# Patient Record
Sex: MALE | Race: WHITE | NOT HISPANIC OR LATINO | Employment: OTHER | URBAN - NONMETROPOLITAN AREA
[De-identification: names, ages, dates, MRNs, and addresses within clinical notes are randomized per-mention and may not be internally consistent; named-entity substitution may affect disease eponyms.]

---

## 2022-03-15 ENCOUNTER — CONSULT (OUTPATIENT)
Dept: ONCOLOGY | Facility: CLINIC | Age: 67
End: 2022-03-15

## 2022-03-15 VITALS
TEMPERATURE: 99.1 F | RESPIRATION RATE: 18 BRPM | SYSTOLIC BLOOD PRESSURE: 165 MMHG | WEIGHT: 251 LBS | DIASTOLIC BLOOD PRESSURE: 78 MMHG | BODY MASS INDEX: 35.14 KG/M2 | HEIGHT: 71 IN | HEART RATE: 70 BPM | OXYGEN SATURATION: 93 %

## 2022-03-15 DIAGNOSIS — C61 PROSTATE CANCER METASTATIC TO MULTIPLE SITES: Primary | ICD-10-CM

## 2022-03-15 PROCEDURE — 99205 OFFICE O/P NEW HI 60 MIN: CPT | Performed by: INTERNAL MEDICINE

## 2022-03-15 RX ORDER — ONDANSETRON HYDROCHLORIDE 8 MG/1
8 TABLET, FILM COATED ORAL 3 TIMES DAILY PRN
Qty: 30 TABLET | Refills: 5 | Status: SHIPPED | OUTPATIENT
Start: 2022-03-15 | End: 2022-07-26 | Stop reason: SDUPTHER

## 2022-03-15 RX ORDER — BICALUTAMIDE 50 MG/1
TABLET, FILM COATED ORAL
COMMUNITY
Start: 2022-03-04 | End: 2022-04-28 | Stop reason: ALTCHOICE

## 2022-03-15 RX ORDER — DIPHENHYDRAMINE HYDROCHLORIDE 50 MG/ML
50 INJECTION INTRAMUSCULAR; INTRAVENOUS AS NEEDED
Status: CANCELLED | OUTPATIENT
Start: 2022-04-07

## 2022-03-15 RX ORDER — FAMOTIDINE 10 MG/ML
20 INJECTION, SOLUTION INTRAVENOUS AS NEEDED
Status: CANCELLED | OUTPATIENT
Start: 2022-04-07

## 2022-03-15 RX ORDER — DEXAMETHASONE 4 MG/1
TABLET ORAL
Qty: 12 TABLET | Refills: 5 | Status: SHIPPED | OUTPATIENT
Start: 2022-03-15 | End: 2022-07-26

## 2022-03-15 RX ORDER — TAMSULOSIN HYDROCHLORIDE 0.4 MG/1
CAPSULE ORAL
COMMUNITY
Start: 2022-03-02 | End: 2022-07-29 | Stop reason: ALTCHOICE

## 2022-03-15 RX ORDER — FINASTERIDE 5 MG/1
TABLET, FILM COATED ORAL
COMMUNITY
Start: 2022-02-24 | End: 2022-07-29 | Stop reason: ALTCHOICE

## 2022-03-15 RX ORDER — SODIUM CHLORIDE 9 MG/ML
250 INJECTION, SOLUTION INTRAVENOUS ONCE
Status: CANCELLED | OUTPATIENT
Start: 2022-04-07

## 2022-03-15 NOTE — PROGRESS NOTES
CHIEF COMPLAINT: Metastatic prostate cancer    REASON FOR REFERRAL: Metastatic prostate cancer      RECORDS OBTAINED  Records of the patients history including those obtained from urologist were reviewed and summarized in detail.    Oncology/Hematology History Overview Note   1.  Prostate cancer clinical T1c and 2M1B stage IVb  2.  Urinary retention with Goodwin in place 1/24/2022.  On finasteride 1/24/2022.  3.  Covid 19 December 2021    Oncology history timeline:  -11/29/2021  with symptoms of urinary retention.  -2/15/2022 prostate biopsy adenocarcinoma grade group 5 and 3 out of 12 cores, grade group 4 in 1 out of 12 cores, grade group 3 in 8 out of 12 cores.  -2/24/2022 CT chest abdomen pelvis and total body bone scan shows left third rib, right acetabulum, periaortic and retroperitoneal kizzy metastases complaining of pain in the left chest and right hip.  Also possible sclerotic left 10th rib on CT.  Spleen mildly enlarged 13.8 cm.  Hepatic steatosis.  Small liver cyst.  Fat stranding in the periaortic and mesenteric region consistent with reactive/inflammatory stranding.  Multiple enlarged periaortic and retroperitoneal nodes and lymphadenopathy largest 4.9 cm.  CT chest describes tiny sclerotic foci in left eighth rib and right lateral seventh rib and T1.  Multiple small mediastinal and bilateral axillary nodes seen with small hypodense left thyroid nodule.  Creatinine 1.7.  -3/4/2022 office note Dr. Rolando Matute: Patient was seen after his elevated PSA by Dr. Vera and prostate biopsy scheduled.  However, he developed COVID-19 and this was canceled and the patient did not reschedule due to lack of insurance.  Saw Dr. Matute back on 1/24/2022 with plans to get staging CTs and bone scan with results as outlined above.  Started Casodex and to return on 3/11/2022 for Lupron.  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  TURP planned for urinary  retention symptoms.    -3/15/2022 [facility] medical oncology initial consultation: I reviewed the above data with the patient.  With his fairly bulky retroperitoneal adenopathy and bone metastases including extra axial metastases, he would fit the data from the CHAARTED trial for which Taxotere x6 followed by Zytiga prednisone along with the planned Lupron already being planned by Dr. Matute would be reasonable.  Equally reasonable in terms of comparisons with bicalutamide and Lupron would be Zytiga prednisone plus Lupron or Xtandi plus Lupron or apalutamide plus Lupron.  None of these have been compared head-to-head but all have beaten combined androgen deprivation therapy with bicalutamide and Lupron.  At the age of 67 and in order to have as many bullets as possible down the road and hence saving some of the hormone blockade options for later and using chemotherapy when he is younger and healthier for a more time-limited.,  He has opted for the Taxotere x6 followed by Zytiga and prednisone.  We will also give him Xgeva to improve bone health and given metastatic disease, as with all metastatic prostate cancer patients, he needs genetic counseling both for his sake as well as his family's.  If he were to have BRCA1 or other such  mutations, then that also opens up the options for PARP inhibitors etc. down the road.  He has his TURP scheduled for 2/24/2022 and we will start treatment a couple of weeks after that and he will get chemo preparation visit in the meantime and I will get capital surgeons to place a port.     Prostate cancer metastatic to multiple sites (HCC)   3/15/2022 Initial Diagnosis    Prostate cancer metastatic to multiple sites (HCC)     3/15/2022 Cancer Staged    Staging form: Prostate, AJCC 8th Edition  - Clinical: Stage IVB (cT1c, cN1, cM1b, Grade Group: 5) - Signed by Fritz Goodson MD on 3/15/2022         HISTORY OF PRESENT ILLNESS:  The patient is a 67 y.o.  male, referred for  "discussions of management of metastatic prostate cancer    REVIEW OF SYSTEMS:  No somatic complaints    History reviewed. No pertinent past medical history.  Past Surgical History:   Procedure Laterality Date   • PROSTATE BIOPSY  02/2022    dr. sue       Current Outpatient Medications on File Prior to Visit   Medication Sig Dispense Refill   • bicalutamide (CASODEX) 50 MG chemo tablet      • finasteride (PROSCAR) 5 MG tablet      • tamsulosin (FLOMAX) 0.4 MG capsule 24 hr capsule        No current facility-administered medications on file prior to visit.       No Known Allergies    Social History     Socioeconomic History   • Marital status: Single   Tobacco Use   • Smoking status: Never Smoker   Substance and Sexual Activity   • Alcohol use: Not Currently     Comment: no drinking x's 20 years   • Drug use: Yes     Types: Marijuana     Comment: x 45 years       Family History   Problem Relation Age of Onset   • Cancer Sister    • Cancer Brother        PHYSICAL EXAM:  No respiratory distress.  No palpable adenopathy    /78   Pulse 70   Temp 99.1 °F (37.3 °C)   Resp 18   Ht 180.3 cm (71\")   Wt 114 kg (251 lb)   SpO2 93%   BMI 35.01 kg/m²                 ECOG: (0) Fully Active - Able to Carry On All Pre-disease Performance Without Restriction    No results found for: HGB, HCT, MCV, PLT, WBC, NEUTROABS, LYMPHSABS, MONOSABS, EOSABS, BASOSABS  No results found for: GLUCOSE, BUN, CREATININE, NA, K, CL, CO2, CALCIUM, PROTEINTOT, ALBUMIN, BILITOT, ALKPHOS, AST, ALT      Assessment/Plan   1.  Prostate cancer clinical T1c and 2M1B stage IVb  2.  Urinary retention with Goodwin in place 1/24/2022.  On finasteride 1/24/2022.  3.  Covid 19 December 2021    Oncology history timeline:  -11/29/2021  with symptoms of urinary retention.  -2/15/2022 prostate biopsy adenocarcinoma grade group 5 and 3 out of 12 cores, grade group 4 in 1 out of 12 cores, grade group 3 in 8 out of 12 cores.  -2/24/2022 CT chest " abdomen pelvis and total body bone scan shows left third rib, right acetabulum, periaortic and retroperitoneal kizzy metastases complaining of pain in the left chest and right hip.  Also possible sclerotic left 10th rib on CT.  Spleen mildly enlarged 13.8 cm.  Hepatic steatosis.  Small liver cyst.  Fat stranding in the periaortic and mesenteric region consistent with reactive/inflammatory stranding.  Multiple enlarged periaortic and retroperitoneal nodes and lymphadenopathy largest 4.9 cm.  CT chest describes tiny sclerotic foci in left eighth rib and right lateral seventh rib and T1.  Multiple small mediastinal and bilateral axillary nodes seen with small hypodense left thyroid nodule.  Creatinine 1.7.  -3/4/2022 office note Dr. Rolando Matute: Patient was seen after his elevated PSA by Dr. Vera and prostate biopsy scheduled.  However, he developed COVID-19 and this was canceled and the patient did not reschedule due to lack of insurance.  Saw Dr. Matute back on 1/24/2022 with plans to get staging CTs and bone scan with results as outlined above.  Started Casodex and to return on 3/11/2022 for Lupron.  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  TURP planned for urinary retention symptoms.    -3/15/2022 [facility] initial consultation: I reviewed the above data with the patient.  With his fairly bulky retroperitoneal adenopathy and bone metastases including extra axial metastases, he would fit the data from the CHAARTED trial for which Taxotere x6 followed by Zytiga prednisone along with the planned Lupron already being planned by Dr. Matute would be reasonable.  Equally reasonable in terms of comparisons with bicalutamide and Lupron would be Zytiga prednisone plus Lupron or Xtandi plus Lupron or apalutamide plus Lupron.  None of these have been compared head-to-head but all have beaten combined androgen deprivation therapy with bicalutamide  and Lupron.  At the age of 67 and in order to have as many bullets as possible down the road and hence saving some of the hormone blockade options for later and using chemotherapy when he is younger and healthier for a more time-limited.,  He has opted for the Taxotere x6 followed by Zytiga and prednisone.  We will also give him Xgeva to improve bone health and given metastatic disease, as with all metastatic prostate cancer patients, he needs genetic counseling both for his sake as well as his family's.  If he were to have BRCA1 or other such  mutations, then that also opens up the options for PARP inhibitors etc. down the road.He has his TURP scheduled for 2/24/2022 and we will start treatment a couple of weeks after that and he will get chemo preparation visit in the meantime and I will get capital surgeons to place a port.  We will check his PSA after his TURP when he sees my nurse practitioner back early April for the start of chemotherapy and repeat the PSA and CT chest abdomen pelvis and bone scan after the Taxotere is complete.      Total time of care today inclusive of time spent today reviewing this data in detail with the patient and after visit instituting this plan and the various options for therapy and the pros and cons for each including toxicities and the fact that none of them are curative took 1 hour of patient care time throughout the day today.      Fritz Goodson MD    3/15/2022

## 2022-03-31 ENCOUNTER — OFFICE VISIT (OUTPATIENT)
Dept: ONCOLOGY | Facility: CLINIC | Age: 67
End: 2022-03-31

## 2022-03-31 VITALS
RESPIRATION RATE: 20 BRPM | BODY MASS INDEX: 35.28 KG/M2 | HEART RATE: 68 BPM | WEIGHT: 252 LBS | DIASTOLIC BLOOD PRESSURE: 76 MMHG | HEIGHT: 71 IN | OXYGEN SATURATION: 93 % | TEMPERATURE: 97.6 F | SYSTOLIC BLOOD PRESSURE: 149 MMHG

## 2022-03-31 DIAGNOSIS — Z95.828 PORT-A-CATH IN PLACE: ICD-10-CM

## 2022-03-31 DIAGNOSIS — C61 PROSTATE CANCER METASTATIC TO MULTIPLE SITES: Primary | ICD-10-CM

## 2022-03-31 PROCEDURE — 99215 OFFICE O/P EST HI 40 MIN: CPT | Performed by: NURSE PRACTITIONER

## 2022-03-31 RX ORDER — LIDOCAINE AND PRILOCAINE 25; 25 MG/G; MG/G
1 CREAM TOPICAL AS NEEDED
Qty: 30 G | Refills: 3 | Status: SHIPPED | OUTPATIENT
Start: 2022-03-31

## 2022-03-31 NOTE — PROGRESS NOTES
CHEMOTHERAPY PREPARATION    Chris Morfin  [id number]  1955    Subjective   Chief Complaint: Treatment Preparation and Needs Assessment    History of present illness:  Chris Morfin is a 67 y.o. year old male who is here today for chemotherapy preparation and needs assessment. The patient has been diagnosed with metastatic prostate cancer and is scheduled to begin treatment with Taxotere.  He will also be receiving Xgeva.    Oncology History:    Oncology/Hematology History Overview Note   1.  Prostate cancer clinical T1c and 2M1B stage IVb  2.  Urinary retention with Goodwin in place 1/24/2022.  On finasteride 1/24/2022.  3.  Covid 19 December 2021    Oncology history timeline:  -11/29/2021  with symptoms of urinary retention.  -2/15/2022 prostate biopsy adenocarcinoma grade group 5 and 3 out of 12 cores, grade group 4 in 1 out of 12 cores, grade group 3 in 8 out of 12 cores.  -2/24/2022 CT chest abdomen pelvis and total body bone scan shows left third rib, right acetabulum, periaortic and retroperitoneal kizzy metastases complaining of pain in the left chest and right hip.  Also possible sclerotic left 10th rib on CT.  Spleen mildly enlarged 13.8 cm.  Hepatic steatosis.  Small liver cyst.  Fat stranding in the periaortic and mesenteric region consistent with reactive/inflammatory stranding.  Multiple enlarged periaortic and retroperitoneal nodes and lymphadenopathy largest 4.9 cm.  CT chest describes tiny sclerotic foci in left eighth rib and right lateral seventh rib and T1.  Multiple small mediastinal and bilateral axillary nodes seen with small hypodense left thyroid nodule.  Creatinine 1.7.  -3/4/2022 office note Dr. Rolando Matute: Patient was seen after his elevated PSA by Dr. Vera and prostate biopsy scheduled.  However, he developed COVID-19 and this was canceled and the patient did not reschedule due to lack of insurance.  Saw Dr. Matute back on 1/24/2022 with plans to get staging CTs and  bone scan with results as outlined above.  Started Casodex and to return on 3/11/2022 for Lupron.  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  TURP planned for urinary retention symptoms.    -3/15/2022 [facility] medical oncology initial consultation: I reviewed the above data with the patient.  With his fairly bulky retroperitoneal adenopathy and bone metastases including extra axial metastases, he would fit the data from the CHAARTED trial for which Taxotere x6 followed by Zytiga prednisone along with the planned Lupron already being planned by Dr. Matute would be reasonable.  Equally reasonable in terms of comparisons with bicalutamide and Lupron would be Zytiga prednisone plus Lupron or Xtandi plus Lupron or apalutamide plus Lupron.  None of these have been compared head-to-head but all have beaten combined androgen deprivation therapy with bicalutamide and Lupron.  At the age of 67 and in order to have as many bullets as possible down the road and hence saving some of the hormone blockade options for later and using chemotherapy when he is younger and healthier for a more time-limited.,  He has opted for the Taxotere x6 followed by Zytiga and prednisone.  We will also give him Xgeva to improve bone health and given metastatic disease, as with all metastatic prostate cancer patients, he needs genetic counseling both for his sake as well as his family's.  If he were to have BRCA1 or other such  mutations, then that also opens up the options for PARP inhibitors etc. down the road.  He has his TURP scheduled for 2/24/2022 and we will start treatment a couple of weeks after that and he will get chemo preparation visit in the meantime and I will get capital surgeons to place a port.  We will check his PSA after his TURP when he sees my nurse practitioner back early April for the start of chemotherapy and repeat the PSA and CT chest abdomen pelvis and bone  "scan after the Taxotere is complete.     Prostate cancer metastatic to multiple sites (HCC)   3/15/2022 Initial Diagnosis    Prostate cancer metastatic to multiple sites (HCC)     3/15/2022 Cancer Staged    Staging form: Prostate, AJCC 8th Edition  - Clinical: Stage IVB (cT1c, cN1, cM1b, Grade Group: 5) - Signed by Fritz Goodson MD on 3/15/2022     4/7/2022 -  Chemotherapy    OP PROSTATE DOCEtaxel     4/7/2022 -  Chemotherapy    OP SUPPORTIVE Denosumab (Xgeva) Q28D     8/11/2022 -  Chemotherapy    OP PROSTATE Abiraterone / PredniSONE         The current medication list and allergy list were reviewed and reconciled.     Past Medical History, Past Surgical History, Social History, Family History have been reviewed and are without significant changes except as mentioned.    Review of Systems    Negative for somatic concerns    Objective   Physical Exam  Vital Signs: /76   Pulse 68   Temp 97.6 °F (36.4 °C)   Resp 20   Ht 180.3 cm (71\")   Wt 114 kg (252 lb)   SpO2 93%   BMI 35.15 kg/m²   Vitals:    03/31/22 0930   PainSc: 0-No pain           General Appearance:  alert, cooperative, no apparent distress and appears stated age   Neurologic/Psychiatric: A&O x 3, gait steady, appropriate affect   HEENT:  Normocephalic, without obvious abnormality, mucous membranes moist   Lungs:   Respirations regular, even, and unlabored bilaterally       Extremities: Normal, atraumatic; no clubbing, cyanosis, or edema    Skin: No rashes, lesions, or abnormal coloration noted     ECOG Performance Status: 0 - Asymptomatic            NEEDS ASSESSMENTS    Genetics  The patient's new diagnosis and family history have been reviewed for genetic counseling needs. A genetic referral is recommended.  Referral has been made, appointment pending.      Psychosocial  The patient has completed a PHQ-9 Depression Screening and the Distress Thermometer (DT) today.   PHQ-9 Total Score:  . PHQ-9 results show 1-4 (Minimal Depression). The patient " scored their distress today as 1 on a scale of 0-10 with 0 being no distress and 10 being extreme distress.   Problems marked by the patient as being an issue for them within the last week include no problems.   Results were reviewed along with psychosocial resources offered by our cancer center. Our oncology social worker will be flagged for a DT score of 4 or above, and a same day call will be made for a score of 9 or 10. A mental health referral is not recommended at this time. The patient is not accepting of a referral to [facility].   Copies of patient's questionnaires will be scanned into EMR for details and further reference.    Barriers to care  A barriers form was also completed by the patient today. We discussed services offered by our facility to help him have adequate access to care. The patient was given the name and card for our Oncology Social Worker, Ivana Clark. Based upon barriers assessment today, the patient will not require a follow-up call from the  to further discuss needs.   A copy of the barriers form will also be scanned into EMR for details and further reference.     VAD Assessment  The patient and I discussed planned intervenous chemotherapy as well as other IV treatments that are often needed throughout the course of treatment. These may include, but are not limited to blood transfusions, antibiotics, and IV hydration. The vasculature does not appear to be adequate for multiple peripheral IVs throughout their treatment course. Discussed risks and benefits of VADs. The patient would like to pursue Port-A-Cath insertion prior to initiation of treatment.   He now has port in place in his right chest wall.  Dressing on port site is clean, dry.  I discussed with him that since his port was placed 1 week ago he can remove the Tegaderm dressing, I instructed him to leave any Steri-Strips in place.  I also have sent a prescription for EMLA cream and instructed him on how  "to apply it.      Advance Care Planning   ACP discussion was declined by the patient. Patient does not have an advance directive, information provided.  The patient and I discussed advanced care planning, \"Conversations that Matter\".   This service was offered, free of charge, for development of advance directives with a certified ACP facilitator.  The patient does not have an up-to-date advanced directive. This document is not on file with our office. The patient is not interested in an appointment with one of our facilitators to create or update their advanced directives.         Palliative Care  The patient and I discussed palliative care services. Palliative care is not the same as Hospice care. This is specialized medical care for people living with serious illness with the goal of improving quality of life for the patient and their family. [facility] has partnered with [facility] to offer our patients outpatient palliative care early along with their treatment to assist in coordination of care, symptom management, pain management, and medical decision making.  Oncology criteria for palliative care referral is not met at this time. The patient is not interested in a palliative care consultation.     Additional Referral needs  none      CHEMOTHERAPY EDUCATION    Booklets Given: Chemotherapy and You []  Eating Hints []    Sexuality/Fertility Books []      Chemotherapy/Biotherapy Education Sheets: (list all that apply)  nausea management, diarrhea management, Cancer resourse contacts information and skin and mouth care                                                                                                                                                                 Chemotherapy Regimen:   Treatment Plans     Name Type Plan Dates Plan Provider         Active    OP SUPPORTIVE Denosumab (Xgeva) Q28D ONCOLOGY SUPPORTIVE CARE 1  4/7/2022 - Present Fritz Goodson MD     OP PROSTATE DOCEtaxel " ONCOLOGY TREATMENT  3/15/2022 - Present Fritz Goodson MD                    DETAILED CHEMOTHERAPY TEACHING COMPLETED BY PHARMACY. CHEMOTHERAPY CONSENT COMPLETED BY PHARMACY. SEE PHARMACY EDUCATION FOR DOCUMENTATION.     Chemotherapy education comprehension reviewed. Questions answered and additional information discussed on topics including:  Anemia, Thrombocytopenia, Neutropenia, Diarrhea, Nausea & vomiting, Mouth sores, Alopecia, Nervous system changes, Skin & nail changes, Home care, Vaccinations and we discussed the COVID vaccine of which I recommend he get.  He states he will consider but likely will not get        Assessment and Plan:    Diagnoses and all orders for this visit:    1. Prostate cancer metastatic to multiple sites (HCC) (Primary)  -     CBC and Differential; Future  -     Comprehensive metabolic panel; Future  -     Lab Appointment Request; Future  -     Clinic Appointment Request; Future  -     Infusion Appointment Request 3; Future  -     lidocaine-prilocaine (EMLA) 2.5-2.5 % cream; Apply 1 application topically to the appropriate area as directed As Needed (prior to port access).  Dispense: 30 g; Refill: 3    2. Port-A-Cath in place  -     lidocaine-prilocaine (EMLA) 2.5-2.5 % cream; Apply 1 application topically to the appropriate area as directed As Needed (prior to port access).  Dispense: 30 g; Refill: 3        I reviewed the medications he will take at home including dexamethasone that he will take with each treatment, the day before, day of treatment and day after treatment.  I also sent a prescription for EMLA cream today.  He also has Zofran at home to use if needed for nausea.  He is scheduled for lab draw including PSA and port flush on Tuesday, I will see him back Thursday for his first treatment to review his labs with him and address any further questions.    The patient and I have reviewed their new cancer diagnosis and scheduled treatment plan. Needs assessment was completed  including genetics, psychosocial needs, barriers to care, VAD evaluation, advanced care planning, and palliative care services. Referrals have been ordered as appropriate based upon our evaluation and patient desires.     Adequate time was given to answer all questions to his satisfaction. Patient and family are aware of their care team members and contact information if they have questions or problems throughout the treatment course. Needs assessments and education has been completed. The patient is adequately prepared to begin treatment as scheduled.     I spent 45 minutes caring for Chris on this date of service. This time includes time spent by me in the following activities: preparing for the visit, reviewing tests, obtaining and/or reviewing a separately obtained history, performing a medically appropriate examination and/or evaluation, counseling and educating the patient/family/caregiver and documenting information in the medical record.     Electronically signed by KARYN Weber on 03/31/22 at 11:41 EDT.

## 2022-04-05 ENCOUNTER — INFUSION (OUTPATIENT)
Dept: ONCOLOGY | Facility: HOSPITAL | Age: 67
End: 2022-04-05

## 2022-04-05 VITALS
HEART RATE: 79 BPM | TEMPERATURE: 99.1 F | BODY MASS INDEX: 35.73 KG/M2 | DIASTOLIC BLOOD PRESSURE: 84 MMHG | WEIGHT: 256.2 LBS | SYSTOLIC BLOOD PRESSURE: 138 MMHG | RESPIRATION RATE: 18 BRPM

## 2022-04-05 DIAGNOSIS — Z45.2 ENCOUNTER FOR CARE RELATED TO VASCULAR ACCESS PORT: ICD-10-CM

## 2022-04-05 DIAGNOSIS — C61 PROSTATE CANCER METASTATIC TO MULTIPLE SITES: Primary | ICD-10-CM

## 2022-04-05 PROCEDURE — 25010000002 HEPARIN LOCK FLUSH PER 10 UNITS: Performed by: INTERNAL MEDICINE

## 2022-04-05 PROCEDURE — 36591 DRAW BLOOD OFF VENOUS DEVICE: CPT

## 2022-04-05 RX ORDER — HEPARIN SODIUM (PORCINE) LOCK FLUSH IV SOLN 100 UNIT/ML 100 UNIT/ML
500 SOLUTION INTRAVENOUS AS NEEDED
Status: CANCELLED | OUTPATIENT
Start: 2022-04-05

## 2022-04-05 RX ORDER — SODIUM CHLORIDE 0.9 % (FLUSH) 0.9 %
20 SYRINGE (ML) INJECTION AS NEEDED
Status: CANCELLED | OUTPATIENT
Start: 2022-04-05

## 2022-04-05 RX ORDER — HEPARIN SODIUM (PORCINE) LOCK FLUSH IV SOLN 100 UNIT/ML 100 UNIT/ML
500 SOLUTION INTRAVENOUS AS NEEDED
Status: DISCONTINUED | OUTPATIENT
Start: 2022-04-05 | End: 2022-04-05 | Stop reason: HOSPADM

## 2022-04-05 RX ADMIN — HEPARIN 500 UNITS: 100 SYRINGE at 08:40

## 2022-04-06 LAB
ALBUMIN SERPL-MCNC: 4.1 G/DL (ref 3.8–4.8)
ALBUMIN/GLOB SERPL: 1.5 {RATIO} (ref 1.2–2.2)
ALP SERPL-CCNC: 119 IU/L (ref 44–121)
ALT SERPL-CCNC: 11 IU/L (ref 0–44)
AST SERPL-CCNC: 15 IU/L (ref 0–40)
BASOPHILS # BLD AUTO: 0.1 X10E3/UL (ref 0–0.2)
BASOPHILS NFR BLD AUTO: 2 %
BILIRUB SERPL-MCNC: 0.3 MG/DL (ref 0–1.2)
BUN SERPL-MCNC: 21 MG/DL (ref 8–27)
BUN/CREAT SERPL: 16 (ref 10–24)
CALCIUM SERPL-MCNC: 8.7 MG/DL (ref 8.6–10.2)
CHLORIDE SERPL-SCNC: 105 MMOL/L (ref 96–106)
CO2 SERPL-SCNC: 19 MMOL/L (ref 20–29)
CREAT SERPL-MCNC: 1.34 MG/DL (ref 0.76–1.27)
EGFRCR SERPLBLD CKD-EPI 2021: 58 ML/MIN/1.73
EOSINOPHIL # BLD AUTO: 0.4 X10E3/UL (ref 0–0.4)
EOSINOPHIL NFR BLD AUTO: 6 %
ERYTHROCYTE [DISTWIDTH] IN BLOOD BY AUTOMATED COUNT: 13.5 % (ref 11.6–15.4)
GLOBULIN SER CALC-MCNC: 2.7 G/DL (ref 1.5–4.5)
GLUCOSE SERPL-MCNC: 115 MG/DL (ref 65–99)
HCT VFR BLD AUTO: 39.1 % (ref 37.5–51)
HGB BLD-MCNC: 13.1 G/DL (ref 13–17.7)
IMM GRANULOCYTES # BLD AUTO: 0 X10E3/UL (ref 0–0.1)
IMM GRANULOCYTES NFR BLD AUTO: 0 %
LYMPHOCYTES # BLD AUTO: 2.1 X10E3/UL (ref 0.7–3.1)
LYMPHOCYTES NFR BLD AUTO: 30 %
MAGNESIUM SERPL-MCNC: 2 MG/DL (ref 1.6–2.3)
MCH RBC QN AUTO: 29 PG (ref 26.6–33)
MCHC RBC AUTO-ENTMCNC: 33.5 G/DL (ref 31.5–35.7)
MCV RBC AUTO: 87 FL (ref 79–97)
MONOCYTES # BLD AUTO: 0.6 X10E3/UL (ref 0.1–0.9)
MONOCYTES NFR BLD AUTO: 8 %
NEUTROPHILS # BLD AUTO: 3.7 X10E3/UL (ref 1.4–7)
NEUTROPHILS NFR BLD AUTO: 54 %
PHOSPHATE SERPL-MCNC: 3.2 MG/DL (ref 2.8–4.1)
PLATELET # BLD AUTO: 281 X10E3/UL (ref 150–450)
POTASSIUM SERPL-SCNC: 4.2 MMOL/L (ref 3.5–5.2)
PROT SERPL-MCNC: 6.8 G/DL (ref 6–8.5)
PSA SERPL-MCNC: 259 NG/ML (ref 0–4)
RBC # BLD AUTO: 4.52 X10E6/UL (ref 4.14–5.8)
SODIUM SERPL-SCNC: 140 MMOL/L (ref 134–144)
WBC # BLD AUTO: 6.8 X10E3/UL (ref 3.4–10.8)

## 2022-04-07 ENCOUNTER — OFFICE VISIT (OUTPATIENT)
Dept: ONCOLOGY | Facility: CLINIC | Age: 67
End: 2022-04-07

## 2022-04-07 ENCOUNTER — INFUSION (OUTPATIENT)
Dept: ONCOLOGY | Facility: HOSPITAL | Age: 67
End: 2022-04-07

## 2022-04-07 VITALS
TEMPERATURE: 97.2 F | HEIGHT: 71 IN | RESPIRATION RATE: 18 BRPM | WEIGHT: 257 LBS | DIASTOLIC BLOOD PRESSURE: 76 MMHG | HEART RATE: 82 BPM | OXYGEN SATURATION: 97 % | BODY MASS INDEX: 35.98 KG/M2 | SYSTOLIC BLOOD PRESSURE: 148 MMHG

## 2022-04-07 DIAGNOSIS — C61 PROSTATE CANCER METASTATIC TO MULTIPLE SITES: Primary | ICD-10-CM

## 2022-04-07 DIAGNOSIS — Z45.2 ENCOUNTER FOR CARE RELATED TO VASCULAR ACCESS PORT: ICD-10-CM

## 2022-04-07 PROCEDURE — 99213 OFFICE O/P EST LOW 20 MIN: CPT | Performed by: NURSE PRACTITIONER

## 2022-04-07 PROCEDURE — 25010000002 DENOSUMAB 120 MG/1.7ML SOLUTION: Performed by: INTERNAL MEDICINE

## 2022-04-07 PROCEDURE — 96372 THER/PROPH/DIAG INJ SC/IM: CPT

## 2022-04-07 PROCEDURE — 25010000002 DOCETAXEL 20 MG/ML SOLUTION 8 ML VIAL: Performed by: INTERNAL MEDICINE

## 2022-04-07 PROCEDURE — 96413 CHEMO IV INFUSION 1 HR: CPT

## 2022-04-07 PROCEDURE — 25010000002 HEPARIN LOCK FLUSH PER 10 UNITS: Performed by: INTERNAL MEDICINE

## 2022-04-07 RX ORDER — HEPARIN SODIUM (PORCINE) LOCK FLUSH IV SOLN 100 UNIT/ML 100 UNIT/ML
500 SOLUTION INTRAVENOUS AS NEEDED
Status: DISCONTINUED | OUTPATIENT
Start: 2022-04-07 | End: 2022-04-07 | Stop reason: HOSPADM

## 2022-04-07 RX ORDER — SODIUM CHLORIDE 9 MG/ML
250 INJECTION, SOLUTION INTRAVENOUS ONCE
Status: COMPLETED | OUTPATIENT
Start: 2022-04-07 | End: 2022-04-07

## 2022-04-07 RX ORDER — HEPARIN SODIUM (PORCINE) LOCK FLUSH IV SOLN 100 UNIT/ML 100 UNIT/ML
500 SOLUTION INTRAVENOUS AS NEEDED
Status: CANCELLED | OUTPATIENT
Start: 2022-04-07

## 2022-04-07 RX ORDER — SODIUM CHLORIDE 0.9 % (FLUSH) 0.9 %
20 SYRINGE (ML) INJECTION AS NEEDED
Status: CANCELLED | OUTPATIENT
Start: 2022-04-07

## 2022-04-07 RX ADMIN — DOCETAXEL 175 MG: 20 INJECTION, SOLUTION, CONCENTRATE INTRAVENOUS at 09:28

## 2022-04-07 RX ADMIN — Medication 500 UNITS: at 10:38

## 2022-04-07 RX ADMIN — SODIUM CHLORIDE 250 ML: 9 INJECTION, SOLUTION INTRAVENOUS at 09:28

## 2022-04-07 RX ADMIN — DENOSUMAB 120 MG: 120 INJECTION SUBCUTANEOUS at 10:39

## 2022-04-07 NOTE — PROGRESS NOTES
CHIEF COMPLAINT: Metastatic prostate cancer    Problem List:  Oncology/Hematology History Overview Note   1.  Prostate cancer clinical T1c and 2M1B stage IVb  2.  Urinary retention with Goodwin in place 1/24/2022.  On finasteride 1/24/2022.  3.  Covid 19 December 2021    Oncology history timeline:  -11/29/2021  with symptoms of urinary retention.  -2/15/2022 prostate biopsy adenocarcinoma grade group 5 and 3 out of 12 cores, grade group 4 in 1 out of 12 cores, grade group 3 in 8 out of 12 cores.  -2/24/2022 CT chest abdomen pelvis and total body bone scan shows left third rib, right acetabulum, periaortic and retroperitoneal kizzy metastases complaining of pain in the left chest and right hip.  Also possible sclerotic left 10th rib on CT.  Spleen mildly enlarged 13.8 cm.  Hepatic steatosis.  Small liver cyst.  Fat stranding in the periaortic and mesenteric region consistent with reactive/inflammatory stranding.  Multiple enlarged periaortic and retroperitoneal nodes and lymphadenopathy largest 4.9 cm.  CT chest describes tiny sclerotic foci in left eighth rib and right lateral seventh rib and T1.  Multiple small mediastinal and bilateral axillary nodes seen with small hypodense left thyroid nodule.  Creatinine 1.7.  -3/4/2022 office note Dr. Rolando Matute: Patient was seen after his elevated PSA by Dr. Vera and prostate biopsy scheduled.  However, he developed COVID-19 and this was canceled and the patient did not reschedule due to lack of insurance.  Saw Dr. Matute back on 1/24/2022 with plans to get staging CTs and bone scan with results as outlined above.  Started Casodex and to return on 3/11/2022 for Lupron.  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  TURP planned for urinary retention symptoms.    -3/15/2022 [facility] initial consultation: I reviewed the above data with the patient.  With his fairly bulky retroperitoneal  adenopathy and bone metastases including extra axial metastases, he would fit the data from the CHAARTED trial for which Taxotere x6 followed by Zytiga prednisone along with the planned Lupron already being planned by Dr. Matute would be reasonable.  Equally reasonable in terms of comparisons with bicalutamide and Lupron would be Zytiga prednisone plus Lupron or Xtandi plus Lupron or apalutamide plus Lupron.  None of these have been compared head-to-head but all have beaten combined androgen deprivation therapy with bicalutamide and Lupron.  At the age of 67 and in order to have as many bullets as possible down the road and hence saving some of the hormone blockade options for later and using chemotherapy when he is younger and healthier for a more time-limited.,  He has opted for the Taxotere x6 followed by Zytiga and prednisone.  We will also give him Xgeva to improve bone health and given metastatic disease, as with all metastatic prostate cancer patients, he needs genetic counseling both for his sake as well as his family's.  If he were to have BRCA1 or other such  mutations, then that also opens up the options for PARP inhibitors etc. down the road.  He has his TURP scheduled for 2/24/2022 and we will start treatment a couple of weeks after that and he will get chemo preparation visit in the meantime and I will get capital surgeons to place a port.  We will check his PSA after his TURP when he sees my nurse practitioner back early April for the start of chemotherapy and repeat the PSA and CT chest abdomen pelvis and bone scan after the Taxotere is complete.     Prostate cancer metastatic to multiple sites (HCC)   3/15/2022 Initial Diagnosis    Prostate cancer metastatic to multiple sites (HCC)     3/15/2022 Cancer Staged    Staging form: Prostate, AJCC 8th Edition  - Clinical: Stage IVB (cT1c, cN1, cM1b, Grade Group: 5) - Signed by Fritz Goodson MD on 3/15/2022     4/7/2022 -  Chemotherapy    OP  "PROSTATE DOCEtaxel     4/7/2022 -  Chemotherapy    OP SUPPORTIVE Denosumab (Xgeva) Q28D     8/11/2022 -  Chemotherapy    OP PROSTATE Abiraterone / PredniSONE         HISTORY OF PRESENT ILLNESS:  The patient is a 67 y.o. male, here for follow up on management of metastatic prostate cancer beginning systemic therapy today with docetaxel and Xgeva.  Mr. Morfin reports that he is a little anxious about starting treatment but otherwise feeling well with no new concerns.    History reviewed. No pertinent past medical history.  Past Surgical History:   Procedure Laterality Date   • PROSTATE BIOPSY  02/2022    dr. sue       No Known Allergies    Family History and Social History reviewed and changed as necessary    REVIEW OF SYSTEM:   No new concerns    PHYSICAL EXAM:  General: Well-developed, well-nourished male in no distress, here with his niece today    Vitals:    04/07/22 0816   BP: 148/76   Pulse: 82   Resp: 18   Temp: 97.2 °F (36.2 °C)   SpO2: 97%   Weight: 117 kg (257 lb)   Height: 180.3 cm (71\")     Vitals:    04/07/22 0816   PainSc: 0-No pain          ECOG score: 0           Vitals reviewed.    ECOG: (0) Fully Active - Able to Carry On All Pre-disease Performance Without Restriction    Lab Results   Component Value Date    HGB 13.1 04/05/2022    HCT 39.1 04/05/2022    MCV 87 04/05/2022     04/05/2022    WBC 6.8 04/05/2022    NEUTROABS 3.7 04/05/2022    LYMPHSABS 2.1 04/05/2022    MONOSABS 0.6 04/05/2022    EOSABS 0.4 04/05/2022    BASOSABS 0.1 04/05/2022       Lab Results   Component Value Date    GLUCOSE 115 (H) 04/05/2022    BUN 21 04/05/2022    CREATININE 1.34 (H) 04/05/2022     04/05/2022    K 4.2 04/05/2022     04/05/2022    CO2 19 (L) 04/05/2022    CALCIUM 8.7 04/05/2022    ALBUMIN 4.1 04/05/2022    BILITOT 0.3 04/05/2022    ALKPHOS 119 04/05/2022    AST 15 04/05/2022    ALT 11 04/05/2022     .0, magnesium 2.0, phosphorus 3.2.        ASSESSMENT & PLAN:    1.  Prostate cancer " clinical T1c and 2M1B stage IVb  2.  Urinary retention with Goodwin in place 1/24/2022.  On finasteride 1/24/2022.  3.  Covid 19 December 2021    Oncology history timeline:  -11/29/2021  with symptoms of urinary retention.  -2/15/2022 prostate biopsy adenocarcinoma grade group 5 and 3 out of 12 cores, grade group 4 in 1 out of 12 cores, grade group 3 in 8 out of 12 cores.  -2/24/2022 CT chest abdomen pelvis and total body bone scan shows left third rib, right acetabulum, periaortic and retroperitoneal kizzy metastases complaining of pain in the left chest and right hip.  Also possible sclerotic left 10th rib on CT.  Spleen mildly enlarged 13.8 cm.  Hepatic steatosis.  Small liver cyst.  Fat stranding in the periaortic and mesenteric region consistent with reactive/inflammatory stranding.  Multiple enlarged periaortic and retroperitoneal nodes and lymphadenopathy largest 4.9 cm.  CT chest describes tiny sclerotic foci in left eighth rib and right lateral seventh rib and T1.  Multiple small mediastinal and bilateral axillary nodes seen with small hypodense left thyroid nodule.  Creatinine 1.7.  -3/4/2022 office note Dr. Rolando Matute: Patient was seen after his elevated PSA by Dr. Vera and prostate biopsy scheduled.  However, he developed COVID-19 and this was canceled and the patient did not reschedule due to lack of insurance.  Saw Dr. Matute back on 1/24/2022 with plans to get staging CTs and bone scan with results as outlined above.  Started Casodex and to return on 3/11/2022 for Lupron.  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  TURP planned for urinary retention symptoms.    -3/15/2022 [facility] initial consultation: I reviewed the above data with the patient.  With his fairly bulky retroperitoneal adenopathy and bone metastases including extra axial metastases, he would fit the data from the CHAARTED trial for which Taxotere x6  followed by Zytiga prednisone along with the planned Lupron already being planned by Dr. Matute would be reasonable.  Equally reasonable in terms of comparisons with bicalutamide and Lupron would be Zytiga prednisone plus Lupron or Xtandi plus Lupron or apalutamide plus Lupron.  None of these have been compared head-to-head but all have beaten combined androgen deprivation therapy with bicalutamide and Lupron.  At the age of 67 and in order to have as many bullets as possible down the road and hence saving some of the hormone blockade options for later and using chemotherapy when he is younger and healthier for a more time-limited.,  He has opted for the Taxotere x6 followed by Zytiga and prednisone.  We will also give him Xgeva to improve bone health and given metastatic disease, as with all metastatic prostate cancer patients, he needs genetic counseling both for his sake as well as his family's.  If he were to have BRCA1 or other such  mutations, then that also opens up the options for PARP inhibitors etc. down the road.  He has his TURP scheduled for 2/24/2022 and we will start treatment a couple of weeks after that and he will get chemo preparation visit in the meantime and I will get capital surgeons to place a port.  We will check his PSA after his TURP when he sees my nurse practitioner back early April for the start of chemotherapy and repeat the PSA and CT chest abdomen pelvis and bone scan after the Taxotere is complete.    -3/31/2022 chemotherapy education and needs assessment completed    -4/7/2022 [facility] follow-up:  Mr. Morfin will start systemic therapy today for his metastatic prostate cancer with docetaxel and Xgeva.  Labs reviewed from 4/5/2022 with normal CBC, CMP unremarkable with mild renal insufficiency with creatinine 1.34, his creatinine clearance is 87.5, phosphorus and magnesium normal,  which is down from his baseline PSA of 911 on 11/29/2021.  He receives  Kaya with Dr. Matute and is on bicalutamide.  We will treat today, I will see him back in 3 weeks for follow-up.  We plan on 6 courses of docetaxel.  Genetic counseling appointment is pending.  See note dated 3/15/2022 from Dr. Fritz Goodson for plan of care going forward.  Mr. Morfin does not have a primary care provider, we are giving him information on primary care providers available in the area as he needs to establish care.  We will do his Xgeva every 6 weeks to coordinate with his docetaxel infusions.    I spent 20 minutes caring for Chris on this date of service. This time includes time spent by me in the following activities: preparing for the visit, reviewing tests and documenting information in the medical record.     Susana Torres, APRN    04/07/2022

## 2022-04-19 ENCOUNTER — TELEPHONE (OUTPATIENT)
Dept: ONCOLOGY | Facility: CLINIC | Age: 67
End: 2022-04-19

## 2022-04-19 NOTE — TELEPHONE ENCOUNTER
Evangelina tt Dr. Goodson about continuing casodex. Dr. Goodson said to stop the casodex but continue Lupron permanently. Called Giovani's office and spoke with Stefany about dcing the casodex and continuing the lupron. Voiced understanding.

## 2022-04-19 NOTE — TELEPHONE ENCOUNTER
Caller: [facility] - DR. GORE    Relationship: Provider    Best call back number: [phone]    What medications are you currently taking:   Current Outpatient Medications on File Prior to Visit   Medication Sig Dispense Refill   • bicalutamide (CASODEX) 50 MG chemo tablet      • dexamethasone (DECADRON) 4 MG tablet Take 2 tablets oral twice a day for 3 consecutive days beginning the day before chemotherapy and continue for 6 doses. 12 tablet 5   • finasteride (PROSCAR) 5 MG tablet      • lidocaine-prilocaine (EMLA) 2.5-2.5 % cream Apply 1 application topically to the appropriate area as directed As Needed (prior to port access). 30 g 3   • ondansetron (ZOFRAN) 8 MG tablet Take 1 tablet by mouth 3 (Three) Times a Day As Needed for Nausea or Vomiting. 30 tablet 5   • tamsulosin (FLOMAX) 0.4 MG capsule 24 hr capsule        No current facility-administered medications on file prior to visit.      Which medication are you concerned about: CASODEX    Who prescribed you this medication: DR. WATERS    What are your concerns: DR. MITCHELL IS ASKING IF PATIENT SHOULD CONTINUE WITH CASODEX & LUPRON.

## 2022-04-27 ENCOUNTER — INFUSION (OUTPATIENT)
Dept: ONCOLOGY | Facility: HOSPITAL | Age: 67
End: 2022-04-27

## 2022-04-27 VITALS
BODY MASS INDEX: 36.12 KG/M2 | HEART RATE: 71 BPM | RESPIRATION RATE: 16 BRPM | SYSTOLIC BLOOD PRESSURE: 147 MMHG | DIASTOLIC BLOOD PRESSURE: 83 MMHG | TEMPERATURE: 98.9 F | WEIGHT: 259 LBS

## 2022-04-27 DIAGNOSIS — C61 PROSTATE CANCER METASTATIC TO MULTIPLE SITES: ICD-10-CM

## 2022-04-27 DIAGNOSIS — Z45.2 ENCOUNTER FOR CARE RELATED TO VASCULAR ACCESS PORT: Primary | ICD-10-CM

## 2022-04-27 PROCEDURE — 36415 COLL VENOUS BLD VENIPUNCTURE: CPT

## 2022-04-27 RX ORDER — HEPARIN SODIUM (PORCINE) LOCK FLUSH IV SOLN 100 UNIT/ML 100 UNIT/ML
500 SOLUTION INTRAVENOUS AS NEEDED
Status: DISCONTINUED | OUTPATIENT
Start: 2022-04-27 | End: 2022-04-27 | Stop reason: HOSPADM

## 2022-04-27 RX ORDER — HEPARIN SODIUM (PORCINE) LOCK FLUSH IV SOLN 100 UNIT/ML 100 UNIT/ML
500 SOLUTION INTRAVENOUS AS NEEDED
Status: CANCELLED | OUTPATIENT
Start: 2022-04-27

## 2022-04-27 RX ORDER — SODIUM CHLORIDE 0.9 % (FLUSH) 0.9 %
20 SYRINGE (ML) INJECTION AS NEEDED
Status: CANCELLED | OUTPATIENT
Start: 2022-04-27

## 2022-04-28 ENCOUNTER — INFUSION (OUTPATIENT)
Dept: ONCOLOGY | Facility: HOSPITAL | Age: 67
End: 2022-04-28

## 2022-04-28 ENCOUNTER — OFFICE VISIT (OUTPATIENT)
Dept: ONCOLOGY | Facility: CLINIC | Age: 67
End: 2022-04-28

## 2022-04-28 VITALS
TEMPERATURE: 97.9 F | OXYGEN SATURATION: 98 % | BODY MASS INDEX: 35.93 KG/M2 | SYSTOLIC BLOOD PRESSURE: 156 MMHG | DIASTOLIC BLOOD PRESSURE: 77 MMHG | WEIGHT: 257.6 LBS | HEART RATE: 86 BPM

## 2022-04-28 DIAGNOSIS — Z45.2 ENCOUNTER FOR CARE RELATED TO VASCULAR ACCESS PORT: ICD-10-CM

## 2022-04-28 DIAGNOSIS — C61 PROSTATE CANCER METASTATIC TO MULTIPLE SITES: Primary | ICD-10-CM

## 2022-04-28 LAB
ALBUMIN SERPL-MCNC: 4.3 G/DL (ref 3.8–4.8)
ALBUMIN/GLOB SERPL: 1.7 {RATIO} (ref 1.2–2.2)
ALP SERPL-CCNC: 116 IU/L (ref 44–121)
ALT SERPL-CCNC: 15 IU/L (ref 0–44)
AST SERPL-CCNC: 17 IU/L (ref 0–40)
BASOPHILS # BLD AUTO: 0.2 X10E3/UL (ref 0–0.2)
BASOPHILS NFR BLD AUTO: 2 %
BILIRUB SERPL-MCNC: 0.2 MG/DL (ref 0–1.2)
BUN SERPL-MCNC: 17 MG/DL (ref 8–27)
BUN/CREAT SERPL: 13 (ref 10–24)
CALCIUM SERPL-MCNC: 8.9 MG/DL (ref 8.6–10.2)
CHLORIDE SERPL-SCNC: 105 MMOL/L (ref 96–106)
CO2 SERPL-SCNC: 18 MMOL/L (ref 20–29)
CREAT SERPL-MCNC: 1.33 MG/DL (ref 0.76–1.27)
EGFRCR SERPLBLD CKD-EPI 2021: 59 ML/MIN/1.73
EOSINOPHIL # BLD AUTO: 0 X10E3/UL (ref 0–0.4)
EOSINOPHIL NFR BLD AUTO: 1 %
ERYTHROCYTE [DISTWIDTH] IN BLOOD BY AUTOMATED COUNT: 13.5 % (ref 11.6–15.4)
GLOBULIN SER CALC-MCNC: 2.5 G/DL (ref 1.5–4.5)
GLUCOSE SERPL-MCNC: 107 MG/DL (ref 65–99)
HCT VFR BLD AUTO: 39.7 % (ref 37.5–51)
HGB BLD-MCNC: 13.2 G/DL (ref 13–17.7)
IMM GRANULOCYTES # BLD AUTO: 0.1 X10E3/UL (ref 0–0.1)
IMM GRANULOCYTES NFR BLD AUTO: 2 %
LYMPHOCYTES # BLD AUTO: 2.1 X10E3/UL (ref 0.7–3.1)
LYMPHOCYTES NFR BLD AUTO: 26 %
MCH RBC QN AUTO: 28.8 PG (ref 26.6–33)
MCHC RBC AUTO-ENTMCNC: 33.2 G/DL (ref 31.5–35.7)
MCV RBC AUTO: 87 FL (ref 79–97)
MONOCYTES # BLD AUTO: 0.7 X10E3/UL (ref 0.1–0.9)
MONOCYTES NFR BLD AUTO: 8 %
NEUTROPHILS # BLD AUTO: 5 X10E3/UL (ref 1.4–7)
NEUTROPHILS NFR BLD AUTO: 61 %
PLATELET # BLD AUTO: 225 X10E3/UL (ref 150–450)
POTASSIUM SERPL-SCNC: 4.5 MMOL/L (ref 3.5–5.2)
PROT SERPL-MCNC: 6.8 G/DL (ref 6–8.5)
RBC # BLD AUTO: 4.59 X10E6/UL (ref 4.14–5.8)
SODIUM SERPL-SCNC: 142 MMOL/L (ref 134–144)
WBC # BLD AUTO: 8.1 X10E3/UL (ref 3.4–10.8)

## 2022-04-28 PROCEDURE — 99214 OFFICE O/P EST MOD 30 MIN: CPT | Performed by: NURSE PRACTITIONER

## 2022-04-28 PROCEDURE — 96413 CHEMO IV INFUSION 1 HR: CPT

## 2022-04-28 PROCEDURE — 25010000002 HEPARIN LOCK FLUSH PER 10 UNITS: Performed by: INTERNAL MEDICINE

## 2022-04-28 PROCEDURE — 25010000002 DOCETAXEL 20 MG/ML SOLUTION 8 ML VIAL: Performed by: NURSE PRACTITIONER

## 2022-04-28 RX ORDER — SODIUM CHLORIDE 9 MG/ML
250 INJECTION, SOLUTION INTRAVENOUS ONCE
Status: COMPLETED | OUTPATIENT
Start: 2022-04-28 | End: 2022-04-28

## 2022-04-28 RX ORDER — SODIUM CHLORIDE 9 MG/ML
250 INJECTION, SOLUTION INTRAVENOUS ONCE
Status: CANCELLED | OUTPATIENT
Start: 2022-04-28

## 2022-04-28 RX ORDER — DIPHENHYDRAMINE HYDROCHLORIDE 50 MG/ML
50 INJECTION INTRAMUSCULAR; INTRAVENOUS AS NEEDED
Status: CANCELLED | OUTPATIENT
Start: 2022-04-28

## 2022-04-28 RX ORDER — HEPARIN SODIUM (PORCINE) LOCK FLUSH IV SOLN 100 UNIT/ML 100 UNIT/ML
500 SOLUTION INTRAVENOUS AS NEEDED
Status: DISCONTINUED | OUTPATIENT
Start: 2022-04-28 | End: 2022-04-28 | Stop reason: HOSPADM

## 2022-04-28 RX ORDER — HEPARIN SODIUM (PORCINE) LOCK FLUSH IV SOLN 100 UNIT/ML 100 UNIT/ML
500 SOLUTION INTRAVENOUS AS NEEDED
Status: CANCELLED | OUTPATIENT
Start: 2022-04-28

## 2022-04-28 RX ORDER — SODIUM CHLORIDE 0.9 % (FLUSH) 0.9 %
20 SYRINGE (ML) INJECTION AS NEEDED
Status: CANCELLED | OUTPATIENT
Start: 2022-04-28

## 2022-04-28 RX ORDER — FAMOTIDINE 10 MG/ML
20 INJECTION, SOLUTION INTRAVENOUS AS NEEDED
Status: CANCELLED | OUTPATIENT
Start: 2022-04-28

## 2022-04-28 RX ADMIN — HEPARIN 500 UNITS: 100 SYRINGE at 10:42

## 2022-04-28 RX ADMIN — SODIUM CHLORIDE 250 ML: 9 INJECTION, SOLUTION INTRAVENOUS at 09:36

## 2022-04-28 RX ADMIN — DOCETAXEL 175 MG: 20 INJECTION, SOLUTION, CONCENTRATE INTRAVENOUS at 09:36

## 2022-04-28 NOTE — PROGRESS NOTES
CHIEF COMPLAINT: Metastatic prostate cancer    Problem List:  Oncology/Hematology History Overview Note   1.  Prostate cancer clinical T1c and 2M1B stage IVb  2.  Urinary retention with Goodwin in place 1/24/2022.  On finasteride 1/24/2022.  3.  Covid 19 December 2021    Oncology history timeline:  -11/29/2021  with symptoms of urinary retention.  -2/15/2022 prostate biopsy adenocarcinoma grade group 5 and 3 out of 12 cores, grade group 4 in 1 out of 12 cores, grade group 3 in 8 out of 12 cores.  -2/24/2022 CT chest abdomen pelvis and total body bone scan shows left third rib, right acetabulum, periaortic and retroperitoneal kizzy metastases complaining of pain in the left chest and right hip.  Also possible sclerotic left 10th rib on CT.  Spleen mildly enlarged 13.8 cm.  Hepatic steatosis.  Small liver cyst.  Fat stranding in the periaortic and mesenteric region consistent with reactive/inflammatory stranding.  Multiple enlarged periaortic and retroperitoneal nodes and lymphadenopathy largest 4.9 cm.  CT chest describes tiny sclerotic foci in left eighth rib and right lateral seventh rib and T1.  Multiple small mediastinal and bilateral axillary nodes seen with small hypodense left thyroid nodule.  Creatinine 1.7.  -3/4/2022 office note Dr. Rolando Matute: Patient was seen after his elevated PSA by Dr. Vera and prostate biopsy scheduled.  However, he developed COVID-19 and this was canceled and the patient did not reschedule due to lack of insurance.  Saw Dr. Matute back on 1/24/2022 with plans to get staging CTs and bone scan with results as outlined above.  Started Casodex and to return on 3/11/2022 for Lupron.  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  TURP planned for urinary retention symptoms.    -3/15/2022 [facility] initial consultation: I reviewed the above data with the patient.  With his fairly bulky retroperitoneal  adenopathy and bone metastases including extra axial metastases, he would fit the data from the CHAARTED trial for which Taxotere x6 followed by Zytiga prednisone along with the planned Lupron already being planned by Dr. Matute would be reasonable.  Equally reasonable in terms of comparisons with bicalutamide and Lupron would be Zytiga prednisone plus Lupron or Xtandi plus Lupron or apalutamide plus Lupron.  None of these have been compared head-to-head but all have beaten combined androgen deprivation therapy with bicalutamide and Lupron.  At the age of 67 and in order to have as many bullets as possible down the road and hence saving some of the hormone blockade options for later and using chemotherapy when he is younger and healthier for a more time-limited.,  He has opted for the Taxotere x6 followed by Zytiga and prednisone.  We will also give him Xgeva to improve bone health and given metastatic disease, as with all metastatic prostate cancer patients, he needs genetic counseling both for his sake as well as his family's.  If he were to have BRCA1 or other such  mutations, then that also opens up the options for PARP inhibitors etc. down the road.  He has his TURP scheduled for 2/24/2022 and we will start treatment a couple of weeks after that and he will get chemo preparation visit in the meantime and I will get capital surgeons to place a port.  We will check his PSA after his TURP when he sees my nurse practitioner back early April for the start of chemotherapy and repeat the PSA and CT chest abdomen pelvis and bone scan after the Taxotere is complete.    -3/31/2022 chemotherapy education and needs assessment completed    -4/7/2022 began docetaxel and Xgeva.  .0.  We will do his Xgeva every 6 weeks to coordinate with his docetaxel infusions.     Prostate cancer metastatic to multiple sites (HCC)   3/15/2022 Initial Diagnosis    Prostate cancer metastatic to multiple sites (HCC)     3/15/2022  Cancer Staged    Staging form: Prostate, AJCC 8th Edition  - Clinical: Stage IVB (cT1c, cN1, cM1b, Grade Group: 5) - Signed by Fritz Goodson MD on 3/15/2022     4/7/2022 -  Chemotherapy    OP PROSTATE DOCEtaxel     4/7/2022 -  Chemotherapy    OP SUPPORTIVE Denosumab (Xgeva) Q28D     8/11/2022 -  Chemotherapy    OP PROSTATE Abiraterone / PredniSONE         HISTORY OF PRESENT ILLNESS:  The patient is a 67 y.o. male, here for follow up on management of metastatic prostate cancer currently on therapy twith docetaxel and Xgeva.  Mr. Morfin reports that he tolerated his first cycle with no side effects.  He has felt good, he has had lots of energy and remains busy.  No neuropathy thus far.  No change in his appetite, no change in his bowel or bladder habits.  He stopped Casodex on 4/19/2022.    History reviewed. No pertinent past medical history.  Past Surgical History:   Procedure Laterality Date   • PROSTATE BIOPSY  02/2022    dr. sue       No Known Allergies    Family History and Social History reviewed and changed as necessary    REVIEW OF SYSTEM:   No new concerns    PHYSICAL EXAM:  General: Well-developed, well-nourished male in no distress, here with his niece today    Vitals:    04/28/22 0859   BP: 156/77   Pulse: 86   Temp: 97.9 °F (36.6 °C)   SpO2: 98%   Weight: 117 kg (257 lb 9.6 oz)     Vitals:    04/28/22 0859   PainSc: 0-No pain          ECOG score: 0           Vitals reviewed.  Labs reviewed    ECOG: (0) Fully Active - Able to Carry On All Pre-disease Performance Without Restriction    Lab Results   Component Value Date    HGB 13.2 04/27/2022    HCT 39.7 04/27/2022    MCV 87 04/27/2022     04/27/2022    WBC 8.1 04/27/2022    NEUTROABS 5.0 04/27/2022    LYMPHSABS 2.1 04/27/2022    MONOSABS 0.7 04/27/2022    EOSABS 0.0 04/27/2022    BASOSABS 0.2 04/27/2022       Lab Results   Component Value Date    GLUCOSE 107 (H) 04/27/2022    BUN 17 04/27/2022    CREATININE 1.33 (H) 04/27/2022      04/27/2022    K 4.5 04/27/2022     04/27/2022    CO2 18 (L) 04/27/2022    CALCIUM 8.9 04/27/2022    ALBUMIN 4.3 04/27/2022    BILITOT 0.2 04/27/2022    ALKPHOS 116 04/27/2022    AST 17 04/27/2022    ALT 15 04/27/2022             ASSESSMENT & PLAN:    1.  Prostate cancer clinical T1c and 2M1B stage IVb  2.  Urinary retention with Goodwin in place 1/24/2022.  On finasteride 1/24/2022.  3.  Covid 19 December 2021  4.  Mild renal insufficiency    Oncology history timeline:  -11/29/2021  with symptoms of urinary retention.  -2/15/2022 prostate biopsy adenocarcinoma grade group 5 and 3 out of 12 cores, grade group 4 in 1 out of 12 cores, grade group 3 in 8 out of 12 cores.  -2/24/2022 CT chest abdomen pelvis and total body bone scan shows left third rib, right acetabulum, periaortic and retroperitoneal kizzy metastases complaining of pain in the left chest and right hip.  Also possible sclerotic left 10th rib on CT.  Spleen mildly enlarged 13.8 cm.  Hepatic steatosis.  Small liver cyst.  Fat stranding in the periaortic and mesenteric region consistent with reactive/inflammatory stranding.  Multiple enlarged periaortic and retroperitoneal nodes and lymphadenopathy largest 4.9 cm.  CT chest describes tiny sclerotic foci in left eighth rib and right lateral seventh rib and T1.  Multiple small mediastinal and bilateral axillary nodes seen with small hypodense left thyroid nodule.  Creatinine 1.7.  -3/4/2022 office note Dr. Rolando Matute: Patient was seen after his elevated PSA by Dr. Vera and prostate biopsy scheduled.  However, he developed COVID-19 and this was canceled and the patient did not reschedule due to lack of insurance.  Saw Dr. Matute back on 1/24/2022 with plans to get staging CTs and bone scan with results as outlined above.  Started Casodex and to return on 3/11/2022 for Lupron.  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  TURP planned for  urinary retention symptoms.    -3/15/2022 [facility] initial consultation: I reviewed the above data with the patient.  With his fairly bulky retroperitoneal adenopathy and bone metastases including extra axial metastases, he would fit the data from the CHAARTED trial for which Taxotere x6 followed by Zytiga prednisone along with the planned Lupron already being planned by Dr. Matute would be reasonable.  Equally reasonable in terms of comparisons with bicalutamide and Lupron would be Zytiga prednisone plus Lupron or Xtandi plus Lupron or apalutamide plus Lupron.  None of these have been compared head-to-head but all have beaten combined androgen deprivation therapy with bicalutamide and Lupron.  At the age of 67 and in order to have as many bullets as possible down the road and hence saving some of the hormone blockade options for later and using chemotherapy when he is younger and healthier for a more time-limited.,  He has opted for the Taxotere x6 followed by Zytiga and prednisone.  We will also give him Xgeva to improve bone health and given metastatic disease, as with all metastatic prostate cancer patients, he needs genetic counseling both for his sake as well as his family's.  If he were to have BRCA1 or other such  mutations, then that also opens up the options for PARP inhibitors etc. down the road.  He has his TURP scheduled for 2/24/2022 and we will start treatment a couple of weeks after that and he will get chemo preparation visit in the meantime and I will get capital surgeons to place a port.  We will check his PSA after his TURP when he sees my nurse practitioner back early April for the start of chemotherapy and repeat the PSA and CT chest abdomen pelvis and bone scan after the Taxotere is complete.    -3/31/2022 chemotherapy education and needs assessment completed    -4/7/2022 [facility] follow-up:  Mr. Morfin will start systemic therapy today for his metastatic  prostate cancer with docetaxel and Xgeva.  Labs reviewed from 4/5/2022 with normal CBC, CMP unremarkable with mild renal insufficiency with creatinine 1.34, his creatinine clearance is 87.5, phosphorus and magnesium normal,  which is down from his baseline PSA of 911 on 11/29/2021.  He receives Lupron with Dr. Matute and is on bicalutamide.  We will treat today, I will see him back in 3 weeks for follow-up.  We plan on 6 courses of docetaxel.  Genetic counseling appointment is pending.  See note dated 3/15/2022 from Dr. Fritz Goodson for plan of care going forward.  Mr. Morfin does not have a primary care provider, we are giving him information on primary care providers available in the area as he needs to establish care.  We will do his Xgeva every 6 weeks to coordinate with his docetaxel infusions.    -4/28/2022 [facility] follow-up: Mr. Morfin continues to do well, he tolerated his first cycle of docetaxel with no unusual side effects, he also was on Xgeva which we are doing every 6 weeks to coordinate with his chemotherapy infusions.  Labs from yesterday reviewed and were unremarkable, CBC is normal, creatinine stable at 1.33.  He will continue treatment today with cycle #2.  I will see him back in 3 weeks for follow-up, he will be due his next Xgeva at that time.  We will also repeat PSA then.  Plan to repeat restaging scans after 6 courses.    Return to clinic in 3 weeks for follow-up    This was a level 4, moderate MDM visit with 2 stable chronic illnesses, management of drug therapy requiring intensive monitoring for toxicity and review of labs.    Susana Torres, APRN    04/28/2022

## 2022-05-05 ENCOUNTER — TELEPHONE (OUTPATIENT)
Dept: NUTRITION | Facility: HOSPITAL | Age: 67
End: 2022-05-05

## 2022-05-05 NOTE — PROGRESS NOTES
"Outpatient Oncology Nutrition     Reason for Visit:    Oncology Nutrition Screening    Patient Name:  Chris Morfin    :  1955    MRN:  [id number]    Date of Encounter: 2022    Nutrition Assessment     Diagnosis: Metastatic prostate cancer    Chemotherapy: Taxotere - every 21 days x 6 cycles (cycle 3 planned for 22) + Xgeva - every 6 weeks     Patient Active Problem List:    Patient Active Problem List   Diagnosis   • Prostate cancer metastatic to multiple sites (HCC)   • Encounter for care related to vascular access port       Food / Nutrition Related History       Hydration Status     Goal: ~96 ounces     Enteral Feeding       Anthropometric Measurements     Height:    Ht Readings from Last 1 Encounters:   22 180.3 cm (71\")       Weight:    Wt Readings from Last 1 Encounters:   22 117 kg (257 lb 9.6 oz)       BMI:  35.9 - Obese     Weight Change: no recent weight change noted per chart review    Review of Lab Data (Time Frame - 1 month / 2 month)   Labs reviewed - 22 - elevated creatinine noted     Medication Review   MAR reviewed - dexamethasone noted     Nutrition Focused Physical Findings       Nutrition Impact Symptoms   No problems with eating noted per chart review    Physical Activity   Normal with no limitations    Current Nutritional Intake     Oral diet:  Regular     Malnutrition Risk Assessment     Recent weight loss over the past 6 months:  0 = No    Eating poorly because of a decreased appetite:  0 = No    Malnutrition Screening Score:     MST = 0 or 1 Patient not at risk for malnutrition    Nutrition Diagnosis     Problem    Etiology    Signs / Symptoms      Nutrition Intervention   Initial nutritional screening completed as above.  Attempted to contact patient for nutrition education, however no answer.  VM message provided stating the nature of the phone call, RD's contact information, and request to call back at his convenience / as needed.    Goal "       Monitoring / Evaluation   Will follow up as indicated.  RD available to assist prn.    Collette Laird MS, RD, LD

## 2022-05-17 ENCOUNTER — OFFICE VISIT (OUTPATIENT)
Dept: FAMILY MEDICINE CLINIC | Facility: CLINIC | Age: 67
End: 2022-05-17

## 2022-05-17 ENCOUNTER — TELEPHONE (OUTPATIENT)
Dept: ONCOLOGY | Facility: CLINIC | Age: 67
End: 2022-05-17

## 2022-05-17 VITALS
SYSTOLIC BLOOD PRESSURE: 120 MMHG | HEIGHT: 71 IN | WEIGHT: 261 LBS | DIASTOLIC BLOOD PRESSURE: 82 MMHG | BODY MASS INDEX: 36.54 KG/M2

## 2022-05-17 DIAGNOSIS — C61 PROSTATE CANCER METASTATIC TO MULTIPLE SITES: Primary | ICD-10-CM

## 2022-05-17 DIAGNOSIS — C61 PROSTATE CANCER METASTATIC TO MULTIPLE SITES: Primary | Chronic | ICD-10-CM

## 2022-05-17 DIAGNOSIS — L02.91 ABSCESS: ICD-10-CM

## 2022-05-17 DIAGNOSIS — R73.01 IMPAIRED FASTING GLUCOSE: ICD-10-CM

## 2022-05-17 DIAGNOSIS — N18.31 STAGE 3A CHRONIC KIDNEY DISEASE (CKD): ICD-10-CM

## 2022-05-17 DIAGNOSIS — Z13.220 LIPID SCREENING: ICD-10-CM

## 2022-05-17 PROCEDURE — 99203 OFFICE O/P NEW LOW 30 MIN: CPT | Performed by: STUDENT IN AN ORGANIZED HEALTH CARE EDUCATION/TRAINING PROGRAM

## 2022-05-17 RX ORDER — CLINDAMYCIN HYDROCHLORIDE 300 MG/1
300 CAPSULE ORAL 3 TIMES DAILY
Qty: 30 CAPSULE | Refills: 0 | Status: SHIPPED | OUTPATIENT
Start: 2022-05-17 | End: 2022-05-27 | Stop reason: SDUPTHER

## 2022-05-17 NOTE — PROGRESS NOTES
"Chief Complaint  Establish Care (Does not have a PCP)    Subjective          Chris Morfin presents to [facility]  History of Present Illness    Patient is here to establish care. He has not had a PCP in the past.     PMH includes following:    He has know Prostate cancer Stage 4 followed by Dr. Goodson, diagnosed by Dr. Matute. He states that he was diagnosed after he was unable to urinate.  He states that he was told that his PSA was in the 900s.  Most recent PSA was in the 250s.    He has no other known medical problems.  He denies any known hypertension, hyperlipidemia, or diabetes.    PSH: TURP, Port placement in the R chest.     Family history: Lung cancer, prostate cancer, diabetes in brother    Patient states that he does have a concern today: He states that he has noticed a spot on the lower abdomen above the pubic bone that has been growing over the past day.  He states that it is red, painful, and is not getting any better.  He has not tried anything for this.        Objective   Vital Signs:   /82 (BP Location: Left arm, Patient Position: Sitting, Cuff Size: Large Adult)   Ht 180.3 cm (71\")   Wt 118 kg (261 lb)   BMI 36.40 kg/m²     Body mass index is 36.4 kg/m².    Review of Systems    Past History:  Medical History: has a past medical history of Cancer (HCC) and Prostate cancer (HCC).   Surgical History: has a past surgical history that includes Prostate biopsy (02/2022).   Family History: family history includes Cancer in his brother and sister.   Social History: reports that he has never smoked. He has never used smokeless tobacco. He reports previous alcohol use. He reports current drug use. Drug: Marijuana.      Current Outpatient Medications:   •  clindamycin (Cleocin) 300 MG capsule, Take 1 capsule by mouth 3 (Three) Times a Day., Disp: 30 capsule, Rfl: 0  •  dexamethasone (DECADRON) 4 MG tablet, Take 2 tablets oral twice a day for 3 consecutive days " beginning the day before chemotherapy and continue for 6 doses., Disp: 12 tablet, Rfl: 5  •  finasteride (PROSCAR) 5 MG tablet, , Disp: , Rfl:   •  lidocaine-prilocaine (EMLA) 2.5-2.5 % cream, Apply 1 application topically to the appropriate area as directed As Needed (prior to port access)., Disp: 30 g, Rfl: 3  •  ondansetron (ZOFRAN) 8 MG tablet, Take 1 tablet by mouth 3 (Three) Times a Day As Needed for Nausea or Vomiting., Disp: 30 tablet, Rfl: 5  •  tamsulosin (FLOMAX) 0.4 MG capsule 24 hr capsule, , Disp: , Rfl:     Allergies: Patient has no known allergies.    Physical Exam  Constitutional:       General: He is not in acute distress.     Appearance: Normal appearance. He is not ill-appearing or toxic-appearing.   HENT:      Head: Normocephalic and atraumatic.   Cardiovascular:      Rate and Rhythm: Normal rate and regular rhythm.      Heart sounds: No murmur heard.    No gallop.   Pulmonary:      Effort: Pulmonary effort is normal.      Breath sounds: Normal breath sounds.   Abdominal:      General: Abdomen is flat.      Palpations: Abdomen is soft.      Tenderness: There is no abdominal tenderness. There is no guarding.   Musculoskeletal:      Right lower leg: No edema.      Left lower leg: No edema.   Skin:     Comments: Port upper right chest.  Approximately 1-1/2 inch abscess on the mons.  No drainage.  Fluctuance noted   Neurological:      General: No focal deficit present.      Mental Status: He is alert and oriented to person, place, and time.   Psychiatric:         Mood and Affect: Mood normal.         Thought Content: Thought content normal.          Result Review :                   Assessment and Plan    Diagnoses and all orders for this visit:    1. Prostate cancer metastatic to multiple sites (HCC) (Primary)  Assessment & Plan:  Managed by Dr. Goodson with hematology oncology and Dr. Matute with urology.  Patient is overall doing well and has upcoming appointment for hemotherapy.  Advised that  because he has an active infection with an abscess he should contact them prior to starting the dexamethasone for fear that this could worsen the abscess significantly.  He is agreeable and will contact them.      2. Stage 3a chronic kidney disease (CKD) (HCC)  Assessment & Plan:  Patient is due for blood work tomorrow.  We will add on labs and follow kidney function.       3. Lipid screening  Assessment & Plan:  Blood work.      4. Abscess  Assessment & Plan:  Clindamycin 3 mg 3 times daily for 10 days.  Contact heme-onc as above      Other orders  -     clindamycin (Cleocin) 300 MG capsule; Take 1 capsule by mouth 3 (Three) Times a Day.  Dispense: 30 capsule; Refill: 0      Follow Up   Return 6-8, for AWV-Provider.  Patient was given instructions and counseling regarding his condition or for health maintenance advice. Please see specific information pulled into the AVS if appropriate.     Teri Ram, DO

## 2022-05-17 NOTE — TELEPHONE ENCOUNTER
Called and spoke with patient.  He states his pcp prescribed clindamycin today for an abscess on his lower abdomen and asked that he call to make sure it did not interact with his dexamethasone.  Verified with pharmacist there's no interaction.  Reviewed pcp note from today.  Per office note, the concern is that dexamethasone could worse abscess.  Discussed with Dr. Goodson.  He would like to delay taxotere one week to allow antibiotics time to work before another dose of chemotherapy.  Patient notified and verbalized understanding.  Call transferred to Natty to reschedule.

## 2022-05-17 NOTE — TELEPHONE ENCOUNTER
Caller: Chris Morfin    Relationship: Self    Best call back number: [phone]    What is the best time to reach you: ASAP    Who are you requesting to speak with (clinical staff, provider,  specific staff member): NURSE    Do you know the name of the person who called:     What was the call regarding: PT WANTS TO ASK ABOUT MED INTERACTION    Do you require a callback: YES

## 2022-05-17 NOTE — ASSESSMENT & PLAN NOTE
Managed by Dr. Goodson with hematology oncology and Dr. Matute with urology.  Patient is overall doing well and has upcoming appointment for hemotherapy.  Advised that because he has an active infection with an abscess he should contact them prior to starting the dexamethasone for fear that this could worsen the abscess significantly.  He is agreeable and will contact them.

## 2022-05-19 ENCOUNTER — APPOINTMENT (OUTPATIENT)
Dept: ONCOLOGY | Facility: HOSPITAL | Age: 67
End: 2022-05-19

## 2022-05-25 ENCOUNTER — INFUSION (OUTPATIENT)
Dept: ONCOLOGY | Facility: HOSPITAL | Age: 67
End: 2022-05-25

## 2022-05-25 VITALS
HEART RATE: 78 BPM | TEMPERATURE: 98.7 F | RESPIRATION RATE: 18 BRPM | WEIGHT: 258.8 LBS | BODY MASS INDEX: 36.1 KG/M2 | DIASTOLIC BLOOD PRESSURE: 75 MMHG | SYSTOLIC BLOOD PRESSURE: 141 MMHG

## 2022-05-25 DIAGNOSIS — Z45.2 ENCOUNTER FOR CARE RELATED TO VASCULAR ACCESS PORT: ICD-10-CM

## 2022-05-25 DIAGNOSIS — C61 PROSTATE CANCER METASTATIC TO MULTIPLE SITES: Primary | ICD-10-CM

## 2022-05-25 PROCEDURE — 36415 COLL VENOUS BLD VENIPUNCTURE: CPT

## 2022-05-25 PROCEDURE — 25010000002 HEPARIN LOCK FLUSH PER 10 UNITS: Performed by: INTERNAL MEDICINE

## 2022-05-25 RX ORDER — HEPARIN SODIUM (PORCINE) LOCK FLUSH IV SOLN 100 UNIT/ML 100 UNIT/ML
500 SOLUTION INTRAVENOUS AS NEEDED
Status: DISCONTINUED | OUTPATIENT
Start: 2022-05-25 | End: 2022-05-25 | Stop reason: HOSPADM

## 2022-05-25 RX ORDER — SODIUM CHLORIDE 0.9 % (FLUSH) 0.9 %
20 SYRINGE (ML) INJECTION AS NEEDED
Status: CANCELLED | OUTPATIENT
Start: 2022-05-25

## 2022-05-25 RX ORDER — HEPARIN SODIUM (PORCINE) LOCK FLUSH IV SOLN 100 UNIT/ML 100 UNIT/ML
500 SOLUTION INTRAVENOUS AS NEEDED
Status: CANCELLED | OUTPATIENT
Start: 2022-05-25

## 2022-05-26 ENCOUNTER — OFFICE VISIT (OUTPATIENT)
Dept: ONCOLOGY | Facility: CLINIC | Age: 67
End: 2022-05-26

## 2022-05-26 ENCOUNTER — INFUSION (OUTPATIENT)
Dept: ONCOLOGY | Facility: HOSPITAL | Age: 67
End: 2022-05-26

## 2022-05-26 VITALS
DIASTOLIC BLOOD PRESSURE: 77 MMHG | WEIGHT: 258 LBS | HEART RATE: 79 BPM | OXYGEN SATURATION: 98 % | HEIGHT: 71 IN | RESPIRATION RATE: 20 BRPM | TEMPERATURE: 98.7 F | BODY MASS INDEX: 36.12 KG/M2 | SYSTOLIC BLOOD PRESSURE: 151 MMHG

## 2022-05-26 DIAGNOSIS — C61 PROSTATE CANCER METASTATIC TO MULTIPLE SITES: Primary | ICD-10-CM

## 2022-05-26 DIAGNOSIS — L02.818 CUTANEOUS ABSCESS OF OTHER SITE: ICD-10-CM

## 2022-05-26 LAB
ALBUMIN SERPL-MCNC: 4.4 G/DL (ref 3.8–4.8)
ALBUMIN/GLOB SERPL: 1.6 {RATIO} (ref 1.2–2.2)
ALP SERPL-CCNC: 114 IU/L (ref 44–121)
ALT SERPL-CCNC: 19 IU/L (ref 0–44)
AST SERPL-CCNC: 17 IU/L (ref 0–40)
BASOPHILS # BLD AUTO: 0.1 X10E3/UL (ref 0–0.2)
BASOPHILS NFR BLD AUTO: 1 %
BILIRUB SERPL-MCNC: 0.3 MG/DL (ref 0–1.2)
BUN SERPL-MCNC: 22 MG/DL (ref 8–27)
BUN/CREAT SERPL: 18 (ref 10–24)
CALCIUM SERPL-MCNC: 10 MG/DL (ref 8.6–10.2)
CHLORIDE SERPL-SCNC: 106 MMOL/L (ref 96–106)
CO2 SERPL-SCNC: 20 MMOL/L (ref 20–29)
CREAT SERPL-MCNC: 1.25 MG/DL (ref 0.76–1.27)
EGFRCR SERPLBLD CKD-EPI 2021: 63 ML/MIN/1.73
EOSINOPHIL # BLD AUTO: 0.3 X10E3/UL (ref 0–0.4)
EOSINOPHIL NFR BLD AUTO: 2 %
ERYTHROCYTE [DISTWIDTH] IN BLOOD BY AUTOMATED COUNT: 13.3 % (ref 11.6–15.4)
GLOBULIN SER CALC-MCNC: 2.8 G/DL (ref 1.5–4.5)
GLUCOSE SERPL-MCNC: 120 MG/DL (ref 65–99)
HCT VFR BLD AUTO: 40 % (ref 37.5–51)
HGB BLD-MCNC: 13.5 G/DL (ref 13–17.7)
IMM GRANULOCYTES # BLD AUTO: 0.1 X10E3/UL (ref 0–0.1)
IMM GRANULOCYTES NFR BLD AUTO: 1 %
LYMPHOCYTES # BLD AUTO: 1.2 X10E3/UL (ref 0.7–3.1)
LYMPHOCYTES NFR BLD AUTO: 11 %
MAGNESIUM SERPL-MCNC: 2.4 MG/DL (ref 1.6–2.3)
MCH RBC QN AUTO: 28.8 PG (ref 26.6–33)
MCHC RBC AUTO-ENTMCNC: 33.8 G/DL (ref 31.5–35.7)
MCV RBC AUTO: 86 FL (ref 79–97)
MONOCYTES # BLD AUTO: 0.3 X10E3/UL (ref 0.1–0.9)
MONOCYTES NFR BLD AUTO: 3 %
NEUTROPHILS # BLD AUTO: 8.4 X10E3/UL (ref 1.4–7)
NEUTROPHILS NFR BLD AUTO: 82 %
PHOSPHATE SERPL-MCNC: 2.5 MG/DL (ref 2.8–4.1)
PLATELET # BLD AUTO: 272 X10E3/UL (ref 150–450)
POTASSIUM SERPL-SCNC: 5.1 MMOL/L (ref 3.5–5.2)
PROT SERPL-MCNC: 7.2 G/DL (ref 6–8.5)
PSA SERPL-MCNC: 34.3 NG/ML (ref 0–4)
RBC # BLD AUTO: 4.68 X10E6/UL (ref 4.14–5.8)
SODIUM SERPL-SCNC: 141 MMOL/L (ref 134–144)
WBC # BLD AUTO: 10.3 X10E3/UL (ref 3.4–10.8)

## 2022-05-26 PROCEDURE — 99214 OFFICE O/P EST MOD 30 MIN: CPT | Performed by: NURSE PRACTITIONER

## 2022-05-26 PROCEDURE — 96372 THER/PROPH/DIAG INJ SC/IM: CPT

## 2022-05-26 PROCEDURE — 25010000002 DENOSUMAB 120 MG/1.7ML SOLUTION: Performed by: NURSE PRACTITIONER

## 2022-05-26 RX ADMIN — DENOSUMAB 120 MG: 120 INJECTION SUBCUTANEOUS at 10:11

## 2022-05-26 NOTE — PROGRESS NOTES
CHIEF COMPLAINT: 1.   Metastatic prostate cancer   2.  Suprapubic abscess    Problem List:  Oncology/Hematology History Overview Note   1.  Prostate cancer clinical T1c and 2M1B stage IVb  2.  Urinary retention with Goodwin in place 1/24/2022.  On finasteride 1/24/2022.  3.  Covid 19 December 2021    Oncology history timeline:  -11/29/2021  with symptoms of urinary retention.  -2/15/2022 prostate biopsy adenocarcinoma grade group 5 and 3 out of 12 cores, grade group 4 in 1 out of 12 cores, grade group 3 in 8 out of 12 cores.  -2/24/2022 CT chest abdomen pelvis and total body bone scan shows left third rib, right acetabulum, periaortic and retroperitoneal kizzy metastases complaining of pain in the left chest and right hip.  Also possible sclerotic left 10th rib on CT.  Spleen mildly enlarged 13.8 cm.  Hepatic steatosis.  Small liver cyst.  Fat stranding in the periaortic and mesenteric region consistent with reactive/inflammatory stranding.  Multiple enlarged periaortic and retroperitoneal nodes and lymphadenopathy largest 4.9 cm.  CT chest describes tiny sclerotic foci in left eighth rib and right lateral seventh rib and T1.  Multiple small mediastinal and bilateral axillary nodes seen with small hypodense left thyroid nodule.  Creatinine 1.7.  -3/4/2022 office note Dr. Rolando Matute: Patient was seen after his elevated PSA by Dr. Vera and prostate biopsy scheduled.  However, he developed COVID-19 and this was canceled and the patient did not reschedule due to lack of insurance.  Saw Dr. Matute back on 1/24/2022 with plans to get staging CTs and bone scan with results as outlined above.  Started Casodex and to return on 3/11/2022 for Lupron.  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  TURP planned for urinary retention symptoms.    -3/15/2022 [facility]ology initial consultation: I reviewed the above data with the patient.  With his fairly  bulky retroperitoneal adenopathy and bone metastases including extra axial metastases, he would fit the data from the CHAARTED trial for which Taxotere x6 followed by Zytiga prednisone along with the planned Lupron already being planned by Dr. Matute would be reasonable.  Equally reasonable in terms of comparisons with bicalutamide and Lupron would be Zytiga prednisone plus Lupron or Xtandi plus Lupron or apalutamide plus Lupron.  None of these have been compared head-to-head but all have beaten combined androgen deprivation therapy with bicalutamide and Lupron.  At the age of 67 and in order to have as many bullets as possible down the road and hence saving some of the hormone blockade options for later and using chemotherapy when he is younger and healthier for a more time-limited.,  He has opted for the Taxotere x6 followed by Zytiga and prednisone.  We will also give him Xgeva to improve bone health and given metastatic disease, as with all metastatic prostate cancer patients, he needs genetic counseling both for his sake as well as his family's.  If he were to have BRCA1 or other such  mutations, then that also opens up the options for PARP inhibitors etc. down the road.  He has his TURP scheduled for 2/24/2022 and we will start treatment a couple of weeks after that and he will get chemo preparation visit in the meantime and I will get capital surgeons to place a port.  We will check his PSA after his TURP when he sees my nurse practitioner back early April for the start of chemotherapy and repeat the PSA and CT chest abdomen pelvis and bone scan after the Taxotere is complete.    -3/31/2022 chemotherapy education and needs assessment completed    -4/7/2022 began docetaxel and Xgeva.  .0.  We will do his Xgeva every 6 weeks to coordinate with his docetaxel infusions.    -4/19/2022 patient stopped Casodex     Prostate cancer metastatic to multiple sites (HCC)   3/15/2022 Initial Diagnosis     "Prostate cancer metastatic to multiple sites (HCC)     3/15/2022 Cancer Staged    Staging form: Prostate, AJCC 8th Edition  - Clinical: Stage IVB (cT1c, cN1, cM1b, Grade Group: 5) - Signed by Fritz Goodson MD on 3/15/2022     4/7/2022 -  Chemotherapy    OP PROSTATE DOCEtaxel     4/7/2022 -  Chemotherapy    OP SUPPORTIVE Denosumab (Xgeva) Q28D     8/11/2022 -  Chemotherapy    OP PROSTATE Abiraterone / PredniSONE         HISTORY OF PRESENT ILLNESS:  The patient is a 67 y.o. male, here for follow up on management of metastatic prostate cancer currently on therapy twith docetaxel and Xgeva.  Mr. Morfin reports that he saw his PCP about 1 week ago over a knot that appeared in his suprapubic area.  He states it was about the size of an egg, it was tender and red.  No fevers or chills.  He was placed on clindamycin and reports that the area now has shrunk significantly.  It has never opened up or drained.  He reports that after his last treatment on about day 4 he felt poorly and had substernal chest pain.  He states that he rested and the pain went away after a few hours without intervention.  He did not seek any emergency evaluation.  Currently he is feeling well.     Past Medical History:   Diagnosis Date   • Cancer (HCC)    • Prostate cancer (HCC)      Past Surgical History:   Procedure Laterality Date   • PROSTATE BIOPSY  02/2022    dr. sue       No Known Allergies    Family History and Social History reviewed and changed as necessary    REVIEW OF SYSTEM:   Abscess in the suprapubic area    PHYSICAL EXAM:  Well-developed, well-nourished male in no distress  Firm nodular cystic abscess in the suprapubic area approximately 4-5 cm, mildly erythemic, nontender.    Vitals:    05/26/22 0927   BP: 151/77   Pulse: 79   Resp: 20   Temp: 98.7 °F (37.1 °C)   SpO2: 98%   Weight: 117 kg (258 lb)   Height: 180.3 cm (71\")     Vitals:    05/26/22 0927   PainSc: 0-No pain          ECOG score: 0           Vitals reviewed.  Labs " reviewed    ECOG: (0) Fully Active - Able to Carry On All Pre-disease Performance Without Restriction    Lab Results   Component Value Date    HGB 13.5 05/25/2022    HCT 40.0 05/25/2022    MCV 86 05/25/2022     05/25/2022    WBC 10.3 05/25/2022    NEUTROABS 8.4 (H) 05/25/2022    LYMPHSABS 1.2 05/25/2022    MONOSABS 0.3 05/25/2022    EOSABS 0.3 05/25/2022    BASOSABS 0.1 05/25/2022       Lab Results   Component Value Date    GLUCOSE 120 (H) 05/25/2022    BUN 22 05/25/2022    CREATININE 1.25 05/25/2022     05/25/2022    K 5.1 05/25/2022     05/25/2022    CO2 20 05/25/2022    CALCIUM 10.0 05/25/2022    ALBUMIN 4.4 05/25/2022    BILITOT 0.3 05/25/2022    ALKPHOS 114 05/25/2022    AST 17 05/25/2022    ALT 19 05/25/2022     PSA 34.3 5/25/2022        ASSESSMENT & PLAN:    1.  Prostate cancer clinical T1c and 2M1B stage IVb  2.  Urinary retention with Goodwin in place 1/24/2022.  On finasteride 1/24/2022.  3.  Covid 19 December 2021  4.  Mild renal insufficiency  5.  Abscess in the suprapubic area    Oncology history timeline:  -11/29/2021  with symptoms of urinary retention.  -2/15/2022 prostate biopsy adenocarcinoma grade group 5 and 3 out of 12 cores, grade group 4 in 1 out of 12 cores, grade group 3 in 8 out of 12 cores.  -2/24/2022 CT chest abdomen pelvis and total body bone scan shows left third rib, right acetabulum, periaortic and retroperitoneal kizzy metastases complaining of pain in the left chest and right hip.  Also possible sclerotic left 10th rib on CT.  Spleen mildly enlarged 13.8 cm.  Hepatic steatosis.  Small liver cyst.  Fat stranding in the periaortic and mesenteric region consistent with reactive/inflammatory stranding.  Multiple enlarged periaortic and retroperitoneal nodes and lymphadenopathy largest 4.9 cm.  CT chest describes tiny sclerotic foci in left eighth rib and right lateral seventh rib and T1.  Multiple small mediastinal and bilateral axillary nodes seen with small  hypodense left thyroid nodule.  Creatinine 1.7.  -3/4/2022 office note Dr. Rolando Matute: Patient was seen after his elevated PSA by Dr. Vera and prostate biopsy scheduled.  However, he developed COVID-19 and this was canceled and the patient did not reschedule due to lack of insurance.  Saw Dr. Matute back on 1/24/2022 with plans to get staging CTs and bone scan with results as outlined above.  Started Casodex and to return on 3/11/2022 for Lupron.  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  TURP planned for urinary retention symptoms.    -3/15/2022 [facility] medical oncology initial consultation: I reviewed the above data with the patient.  With his fairly bulky retroperitoneal adenopathy and bone metastases including extra axial metastases, he would fit the data from the CHAARTED trial for which Taxotere x6 followed by Zytiga prednisone along with the planned Lupron already being planned by Dr. Matute would be reasonable.  Equally reasonable in terms of comparisons with bicalutamide and Lupron would be Zytiga prednisone plus Lupron or Xtandi plus Lupron or apalutamide plus Lupron.  None of these have been compared head-to-head but all have beaten combined androgen deprivation therapy with bicalutamide and Lupron.  At the age of 67 and in order to have as many bullets as possible down the road and hence saving some of the hormone blockade options for later and using chemotherapy when he is younger and healthier for a more time-limited.,  He has opted for the Taxotere x6 followed by Zytiga and prednisone.  We will also give him Xgeva to improve bone health and given metastatic disease, as with all metastatic prostate cancer patients, he needs genetic counseling both for his sake as well as his family's.  If he were to have BRCA1 or other such  mutations, then that also opens up the options for PARP inhibitors etc. down the road.  He has his TURP  scheduled for 2/24/2022 and we will start treatment a couple of weeks after that and he will get chemo preparation visit in the meantime and I will get capital surgeons to place a port.  We will check his PSA after his TURP when he sees my nurse practitioner back early April for the start of chemotherapy and repeat the PSA and CT chest abdomen pelvis and bone scan after the Taxotere is complete.    -3/31/2022 chemotherapy education and needs assessment completed    -4/7/2022 [facility] [place] [facility] follow-up:  Mr. Morfni will start systemic therapy today for his metastatic prostate cancer with docetaxel and Xgeva.  Labs reviewed from 4/5/2022 with normal CBC, CMP unremarkable with mild renal insufficiency with creatinine 1.34, his creatinine clearance is 87.5, phosphorus and magnesium normal,  which is down from his baseline PSA of 911 on 11/29/2021.  He receives Lupron with Dr. Matute and is on bicalutamide.  We will treat today, I will see him back in 3 weeks for follow-up.  We plan on 6 courses of docetaxel.  Genetic counseling appointment is pending.  See note dated 3/15/2022 from Dr. Fritz Goodson for plan of care going forward.  Mr. Morfin does not have a primary care provider, we are giving him information on primary care providers available in the area as he needs to establish care.  We will do his Xgeva every 6 weeks to coordinate with his docetaxel infusions.    -4/28/2022 [facility] [place] [facility] follow-up: Mr. Morfin continues to do well, he tolerated his first cycle of docetaxel with no unusual side effects, he also was on Xgeva which we are doing every 6 weeks to coordinate with his chemotherapy infusions.  Labs from yesterday reviewed and were unremarkable, CBC is normal, creatinine stable at 1.33.  He will continue treatment today with cycle #2.  I will see him back in 3 weeks for follow-up, he will be due his next Xgeva at that time.  We will also repeat PSA then.  Plan to repeat restaging scans  after 6 courses.    -5/26/2022 [facility] Oncology clinic follow-up: Chris has an abscess in the suprapubic area, it has improved on antibiotic therapy but I am concerned if we treat him it will just flare back up unless it is drained.  I will get him to Dr. Miller who placed his port for I&D and culture.  He is on clindamycin and states he completes course of treatment tomorrow.  I will delay his treatment with Taxotere 1 more week.  We will give his Xgeva today.  I will see him back in 1 week for follow-up to assess whether or not we can resume his Taxotere.  His PSA has dropped nicely with treatment thus far, PSA yesterday was 34.3 which is down from a PSA of 259.0 when we started treatment, it has been as high as 911 in November.    Return to clinic in 1 week for follow-up    This was a level 4, moderate MDM visit with management of side effects of therapy, drug therapy requiring intensive monitoring for toxicity, review of labs and referral to surgeon.    Susana Torres, APRN    05/26/2022

## 2022-05-27 ENCOUNTER — TELEPHONE (OUTPATIENT)
Dept: FAMILY MEDICINE CLINIC | Facility: CLINIC | Age: 67
End: 2022-05-27

## 2022-05-27 RX ORDER — CLINDAMYCIN HYDROCHLORIDE 300 MG/1
300 CAPSULE ORAL 3 TIMES DAILY
Qty: 15 CAPSULE | Refills: 0 | Status: SHIPPED | OUTPATIENT
Start: 2022-05-27 | End: 2022-07-14

## 2022-05-27 NOTE — TELEPHONE ENCOUNTER
Pt is doing good on the abx you gave him but he asked if you could send him one more round to help him feel better.

## 2022-06-01 ENCOUNTER — INFUSION (OUTPATIENT)
Dept: ONCOLOGY | Facility: HOSPITAL | Age: 67
End: 2022-06-01

## 2022-06-01 VITALS
RESPIRATION RATE: 18 BRPM | WEIGHT: 257.2 LBS | HEART RATE: 78 BPM | SYSTOLIC BLOOD PRESSURE: 135 MMHG | TEMPERATURE: 99.3 F | BODY MASS INDEX: 35.87 KG/M2 | DIASTOLIC BLOOD PRESSURE: 67 MMHG

## 2022-06-01 DIAGNOSIS — C61 PROSTATE CANCER METASTATIC TO MULTIPLE SITES: ICD-10-CM

## 2022-06-01 PROCEDURE — 36415 COLL VENOUS BLD VENIPUNCTURE: CPT

## 2022-06-02 ENCOUNTER — INFUSION (OUTPATIENT)
Dept: ONCOLOGY | Facility: HOSPITAL | Age: 67
End: 2022-06-02

## 2022-06-02 ENCOUNTER — OFFICE VISIT (OUTPATIENT)
Dept: ONCOLOGY | Facility: CLINIC | Age: 67
End: 2022-06-02

## 2022-06-02 VITALS
HEIGHT: 71 IN | HEART RATE: 94 BPM | DIASTOLIC BLOOD PRESSURE: 83 MMHG | TEMPERATURE: 97.7 F | OXYGEN SATURATION: 98 % | BODY MASS INDEX: 36.12 KG/M2 | WEIGHT: 258 LBS | RESPIRATION RATE: 20 BRPM | SYSTOLIC BLOOD PRESSURE: 156 MMHG

## 2022-06-02 DIAGNOSIS — L30.4 INTERTRIGO: ICD-10-CM

## 2022-06-02 DIAGNOSIS — C61 PROSTATE CANCER METASTATIC TO MULTIPLE SITES: Primary | ICD-10-CM

## 2022-06-02 DIAGNOSIS — Z45.2 ENCOUNTER FOR CARE RELATED TO VASCULAR ACCESS PORT: ICD-10-CM

## 2022-06-02 PROBLEM — L02.91 ABSCESS: Status: RESOLVED | Noted: 2022-05-17 | Resolved: 2022-06-02

## 2022-06-02 LAB
ALBUMIN SERPL-MCNC: 4.1 G/DL (ref 3.8–4.8)
ALBUMIN/GLOB SERPL: 1.6 {RATIO} (ref 1.2–2.2)
ALP SERPL-CCNC: 99 IU/L (ref 44–121)
ALT SERPL-CCNC: 10 IU/L (ref 0–44)
AST SERPL-CCNC: 12 IU/L (ref 0–40)
BASOPHILS # BLD AUTO: 0.1 X10E3/UL (ref 0–0.2)
BASOPHILS NFR BLD AUTO: 1 %
BILIRUB SERPL-MCNC: 0.5 MG/DL (ref 0–1.2)
BUN SERPL-MCNC: 17 MG/DL (ref 8–27)
BUN/CREAT SERPL: 14 (ref 10–24)
CALCIUM SERPL-MCNC: 8.3 MG/DL (ref 8.6–10.2)
CHLORIDE SERPL-SCNC: 108 MMOL/L (ref 96–106)
CO2 SERPL-SCNC: 20 MMOL/L (ref 20–29)
CREAT SERPL-MCNC: 1.18 MG/DL (ref 0.76–1.27)
EGFRCR SERPLBLD CKD-EPI 2021: 68 ML/MIN/1.73
EOSINOPHIL # BLD AUTO: 0.4 X10E3/UL (ref 0–0.4)
EOSINOPHIL NFR BLD AUTO: 6 %
ERYTHROCYTE [DISTWIDTH] IN BLOOD BY AUTOMATED COUNT: 13.5 % (ref 11.6–15.4)
GLOBULIN SER CALC-MCNC: 2.6 G/DL (ref 1.5–4.5)
GLUCOSE SERPL-MCNC: 135 MG/DL (ref 65–99)
HCT VFR BLD AUTO: 38.8 % (ref 37.5–51)
HGB BLD-MCNC: 12.9 G/DL (ref 13–17.7)
IMM GRANULOCYTES # BLD AUTO: 0 X10E3/UL (ref 0–0.1)
IMM GRANULOCYTES NFR BLD AUTO: 0 %
LYMPHOCYTES # BLD AUTO: 1.8 X10E3/UL (ref 0.7–3.1)
LYMPHOCYTES NFR BLD AUTO: 26 %
MCH RBC QN AUTO: 28.5 PG (ref 26.6–33)
MCHC RBC AUTO-ENTMCNC: 33.2 G/DL (ref 31.5–35.7)
MCV RBC AUTO: 86 FL (ref 79–97)
MONOCYTES # BLD AUTO: 0.7 X10E3/UL (ref 0.1–0.9)
MONOCYTES NFR BLD AUTO: 10 %
NEUTROPHILS # BLD AUTO: 3.9 X10E3/UL (ref 1.4–7)
NEUTROPHILS NFR BLD AUTO: 57 %
PLATELET # BLD AUTO: 237 X10E3/UL (ref 150–450)
POTASSIUM SERPL-SCNC: 4 MMOL/L (ref 3.5–5.2)
PROT SERPL-MCNC: 6.7 G/DL (ref 6–8.5)
PSA SERPL-MCNC: 39.1 NG/ML (ref 0–4)
RBC # BLD AUTO: 4.53 X10E6/UL (ref 4.14–5.8)
SODIUM SERPL-SCNC: 141 MMOL/L (ref 134–144)
WBC # BLD AUTO: 7 X10E3/UL (ref 3.4–10.8)

## 2022-06-02 PROCEDURE — 25010000002 HEPARIN LOCK FLUSH PER 10 UNITS: Performed by: INTERNAL MEDICINE

## 2022-06-02 PROCEDURE — 96413 CHEMO IV INFUSION 1 HR: CPT

## 2022-06-02 PROCEDURE — 99214 OFFICE O/P EST MOD 30 MIN: CPT | Performed by: NURSE PRACTITIONER

## 2022-06-02 PROCEDURE — 25010000002 DOCETAXEL 20 MG/ML SOLUTION 8 ML VIAL: Performed by: NURSE PRACTITIONER

## 2022-06-02 RX ORDER — SODIUM CHLORIDE 0.9 % (FLUSH) 0.9 %
20 SYRINGE (ML) INJECTION AS NEEDED
Status: CANCELLED | OUTPATIENT
Start: 2022-06-02

## 2022-06-02 RX ORDER — FAMOTIDINE 10 MG/ML
20 INJECTION, SOLUTION INTRAVENOUS AS NEEDED
Status: CANCELLED | OUTPATIENT
Start: 2022-06-02

## 2022-06-02 RX ORDER — HEPARIN SODIUM (PORCINE) LOCK FLUSH IV SOLN 100 UNIT/ML 100 UNIT/ML
500 SOLUTION INTRAVENOUS AS NEEDED
Status: DISCONTINUED | OUTPATIENT
Start: 2022-06-02 | End: 2022-06-02 | Stop reason: HOSPADM

## 2022-06-02 RX ORDER — SODIUM CHLORIDE 9 MG/ML
250 INJECTION, SOLUTION INTRAVENOUS ONCE
Status: COMPLETED | OUTPATIENT
Start: 2022-06-02 | End: 2022-06-02

## 2022-06-02 RX ORDER — SODIUM CHLORIDE 9 MG/ML
250 INJECTION, SOLUTION INTRAVENOUS ONCE
Status: CANCELLED | OUTPATIENT
Start: 2022-06-02

## 2022-06-02 RX ORDER — HEPARIN SODIUM (PORCINE) LOCK FLUSH IV SOLN 100 UNIT/ML 100 UNIT/ML
500 SOLUTION INTRAVENOUS AS NEEDED
Status: CANCELLED | OUTPATIENT
Start: 2022-06-02

## 2022-06-02 RX ORDER — DIPHENHYDRAMINE HYDROCHLORIDE 50 MG/ML
50 INJECTION INTRAMUSCULAR; INTRAVENOUS AS NEEDED
Status: CANCELLED | OUTPATIENT
Start: 2022-06-02

## 2022-06-02 RX ADMIN — DOCETAXEL 175 MG: 20 INJECTION, SOLUTION, CONCENTRATE INTRAVENOUS at 09:15

## 2022-06-02 RX ADMIN — SODIUM CHLORIDE 250 ML: 9 INJECTION, SOLUTION INTRAVENOUS at 09:15

## 2022-06-02 RX ADMIN — HEPARIN 500 UNITS: 100 SYRINGE at 10:15

## 2022-06-02 NOTE — PROGRESS NOTES
CHIEF COMPLAINT: 1.   Metastatic prostate cancer   2.  Suprapubic abscess  3.  Intertrigo    Problem List:  Oncology/Hematology History Overview Note   1.  Prostate cancer clinical T1c and 2M1B stage IVb  2.  Urinary retention with Goodwin in place 1/24/2022.  On finasteride 1/24/2022.  3.  Covid 19 December 2021    Oncology history timeline:  -11/29/2021  with symptoms of urinary retention.  -2/15/2022 prostate biopsy adenocarcinoma grade group 5 and 3 out of 12 cores, grade group 4 in 1 out of 12 cores, grade group 3 in 8 out of 12 cores.  -2/24/2022 CT chest abdomen pelvis and total body bone scan shows left third rib, right acetabulum, periaortic and retroperitoneal kizzy metastases complaining of pain in the left chest and right hip.  Also possible sclerotic left 10th rib on CT.  Spleen mildly enlarged 13.8 cm.  Hepatic steatosis.  Small liver cyst.  Fat stranding in the periaortic and mesenteric region consistent with reactive/inflammatory stranding.  Multiple enlarged periaortic and retroperitoneal nodes and lymphadenopathy largest 4.9 cm.  CT chest describes tiny sclerotic foci in left eighth rib and right lateral seventh rib and T1.  Multiple small mediastinal and bilateral axillary nodes seen with small hypodense left thyroid nodule.  Creatinine 1.7.  -3/4/2022 office note Dr. Rolando Matute: Patient was seen after his elevated PSA by Dr. Vera and prostate biopsy scheduled.  However, he developed COVID-19 and this was canceled and the patient did not reschedule due to lack of insurance.  Saw Dr. Matute back on 1/24/2022 with plans to get staging CTs and bone scan with results as outlined above.  Started Casodex and to return on 3/11/2022 for Lupron.  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  TURP planned for urinary retention symptoms.    -3/15/2022 [facility] initial consultation: I reviewed the above data with the patient.   With his fairly bulky retroperitoneal adenopathy and bone metastases including extra axial metastases, he would fit the data from the CHAARTED trial for which Taxotere x6 followed by Zytiga prednisone along with the planned Lupron already being planned by Dr. Matute would be reasonable.  Equally reasonable in terms of comparisons with bicalutamide and Lupron would be Zytiga prednisone plus Lupron or Xtandi plus Lupron or apalutamide plus Lupron.  None of these have been compared head-to-head but all have beaten combined androgen deprivation therapy with bicalutamide and Lupron.  At the age of 67 and in order to have as many bullets as possible down the road and hence saving some of the hormone blockade options for later and using chemotherapy when he is younger and healthier for a more time-limited.,  He has opted for the Taxotere x6 followed by Zytiga and prednisone.  We will also give him Xgeva to improve bone health and given metastatic disease, as with all metastatic prostate cancer patients, he needs genetic counseling both for his sake as well as his family's.  If he were to have BRCA1 or other such  mutations, then that also opens up the options for PARP inhibitors etc. down the road.  He has his TURP scheduled for 2/24/2022 and we will start treatment a couple of weeks after that and he will get chemo preparation visit in the meantime and I will get capital surgeons to place a port.  We will check his PSA after his TURP when he sees my nurse practitioner back early April for the start of chemotherapy and repeat the PSA and CT chest abdomen pelvis and bone scan after the Taxotere is complete.    -3/31/2022 chemotherapy education and needs assessment completed    -4/7/2022 began docetaxel and Xgeva.  .0.  We will do his Xgeva every 6 weeks to coordinate with his docetaxel infusions.    -4/19/2022 patient stopped Casodex    -5/17/2022 patient reported seeing his PCP for an abscess in his  suprapubic area.  Placed on clindamycin, will delay treatment 1 week.    -5/25/2022 PSA 34.3  -5/26/2022 [facility] Oncology clinic follow-up: Chris has an abscess in the suprapubic area, it has improved on antibiotic therapy but I am concerned if we treat him it will just flare back up unless it is drained.  I will get him to Dr. Miller who placed his port for I&D and culture.  He is on clindamycin and states he completes course of treatment tomorrow.  I will delay his treatment with Taxotere 1 more week.  We will give his Xgeva today.  I will see him back in 1 week for follow-up to assess whether or not we can resume his Taxotere.  His PSA has dropped nicely with treatment thus far, PSA yesterday was 34.3 which is down from a PSA of 259.0 when we started treatment, it has been as high as 911 in November.     Prostate cancer metastatic to multiple sites (HCC)   3/15/2022 Initial Diagnosis    Prostate cancer metastatic to multiple sites (HCC)     3/15/2022 Cancer Staged    Staging form: Prostate, AJCC 8th Edition  - Clinical: Stage IVB (cT1c, cN1, cM1b, Grade Group: 5) - Signed by Fritz Goodson MD on 3/15/2022     4/7/2022 -  Chemotherapy    OP PROSTATE DOCEtaxel     4/7/2022 -  Chemotherapy    OP SUPPORTIVE Denosumab (Xgeva) Q28D     8/11/2022 -  Chemotherapy    OP PROSTATE Abiraterone / PredniSONE         HISTORY OF PRESENT ILLNESS:  The patient is a 67 y.o. male, here for follow up on management of metastatic prostate cancer currently on therapy twith docetaxel and Xgeva.  Mr. Morfin completed antibiotics for abscess in his suprapubic area.  I had sent him to surgeon last week to have this lanced however there apparently was a long waiting time in the office and Mr. Morfin left without being seen.  He did call his PCP and was given 5 more days of antibiotic.  The abscess has basically resolved.  He does have a mild rash on his lower abdomen that is not painful or bothersome.  He has had no fevers or chills.  He  "reports that overall he feels good.      Past Medical History:   Diagnosis Date   • Cancer (HCC)    • Prostate cancer (HCC)      Past Surgical History:   Procedure Laterality Date   • PROSTATE BIOPSY  02/2022    dr. sue       No Known Allergies    Family History and Social History reviewed and changed as necessary    REVIEW OF SYSTEM:   Abscess in the suprapubic area, basically resolved  Rash on lower abdomen    PHYSICAL EXAM:  Well-developed, well-nourished male in no distress  No longer able to palpate abscess in the suprapubic area  Erythemic confluent patch on lower abdomen under the panniculus, sour odor    Vitals:    06/02/22 0830   BP: 156/83   Pulse: 94   Resp: 20   Temp: 97.7 °F (36.5 °C)   SpO2: 98%   Weight: 117 kg (258 lb)   Height: 180.3 cm (71\")     Vitals:    06/02/22 0830   PainSc: 0-No pain          ECOG score: 0           Vitals reviewed.  Labs reviewed    ECOG: (0) Fully Active - Able to Carry On All Pre-disease Performance Without Restriction    Lab Results   Component Value Date    HGB 12.9 (L) 06/01/2022    HCT 38.8 06/01/2022    MCV 86 06/01/2022     06/01/2022    WBC 7.0 06/01/2022    NEUTROABS 3.9 06/01/2022    LYMPHSABS 1.8 06/01/2022    MONOSABS 0.7 06/01/2022    EOSABS 0.4 06/01/2022    BASOSABS 0.1 06/01/2022       Lab Results   Component Value Date    GLUCOSE 135 (H) 06/01/2022    BUN 17 06/01/2022    CREATININE 1.18 06/01/2022     06/01/2022    K 4.0 06/01/2022     (H) 06/01/2022    CO2 20 06/01/2022    CALCIUM 8.3 (L) 06/01/2022    ALBUMIN 4.1 06/01/2022    BILITOT 0.5 06/01/2022    ALKPHOS 99 06/01/2022    AST 12 06/01/2022    ALT 10 06/01/2022 6/1/2022 PSA 39.1        ASSESSMENT & PLAN:    1.  Prostate cancer clinical T1c and 2M1B stage IVb  2.  Urinary retention with Goodwin in place 1/24/2022.  On finasteride 1/24/2022.  3.  Covid 19 December 2021  4.  Mild renal insufficiency  5.  Abscess in the suprapubic area, resolved on antibiotics  6.  " Intertrigo  7.  Mild hypocalcemia    Oncology history timeline:  -11/29/2021  with symptoms of urinary retention.  -2/15/2022 prostate biopsy adenocarcinoma grade group 5 and 3 out of 12 cores, grade group 4 in 1 out of 12 cores, grade group 3 in 8 out of 12 cores.  -2/24/2022 CT chest abdomen pelvis and total body bone scan shows left third rib, right acetabulum, periaortic and retroperitoneal kizzy metastases complaining of pain in the left chest and right hip.  Also possible sclerotic left 10th rib on CT.  Spleen mildly enlarged 13.8 cm.  Hepatic steatosis.  Small liver cyst.  Fat stranding in the periaortic and mesenteric region consistent with reactive/inflammatory stranding.  Multiple enlarged periaortic and retroperitoneal nodes and lymphadenopathy largest 4.9 cm.  CT chest describes tiny sclerotic foci in left eighth rib and right lateral seventh rib and T1.  Multiple small mediastinal and bilateral axillary nodes seen with small hypodense left thyroid nodule.  Creatinine 1.7.  -3/4/2022 office note Dr. Rolando Matute: Patient was seen after his elevated PSA by Dr. Vera and prostate biopsy scheduled.  However, he developed COVID-19 and this was canceled and the patient did not reschedule due to lack of insurance.  Saw Dr. Matute back on 1/24/2022 with plans to get staging CTs and bone scan with results as outlined above.  Started Casodex and to return on 3/11/2022 for Lupron.  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  TURP planned for urinary retention symptoms.    -3/15/2022 [facility] initial consultation: I reviewed the above data with the patient.  With his fairly bulky retroperitoneal adenopathy and bone metastases including extra axial metastases, he would fit the data from the CHAARTED trial for which Taxotere x6 followed by Zytiga prednisone along with the planned Lupron already being planned by Dr. Matute would be  reasonable.  Equally reasonable in terms of comparisons with bicalutamide and Lupron would be Zytiga prednisone plus Lupron or Xtandi plus Lupron or apalutamide plus Lupron.  None of these have been compared head-to-head but all have beaten combined androgen deprivation therapy with bicalutamide and Lupron.  At the age of 67 and in order to have as many bullets as possible down the road and hence saving some of the hormone blockade options for later and using chemotherapy when he is younger and healthier for a more time-limited.,  He has opted for the Taxotere x6 followed by Zytiga and prednisone.  We will also give him Xgeva to improve bone health and given metastatic disease, as with all metastatic prostate cancer patients, he needs genetic counseling both for his sake as well as his family's.  If he were to have BRCA1 or other such  mutations, then that also opens up the options for PARP inhibitors etc. down the road.  He has his TURP scheduled for 2/24/2022 and we will start treatment a couple of weeks after that and he will get chemo preparation visit in the meantime and I will get capital surgeons to place a port.  We will check his PSA after his TURP when he sees my nurse practitioner back early April for the start of chemotherapy and repeat the PSA and CT chest abdomen pelvis and bone scan after the Taxotere is complete.    -3/31/2022 chemotherapy education and needs assessment completed    -4/7/2022 [facility] follow-up:  Mr. Morfin will start systemic therapy today for his metastatic prostate cancer with docetaxel and Xgeva.  Labs reviewed from 4/5/2022 with normal CBC, CMP unremarkable with mild renal insufficiency with creatinine 1.34, his creatinine clearance is 87.5, phosphorus and magnesium normal,  which is down from his baseline PSA of 911 on 11/29/2021.  He receives Lupron with Dr. Matute and is on bicalutamide.  We will treat today, I will see him back in 3 weeks for  follow-up.  We plan on 6 courses of docetaxel.  Genetic counseling appointment is pending.  See note dated 3/15/2022 from Dr. Fritz Goodson for plan of care going forward.  Mr. Morfin does not have a primary care provider, we are giving him information on primary care providers available in the area as he needs to establish care.  We will do his Xgeva every 6 weeks to coordinate with his docetaxel infusions.    -4/28/2022 [facility] clinic follow-up: Mr. Morfin continues to do well, he tolerated his first cycle of docetaxel with no unusual side effects, he also was on Xgeva which we are doing every 6 weeks to coordinate with his chemotherapy infusions.  Labs from yesterday reviewed and were unremarkable, CBC is normal, creatinine stable at 1.33.  He will continue treatment today with cycle #2.  I will see him back in 3 weeks for follow-up, he will be due his next Xgeva at that time.  We will also repeat PSA then.  Plan to repeat restaging scans after 6 courses.    -5/26/2022 [facility] Oncology clinic follow-up: Chris has an abscess in the suprapubic area, it has improved on antibiotic therapy but I am concerned if we treat him it will just flare back up unless it is drained.  I will get him to Dr. Miller who placed his port for I&D and culture.  He is on clindamycin and states he completes course of treatment tomorrow.  I will delay his treatment with Taxotere 1 more week.  We will give his Xgeva today.  I will see him back in 1 week for follow-up to assess whether or not we can resume his Taxotere.  His PSA has dropped nicely with treatment thus far, PSA yesterday was 34.3 which is down from a PSA of 259.0 when we started treatment, it has been as high as 911 in November.    -6/2/2022 [facility] Oncology clinic follow-up: Chris went to see Dr. Miller to have his abscess lanced however apparently there was a long wait and he left without being seen.  He did call his PCP and was given 5 more days of clindamycin and the  abscess now seems to have resolved.  We discussed today that he is at risk for recurrence of infection due to immunocompromise state with chemotherapy.  He will notify us if he has any return of his abscess.  He does have intertrigo under his panniculus, I have advised him to keep this area dry, to apply topical drying/antifungal powders.  Also recommended looser clothing.  His counts are adequate to continue therapy, we will resume Taxotere today with cycle #3.  We will repeat restaging scans after 6 courses.  We are doing Xgeva every 6 weeks to coordinate with his Taxotere.  Once he finishes Taxotere we can then go back to every 4 weeks.  His next Xgeva is not due until 7/14/2022.  His calcium is running a little low, he is going to  calcium supplement.    Return to clinic in 3 weeks for follow-up    This was a level 4, moderate MDM visit with management of side effects of therapy, drug therapy requiring intensive monitoring for toxicity and review of labs.    Susana Torres, APRN    06/02/2022

## 2022-06-22 ENCOUNTER — INFUSION (OUTPATIENT)
Dept: ONCOLOGY | Facility: HOSPITAL | Age: 67
End: 2022-06-22

## 2022-06-22 VITALS
BODY MASS INDEX: 36.12 KG/M2 | DIASTOLIC BLOOD PRESSURE: 82 MMHG | HEART RATE: 76 BPM | TEMPERATURE: 97.3 F | SYSTOLIC BLOOD PRESSURE: 149 MMHG | WEIGHT: 259 LBS | RESPIRATION RATE: 18 BRPM

## 2022-06-22 DIAGNOSIS — C61 PROSTATE CANCER METASTATIC TO MULTIPLE SITES: ICD-10-CM

## 2022-06-22 DIAGNOSIS — Z45.2 ENCOUNTER FOR CARE RELATED TO VASCULAR ACCESS PORT: Primary | ICD-10-CM

## 2022-06-22 PROCEDURE — G0463 HOSPITAL OUTPT CLINIC VISIT: HCPCS

## 2022-06-22 PROCEDURE — 36415 COLL VENOUS BLD VENIPUNCTURE: CPT

## 2022-06-22 RX ORDER — DIPHENHYDRAMINE HYDROCHLORIDE 50 MG/ML
50 INJECTION INTRAMUSCULAR; INTRAVENOUS AS NEEDED
Status: CANCELLED | OUTPATIENT
Start: 2022-06-23

## 2022-06-22 RX ORDER — HEPARIN SODIUM (PORCINE) LOCK FLUSH IV SOLN 100 UNIT/ML 100 UNIT/ML
500 SOLUTION INTRAVENOUS AS NEEDED
Status: DISCONTINUED | OUTPATIENT
Start: 2022-06-22 | End: 2022-06-22 | Stop reason: HOSPADM

## 2022-06-22 RX ORDER — SODIUM CHLORIDE 0.9 % (FLUSH) 0.9 %
20 SYRINGE (ML) INJECTION AS NEEDED
Status: CANCELLED | OUTPATIENT
Start: 2022-06-22

## 2022-06-22 RX ORDER — HEPARIN SODIUM (PORCINE) LOCK FLUSH IV SOLN 100 UNIT/ML 100 UNIT/ML
500 SOLUTION INTRAVENOUS AS NEEDED
Status: CANCELLED | OUTPATIENT
Start: 2022-06-22

## 2022-06-22 RX ORDER — SODIUM CHLORIDE 9 MG/ML
250 INJECTION, SOLUTION INTRAVENOUS ONCE
Status: CANCELLED | OUTPATIENT
Start: 2022-06-23

## 2022-06-22 RX ORDER — FAMOTIDINE 10 MG/ML
20 INJECTION, SOLUTION INTRAVENOUS AS NEEDED
Status: CANCELLED | OUTPATIENT
Start: 2022-06-23

## 2022-06-23 ENCOUNTER — OFFICE VISIT (OUTPATIENT)
Dept: ONCOLOGY | Facility: CLINIC | Age: 67
End: 2022-06-23

## 2022-06-23 ENCOUNTER — INFUSION (OUTPATIENT)
Dept: ONCOLOGY | Facility: HOSPITAL | Age: 67
End: 2022-06-23

## 2022-06-23 VITALS
TEMPERATURE: 98.2 F | HEIGHT: 71 IN | OXYGEN SATURATION: 96 % | DIASTOLIC BLOOD PRESSURE: 89 MMHG | HEART RATE: 82 BPM | SYSTOLIC BLOOD PRESSURE: 147 MMHG | BODY MASS INDEX: 36.12 KG/M2 | RESPIRATION RATE: 20 BRPM | WEIGHT: 258 LBS

## 2022-06-23 DIAGNOSIS — Z45.2 ENCOUNTER FOR CARE RELATED TO VASCULAR ACCESS PORT: ICD-10-CM

## 2022-06-23 DIAGNOSIS — C61 PROSTATE CANCER METASTATIC TO MULTIPLE SITES: Primary | ICD-10-CM

## 2022-06-23 LAB
ALBUMIN SERPL-MCNC: 4.4 G/DL (ref 3.8–4.8)
ALBUMIN/GLOB SERPL: 1.8 {RATIO} (ref 1.2–2.2)
ALP SERPL-CCNC: 92 IU/L (ref 44–121)
ALT SERPL-CCNC: 17 IU/L (ref 0–44)
AST SERPL-CCNC: 18 IU/L (ref 0–40)
BASOPHILS # BLD AUTO: 0.1 X10E3/UL (ref 0–0.2)
BASOPHILS NFR BLD AUTO: 2 %
BILIRUB SERPL-MCNC: 0.3 MG/DL (ref 0–1.2)
BUN SERPL-MCNC: 23 MG/DL (ref 8–27)
BUN/CREAT SERPL: 20 (ref 10–24)
CALCIUM SERPL-MCNC: 9.2 MG/DL (ref 8.6–10.2)
CHLORIDE SERPL-SCNC: 106 MMOL/L (ref 96–106)
CO2 SERPL-SCNC: 20 MMOL/L (ref 20–29)
CREAT SERPL-MCNC: 1.17 MG/DL (ref 0.76–1.27)
EGFRCR SERPLBLD CKD-EPI 2021: 68 ML/MIN/1.73
EOSINOPHIL # BLD AUTO: 0.1 X10E3/UL (ref 0–0.4)
EOSINOPHIL NFR BLD AUTO: 1 %
ERYTHROCYTE [DISTWIDTH] IN BLOOD BY AUTOMATED COUNT: 13.7 % (ref 11.6–15.4)
GLOBULIN SER CALC-MCNC: 2.4 G/DL (ref 1.5–4.5)
GLUCOSE SERPL-MCNC: 130 MG/DL (ref 65–99)
HCT VFR BLD AUTO: 38.8 % (ref 37.5–51)
HGB BLD-MCNC: 12.9 G/DL (ref 13–17.7)
IMM GRANULOCYTES # BLD AUTO: 0.1 X10E3/UL (ref 0–0.1)
IMM GRANULOCYTES NFR BLD AUTO: 1 %
LYMPHOCYTES # BLD AUTO: 2.4 X10E3/UL (ref 0.7–3.1)
LYMPHOCYTES NFR BLD AUTO: 33 %
MCH RBC QN AUTO: 28.3 PG (ref 26.6–33)
MCHC RBC AUTO-ENTMCNC: 33.2 G/DL (ref 31.5–35.7)
MCV RBC AUTO: 85 FL (ref 79–97)
MONOCYTES # BLD AUTO: 0.7 X10E3/UL (ref 0.1–0.9)
MONOCYTES NFR BLD AUTO: 10 %
NEUTROPHILS # BLD AUTO: 3.8 X10E3/UL (ref 1.4–7)
NEUTROPHILS NFR BLD AUTO: 53 %
PLATELET # BLD AUTO: 277 X10E3/UL (ref 150–450)
POTASSIUM SERPL-SCNC: 4.3 MMOL/L (ref 3.5–5.2)
PROT SERPL-MCNC: 6.8 G/DL (ref 6–8.5)
RBC # BLD AUTO: 4.56 X10E6/UL (ref 4.14–5.8)
SODIUM SERPL-SCNC: 141 MMOL/L (ref 134–144)
WBC # BLD AUTO: 7.2 X10E3/UL (ref 3.4–10.8)

## 2022-06-23 PROCEDURE — 25010000002 DOCETAXEL 20 MG/ML SOLUTION 8 ML VIAL: Performed by: NURSE PRACTITIONER

## 2022-06-23 PROCEDURE — 25010000002 HEPARIN LOCK FLUSH PER 10 UNITS: Performed by: INTERNAL MEDICINE

## 2022-06-23 PROCEDURE — 99214 OFFICE O/P EST MOD 30 MIN: CPT | Performed by: NURSE PRACTITIONER

## 2022-06-23 PROCEDURE — 96413 CHEMO IV INFUSION 1 HR: CPT

## 2022-06-23 RX ORDER — SODIUM CHLORIDE 0.9 % (FLUSH) 0.9 %
20 SYRINGE (ML) INJECTION AS NEEDED
Status: CANCELLED | OUTPATIENT
Start: 2022-06-23

## 2022-06-23 RX ORDER — SODIUM CHLORIDE 0.9 % (FLUSH) 0.9 %
20 SYRINGE (ML) INJECTION AS NEEDED
Status: DISCONTINUED | OUTPATIENT
Start: 2022-06-23 | End: 2022-06-23 | Stop reason: HOSPADM

## 2022-06-23 RX ORDER — HEPARIN SODIUM (PORCINE) LOCK FLUSH IV SOLN 100 UNIT/ML 100 UNIT/ML
500 SOLUTION INTRAVENOUS AS NEEDED
Status: DISCONTINUED | OUTPATIENT
Start: 2022-06-23 | End: 2022-06-23 | Stop reason: HOSPADM

## 2022-06-23 RX ORDER — SODIUM CHLORIDE 9 MG/ML
250 INJECTION, SOLUTION INTRAVENOUS ONCE
Status: COMPLETED | OUTPATIENT
Start: 2022-06-23 | End: 2022-06-23

## 2022-06-23 RX ORDER — HEPARIN SODIUM (PORCINE) LOCK FLUSH IV SOLN 100 UNIT/ML 100 UNIT/ML
500 SOLUTION INTRAVENOUS AS NEEDED
Status: CANCELLED | OUTPATIENT
Start: 2022-06-23

## 2022-06-23 RX ADMIN — HEPARIN 500 UNITS: 100 SYRINGE at 10:05

## 2022-06-23 RX ADMIN — DOCETAXEL 175 MG: 20 INJECTION, SOLUTION, CONCENTRATE INTRAVENOUS at 09:05

## 2022-06-23 RX ADMIN — SODIUM CHLORIDE 250 ML: 9 INJECTION, SOLUTION INTRAVENOUS at 09:04

## 2022-06-23 NOTE — PROGRESS NOTES
CHIEF COMPLAINT: 1.   Metastatic prostate cancer       Problem List:  Oncology/Hematology History Overview Note   1.  Prostate cancer clinical T1c and 2M1B stage IVb  2.  Urinary retention with Goodwin in place 1/24/2022.  On finasteride 1/24/2022.  3.  Covid 19 December 2021    Oncology history timeline:  -11/29/2021  with symptoms of urinary retention.  -2/15/2022 prostate biopsy adenocarcinoma grade group 5 and 3 out of 12 cores, grade group 4 in 1 out of 12 cores, grade group 3 in 8 out of 12 cores.  -2/24/2022 CT chest abdomen pelvis and total body bone scan shows left third rib, right acetabulum, periaortic and retroperitoneal kizzy metastases complaining of pain in the left chest and right hip.  Also possible sclerotic left 10th rib on CT.  Spleen mildly enlarged 13.8 cm.  Hepatic steatosis.  Small liver cyst.  Fat stranding in the periaortic and mesenteric region consistent with reactive/inflammatory stranding.  Multiple enlarged periaortic and retroperitoneal nodes and lymphadenopathy largest 4.9 cm.  CT chest describes tiny sclerotic foci in left eighth rib and right lateral seventh rib and T1.  Multiple small mediastinal and bilateral axillary nodes seen with small hypodense left thyroid nodule.  Creatinine 1.7.  -3/4/2022 office note Dr. Rolando Matute: Patient was seen after his elevated PSA by Dr. Vera and prostate biopsy scheduled.  However, he developed COVID-19 and this was canceled and the patient did not reschedule due to lack of insurance.  Saw Dr. Matute back on 1/24/2022 with plans to get staging CTs and bone scan with results as outlined above.  Started Casodex and to return on 3/11/2022 for Lupron.  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  TURP planned for urinary retention symptoms.    -3/15/2022 [facility] medical oncology initial consultation: I reviewed the above data with the patient.  With his fairly bulky retroperitoneal  adenopathy and bone metastases including extra axial metastases, he would fit the data from the CHAARTED trial for which Taxotere x6 followed by Zytiga prednisone along with the planned Lupron already being planned by Dr. Matute would be reasonable.  Equally reasonable in terms of comparisons with bicalutamide and Lupron would be Zytiga prednisone plus Lupron or Xtandi plus Lupron or apalutamide plus Lupron.  None of these have been compared head-to-head but all have beaten combined androgen deprivation therapy with bicalutamide and Lupron.  At the age of 67 and in order to have as many bullets as possible down the road and hence saving some of the hormone blockade options for later and using chemotherapy when he is younger and healthier for a more time-limited.,  He has opted for the Taxotere x6 followed by Zytiga and prednisone.  We will also give him Xgeva to improve bone health and given metastatic disease, as with all metastatic prostate cancer patients, he needs genetic counseling both for his sake as well as his family's.  If he were to have BRCA1 or other such  mutations, then that also opens up the options for PARP inhibitors etc. down the road.  He has his TURP scheduled for 2/24/2022 and we will start treatment a couple of weeks after that and he will get chemo preparation visit in the meantime and I will get capital surgeons to place a port.  We will check his PSA after his TURP when he sees my nurse practitioner back early April for the start of chemotherapy and repeat the PSA and CT chest abdomen pelvis and bone scan after the Taxotere is complete.    -3/31/2022 chemotherapy education and needs assessment completed    -4/7/2022 began docetaxel and Xgeva.  .0.  We will do his Xgeva every 6 weeks to coordinate with his docetaxel infusions.    -4/19/2022 patient stopped Casodex    -5/17/2022 patient reported seeing his PCP for an abscess in his suprapubic area.  Placed on clindamycin, will  delay treatment 1 week.    -5/25/2022 PSA 34.3  -5/26/2022 [facility] Oncology clinic follow-up: Chris has an abscess in the suprapubic area, it has improved on antibiotic therapy but I am concerned if we treat him it will just flare back up unless it is drained.  I will get him to Dr. Miller who placed his port for I&D and culture.  He is on clindamycin and states he completes course of treatment tomorrow.  I will delay his treatment with Taxotere 1 more week.  We will give his Xgeva today.  I will see him back in 1 week for follow-up to assess whether or not we can resume his Taxotere.  His PSA has dropped nicely with treatment thus far, PSA yesterday was 34.3 which is down from a PSA of 259.0 when we started treatment, it has been as high as 911 in November.    -6/2/2022 [facility] Oncology clinic follow-up: Chris went to see Dr. Miller to have his abscess lanced however apparently there was a long wait and he left without being seen.  He did call his PCP and was given 5 more days of clindamycin and the abscess now seems to have resolved.  We discussed today that he is at risk for recurrence of infection due to immunocompromise state with chemotherapy.  He will notify us if he has any return of his abscess.  He does have intertrigo under his panniculus, I have advised him to keep this area dry, to apply topical drying/antifungal powders.  Also recommended looser clothing.  His counts are adequate to continue therapy, we will resume Taxotere today with cycle #3.  We will repeat restaging scans after 6 courses.  We are doing Xgeva every 6 weeks to coordinate with his Taxotere.  Once he finishes Taxotere we can then go back to every 4 weeks.  His next Xgeva is not due until 7/14/2022.  His calcium is running a little low, he is going to  calcium supplement.     Prostate cancer metastatic to multiple sites (HCC)   3/15/2022 Initial Diagnosis    Prostate cancer metastatic to multiple sites (HCC)     3/15/2022 Cancer  "Staged    Staging form: Prostate, AJCC 8th Edition  - Clinical: Stage IVB (cT1c, cN1, cM1b, Grade Group: 5) - Signed by Fritz Goodson MD on 3/15/2022     4/7/2022 -  Chemotherapy    OP PROSTATE DOCEtaxel     4/7/2022 -  Chemotherapy    OP SUPPORTIVE Denosumab (Xgeva) Q28D     8/11/2022 -  Chemotherapy    OP PROSTATE Abiraterone / PredniSONE         HISTORY OF PRESENT ILLNESS:  The patient is a 67 y.o. male, here for follow up on management of metastatic prostate cancer currently on therapy twith docetaxel and Xgeva.  Mr. Morfin is feeling well.  He reports he has had no recurrence of his suprapubic abscess everything has resolved.  He is trying to keep his skin dry on his lower abdomen and uses antifungal powder \"when I think about it\".  No fevers or chills.  He is eating well and has a good appetite, weight is stable.  No change in his bowel or bladder habits.  Has some fatigue for 2-3 days after his treatment but then feels back to himself.      Past Medical History:   Diagnosis Date   • Cancer (HCC)    • Prostate cancer (HCC)      Past Surgical History:   Procedure Laterality Date   • PROSTATE BIOPSY  02/2022    dr. sue       No Known Allergies    Family History and Social History reviewed and changed as necessary    REVIEW OF SYSTEM:   Negative for new somatic concerns    PHYSICAL EXAM:  Well-developed, well-nourished appearing male in no distress  Lungs clear to auscultation bilaterally, respirations even and unlabored  Heart regular rate and rhythm    Vitals:    06/23/22 0827   BP: 147/89   Pulse: 82   Resp: 20   Temp: 98.2 °F (36.8 °C)   SpO2: 96%   Weight: 117 kg (258 lb)   Height: 180.3 cm (71\")     Vitals:    06/23/22 0827   PainSc: 0-No pain          ECOG score: 0           Vitals reviewed.  Labs reviewed    ECOG: (0) Fully Active - Able to Carry On All Pre-disease Performance Without Restriction    Lab Results   Component Value Date    HGB 12.9 (L) 06/22/2022    HCT 38.8 06/22/2022    MCV 85 " 06/22/2022     06/22/2022    WBC 7.2 06/22/2022    NEUTROABS 3.8 06/22/2022    LYMPHSABS 2.4 06/22/2022    MONOSABS 0.7 06/22/2022    EOSABS 0.1 06/22/2022    BASOSABS 0.1 06/22/2022       Lab Results   Component Value Date    GLUCOSE 130 (H) 06/22/2022    BUN 23 06/22/2022    CREATININE 1.17 06/22/2022     06/22/2022    K 4.3 06/22/2022     06/22/2022    CO2 20 06/22/2022    CALCIUM 9.2 06/22/2022    ALBUMIN 4.4 06/22/2022    BILITOT 0.3 06/22/2022    ALKPHOS 92 06/22/2022    AST 18 06/22/2022    ALT 17 06/22/2022     6/1/2022 PSA 39.1        ASSESSMENT & PLAN:    1.  Prostate cancer clinical T1c and 2M1B stage IVb  2.  Urinary retention with Goodwin in place 1/24/2022.  On finasteride 1/24/2022.  3.  Covid 19 December 2021  4.  Mild renal insufficiency  5.  Abscess in the suprapubic area, resolved on antibiotics  6.  Intertrigo  7.  Mild hypocalcemia    Oncology history timeline:  -11/29/2021  with symptoms of urinary retention.  -2/15/2022 prostate biopsy adenocarcinoma grade group 5 and 3 out of 12 cores, grade group 4 in 1 out of 12 cores, grade group 3 in 8 out of 12 cores.  -2/24/2022 CT chest abdomen pelvis and total body bone scan shows left third rib, right acetabulum, periaortic and retroperitoneal kizzy metastases complaining of pain in the left chest and right hip.  Also possible sclerotic left 10th rib on CT.  Spleen mildly enlarged 13.8 cm.  Hepatic steatosis.  Small liver cyst.  Fat stranding in the periaortic and mesenteric region consistent with reactive/inflammatory stranding.  Multiple enlarged periaortic and retroperitoneal nodes and lymphadenopathy largest 4.9 cm.  CT chest describes tiny sclerotic foci in left eighth rib and right lateral seventh rib and T1.  Multiple small mediastinal and bilateral axillary nodes seen with small hypodense left thyroid nodule.  Creatinine 1.7.  -3/4/2022 office note Dr. Rolando Matute: Patient was seen after his elevated PSA by   Patterson and prostate biopsy scheduled.  However, he developed COVID-19 and this was canceled and the patient did not reschedule due to lack of insurance.  Saw Dr. Matute back on 1/24/2022 with plans to get staging CTs and bone scan with results as outlined above.  Started Casodex and to return on 3/11/2022 for Lupron.  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  TURP planned for urinary retention symptoms.    -3/15/2022 [facility] medical oncology initial consultation: I reviewed the above data with the patient.  With his fairly bulky retroperitoneal adenopathy and bone metastases including extra axial metastases, he would fit the data from the CHAARTED trial for which Taxotere x6 followed by Zytiga prednisone along with the planned Lupron already being planned by Dr. Matute would be reasonable.  Equally reasonable in terms of comparisons with bicalutamide and Lupron would be Zytiga prednisone plus Lupron or Xtandi plus Lupron or apalutamide plus Lupron.  None of these have been compared head-to-head but all have beaten combined androgen deprivation therapy with bicalutamide and Lupron.  At the age of 67 and in order to have as many bullets as possible down the road and hence saving some of the hormone blockade options for later and using chemotherapy when he is younger and healthier for a more time-limited.,  He has opted for the Taxotere x6 followed by Zytiga and prednisone.  We will also give him Xgeva to improve bone health and given metastatic disease, as with all metastatic prostate cancer patients, he needs genetic counseling both for his sake as well as his family's.  If he were to have BRCA1 or other such  mutations, then that also opens up the options for PARP inhibitors etc. down the road.  He has his TURP scheduled for 2/24/2022 and we will start treatment a couple of weeks after that and he will get chemo preparation visit in the meantime  and I will get capital surgeons to place a port.  We will check his PSA after his TURP when he sees my nurse practitioner back early April for the start of chemotherapy and repeat the PSA and CT chest abdomen pelvis and bone scan after the Taxotere is complete.    -3/31/2022 chemotherapy education and needs assessment completed    -4/7/2022 [facility] Oncology clinic follow-up:  Mr. Morfin will start systemic therapy today for his metastatic prostate cancer with docetaxel and Xgeva.  Labs reviewed from 4/5/2022 with normal CBC, CMP unremarkable with mild renal insufficiency with creatinine 1.34, his creatinine clearance is 87.5, phosphorus and magnesium normal,  which is down from his baseline PSA of 911 on 11/29/2021.  He receives Lupron with Dr. Matute and is on bicalutamide.  We will treat today, I will see him back in 3 weeks for follow-up.  We plan on 6 courses of docetaxel.  Genetic counseling appointment is pending.  See note dated 3/15/2022 from Dr. Fritz Goodson for plan of care going forward.  Mr. Morfin does not have a primary care provider, we are giving him information on primary care providers available in the area as he needs to establish care.  We will do his Xgeva every 6 weeks to coordinate with his docetaxel infusions.    -4/28/2022 [facility] Oncology clinic follow-up: Mr. Morfin continues to do well, he tolerated his first cycle of docetaxel with no unusual side effects, he also was on Xgeva which we are doing every 6 weeks to coordinate with his chemotherapy infusions.  Labs from yesterday reviewed and were unremarkable, CBC is normal, creatinine stable at 1.33.  He will continue treatment today with cycle #2.  I will see him back in 3 weeks for follow-up, he will be due his next Xgeva at that time.  We will also repeat PSA then.  Plan to repeat restaging scans after 6 courses.    -5/26/2022 [facility] Oncology clinic follow-up: Chris has an abscess in the suprapubic area, it has improved on  antibiotic therapy but I am concerned if we treat him it will just flare back up unless it is drained.  I will get him to Dr. Miller who placed his port for I&D and culture.  He is on clindamycin and states he completes course of treatment tomorrow.  I will delay his treatment with Taxotere 1 more week.  We will give his Xgeva today.  I will see him back in 1 week for follow-up to assess whether or not we can resume his Taxotere.  His PSA has dropped nicely with treatment thus far, PSA yesterday was 34.3 which is down from a PSA of 259.0 when we started treatment, it has been as high as 911 in November.    -6/2/2022 [facility] Oncology clinic follow-up: Chris went to see Dr. Miller to have his abscess lanced however apparently there was a long wait and he left without being seen.  He did call his PCP and was given 5 more days of clindamycin and the abscess now seems to have resolved.  We discussed today that he is at risk for recurrence of infection due to immunocompromise state with chemotherapy.  He will notify us if he has any return of his abscess.  He does have intertrigo under his panniculus, I have advised him to keep this area dry, to apply topical drying/antifungal powders.  Also recommended looser clothing.  His counts are adequate to continue therapy, we will resume Taxotere today with cycle #3.  We will repeat restaging scans after 6 courses.  We are doing Xgeva every 6 weeks to coordinate with his Taxotere.  Once he finishes Taxotere we can then go back to every 4 weeks.  His next Xgeva is not due until 7/14/2022.  His calcium is running a little low, he is going to  calcium supplement.    -6/23/2022 [facility] Oncology clinic follow-up: Chris overall is doing well on treatment with Taxotere.  Labs reviewed from yesterday and are unremarkable.  We will continue treatment today unchanged.  His suprapubic abscess resolved on clindamycin.  He will continue to use drying powder/antifungal powder under his  panniculus for intertrigo.  Today will be cycle #4 of a planned 6 courses of Taxotere.  He is also on Xgeva, next dose due 7/14/2022, we are doing this every 6 weeks for now to line up with his chemotherapy, can go back to every 4 weeks after he finishes Taxotere.  Calcium from CMP yesterday was normal.     Return to clinic in 3 weeks for follow-up    This was a level 4, moderate MDM visit with 2 stable illnesses, review of labs, management of prescription drug therapy requiring intensive monitoring for toxicity.    Susana Torres, APRN    06/23/2022

## 2022-06-28 ENCOUNTER — TELEPHONE (OUTPATIENT)
Dept: NUTRITION | Facility: HOSPITAL | Age: 67
End: 2022-06-28

## 2022-06-28 NOTE — PROGRESS NOTES
Onc Nutrition    Patient: Chris Morfin  YOB: 1955    Diagnosis: Metastatic prostate cancer     Chemotherapy: Taxotere - every 21 days x 6 cycles (cycle 5 planned for 7/14/22) + Xgeva - every 6 weeks     Weight - 258# / stable throughout treatment thus far    Attempted to contact patient via phone call to discuss nutrition with his diagnosis and treatment plan, however he was unavailable.  Provided message regarding intention of phone call, request to call back at his convenience, and RD's contact information.  Will follow up as indicated.  RD available to assist prn.    Collette Laird RD  06/28/22

## 2022-07-13 ENCOUNTER — OFFICE VISIT (OUTPATIENT)
Dept: ONCOLOGY | Facility: CLINIC | Age: 67
End: 2022-07-13

## 2022-07-13 ENCOUNTER — INFUSION (OUTPATIENT)
Dept: ONCOLOGY | Facility: HOSPITAL | Age: 67
End: 2022-07-13

## 2022-07-13 VITALS
BODY MASS INDEX: 36.68 KG/M2 | SYSTOLIC BLOOD PRESSURE: 141 MMHG | WEIGHT: 262 LBS | HEART RATE: 87 BPM | OXYGEN SATURATION: 96 % | DIASTOLIC BLOOD PRESSURE: 82 MMHG | HEIGHT: 71 IN | RESPIRATION RATE: 18 BRPM | TEMPERATURE: 99.1 F

## 2022-07-13 DIAGNOSIS — C61 PROSTATE CANCER METASTATIC TO MULTIPLE SITES: ICD-10-CM

## 2022-07-13 DIAGNOSIS — C61 PROSTATE CANCER METASTATIC TO MULTIPLE SITES: Primary | Chronic | ICD-10-CM

## 2022-07-13 DIAGNOSIS — R55 SYNCOPE, UNSPECIFIED SYNCOPE TYPE: ICD-10-CM

## 2022-07-13 PROCEDURE — 36415 COLL VENOUS BLD VENIPUNCTURE: CPT

## 2022-07-13 PROCEDURE — 99215 OFFICE O/P EST HI 40 MIN: CPT | Performed by: NURSE PRACTITIONER

## 2022-07-13 NOTE — PROGRESS NOTES
CHIEF COMPLAINT: 1.   Syncopal episode 3 days after last treatment   2.  Metastatic prostate cancer       Problem List:  Oncology/Hematology History Overview Note   1.  Prostate cancer clinical T1c and 2M1B stage IVb  2.  Urinary retention with Goodwin in place 1/24/2022.  On finasteride 1/24/2022.  3.  Covid 19 December 2021    Oncology history timeline:  -11/29/2021  with symptoms of urinary retention.  -2/15/2022 prostate biopsy adenocarcinoma grade group 5 and 3 out of 12 cores, grade group 4 in 1 out of 12 cores, grade group 3 in 8 out of 12 cores.  -2/24/2022 CT chest abdomen pelvis and total body bone scan shows left third rib, right acetabulum, periaortic and retroperitoneal kizzy metastases complaining of pain in the left chest and right hip.  Also possible sclerotic left 10th rib on CT.  Spleen mildly enlarged 13.8 cm.  Hepatic steatosis.  Small liver cyst.  Fat stranding in the periaortic and mesenteric region consistent with reactive/inflammatory stranding.  Multiple enlarged periaortic and retroperitoneal nodes and lymphadenopathy largest 4.9 cm.  CT chest describes tiny sclerotic foci in left eighth rib and right lateral seventh rib and T1.  Multiple small mediastinal and bilateral axillary nodes seen with small hypodense left thyroid nodule.  Creatinine 1.7.  -3/4/2022 office note Dr. Rolando Matute: Patient was seen after his elevated PSA by Dr. Vera and prostate biopsy scheduled.  However, he developed COVID-19 and this was canceled and the patient did not reschedule due to lack of insurance.  Saw Dr. Matute back on 1/24/2022 with plans to get staging CTs and bone scan with results as outlined above.  Started Casodex and to return on 3/11/2022 for Lupron.  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  TURP planned for urinary retention symptoms.    -3/15/2022 [facility] oncology initial consultation: I reviewed the above data with  the patient.  With his fairly bulky retroperitoneal adenopathy and bone metastases including extra axial metastases, he would fit the data from the CHAARTED trial for which Taxotere x6 followed by Zytiga prednisone along with the planned Lupron already being planned by Dr. Matute would be reasonable.  Equally reasonable in terms of comparisons with bicalutamide and Lupron would be Zytiga prednisone plus Lupron or Xtandi plus Lupron or apalutamide plus Lupron.  None of these have been compared head-to-head but all have beaten combined androgen deprivation therapy with bicalutamide and Lupron.  At the age of 67 and in order to have as many bullets as possible down the road and hence saving some of the hormone blockade options for later and using chemotherapy when he is younger and healthier for a more time-limited.,  He has opted for the Taxotere x6 followed by Zytiga and prednisone.  We will also give him Xgeva to improve bone health and given metastatic disease, as with all metastatic prostate cancer patients, he needs genetic counseling both for his sake as well as his family's.  If he were to have BRCA1 or other such  mutations, then that also opens up the options for PARP inhibitors etc. down the road.  He has his TURP scheduled for 2/24/2022 and we will start treatment a couple of weeks after that and he will get chemo preparation visit in the meantime and I will get capital surgeons to place a port.  We will check his PSA after his TURP when he sees my nurse practitioner back early April for the start of chemotherapy and repeat the PSA and CT chest abdomen pelvis and bone scan after the Taxotere is complete.    -3/31/2022 chemotherapy education and needs assessment completed    -4/7/2022 began docetaxel and Xgeva.  .0.  We will do his Xgeva every 6 weeks to coordinate with his docetaxel infusions.    -4/19/2022 patient stopped Casodex    -5/17/2022 patient reported seeing his PCP for an abscess  in his suprapubic area.  Placed on clindamycin, will delay treatment 1 week.    -5/25/2022 PSA 34.3  -5/26/2022 [facility] Oncology clinic follow-up: Chris has an abscess in the suprapubic area, it has improved on antibiotic therapy but I am concerned if we treat him it will just flare back up unless it is drained.  I will get him to Dr. Miller who placed his port for I&D and culture.  He is on clindamycin and states he completes course of treatment tomorrow.  I will delay his treatment with Taxotere 1 more week.  We will give his Xgeva today.  I will see him back in 1 week for follow-up to assess whether or not we can resume his Taxotere.  His PSA has dropped nicely with treatment thus far, PSA yesterday was 34.3 which is down from a PSA of 259.0 when we started treatment, it has been as high as 911 in November.    -6/2/2022 [facility] Oncology clinic follow-up: Chris went to see Dr. Miller to have his abscess lanced however apparently there was a long wait and he left without being seen.  He did call his PCP and was given 5 more days of clindamycin and the abscess now seems to have resolved.  We discussed today that he is at risk for recurrence of infection due to immunocompromise state with chemotherapy.  He will notify us if he has any return of his abscess.  He does have intertrigo under his panniculus, I have advised him to keep this area dry, to apply topical drying/antifungal powders.  Also recommended looser clothing.  His counts are adequate to continue therapy, we will resume Taxotere today with cycle #3.  We will repeat restaging scans after 6 courses.  We are doing Xgeva every 6 weeks to coordinate with his Taxotere.  Once he finishes Taxotere we can then go back to every 4 weeks.  His next Xgeva is not due until 7/14/2022.  His calcium is running a little low, he is going to  calcium supplement.    -6/23/2022 [facility] Oncology clinic follow-up: Chris overall is doing well on treatment with Taxotere.   Labs reviewed from yesterday and are unremarkable.  We will continue treatment today unchanged.  His suprapubic abscess resolved on clindamycin.  He will continue to use drying powder/antifungal powder under his panniculus for intertrigo.  Today will be cycle #4 of a planned 6 courses of Taxotere.  He is also on Xgeva, next dose due 7/14/2022, we are doing this every 6 weeks for now to line up with his chemotherapy, can go back to every 4 weeks after he finishes Taxotere.  Calcium from CMP yesterday was normal.     Prostate cancer metastatic to multiple sites (HCC)   3/15/2022 Initial Diagnosis    Prostate cancer metastatic to multiple sites (HCC)     3/15/2022 Cancer Staged    Staging form: Prostate, AJCC 8th Edition  - Clinical: Stage IVB (cT1c, cN1, cM1b, Grade Group: 5) - Signed by Fritz Goodson MD on 3/15/2022     4/7/2022 -  Chemotherapy    OP PROSTATE DOCEtaxel     4/7/2022 -  Chemotherapy    OP SUPPORTIVE Denosumab (Xgeva) Q28D     8/11/2022 -  Chemotherapy    OP PROSTATE Abiraterone / PredniSONE         HISTORY OF PRESENT ILLNESS:  The patient is a 67 y.o. male, here for follow up on management of metastatic prostate cancer currently on therapy twith docetaxel and Xgeva.  Mr. Morfin was scheduled for labs today and to see me in clinic tomorrow however when he stopped at the desk to sign in today he requested to be seen today due to issues that happened after his last treatment on day 3.  He reports that he is feeling well today however he reports he had a syncopal episode on day 3 after his last treatment.  He reports that after each treatment he has been more fatigued on about day 3 but this is the first time he actually passed out.  He reports that he was sitting with his family in the garage just having a conversation where he felt a warm sensation and then when he got up the next thing he knew he was waking up on the ground.  No one called 911, he did not seek medical attention, he states that he then  "went to his chair and sat for a while and then when he got back up he felt the warm sensation again but he was able to make it to the house and sit down without losing consciousness again.  Since that day he has been back to his usual self with no further episodes.  On previous treatments on day 3 he would have fatigue, after his first treatment he had chest pain on day 3 but did not seek medical attention and has had no recurrence of chest pain.  No history of cardiac issues.  He denies any headaches, no dizziness otherwise or vision changes.  He reports that he stays well-hydrated.    He has had no recurrence of his suprapubic abscess, stating that has resolved.  He is eating well and has a good appetite, weight is stable.  No change in his bowel or bladder habits.        Past Medical History:   Diagnosis Date   • Cancer (HCC)    • Prostate cancer (HCC)      Past Surgical History:   Procedure Laterality Date   • PROSTATE BIOPSY  02/2022    dr. sue       No Known Allergies    Family History and Social History reviewed and changed as necessary    REVIEW OF SYSTEM:   Negative for new somatic concerns    PHYSICAL EXAM:  Well-developed, well-nourished appearing male in no distress  Lungs clear to auscultation bilaterally, respirations even and unlabored  Heart regular rate and rhythm    Vitals:    07/13/22 1059   BP: 141/82   Pulse: 87   Resp: 18   Temp: 99.1 °F (37.3 °C)   SpO2: 96%   Weight: 119 kg (262 lb)   Height: 180.3 cm (71\")     Vitals:    07/13/22 1059   PainSc: 0-No pain          ECOG score: 0           Vitals reviewed.  Labs from today pending    ECOG: (0) Fully Active - Able to Carry On All Pre-disease Performance Without Restriction    Lab Results   Component Value Date    HGB 12.9 (L) 06/22/2022    HCT 38.8 06/22/2022    MCV 85 06/22/2022     06/22/2022    WBC 7.2 06/22/2022    NEUTROABS 3.8 06/22/2022    LYMPHSABS 2.4 06/22/2022    MONOSABS 0.7 06/22/2022    EOSABS 0.1 06/22/2022    BASOSABS " 0.1 06/22/2022       Lab Results   Component Value Date    GLUCOSE 130 (H) 06/22/2022    BUN 23 06/22/2022    CREATININE 1.17 06/22/2022     06/22/2022    K 4.3 06/22/2022     06/22/2022    CO2 20 06/22/2022    CALCIUM 9.2 06/22/2022    ALBUMIN 4.4 06/22/2022    BILITOT 0.3 06/22/2022    ALKPHOS 92 06/22/2022    AST 18 06/22/2022    ALT 17 06/22/2022     6/1/2022 PSA 39.1        ASSESSMENT & PLAN:    1.  Prostate cancer clinical T1c and 2M1B stage IVb  2.  Urinary retention with Goodwin in place 1/24/2022.  On finasteride 1/24/2022.  3.  Covid 19 December 2021  4.  Mild renal insufficiency  5.  Abscess in the suprapubic area, resolved on antibiotics  6.  Intertrigo  7.  Mild hypocalcemia  8.  Syncopal episode after fourth cycle of Taxotere    Oncology history timeline:  -11/29/2021  with symptoms of urinary retention.  -2/15/2022 prostate biopsy adenocarcinoma grade group 5 and 3 out of 12 cores, grade group 4 in 1 out of 12 cores, grade group 3 in 8 out of 12 cores.  -2/24/2022 CT chest abdomen pelvis and total body bone scan shows left third rib, right acetabulum, periaortic and retroperitoneal kizzy metastases complaining of pain in the left chest and right hip.  Also possible sclerotic left 10th rib on CT.  Spleen mildly enlarged 13.8 cm.  Hepatic steatosis.  Small liver cyst.  Fat stranding in the periaortic and mesenteric region consistent with reactive/inflammatory stranding.  Multiple enlarged periaortic and retroperitoneal nodes and lymphadenopathy largest 4.9 cm.  CT chest describes tiny sclerotic foci in left eighth rib and right lateral seventh rib and T1.  Multiple small mediastinal and bilateral axillary nodes seen with small hypodense left thyroid nodule.  Creatinine 1.7.  -3/4/2022 office note Dr. Rolando Matute: Patient was seen after his elevated PSA by Dr. Vera and prostate biopsy scheduled.  However, he developed COVID-19 and this was canceled and the patient did not  reschedule due to lack of insurance.  Saw Dr. Matute back on 1/24/2022 with plans to get staging CTs and bone scan with results as outlined above.  Started Casodex and to return on 3/11/2022 for Lupron.  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  TURP planned for urinary retention symptoms.    -3/15/2022 [facility] medical oncology initial consultation: I reviewed the above data with the patient.  With his fairly bulky retroperitoneal adenopathy and bone metastases including extra axial metastases, he would fit the data from the CHAARTED trial for which Taxotere x6 followed by Zytiga prednisone along with the planned Lupron already being planned by Dr. Matute would be reasonable.  Equally reasonable in terms of comparisons with bicalutamide and Lupron would be Zytiga prednisone plus Lupron or Xtandi plus Lupron or apalutamide plus Lupron.  None of these have been compared head-to-head but all have beaten combined androgen deprivation therapy with bicalutamide and Lupron.  At the age of 67 and in order to have as many bullets as possible down the road and hence saving some of the hormone blockade options for later and using chemotherapy when he is younger and healthier for a more time-limited.,  He has opted for the Taxotere x6 followed by Zytiga and prednisone.  We will also give him Xgeva to improve bone health and given metastatic disease, as with all metastatic prostate cancer patients, he needs genetic counseling both for his sake as well as his family's.  If he were to have BRCA1 or other such  mutations, then that also opens up the options for PARP inhibitors etc. down the road.  He has his TURP scheduled for 2/24/2022 and we will start treatment a couple of weeks after that and he will get chemo preparation visit in the meantime and I will get capital surgeons to place a port.  We will check his PSA after his TURP when he sees my nurse practitioner  back early April for the start of chemotherapy and repeat the PSA and CT chest abdomen pelvis and bone scan after the Taxotere is complete.    -3/31/2022 chemotherapy education and needs assessment completed    -4/7/2022 [facility] Oncology clinic follow-up:  Mr. Morfin will start systemic therapy today for his metastatic prostate cancer with docetaxel and Xgeva.  Labs reviewed from 4/5/2022 with normal CBC, CMP unremarkable with mild renal insufficiency with creatinine 1.34, his creatinine clearance is 87.5, phosphorus and magnesium normal,  which is down from his baseline PSA of 911 on 11/29/2021.  He receives Lupron with Dr. Matute and is on bicalutamide.  We will treat today, I will see him back in 3 weeks for follow-up.  We plan on 6 courses of docetaxel.  Genetic counseling appointment is pending.  See note dated 3/15/2022 from Dr. Fritz Goodson for plan of care going forward.  Mr. Morfin does not have a primary care provider, we are giving him information on primary care providers available in the area as he needs to establish care.  We will do his Xgeva every 6 weeks to coordinate with his docetaxel infusions.    -4/28/2022 [facility] Oncology clinic follow-up: Mr. Morfin continues to do well, he tolerated his first cycle of docetaxel with no unusual side effects, he also was on Xgeva which we are doing every 6 weeks to coordinate with his chemotherapy infusions.  Labs from yesterday reviewed and were unremarkable, CBC is normal, creatinine stable at 1.33.  He will continue treatment today with cycle #2.  I will see him back in 3 weeks for follow-up, he will be due his next Xgeva at that time.  We will also repeat PSA then.  Plan to repeat restaging scans after 6 courses.    -5/26/2022 [facility] Oncology clinic follow-up: Chris has an abscess in the suprapubic area, it has improved on antibiotic therapy but I am concerned if we treat him it will just flare back up unless it is drained.  I will get him to   Angela who placed his port for I&D and culture.  He is on clindamycin and states he completes course of treatment tomorrow.  I will delay his treatment with Taxotere 1 more week.  We will give his Xgeva today.  I will see him back in 1 week for follow-up to assess whether or not we can resume his Taxotere.  His PSA has dropped nicely with treatment thus far, PSA yesterday was 34.3 which is down from a PSA of 259.0 when we started treatment, it has been as high as 911 in November.    -6/2/2022 [facility] Oncology clinic follow-up: Chris went to see Dr. Miller to have his abscess lanced however apparently there was a long wait and he left without being seen.  He did call his PCP and was given 5 more days of clindamycin and the abscess now seems to have resolved.  We discussed today that he is at risk for recurrence of infection due to immunocompromise state with chemotherapy.  He will notify us if he has any return of his abscess.  He does have intertrigo under his panniculus, I have advised him to keep this area dry, to apply topical drying/antifungal powders.  Also recommended looser clothing.  His counts are adequate to continue therapy, we will resume Taxotere today with cycle #3.  We will repeat restaging scans after 6 courses.  We are doing Xgeva every 6 weeks to coordinate with his Taxotere.  Once he finishes Taxotere we can then go back to every 4 weeks.  His next Xgeva is not due until 7/14/2022.  His calcium is running a little low, he is going to  calcium supplement.    -6/23/2022 [facility] Oncology clinic follow-up: Chris overall is doing well on treatment with Taxotere.  Labs reviewed from yesterday and are unremarkable.  We will continue treatment today unchanged.  His suprapubic abscess resolved on clindamycin.  He will continue to use drying powder/antifungal powder under his panniculus for intertrigo.  Today will be cycle #4 of a planned 6 courses of Taxotere.  He is also on Xgeva, next dose due  7/14/2022, we are doing this every 6 weeks for now to line up with his chemotherapy, can go back to every 4 weeks after he finishes Taxotere.  Calcium from CMP yesterday was normal.     -7/13/2022 [facility] Oncology clinic follow-up: Mr. Morfin has had issues around day 3 after each treatment ranging from chest pain after his first treatment for which he did not seek medical attention, fatigue after the next few treatments, but 3 days after his most recent treatment on 6/23/2022 he had a syncopal episode at home and once again did not seek medical attention.  I discussed with him that this was not acceptable, if he has occurrences like this someone needs to call 911, it is difficult to figure out what is going on after the fact, the best time to work this up would be during the occurrence.  For now we will get labs today with CBC, CMP, PSA.  I will refer him to cardiology for further evaluation.  We will hold Taxotere most likely, I will see him tomorrow to go over his lab results.  I will also repeat his restaging scans with CT chest, abdomen and pelvis and will include CT of the head in light of his syncopal episode.  Plan of care discussed with Dr. Goodson also today via telephone.    Return to clinic tomorrow for follow-up    I spent 40 minutes caring for Chris on this date of service. This time includes time spent by me in the following activities: preparing for the visit, performing a medically appropriate examination and/or evaluation, counseling and educating the patient/family/caregiver, ordering medications, tests, or procedures, referring and communicating with other health care professionals and documenting information in the medical record.     Susana Torres, APRN    07/13/2022

## 2022-07-14 ENCOUNTER — OFFICE VISIT (OUTPATIENT)
Dept: ONCOLOGY | Facility: CLINIC | Age: 67
End: 2022-07-14

## 2022-07-14 VITALS
WEIGHT: 261 LBS | TEMPERATURE: 98.6 F | HEART RATE: 82 BPM | OXYGEN SATURATION: 96 % | BODY MASS INDEX: 36.54 KG/M2 | DIASTOLIC BLOOD PRESSURE: 75 MMHG | RESPIRATION RATE: 18 BRPM | SYSTOLIC BLOOD PRESSURE: 153 MMHG | HEIGHT: 71 IN

## 2022-07-14 DIAGNOSIS — C61 PROSTATE CANCER METASTATIC TO MULTIPLE SITES: Primary | ICD-10-CM

## 2022-07-14 LAB
ALBUMIN SERPL-MCNC: 4.3 G/DL (ref 3.8–4.8)
ALBUMIN/GLOB SERPL: 1.7 {RATIO} (ref 1.2–2.2)
ALP SERPL-CCNC: 82 IU/L (ref 44–121)
ALT SERPL-CCNC: 26 IU/L (ref 0–44)
AST SERPL-CCNC: 22 IU/L (ref 0–40)
BASOPHILS # BLD AUTO: 0.1 X10E3/UL (ref 0–0.2)
BASOPHILS NFR BLD AUTO: 2 %
BILIRUB SERPL-MCNC: <0.2 MG/DL (ref 0–1.2)
BUN SERPL-MCNC: 18 MG/DL (ref 8–27)
BUN/CREAT SERPL: 15 (ref 10–24)
CALCIUM SERPL-MCNC: 9.2 MG/DL (ref 8.6–10.2)
CHLORIDE SERPL-SCNC: 108 MMOL/L (ref 96–106)
CO2 SERPL-SCNC: 19 MMOL/L (ref 20–29)
CREAT SERPL-MCNC: 1.23 MG/DL (ref 0.76–1.27)
EGFRCR SERPLBLD CKD-EPI 2021: 64 ML/MIN/1.73
EOSINOPHIL # BLD AUTO: 0 X10E3/UL (ref 0–0.4)
EOSINOPHIL NFR BLD AUTO: 1 %
ERYTHROCYTE [DISTWIDTH] IN BLOOD BY AUTOMATED COUNT: 13.9 % (ref 11.6–15.4)
GLOBULIN SER CALC-MCNC: 2.5 G/DL (ref 1.5–4.5)
GLUCOSE SERPL-MCNC: 111 MG/DL (ref 65–99)
HCT VFR BLD AUTO: 37.9 % (ref 37.5–51)
HGB BLD-MCNC: 12.4 G/DL (ref 13–17.7)
IMM GRANULOCYTES # BLD AUTO: 0.1 X10E3/UL (ref 0–0.1)
IMM GRANULOCYTES NFR BLD AUTO: 1 %
LYMPHOCYTES # BLD AUTO: 1.3 X10E3/UL (ref 0.7–3.1)
LYMPHOCYTES NFR BLD AUTO: 19 %
MAGNESIUM SERPL-MCNC: 2.3 MG/DL (ref 1.6–2.3)
MCH RBC QN AUTO: 28.4 PG (ref 26.6–33)
MCHC RBC AUTO-ENTMCNC: 32.7 G/DL (ref 31.5–35.7)
MCV RBC AUTO: 87 FL (ref 79–97)
MONOCYTES # BLD AUTO: 0.4 X10E3/UL (ref 0.1–0.9)
MONOCYTES NFR BLD AUTO: 5 %
NEUTROPHILS # BLD AUTO: 4.9 X10E3/UL (ref 1.4–7)
NEUTROPHILS NFR BLD AUTO: 72 %
PHOSPHATE SERPL-MCNC: 2.4 MG/DL (ref 2.8–4.1)
PLATELET # BLD AUTO: 297 X10E3/UL (ref 150–450)
POTASSIUM SERPL-SCNC: 4.8 MMOL/L (ref 3.5–5.2)
PROT SERPL-MCNC: 6.8 G/DL (ref 6–8.5)
PSA SERPL-MCNC: 18.6 NG/ML (ref 0–4)
RBC # BLD AUTO: 4.36 X10E6/UL (ref 4.14–5.8)
SODIUM SERPL-SCNC: 143 MMOL/L (ref 134–144)
WBC # BLD AUTO: 6.8 X10E3/UL (ref 3.4–10.8)

## 2022-07-14 PROCEDURE — 99213 OFFICE O/P EST LOW 20 MIN: CPT | Performed by: NURSE PRACTITIONER

## 2022-07-14 NOTE — PROGRESS NOTES
CHIEF COMPLAINT: 1.   Syncopal episode 3 days after last treatment   2.  Metastatic prostate cancer       Problem List:  Oncology/Hematology History Overview Note   1.  Prostate cancer clinical T1c and 2M1B stage IVb  2.  Urinary retention with Goodwin in place 1/24/2022.  On finasteride 1/24/2022.  3.  Covid 19 December 2021    Oncology history timeline:  -11/29/2021  with symptoms of urinary retention.  -2/15/2022 prostate biopsy adenocarcinoma grade group 5 and 3 out of 12 cores, grade group 4 in 1 out of 12 cores, grade group 3 in 8 out of 12 cores.  -2/24/2022 CT chest abdomen pelvis and total body bone scan shows left third rib, right acetabulum, periaortic and retroperitoneal kizzy metastases complaining of pain in the left chest and right hip.  Also possible sclerotic left 10th rib on CT.  Spleen mildly enlarged 13.8 cm.  Hepatic steatosis.  Small liver cyst.  Fat stranding in the periaortic and mesenteric region consistent with reactive/inflammatory stranding.  Multiple enlarged periaortic and retroperitoneal nodes and lymphadenopathy largest 4.9 cm.  CT chest describes tiny sclerotic foci in left eighth rib and right lateral seventh rib and T1.  Multiple small mediastinal and bilateral axillary nodes seen with small hypodense left thyroid nodule.  Creatinine 1.7.  -3/4/2022 office note Dr. Rolando Matute: Patient was seen after his elevated PSA by Dr. Vera and prostate biopsy scheduled.  However, he developed COVID-19 and this was canceled and the patient did not reschedule due to lack of insurance.  Saw Dr. Matute back on 1/24/2022 with plans to get staging CTs and bone scan with results as outlined above.  Started Casodex and to return on 3/11/2022 for Lupron.  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  TURP planned for urinary retention symptoms.    -3/15/2022 [facility] initial consultation: I reviewed the above data with  the patient.  With his fairly bulky retroperitoneal adenopathy and bone metastases including extra axial metastases, he would fit the data from the CHAARTED trial for which Taxotere x6 followed by Zytiga prednisone along with the planned Lupron already being planned by Dr. Matute would be reasonable.  Equally reasonable in terms of comparisons with bicalutamide and Lupron would be Zytiga prednisone plus Lupron or Xtandi plus Lupron or apalutamide plus Lupron.  None of these have been compared head-to-head but all have beaten combined androgen deprivation therapy with bicalutamide and Lupron.  At the age of 67 and in order to have as many bullets as possible down the road and hence saving some of the hormone blockade options for later and using chemotherapy when he is younger and healthier for a more time-limited.,  He has opted for the Taxotere x6 followed by Zytiga and prednisone.  We will also give him Xgeva to improve bone health and given metastatic disease, as with all metastatic prostate cancer patients, he needs genetic counseling both for his sake as well as his family's.  If he were to have BRCA1 or other such  mutations, then that also opens up the options for PARP inhibitors etc. down the road.  He has his TURP scheduled for 2/24/2022 and we will start treatment a couple of weeks after that and he will get chemo preparation visit in the meantime and I will get capital surgeons to place a port.  We will check his PSA after his TURP when he sees my nurse practitioner back early April for the start of chemotherapy and repeat the PSA and CT chest abdomen pelvis and bone scan after the Taxotere is complete.    -3/31/2022 chemotherapy education and needs assessment completed    -4/7/2022 began docetaxel and Xgeva.  .0.  We will do his Xgeva every 6 weeks to coordinate with his docetaxel infusions.    -4/19/2022 patient stopped Casodex    -5/17/2022 patient reported seeing his PCP for an abscess  in his suprapubic area.  Placed on clindamycin, will delay treatment 1 week.    -5/25/2022 PSA 34.3  -5/26/2022 [facility] Oncology clinic follow-up: Chris has an abscess in the suprapubic area, it has improved on antibiotic therapy but I am concerned if we treat him it will just flare back up unless it is drained.  I will get him to Dr. Miller who placed his port for I&D and culture.  He is on clindamycin and states he completes course of treatment tomorrow.  I will delay his treatment with Taxotere 1 more week.  We will give his Xgeva today.  I will see him back in 1 week for follow-up to assess whether or not we can resume his Taxotere.  His PSA has dropped nicely with treatment thus far, PSA yesterday was 34.3 which is down from a PSA of 259.0 when we started treatment, it has been as high as 911 in November.    -6/2/2022 [facility] Oncology clinic follow-up: Chris went to see Dr. Miller to have his abscess lanced however apparently there was a long wait and he left without being seen.  He did call his PCP and was given 5 more days of clindamycin and the abscess now seems to have resolved.  We discussed today that he is at risk for recurrence of infection due to immunocompromise state with chemotherapy.  He will notify us if he has any return of his abscess.  He does have intertrigo under his panniculus, I have advised him to keep this area dry, to apply topical drying/antifungal powders.  Also recommended looser clothing.  His counts are adequate to continue therapy, we will resume Taxotere today with cycle #3.  We will repeat restaging scans after 6 courses.  We are doing Xgeva every 6 weeks to coordinate with his Taxotere.  Once he finishes Taxotere we can then go back to every 4 weeks.  His next Xgeva is not due until 7/14/2022.  His calcium is running a little low, he is going to  calcium supplement.    -6/23/2022 [facility] Oncology clinic follow-up: Chris overall is doing well on treatment with Taxotere.   Labs reviewed from yesterday and are unremarkable.  We will continue treatment today unchanged.  His suprapubic abscess resolved on clindamycin.  He will continue to use drying powder/antifungal powder under his panniculus for intertrigo.  Today will be cycle #4 of a planned 6 courses of Taxotere.  He is also on Xgeva, next dose due 7/14/2022, we are doing this every 6 weeks for now to line up with his chemotherapy, can go back to every 4 weeks after he finishes Taxotere.  Calcium from CMP yesterday was normal.    -7/13/2022 [facility] Oncology clinic follow-up: Mr. Morfin has had issues around day 3 after each treatment ranging from chest pain after his first treatment for which he did not seek medical attention, fatigue after the next few treatments, but 3 days after his most recent treatment on 6/23/2022 he had a syncopal episode at home and once again did not seek medical attention.  I discussed with him that this was not acceptable, if he has occurrences like this someone needs to call 911, it is difficult to figure out what is going on after the fact, the best time to work this up would be during the occurrence.  For now we will get labs today with CBC, CMP, PSA.  I will refer him to cardiology for further evaluation.  We will hold Taxotere most likely, I will see him tomorrow to go over his lab results.  I will also repeat his restaging scans with CT chest, abdomen and pelvis and will include CT of the head in light of his syncopal episode.       Prostate cancer metastatic to multiple sites (HCC)   3/15/2022 Initial Diagnosis    Prostate cancer metastatic to multiple sites (HCC)     3/15/2022 Cancer Staged    Staging form: Prostate, AJCC 8th Edition  - Clinical: Stage IVB (cT1c, cN1, cM1b, Grade Group: 5) - Signed by Fritz Goodson MD on 3/15/2022     4/7/2022 -  Chemotherapy    OP PROSTATE DOCEtaxel     4/7/2022 -  Chemotherapy    OP SUPPORTIVE Denosumab (Xgeva) Q28D     8/11/2022 -  Chemotherapy    OP PROSTATE  "Abiraterone / PredniSONE         HISTORY OF PRESENT ILLNESS:  The patient is a 67 y.o. male, here for follow up on management of metastatic prostate cancer currently on therapy twith docetaxel and Xgeva.  Mr. Morfin had labs yesterday, we are currently holding treatment today after he reported syncopal episode on day 3 after his last treatment.  He did not seek medical attention.  Currently feeling in his usual health with no new concerns.  Here today to go over labs and plan of care.      Past Medical History:   Diagnosis Date   • Cancer (HCC)    • Prostate cancer (HCC)      Past Surgical History:   Procedure Laterality Date   • PROSTATE BIOPSY  02/2022    dr. sue       No Known Allergies    Family History and Social History reviewed and changed as necessary    REVIEW OF SYSTEM:   Negative for new somatic concerns    PHYSICAL EXAM:  Well-developed, well-nourished appearing male in no distress  Lungs clear to auscultation bilaterally, respirations even and unlabored  Heart regular rate and rhythm    Vitals:    07/14/22 0928   BP: 153/75   Pulse: 82   Resp: 18   Temp: 98.6 °F (37 °C)   SpO2: 96%   Weight: 118 kg (261 lb)   Height: 180.3 cm (71\")     Vitals:    07/14/22 0928   PainSc: 0-No pain          ECOG score: 0           Vitals reviewed.  Labs from today pending    ECOG: (0) Fully Active - Able to Carry On All Pre-disease Performance Without Restriction    Lab Results   Component Value Date    HGB 12.4 (L) 07/13/2022    HCT 37.9 07/13/2022    MCV 87 07/13/2022     07/13/2022    WBC 6.8 07/13/2022    NEUTROABS 4.9 07/13/2022    LYMPHSABS 1.3 07/13/2022    MONOSABS 0.4 07/13/2022    EOSABS 0.0 07/13/2022    BASOSABS 0.1 07/13/2022       Lab Results   Component Value Date    GLUCOSE 111 (H) 07/13/2022    BUN 18 07/13/2022    CREATININE 1.23 07/13/2022     07/13/2022    K 4.8 07/13/2022     (H) 07/13/2022    CO2 19 (L) 07/13/2022    CALCIUM 9.2 07/13/2022    ALBUMIN 4.3 07/13/2022    BILITOT " <0.2 07/13/2022    ALKPHOS 82 07/13/2022    AST 22 07/13/2022    ALT 26 07/13/2022 7/13/2022 PSA 18.6   Magnesium 2.3, Phosphorous 2.4        ASSESSMENT & PLAN:    1.  Prostate cancer clinical T1c and 2M1B stage IVb  2.  Urinary retention with Goodwin in place 1/24/2022.  On finasteride 1/24/2022.  3.  Covid 19 December 2021  4.  Mild renal insufficiency  5.  Abscess in the suprapubic area, resolved on antibiotics  6.  Intertrigo  7.  Mild hypocalcemia  8.  Syncopal episode after fourth cycle of Taxotere    Oncology history timeline:  -11/29/2021  with symptoms of urinary retention.  -2/15/2022 prostate biopsy adenocarcinoma grade group 5 and 3 out of 12 cores, grade group 4 in 1 out of 12 cores, grade group 3 in 8 out of 12 cores.  -2/24/2022 CT chest abdomen pelvis and total body bone scan shows left third rib, right acetabulum, periaortic and retroperitoneal kizzy metastases complaining of pain in the left chest and right hip.  Also possible sclerotic left 10th rib on CT.  Spleen mildly enlarged 13.8 cm.  Hepatic steatosis.  Small liver cyst.  Fat stranding in the periaortic and mesenteric region consistent with reactive/inflammatory stranding.  Multiple enlarged periaortic and retroperitoneal nodes and lymphadenopathy largest 4.9 cm.  CT chest describes tiny sclerotic foci in left eighth rib and right lateral seventh rib and T1.  Multiple small mediastinal and bilateral axillary nodes seen with small hypodense left thyroid nodule.  Creatinine 1.7.  -3/4/2022 office note Dr. Rolando Matute: Patient was seen after his elevated PSA by Dr. Vera and prostate biopsy scheduled.  However, he developed COVID-19 and this was canceled and the patient did not reschedule due to lack of insurance.  Saw Dr. Matute back on 1/24/2022 with plans to get staging CTs and bone scan with results as outlined above.  Started Casodex and to return on 3/11/2022 for Lupron.  Referral made to medical oncology for other  additional castrate naïve prostate cancer therapies.  TURP planned for urinary retention symptoms.    -3/15/2022 [facility] oncology initial consultation: I reviewed the above data with the patient.  With his fairly bulky retroperitoneal adenopathy and bone metastases including extra axial metastases, he would fit the data from the CHAARTED trial for which Taxotere x6 followed by Zytiga prednisone along with the planned Lupron already being planned by Dr. Matute would be reasonable.  Equally reasonable in terms of comparisons with bicalutamide and Lupron would be Zytiga prednisone plus Lupron or Xtandi plus Lupron or apalutamide plus Lupron.  None of these have been compared head-to-head but all have beaten combined androgen deprivation therapy with bicalutamide and Lupron.  At the age of 67 and in order to have as many bullets as possible down the road and hence saving some of the hormone blockade options for later and using chemotherapy when he is younger and healthier for a more time-limited.,  He has opted for the Taxotere x6 followed by Zytiga and prednisone.  We will also give him Xgeva to improve bone health and given metastatic disease, as with all metastatic prostate cancer patients, he needs genetic counseling both for his sake as well as his family's.  If he were to have BRCA1 or other such  mutations, then that also opens up the options for PARP inhibitors etc. down the road.  He has his TURP scheduled for 2/24/2022 and we will start treatment a couple of weeks after that and he will get chemo preparation visit in the meantime and I will get capital surgeons to place a port.  We will check his PSA after his TURP when he sees my nurse practitioner back early April for the start of chemotherapy and repeat the PSA and CT chest abdomen pelvis and bone scan after the Taxotere is complete.    -3/31/2022 chemotherapy education and needs assessment completed    -4/7/2022 [facility] clinic  follow-up:  Mr. Morfin will start systemic therapy today for his metastatic prostate cancer with docetaxel and Xgeva.  Labs reviewed from 4/5/2022 with normal CBC, CMP unremarkable with mild renal insufficiency with creatinine 1.34, his creatinine clearance is 87.5, phosphorus and magnesium normal,  which is down from his baseline PSA of 911 on 11/29/2021.  He receives Lupron with Dr. Matute and is on bicalutamide.  We will treat today, I will see him back in 3 weeks for follow-up.  We plan on 6 courses of docetaxel.  Genetic counseling appointment is pending.  See note dated 3/15/2022 from Dr. Fritz Goodson for plan of care going forward.  Mr. Morfin does not have a primary care provider, we are giving him information on primary care providers available in the area as he needs to establish care.  We will do his Xgeva every 6 weeks to coordinate with his docetaxel infusions.    -4/28/2022 [facility] Oncology clinic follow-up: Mr. Morfin continues to do well, he tolerated his first cycle of docetaxel with no unusual side effects, he also was on Xgeva which we are doing every 6 weeks to coordinate with his chemotherapy infusions.  Labs from yesterday reviewed and were unremarkable, CBC is normal, creatinine stable at 1.33.  He will continue treatment today with cycle #2.  I will see him back in 3 weeks for follow-up, he will be due his next Xgeva at that time.  We will also repeat PSA then.  Plan to repeat restaging scans after 6 courses.    -5/26/2022 [facility] Oncology clinic follow-up: Chris has an abscess in the suprapubic area, it has improved on antibiotic therapy but I am concerned if we treat him it will just flare back up unless it is drained.  I will get him to Dr. Miller who placed his port for I&D and culture.  He is on clindamycin and states he completes course of treatment tomorrow.  I will delay his treatment with Taxotere 1 more week.  We will give his Xgeva today.  I will see him back in 1 week for  follow-up to assess whether or not we can resume his Taxotere.  His PSA has dropped nicely with treatment thus far, PSA yesterday was 34.3 which is down from a PSA of 259.0 when we started treatment, it has been as high as 911 in November.    -6/2/2022 [facility] Oncology clinic follow-up: Chris went to see Dr. Miller to have his abscess lanced however apparently there was a long wait and he left without being seen.  He did call his PCP and was given 5 more days of clindamycin and the abscess now seems to have resolved.  We discussed today that he is at risk for recurrence of infection due to immunocompromise state with chemotherapy.  He will notify us if he has any return of his abscess.  He does have intertrigo under his panniculus, I have advised him to keep this area dry, to apply topical drying/antifungal powders.  Also recommended looser clothing.  His counts are adequate to continue therapy, we will resume Taxotere today with cycle #3.  We will repeat restaging scans after 6 courses.  We are doing Xgeva every 6 weeks to coordinate with his Taxotere.  Once he finishes Taxotere we can then go back to every 4 weeks.  His next Xgeva is not due until 7/14/2022.  His calcium is running a little low, he is going to  calcium supplement.    -6/23/2022 [facility] Oncology clinic follow-up: Chris overall is doing well on treatment with Taxotere.  Labs reviewed from yesterday and are unremarkable.  We will continue treatment today unchanged.  His suprapubic abscess resolved on clindamycin.  He will continue to use drying powder/antifungal powder under his panniculus for intertrigo.  Today will be cycle #4 of a planned 6 courses of Taxotere.  He is also on Xgeva, next dose due 7/14/2022, we are doing this every 6 weeks for now to line up with his chemotherapy, can go back to every 4 weeks after he finishes Taxotere.  Calcium from [facility] yesterday was normal.     -7/13/2022 [facility] Oncology clinic follow-up: Mr. Morfin has  had issues around day 3 after each treatment ranging from chest pain after his first treatment for which he did not seek medical attention, fatigue after the next few treatments, but 3 days after his most recent treatment on 6/23/2022 he had a syncopal episode at home and once again did not seek medical attention.  I discussed with him that this was not acceptable, if he has occurrences like this someone needs to call 911, it is difficult to figure out what is going on after the fact, the best time to work this up would be during the occurrence.  For now we will get labs today with CBC, CMP, PSA.  I will refer him to cardiology for further evaluation.  We will hold Taxotere most likely, I will see him tomorrow to go over his lab results.  I will also repeat his restaging scans with CT chest, abdomen and pelvis and will include CT of the head in light of his syncopal episode.  Plan of care discussed with Dr. Goodson also today via telephone.    -7/14/2022 [facility] Oncology clinic follow-up: Mr. Morfin returns today to review his labs from yesterday.  Labs are unremarkable.  PSA down to 18.6 from a high of 259.0 in April prior to starting treatment.  CBC and CMP unremarkable, magnesium is normal at 2.3, phosphorus slightly low at 2.4.  We will hold therapy for now in light of his syncopal episode on day 3 after his last treatment and prior episodes with chest pain and severe fatigue that all occurred on day 3 after his Taxotere.  We will restage him with CT head, chest, abdomen and pelvis (I am including the head in light of his syncopal episode).  I have also referred him to cardiology, he states that he received a call from them about making an appointment however he wanted to talk to us today first.  I emphasized the importance to him of making and keeping that appointment and he states understanding.  I also once again emphasized the requirement to seek emergency medical attention if he has any episodes in the future  such as chest pain or syncopal events.  Whether or not we try and complete Taxotere with 2 more courses or move to the next line of therapy will be decided when he returns to discuss with Dr. Goodson and review his restaging scans.    Return to clinic in about 2 weeks for follow-up    I spent 20 minutes caring for Chris on this date of service. This time includes time spent by me in the following activities: preparing for the visit, reviewing tests, counseling and educating the patient/family/caregiver and documenting information in the medical record.     Susana Torres, APRN    07/14/2022

## 2022-07-15 ENCOUNTER — OFFICE VISIT (OUTPATIENT)
Dept: CARDIOLOGY | Facility: CLINIC | Age: 67
End: 2022-07-15

## 2022-07-15 VITALS
TEMPERATURE: 97.1 F | DIASTOLIC BLOOD PRESSURE: 88 MMHG | HEART RATE: 64 BPM | SYSTOLIC BLOOD PRESSURE: 128 MMHG | RESPIRATION RATE: 18 BRPM | WEIGHT: 260.8 LBS | OXYGEN SATURATION: 99 % | BODY MASS INDEX: 36.51 KG/M2 | HEIGHT: 71 IN

## 2022-07-15 DIAGNOSIS — R55 SYNCOPE AND COLLAPSE: ICD-10-CM

## 2022-07-15 DIAGNOSIS — R07.9 CHEST PAIN AT REST: Primary | ICD-10-CM

## 2022-07-15 PROCEDURE — 93000 ELECTROCARDIOGRAM COMPLETE: CPT | Performed by: INTERNAL MEDICINE

## 2022-07-15 PROCEDURE — 99204 OFFICE O/P NEW MOD 45 MIN: CPT | Performed by: INTERNAL MEDICINE

## 2022-07-15 NOTE — PROGRESS NOTES
[facility]  [facility]  [address] [place] [address]  Dept: [phone]  Dept Fax: [phone]    Chris Morfin  1955    New Patient Office Note    History of Present Illness:  Chris Morfin is a 67 y.o. male who presents to the clinic for Establish Care. For syncope and chest pain- He is 67 years old without major medical history undergoing chemo for prostate cancer. He states at 3 day of the chemo, has episode of feeling weak,like a wave comes to him, feels hot  And has some chest pain  And one time has passed out,, he states  has the feeling and he try to walk to him home and then went down, was apparently out for 1-2 minutes, he did not go to ER, he does not have any other complaints outside the chemo, he is not smoker, no diabeteic, no hypertension, his cardiac exam is normal as well as his EKG, , his BP is 130.80 and this drops somewhat after standing to 114.70, without complaints, he states he eats well and states well hydrate., he has had lab few days ago, without significant abnormalities, will get an echo and also a Holter, , possible vasovagal,     The following portions of the patient's history were reviewed and updated as appropriate: allergies, current medications, past family history, past medical history, past social history, past surgical history and problem list.    Medications:  dexamethasone  finasteride  lidocaine-prilocaine  ondansetron  tamsulosin capsule    Subjective  No Known Allergies     Past Medical History:   Diagnosis Date   • Cancer (HCC)    • Prostate cancer (HCC)        Past Surgical History:   Procedure Laterality Date   • PROSTATE BIOPSY  02/2022    dr. sue       Family History   Problem Relation Age of Onset   • Cancer Sister    • Cancer Brother         Social History     Socioeconomic History   • Marital status: Single   Tobacco Use   • Smoking status: Never Smoker   • Smokeless tobacco: Never Used   Vaping Use   •  "Vaping Use: Never used   Substance and Sexual Activity   • Alcohol use: Not Currently     Comment: no drinking x's 20 years   • Drug use: Yes     Types: Marijuana     Comment: x 45 years   • Sexual activity: Defer       Review of Systems   Constitutional: Negative.    HENT: Negative.    Respiratory: Negative.    Cardiovascular: Negative.    Endocrine: Negative.    Genitourinary: Negative.    Musculoskeletal: Negative.    Skin: Negative.    Allergic/Immunologic: Negative.    Neurological: Positive for dizziness, syncope and light-headedness.   Hematological: Negative.    Psychiatric/Behavioral: Negative.        Cardiovascular Procedures    ECHO/MUGA:   STRESS TESTS:   CARDIAC CATH:   DEVICES:   HOLTER:   CT/MRI:   VASCULAR:   CARDIOTHORACIC:     Objective  Vitals:    07/15/22 0804   BP: 128/88   BP Location: Left arm   Patient Position: Lying   Cuff Size: Large Adult   Pulse: 64   Resp: 18   Temp: 97.1 °F (36.2 °C)   TempSrc: Temporal   SpO2: 99%   Weight: 118 kg (260 lb 12.8 oz)   Height: 180.3 cm (71\")   PainSc: 0-No pain       Physical Exam  Constitutional:       Appearance: Healthy appearance. Not in distress.   Neck:      Vascular: No JVR. JVD normal.   Pulmonary:      Effort: Pulmonary effort is normal.      Breath sounds: Normal breath sounds. No wheezing. No rhonchi. No rales.   Chest:      Chest wall: Not tender to palpatation.   Cardiovascular:      PMI at left midclavicular line. Normal rate. Regular rhythm. Normal S1. Normal S2.      Murmurs: There is no murmur.      No gallop. No click. No rub.   Pulses:     Intact distal pulses.   Edema:     Peripheral edema absent.   Abdominal:      General: Bowel sounds are normal.      Palpations: Abdomen is soft.      Tenderness: There is no abdominal tenderness.   Musculoskeletal: Normal range of motion.         General: No tenderness. Skin:     General: Skin is warm and dry.   Neurological:      General: No focal deficit present.      Mental Status: Alert and " oriented to person, place and time.          Diagnostic Data    ECG 12 Lead    Date/Time: 7/15/2022 8:59 AM  Performed by: Ang Diaz MD  Authorized by: Ang Diaz MD   Comparison: not compared with previous ECG   Previous ECG: no previous ECG available  Rhythm: sinus rhythm  Rate: normal  BPM: 64  Conduction: left anterior fascicular block and non-specific intraventricular conduction delay  QRS axis: left    Clinical impression: abnormal EKG            Assessment and Plan  Diagnoses and all orders for this visit:    Chest pain at rest-Atypical, will get a echo   -     Adult Transthoracic Echo Complete W/ Cont if Necessary Per Protocol; Future  -     Holter Monitor - 48 Hour; Future    Syncope and collapse- has some complaints consistent with vasovagal, hot or warm feeling dizziness and also a wave coming, will get a Holter 48 hours and also a echo, advised to drink plenty fluids,   -     Adult Transthoracic Echo Complete W/ Cont if Necessary Per Protocol; Future  -     Holter Monitor - 48 Hour; Future        No follow-ups on file.    Ang Diaz MD  07/15/2022

## 2022-07-26 ENCOUNTER — INFUSION (OUTPATIENT)
Dept: ONCOLOGY | Facility: HOSPITAL | Age: 67
End: 2022-07-26

## 2022-07-26 ENCOUNTER — SPECIALTY PHARMACY (OUTPATIENT)
Dept: ONCOLOGY | Facility: HOSPITAL | Age: 67
End: 2022-07-26

## 2022-07-26 ENCOUNTER — OFFICE VISIT (OUTPATIENT)
Dept: ONCOLOGY | Facility: CLINIC | Age: 67
End: 2022-07-26

## 2022-07-26 VITALS
HEART RATE: 72 BPM | HEIGHT: 71 IN | RESPIRATION RATE: 18 BRPM | TEMPERATURE: 98.4 F | OXYGEN SATURATION: 97 % | DIASTOLIC BLOOD PRESSURE: 89 MMHG | WEIGHT: 256 LBS | SYSTOLIC BLOOD PRESSURE: 169 MMHG | BODY MASS INDEX: 35.84 KG/M2

## 2022-07-26 DIAGNOSIS — C61 PROSTATE CANCER METASTATIC TO MULTIPLE SITES: Chronic | ICD-10-CM

## 2022-07-26 DIAGNOSIS — C61 PROSTATE CANCER METASTATIC TO MULTIPLE SITES: Primary | Chronic | ICD-10-CM

## 2022-07-26 DIAGNOSIS — K63.9 CECAL LESION: ICD-10-CM

## 2022-07-26 DIAGNOSIS — C61 PROSTATE CANCER METASTATIC TO MULTIPLE SITES: Primary | ICD-10-CM

## 2022-07-26 PROCEDURE — 36415 COLL VENOUS BLD VENIPUNCTURE: CPT

## 2022-07-26 PROCEDURE — 99215 OFFICE O/P EST HI 40 MIN: CPT | Performed by: INTERNAL MEDICINE

## 2022-07-26 RX ORDER — PREDNISONE 1 MG/1
5 TABLET ORAL 2 TIMES DAILY
Qty: 60 TABLET | Refills: 11 | Status: CANCELLED | OUTPATIENT
Start: 2022-07-26

## 2022-07-26 RX ORDER — ABIRATERONE 500 MG/1
1000 TABLET ORAL DAILY
Qty: 60 TABLET | Refills: 5 | Status: SHIPPED | OUTPATIENT
Start: 2022-07-26 | End: 2023-01-09 | Stop reason: SDUPTHER

## 2022-07-26 RX ORDER — ONDANSETRON HYDROCHLORIDE 8 MG/1
8 TABLET, FILM COATED ORAL 3 TIMES DAILY PRN
Qty: 30 TABLET | Refills: 5 | Status: SHIPPED | OUTPATIENT
Start: 2022-07-26

## 2022-07-26 RX ORDER — PREDNISONE 1 MG/1
5 TABLET ORAL 2 TIMES DAILY
Qty: 60 TABLET | Refills: 11 | Status: SHIPPED | OUTPATIENT
Start: 2022-07-26

## 2022-07-26 NOTE — PROGRESS NOTES
Oral Chemotherapy - New Referral    Received a referral from Dr. Goodson    Treatment Plan: Zytiga (abiraterone acetate)  + prednisone + Lupron + Xgeva  Start date of treatment planned for: As soon as oral specialty medication is available.  Indication: stage IVB prostate cancer, castration naive  Relevant past treatments: docetaxel, casodex  Is the therapy appropriate based on treatment guidelines and FDA labeling?: yes  Therapeutic Goals: Continue treatment until progression or intolerable toxicity  Patient can self-administer oral medications: Yes    • Drug-Drug Interactions: The current medication list was reviewed and there are some relevant drug-drug interactions with the oral specialty medication, including moderate interaction with abiraterone and Flomax (may increase concentrations of Flomax)..  This will be discussed during education.  • Medication Allergies: The patient has NKDA  • Review of Labs/Dose Adjustments: The patient's most recent labs were reviewed and all are WNL to start treatment at this dose.     A prescription was released to [facility] specialty pharmacy for   Drug: Zytiga (abiraterone acetate)  Strength: 500 mg  Directions: Take 2 tablets by mouth daily  Quantity: 60  Refills: 5    Pharmacy education is not yet scheduled. and CCA and consent will be signed at that time.    Karen Pardo, Zenia, [facility]  Oncology Clinical Pharmacist  7/26/2022  10:27 EDT

## 2022-07-26 NOTE — PROGRESS NOTES
CHIEF COMPLAINT: No new somatic complaints    Problem List:  Oncology/Hematology History Overview Note   1.  Prostate cancer clinical T1c and 2M1B stage IVb treated with 4 cycles of Taxotere, stopped due to syncope with negative cardiac work-up, with marked drop in PSA of 18.6 from over 900 at baseline, continued on Zytiga and prednisone.  2.  Urinary retention with Goodwin in place 1/24/2022.  On finasteride 1/24/2022.  3.  Covid 19 December 2021    Oncology history timeline:  -11/29/2021  with symptoms of urinary retention.  -2/15/2022 prostate biopsy adenocarcinoma grade group 5 and 3 out of 12 cores, grade group 4 in 1 out of 12 cores, grade group 3 in 8 out of 12 cores.  -2/24/2022 CT chest abdomen pelvis and total body bone scan shows left third rib, right acetabulum, periaortic and retroperitoneal kizzy metastases complaining of pain in the left chest and right hip.  Also possible sclerotic left 10th rib on CT.  Spleen mildly enlarged 13.8 cm.  Hepatic steatosis.  Small liver cyst.  Fat stranding in the periaortic and mesenteric region consistent with reactive/inflammatory stranding.  Multiple enlarged periaortic and retroperitoneal nodes and lymphadenopathy largest 4.9 cm.  CT chest describes tiny sclerotic foci in left eighth rib and right lateral seventh rib and T1.  Multiple small mediastinal and bilateral axillary nodes seen with small hypodense left thyroid nodule.  Creatinine 1.7.  -3/4/2022 office note Dr. Rolando Matute: Patient was seen after his elevated PSA by Dr. Vera and prostate biopsy scheduled.  However, he developed COVID-19 and this was canceled and the patient did not reschedule due to lack of insurance.  Saw Dr. Matute back on 1/24/2022 with plans to get staging CTs and bone scan with results as outlined above.  Started Casodex and to return on 3/11/2022 for Lupron.  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  TURP planned  for urinary retention symptoms.    -3/15/2022 [facility] medical oncology initial consultation: I reviewed the above data with the patient.  With his fairly bulky retroperitoneal adenopathy and bone metastases including extra axial metastases, he would fit the data from the CHAARTED trial for which Taxotere x6 followed by Zytiga prednisone along with the planned Lupron already being planned by Dr. Matute would be reasonable.  Equally reasonable in terms of comparisons with bicalutamide and Lupron would be Zytiga prednisone plus Lupron or Xtandi plus Lupron or apalutamide plus Lupron.  None of these have been compared head-to-head but all have beaten combined androgen deprivation therapy with bicalutamide and Lupron.  At the age of 67 and in order to have as many bullets as possible down the road and hence saving some of the hormone blockade options for later and using chemotherapy when he is younger and healthier for a more time-limited.,  He has opted for the Taxotere x6 followed by Zytiga and prednisone.  We will also give him Xgeva to improve bone health and given metastatic disease, as with all metastatic prostate cancer patients, he needs genetic counseling both for his sake as well as his family's.  If he were to have BRCA1 or other such  mutations, then that also opens up the options for PARP inhibitors etc. down the road.  He has his TURP scheduled for 2/24/2022 and we will start treatment a couple of weeks after that and he will get chemo preparation visit in the meantime and I will get capital surgeons to place a port.  We will check his PSA after his TURP when he sees my nurse practitioner back early April for the start of chemotherapy and repeat the PSA and CT chest abdomen pelvis and bone scan after the Taxotere is complete.    -3/31/2022 chemotherapy education and needs assessment completed    -4/7/2022 began docetaxel and Xgeva.  .0.  We will do his Xgeva every 6 weeks to coordinate with  his docetaxel infusions.    -4/19/2022 patient stopped Casodex    -5/17/2022 patient reported seeing his PCP for an abscess in his suprapubic area.  Placed on clindamycin, will delay treatment 1 week.    -5/25/2022 PSA 34.3  -5/26/2022 [facility] Oncology clinic follow-up: Chris has an abscess in the suprapubic area, it has improved on antibiotic therapy but I am concerned if we treat him it will just flare back up unless it is drained.  I will get him to Dr. Miller who placed his port for I&D and culture.  He is on clindamycin and states he completes course of treatment tomorrow.  I will delay his treatment with Taxotere 1 more week.  We will give his Xgeva today.  I will see him back in 1 week for follow-up to assess whether or not we can resume his Taxotere.  His PSA has dropped nicely with treatment thus far, PSA yesterday was 34.3 which is down from a PSA of 259.0 when we started treatment, it has been as high as 911 in November.    -6/2/2022 [facility] Oncology clinic follow-up: Chris went to see Dr. Milelr to have his abscess lanced however apparently there was a long wait and he left without being seen.  He did call his PCP and was given 5 more days of clindamycin and the abscess now seems to have resolved.  We discussed today that he is at risk for recurrence of infection due to immunocompromise state with chemotherapy.  He will notify us if he has any return of his abscess.  He does have intertrigo under his panniculus, I have advised him to keep this area dry, to apply topical drying/antifungal powders.  Also recommended looser clothing.  His counts are adequate to continue therapy, we will resume Taxotere today with cycle #3.  We will repeat restaging scans after 6 courses.  We are doing Xgeva every 6 weeks to coordinate with his Taxotere.  Once he finishes Taxotere we can then go back to every 4 weeks.  His next Xgeva is not due until 7/14/2022.  His calcium is running a little low, he is going to   calcium supplement.    -6/23/2022 [facility] Oncology clinic follow-up: Chris overall is doing well on treatment with Taxotere.  Labs reviewed from yesterday and are unremarkable.  We will continue treatment today unchanged.  His suprapubic abscess resolved on clindamycin.  He will continue to use drying powder/antifungal powder under his panniculus for intertrigo.  Today will be cycle #4 of a planned 6 courses of Taxotere.  He is also on Xgeva, next dose due 7/14/2022, we are doing this every 6 weeks for now to line up with his chemotherapy, can go back to every 4 weeks after he finishes Taxotere.  Calcium from CMP yesterday was normal.    -7/13/2022 [facility] Oncology clinic follow-up: Mr. Morfin has had issues around day 3 after each treatment ranging from chest pain after his first treatment for which he did not seek medical attention, fatigue after the next few treatments, but 3 days after his most recent treatment on 6/23/2022 he had a syncopal episode at home and once again did not seek medical attention.  I discussed with him that this was not acceptable, if he has occurrences like this someone needs to call 911, it is difficult to figure out what is going on after the fact, the best time to work this up would be during the occurrence.  For now we will get labs today with CBC, CMP, PSA.  I will refer him to cardiology for further evaluation.  We will hold Taxotere most likely, I will see him tomorrow to go over his lab results.  I will also repeat his restaging scans with CT chest, abdomen and pelvis and will include CT of the head in light of his syncopal episode.      -7/13/2022 PSA 18.6    -7/14/2022 [facility] Oncology clinic follow-up: Mr. Morfin returns today to review his labs from yesterday.  Labs are unremarkable.  PSA down to 18.6 from a high of 259.0 in April prior to starting treatment.  CBC and CMP unremarkable, magnesium is normal at 2.3, phosphorus slightly low at 2.4.  We will hold therapy for now in  light of his syncopal episode on day 3 after his last treatment and prior episodes with chest pain and severe fatigue that all occurred on day 3 after his Taxotere.  We will restage him with CT head, chest, abdomen and pelvis (I am including the head in light of his syncopal episode).  I have also referred him to cardiology, he states that he received a call from them about making an appointment however he wanted to talk to us today first.  I emphasized the importance to him of making and keeping that appointment and he states understanding.  I also once again emphasized the requirement to seek emergency medical attention if he has any episodes in the future such as chest pain or syncopal events.  Whether or not we try and complete Taxotere with 2 more courses or move to the next line of therapy will be decided when he returns to discuss with Dr. Goodson and review his restaging scans.    -7/15/2022 saw Dr. Diaz cardiologist.  For syncope he ordered echocardiogram and 48-hour Holter monitor.    -7/15/2022 Holter monitor relatively benign.    -7/22/2022 CT brain with and without contrast negative.  CT chest abdomen and pelvis shows some nonspecific cecal wall thickening.  No progression in the chest.  T1 and ribs stable.  Decrease in multiple retroperitoneal and pelvic nodes.  Slight increase in right acetabular sclerotic lesion.  Persistent but improved circumferential bladder wall thickening.  Total body bone scan shows stable left third rib and no evidence of new bone metastases.    -7/19/2022 ejection fraction 65% with grade 1 diastolic dysfunction.    -7/26/2022 [facility] follow-up: Given presyncopal symptoms occurred 3 days after Taxotere and has not otherwise occurred any other time and his cardiology work-up is fairly unremarkable and his disease is under good control as witnessed by the report above of the CTs of head chest abdomen pelvis and bone scan, I will hold cycle 5 and 6 of Taxotere  "and continue 1 now with Zytiga and prednisone.  With reference to the coincidental cecal wall thickening found on CT, we will get him to [facility] surgeons for colonoscopy.  He will see my nurse practitioner back in August for next cycle of Abiraterone prednisone.  He will continue his Lupron with Dr. Sue.  We will continue his Xgeva.  We will repeat his CT chest abdomen pelvis and bone scan again in 3 months.  He will see my nurse practitioner in the interim.     Prostate cancer metastatic to multiple sites (HCC)   3/15/2022 Initial Diagnosis    Prostate cancer metastatic to multiple sites (HCC)     3/15/2022 Cancer Staged    Staging form: Prostate, AJCC 8th Edition  - Clinical: Stage IVB (cT1c, cN1, cM1b, Grade Group: 5) - Signed by Fritz Goodson MD on 3/15/2022     4/7/2022 -  Chemotherapy    OP PROSTATE DOCEtaxel     4/7/2022 -  Chemotherapy    OP SUPPORTIVE Denosumab (Xgeva) Q28D     8/11/2022 -  Chemotherapy    OP PROSTATE Abiraterone / PredniSONE         HISTORY OF PRESENT ILLNESS:  The patient is a 67 y.o. male, here for follow up on management of presyncopal symptoms resolved with no explanation now off Taxotere for several weeks.    Past Medical History:   Diagnosis Date   • Cancer (HCC)    • Prostate cancer (HCC)      Past Surgical History:   Procedure Laterality Date   • PROSTATE BIOPSY  02/2022    dr. sue       No Known Allergies    Family History and Social History reviewed and changed as necessary    REVIEW OF SYSTEM:   No new somatic complaints    PHYSICAL EXAM:  Lungs clear heart regular rate and rhythm.    Vitals:    07/26/22 0858   BP: 169/89   Pulse: 72   Resp: 18   Temp: 98.4 °F (36.9 °C)   SpO2: 97%   Weight: 116 kg (256 lb)   Height: 180.3 cm (71\")     Vitals:    07/26/22 0858   PainSc: 0-No pain          ECOG score: 0           Vitals reviewed.      Lab Results   Component Value Date    HGB 12.4 (L) 07/13/2022    HCT 37.9 07/13/2022    MCV 87 07/13/2022     07/13/2022    WBC " 6.8 07/13/2022    NEUTROABS 4.9 07/13/2022    LYMPHSABS 1.3 07/13/2022    MONOSABS 0.4 07/13/2022    EOSABS 0.0 07/13/2022    BASOSABS 0.1 07/13/2022       Lab Results   Component Value Date    GLUCOSE 111 (H) 07/13/2022    BUN 18 07/13/2022    CREATININE 1.23 07/13/2022     07/13/2022    K 4.8 07/13/2022     (H) 07/13/2022    CO2 19 (L) 07/13/2022    CALCIUM 9.2 07/13/2022    ALBUMIN 4.3 07/13/2022    BILITOT <0.2 07/13/2022    ALKPHOS 82 07/13/2022    AST 22 07/13/2022    ALT 26 07/13/2022             ASSESSMENT & PLAN:  1.  Prostate cancer clinical T1c and 2M1B stage IVb treated with 4 cycles of Taxotere, stopped due to syncope with negative cardiac work-up, with marked drop in PSA of 18.6 from over 900 at baseline, continued on Zytiga and prednisone.  2.  Urinary retention with Goodwin in place 1/24/2022.  On finasteride 1/24/2022.  3.  Covid 19 December 2021    Oncology history timeline:  -11/29/2021  with symptoms of urinary retention.  -2/15/2022 prostate biopsy adenocarcinoma grade group 5 and 3 out of 12 cores, grade group 4 in 1 out of 12 cores, grade group 3 in 8 out of 12 cores.  -2/24/2022 CT chest abdomen pelvis and total body bone scan shows left third rib, right acetabulum, periaortic and retroperitoneal kizzy metastases complaining of pain in the left chest and right hip.  Also possible sclerotic left 10th rib on CT.  Spleen mildly enlarged 13.8 cm.  Hepatic steatosis.  Small liver cyst.  Fat stranding in the periaortic and mesenteric region consistent with reactive/inflammatory stranding.  Multiple enlarged periaortic and retroperitoneal nodes and lymphadenopathy largest 4.9 cm.  CT chest describes tiny sclerotic foci in left eighth rib and right lateral seventh rib and T1.  Multiple small mediastinal and bilateral axillary nodes seen with small hypodense left thyroid nodule.  Creatinine 1.7.  -3/4/2022 office note Dr. Rolando Matute: Patient was seen after his elevated PSA by   [facility] and prostate biopsy scheduled.  However, he developed COVID-19 and this was canceled and the patient did not reschedule due to lack of insurance.  Saw Dr. Matute back on 1/24/2022 with plans to get staging CTs and bone scan with results as outlined above.  Started Casodex and to return on 3/11/2022 for Lupron.  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  TURP planned for urinary retention symptoms.    -3/15/2022 [facility] medical oncology initial consultation: I reviewed the above data with the patient.  With his fairly bulky retroperitoneal adenopathy and bone metastases including extra axial metastases, he would fit the data from the CHAARTED trial for which Taxotere x6 followed by Zytiga prednisone along with the planned Lupron already being planned by Dr. Matute would be reasonable.  Equally reasonable in terms of comparisons with bicalutamide and Lupron would be Zytiga prednisone plus Lupron or Xtandi plus Lupron or apalutamide plus Lupron.  None of these have been compared head-to-head but all have beaten combined androgen deprivation therapy with bicalutamide and Lupron.  At the age of 67 and in order to have as many bullets as possible down the road and hence saving some of the hormone blockade options for later and using chemotherapy when he is younger and healthier for a more time-limited.,  He has opted for the Taxotere x6 followed by Zytiga and prednisone.  We will also give him Xgeva to improve bone health and given metastatic disease, as with all metastatic prostate cancer patients, he needs genetic counseling both for his sake as well as his family's.  If he were to have BRCA1 or other such  mutations, then that also opens up the options for PARP inhibitors etc. down the road.  He has his TURP scheduled for 2/24/2022 and we will start treatment a couple of weeks after that and he will get chemo preparation visit in the meantime  and I will get capital surgeons to place a port.  We will check his PSA after his TURP when he sees my nurse practitioner back early April for the start of chemotherapy and repeat the PSA and CT chest abdomen pelvis and bone scan after the Taxotere is complete.    -3/31/2022 chemotherapy education and needs assessment completed    -4/7/2022 began docetaxel and Xgeva.  .0.  We will do his Xgeva every 6 weeks to coordinate with his docetaxel infusions.    -4/19/2022 patient stopped Casodex    -5/17/2022 patient reported seeing his PCP for an abscess in his suprapubic area.  Placed on clindamycin, will delay treatment 1 week.    -5/25/2022 PSA 34.3  -5/26/2022 [facility] clinic follow-up: Chris has an abscess in the suprapubic area, it has improved on antibiotic therapy but I am concerned if we treat him it will just flare back up unless it is drained.  I will get him to Dr. Miller who placed his port for I&D and culture.  He is on clindamycin and states he completes course of treatment tomorrow.  I will delay his treatment with Taxotere 1 more week.  We will give his Xgeva today.  I will see him back in 1 week for follow-up to assess whether or not we can resume his Taxotere.  His PSA has dropped nicely with treatment thus far, PSA yesterday was 34.3 which is down from a PSA of 259.0 when we started treatment, it has been as high as 911 in November.    -6/2/2022 [facility] clinic follow-up: Chris went to see Dr. Miller to have his abscess lanced however apparently there was a long wait and he left without being seen.  He did call his PCP and was given 5 more days of clindamycin and the abscess now seems to have resolved.  We discussed today that he is at risk for recurrence of infection due to immunocompromise state with chemotherapy.  He will notify us if he has any return of his abscess.  He does have intertrigo under his panniculus, I have advised him to keep this area dry, to apply topical  drying/antifungal powders.  Also recommended looser clothing.  His counts are adequate to continue therapy, we will resume Taxotere today with cycle #3.  We will repeat restaging scans after 6 courses.  We are doing Xgeva every 6 weeks to coordinate with his Taxotere.  Once he finishes Taxotere we can then go back to every 4 weeks.  His next Xgeva is not due until 7/14/2022.  His calcium is running a little low, he is going to  calcium supplement.    -6/23/2022 [facility] Oncology clinic follow-up: Chris huerta is doing well on treatment with Taxotere.  Labs reviewed from yesterday and are unremarkable.  We will continue treatment today unchanged.  His suprapubic abscess resolved on clindamycin.  He will continue to use drying powder/antifungal powder under his panniculus for intertrigo.  Today will be cycle #4 of a planned 6 courses of Taxotere.  He is also on Xgeva, next dose due 7/14/2022, we are doing this every 6 weeks for now to line up with his chemotherapy, can go back to every 4 weeks after he finishes Taxotere.  Calcium from CMP yesterday was normal.    -7/13/2022 [facility] Oncology clinic follow-up: Mr. Morfin has had issues around day 3 after each treatment ranging from chest pain after his first treatment for which he did not seek medical attention, fatigue after the next few treatments, but 3 days after his most recent treatment on 6/23/2022 he had a syncopal episode at home and once again did not seek medical attention.  I discussed with him that this was not acceptable, if he has occurrences like this someone needs to call 911, it is difficult to figure out what is going on after the fact, the best time to work this up would be during the occurrence.  For now we will get labs today with CBC, CMP, PSA.  I will refer him to cardiology for further evaluation.  We will hold Taxotere most likely, I will see him tomorrow to go over his lab results.  I will also repeat his restaging scans with CT chest,  abdomen and pelvis and will include CT of the head in light of his syncopal episode.      -7/13/2022 PSA 18.6    -7/14/2022 [facility] [place] by [facility] Oncology clinic follow-up: Mr. Morfin returns today to review his labs from yesterday.  Labs are unremarkable.  PSA down to 18.6 from a high of 259.0 in April prior to starting treatment.  CBC and CMP unremarkable, magnesium is normal at 2.3, phosphorus slightly low at 2.4.  We will hold therapy for now in light of his syncopal episode on day 3 after his last treatment and prior episodes with chest pain and severe fatigue that all occurred on day 3 after his Taxotere.  We will restage him with CT head, chest, abdomen and pelvis (I am including the head in light of his syncopal episode).  I have also referred him to cardiology, he states that he received a call from them about making an appointment however he wanted to talk to us today first.  I emphasized the importance to him of making and keeping that appointment and he states understanding.  I also once again emphasized the requirement to seek emergency medical attention if he has any episodes in the future such as chest pain or syncopal events.  Whether or not we try and complete Taxotere with 2 more courses or move to the next line of therapy will be decided when he returns to discuss with Dr. Goodson and review his restaging scans.    -7/15/2022 saw Dr. Diaz cardiologist.  For syncope he ordered echocardiogram and 48-hour Holter monitor.    -7/15/2022 Holter monitor relatively benign.    -7/22/2022 CT brain with and without contrast negative.  CT chest abdomen and pelvis shows some nonspecific cecal wall thickening.  No progression in the chest.  T1 and ribs stable.  Decrease in multiple retroperitoneal and pelvic nodes.  Slight increase in right acetabular sclerotic lesion.  Persistent but improved circumferential bladder wall thickening.  Total body bone scan shows stable left third rib and no evidence of new bone  metastases.    -7/19/2022 ejection fraction 65% with grade 1 diastolic dysfunction.    -7/26/2022 [facility] oncology clinic follow-up: Given presyncopal symptoms occurred 3 days after Taxotere and has not otherwise occurred any other time and his cardiology work-up is fairly unremarkable and his disease is under good control as witnessed by the report above of the CTs of head chest abdomen pelvis and bone scan, I will hold cycle 5 and 6 of Taxotere and continue 1 now with Zytiga and prednisone.  With reference to the coincidental cecal wall thickening found on CT, we will get him to [facility] for colonoscopy.  He will see my nurse practitioner back in August for next cycle of Abiraterone prednisone.  He will continue his Lupron with Dr. Matute.  We will continue his Xgeva.  We will repeat his CT chest abdomen pelvis and bone scan again in 3 months.  He will see my nurse practitioner in the interim.    Total time of care today inclusive of time spent today prior to his arrival reviewing interval notes from cardiology, Holter, echo, CT reports and bone scan reports and during visit translating all that information to him and going over the plan as outlined above and interviewing him as to signs or symptoms from the treatment and/or disease and after visit instituting the plan as outlined above and signing orders and changing treatment plans as outlined took 45 minutes of patient care time throughout the day today.  Fritz Goodson MD    07/26/2022

## 2022-07-27 LAB
ALBUMIN SERPL-MCNC: 4.7 G/DL (ref 3.8–4.8)
ALBUMIN/GLOB SERPL: 1.9 {RATIO} (ref 1.2–2.2)
ALP SERPL-CCNC: 83 IU/L (ref 44–121)
ALT SERPL-CCNC: 17 IU/L (ref 0–44)
AST SERPL-CCNC: 18 IU/L (ref 0–40)
BASOPHILS # BLD AUTO: 0.1 X10E3/UL (ref 0–0.2)
BASOPHILS NFR BLD AUTO: 1 %
BILIRUB SERPL-MCNC: 0.4 MG/DL (ref 0–1.2)
BUN SERPL-MCNC: 17 MG/DL (ref 8–27)
BUN/CREAT SERPL: 15 (ref 10–24)
CALCIUM SERPL-MCNC: 9.6 MG/DL (ref 8.6–10.2)
CHLORIDE SERPL-SCNC: 106 MMOL/L (ref 96–106)
CO2 SERPL-SCNC: 22 MMOL/L (ref 20–29)
CREAT SERPL-MCNC: 1.12 MG/DL (ref 0.76–1.27)
EGFRCR SERPLBLD CKD-EPI 2021: 72 ML/MIN/1.73
EOSINOPHIL # BLD AUTO: 0.4 X10E3/UL (ref 0–0.4)
EOSINOPHIL NFR BLD AUTO: 6 %
ERYTHROCYTE [DISTWIDTH] IN BLOOD BY AUTOMATED COUNT: 14.4 % (ref 11.6–15.4)
GLOBULIN SER CALC-MCNC: 2.5 G/DL (ref 1.5–4.5)
GLUCOSE SERPL-MCNC: 95 MG/DL (ref 65–99)
HCT VFR BLD AUTO: 40.4 % (ref 37.5–51)
HGB BLD-MCNC: 13.2 G/DL (ref 13–17.7)
IMM GRANULOCYTES # BLD AUTO: 0 X10E3/UL (ref 0–0.1)
IMM GRANULOCYTES NFR BLD AUTO: 0 %
LYMPHOCYTES # BLD AUTO: 1.5 X10E3/UL (ref 0.7–3.1)
LYMPHOCYTES NFR BLD AUTO: 21 %
MAGNESIUM SERPL-MCNC: 2.3 MG/DL (ref 1.6–2.3)
MCH RBC QN AUTO: 28.3 PG (ref 26.6–33)
MCHC RBC AUTO-ENTMCNC: 32.7 G/DL (ref 31.5–35.7)
MCV RBC AUTO: 87 FL (ref 79–97)
MONOCYTES # BLD AUTO: 0.7 X10E3/UL (ref 0.1–0.9)
MONOCYTES NFR BLD AUTO: 10 %
NEUTROPHILS # BLD AUTO: 4.3 X10E3/UL (ref 1.4–7)
NEUTROPHILS NFR BLD AUTO: 62 %
PHOSPHATE SERPL-MCNC: 2.7 MG/DL (ref 2.8–4.1)
PLATELET # BLD AUTO: 222 X10E3/UL (ref 150–450)
POTASSIUM SERPL-SCNC: 4.3 MMOL/L (ref 3.5–5.2)
PROT SERPL-MCNC: 7.2 G/DL (ref 6–8.5)
RBC # BLD AUTO: 4.66 X10E6/UL (ref 4.14–5.8)
SODIUM SERPL-SCNC: 142 MMOL/L (ref 134–144)
WBC # BLD AUTO: 7.1 X10E3/UL (ref 3.4–10.8)

## 2022-07-27 NOTE — PROGRESS NOTES
I completed an independent review of the medication order and prescription. I agree with Dr. Leonardo' assessment and what is in her note.  I confirm she sent the prescription to [facility] specialty pharmacy as described.      Aspen Taylor, PharmD, [facility] - Oncology Clinical Pharmacist [phone]    7/27/2022  12:28 EDT

## 2022-07-28 ENCOUNTER — INFUSION (OUTPATIENT)
Dept: ONCOLOGY | Facility: HOSPITAL | Age: 67
End: 2022-07-28

## 2022-07-28 VITALS — DIASTOLIC BLOOD PRESSURE: 86 MMHG | SYSTOLIC BLOOD PRESSURE: 149 MMHG | TEMPERATURE: 98.4 F | HEART RATE: 76 BPM

## 2022-07-28 DIAGNOSIS — C61 PROSTATE CANCER METASTATIC TO MULTIPLE SITES: Primary | ICD-10-CM

## 2022-07-28 PROCEDURE — 25010000002 DENOSUMAB 120 MG/1.7ML SOLUTION: Performed by: INTERNAL MEDICINE

## 2022-07-28 PROCEDURE — 96372 THER/PROPH/DIAG INJ SC/IM: CPT

## 2022-07-28 RX ADMIN — DENOSUMAB 120 MG: 120 INJECTION SUBCUTANEOUS at 10:45

## 2022-07-29 ENCOUNTER — HOSPITAL ENCOUNTER (OUTPATIENT)
Dept: ONCOLOGY | Facility: HOSPITAL | Age: 67
Discharge: HOME | End: 2022-07-29
Admitting: INTERNAL MEDICINE

## 2022-07-29 ENCOUNTER — PATIENT ROUNDING (BHMG ONLY) (OUTPATIENT)
Dept: CARDIOLOGY | Facility: CLINIC | Age: 67
End: 2022-07-29

## 2022-07-29 ENCOUNTER — OFFICE VISIT (OUTPATIENT)
Dept: CARDIOLOGY | Facility: CLINIC | Age: 67
End: 2022-07-29

## 2022-07-29 ENCOUNTER — SPECIALTY PHARMACY (OUTPATIENT)
Dept: ONCOLOGY | Facility: HOSPITAL | Age: 67
End: 2022-07-29

## 2022-07-29 VITALS
TEMPERATURE: 98.2 F | HEIGHT: 71 IN | HEART RATE: 81 BPM | OXYGEN SATURATION: 96 % | RESPIRATION RATE: 16 BRPM | BODY MASS INDEX: 21.76 KG/M2 | DIASTOLIC BLOOD PRESSURE: 84 MMHG | WEIGHT: 155.4 LBS | SYSTOLIC BLOOD PRESSURE: 128 MMHG

## 2022-07-29 DIAGNOSIS — R55 SYNCOPE AND COLLAPSE: Primary | ICD-10-CM

## 2022-07-29 DIAGNOSIS — I25.10 CORONARY ARTERY DISEASE INVOLVING NATIVE CORONARY ARTERY OF NATIVE HEART WITHOUT ANGINA PECTORIS: ICD-10-CM

## 2022-07-29 PROBLEM — I25.810 CORONARY ARTERY DISEASE INVOLVING CORONARY BYPASS GRAFT OF NATIVE HEART WITHOUT ANGINA PECTORIS: Status: RESOLVED | Noted: 2022-07-29 | Resolved: 2022-07-29

## 2022-07-29 PROBLEM — I25.810 CORONARY ARTERY DISEASE INVOLVING CORONARY BYPASS GRAFT OF NATIVE HEART WITHOUT ANGINA PECTORIS: Status: ACTIVE | Noted: 2022-07-29

## 2022-07-29 PROCEDURE — 99214 OFFICE O/P EST MOD 30 MIN: CPT | Performed by: INTERNAL MEDICINE

## 2022-07-29 PROCEDURE — G0463 HOSPITAL OUTPT CLINIC VISIT: HCPCS

## 2022-07-29 NOTE — PROGRESS NOTES
July 29, 2022    Hello, may I speak with Chris Morfin?    My name is marco antonio     I am  with [facility]  [facility]  [address] [place] [address].    Before we get started may I verify your date of birth? 1955    I am calling to officially welcome you to our practice and ask about your recent visit. Is this a good time to talk? yes    Tell me about your visit with us. What things went well?  everything went well  the staff is really friendly        We're always looking for ways to make our patients' experiences even better. Do you have recommendations on ways we may improve?  no    Overall were you satisfied with your first visit to our practice? yes       I appreciate you taking the time to speak with me today. Is there anything else I can do for you? no      Thank you, and have a great day.

## 2022-07-29 NOTE — PROGRESS NOTES
Specialty Pharmacy Patient Management Program  Oncology Initial Assessment       Chris Morfin is a 67 y.o. male with prostate cancer seen by an Oncology provider and enrolled in the Oncology Patient Management program offered by [facility].  An initial outreach was conducted, including assessment of therapy appropriateness and specialty medication education for abiraterone and prednisone. The patient was introduced to services offered by [facility], including: regular assessments, refill coordination, curbside pick-up or mail order delivery options, prior authorization maintenance, and financial assistance programs as applicable. The patient was also provided with contact information for the pharmacy team.     Regimen: Abiraterone 1000mg PO daily + prednisone 5mg PO BID    Start date of oral specialty medication: 8/1/2022    Relevant Past Medical History, Comorbidities, and Vaccines  Relevant medical history and concomitant health conditions were discussed with the patient. The patient's chart has been reviewed for relevant past medical history and comorbid health conditions and updated as necessary.  Vaccines are coordinated by the patient's oncologist and primary care provider.  Past Medical History:   Diagnosis Date   • Cancer (HCC)    • Prostate cancer (HCC)      Social History     Socioeconomic History   • Marital status: Single   Tobacco Use   • Smoking status: Never Smoker   • Smokeless tobacco: Never Used   Vaping Use   • Vaping Use: Never used   Substance and Sexual Activity   • Alcohol use: Not Currently     Comment: no drinking x's 20 years   • Drug use: Yes     Types: Marijuana     Comment: x 45 years   • Sexual activity: Defer       Allergies  Known allergies and reactions were discussed with the patient. The patient's chart has been reviewed for allergy information and updated as necessary.   Patient has no known allergies.    Current Medication  List  This medication list has been reviewed with the patient and evaluated for any interactions or necessary modifications/recommendations, and updated to include all prescription medications, OTC medications, and supplements the patient is currently taking.  This list reflects what is contained in the patient's profile, which has also been marked as reviewed to communicate to other providers it is the most up to date version of the patient's current medication therapy.   Prior to Admission medications    Medication Sig Start Date End Date Taking? Authorizing Provider   abiraterone acetate (ZYTIGA) 500 MG chemo tablet Take 2 tablets by mouth Daily. 7/26/22   Fritz Goodson MD   lidocaine-prilocaine (EMLA) 2.5-2.5 % cream Apply 1 application topically to the appropriate area as directed As Needed (prior to port access). 3/31/22   Susana Torres APRN   ondansetron (ZOFRAN) 8 MG tablet Take 1 tablet by mouth 3 (Three) Times a Day As Needed for Nausea or Vomiting. 7/26/22   Fritz Goodson MD   predniSONE (DELTASONE) 5 MG tablet Take 1 tablet by mouth 2 (Two) Times a Day. 7/26/22   Fritz Goodson MD   clindamycin (Cleocin) 300 MG capsule Take 1 capsule by mouth 3 (Three) Times a Day. 5/27/22 7/14/22  Teri Ram DO   dexamethasone (DECADRON) 4 MG tablet Take 2 tablets oral twice a day for 3 consecutive days beginning the day before chemotherapy and continue for 6 doses. 3/15/22 7/26/22  Fritz Goodson MD   finasteride (PROSCAR) 5 MG tablet  2/24/22 7/29/22  Camden Enciso MD   ondansetron (ZOFRAN) 8 MG tablet Take 1 tablet by mouth 3 (Three) Times a Day As Needed for Nausea or Vomiting. 3/15/22 7/26/22  Fritz Goodson MD   tamsulosin (FLOMAX) 0.4 MG capsule 24 hr capsule  3/2/22 7/29/22  Camden Enciso MD       Drug Interactions  • Reviewed concomitant medications, allergies, labs, comorbidities/medical history, quality of life, and immunization history.   • Drug-drug interactions noted and discussed  during education: no significant drug interactions noted. . Reminded the patient to let us know before making any changes or starting any new prescription or OTC medications so we can first assess drug interactions.  • Drug-food interactions noted and discussed during education: Patient was instructed to avoid eating grapefruit and drinking grapefruit juice    Recommended Medications Assessment  • Bone Health (such as calcium/vitamin D, bisphosphonate, RANKL inhibitor) - Currently Taking The patient was started/is already on Calcium and Vitamin D and Xgeva (denosumab)    Relevant Laboratory Values  Lab Results   Component Value Date    GLUCOSE 95 07/26/2022    CALCIUM 9.6 07/26/2022     07/26/2022    K 4.3 07/26/2022    CO2 22 07/26/2022     07/26/2022    BUN 17 07/26/2022    CREATININE 1.12 07/26/2022    BCR 15 07/26/2022     Lab Results   Component Value Date    WBC 7.1 07/26/2022    RBC 4.66 07/26/2022    HGB 13.2 07/26/2022    HCT 40.4 07/26/2022    MCV 87 07/26/2022    MCH 28.3 07/26/2022    MCHC 32.7 07/26/2022    RDW 14.4 07/26/2022     07/26/2022    NEUTRORELPCT 62 07/26/2022    LYMPHORELPCT 21 07/26/2022    MONORELPCT 10 07/26/2022    EOSRELPCT 6 07/26/2022    BASORELPCT 1 07/26/2022    NEUTROABS 4.3 07/26/2022    LYMPHSABS 1.5 07/26/2022    MONOSABS 0.7 07/26/2022    EOSABS 0.4 07/26/2022    BASOSABS 0.1 07/26/2022       Initial Education Provided for Specialty Medication  The patient has been provided with the following education. All questions and concerns have been addressed prior to the patient receiving the medication, and the patient has verbalized understanding of the education and any materials provided.  Additional patient education shall be provided and documented upon request by the patient, provider or payer.      Provided patient with:   Chemo calendar to help improve adherence., Education sheets about the medication, 24-hour clinic phone number and my contact information  and instructions to call should additional questions arise.     Medication Education Sheets Provided: (select all that apply)  • Oral Specialty Medication: Zytiga (abiraterone acetate)  • IV: None  • Steroid: Prednisone    Other Education Sheets Provided: (select all that apply)  Adherence, Blood Pressure Log and Symptom Tracker Sheet and [facility] Information    TOPICS COMMENTS   Storage and Handling of Oral Specialty Medication Store in the original container, in a dry location out of direct sunlight, and out of reach of children or pets. and Store at room temperature.  Discussed safe handling and what to do with any unused medication.   Administration of Oral Specialty Medication Take on an empty stomach, 1 hour(s) before and 2 hour(s) after eating.   Adherence to Oral Specialty Regimen and Handling Missed Doses Patient is likely to have good treatment adherence; reinforced the importance of adherence. Reviewed how to address missed doses and to let us know of any missed doses.   Anemia: role of RBC, cause, s/s, ways to manage, role of transfusion Reviewed the role of RBC and the use of transfusions if hemoglobin decreases too much.  Patient to notify us if they experience shortness of breath, dizziness, or palpitations.  Also let patient know they could feel more tired than usual and to try to stay active, but rest if they need to.    Thrombocytopenia: role of platelet, cause, s/s, ways to prevent bleeding, things to avoid, when to seek help Reviewed the role of platelets in blood clotting and when to call clinic (bloody nose that bleeds for 5 mins despite pressure, a cut that won't stop bleeding despite pressure, gums that bleed excessively with brushing or flossing). Recommended using an electric razor, soft bristle toothbrush, and blowing your nose gently.    Neutropenia: role of WBC, cause, infection precautions, s/s of infection, when to call MD Reviewed the role of WBC, good infection prevention practices, and  when to call the clinic (temperature 100.4F, sore throat, burning urination, etc)  • COVID Vaccines: Unknown  • Flu Vaccine: Unknown   Nutrition and Appetite Changes:  importance of maintaining healthy diet & weight, ways to manage to improve intake, dietary consult, exercise regimen Discussed risk of decreased appetite, reviewed plan for small more frequent meals.    Diarrhea: causes, s/s of dehydration, ways to manage, dietary changes, when to call MD Chemotherapy - Discussed risk of diarrhea. Instructed patient that they can use OTC loperamide at first presentation of diarrhea, but call MD if 4-6 episodes in 24 hours not relieved by OTC loperamide.   Constipation: causes, ways to manage, dietary changes, when to call MD Provided supplementary handout with instructions for use of docusate and other OTC therapies to manage constipation.  Instructed to call us if medications aren't working.   Nausea/Vomiting: cause, use of antiemetics, dietary changes, when to call MD • Emetic risk: Low-Minimal  • Premeds: none  • Scheduled meds: none  • PRN home meds: Ondansetron  • Pharmacy home meds sent to: Patient has ondansetron at home.    Instructed the patient to take a dose of the PRN medication at the first onset of nausea and if it's not working to call us for additional medications.  Also provided non-drug measures to mitigate nausea.   Mouth Sores: causes, oral care, ways to manage Mouth sores can be prevented by making a mouth wash mixture of salt, baking soda, and water. The patient was instructed to swish and spit four times daily after meals and before bedtime.  Use of a soft bristle toothbrush was recommended.  The patient was instructed to avoid alcohol-containing OTC mouthwashes.    Alopecia: cause, ways to manage, resources Discussed the possibility of hair loss with the patient. Informed patient that they could request a prescription for a wig if desired and most of the cost is usually covered by insurance.    Infertility and Sexuality:  causes, fertility preservation options, sexuality changes, ways to manage, importance of birth control Discussed safe sex practices.   Nervous System Changes: causes, s/s, neuropathies, cognitive changes, ways to manage    Pain: causes, ways to manage Chemo - Discussed muscle and joint aches/pains with chemotherapy, and recommended the use of OTC pain relief with ibuprofen or acetaminophen if needed.   Skin/Nail Changes: cause, s/s, ways to manage Informed patient that they may see dark or white lines on finger and toenails during treatment, and that their nails may become brittle and even fall off in extreme cases. But that this would likely reverse after treatment concludes.        Organ Toxicities: cause, s/s, need for diagnostic tests, labs, when to notify MD Discussed potential effects on organ systems, monitoring, diagnostic tests, labs, and when to notify their MD. Discussed the signs/symptoms of the following: hepatotoxicity and nephrotoxicity   Survivorship: distress, distress assessment, secondary malignancies, early/late effects, follow-up, social issues, social support Discussed risk of hypertension. Discussed risk of electrolyte changes and hyperglycemia. Discussed risk of swelling and edema. Discussed how and when to take prednisone twice daily.   Home Care: how to manage bodily fluids Counseled on management of soiled linens and proper flush technique.  Discussed how to manage all the side effects at home and advised when to contact the MD office   Miscellaneous • Financial Issues: Patient will  abiraterone today from [employer] after education for $1.35.  • Lab Draws: Patient will need labs Days 1 and 15 for 3 cycles then monthly thereafter.     Adherence and Self-Administration  • Barriers to Patient Adherence and/or Self-Administration: no barriers  • Methods for Supporting Patient Adherence and/or Self-Administration: dosing calendar  • Expected duration of  therapy: Until disease progression or intolerable toxicity    Goals of Therapy  • Patient Goals of Therapy:   o Consistently take medications as prescribed  o Patient will adhere to medication regimen  o Patient will report any medication side effects to healthcare provider  • Clinical Goals:   o Support patient understanding of medication regimen  o Ensure patient knows the pharmacy contact information  o Schedule regular follow-up to monitor the treatment serious adverse events  o Schedule regular follow-up to confirm medication adherence  o Schedule regular follow-up to monitor disease progression or stabilty    Quality of Life Assessment   The patient's current (pre-therapy) quality of life was discussed with the patient. The QOL segment of this outreach has been reviewed and updated.   • Quality of Life Score: 8/10    Reassessment Plan & Follow-Up  1. Pharmacist to perform regular reassessments no more than (6) months from the previous assessment.  2. Welcome information and patient satisfaction survey to be sent by retail team with patient's initial fill.  3. Care Coordinator to set up future refill outreaches, coordinate prescription delivery, and escalate clinical questions to pharmacist.     Additional Plans, Therapy Recommendations or Therapy Problems to Be Addressed: no further concerns     Attestation  I attest that the initiated specialty medication(s) are appropriate for the patient based on my assessment.  If the prescribed therapy is at any point deemed not appropriate based on the current or future assessments, a consultation will be initiated with the patient's specialty care provider to determine the best course of action. The revised plan of therapy will be documented along with any additional patient education provided.     Karen Pardo PharmD, [facility]  Oncology Clinical Pharmacist    Date and Time: 7/29/2022 1242

## 2022-07-29 NOTE — PROGRESS NOTES
[facility]  [facility]  [address] [place] [address]  Dept: [phone]  Dept Fax: [phone]    Chris Morfin  1955    Follow Up Office Visit Note    History of Present Illness:  Chris Morfin is a 67 y.o. male who presents to the clinic for Follow-up. Near syncope- He has not had more complaints, he thinks was related to the chemo, Echo, normal  Holter short run atrial tachycardia and extra beats, asymptomatic, will observe , he also has had a CT chest with coalification of the coronary arteries , and as he was having some chest discomfort, will do risk stratification , with a stress nuclear, he seems very active    The following portions of the patient's history were reviewed and updated as appropriate: allergies, current medications, past family history, past medical history, past social history, past surgical history and problem list.    Medications:  abiraterone acetate  lidocaine-prilocaine  ondansetron  predniSONE    Subjective  No Known Allergies     Past Medical History:   Diagnosis Date   • Cancer (HCC)    • Prostate cancer (HCC)        Past Surgical History:   Procedure Laterality Date   • PROSTATE BIOPSY  02/2022    dr. sue       Family History   Problem Relation Age of Onset   • Cancer Sister    • Cancer Brother         Social History     Socioeconomic History   • Marital status: Single   Tobacco Use   • Smoking status: Never Smoker   • Smokeless tobacco: Never Used   Vaping Use   • Vaping Use: Never used   Substance and Sexual Activity   • Alcohol use: Not Currently     Comment: no drinking x's 20 years   • Drug use: Yes     Types: Marijuana     Comment: x 45 years   • Sexual activity: Defer       Review of Systems   Constitutional: Negative.    HENT: Negative.    Respiratory: Negative.    Cardiovascular: Negative.    Endocrine: Negative.    Genitourinary: Negative.    Musculoskeletal: Negative.    Skin: Negative.    Allergic/Immunologic:  "Negative.    Neurological: Negative.    Hematological: Negative.    Psychiatric/Behavioral: Negative.        Cardiovascular Procedures    ECHO/MUGA:   STRESS TESTS:   CARDIAC CATH:   DEVICES:   HOLTER:   CT/MRI:   VASCULAR:   CARDIOTHORACIC:     Objective  Vitals:    07/29/22 0928   BP: 128/84   BP Location: Left arm   Patient Position: Lying   Cuff Size: Adult   Pulse: 81   Resp: 16   Temp: 98.2 °F (36.8 °C)   TempSrc: Temporal   SpO2: 96%   Weight: 70.5 kg (155 lb 6.4 oz)   Height: 180.3 cm (71\")   PainSc: 0-No pain     Body mass index is 21.67 kg/m².     Physical Exam  Vitals reviewed.   Constitutional:       Appearance: Healthy appearance. Not in distress.   Eyes:      Pupils: Pupils are equal, round, and reactive to light.   HENT:    Mouth/Throat:      Pharynx: Oropharynx is clear.   Neck:      Thyroid: Thyroid normal.      Vascular: No JVR. JVD normal.   Pulmonary:      Effort: Pulmonary effort is normal.      Breath sounds: Normal breath sounds. No wheezing. No rhonchi. No rales.   Chest:      Chest wall: Not tender to palpatation.   Cardiovascular:      PMI at left midclavicular line. Normal rate. Regular rhythm. Normal S1. Normal S2.      Murmurs: There is no murmur.      No gallop. No click. No rub.   Pulses:     Intact distal pulses.   Edema:     Peripheral edema absent.   Abdominal:      General: Bowel sounds are normal.      Palpations: Abdomen is soft.      Tenderness: There is no abdominal tenderness.   Musculoskeletal: Normal range of motion.         General: No tenderness.      Cervical back: Normal range of motion and neck supple. Skin:     General: Skin is warm and dry.   Neurological:      General: No focal deficit present.      Mental Status: Alert and oriented to person, place and time.          Diagnostic Data  Procedures    Assessment and Plan  Diagnoses and all orders for this visit:    Syncope and collapse- No more complaints, echo normal, Holter short run atrial tachycardia   -     Stress " Test With Myocardial Perfusion One Day; Future  -     High Sensitivity CRP  -     Lipid Panel  -     CK    Coronary artery disease involving native coronary artery of native heart without angina pectoris- he has calcifications of the coronary arteries by ct scan no exertional, chest pain, will do risk stratification with a treadmill Nuclear, will get a Lipid profile   -     Stress Test With Myocardial Perfusion One Day; Future  -     High Sensitivity CRP  -     Lipid Panel  -     CK         Return in about 1 month (around 8/29/2022) for Recheck.    Ang Diaz MD  07/29/2022

## 2022-07-30 LAB
CHOLEST SERPL-MCNC: 214 MG/DL (ref 100–199)
CK SERPL-CCNC: 40 U/L (ref 41–331)
CRP SERPL HS-MCNC: 0.66 MG/L (ref 0–3)
HDLC SERPL-MCNC: 46 MG/DL
LDLC SERPL CALC-MCNC: 140 MG/DL (ref 0–99)
TRIGL SERPL-MCNC: 156 MG/DL (ref 0–149)
VLDLC SERPL CALC-MCNC: 28 MG/DL (ref 5–40)

## 2022-08-02 ENCOUNTER — TELEPHONE (OUTPATIENT)
Dept: CARDIOLOGY | Facility: CLINIC | Age: 67
End: 2022-08-02

## 2022-08-02 RX ORDER — ROSUVASTATIN CALCIUM 20 MG/1
20 TABLET, COATED ORAL DAILY
Qty: 90 TABLET | Refills: 1 | Status: SHIPPED | OUTPATIENT
Start: 2022-08-02 | End: 2023-01-09

## 2022-08-02 RX ORDER — ROSUVASTATIN CALCIUM 20 MG/1
20 TABLET, COATED ORAL DAILY
COMMUNITY
End: 2022-08-02 | Stop reason: SDUPTHER

## 2022-08-02 NOTE — TELEPHONE ENCOUNTER
----- Message from Ang Diaz MD sent at 7/30/2022  8:34 AM EDT -----  Lipids very high LDL is 140, take Crestor 20 mg

## 2022-08-02 NOTE — TELEPHONE ENCOUNTER
HUB TO SHARE    Left a message that mr. Morfin's, lab results for his LDL, bad cholesterol, are 140 and need to be <70. He would like you to start Crestor to help lower these numbers.  I have called prescription into your pharmacy. Please call if any questions or concerns.

## 2022-08-10 ENCOUNTER — INFUSION (OUTPATIENT)
Dept: ONCOLOGY | Facility: HOSPITAL | Age: 67
End: 2022-08-10

## 2022-08-10 VITALS
HEART RATE: 65 BPM | WEIGHT: 261.6 LBS | BODY MASS INDEX: 36.49 KG/M2 | RESPIRATION RATE: 18 BRPM | DIASTOLIC BLOOD PRESSURE: 86 MMHG | TEMPERATURE: 97.6 F | SYSTOLIC BLOOD PRESSURE: 141 MMHG

## 2022-08-10 DIAGNOSIS — C61 PROSTATE CANCER METASTATIC TO MULTIPLE SITES: Chronic | ICD-10-CM

## 2022-08-10 PROCEDURE — 36415 COLL VENOUS BLD VENIPUNCTURE: CPT

## 2022-08-11 LAB
ALBUMIN SERPL-MCNC: 4.6 G/DL (ref 3.8–4.8)
ALBUMIN/GLOB SERPL: 2.1 {RATIO} (ref 1.2–2.2)
ALP SERPL-CCNC: 66 IU/L (ref 44–121)
ALT SERPL-CCNC: 12 IU/L (ref 0–44)
AST SERPL-CCNC: 15 IU/L (ref 0–40)
BASOPHILS # BLD AUTO: 0.1 X10E3/UL (ref 0–0.2)
BASOPHILS NFR BLD AUTO: 1 %
BILIRUB SERPL-MCNC: 0.3 MG/DL (ref 0–1.2)
BUN SERPL-MCNC: 21 MG/DL (ref 8–27)
BUN/CREAT SERPL: 17 (ref 10–24)
CALCIUM SERPL-MCNC: 9.3 MG/DL (ref 8.6–10.2)
CHLORIDE SERPL-SCNC: 107 MMOL/L (ref 96–106)
CO2 SERPL-SCNC: 21 MMOL/L (ref 20–29)
CREAT SERPL-MCNC: 1.27 MG/DL (ref 0.76–1.27)
EGFRCR SERPLBLD CKD-EPI 2021: 62 ML/MIN/1.73
EOSINOPHIL # BLD AUTO: 0.4 X10E3/UL (ref 0–0.4)
EOSINOPHIL NFR BLD AUTO: 7 %
ERYTHROCYTE [DISTWIDTH] IN BLOOD BY AUTOMATED COUNT: 13.9 % (ref 11.6–15.4)
GLOBULIN SER CALC-MCNC: 2.2 G/DL (ref 1.5–4.5)
GLUCOSE SERPL-MCNC: 108 MG/DL (ref 65–99)
HCT VFR BLD AUTO: 37.2 % (ref 37.5–51)
HGB BLD-MCNC: 12.3 G/DL (ref 13–17.7)
IMM GRANULOCYTES # BLD AUTO: 0 X10E3/UL (ref 0–0.1)
IMM GRANULOCYTES NFR BLD AUTO: 0 %
LYMPHOCYTES # BLD AUTO: 1.7 X10E3/UL (ref 0.7–3.1)
LYMPHOCYTES NFR BLD AUTO: 26 %
MCH RBC QN AUTO: 28.7 PG (ref 26.6–33)
MCHC RBC AUTO-ENTMCNC: 33.1 G/DL (ref 31.5–35.7)
MCV RBC AUTO: 87 FL (ref 79–97)
MONOCYTES # BLD AUTO: 0.7 X10E3/UL (ref 0.1–0.9)
MONOCYTES NFR BLD AUTO: 11 %
NEUTROPHILS # BLD AUTO: 3.6 X10E3/UL (ref 1.4–7)
NEUTROPHILS NFR BLD AUTO: 55 %
PLATELET # BLD AUTO: 267 X10E3/UL (ref 150–450)
POTASSIUM SERPL-SCNC: 4.1 MMOL/L (ref 3.5–5.2)
PROT SERPL-MCNC: 6.8 G/DL (ref 6–8.5)
RBC # BLD AUTO: 4.29 X10E6/UL (ref 4.14–5.8)
SODIUM SERPL-SCNC: 143 MMOL/L (ref 134–144)
WBC # BLD AUTO: 6.5 X10E3/UL (ref 3.4–10.8)

## 2022-08-22 ENCOUNTER — SPECIALTY PHARMACY (OUTPATIENT)
Dept: ONCOLOGY | Facility: HOSPITAL | Age: 67
End: 2022-08-22

## 2022-08-22 NOTE — PROGRESS NOTES
Specialty Pharmacy Refill Coordination Note     Chris is a 67 y.o. male contacted today regarding refills of  abiraterone 1000mg PO QD specialty medication(s).      Specialty medication(s) and dose(s) confirmed: yes    Refill Questions    Flowsheet Row Most Recent Value   Changes to allergies? No   Changes to medications? No   New conditions since last clinic visit No   Unplanned office visit, urgent care, ED, or hospital admission in the last 4 weeks  No   How does patient/caregiver feel medication is working? Good   Financial problems or insurance changes  No   Since the previous refill, were any specialty medication doses or scheduled injections missed or delayed?  No   Does this patient require a clinical escalation to a pharmacist? No          Delivery Questions    Flowsheet Row Most Recent Value   Delivery method [employer]   Delivery address correct? Yes   Delivery phone number [phone]   Preferred delivery time? Anytime   Number of medications in delivery 1   Medication being filled and delivered abiraterone   Doses left of specialty medications 3 days left   Is there any medication that is due not being filled? No   Supplies needed? No supplies needed   Cooler needed? No   Do any medications need mixed or dated? No   Copay form of payment Credit card on file   Additional comments $1.35   Questions or concerns for the pharmacist? No   Explain any questions or concerns for the pharmacist n/a   Are any medications first time fills? No                 Follow-up: 28 day(s)     Catalina Montes De Oca, Pharmacy Technician  Specialty Pharmacy Technician

## 2022-08-23 ENCOUNTER — INFUSION (OUTPATIENT)
Dept: ONCOLOGY | Facility: HOSPITAL | Age: 67
End: 2022-08-23

## 2022-08-23 ENCOUNTER — OFFICE VISIT (OUTPATIENT)
Dept: ONCOLOGY | Facility: CLINIC | Age: 67
End: 2022-08-23

## 2022-08-23 VITALS
SYSTOLIC BLOOD PRESSURE: 156 MMHG | HEIGHT: 71 IN | OXYGEN SATURATION: 97 % | DIASTOLIC BLOOD PRESSURE: 90 MMHG | WEIGHT: 264 LBS | HEART RATE: 66 BPM | TEMPERATURE: 98.6 F | BODY MASS INDEX: 36.96 KG/M2 | RESPIRATION RATE: 18 BRPM

## 2022-08-23 DIAGNOSIS — C61 PROSTATE CANCER METASTATIC TO MULTIPLE SITES: Primary | Chronic | ICD-10-CM

## 2022-08-23 DIAGNOSIS — Z45.2 ENCOUNTER FOR CARE RELATED TO VASCULAR ACCESS PORT: ICD-10-CM

## 2022-08-23 PROCEDURE — 96372 THER/PROPH/DIAG INJ SC/IM: CPT

## 2022-08-23 PROCEDURE — 36591 DRAW BLOOD OFF VENOUS DEVICE: CPT

## 2022-08-23 PROCEDURE — 25010000002 HEPARIN LOCK FLUSH PER 10 UNITS: Performed by: INTERNAL MEDICINE

## 2022-08-23 PROCEDURE — 25010000002 DENOSUMAB 120 MG/1.7ML SOLUTION: Performed by: INTERNAL MEDICINE

## 2022-08-23 PROCEDURE — 99214 OFFICE O/P EST MOD 30 MIN: CPT | Performed by: INTERNAL MEDICINE

## 2022-08-23 RX ORDER — HEPARIN SODIUM (PORCINE) LOCK FLUSH IV SOLN 100 UNIT/ML 100 UNIT/ML
500 SOLUTION INTRAVENOUS AS NEEDED
Status: DISCONTINUED | OUTPATIENT
Start: 2022-08-23 | End: 2022-08-23 | Stop reason: HOSPADM

## 2022-08-23 RX ORDER — SODIUM CHLORIDE 0.9 % (FLUSH) 0.9 %
20 SYRINGE (ML) INJECTION AS NEEDED
Status: CANCELLED | OUTPATIENT
Start: 2022-08-23

## 2022-08-23 RX ORDER — HEPARIN SODIUM (PORCINE) LOCK FLUSH IV SOLN 100 UNIT/ML 100 UNIT/ML
500 SOLUTION INTRAVENOUS AS NEEDED
Status: CANCELLED | OUTPATIENT
Start: 2022-08-23

## 2022-08-23 RX ADMIN — DENOSUMAB 120 MG: 120 INJECTION SUBCUTANEOUS at 11:38

## 2022-08-23 RX ADMIN — HEPARIN 500 UNITS: 100 SYRINGE at 10:50

## 2022-08-23 NOTE — PROGRESS NOTES
CHIEF COMPLAINT: Hot flashes    Problem List:  Oncology/Hematology History Overview Note   1.  Prostate cancer clinical T1c and 2M1B stage IVb treated with 4 cycles of Taxotere, stopped due to syncope with negative cardiac work-up, with marked drop in PSA of 18.6 from over 900 at baseline, continued on Zytiga and prednisone.  2.  Urinary retention with Goodwin in place 1/24/2022.  On finasteride 1/24/2022.  3.  Covid 19 December 2021    Oncology history timeline:  -11/29/2021  with symptoms of urinary retention.  -2/15/2022 prostate biopsy adenocarcinoma grade group 5 and 3 out of 12 cores, grade group 4 in 1 out of 12 cores, grade group 3 in 8 out of 12 cores.  -2/24/2022 CT chest abdomen pelvis and total body bone scan shows left third rib, right acetabulum, periaortic and retroperitoneal kizzy metastases complaining of pain in the left chest and right hip.  Also possible sclerotic left 10th rib on CT.  Spleen mildly enlarged 13.8 cm.  Hepatic steatosis.  Small liver cyst.  Fat stranding in the periaortic and mesenteric region consistent with reactive/inflammatory stranding.  Multiple enlarged periaortic and retroperitoneal nodes and lymphadenopathy largest 4.9 cm.  CT chest describes tiny sclerotic foci in left eighth rib and right lateral seventh rib and T1.  Multiple small mediastinal and bilateral axillary nodes seen with small hypodense left thyroid nodule.  Creatinine 1.7.  -3/4/2022 office note Dr. Rolando Matute: Patient was seen after his elevated PSA by Dr. Vera and prostate biopsy scheduled.  However, he developed COVID-19 and this was canceled and the patient did not reschedule due to lack of insurance.  Saw Dr. Matute back on 1/24/2022 with plans to get staging CTs and bone scan with results as outlined above.  Started Casodex and to return on 3/11/2022 for Lupron.  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  TURP planned for urinary  retention symptoms.    -3/15/2022 [facility] medical oncology initial consultation: I reviewed the above data with the patient.  With his fairly bulky retroperitoneal adenopathy and bone metastases including extra axial metastases, he would fit the data from the CHAARTED trial for which Taxotere x6 followed by Zytiga prednisone along with the planned Lupron already being planned by Dr. Matute would be reasonable.  Equally reasonable in terms of comparisons with bicalutamide and Lupron would be Zytiga prednisone plus Lupron or Xtandi plus Lupron or apalutamide plus Lupron.  None of these have been compared head-to-head but all have beaten combined androgen deprivation therapy with bicalutamide and Lupron.  At the age of 67 and in order to have as many bullets as possible down the road and hence saving some of the hormone blockade options for later and using chemotherapy when he is younger and healthier for a more time-limited.,  He has opted for the Taxotere x6 followed by Zytiga and prednisone.  We will also give him Xgeva to improve bone health and given metastatic disease, as with all metastatic prostate cancer patients, he needs genetic counseling both for his sake as well as his family's.  If he were to have BRCA1 or other such  mutations, then that also opens up the options for PARP inhibitors etc. down the road.  He has his TURP scheduled for 2/24/2022 and we will start treatment a couple of weeks after that and he will get chemo preparation visit in the meantime and I will get capital surgeons to place a port.  We will check his PSA after his TURP when he sees my nurse practitioner back early April for the start of chemotherapy and repeat the PSA and CT chest abdomen pelvis and bone scan after the Taxotere is complete.    -3/31/2022 chemotherapy education and needs assessment completed    -4/7/2022 began docetaxel and Xgeva.  .0.  We will do his Xgeva every 6 weeks to coordinate with his docetaxel  infusions.    -4/19/2022 patient stopped Casodex    -5/17/2022 patient reported seeing his PCP for an abscess in his suprapubic area.  Placed on clindamycin, will delay treatment 1 week.    -5/25/2022 PSA 34.3  -5/26/2022 [facility] Oncology clinic follow-up: Chris has an abscess in the suprapubic area, it has improved on antibiotic therapy but I am concerned if we treat him it will just flare back up unless it is drained.  I will get him to Dr. Miller who placed his port for I&D and culture.  He is on clindamycin and states he completes course of treatment tomorrow.  I will delay his treatment with Taxotere 1 more week.  We will give his Xgeva today.  I will see him back in 1 week for follow-up to assess whether or not we can resume his Taxotere.  His PSA has dropped nicely with treatment thus far, PSA yesterday was 34.3 which is down from a PSA of 259.0 when we started treatment, it has been as high as 911 in November.    -6/2/2022 [facility] Oncology clinic follow-up: Chris went to see Dr. Miller to have his abscess lanced however apparently there was a long wait and he left without being seen.  He did call his PCP and was given 5 more days of clindamycin and the abscess now seems to have resolved.  We discussed today that he is at risk for recurrence of infection due to immunocompromise state with chemotherapy.  He will notify us if he has any return of his abscess.  He does have intertrigo under his panniculus, I have advised him to keep this area dry, to apply topical drying/antifungal powders.  Also recommended looser clothing.  His counts are adequate to continue therapy, we will resume Taxotere today with cycle #3.  We will repeat restaging scans after 6 courses.  We are doing Xgeva every 6 weeks to coordinate with his Taxotere.  Once he finishes Taxotere we can then go back to every 4 weeks.  His next Xgeva is not due until 7/14/2022.  His calcium is running a little low, he is going to  calcium  supplement.    -6/23/2022 [facility] Oncology clinic follow-up: Chris overall is doing well on treatment with Taxotere.  Labs reviewed from yesterday and are unremarkable.  We will continue treatment today unchanged.  His suprapubic abscess resolved on clindamycin.  He will continue to use drying powder/antifungal powder under his panniculus for intertrigo.  Today will be cycle #4 of a planned 6 courses of Taxotere.  He is also on Xgeva, next dose due 7/14/2022, we are doing this every 6 weeks for now to line up with his chemotherapy, can go back to every 4 weeks after he finishes Taxotere.  Calcium from CMP yesterday was normal.    -7/13/2022 [facility] Oncology clinic follow-up: Mr. Morfin has had issues around day 3 after each treatment ranging from chest pain after his first treatment for which he did not seek medical attention, fatigue after the next few treatments, but 3 days after his most recent treatment on 6/23/2022 he had a syncopal episode at home and once again did not seek medical attention.  I discussed with him that this was not acceptable, if he has occurrences like this someone needs to call 911, it is difficult to figure out what is going on after the fact, the best time to work this up would be during the occurrence.  For now we will get labs today with CBC, CMP, PSA.  I will refer him to cardiology for further evaluation.  We will hold Taxotere most likely, I will see him tomorrow to go over his lab results.  I will also repeat his restaging scans with CT chest, abdomen and pelvis and will include CT of the head in light of his syncopal episode.      -7/13/2022 PSA 18.6    -7/14/2022 [facility] Oncology clinic follow-up: Mr. Morfin returns today to review his labs from yesterday.  Labs are unremarkable.  PSA down to 18.6 from a high of 259.0 in April prior to starting treatment.  CBC and CMP unremarkable, magnesium is normal at 2.3, phosphorus slightly low at 2.4.  We will hold therapy for now in light  of his syncopal episode on day 3 after his last treatment and prior episodes with chest pain and severe fatigue that all occurred on day 3 after his Taxotere.  We will restage him with CT head, chest, abdomen and pelvis (I am including the head in light of his syncopal episode).  I have also referred him to cardiology, he states that he received a call from them about making an appointment however he wanted to talk to us today first.  I emphasized the importance to him of making and keeping that appointment and he states understanding.  I also once again emphasized the requirement to seek emergency medical attention if he has any episodes in the future such as chest pain or syncopal events.  Whether or not we try and complete Taxotere with 2 more courses or move to the next line of therapy will be decided when he returns to discuss with Dr. Goodson and review his restaging scans.    -7/15/2022 saw Dr. Diaz cardiologist.  For syncope he ordered echocardiogram and 48-hour Holter monitor.    -7/15/2022 Holter monitor relatively benign.    -7/22/2022 CT brain with and without contrast negative.  CT chest abdomen and pelvis shows some nonspecific cecal wall thickening.  No progression in the chest.  T1 and ribs stable.  Decrease in multiple retroperitoneal and pelvic nodes.  Slight increase in right acetabular sclerotic lesion.  Persistent but improved circumferential bladder wall thickening.  Total body bone scan shows stable left third rib and no evidence of new bone metastases.    -7/19/2022 ejection fraction 65% with grade 1 diastolic dysfunction.    -7/26/2022 [facility] oncology clinic follow-up: Given presyncopal symptoms occurred 3 days after Taxotere and has not otherwise occurred any other time and his cardiology work-up is fairly unremarkable and his disease is under good control as witnessed by the report above of the CTs of head chest abdomen pelvis and bone scan, I will hold cycle 5 and 6 of Taxotere and  continue 1 now with Zytiga and prednisone.  With reference to the coincidental cecal wall thickening found on CT, we will get him to [facility] surgeons for colonoscopy.  He will see my nurse practitioner back in August for next cycle of Abiraterone prednisone.  He will continue his Lupron with Dr. Matute.  We will continue his Xgeva.  We will repeat his CT chest abdomen pelvis and bone scan again in 3 months.  He will see my nurse practitioner in the interim.    -8/23/2022 [facility] oncology clinic follow-up: No further presyncopal symptoms.  Feeling great other than for hot flashes.  Did commend for now we will continue on with his Zytiga prednisone and he will get his Xgeva today based on labs from 8/10/2022.  He opted out of getting the colonoscopy that I recommended and he will not change his mind.  He will continue getting the Lupron with Dr. Matute and I asked him to give the date of this appointment to my nurse when he sees her back in a month.  We will give his Xgeva today.  We will get CT chest abdomen pelvis and total body bone scan towards the middle to end of October.  Presently other than for the hot flashes feels great.     Prostate cancer metastatic to multiple sites (HCC)   3/15/2022 Initial Diagnosis    Prostate cancer metastatic to multiple sites (HCC)     3/15/2022 Cancer Staged    Staging form: Prostate, AJCC 8th Edition  - Clinical: Stage IVB (cT1c, cN1, cM1b, Grade Group: 5) - Signed by Fritz Goodson MD on 3/15/2022     4/7/2022 - 6/23/2022 Chemotherapy    Stopped after cycle 4 6/23/2022 due to syncope 3 days post infusions with negative cardiac work-up  OP PROSTATE DOCEtaxel     4/7/2022 -  Chemotherapy    OP SUPPORTIVE Denosumab (Xgeva) Q28D     7/26/2022 -  Chemotherapy    OP PROSTATE Abiraterone / PredniSONE           HISTORY OF PRESENT ILLNESS:  The patient is a 67 y.o. male, here for follow up on management of prostate cancer.  With hot flashes.  No other complaints    Past Medical  "History:   Diagnosis Date   • Cancer (HCC)    • Prostate cancer (HCC)      Past Surgical History:   Procedure Laterality Date   • PROSTATE BIOPSY  02/2022    dr. sue       No Known Allergies    Family History and Social History reviewed and changed as necessary    REVIEW OF SYSTEM:   No further syncopal episodes    PHYSICAL EXAM:  No respiratory distress.  Lungs clear.  Heart regular rhythm.    Vitals:    08/23/22 0946   BP: 156/90   Pulse: 66   Resp: 18   Temp: 98.6 °F (37 °C)   SpO2: 97%   Weight: 120 kg (264 lb)   Height: 180.3 cm (71\")     Vitals:    08/23/22 0946   PainSc: 0-No pain          ECOG score: 0           Vitals reviewed.    ECOG: (0) Fully Active - Able to Carry On All Pre-disease Performance Without Restriction    Lab Results   Component Value Date    HGB 12.3 (L) 08/10/2022    HCT 37.2 (L) 08/10/2022    MCV 87 08/10/2022     08/10/2022    WBC 6.5 08/10/2022    NEUTROABS 3.6 08/10/2022    LYMPHSABS 1.7 08/10/2022    MONOSABS 0.7 08/10/2022    EOSABS 0.4 08/10/2022    BASOSABS 0.1 08/10/2022       Lab Results   Component Value Date    GLUCOSE 108 (H) 08/10/2022    BUN 21 08/10/2022    CREATININE 1.27 08/10/2022     08/10/2022    K 4.1 08/10/2022     (H) 08/10/2022    CO2 21 08/10/2022    CALCIUM 9.3 08/10/2022    ALBUMIN 4.6 08/10/2022    BILITOT 0.3 08/10/2022    ALKPHOS 66 08/10/2022    AST 15 08/10/2022    ALT 12 08/10/2022             ASSESSMENT & PLAN:  1.  Prostate cancer clinical T1c and 2M1B stage IVb treated with 4 cycles of Taxotere, stopped due to syncope with negative cardiac work-up, with marked drop in PSA of 18.6 from over 900 at baseline, continued on Zytiga and prednisone.  2.  Urinary retention with Goodwin in place 1/24/2022.  On finasteride 1/24/2022.  3.  Covid 19 December 2021    Oncology history timeline:  -11/29/2021  with symptoms of urinary retention.  -2/15/2022 prostate biopsy adenocarcinoma grade group 5 and 3 out of 12 cores, grade group 4 in " 1 out of 12 cores, grade group 3 in 8 out of 12 cores.  -2/24/2022 CT chest abdomen pelvis and total body bone scan shows left third rib, right acetabulum, periaortic and retroperitoneal kizzy metastases complaining of pain in the left chest and right hip.  Also possible sclerotic left 10th rib on CT.  Spleen mildly enlarged 13.8 cm.  Hepatic steatosis.  Small liver cyst.  Fat stranding in the periaortic and mesenteric region consistent with reactive/inflammatory stranding.  Multiple enlarged periaortic and retroperitoneal nodes and lymphadenopathy largest 4.9 cm.  CT chest describes tiny sclerotic foci in left eighth rib and right lateral seventh rib and T1.  Multiple small mediastinal and bilateral axillary nodes seen with small hypodense left thyroid nodule.  Creatinine 1.7.  -3/4/2022 office note Dr. Rolando Matute: Patient was seen after his elevated PSA by Dr. Vera and prostate biopsy scheduled.  However, he developed COVID-19 and this was canceled and the patient did not reschedule due to lack of insurance.  Saw Dr. Matute back on 1/24/2022 with plans to get staging CTs and bone scan with results as outlined above.  Started Casodex and to return on 3/11/2022 for Lupron.  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  TURP planned for urinary retention symptoms.    -3/15/2022 [facility] medical oncology initial consultation: I reviewed the above data with the patient.  With his fairly bulky retroperitoneal adenopathy and bone metastases including extra axial metastases, he would fit the data from the CHAARTED trial for which Taxotere x6 followed by Zytiga prednisone along with the planned Lupron already being planned by Dr. Matute would be reasonable.  Equally reasonable in terms of comparisons with bicalutamide and Lupron would be Zytiga prednisone plus Lupron or Xtandi plus Lupron or apalutamide plus Lupron.  None of these have been compared head-to-head  but all have beaten combined androgen deprivation therapy with bicalutamide and Lupron.  At the age of 67 and in order to have as many bullets as possible down the road and hence saving some of the hormone blockade options for later and using chemotherapy when he is younger and healthier for a more time-limited.,  He has opted for the Taxotere x6 followed by Zytiga and prednisone.  We will also give him Xgeva to improve bone health and given metastatic disease, as with all metastatic prostate cancer patients, he needs genetic counseling both for his sake as well as his family's.  If he were to have BRCA1 or other such  mutations, then that also opens up the options for PARP inhibitors etc. down the road.  He has his TURP scheduled for 2/24/2022 and we will start treatment a couple of weeks after that and he will get chemo preparation visit in the meantime and I will get capital surgeons to place a port.  We will check his PSA after his TURP when he sees my nurse practitioner back early April for the start of chemotherapy and repeat the PSA and CT chest abdomen pelvis and bone scan after the Taxotere is complete.    -3/31/2022 chemotherapy education and needs assessment completed    -4/7/2022 began docetaxel and Xgeva.  .0.  We will do his Xgeva every 6 weeks to coordinate with his docetaxel infusions.    -4/19/2022 patient stopped Casodex    -5/17/2022 patient reported seeing his PCP for an abscess in his suprapubic area.  Placed on clindamycin, will delay treatment 1 week.    -5/25/2022 PSA 34.3  -5/26/2022 [facility] Oncology clinic follow-up: Chris has an abscess in the suprapubic area, it has improved on antibiotic therapy but I am concerned if we treat him it will just flare back up unless it is drained.  I will get him to Dr. Miller who placed his port for I&D and culture.  He is on clindamycin and states he completes course of treatment tomorrow.  I will delay his treatment with Taxotere 1 more  week.  We will give his Xgeva today.  I will see him back in 1 week for follow-up to assess whether or not we can resume his Taxotere.  His PSA has dropped nicely with treatment thus far, PSA yesterday was 34.3 which is down from a PSA of 259.0 when we started treatment, it has been as high as 911 in November.    -6/2/2022 [facility] Oncology clinic follow-up: Chris went to see Dr. Miller to have his abscess lanced however apparently there was a long wait and he left without being seen.  He did call his PCP and was given 5 more days of clindamycin and the abscess now seems to have resolved.  We discussed today that he is at risk for recurrence of infection due to immunocompromise state with chemotherapy.  He will notify us if he has any return of his abscess.  He does have intertrigo under his panniculus, I have advised him to keep this area dry, to apply topical drying/antifungal powders.  Also recommended looser clothing.  His counts are adequate to continue therapy, we will resume Taxotere today with cycle #3.  We will repeat restaging scans after 6 courses.  We are doing Xgeva every 6 weeks to coordinate with his Taxotere.  Once he finishes Taxotere we can then go back to every 4 weeks.  His next Xgeva is not due until 7/14/2022.  His calcium is running a little low, he is going to  calcium supplement.    -6/23/2022 [facility] Oncology clinic follow-up: Chris overall is doing well on treatment with Taxotere.  Labs reviewed from yesterday and are unremarkable.  We will continue treatment today unchanged.  His suprapubic abscess resolved on clindamycin.  He will continue to use drying powder/antifungal powder under his panniculus for intertrigo.  Today will be cycle #4 of a planned 6 courses of Taxotere.  He is also on Xgeva, next dose due 7/14/2022, we are doing this every 6 weeks for now to line up with his chemotherapy, can go back to every 4 weeks after he finishes Taxotere.  Calcium from [facility] yesterday was  normal.    -7/13/2022 [facility] Oncology clinic follow-up: Mr. Morfin has had issues around day 3 after each treatment ranging from chest pain after his first treatment for which he did not seek medical attention, fatigue after the next few treatments, but 3 days after his most recent treatment on 6/23/2022 he had a syncopal episode at home and once again did not seek medical attention.  I discussed with him that this was not acceptable, if he has occurrences like this someone needs to call 911, it is difficult to figure out what is going on after the fact, the best time to work this up would be during the occurrence.  For now we will get labs today with CBC, CMP, PSA.  I will refer him to cardiology for further evaluation.  We will hold Taxotere most likely, I will see him tomorrow to go over his lab results.  I will also repeat his restaging scans with CT chest, abdomen and pelvis and will include CT of the head in light of his syncopal episode.      -7/13/2022 PSA 18.6    -7/14/2022 [facility] Oncology clinic follow-up: Mr. Morfin returns today to review his labs from yesterday.  Labs are unremarkable.  PSA down to 18.6 from a high of 259.0 in April prior to starting treatment.  CBC and CMP unremarkable, magnesium is normal at 2.3, phosphorus slightly low at 2.4.  We will hold therapy for now in light of his syncopal episode on day 3 after his last treatment and prior episodes with chest pain and severe fatigue that all occurred on day 3 after his Taxotere.  We will restage him with CT head, chest, abdomen and pelvis (I am including the head in light of his syncopal episode).  I have also referred him to cardiology, he states that he received a call from them about making an appointment however he wanted to talk to us today first.  I emphasized the importance to him of making and keeping that appointment and he states understanding.  I also once again emphasized the requirement to seek emergency medical attention if  he has any episodes in the future such as chest pain or syncopal events.  Whether or not we try and complete Taxotere with 2 more courses or move to the next line of therapy will be decided when he returns to discuss with Dr. Goodson and review his restaging scans.    -7/15/2022 saw Dr. Diaz cardiologist.  For syncope he ordered echocardiogram and 48-hour Holter monitor.    -7/15/2022 Holter monitor relatively benign.    -7/22/2022 CT brain with and without contrast negative.  CT chest abdomen and pelvis shows some nonspecific cecal wall thickening.  No progression in the chest.  T1 and ribs stable.  Decrease in multiple retroperitoneal and pelvic nodes.  Slight increase in right acetabular sclerotic lesion.  Persistent but improved circumferential bladder wall thickening.  Total body bone scan shows stable left third rib and no evidence of new bone metastases.    -7/19/2022 ejection fraction 65% with grade 1 diastolic dysfunction.    -7/26/2022 [facility] oncology clinic follow-up: Given presyncopal symptoms occurred 3 days after Taxotere and has not otherwise occurred any other time and his cardiology work-up is fairly unremarkable and his disease is under good control as witnessed by the report above of the CTs of head chest abdomen pelvis and bone scan, I will hold cycle 5 and 6 of Taxotere and continue 1 now with Zytiga and prednisone.  With reference to the coincidental cecal wall thickening found on CT, we will get him to [facility] surgeons for colonoscopy.  He will see my nurse practitioner back in August for next cycle of Abiraterone prednisone.  He will continue his Lupron with Dr. Matute.  We will continue his Xgeva.  We will repeat his CT chest abdomen pelvis and bone scan again in 3 months.  He will see my nurse practitioner in the interim.    -8/23/2022 [facility] oncology clinic follow-up: No further presyncopal symptoms.  Feeling great other than for hot flashes.  Did commend for now we will continue on  with his Zytiga prednisone and he will get his Xgeva today based on labs from 8/10/2022.  He opted out of getting the colonoscopy that I recommended and he will not change his mind.  He will continue getting the Lupron with Dr. Matute and I asked him to give the date of this appointment to my nurse when he sees her back in a month.  We will give his Xgeva today.  We will get CT chest abdomen pelvis and total body bone scan towards the middle to end of October.  Presently other than for the hot flashes feels great.    Total time of care today including time spent prior to his arrival searching for the colonoscopy which of course was not subsequently done per the patient's own recognizance and during visit translating the above information to the patient and the plan as outlined above and after visit instituting the plan as outlined took 30 minutes of patient care time throughout the day today.  Fritz Goodson MD    08/23/2022

## 2022-08-24 LAB
ALBUMIN SERPL-MCNC: 4.2 G/DL (ref 3.8–4.8)
ALBUMIN/GLOB SERPL: 1.7 {RATIO} (ref 1.2–2.2)
ALP SERPL-CCNC: 73 IU/L (ref 44–121)
ALT SERPL-CCNC: 11 IU/L (ref 0–44)
AST SERPL-CCNC: 13 IU/L (ref 0–40)
BASOPHILS # BLD AUTO: 0.1 X10E3/UL (ref 0–0.2)
BASOPHILS NFR BLD AUTO: 1 %
BILIRUB SERPL-MCNC: 0.2 MG/DL (ref 0–1.2)
BUN SERPL-MCNC: 23 MG/DL (ref 8–27)
BUN/CREAT SERPL: 20 (ref 10–24)
CALCIUM SERPL-MCNC: 9.4 MG/DL (ref 8.6–10.2)
CHLORIDE SERPL-SCNC: 107 MMOL/L (ref 96–106)
CO2 SERPL-SCNC: 22 MMOL/L (ref 20–29)
CREAT SERPL-MCNC: 1.16 MG/DL (ref 0.76–1.27)
EGFRCR-CYS SERPLBLD CKD-EPI 2021: 69 ML/MIN/1.73
EOSINOPHIL # BLD AUTO: 0.3 X10E3/UL (ref 0–0.4)
EOSINOPHIL NFR BLD AUTO: 3 %
ERYTHROCYTE [DISTWIDTH] IN BLOOD BY AUTOMATED COUNT: 13.9 % (ref 11.6–15.4)
GLOBULIN SER CALC-MCNC: 2.5 G/DL (ref 1.5–4.5)
GLUCOSE SERPL-MCNC: 82 MG/DL (ref 65–99)
HCT VFR BLD AUTO: 40.1 % (ref 37.5–51)
HGB BLD-MCNC: 13.2 G/DL (ref 13–17.7)
IMM GRANULOCYTES # BLD AUTO: 0 X10E3/UL (ref 0–0.1)
IMM GRANULOCYTES NFR BLD AUTO: 0 %
LYMPHOCYTES # BLD AUTO: 1.8 X10E3/UL (ref 0.7–3.1)
LYMPHOCYTES NFR BLD AUTO: 24 %
MAGNESIUM SERPL-MCNC: 2.3 MG/DL (ref 1.6–2.3)
MCH RBC QN AUTO: 29 PG (ref 26.6–33)
MCHC RBC AUTO-ENTMCNC: 32.9 G/DL (ref 31.5–35.7)
MCV RBC AUTO: 88 FL (ref 79–97)
MONOCYTES # BLD AUTO: 0.8 X10E3/UL (ref 0.1–0.9)
MONOCYTES NFR BLD AUTO: 10 %
NEUTROPHILS # BLD AUTO: 4.7 X10E3/UL (ref 1.4–7)
NEUTROPHILS NFR BLD AUTO: 62 %
PHOSPHATE SERPL-MCNC: 3 MG/DL (ref 2.8–4.1)
PLATELET # BLD AUTO: 247 X10E3/UL (ref 150–450)
POTASSIUM SERPL-SCNC: 4.2 MMOL/L (ref 3.5–5.2)
PROT SERPL-MCNC: 6.7 G/DL (ref 6–8.5)
PSA SERPL-MCNC: 8.3 NG/ML (ref 0–4)
RBC # BLD AUTO: 4.55 X10E6/UL (ref 4.14–5.8)
SODIUM SERPL-SCNC: 143 MMOL/L (ref 134–144)
WBC # BLD AUTO: 7.7 X10E3/UL (ref 3.4–10.8)

## 2022-08-25 ENCOUNTER — APPOINTMENT (OUTPATIENT)
Dept: ONCOLOGY | Facility: HOSPITAL | Age: 67
End: 2022-08-25

## 2022-09-06 ENCOUNTER — INFUSION (OUTPATIENT)
Dept: ONCOLOGY | Facility: HOSPITAL | Age: 67
End: 2022-09-06

## 2022-09-06 VITALS
WEIGHT: 264.4 LBS | HEART RATE: 56 BPM | RESPIRATION RATE: 18 BRPM | TEMPERATURE: 99 F | DIASTOLIC BLOOD PRESSURE: 83 MMHG | SYSTOLIC BLOOD PRESSURE: 140 MMHG | BODY MASS INDEX: 36.88 KG/M2

## 2022-09-06 DIAGNOSIS — C61 PROSTATE CANCER METASTATIC TO MULTIPLE SITES: Chronic | ICD-10-CM

## 2022-09-06 PROCEDURE — 36415 COLL VENOUS BLD VENIPUNCTURE: CPT

## 2022-09-07 LAB
ALBUMIN SERPL-MCNC: 4.7 G/DL (ref 3.8–4.8)
ALBUMIN/GLOB SERPL: 2 {RATIO} (ref 1.2–2.2)
ALP SERPL-CCNC: 73 IU/L (ref 44–121)
ALT SERPL-CCNC: 13 IU/L (ref 0–44)
AST SERPL-CCNC: 14 IU/L (ref 0–40)
BASOPHILS # BLD AUTO: 0.1 X10E3/UL (ref 0–0.2)
BASOPHILS NFR BLD AUTO: 1 %
BILIRUB SERPL-MCNC: <0.2 MG/DL (ref 0–1.2)
BUN SERPL-MCNC: 25 MG/DL (ref 8–27)
BUN/CREAT SERPL: 20 (ref 10–24)
CALCIUM SERPL-MCNC: 9.7 MG/DL (ref 8.6–10.2)
CHLORIDE SERPL-SCNC: 101 MMOL/L (ref 96–106)
CO2 SERPL-SCNC: 22 MMOL/L (ref 20–29)
CREAT SERPL-MCNC: 1.26 MG/DL (ref 0.76–1.27)
EGFRCR-CYS SERPLBLD CKD-EPI 2021: 63 ML/MIN/1.73
EOSINOPHIL # BLD AUTO: 0.4 X10E3/UL (ref 0–0.4)
EOSINOPHIL NFR BLD AUTO: 4 %
ERYTHROCYTE [DISTWIDTH] IN BLOOD BY AUTOMATED COUNT: 13.9 % (ref 11.6–15.4)
GLOBULIN SER CALC-MCNC: 2.3 G/DL (ref 1.5–4.5)
GLUCOSE SERPL-MCNC: 80 MG/DL (ref 65–99)
HCT VFR BLD AUTO: 41 % (ref 37.5–51)
HGB BLD-MCNC: 13.8 G/DL (ref 13–17.7)
IMM GRANULOCYTES # BLD AUTO: 0 X10E3/UL (ref 0–0.1)
IMM GRANULOCYTES NFR BLD AUTO: 0 %
LYMPHOCYTES # BLD AUTO: 3.1 X10E3/UL (ref 0.7–3.1)
LYMPHOCYTES NFR BLD AUTO: 35 %
MCH RBC QN AUTO: 29.6 PG (ref 26.6–33)
MCHC RBC AUTO-ENTMCNC: 33.7 G/DL (ref 31.5–35.7)
MCV RBC AUTO: 88 FL (ref 79–97)
MONOCYTES # BLD AUTO: 0.8 X10E3/UL (ref 0.1–0.9)
MONOCYTES NFR BLD AUTO: 9 %
NEUTROPHILS # BLD AUTO: 4.5 X10E3/UL (ref 1.4–7)
NEUTROPHILS NFR BLD AUTO: 51 %
PLATELET # BLD AUTO: 245 X10E3/UL (ref 150–450)
POTASSIUM SERPL-SCNC: 4 MMOL/L (ref 3.5–5.2)
PROT SERPL-MCNC: 7 G/DL (ref 6–8.5)
RBC # BLD AUTO: 4.67 X10E6/UL (ref 4.14–5.8)
SODIUM SERPL-SCNC: 140 MMOL/L (ref 134–144)
WBC # BLD AUTO: 8.8 X10E3/UL (ref 3.4–10.8)

## 2022-09-16 ENCOUNTER — SPECIALTY PHARMACY (OUTPATIENT)
Dept: ONCOLOGY | Facility: HOSPITAL | Age: 67
End: 2022-09-16

## 2022-09-16 NOTE — PROGRESS NOTES
Specialty Pharmacy Refill Coordination Note     Chris is a 67 y.o. male contacted today regarding refills of  abiraterone 1000mg PO QD specialty medication(s).    This prescription will not be mailed out until 9/19/22 because of the weekend. So it will cause of gap in refill history.     Specialty medication(s) and dose(s) confirmed: yes    Refill Questions    Flowsheet Row Most Recent Value   Changes to allergies? No   Changes to medications? No   New conditions since last clinic visit No   Unplanned office visit, urgent care, ED, or hospital admission in the last 4 weeks  No   How does patient/caregiver feel medication is working? Good   Financial problems or insurance changes  No   Since the previous refill, were any specialty medication doses or scheduled injections missed or delayed?  No   Does this patient require a clinical escalation to a pharmacist? No          Delivery Questions    Flowsheet Row Most Recent Value   Delivery method [employer]   Delivery address correct? Yes   Delivery phone number [phone]   Preferred delivery time? Anytime   Number of medications in delivery 1   Medication being filled and delivered abiraterone   Doses left of specialty medications 14 days left   Is there any medication that is due not being filled? No   Supplies needed? No supplies needed   Cooler needed? No   Do any medications need mixed or dated? No   Additional comments no copay   Questions or concerns for the pharmacist? No   Explain any questions or concerns for the pharmacist n/a   Are any medications first time fills? No                 Follow-up: 28 day(s)     Catalina Montes De Oca, Pharmacy Technician  Specialty Pharmacy Technician

## 2022-09-20 ENCOUNTER — INFUSION (OUTPATIENT)
Dept: ONCOLOGY | Facility: HOSPITAL | Age: 67
End: 2022-09-20

## 2022-09-20 VITALS
BODY MASS INDEX: 36.74 KG/M2 | HEART RATE: 66 BPM | DIASTOLIC BLOOD PRESSURE: 95 MMHG | WEIGHT: 263.4 LBS | RESPIRATION RATE: 18 BRPM | SYSTOLIC BLOOD PRESSURE: 159 MMHG | TEMPERATURE: 98 F

## 2022-09-20 DIAGNOSIS — C61 PROSTATE CANCER METASTATIC TO MULTIPLE SITES: Chronic | ICD-10-CM

## 2022-09-20 PROCEDURE — 36415 COLL VENOUS BLD VENIPUNCTURE: CPT

## 2022-09-21 LAB
ALBUMIN SERPL-MCNC: 4.7 G/DL (ref 3.5–5.2)
ALBUMIN/GLOB SERPL: 2.2 G/DL
ALP SERPL-CCNC: 72 U/L (ref 39–117)
ALT SERPL-CCNC: 15 U/L (ref 1–41)
AST SERPL-CCNC: 17 U/L (ref 1–40)
BASOPHILS # BLD AUTO: 0.07 10*3/MM3 (ref 0–0.2)
BASOPHILS NFR BLD AUTO: 0.9 % (ref 0–1.5)
BILIRUB SERPL-MCNC: 0.3 MG/DL (ref 0–1.2)
BUN SERPL-MCNC: 34 MG/DL (ref 8–23)
BUN/CREAT SERPL: 25.4 (ref 7–25)
CALCIUM SERPL-MCNC: 9.5 MG/DL (ref 8.6–10.5)
CHLORIDE SERPL-SCNC: 106 MMOL/L (ref 98–107)
CO2 SERPL-SCNC: 24 MMOL/L (ref 22–29)
CREAT SERPL-MCNC: 1.34 MG/DL (ref 0.76–1.27)
EGFRCR SERPLBLD CKD-EPI 2021: 58.1 ML/MIN/1.73
EOSINOPHIL # BLD AUTO: 0.31 10*3/MM3 (ref 0–0.4)
EOSINOPHIL NFR BLD AUTO: 3.8 % (ref 0.3–6.2)
ERYTHROCYTE [DISTWIDTH] IN BLOOD BY AUTOMATED COUNT: 13.4 % (ref 12.3–15.4)
GLOBULIN SER CALC-MCNC: 2.1 GM/DL
GLUCOSE SERPL-MCNC: 94 MG/DL (ref 65–99)
HCT VFR BLD AUTO: 40.5 % (ref 37.5–51)
HGB BLD-MCNC: 13.6 G/DL (ref 13–17.7)
IMM GRANULOCYTES # BLD AUTO: 0.04 10*3/MM3 (ref 0–0.05)
IMM GRANULOCYTES NFR BLD AUTO: 0.5 % (ref 0–0.5)
LYMPHOCYTES # BLD AUTO: 2.59 10*3/MM3 (ref 0.7–3.1)
LYMPHOCYTES NFR BLD AUTO: 31.7 % (ref 19.6–45.3)
MAGNESIUM SERPL-MCNC: 2.1 MG/DL (ref 1.6–2.4)
MCH RBC QN AUTO: 28.6 PG (ref 26.6–33)
MCHC RBC AUTO-ENTMCNC: 33.6 G/DL (ref 31.5–35.7)
MCV RBC AUTO: 85.1 FL (ref 79–97)
MONOCYTES # BLD AUTO: 0.62 10*3/MM3 (ref 0.1–0.9)
MONOCYTES NFR BLD AUTO: 7.6 % (ref 5–12)
NEUTROPHILS # BLD AUTO: 4.55 10*3/MM3 (ref 1.7–7)
NEUTROPHILS NFR BLD AUTO: 55.5 % (ref 42.7–76)
NRBC BLD AUTO-RTO: 0 /100 WBC (ref 0–0.2)
PHOSPHATE SERPL-MCNC: 3.1 MG/DL (ref 2.5–4.5)
PLATELET # BLD AUTO: 237 10*3/MM3 (ref 140–450)
POTASSIUM SERPL-SCNC: 4.2 MMOL/L (ref 3.5–5.2)
PROT SERPL-MCNC: 6.8 G/DL (ref 6–8.5)
PSA SERPL-MCNC: 8.32 NG/ML (ref 0–4)
RBC # BLD AUTO: 4.76 10*6/MM3 (ref 4.14–5.8)
SODIUM SERPL-SCNC: 141 MMOL/L (ref 136–145)
WBC # BLD AUTO: 8.18 10*3/MM3 (ref 3.4–10.8)

## 2022-09-22 ENCOUNTER — OFFICE VISIT (OUTPATIENT)
Dept: ONCOLOGY | Facility: CLINIC | Age: 67
End: 2022-09-22

## 2022-09-22 ENCOUNTER — INFUSION (OUTPATIENT)
Dept: ONCOLOGY | Facility: HOSPITAL | Age: 67
End: 2022-09-22

## 2022-09-22 VITALS
WEIGHT: 262 LBS | RESPIRATION RATE: 20 BRPM | TEMPERATURE: 97.8 F | HEIGHT: 71 IN | OXYGEN SATURATION: 95 % | SYSTOLIC BLOOD PRESSURE: 167 MMHG | DIASTOLIC BLOOD PRESSURE: 94 MMHG | HEART RATE: 74 BPM | BODY MASS INDEX: 36.68 KG/M2

## 2022-09-22 DIAGNOSIS — C61 PROSTATE CANCER METASTATIC TO MULTIPLE SITES: Primary | ICD-10-CM

## 2022-09-22 PROCEDURE — 25010000002 DENOSUMAB 120 MG/1.7ML SOLUTION: Performed by: INTERNAL MEDICINE

## 2022-09-22 PROCEDURE — 96372 THER/PROPH/DIAG INJ SC/IM: CPT

## 2022-09-22 PROCEDURE — 99214 OFFICE O/P EST MOD 30 MIN: CPT | Performed by: NURSE PRACTITIONER

## 2022-09-22 RX ADMIN — DENOSUMAB 120 MG: 120 INJECTION SUBCUTANEOUS at 09:55

## 2022-09-22 NOTE — PROGRESS NOTES
CHIEF COMPLAINT:  1. Hot flashes  2.  Metastatic prostate cancer    Problem List:  Oncology/Hematology History Overview Note   1.  Prostate cancer clinical T1c and 2M1B stage IVb treated with 4 cycles of Taxotere, stopped due to syncope with negative cardiac work-up, with marked drop in PSA of 18.6 from over 900 at baseline, continued on Zytiga and prednisone.  2.  Urinary retention with Goodwin in place 1/24/2022.  On finasteride 1/24/2022.  3.  Covid 19 December 2021    Oncology history timeline:  -11/29/2021  with symptoms of urinary retention.  -2/15/2022 prostate biopsy adenocarcinoma grade group 5 and 3 out of 12 cores, grade group 4 in 1 out of 12 cores, grade group 3 in 8 out of 12 cores.  -2/24/2022 CT chest abdomen pelvis and total body bone scan shows left third rib, right acetabulum, periaortic and retroperitoneal kizzy metastases complaining of pain in the left chest and right hip.  Also possible sclerotic left 10th rib on CT.  Spleen mildly enlarged 13.8 cm.  Hepatic steatosis.  Small liver cyst.  Fat stranding in the periaortic and mesenteric region consistent with reactive/inflammatory stranding.  Multiple enlarged periaortic and retroperitoneal nodes and lymphadenopathy largest 4.9 cm.  CT chest describes tiny sclerotic foci in left eighth rib and right lateral seventh rib and T1.  Multiple small mediastinal and bilateral axillary nodes seen with small hypodense left thyroid nodule.  Creatinine 1.7.  -3/4/2022 office note Dr. Rolando Matute: Patient was seen after his elevated PSA by Dr. Vera and prostate biopsy scheduled.  However, he developed COVID-19 and this was canceled and the patient did not reschedule due to lack of insurance.  Saw Dr. Matute back on 1/24/2022 with plans to get staging CTs and bone scan with results as outlined above.  Started Casodex and to return on 3/11/2022 for Lupron.  Referral made to medical oncology for other additional castrate naïve prostate cancer  therapies.  TURP planned for urinary retention symptoms.    -3/15/2022 [facility] medical oncology initial consultation: I reviewed the above data with the patient.  With his fairly bulky retroperitoneal adenopathy and bone metastases including extra axial metastases, he would fit the data from the CHAARTED trial for which Taxotere x6 followed by Zytiga prednisone along with the planned Lupron already being planned by Dr. Matute would be reasonable.  Equally reasonable in terms of comparisons with bicalutamide and Lupron would be Zytiga prednisone plus Lupron or Xtandi plus Lupron or apalutamide plus Lupron.  None of these have been compared head-to-head but all have beaten combined androgen deprivation therapy with bicalutamide and Lupron.  At the age of 67 and in order to have as many bullets as possible down the road and hence saving some of the hormone blockade options for later and using chemotherapy when he is younger and healthier for a more time-limited.,  He has opted for the Taxotere x6 followed by Zytiga and prednisone.  We will also give him Xgeva to improve bone health and given metastatic disease, as with all metastatic prostate cancer patients, he needs genetic counseling both for his sake as well as his family's.  If he were to have BRCA1 or other such  mutations, then that also opens up the options for PARP inhibitors etc. down the road.  He has his TURP scheduled for 2/24/2022 and we will start treatment a couple of weeks after that and he will get chemo preparation visit in the meantime and I will get capital surgeons to place a port.  We will check his PSA after his TURP when he sees my nurse practitioner back early April for the start of chemotherapy and repeat the PSA and CT chest abdomen pelvis and bone scan after the Taxotere is complete.    -3/31/2022 chemotherapy education and needs assessment completed    -4/7/2022 began docetaxel and Xgeva.  .0.  We will do his Xgeva every 6  weeks to coordinate with his docetaxel infusions.    -4/19/2022 patient stopped Casodex    -5/17/2022 patient reported seeing his PCP for an abscess in his suprapubic area.  Placed on clindamycin, will delay treatment 1 week.    -5/25/2022 PSA 34.3  -5/26/2022 [facility] follow-up: Chris has an abscess in the suprapubic area, it has improved on antibiotic therapy but I am concerned if we treat him it will just flare back up unless it is drained.  I will get him to Dr. Miller who placed his port for I&D and culture.  He is on clindamycin and states he completes course of treatment tomorrow.  I will delay his treatment with Taxotere 1 more week.  We will give his Xgeva today.  I will see him back in 1 week for follow-up to assess whether or not we can resume his Taxotere.  His PSA has dropped nicely with treatment thus far, PSA yesterday was 34.3 which is down from a PSA of 259.0 when we started treatment, it has been as high as 911 in November.    -6/2/2022 [facility] [place] Oncology clinic follow-up: Chris went to see Dr. Miller to have his abscess lanced however apparently there was a long wait and he left without being seen.  He did call his PCP and was given 5 more days of clindamycin and the abscess now seems to have resolved.  We discussed today that he is at risk for recurrence of infection due to immunocompromise state with chemotherapy.  He will notify us if he has any return of his abscess.  He does have intertrigo under his panniculus, I have advised him to keep this area dry, to apply topical drying/antifungal powders.  Also recommended looser clothing.  His counts are adequate to continue therapy, we will resume Taxotere today with cycle #3.  We will repeat restaging scans after 6 courses.  We are doing Xgeva every 6 weeks to coordinate with his Taxotere.  Once he finishes Taxotere we can then go back to every 4 weeks.  His next Xgeva is not due until 7/14/2022.  His calcium is running a little low,  he is going to  calcium supplement.    -6/23/2022 [facility] clinic follow-up: Chris overall is doing well on treatment with Taxotere.  Labs reviewed from yesterday and are unremarkable.  We will continue treatment today unchanged.  His suprapubic abscess resolved on clindamycin.  He will continue to use drying powder/antifungal powder under his panniculus for intertrigo.  Today will be cycle #4 of a planned 6 courses of Taxotere.  He is also on Xgeva, next dose due 7/14/2022, we are doing this every 6 weeks for now to line up with his chemotherapy, can go back to every 4 weeks after he finishes Taxotere.  Calcium from CMP yesterday was normal.    -7/13/2022 [facility] clinic follow-up: Mr. Morfin has had issues around day 3 after each treatment ranging from chest pain after his first treatment for which he did not seek medical attention, fatigue after the next few treatments, but 3 days after his most recent treatment on 6/23/2022 he had a syncopal episode at home and once again did not seek medical attention.  I discussed with him that this was not acceptable, if he has occurrences like this someone needs to call 911, it is difficult to figure out what is going on after the fact, the best time to work this up would be during the occurrence.  For now we will get labs today with CBC, CMP, PSA.  I will refer him to cardiology for further evaluation.  We will hold Taxotere most likely, I will see him tomorrow to go over his lab results.  I will also repeat his restaging scans with CT chest, abdomen and pelvis and will include CT of the head in light of his syncopal episode.      -7/13/2022 PSA 18.6    -7/14/2022 [facility] clinic follow-up: Mr. Morfin returns today to review his labs from yesterday.  Labs are unremarkable.  PSA down to 18.6 from a high of 259.0 in April prior to starting treatment.  CBC and CMP unremarkable, magnesium is normal at 2.3, phosphorus slightly low at 2.4.  We will  hold therapy for now in light of his syncopal episode on day 3 after his last treatment and prior episodes with chest pain and severe fatigue that all occurred on day 3 after his Taxotere.  We will restage him with CT head, chest, abdomen and pelvis (I am including the head in light of his syncopal episode).  I have also referred him to cardiology, he states that he received a call from them about making an appointment however he wanted to talk to us today first.  I emphasized the importance to him of making and keeping that appointment and he states understanding.  I also once again emphasized the requirement to seek emergency medical attention if he has any episodes in the future such as chest pain or syncopal events.  Whether or not we try and complete Taxotere with 2 more courses or move to the next line of therapy will be decided when he returns to discuss with Dr. Goodson and review his restaging scans.    -7/15/2022 saw Dr. Diaz cardiologist.  For syncope he ordered echocardiogram and 48-hour Holter monitor.    -7/15/2022 Holter monitor relatively benign.    -7/22/2022 CT brain with and without contrast negative.  CT chest abdomen and pelvis shows some nonspecific cecal wall thickening.  No progression in the chest.  T1 and ribs stable.  Decrease in multiple retroperitoneal and pelvic nodes.  Slight increase in right acetabular sclerotic lesion.  Persistent but improved circumferential bladder wall thickening.  Total body bone scan shows stable left third rib and no evidence of new bone metastases.    -7/19/2022 ejection fraction 65% with grade 1 diastolic dysfunction.    -7/26/2022 [facility] oncology clinic follow-up: Given presyncopal symptoms occurred 3 days after Taxotere and has not otherwise occurred any other time and his cardiology work-up is fairly unremarkable and his disease is under good control as witnessed by the report above of the CTs of head chest abdomen pelvis and bone scan, I will hold  cycle 5 and 6 of Taxotere and continue 1 now with Zytiga and prednisone.  With reference to the coincidental cecal wall thickening found on CT, we will get him to [facility] surgeons for colonoscopy.  He will see my nurse practitioner back in August for next cycle of Abiraterone prednisone.  He will continue his Lupron with Dr. Matute.  We will continue his Xgeva.  We will repeat his CT chest abdomen pelvis and bone scan again in 3 months.  He will see my nurse practitioner in the interim.    -8/23/2022 [facility] oncology clinic follow-up: No further presyncopal symptoms.  Feeling great other than for hot flashes.  Did commend for now we will continue on with his Zytiga prednisone and he will get his Xgeva today based on labs from 8/10/2022.  He opted out of getting the colonoscopy that I recommended and he will not change his mind.  He will continue getting the Lupron with Dr. Matute and I asked him to give the date of this appointment to my nurse when he sees her back in a month.  We will give his Xgeva today.  We will get CT chest abdomen pelvis and total body bone scan towards the middle to end of October.  Presently other than for the hot flashes feels great.     Prostate cancer metastatic to multiple sites (HCC)   3/15/2022 Initial Diagnosis    Prostate cancer metastatic to multiple sites (HCC)     3/15/2022 Cancer Staged    Staging form: Prostate, AJCC 8th Edition  - Clinical: Stage IVB (cT1c, cN1, cM1b, Grade Group: 5) - Signed by Fritz Goodson MD on 3/15/2022     4/7/2022 - 6/23/2022 Chemotherapy    Stopped after cycle 4 6/23/2022 due to syncope 3 days post infusions with negative cardiac work-up  OP PROSTATE DOCEtaxel     4/7/2022 -  Chemotherapy    OP SUPPORTIVE Denosumab (Xgeva) Q28D     7/26/2022 -  Chemotherapy    OP PROSTATE Abiraterone / PredniSONE           HISTORY OF PRESENT ILLNESS:  The patient is a 67 y.o. male, here for follow up on management of prostate cancer currently on treatment with  "Zytiga, prednisone and Xgeva.  Mr. Morfin reports overall he is feeling well, continues to have hot flashes but these are tolerable.  Reports that he had his Lupron shot last week with Dr. Bennett and his next visit is in February.  He reports that he feels much better off of chemotherapy, no new concerns.    Past Medical History:   Diagnosis Date   • Cancer (HCC)    • Prostate cancer (HCC)      Past Surgical History:   Procedure Laterality Date   • PROSTATE BIOPSY  02/2022    dr. sue       No Known Allergies    Family History and Social History reviewed and changed as necessary    REVIEW OF SYSTEM:   No new somatic concerns    PHYSICAL EXAM:  Well-developed, well-nourished healthy-appearing male in no distress  Lungs clear to auscultation bilaterally, respirations even and unlabored  Heart regular rate and rhythm  Skin without rash    Vitals:    09/22/22 0927   BP: 167/94   Pulse: 74   Resp: 20   Temp: 97.8 °F (36.6 °C)   SpO2: 95%   Weight: 119 kg (262 lb)   Height: 180.3 cm (71\")     Vitals:    09/22/22 0927   PainSc: 0-No pain          ECOG score: 0           Vitals reviewed.  Labs reviewed.    ECOG: (0) Fully Active - Able to Carry On All Pre-disease Performance Without Restriction    Lab Results   Component Value Date    HGB 13.6 09/20/2022    HCT 40.5 09/20/2022    MCV 85.1 09/20/2022     09/20/2022    WBC 8.18 09/20/2022    NEUTROABS 4.55 09/20/2022    LYMPHSABS 2.59 09/20/2022    MONOSABS 0.62 09/20/2022    EOSABS 0.31 09/20/2022    BASOSABS 0.07 09/20/2022       Lab Results   Component Value Date    GLUCOSE 94 09/20/2022    BUN 34 (H) 09/20/2022    CREATININE 1.34 (H) 09/20/2022     09/20/2022    K 4.2 09/20/2022     09/20/2022    CO2 24.0 09/20/2022    CALCIUM 9.5 09/20/2022    ALBUMIN 4.70 09/20/2022    BILITOT 0.3 09/20/2022    ALKPHOS 72 09/20/2022    AST 17 09/20/2022    ALT 15 09/20/2022     Lab Results   Component Value Date    PSA 8.320 (H) 09/20/2022             ASSESSMENT " & PLAN:  1.  Prostate cancer clinical T1c and 2M1B stage IVb treated with 4 cycles of Taxotere, stopped due to syncope with negative cardiac work-up, with marked drop in PSA of 18.6 from over 900 at baseline, continued on Zytiga and prednisone.  2.  Urinary retention with Goodwin in place 1/24/2022.  On finasteride 1/24/2022.  3.  Covid 19 December 2021    Oncology history timeline:  -11/29/2021  with symptoms of urinary retention.  -2/15/2022 prostate biopsy adenocarcinoma grade group 5 and 3 out of 12 cores, grade group 4 in 1 out of 12 cores, grade group 3 in 8 out of 12 cores.  -2/24/2022 CT chest abdomen pelvis and total body bone scan shows left third rib, right acetabulum, periaortic and retroperitoneal kizzy metastases complaining of pain in the left chest and right hip.  Also possible sclerotic left 10th rib on CT.  Spleen mildly enlarged 13.8 cm.  Hepatic steatosis.  Small liver cyst.  Fat stranding in the periaortic and mesenteric region consistent with reactive/inflammatory stranding.  Multiple enlarged periaortic and retroperitoneal nodes and lymphadenopathy largest 4.9 cm.  CT chest describes tiny sclerotic foci in left eighth rib and right lateral seventh rib and T1.  Multiple small mediastinal and bilateral axillary nodes seen with small hypodense left thyroid nodule.  Creatinine 1.7.  -3/4/2022 office note Dr. Rolando Matute: Patient was seen after his elevated PSA by Dr. Vera and prostate biopsy scheduled.  However, he developed COVID-19 and this was canceled and the patient did not reschedule due to lack of insurance.  Saw Dr. Matute back on 1/24/2022 with plans to get staging CTs and bone scan with results as outlined above.  Started Casodex and to return on 3/11/2022 for Lupron.  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  TURP planned for urinary retention symptoms.    -3/15/2022 [facility] oncology initial consultation: I  reviewed the above data with the patient.  With his fairly bulky retroperitoneal adenopathy and bone metastases including extra axial metastases, he would fit the data from the CHAARTED trial for which Taxotere x6 followed by Zytiga prednisone along with the planned Lupron already being planned by Dr. Matute would be reasonable.  Equally reasonable in terms of comparisons with bicalutamide and Lupron would be Zytiga prednisone plus Lupron or Xtandi plus Lupron or apalutamide plus Lupron.  None of these have been compared head-to-head but all have beaten combined androgen deprivation therapy with bicalutamide and Lupron.  At the age of 67 and in order to have as many bullets as possible down the road and hence saving some of the hormone blockade options for later and using chemotherapy when he is younger and healthier for a more time-limited.,  He has opted for the Taxotere x6 followed by Zytiga and prednisone.  We will also give him Xgeva to improve bone health and given metastatic disease, as with all metastatic prostate cancer patients, he needs genetic counseling both for his sake as well as his family's.  If he were to have BRCA1 or other such  mutations, then that also opens up the options for PARP inhibitors etc. down the road.  He has his TURP scheduled for 2/24/2022 and we will start treatment a couple of weeks after that and he will get chemo preparation visit in the meantime and I will get capital surgeons to place a port.  We will check his PSA after his TURP when he sees my nurse practitioner back early April for the start of chemotherapy and repeat the PSA and CT chest abdomen pelvis and bone scan after the Taxotere is complete.    -3/31/2022 chemotherapy education and needs assessment completed    -4/7/2022 began docetaxel and Xgeva.  .0.  We will do his Xgeva every 6 weeks to coordinate with his docetaxel infusions.    -4/19/2022 patient stopped Casodex    -5/17/2022 patient reported  seeing his PCP for an abscess in his suprapubic area.  Placed on clindamycin, will delay treatment 1 week.    -5/25/2022 PSA 34.3  -5/26/2022 [facility] Oncology clinic follow-up: Chris has an abscess in the suprapubic area, it has improved on antibiotic therapy but I am concerned if we treat him it will just flare back up unless it is drained.  I will get him to Dr. Miller who placed his port for I&D and culture.  He is on clindamycin and states he completes course of treatment tomorrow.  I will delay his treatment with Taxotere 1 more week.  We will give his Xgeva today.  I will see him back in 1 week for follow-up to assess whether or not we can resume his Taxotere.  His PSA has dropped nicely with treatment thus far, PSA yesterday was 34.3 which is down from a PSA of 259.0 when we started treatment, it has been as high as 911 in November.    -6/2/2022 [facility] Oncology clinic follow-up: Chris went to see Dr. Miller to have his abscess lanced however apparently there was a long wait and he left without being seen.  He did call his PCP and was given 5 more days of clindamycin and the abscess now seems to have resolved.  We discussed today that he is at risk for recurrence of infection due to immunocompromise state with chemotherapy.  He will notify us if he has any return of his abscess.  He does have intertrigo under his panniculus, I have advised him to keep this area dry, to apply topical drying/antifungal powders.  Also recommended looser clothing.  His counts are adequate to continue therapy, we will resume Taxotere today with cycle #3.  We will repeat restaging scans after 6 courses.  We are doing Xgeva every 6 weeks to coordinate with his Taxotere.  Once he finishes Taxotere we can then go back to every 4 weeks.  His next Xgeva is not due until 7/14/2022.  His calcium is running a little low, he is going to  calcium supplement.    -6/23/2022 [facility] Oncology clinic follow-up: Chris overall is doing well  on treatment with Taxotere.  Labs reviewed from yesterday and are unremarkable.  We will continue treatment today unchanged.  His suprapubic abscess resolved on clindamycin.  He will continue to use drying powder/antifungal powder under his panniculus for intertrigo.  Today will be cycle #4 of a planned 6 courses of Taxotere.  He is also on Xgeva, next dose due 7/14/2022, we are doing this every 6 weeks for now to line up with his chemotherapy, can go back to every 4 weeks after he finishes Taxotere.  Calcium from CMP yesterday was normal.    -7/13/2022 [facility] Oncology clinic follow-up: Mr. Morfin has had issues around day 3 after each treatment ranging from chest pain after his first treatment for which he did not seek medical attention, fatigue after the next few treatments, but 3 days after his most recent treatment on 6/23/2022 he had a syncopal episode at home and once again did not seek medical attention.  I discussed with him that this was not acceptable, if he has occurrences like this someone needs to call 911, it is difficult to figure out what is going on after the fact, the best time to work this up would be during the occurrence.  For now we will get labs today with CBC, CMP, PSA.  I will refer him to cardiology for further evaluation.  We will hold Taxotere most likely, I will see him tomorrow to go over his lab results.  I will also repeat his restaging scans with CT chest, abdomen and pelvis and will include CT of the head in light of his syncopal episode.      -7/13/2022 PSA 18.6    -7/14/2022 [facility] Oncology clinic follow-up: Mr. Morfin returns today to review his labs from yesterday.  Labs are unremarkable.  PSA down to 18.6 from a high of 259.0 in April prior to starting treatment.  CBC and CMP unremarkable, magnesium is normal at 2.3, phosphorus slightly low at 2.4.  We will hold therapy for now in light of his syncopal episode on day 3 after his last treatment and prior episodes with chest  pain and severe fatigue that all occurred on day 3 after his Taxotere.  We will restage him with CT head, chest, abdomen and pelvis (I am including the head in light of his syncopal episode).  I have also referred him to cardiology, he states that he received a call from them about making an appointment however he wanted to talk to us today first.  I emphasized the importance to him of making and keeping that appointment and he states understanding.  I also once again emphasized the requirement to seek emergency medical attention if he has any episodes in the future such as chest pain or syncopal events.  Whether or not we try and complete Taxotere with 2 more courses or move to the next line of therapy will be decided when he returns to discuss with Dr. Goodson and review his restaging scans.    -7/15/2022 saw Dr. Diaz cardiologist.  For syncope he ordered echocardiogram and 48-hour Holter monitor.    -7/15/2022 Holter monitor relatively benign.    -7/22/2022 CT brain with and without contrast negative.  CT chest abdomen and pelvis shows some nonspecific cecal wall thickening.  No progression in the chest.  T1 and ribs stable.  Decrease in multiple retroperitoneal and pelvic nodes.  Slight increase in right acetabular sclerotic lesion.  Persistent but improved circumferential bladder wall thickening.  Total body bone scan shows stable left third rib and no evidence of new bone metastases.    -7/19/2022 ejection fraction 65% with grade 1 diastolic dysfunction.    -7/26/2022 [facility] oncology clinic follow-up: Given presyncopal symptoms occurred 3 days after Taxotere and has not otherwise occurred any other time and his cardiology work-up is fairly unremarkable and his disease is under good control as witnessed by the report above of the CTs of head chest abdomen pelvis and bone scan, I will hold cycle 5 and 6 of Taxotere and continue 1 now with Zytiga and prednisone.  With reference to the coincidental cecal wall  thickening found on CT, we will get him to [facility] for colonoscopy.  He will see my nurse practitioner back in August for next cycle of Abiraterone prednisone.  He will continue his Lupron with Dr. Matute.  We will continue his Xgeva.  We will repeat his CT chest abdomen pelvis and bone scan again in 3 months.  He will see my nurse practitioner in the interim.    -8/23/2022 [facility] oncology clinic follow-up: No further presyncopal symptoms.  Feeling great other than for hot flashes.  Did commend for now we will continue on with his Zytiga prednisone and he will get his Xgeva today based on labs from 8/10/2022.  He opted out of getting the colonoscopy that I recommended and he will not change his mind.  He will continue getting the Lupron with Dr. Matute and I asked him to give the date of this appointment to my nurse when he sees her back in a month.  We will give his Xgeva today.  We will get CT chest abdomen pelvis and total body bone scan towards the middle to end of October.  Presently other than for the hot flashes feels great.    -9/22/2022 [facility] Oncology clinic follow-up: Chris is doing well on Zytiga, prednisone along with Xgeva.  Labs reviewed from 9/20/2022 are unremarkable, CBC was normal, CMP with creatinine of 1.34 otherwise normal, this is stable from his baseline.  PSA stable at 8.320.  He received Lupron recently with Dr. Matute, he thinks last week or so, states his next appointment is in February.  He will continue treatment unchanged.  We will repeat restaging CT chest, abdomen and pelvis and total body bone scan in October prior to return and I have ordered those today.  We will also repeat his PSA.    Return to clinic in 1 month for follow-up    I spent 30 minutes caring for Chris on this date of service. This time includes time spent by me in the following activities: preparing for the visit, reviewing tests, performing a medically appropriate examination and/or evaluation,  ordering medications, tests, or procedures and documenting information in the medical record.     Susana Torres, APRN    09/22/2022

## 2022-10-04 ENCOUNTER — INFUSION (OUTPATIENT)
Dept: ONCOLOGY | Facility: HOSPITAL | Age: 67
End: 2022-10-04

## 2022-10-04 VITALS
RESPIRATION RATE: 18 BRPM | HEART RATE: 70 BPM | SYSTOLIC BLOOD PRESSURE: 164 MMHG | BODY MASS INDEX: 37.1 KG/M2 | WEIGHT: 266 LBS | DIASTOLIC BLOOD PRESSURE: 91 MMHG | TEMPERATURE: 98.1 F

## 2022-10-04 DIAGNOSIS — C61 PROSTATE CANCER METASTATIC TO MULTIPLE SITES: Primary | Chronic | ICD-10-CM

## 2022-10-04 DIAGNOSIS — Z45.2 ENCOUNTER FOR CARE RELATED TO VASCULAR ACCESS PORT: ICD-10-CM

## 2022-10-04 PROCEDURE — 25010000002 HEPARIN LOCK FLUSH PER 10 UNITS: Performed by: INTERNAL MEDICINE

## 2022-10-04 PROCEDURE — 36591 DRAW BLOOD OFF VENOUS DEVICE: CPT

## 2022-10-04 RX ORDER — SODIUM CHLORIDE 0.9 % (FLUSH) 0.9 %
20 SYRINGE (ML) INJECTION AS NEEDED
OUTPATIENT
Start: 2022-10-04

## 2022-10-04 RX ORDER — HEPARIN SODIUM (PORCINE) LOCK FLUSH IV SOLN 100 UNIT/ML 100 UNIT/ML
500 SOLUTION INTRAVENOUS AS NEEDED
OUTPATIENT
Start: 2022-10-04

## 2022-10-04 RX ORDER — HEPARIN SODIUM (PORCINE) LOCK FLUSH IV SOLN 100 UNIT/ML 100 UNIT/ML
500 SOLUTION INTRAVENOUS AS NEEDED
Status: DISCONTINUED | OUTPATIENT
Start: 2022-10-04 | End: 2022-10-04 | Stop reason: HOSPADM

## 2022-10-04 RX ADMIN — HEPARIN 500 UNITS: 100 SYRINGE at 11:35

## 2022-10-05 ENCOUNTER — SPECIALTY PHARMACY (OUTPATIENT)
Dept: ONCOLOGY | Facility: HOSPITAL | Age: 67
End: 2022-10-05

## 2022-10-05 LAB
ALBUMIN SERPL-MCNC: 4.5 G/DL (ref 3.5–5.2)
ALBUMIN/GLOB SERPL: 2 G/DL
ALP SERPL-CCNC: 78 U/L (ref 39–117)
ALT SERPL-CCNC: 22 U/L (ref 1–41)
AST SERPL-CCNC: 21 U/L (ref 1–40)
BASOPHILS # BLD AUTO: 0.07 10*3/MM3 (ref 0–0.2)
BASOPHILS NFR BLD AUTO: 1 % (ref 0–1.5)
BILIRUB SERPL-MCNC: 0.2 MG/DL (ref 0–1.2)
BUN SERPL-MCNC: 22 MG/DL (ref 8–23)
BUN/CREAT SERPL: 18.3 (ref 7–25)
CALCIUM SERPL-MCNC: 9.3 MG/DL (ref 8.6–10.5)
CHLORIDE SERPL-SCNC: 106 MMOL/L (ref 98–107)
CO2 SERPL-SCNC: 25.3 MMOL/L (ref 22–29)
CREAT SERPL-MCNC: 1.2 MG/DL (ref 0.76–1.27)
EGFRCR SERPLBLD CKD-EPI 2021: 66.3 ML/MIN/1.73
EOSINOPHIL # BLD AUTO: 0.21 10*3/MM3 (ref 0–0.4)
EOSINOPHIL NFR BLD AUTO: 2.9 % (ref 0.3–6.2)
ERYTHROCYTE [DISTWIDTH] IN BLOOD BY AUTOMATED COUNT: 13.4 % (ref 12.3–15.4)
GLOBULIN SER CALC-MCNC: 2.3 GM/DL
GLUCOSE SERPL-MCNC: 125 MG/DL (ref 65–99)
HCT VFR BLD AUTO: 39 % (ref 37.5–51)
HGB BLD-MCNC: 13.1 G/DL (ref 13–17.7)
IMM GRANULOCYTES # BLD AUTO: 0.03 10*3/MM3 (ref 0–0.05)
IMM GRANULOCYTES NFR BLD AUTO: 0.4 % (ref 0–0.5)
LYMPHOCYTES # BLD AUTO: 1.53 10*3/MM3 (ref 0.7–3.1)
LYMPHOCYTES NFR BLD AUTO: 21.2 % (ref 19.6–45.3)
MCH RBC QN AUTO: 29 PG (ref 26.6–33)
MCHC RBC AUTO-ENTMCNC: 33.6 G/DL (ref 31.5–35.7)
MCV RBC AUTO: 86.3 FL (ref 79–97)
MONOCYTES # BLD AUTO: 0.64 10*3/MM3 (ref 0.1–0.9)
MONOCYTES NFR BLD AUTO: 8.9 % (ref 5–12)
NEUTROPHILS # BLD AUTO: 4.75 10*3/MM3 (ref 1.7–7)
NEUTROPHILS NFR BLD AUTO: 65.6 % (ref 42.7–76)
NRBC BLD AUTO-RTO: 0 /100 WBC (ref 0–0.2)
PLATELET # BLD AUTO: 229 10*3/MM3 (ref 140–450)
POTASSIUM SERPL-SCNC: 4.2 MMOL/L (ref 3.5–5.2)
PROT SERPL-MCNC: 6.8 G/DL (ref 6–8.5)
RBC # BLD AUTO: 4.52 10*6/MM3 (ref 4.14–5.8)
SODIUM SERPL-SCNC: 142 MMOL/L (ref 136–145)
WBC # BLD AUTO: 7.23 10*3/MM3 (ref 3.4–10.8)

## 2022-10-05 NOTE — PROGRESS NOTES
Specialty Pharmacy Refill Coordination Note     Chris is a 67 y.o. male contacted today regarding refills of  abiraterone 1000mg PO QD specialty medication(s).    Scheduled refill for abiraterone at [facility] retail for 10/14/22. Patient request medication be mailed to home address.     Specialty medication(s) and dose(s) confirmed: yes    Refill Questions    Flowsheet Row Most Recent Value   Changes to allergies? No   Changes to medications? No   New conditions since last clinic visit No   Unplanned office visit, urgent care, ED, or hospital admission in the last 4 weeks  No   How does patient/caregiver feel medication is working? Good   Financial problems or insurance changes  No   Since the previous refill, were any specialty medication doses or scheduled injections missed or delayed?  No   Does this patient require a clinical escalation to a pharmacist? No          Delivery Questions    Flowsheet Row Most Recent Value   Delivery method [employer]   Delivery address correct? Yes   Delivery phone number [phone]   Preferred delivery time? Anytime   Number of medications in delivery 1   Medication being filled and delivered abiraterone   Doses left of specialty medications 10 days left   Is there any medication that is due not being filled? No   Supplies needed? No supplies needed   Cooler needed? No   Do any medications need mixed or dated? No   Additional comments no copay   Questions or concerns for the pharmacist? No   Explain any questions or concerns for the pharmacist n/a   Are any medications first time fills? No                 Follow-up: 28 day(s)     Catalina Montes De Oca, Pharmacy Technician  Specialty Pharmacy Technician

## 2022-10-18 ENCOUNTER — OFFICE VISIT (OUTPATIENT)
Dept: ONCOLOGY | Facility: CLINIC | Age: 67
End: 2022-10-18

## 2022-10-18 ENCOUNTER — INFUSION (OUTPATIENT)
Dept: ONCOLOGY | Facility: HOSPITAL | Age: 67
End: 2022-10-18

## 2022-10-18 VITALS
DIASTOLIC BLOOD PRESSURE: 101 MMHG | OXYGEN SATURATION: 96 % | SYSTOLIC BLOOD PRESSURE: 182 MMHG | RESPIRATION RATE: 18 BRPM | HEART RATE: 81 BPM | TEMPERATURE: 98 F | HEIGHT: 71 IN | WEIGHT: 268 LBS | BODY MASS INDEX: 37.52 KG/M2

## 2022-10-18 DIAGNOSIS — C61 PROSTATE CANCER METASTATIC TO MULTIPLE SITES: Primary | Chronic | ICD-10-CM

## 2022-10-18 DIAGNOSIS — C61 PROSTATE CANCER METASTATIC TO MULTIPLE SITES: ICD-10-CM

## 2022-10-18 PROCEDURE — 36415 COLL VENOUS BLD VENIPUNCTURE: CPT

## 2022-10-18 PROCEDURE — 99214 OFFICE O/P EST MOD 30 MIN: CPT | Performed by: INTERNAL MEDICINE

## 2022-10-18 NOTE — PROGRESS NOTES
Discussed with Dr. Goodson patients desire to have port removed, Dr. Goodson agreeable order placed. Referral coordinator notified.

## 2022-10-18 NOTE — PROGRESS NOTES
CHIEF COMPLAINT: No new somatic complaints    Problem List:  Oncology/Hematology History Overview Note   1.  Prostate cancer clinical T1c and 2M1B stage IVb treated with 4 cycles of Taxotere, stopped due to syncope with negative cardiac work-up, with marked drop in PSA of 18.6 from over 900 at baseline, continued on Zytiga and prednisone.  2.  Urinary retention with Goodwin in place 1/24/2022.  On finasteride 1/24/2022.  3.  Covid 19 December 2021    Oncology history timeline:  -11/29/2021  with symptoms of urinary retention.  -2/15/2022 prostate biopsy adenocarcinoma grade group 5 and 3 out of 12 cores, grade group 4 in 1 out of 12 cores, grade group 3 in 8 out of 12 cores.  -2/24/2022 CT chest abdomen pelvis and total body bone scan shows left third rib, right acetabulum, periaortic and retroperitoneal kizzy metastases complaining of pain in the left chest and right hip.  Also possible sclerotic left 10th rib on CT.  Spleen mildly enlarged 13.8 cm.  Hepatic steatosis.  Small liver cyst.  Fat stranding in the periaortic and mesenteric region consistent with reactive/inflammatory stranding.  Multiple enlarged periaortic and retroperitoneal nodes and lymphadenopathy largest 4.9 cm.  CT chest describes tiny sclerotic foci in left eighth rib and right lateral seventh rib and T1.  Multiple small mediastinal and bilateral axillary nodes seen with small hypodense left thyroid nodule.  Creatinine 1.7.  -3/4/2022 office note Dr. Rolando Matute: Patient was seen after his elevated PSA by Dr. Vera and prostate biopsy scheduled.  However, he developed COVID-19 and this was canceled and the patient did not reschedule due to lack of insurance.  Saw Dr. Matute back on 1/24/2022 with plans to get staging CTs and bone scan with results as outlined above.  Started Casodex and to return on 3/11/2022 for Lupron.  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  TURP planned  for urinary retention symptoms.    -3/15/2022 [facility] medical oncology initial consultation: I reviewed the above data with the patient.  With his fairly bulky retroperitoneal adenopathy and bone metastases including extra axial metastases, he would fit the data from the CHAARTED trial for which Taxotere x6 followed by Zytiga prednisone along with the planned Lupron already being planned by Dr. Matute would be reasonable.  Equally reasonable in terms of comparisons with bicalutamide and Lupron would be Zytiga prednisone plus Lupron or Xtandi plus Lupron or apalutamide plus Lupron.  None of these have been compared head-to-head but all have beaten combined androgen deprivation therapy with bicalutamide and Lupron.  At the age of 67 and in order to have as many bullets as possible down the road and hence saving some of the hormone blockade options for later and using chemotherapy when he is younger and healthier for a more time-limited.,  He has opted for the Taxotere x6 followed by Zytiga and prednisone.  We will also give him Xgeva to improve bone health and given metastatic disease, as with all metastatic prostate cancer patients, he needs genetic counseling both for his sake as well as his family's.  If he were to have BRCA1 or other such  mutations, then that also opens up the options for PARP inhibitors etc. down the road.  He has his TURP scheduled for 2/24/2022 and we will start treatment a couple of weeks after that and he will get chemo preparation visit in the meantime and I will get capital surgeons to place a port.  We will check his PSA after his TURP when he sees my nurse practitioner back early April for the start of chemotherapy and repeat the PSA and CT chest abdomen pelvis and bone scan after the Taxotere is complete.    -3/31/2022 chemotherapy education and needs assessment completed    -4/7/2022 began docetaxel and Xgeva.  .0.  We will do his Xgeva every 6 weeks to coordinate with  his docetaxel infusions.    -4/19/2022 patient stopped Casodex    -5/17/2022 patient reported seeing his PCP for an abscess in his suprapubic area.  Placed on clindamycin, will delay treatment 1 week.    -5/25/2022 PSA 34.3  -5/26/2022 [facility] Oncology clinic follow-up: Chris has an abscess in the suprapubic area, it has improved on antibiotic therapy but I am concerned if we treat him it will just flare back up unless it is drained.  I will get him to Dr. Miller who placed his port for I&D and culture.  He is on clindamycin and states he completes course of treatment tomorrow.  I will delay his treatment with Taxotere 1 more week.  We will give his Xgeva today.  I will see him back in 1 week for follow-up to assess whether or not we can resume his Taxotere.  His PSA has dropped nicely with treatment thus far, PSA yesterday was 34.3 which is down from a PSA of 259.0 when we started treatment, it has been as high as 911 in November.    -6/2/2022 [facility] Oncology clinic follow-up: Chris went to see Dr. Miller to have his abscess lanced however apparently there was a long wait and he left without being seen.  He did call his PCP and was given 5 more days of clindamycin and the abscess now seems to have resolved.  We discussed today that he is at risk for recurrence of infection due to immunocompromise state with chemotherapy.  He will notify us if he has any return of his abscess.  He does have intertrigo under his panniculus, I have advised him to keep this area dry, to apply topical drying/antifungal powders.  Also recommended looser clothing.  His counts are adequate to continue therapy, we will resume Taxotere today with cycle #3.  We will repeat restaging scans after 6 courses.  We are doing Xgeva every 6 weeks to coordinate with his Taxotere.  Once he finishes Taxotere we can then go back to every 4 weeks.  His next Xgeva is not due until 7/14/2022.  His calcium is running a little low, he is going to   calcium supplement.    -6/23/2022 [facility] Oncology clinic follow-up: Chris overall is doing well on treatment with Taxotere.  Labs reviewed from yesterday and are unremarkable.  We will continue treatment today unchanged.  His suprapubic abscess resolved on clindamycin.  He will continue to use drying powder/antifungal powder under his panniculus for intertrigo.  Today will be cycle #4 of a planned 6 courses of Taxotere.  He is also on Xgeva, next dose due 7/14/2022, we are doing this every 6 weeks for now to line up with his chemotherapy, can go back to every 4 weeks after he finishes Taxotere.  Calcium from CMP yesterday was normal.    -7/13/2022 [facility] Oncology clinic follow-up: Mr. Morfin has had issues around day 3 after each treatment ranging from chest pain after his first treatment for which he did not seek medical attention, fatigue after the next few treatments, but 3 days after his most recent treatment on 6/23/2022 he had a syncopal episode at home and once again did not seek medical attention.  I discussed with him that this was not acceptable, if he has occurrences like this someone needs to call 911, it is difficult to figure out what is going on after the fact, the best time to work this up would be during the occurrence.  For now we will get labs today with CBC, CMP, PSA.  I will refer him to cardiology for further evaluation.  We will hold Taxotere most likely, I will see him tomorrow to go over his lab results.  I will also repeat his restaging scans with CT chest, abdomen and pelvis and will include CT of the head in light of his syncopal episode.      -7/13/2022 PSA 18.6    -7/14/2022 [facility] Oncology clinic follow-up: Mr. Morfin returns today to review his labs from yesterday.  Labs are unremarkable.  PSA down to 18.6 from a high of 259.0 in April prior to starting treatment.  CBC and CMP unremarkable, magnesium is normal at 2.3, phosphorus slightly low at 2.4.  We will hold therapy for now in  light of his syncopal episode on day 3 after his last treatment and prior episodes with chest pain and severe fatigue that all occurred on day 3 after his Taxotere.  We will restage him with CT head, chest, abdomen and pelvis (I am including the head in light of his syncopal episode).  I have also referred him to cardiology, he states that he received a call from them about making an appointment however he wanted to talk to us today first.  I emphasized the importance to him of making and keeping that appointment and he states understanding.  I also once again emphasized the requirement to seek emergency medical attention if he has any episodes in the future such as chest pain or syncopal events.  Whether or not we try and complete Taxotere with 2 more courses or move to the next line of therapy will be decided when he returns to discuss with Dr. Goodson and review his restaging scans.    -7/15/2022 saw Dr. Diaz cardiologist.  For syncope he ordered echocardiogram and 48-hour Holter monitor.    -7/15/2022 Holter monitor relatively benign.    -7/22/2022 CT brain with and without contrast negative.  CT chest abdomen and pelvis shows some nonspecific cecal wall thickening.  No progression in the chest.  T1 and ribs stable.  Decrease in multiple retroperitoneal and pelvic nodes.  Slight increase in right acetabular sclerotic lesion.  Persistent but improved circumferential bladder wall thickening.  Total body bone scan shows stable left third rib and no evidence of new bone metastases.    -7/19/2022 ejection fraction 65% with grade 1 diastolic dysfunction.    -7/26/2022 [facility] oncology clinic follow-up: Given presyncopal symptoms occurred 3 days after Taxotere and has not otherwise occurred any other time and his cardiology work-up is fairly unremarkable and his disease is under good control as witnessed by the report above of the CTs of head chest abdomen pelvis and bone scan, I will hold cycle 5 and 6 of Taxotere  and continue 1 now with Zytiga and prednisone.  With reference to the coincidental cecal wall thickening found on CT, we will get him to [facility] surgeons for colonoscopy.  He will see my nurse practitioner back in August for next cycle of Abiraterone prednisone.  He will continue his Lupron with Dr. Matute.  We will continue his Xgeva.  We will repeat his CT chest abdomen pelvis and bone scan again in 3 months.  He will see my nurse practitioner in the interim.    -8/23/2022 [facility] oncology clinic follow-up: No further presyncopal symptoms.  Feeling great other than for hot flashes.  Did commend for now we will continue on with his Zytiga prednisone and he will get his Xgeva today based on labs from 8/10/2022.  He opted out of getting the colonoscopy that I recommended and he will not change his mind.  He will continue getting the Lupron with Dr. Matute and I asked him to give the date of this appointment to my nurse when he sees her back in a month.  We will give his Xgeva today.  We will get CT chest abdomen pelvis and total body bone scan towards the middle to end of October.  Presently other than for the hot flashes feels great.    -9/20/2022 PSA 8.320    -9/22/2022 [facility] Oncology clinic follow-up: Chris is doing well on Zytiga, prednisone along with Xgeva.  Labs reviewed from 9/20/2022 are unremarkable, CBC was normal, CMP with creatinine of 1.34 otherwise normal, this is stable from his baseline.  PSA stable at 8.320.  He received Lupron recently with Dr. Matute, he thinks last week or so, states his next appointment is in February.  He will continue treatment unchanged.  We will repeat restaging CT chest, abdomen and pelvis and total body bone scan in October prior to return and I have ordered those today.  We will also repeat his PSA.    -9/22/2022 CT chest abdomen pelvisCompared to July 2022 [facility] showed no evidence of disease progression in the chest with no substantial sclerotic bony  "lesions and decrease in size of retroperitoneal and pelvic nodes with persistent cecal wall thickening and suboptimal bladder distention.  Total body bone scan showed decrease in previously seen uptake in the left third rib with diffuse sclerosis representing treatment response with no new foci.     Prostate cancer metastatic to multiple sites (HCC)   3/15/2022 Initial Diagnosis    Prostate cancer metastatic to multiple sites (HCC)     3/15/2022 Cancer Staged    Staging form: Prostate, AJCC 8th Edition  - Clinical: Stage IVB (cT1c, cN1, cM1b, Grade Group: 5) - Signed by Fritz Godoson MD on 3/15/2022     4/7/2022 - 6/23/2022 Chemotherapy    Stopped after cycle 4 6/23/2022 due to syncope 3 days post infusions with negative cardiac work-up  OP PROSTATE DOCEtaxel     4/7/2022 -  Chemotherapy    OP SUPPORTIVE Denosumab (Xgeva) Q28D     7/26/2022 -  Chemotherapy    OP PROSTATE Abiraterone / PredniSONE           HISTORY OF PRESENT ILLNESS:  The patient is a 67 y.o. male, here for follow up on management of metastatic prostate cancer.  No new somatic complaints.    Past Medical History:   Diagnosis Date   • Cancer (HCC)    • Prostate cancer (HCC)      Past Surgical History:   Procedure Laterality Date   • PROSTATE BIOPSY  02/2022    dr. sue       No Known Allergies    Family History and Social History reviewed and changed as necessary    REVIEW OF SYSTEM:   No new somatic complaints.    PHYSICAL EXAM:  Lungs clear with no respiratory distress.    Vitals:    10/18/22 0907   BP: (!) 182/101   Pulse: 81   Resp: 18   Temp: 98 °F (36.7 °C)   SpO2: 96%   Weight: 122 kg (268 lb)   Height: 180.3 cm (71\")     Vitals:    10/18/22 0907   PainSc: 0-No pain          ECOG score: 0           Vitals reviewed.    ECOG: (0) Fully Active - Able to Carry On All Pre-disease Performance Without Restriction    Lab Results   Component Value Date    HGB 13.1 10/04/2022    HCT 39.0 10/04/2022    MCV 86.3 10/04/2022     10/04/2022    WBC " 7.23 10/04/2022    NEUTROABS 4.75 10/04/2022    LYMPHSABS 1.53 10/04/2022    MONOSABS 0.64 10/04/2022    EOSABS 0.21 10/04/2022    BASOSABS 0.07 10/04/2022       Lab Results   Component Value Date    GLUCOSE 125 (H) 10/04/2022    BUN 22 10/04/2022    CREATININE 1.20 10/04/2022     10/04/2022    K 4.2 10/04/2022     10/04/2022    CO2 25.3 10/04/2022    CALCIUM 9.3 10/04/2022    ALBUMIN 4.50 10/04/2022    BILITOT 0.2 10/04/2022    ALKPHOS 78 10/04/2022    AST 21 10/04/2022    ALT 22 10/04/2022             ASSESSMENT & PLAN:  1.  Prostate cancer clinical T1c and 2M1B stage IVb treated with 4 cycles of Taxotere, stopped due to syncope with negative cardiac work-up, with marked drop in PSA of 18.6 from over 900 at baseline, continued on Zytiga and prednisone.  2.  Urinary retention with Goodwin in place 1/24/2022.  On finasteride 1/24/2022.  3.  Covid 19 December 2021    Oncology history timeline:  -11/29/2021  with symptoms of urinary retention.  -2/15/2022 prostate biopsy adenocarcinoma grade group 5 and 3 out of 12 cores, grade group 4 in 1 out of 12 cores, grade group 3 in 8 out of 12 cores.  -2/24/2022 CT chest abdomen pelvis and total body bone scan shows left third rib, right acetabulum, periaortic and retroperitoneal kizzy metastases complaining of pain in the left chest and right hip.  Also possible sclerotic left 10th rib on CT.  Spleen mildly enlarged 13.8 cm.  Hepatic steatosis.  Small liver cyst.  Fat stranding in the periaortic and mesenteric region consistent with reactive/inflammatory stranding.  Multiple enlarged periaortic and retroperitoneal nodes and lymphadenopathy largest 4.9 cm.  CT chest describes tiny sclerotic foci in left eighth rib and right lateral seventh rib and T1.  Multiple small mediastinal and bilateral axillary nodes seen with small hypodense left thyroid nodule.  Creatinine 1.7.  -3/4/2022 office note Dr. Rolando Matute: Patient was seen after his elevated PSA by   [facility] and prostate biopsy scheduled.  However, he developed COVID-19 and this was canceled and the patient did not reschedule due to lack of insurance.  Saw Dr. Matute back on 1/24/2022 with plans to get staging CTs and bone scan with results as outlined above.  Started Casodex and to return on 3/11/2022 for Lupron.  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  TURP planned for urinary retention symptoms.    -3/15/2022 [facility] medical oncology initial consultation: I reviewed the above data with the patient.  With his fairly bulky retroperitoneal adenopathy and bone metastases including extra axial metastases, he would fit the data from the CHAARTED trial for which Taxotere x6 followed by Zytiga prednisone along with the planned Lupron already being planned by Dr. Matute would be reasonable.  Equally reasonable in terms of comparisons with bicalutamide and Lupron would be Zytiga prednisone plus Lupron or Xtandi plus Lupron or apalutamide plus Lupron.  None of these have been compared head-to-head but all have beaten combined androgen deprivation therapy with bicalutamide and Lupron.  At the age of 67 and in order to have as many bullets as possible down the road and hence saving some of the hormone blockade options for later and using chemotherapy when he is younger and healthier for a more time-limited.,  He has opted for the Taxotere x6 followed by Zytiga and prednisone.  We will also give him Xgeva to improve bone health and given metastatic disease, as with all metastatic prostate cancer patients, he needs genetic counseling both for his sake as well as his family's.  If he were to have BRCA1 or other such  mutations, then that also opens up the options for PARP inhibitors etc. down the road.  He has his TURP scheduled for 2/24/2022 and we will start treatment a couple of weeks after that and he will get chemo preparation visit in the meantime  and I will get capital surgeons to place a port.  We will check his PSA after his TURP when he sees my nurse practitioner back early April for the start of chemotherapy and repeat the PSA and CT chest abdomen pelvis and bone scan after the Taxotere is complete.    -3/31/2022 chemotherapy education and needs assessment completed    -4/7/2022 began docetaxel and Xgeva.  .0.  We will do his Xgeva every 6 weeks to coordinate with his docetaxel infusions.    -4/19/2022 patient stopped Casodex    -5/17/2022 patient reported seeing his PCP for an abscess in his suprapubic area.  Placed on clindamycin, will delay treatment 1 week.    -5/25/2022 PSA 34.3  -5/26/2022 [facility] clinic follow-up: Chris has an abscess in the suprapubic area, it has improved on antibiotic therapy but I am concerned if we treat him it will just flare back up unless it is drained.  I will get him to Dr. Miller who placed his port for I&D and culture.  He is on clindamycin and states he completes course of treatment tomorrow.  I will delay his treatment with Taxotere 1 more week.  We will give his Xgeva today.  I will see him back in 1 week for follow-up to assess whether or not we can resume his Taxotere.  His PSA has dropped nicely with treatment thus far, PSA yesterday was 34.3 which is down from a PSA of 259.0 when we started treatment, it has been as high as 911 in November.    -6/2/2022 [facility] clinic follow-up: Chris went to see Dr. Miller to have his abscess lanced however apparently there was a long wait and he left without being seen.  He did call his PCP and was given 5 more days of clindamycin and the abscess now seems to have resolved.  We discussed today that he is at risk for recurrence of infection due to immunocompromise state with chemotherapy.  He will notify us if he has any return of his abscess.  He does have intertrigo under his panniculus, I have advised him to keep this area dry, to apply topical  drying/antifungal powders.  Also recommended looser clothing.  His counts are adequate to continue therapy, we will resume Taxotere today with cycle #3.  We will repeat restaging scans after 6 courses.  We are doing Xgeva every 6 weeks to coordinate with his Taxotere.  Once he finishes Taxotere we can then go back to every 4 weeks.  His next Xgeva is not due until 7/14/2022.  His calcium is running a little low, he is going to  calcium supplement.    -6/23/2022 [facility] Oncology clinic follow-up: Chris huerta is doing well on treatment with Taxotere.  Labs reviewed from yesterday and are unremarkable.  We will continue treatment today unchanged.  His suprapubic abscess resolved on clindamycin.  He will continue to use drying powder/antifungal powder under his panniculus for intertrigo.  Today will be cycle #4 of a planned 6 courses of Taxotere.  He is also on Xgeva, next dose due 7/14/2022, we are doing this every 6 weeks for now to line up with his chemotherapy, can go back to every 4 weeks after he finishes Taxotere.  Calcium from CMP yesterday was normal.    -7/13/2022 [facility] [place] Oncology clinic follow-up: Mr. Morfin has had issues around day 3 after each treatment ranging from chest pain after his first treatment for which he did not seek medical attention, fatigue after the next few treatments, but 3 days after his most recent treatment on 6/23/2022 he had a syncopal episode at home and once again did not seek medical attention.  I discussed with him that this was not acceptable, if he has occurrences like this someone needs to call 911, it is difficult to figure out what is going on after the fact, the best time to work this up would be during the occurrence.  For now we will get labs today with CBC, CMP, PSA.  I will refer him to cardiology for further evaluation.  We will hold Taxotere most likely, I will see him tomorrow to go over his lab results.  I will also repeat his restaging scans with CT chest,  abdomen and pelvis and will include CT of the head in light of his syncopal episode.      -7/13/2022 PSA 18.6    -7/14/2022 [facility] Oncology clinic follow-up: Mr. Morfin returns today to review his labs from yesterday.  Labs are unremarkable.  PSA down to 18.6 from a high of 259.0 in April prior to starting treatment.  CBC and CMP unremarkable, magnesium is normal at 2.3, phosphorus slightly low at 2.4.  We will hold therapy for now in light of his syncopal episode on day 3 after his last treatment and prior episodes with chest pain and severe fatigue that all occurred on day 3 after his Taxotere.  We will restage him with CT head, chest, abdomen and pelvis (I am including the head in light of his syncopal episode).  I have also referred him to cardiology, he states that he received a call from them about making an appointment however he wanted to talk to us today first.  I emphasized the importance to him of making and keeping that appointment and he states understanding.  I also once again emphasized the requirement to seek emergency medical attention if he has any episodes in the future such as chest pain or syncopal events.  Whether or not we try and complete Taxotere with 2 more courses or move to the next line of therapy will be decided when he returns to discuss with Dr. Goodson and review his restaging scans.    -7/15/2022 saw Dr. Diaz cardiologist.  For syncope he ordered echocardiogram and 48-hour Holter monitor.    -7/15/2022 Holter monitor relatively benign.    -7/22/2022 CT brain with and without contrast negative.  CT chest abdomen and pelvis shows some nonspecific cecal wall thickening.  No progression in the chest.  T1 and ribs stable.  Decrease in multiple retroperitoneal and pelvic nodes.  Slight increase in right acetabular sclerotic lesion.  Persistent but improved circumferential bladder wall thickening.  Total body bone scan shows stable left third rib and no evidence of new bone  metastases.    -7/19/2022 ejection fraction 65% with grade 1 diastolic dysfunction.    -7/26/2022 [facility] oncology clinic follow-up: Given presyncopal symptoms occurred 3 days after Taxotere and has not otherwise occurred any other time and his cardiology work-up is fairly unremarkable and his disease is under good control as witnessed by the report above of the CTs of head chest abdomen pelvis and bone scan, I will hold cycle 5 and 6 of Taxotere and continue 1 now with Zytiga and prednisone.  With reference to the coincidental cecal wall thickening found on CT, we will get him to [facility] for colonoscopy.  He will see my nurse practitioner back in August for next cycle of Abiraterone prednisone.  He will continue his Lupron with Dr. Matute.  We will continue his Xgeva.  We will repeat his CT chest abdomen pelvis and bone scan again in 3 months.  He will see my nurse practitioner in the interim.    -8/23/2022 [facility] follow-up: No further presyncopal symptoms.  Feeling great other than for hot flashes.  Did commend for now we will continue on with his Zytiga prednisone and he will get his Xgeva today based on labs from 8/10/2022.  He opted out of getting the colonoscopy that I recommended and he will not change his mind.  He will continue getting the Lupron with Dr. Matute and I asked him to give the date of this appointment to my nurse when he sees her back in a month.  We will give his Xgeva today.  We will get CT chest abdomen pelvis and total body bone scan towards the middle to end of October.  Presently other than for the hot flashes feels great.    -9/20/2022 PSA 8.320    -9/22/2022 [facility] follow-up: Chirs is doing well on Zytiga, prednisone along with Xgeva.  Labs reviewed from 9/20/2022 are unremarkable, CBC was normal, CMP with creatinine of 1.34 otherwise normal, this is stable from his baseline.  PSA stable at 8.320.  He received Lupron recently with   Giovani, he thinks last week or so, states his next appointment is in February.  He will continue treatment unchanged.  We will repeat restaging CT chest, abdomen and pelvis and total body bone scan in October prior to return and I have ordered those today.  We will also repeat his PSA.    -9/22/2022 CT chest abdomen pelvisCompared to July 2022 [facility] showed no evidence of disease progression in the chest with no substantial sclerotic bony lesions and decrease in size of retroperitoneal and pelvic nodes with persistent cecal wall thickening and suboptimal bladder distention.  Total body bone scan showed decrease in previously seen uptake in the left third rib with diffuse sclerosis representing treatment response with no new foci.    -10/18/2022 [facility] oncology follow-up: I reviewed bone scan and CT reports as above with no evidence of progression.  Tolerating Zytiga prednisone and Xgeva.  Getting Lupron regularly with Dr. Matute next appointment coming in February.  We will repeat his CT chest abdomen pelvis and total body bone scan in April.  We will follow with my nurse practitioner in the interim.    Total time of care today inclusive of time spent today prior to patient's arrival reviewing interval images and reports thereof as outlined above and during visit translating that information to him and putting forth a plan as outlined above and after visit signing orders for all of the above took 30 minutes of patient care time throughout the day today.  Fritz Goodson MD    10/18/2022

## 2022-10-19 LAB
ALBUMIN SERPL-MCNC: 4.1 G/DL (ref 3.5–5.2)
ALBUMIN/GLOB SERPL: 1.9 G/DL
ALP SERPL-CCNC: 86 U/L (ref 39–117)
ALT SERPL-CCNC: 29 U/L (ref 1–41)
AST SERPL-CCNC: 19 U/L (ref 1–40)
BASOPHILS # BLD AUTO: 0.08 10*3/MM3 (ref 0–0.2)
BASOPHILS NFR BLD AUTO: 1 % (ref 0–1.5)
BILIRUB SERPL-MCNC: 0.3 MG/DL (ref 0–1.2)
BUN SERPL-MCNC: 21 MG/DL (ref 8–23)
BUN/CREAT SERPL: 16.8 (ref 7–25)
CALCIUM SERPL-MCNC: 9.4 MG/DL (ref 8.6–10.5)
CHLORIDE SERPL-SCNC: 106 MMOL/L (ref 98–107)
CO2 SERPL-SCNC: 25.6 MMOL/L (ref 22–29)
CREAT SERPL-MCNC: 1.25 MG/DL (ref 0.76–1.27)
EGFRCR SERPLBLD CKD-EPI 2021: 63.1 ML/MIN/1.73
EOSINOPHIL # BLD AUTO: 0.33 10*3/MM3 (ref 0–0.4)
EOSINOPHIL NFR BLD AUTO: 4 % (ref 0.3–6.2)
ERYTHROCYTE [DISTWIDTH] IN BLOOD BY AUTOMATED COUNT: 13.7 % (ref 12.3–15.4)
GLOBULIN SER CALC-MCNC: 2.2 GM/DL
GLUCOSE SERPL-MCNC: 93 MG/DL (ref 65–99)
HCT VFR BLD AUTO: 41.3 % (ref 37.5–51)
HGB BLD-MCNC: 13 G/DL (ref 13–17.7)
IMM GRANULOCYTES # BLD AUTO: 0.05 10*3/MM3 (ref 0–0.05)
IMM GRANULOCYTES NFR BLD AUTO: 0.6 % (ref 0–0.5)
LYMPHOCYTES # BLD AUTO: 2.21 10*3/MM3 (ref 0.7–3.1)
LYMPHOCYTES NFR BLD AUTO: 26.9 % (ref 19.6–45.3)
MAGNESIUM SERPL-MCNC: 2.3 MG/DL (ref 1.6–2.4)
MCH RBC QN AUTO: 28.1 PG (ref 26.6–33)
MCHC RBC AUTO-ENTMCNC: 31.5 G/DL (ref 31.5–35.7)
MCV RBC AUTO: 89.2 FL (ref 79–97)
MONOCYTES # BLD AUTO: 0.77 10*3/MM3 (ref 0.1–0.9)
MONOCYTES NFR BLD AUTO: 9.4 % (ref 5–12)
NEUTROPHILS # BLD AUTO: 4.79 10*3/MM3 (ref 1.7–7)
NEUTROPHILS NFR BLD AUTO: 58.1 % (ref 42.7–76)
NRBC BLD AUTO-RTO: 0 /100 WBC (ref 0–0.2)
PHOSPHATE SERPL-MCNC: 2.8 MG/DL (ref 2.5–4.5)
PLATELET # BLD AUTO: 257 10*3/MM3 (ref 140–450)
POTASSIUM SERPL-SCNC: 4.2 MMOL/L (ref 3.5–5.2)
PROT SERPL-MCNC: 6.3 G/DL (ref 6–8.5)
PSA SERPL-MCNC: 5.5 NG/ML (ref 0–4)
RBC # BLD AUTO: 4.63 10*6/MM3 (ref 4.14–5.8)
SODIUM SERPL-SCNC: 143 MMOL/L (ref 136–145)
WBC # BLD AUTO: 8.23 10*3/MM3 (ref 3.4–10.8)

## 2022-10-20 ENCOUNTER — INFUSION (OUTPATIENT)
Dept: ONCOLOGY | Facility: HOSPITAL | Age: 67
End: 2022-10-20

## 2022-10-20 VITALS
WEIGHT: 267 LBS | DIASTOLIC BLOOD PRESSURE: 83 MMHG | SYSTOLIC BLOOD PRESSURE: 150 MMHG | BODY MASS INDEX: 37.24 KG/M2 | HEART RATE: 87 BPM | TEMPERATURE: 97.7 F | RESPIRATION RATE: 17 BRPM

## 2022-10-20 DIAGNOSIS — C61 PROSTATE CANCER METASTATIC TO MULTIPLE SITES: Primary | ICD-10-CM

## 2022-10-20 PROCEDURE — 96372 THER/PROPH/DIAG INJ SC/IM: CPT

## 2022-10-20 PROCEDURE — 25010000002 DENOSUMAB 120 MG/1.7ML SOLUTION: Performed by: INTERNAL MEDICINE

## 2022-10-20 RX ADMIN — DENOSUMAB 120 MG: 120 INJECTION SUBCUTANEOUS at 09:52

## 2022-11-11 ENCOUNTER — SPECIALTY PHARMACY (OUTPATIENT)
Dept: ONCOLOGY | Facility: HOSPITAL | Age: 67
End: 2022-11-11

## 2022-11-11 NOTE — PROGRESS NOTES
Specialty Pharmacy Refill Coordination Note     Chris is a 67 y.o. male contacted today regarding refills of  Abiraterone 1000mg PO QD specialty medication(s).    Scheduled refill for 11/16/22 to be mailed and delivered on 11/17/22 per patient request    Specialty medication(s) and dose(s) confirmed: yes    Refill Questions    Flowsheet Row Most Recent Value   Changes to allergies? No   Changes to medications? No   New conditions since last clinic visit No   Unplanned office visit, urgent care, ED, or hospital admission in the last 4 weeks  No   How does patient/caregiver feel medication is working? Good   Financial problems or insurance changes  No   Since the previous refill, were any specialty medication doses or scheduled injections missed or delayed?  No   Does this patient require a clinical escalation to a pharmacist? No          Delivery Questions    Flowsheet Row Most Recent Value   Delivery method [employer]   Delivery address correct? Yes   Delivery phone number [phone]   Preferred delivery time? Anytime   Number of medications in delivery 1   Medication being filled and delivered abiraterone   Doses left of specialty medications 5 days left   Is there any medication that is due not being filled? No   Supplies needed? No supplies needed   Cooler needed? No   Do any medications need mixed or dated? No   Additional comments no copay   Questions or concerns for the pharmacist? No   Explain any questions or concerns for the pharmacist n/a   Are any medications first time fills? No                 Follow-up: 28 day(s)     Catalina Montes De Oca, Pharmacy Technician  Specialty Pharmacy Technician

## 2022-11-15 ENCOUNTER — INFUSION (OUTPATIENT)
Dept: ONCOLOGY | Facility: HOSPITAL | Age: 67
End: 2022-11-15

## 2022-11-15 VITALS
RESPIRATION RATE: 18 BRPM | BODY MASS INDEX: 38.05 KG/M2 | DIASTOLIC BLOOD PRESSURE: 90 MMHG | SYSTOLIC BLOOD PRESSURE: 141 MMHG | HEART RATE: 78 BPM | WEIGHT: 272.8 LBS | TEMPERATURE: 98.8 F

## 2022-11-15 DIAGNOSIS — C61 PROSTATE CANCER METASTATIC TO MULTIPLE SITES: Chronic | ICD-10-CM

## 2022-11-15 PROCEDURE — 36415 COLL VENOUS BLD VENIPUNCTURE: CPT

## 2022-11-16 ENCOUNTER — INFUSION (OUTPATIENT)
Dept: ONCOLOGY | Facility: HOSPITAL | Age: 67
End: 2022-11-16

## 2022-11-16 ENCOUNTER — OFFICE VISIT (OUTPATIENT)
Dept: ONCOLOGY | Facility: CLINIC | Age: 67
End: 2022-11-16

## 2022-11-16 VITALS
TEMPERATURE: 98.4 F | RESPIRATION RATE: 20 BRPM | HEIGHT: 71 IN | OXYGEN SATURATION: 96 % | SYSTOLIC BLOOD PRESSURE: 175 MMHG | DIASTOLIC BLOOD PRESSURE: 91 MMHG | WEIGHT: 276 LBS | HEART RATE: 87 BPM | BODY MASS INDEX: 38.64 KG/M2

## 2022-11-16 DIAGNOSIS — C61 PROSTATE CANCER METASTATIC TO MULTIPLE SITES: Primary | ICD-10-CM

## 2022-11-16 LAB
ALBUMIN SERPL-MCNC: 4.6 G/DL (ref 3.5–5.2)
ALBUMIN/GLOB SERPL: 2.4 G/DL
ALP SERPL-CCNC: 79 U/L (ref 39–117)
ALT SERPL-CCNC: 27 U/L (ref 1–41)
AST SERPL-CCNC: 18 U/L (ref 1–40)
BASOPHILS # BLD AUTO: 0.09 10*3/MM3 (ref 0–0.2)
BASOPHILS NFR BLD AUTO: 1 % (ref 0–1.5)
BILIRUB SERPL-MCNC: 0.3 MG/DL (ref 0–1.2)
BUN SERPL-MCNC: 19 MG/DL (ref 8–23)
BUN/CREAT SERPL: 17.1 (ref 7–25)
CALCIUM SERPL-MCNC: 9.4 MG/DL (ref 8.6–10.5)
CHLORIDE SERPL-SCNC: 104 MMOL/L (ref 98–107)
CO2 SERPL-SCNC: 26 MMOL/L (ref 22–29)
CREAT SERPL-MCNC: 1.11 MG/DL (ref 0.76–1.27)
EGFRCR SERPLBLD CKD-EPI 2021: 72.8 ML/MIN/1.73
EOSINOPHIL # BLD AUTO: 0.38 10*3/MM3 (ref 0–0.4)
EOSINOPHIL NFR BLD AUTO: 4.2 % (ref 0.3–6.2)
ERYTHROCYTE [DISTWIDTH] IN BLOOD BY AUTOMATED COUNT: 13.3 % (ref 12.3–15.4)
GLOBULIN SER CALC-MCNC: 1.9 GM/DL
GLUCOSE SERPL-MCNC: 155 MG/DL (ref 65–99)
HCT VFR BLD AUTO: 42.3 % (ref 37.5–51)
HGB BLD-MCNC: 13.5 G/DL (ref 13–17.7)
IMM GRANULOCYTES # BLD AUTO: 0.03 10*3/MM3 (ref 0–0.05)
IMM GRANULOCYTES NFR BLD AUTO: 0.3 % (ref 0–0.5)
LYMPHOCYTES # BLD AUTO: 2.53 10*3/MM3 (ref 0.7–3.1)
LYMPHOCYTES NFR BLD AUTO: 28.1 % (ref 19.6–45.3)
MAGNESIUM SERPL-MCNC: 2.1 MG/DL (ref 1.6–2.4)
MCH RBC QN AUTO: 28.4 PG (ref 26.6–33)
MCHC RBC AUTO-ENTMCNC: 31.9 G/DL (ref 31.5–35.7)
MCV RBC AUTO: 89.1 FL (ref 79–97)
MONOCYTES # BLD AUTO: 0.63 10*3/MM3 (ref 0.1–0.9)
MONOCYTES NFR BLD AUTO: 7 % (ref 5–12)
NEUTROPHILS # BLD AUTO: 5.34 10*3/MM3 (ref 1.7–7)
NEUTROPHILS NFR BLD AUTO: 59.4 % (ref 42.7–76)
NRBC BLD AUTO-RTO: 0 /100 WBC (ref 0–0.2)
PHOSPHATE SERPL-MCNC: 3.3 MG/DL (ref 2.5–4.5)
PLATELET # BLD AUTO: 240 10*3/MM3 (ref 140–450)
POTASSIUM SERPL-SCNC: 3.9 MMOL/L (ref 3.5–5.2)
PROT SERPL-MCNC: 6.5 G/DL (ref 6–8.5)
RBC # BLD AUTO: 4.75 10*6/MM3 (ref 4.14–5.8)
SODIUM SERPL-SCNC: 140 MMOL/L (ref 136–145)
WBC # BLD AUTO: 9 10*3/MM3 (ref 3.4–10.8)

## 2022-11-16 PROCEDURE — 99214 OFFICE O/P EST MOD 30 MIN: CPT | Performed by: NURSE PRACTITIONER

## 2022-11-16 PROCEDURE — 96372 THER/PROPH/DIAG INJ SC/IM: CPT

## 2022-11-16 PROCEDURE — 25010000002 DENOSUMAB 120 MG/1.7ML SOLUTION: Performed by: NURSE PRACTITIONER

## 2022-11-16 RX ADMIN — DENOSUMAB 120 MG: 120 INJECTION SUBCUTANEOUS at 09:57

## 2022-11-16 NOTE — PROGRESS NOTES
CHIEF COMPLAINT: Occasional hot flashes    Problem List:  Oncology/Hematology History Overview Note   1.  Prostate cancer clinical T1c and 2M1B stage IVb treated with 4 cycles of Taxotere, stopped due to syncope with negative cardiac work-up, with marked drop in PSA of 18.6 from over 900 at baseline, continued on Zytiga and prednisone.  2.  Urinary retention with Goodwin in place 1/24/2022.  On finasteride 1/24/2022.  3.  Covid 19 December 2021    Oncology history timeline:  -11/29/2021  with symptoms of urinary retention.  -2/15/2022 prostate biopsy adenocarcinoma grade group 5 and 3 out of 12 cores, grade group 4 in 1 out of 12 cores, grade group 3 in 8 out of 12 cores.  -2/24/2022 CT chest abdomen pelvis and total body bone scan shows left third rib, right acetabulum, periaortic and retroperitoneal kizzy metastases complaining of pain in the left chest and right hip.  Also possible sclerotic left 10th rib on CT.  Spleen mildly enlarged 13.8 cm.  Hepatic steatosis.  Small liver cyst.  Fat stranding in the periaortic and mesenteric region consistent with reactive/inflammatory stranding.  Multiple enlarged periaortic and retroperitoneal nodes and lymphadenopathy largest 4.9 cm.  CT chest describes tiny sclerotic foci in left eighth rib and right lateral seventh rib and T1.  Multiple small mediastinal and bilateral axillary nodes seen with small hypodense left thyroid nodule.  Creatinine 1.7.  -3/4/2022 office note Dr. Rolando Matute: Patient was seen after his elevated PSA by Dr. Vera and prostate biopsy scheduled.  However, he developed COVID-19 and this was canceled and the patient did not reschedule due to lack of insurance.  Saw Dr. Matute back on 1/24/2022 with plans to get staging CTs and bone scan with results as outlined above.  Started Casodex and to return on 3/11/2022 for Lupron.  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  TURP planned for  urinary retention symptoms.    -3/15/2022 [facility] medical oncology initial consultation: I reviewed the above data with the patient.  With his fairly bulky retroperitoneal adenopathy and bone metastases including extra axial metastases, he would fit the data from the CHAARTED trial for which Taxotere x6 followed by Zytiga prednisone along with the planned Lupron already being planned by Dr. Matute would be reasonable.  Equally reasonable in terms of comparisons with bicalutamide and Lupron would be Zytiga prednisone plus Lupron or Xtandi plus Lupron or apalutamide plus Lupron.  None of these have been compared head-to-head but all have beaten combined androgen deprivation therapy with bicalutamide and Lupron.  At the age of 67 and in order to have as many bullets as possible down the road and hence saving some of the hormone blockade options for later and using chemotherapy when he is younger and healthier for a more time-limited.,  He has opted for the Taxotere x6 followed by Zytiga and prednisone.  We will also give him Xgeva to improve bone health and given metastatic disease, as with all metastatic prostate cancer patients, he needs genetic counseling both for his sake as well as his family's.  If he were to have BRCA1 or other such  mutations, then that also opens up the options for PARP inhibitors etc. down the road.  He has his TURP scheduled for 2/24/2022 and we will start treatment a couple of weeks after that and he will get chemo preparation visit in the meantime and I will get capital surgeons to place a port.  We will check his PSA after his TURP when he sees my nurse practitioner back early April for the start of chemotherapy and repeat the PSA and CT chest abdomen pelvis and bone scan after the Taxotere is complete.    -3/31/2022 chemotherapy education and needs assessment completed    -4/7/2022 began docetaxel and Xgeva.  .0.  We will do his Xgeva every 6 weeks to coordinate with his  docetaxel infusions.    -4/19/2022 patient stopped Casodex    -5/17/2022 patient reported seeing his PCP for an abscess in his suprapubic area.  Placed on clindamycin, will delay treatment 1 week.    -5/25/2022 PSA 34.3  -5/26/2022 [facility] Oncology clinic follow-up: Chris has an abscess in the suprapubic area, it has improved on antibiotic therapy but I am concerned if we treat him it will just flare back up unless it is drained.  I will get him to Dr. Miller who placed his port for I&D and culture.  He is on clindamycin and states he completes course of treatment tomorrow.  I will delay his treatment with Taxotere 1 more week.  We will give his Xgeva today.  I will see him back in 1 week for follow-up to assess whether or not we can resume his Taxotere.  His PSA has dropped nicely with treatment thus far, PSA yesterday was 34.3 which is down from a PSA of 259.0 when we started treatment, it has been as high as 911 in November.    -6/2/2022 [facility] Oncology clinic follow-up: Chris went to see Dr. Miller to have his abscess lanced however apparently there was a long wait and he left without being seen.  He did call his PCP and was given 5 more days of clindamycin and the abscess now seems to have resolved.  We discussed today that he is at risk for recurrence of infection due to immunocompromise state with chemotherapy.  He will notify us if he has any return of his abscess.  He does have intertrigo under his panniculus, I have advised him to keep this area dry, to apply topical drying/antifungal powders.  Also recommended looser clothing.  His counts are adequate to continue therapy, we will resume Taxotere today with cycle #3.  We will repeat restaging scans after 6 courses.  We are doing Xgeva every 6 weeks to coordinate with his Taxotere.  Once he finishes Taxotere we can then go back to every 4 weeks.  His next Xgeva is not due until 7/14/2022.  His calcium is running a little low, he is going to  calcium  supplement.    -6/23/2022 [facility] Oncology clinic follow-up: Chris overall is doing well on treatment with Taxotere.  Labs reviewed from yesterday and are unremarkable.  We will continue treatment today unchanged.  His suprapubic abscess resolved on clindamycin.  He will continue to use drying powder/antifungal powder under his panniculus for intertrigo.  Today will be cycle #4 of a planned 6 courses of Taxotere.  He is also on Xgeva, next dose due 7/14/2022, we are doing this every 6 weeks for now to line up with his chemotherapy, can go back to every 4 weeks after he finishes Taxotere.  Calcium from CMP yesterday was normal.    -7/13/2022 [facility] Oncology clinic follow-up: Mr. Morfin has had issues around day 3 after each treatment ranging from chest pain after his first treatment for which he did not seek medical attention, fatigue after the next few treatments, but 3 days after his most recent treatment on 6/23/2022 he had a syncopal episode at home and once again did not seek medical attention.  I discussed with him that this was not acceptable, if he has occurrences like this someone needs to call 911, it is difficult to figure out what is going on after the fact, the best time to work this up would be during the occurrence.  For now we will get labs today with CBC, CMP, PSA.  I will refer him to cardiology for further evaluation.  We will hold Taxotere most likely, I will see him tomorrow to go over his lab results.  I will also repeat his restaging scans with CT chest, abdomen and pelvis and will include CT of the head in light of his syncopal episode.      -7/13/2022 PSA 18.6    -7/14/2022 [facility] Oncology clinic follow-up: Mr. Morfin returns today to review his labs from yesterday.  Labs are unremarkable.  PSA down to 18.6 from a high of 259.0 in April prior to starting treatment.  CBC and CMP unremarkable, magnesium is normal at 2.3, phosphorus slightly low at 2.4.  We will hold therapy for now in light  of his syncopal episode on day 3 after his last treatment and prior episodes with chest pain and severe fatigue that all occurred on day 3 after his Taxotere.  We will restage him with CT head, chest, abdomen and pelvis (I am including the head in light of his syncopal episode).  I have also referred him to cardiology, he states that he received a call from them about making an appointment however he wanted to talk to us today first.  I emphasized the importance to him of making and keeping that appointment and he states understanding.  I also once again emphasized the requirement to seek emergency medical attention if he has any episodes in the future such as chest pain or syncopal events.  Whether or not we try and complete Taxotere with 2 more courses or move to the next line of therapy will be decided when he returns to discuss with Dr. Goodson and review his restaging scans.    -7/15/2022 saw Dr. Diaz cardiologist.  For syncope he ordered echocardiogram and 48-hour Holter monitor.    -7/15/2022 Holter monitor relatively benign.    -7/22/2022 CT brain with and without contrast negative.  CT chest abdomen and pelvis shows some nonspecific cecal wall thickening.  No progression in the chest.  T1 and ribs stable.  Decrease in multiple retroperitoneal and pelvic nodes.  Slight increase in right acetabular sclerotic lesion.  Persistent but improved circumferential bladder wall thickening.  Total body bone scan shows stable left third rib and no evidence of new bone metastases.    -7/19/2022 ejection fraction 65% with grade 1 diastolic dysfunction.    -7/26/2022 [facility] oncology clinic follow-up: Given presyncopal symptoms occurred 3 days after Taxotere and has not otherwise occurred any other time and his cardiology work-up is fairly unremarkable and his disease is under good control as witnessed by the report above of the CTs of head chest abdomen pelvis and bone scan, I will hold cycle 5 and 6 of Taxotere and  continue 1 now with Zytiga and prednisone.  With reference to the coincidental cecal wall thickening found on CT, we will get him to [facility] surgeons for colonoscopy.  He will see my nurse practitioner back in August for next cycle of Abiraterone prednisone.  He will continue his Lupron with Dr. Matute.  We will continue his Xgeva.  We will repeat his CT chest abdomen pelvis and bone scan again in 3 months.  He will see my nurse practitioner in the interim.    -8/23/2022 [facility] oncology clinic follow-up: No further presyncopal symptoms.  Feeling great other than for hot flashes.  Did commend for now we will continue on with his Zytiga prednisone and he will get his Xgeva today based on labs from 8/10/2022.  He opted out of getting the colonoscopy that I recommended and he will not change his mind.  He will continue getting the Lupron with Dr. Matute and I asked him to give the date of this appointment to my nurse when he sees her back in a month.  We will give his Xgeva today.  We will get CT chest abdomen pelvis and total body bone scan towards the middle to end of October.  Presently other than for the hot flashes feels great.    -9/20/2022 PSA 8.320    -9/22/2022 [facility] Oncology clinic follow-up: Chris is doing well on Zytiga, prednisone along with Xgeva.  Labs reviewed from 9/20/2022 are unremarkable, CBC was normal, CMP with creatinine of 1.34 otherwise normal, this is stable from his baseline.  PSA stable at 8.320.  He received Lupron recently with Dr. Matute, he thinks last week or so, states his next appointment is in February.  He will continue treatment unchanged.  We will repeat restaging CT chest, abdomen and pelvis and total body bone scan in October prior to return and I have ordered those today.  We will also repeat his PSA.    -9/22/2022 CT chest abdomen pelvisCompared to July 2022 [facility] showed no evidence of disease progression in the chest with no substantial sclerotic bony  lesions and decrease in size of retroperitoneal and pelvic nodes with persistent cecal wall thickening and suboptimal bladder distention.  Total body bone scan showed decrease in previously seen uptake in the left third rib with diffuse sclerosis representing treatment response with no new foci.    -10/18/2022 [facility] oncology follow-up: I reviewed bone scan and CT reports as above with no evidence of progression.  Tolerating Zytiga prednisone and Xgeva.  Getting Lupron regularly with Dr. Matute next appointment coming in February.  We will repeat his CT chest abdomen pelvis and total body bone scan in April.  We will follow with my nurse practitioner in the interim.     Prostate cancer metastatic to multiple sites (HCC)   3/15/2022 Initial Diagnosis    Prostate cancer metastatic to multiple sites (HCC)     3/15/2022 Cancer Staged    Staging form: Prostate, AJCC 8th Edition  - Clinical: Stage IVB (cT1c, cN1, cM1b, Grade Group: 5) - Signed by Fritz oGodson MD on 3/15/2022     4/7/2022 - 6/23/2022 Chemotherapy    Stopped after cycle 4 6/23/2022 due to syncope 3 days post infusions with negative cardiac work-up  OP PROSTATE DOCEtaxel     4/7/2022 -  Chemotherapy    OP SUPPORTIVE Denosumab (Xgeva) Q28D     7/26/2022 -  Chemotherapy    OP PROSTATE Abiraterone / PredniSONE           HISTORY OF PRESENT ILLNESS:  The patient is a 67 y.o. male, here for follow up on management of metastatic prostate cancer currently on therapy with abiraterone and prednisone along with Xgeva, he also is on Lupron that he receives every 6 months with urology and states next Lupron is due in February.  Mr. Morfin is doing well with no new concerns.  He has no new pain.  Has occasional hot flashes but these are tolerable.  Appetite is good, no change in his bowel or bladder habits.    Past Medical History:   Diagnosis Date   • Cancer (HCC)    • Prostate cancer (HCC)      Past Surgical History:   Procedure Laterality Date   • PROSTATE  "BIOPSY  02/2022    dr. sue       No Known Allergies    Family History and Social History reviewed and changed as necessary    REVIEW OF SYSTEM:   Occasional hot flashes otherwise no new somatic concerns    PHYSICAL EXAM:  Well-developed, well-nourished male in no distress  Lungs clear to auscultation bilaterally  Heart regular rate and rhythm  Skin without rash    Vitals:    11/16/22 0926   BP: 175/91   Pulse: 87   Resp: 20   Temp: 98.4 °F (36.9 °C)   SpO2: 96%   Weight: 125 kg (276 lb)   Height: 180.3 cm (71\")     Vitals:    11/16/22 0926   PainSc: 0-No pain          ECOG score: 0           Vitals reviewed.  Labs reviewed    ECOG: (0) Fully Active - Able to Carry On All Pre-disease Performance Without Restriction    Lab Results   Component Value Date    HGB 13.5 11/15/2022    HCT 42.3 11/15/2022    MCV 89.1 11/15/2022     11/15/2022    WBC 9.00 11/15/2022    NEUTROABS 5.34 11/15/2022    LYMPHSABS 2.53 11/15/2022    MONOSABS 0.63 11/15/2022    EOSABS 0.38 11/15/2022    BASOSABS 0.09 11/15/2022       Lab Results   Component Value Date    GLUCOSE 155 (H) 11/15/2022    BUN 19 11/15/2022    CREATININE 1.11 11/15/2022     11/15/2022    K 3.9 11/15/2022     11/15/2022    CO2 26.0 11/15/2022    CALCIUM 9.4 11/15/2022    ALBUMIN 4.60 11/15/2022    BILITOT 0.3 11/15/2022    ALKPHOS 79 11/15/2022    AST 18 11/15/2022    ALT 27 11/15/2022             ASSESSMENT & PLAN:  1.  Prostate cancer clinical T1c and 2M1B stage IVb treated with 4 cycles of Taxotere, stopped due to syncope with negative cardiac work-up, with marked drop in PSA of 18.6 from over 900 at baseline, continued on Zytiga and prednisone.  2.  Urinary retention with Goodwin in place 1/24/2022.  On finasteride 1/24/2022.  3.  Covid 19 December 2021    Oncology history timeline:  -11/29/2021  with symptoms of urinary retention.  -2/15/2022 prostate biopsy adenocarcinoma grade group 5 and 3 out of 12 cores, grade group 4 in 1 out of 12 " cores, grade group 3 in 8 out of 12 cores.  -2/24/2022 CT chest abdomen pelvis and total body bone scan shows left third rib, right acetabulum, periaortic and retroperitoneal kizzy metastases complaining of pain in the left chest and right hip.  Also possible sclerotic left 10th rib on CT.  Spleen mildly enlarged 13.8 cm.  Hepatic steatosis.  Small liver cyst.  Fat stranding in the periaortic and mesenteric region consistent with reactive/inflammatory stranding.  Multiple enlarged periaortic and retroperitoneal nodes and lymphadenopathy largest 4.9 cm.  CT chest describes tiny sclerotic foci in left eighth rib and right lateral seventh rib and T1.  Multiple small mediastinal and bilateral axillary nodes seen with small hypodense left thyroid nodule.  Creatinine 1.7.  -3/4/2022 office note Dr. Rolando Matute: Patient was seen after his elevated PSA by Dr. Vera and prostate biopsy scheduled.  However, he developed COVID-19 and this was canceled and the patient did not reschedule due to lack of insurance.  Saw Dr. Matute back on 1/24/2022 with plans to get staging CTs and bone scan with results as outlined above.  Started Casodex and to return on 3/11/2022 for Lupron.  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  TURP planned for urinary retention symptoms.    -3/15/2022 [facility] medical oncology initial consultation: I reviewed the above data with the patient.  With his fairly bulky retroperitoneal adenopathy and bone metastases including extra axial metastases, he would fit the data from the CHAARTED trial for which Taxotere x6 followed by Zytiga prednisone along with the planned Lupron already being planned by Dr. Matute would be reasonable.  Equally reasonable in terms of comparisons with bicalutamide and Lupron would be Zytiga prednisone plus Lupron or Xtandi plus Lupron or apalutamide plus Lupron.  None of these have been compared head-to-head but all  have beaten combined androgen deprivation therapy with bicalutamide and Lupron.  At the age of 67 and in order to have as many bullets as possible down the road and hence saving some of the hormone blockade options for later and using chemotherapy when he is younger and healthier for a more time-limited.,  He has opted for the Taxotere x6 followed by Zytiga and prednisone.  We will also give him Xgeva to improve bone health and given metastatic disease, as with all metastatic prostate cancer patients, he needs genetic counseling both for his sake as well as his family's.  If he were to have BRCA1 or other such  mutations, then that also opens up the options for PARP inhibitors etc. down the road.  He has his TURP scheduled for 2/24/2022 and we will start treatment a couple of weeks after that and he will get chemo preparation visit in the meantime and I will get capital surgeons to place a port.  We will check his PSA after his TURP when he sees my nurse practitioner back early April for the start of chemotherapy and repeat the PSA and CT chest abdomen pelvis and bone scan after the Taxotere is complete.    -3/31/2022 chemotherapy education and needs assessment completed    -4/7/2022 began docetaxel and Xgeva.  .0.  We will do his Xgeva every 6 weeks to coordinate with his docetaxel infusions.    -4/19/2022 patient stopped Casodex    -5/17/2022 patient reported seeing his PCP for an abscess in his suprapubic area.  Placed on clindamycin, will delay treatment 1 week.    -5/25/2022 PSA 34.3  -5/26/2022 [facility] clinic follow-up: Chris has an abscess in the suprapubic area, it has improved on antibiotic therapy but I am concerned if we treat him it will just flare back up unless it is drained.  I will get him to Dr. Miller who placed his port for I&D and culture.  He is on clindamycin and states he completes course of treatment tomorrow.  I will delay his treatment with Taxotere 1 more week.  We  will give his Xgeva today.  I will see him back in 1 week for follow-up to assess whether or not we can resume his Taxotere.  His PSA has dropped nicely with treatment thus far, PSA yesterday was 34.3 which is down from a PSA of 259.0 when we started treatment, it has been as high as 911 in November.    -6/2/2022 [facility] Oncology clinic follow-up: Chris went to see Dr. Miller to have his abscess lanced however apparently there was a long wait and he left without being seen.  He did call his PCP and was given 5 more days of clindamycin and the abscess now seems to have resolved.  We discussed today that he is at risk for recurrence of infection due to immunocompromise state with chemotherapy.  He will notify us if he has any return of his abscess.  He does have intertrigo under his panniculus, I have advised him to keep this area dry, to apply topical drying/antifungal powders.  Also recommended looser clothing.  His counts are adequate to continue therapy, we will resume Taxotere today with cycle #3.  We will repeat restaging scans after 6 courses.  We are doing Xgeva every 6 weeks to coordinate with his Taxotere.  Once he finishes Taxotere we can then go back to every 4 weeks.  His next Xgeva is not due until 7/14/2022.  His calcium is running a little low, he is going to  calcium supplement.    -6/23/2022 [facility] Oncology clinic follow-up: Chris overall is doing well on treatment with Taxotere.  Labs reviewed from yesterday and are unremarkable.  We will continue treatment today unchanged.  His suprapubic abscess resolved on clindamycin.  He will continue to use drying powder/antifungal powder under his panniculus for intertrigo.  Today will be cycle #4 of a planned 6 courses of Taxotere.  He is also on Xgeva, next dose due 7/14/2022, we are doing this every 6 weeks for now to line up with his chemotherapy, can go back to every 4 weeks after he finishes Taxotere.  Calcium from [facility] yesterday was  normal.    -7/13/2022 [facility] Oncology clinic follow-up: Mr. Morfin has had issues around day 3 after each treatment ranging from chest pain after his first treatment for which he did not seek medical attention, fatigue after the next few treatments, but 3 days after his most recent treatment on 6/23/2022 he had a syncopal episode at home and once again did not seek medical attention.  I discussed with him that this was not acceptable, if he has occurrences like this someone needs to call 911, it is difficult to figure out what is going on after the fact, the best time to work this up would be during the occurrence.  For now we will get labs today with CBC, CMP, PSA.  I will refer him to cardiology for further evaluation.  We will hold Taxotere most likely, I will see him tomorrow to go over his lab results.  I will also repeat his restaging scans with CT chest, abdomen and pelvis and will include CT of the head in light of his syncopal episode.      -7/13/2022 PSA 18.6    -7/14/2022 [facility] Oncology clinic follow-up: Mr. Morfin returns today to review his labs from yesterday.  Labs are unremarkable.  PSA down to 18.6 from a high of 259.0 in April prior to starting treatment.  CBC and CMP unremarkable, magnesium is normal at 2.3, phosphorus slightly low at 2.4.  We will hold therapy for now in light of his syncopal episode on day 3 after his last treatment and prior episodes with chest pain and severe fatigue that all occurred on day 3 after his Taxotere.  We will restage him with CT head, chest, abdomen and pelvis (I am including the head in light of his syncopal episode).  I have also referred him to cardiology, he states that he received a call from them about making an appointment however he wanted to talk to us today first.  I emphasized the importance to him of making and keeping that appointment and he states understanding.  I also once again emphasized the requirement to seek emergency medical attention if  he has any episodes in the future such as chest pain or syncopal events.  Whether or not we try and complete Taxotere with 2 more courses or move to the next line of therapy will be decided when he returns to discuss with Dr. Goodson and review his restaging scans.    -7/15/2022 saw Dr. Diaz cardiologist.  For syncope he ordered echocardiogram and 48-hour Holter monitor.    -7/15/2022 Holter monitor relatively benign.    -7/22/2022 CT brain with and without contrast negative.  CT chest abdomen and pelvis shows some nonspecific cecal wall thickening.  No progression in the chest.  T1 and ribs stable.  Decrease in multiple retroperitoneal and pelvic nodes.  Slight increase in right acetabular sclerotic lesion.  Persistent but improved circumferential bladder wall thickening.  Total body bone scan shows stable left third rib and no evidence of new bone metastases.    -7/19/2022 ejection fraction 65% with grade 1 diastolic dysfunction.    -7/26/2022 [facility] follow-up: Given presyncopal symptoms occurred 3 days after Taxotere and has not otherwise occurred any other time and his cardiology work-up is fairly unremarkable and his disease is under good control as witnessed by the report above of the CTs of head chest abdomen pelvis and bone scan, I will hold cycle 5 and 6 of Taxotere and continue 1 now with Zytiga and prednisone.  With reference to the coincidental cecal wall thickening found on CT, we will get him to [facility] surgeons for colonoscopy.  He will see my nurse practitioner back in August for next cycle of Abiraterone prednisone.  He will continue his Lupron with Dr. Matute.  We will continue his Xgeva.  We will repeat his CT chest abdomen pelvis and bone scan again in 3 months.  He will see my nurse practitioner in the interim.    -8/23/2022 [facility] follow-up: No further presyncopal symptoms.  Feeling great other than for hot flashes.  Did commend for now we will continue on  with his Zytiga prednisone and he will get his Xgeva today based on labs from 8/10/2022.  He opted out of getting the colonoscopy that I recommended and he will not change his mind.  He will continue getting the Lupron with Dr. Matute and I asked him to give the date of this appointment to my nurse when he sees her back in a month.  We will give his Xgeva today.  We will get CT chest abdomen pelvis and total body bone scan towards the middle to end of October.  Presently other than for the hot flashes feels great.    -9/20/2022 PSA 8.320    -9/22/2022 [facility] follow-up: Chris is doing well on Zytiga, prednisone along with Xgeva.  Labs reviewed from 9/20/2022 are unremarkable, CBC was normal, CMP with creatinine of 1.34 otherwise normal, this is stable from his baseline.  PSA stable at 8.320.  He received Lupron recently with Dr. Matute, he thinks last week or so, states his next appointment is in February.  He will continue treatment unchanged.  We will repeat restaging CT chest, abdomen and pelvis and total body bone scan in October prior to return and I have ordered those today.  We will also repeat his PSA.    -9/22/2022 CT chest abdomen pelvisCompared to July 2022 [facility] showed no evidence of disease progression in the chest with no substantial sclerotic bony lesions and decrease in size of retroperitoneal and pelvic nodes with persistent cecal wall thickening and suboptimal bladder distention.  Total body bone scan showed decrease in previously seen uptake in the left third rib with diffuse sclerosis representing treatment response with no new foci.    -10/18/2022 [facility] follow-up: I reviewed bone scan and CT reports as above with no evidence of progression.  Tolerating Zytiga prednisone and Xgeva.  Getting Lupron regularly with Dr. Matute next appointment coming in February.  We will repeat his CT chest abdomen pelvis and total body bone scan in April.  We  will follow with my nurse practitioner in the interim.    -11/16/2022 [facility] clinic follow-up: Mr. Morfin continues to do well, he is tolerating therapy with Zytiga, prednisone and Xgeva with no unusual side effects.  He has occasional hot flashes but these are tolerable.  He also continues on Lupron with Dr. Bennett next Lupron injection in February.  We will continue therapy unchanged.  His labs as shown above are unremarkable.  PSA was not drawn but this months labs, we will repeat that next month.  We will repeat his restaging scans in April unless symptoms dictate the need to do those sooner or if he has any rise in his PSA of concern over time.    Return to clinic in 2 months for follow-up    This was a level 4, moderate MDM visit with management of side effects of therapy, drug therapy requiring intensive monitoring for toxicity and review of labs.    Susana Torres, APRN    11/16/2022

## 2022-12-13 ENCOUNTER — LAB (OUTPATIENT)
Dept: ONCOLOGY | Facility: HOSPITAL | Age: 67
End: 2022-12-13

## 2022-12-13 VITALS
SYSTOLIC BLOOD PRESSURE: 158 MMHG | BODY MASS INDEX: 38.55 KG/M2 | TEMPERATURE: 98.3 F | WEIGHT: 276.4 LBS | RESPIRATION RATE: 18 BRPM | DIASTOLIC BLOOD PRESSURE: 86 MMHG | HEART RATE: 94 BPM

## 2022-12-13 DIAGNOSIS — C61 PROSTATE CANCER METASTATIC TO MULTIPLE SITES: ICD-10-CM

## 2022-12-13 PROCEDURE — 36415 COLL VENOUS BLD VENIPUNCTURE: CPT

## 2022-12-14 ENCOUNTER — INFUSION (OUTPATIENT)
Dept: ONCOLOGY | Facility: HOSPITAL | Age: 67
End: 2022-12-14

## 2022-12-14 VITALS
HEART RATE: 67 BPM | SYSTOLIC BLOOD PRESSURE: 156 MMHG | TEMPERATURE: 98.8 F | DIASTOLIC BLOOD PRESSURE: 89 MMHG | RESPIRATION RATE: 18 BRPM | WEIGHT: 276 LBS | BODY MASS INDEX: 38.49 KG/M2

## 2022-12-14 DIAGNOSIS — C61 PROSTATE CANCER METASTATIC TO MULTIPLE SITES: Primary | ICD-10-CM

## 2022-12-14 LAB
ALBUMIN SERPL-MCNC: 4.2 G/DL (ref 3.5–5.2)
ALBUMIN/GLOB SERPL: 1.8 G/DL
ALP SERPL-CCNC: 83 U/L (ref 39–117)
ALT SERPL-CCNC: 16 U/L (ref 1–41)
AST SERPL-CCNC: 13 U/L (ref 1–40)
BASOPHILS # BLD AUTO: 0.06 10*3/MM3 (ref 0–0.2)
BASOPHILS NFR BLD AUTO: 0.7 % (ref 0–1.5)
BILIRUB SERPL-MCNC: 0.4 MG/DL (ref 0–1.2)
BUN SERPL-MCNC: 22 MG/DL (ref 8–23)
BUN/CREAT SERPL: 20.4 (ref 7–25)
CALCIUM SERPL-MCNC: 9.1 MG/DL (ref 8.6–10.5)
CHLORIDE SERPL-SCNC: 106 MMOL/L (ref 98–107)
CO2 SERPL-SCNC: 26 MMOL/L (ref 22–29)
CREAT SERPL-MCNC: 1.08 MG/DL (ref 0.76–1.27)
EGFRCR SERPLBLD CKD-EPI 2021: 75.2 ML/MIN/1.73
EOSINOPHIL # BLD AUTO: 0.26 10*3/MM3 (ref 0–0.4)
EOSINOPHIL NFR BLD AUTO: 3.1 % (ref 0.3–6.2)
ERYTHROCYTE [DISTWIDTH] IN BLOOD BY AUTOMATED COUNT: 13 % (ref 12.3–15.4)
GLOBULIN SER CALC-MCNC: 2.3 GM/DL
GLUCOSE SERPL-MCNC: 161 MG/DL (ref 65–99)
HCT VFR BLD AUTO: 39.7 % (ref 37.5–51)
HGB BLD-MCNC: 13.4 G/DL (ref 13–17.7)
IMM GRANULOCYTES # BLD AUTO: 0.03 10*3/MM3 (ref 0–0.05)
IMM GRANULOCYTES NFR BLD AUTO: 0.4 % (ref 0–0.5)
LYMPHOCYTES # BLD AUTO: 2.42 10*3/MM3 (ref 0.7–3.1)
LYMPHOCYTES NFR BLD AUTO: 28.6 % (ref 19.6–45.3)
MAGNESIUM SERPL-MCNC: 2.1 MG/DL (ref 1.6–2.4)
MCH RBC QN AUTO: 28.8 PG (ref 26.6–33)
MCHC RBC AUTO-ENTMCNC: 33.8 G/DL (ref 31.5–35.7)
MCV RBC AUTO: 85.4 FL (ref 79–97)
MONOCYTES # BLD AUTO: 0.55 10*3/MM3 (ref 0.1–0.9)
MONOCYTES NFR BLD AUTO: 6.5 % (ref 5–12)
NEUTROPHILS # BLD AUTO: 5.13 10*3/MM3 (ref 1.7–7)
NEUTROPHILS NFR BLD AUTO: 60.7 % (ref 42.7–76)
NRBC BLD AUTO-RTO: 0 /100 WBC (ref 0–0.2)
PHOSPHATE SERPL-MCNC: 2.9 MG/DL (ref 2.5–4.5)
PLATELET # BLD AUTO: 212 10*3/MM3 (ref 140–450)
POTASSIUM SERPL-SCNC: 4.2 MMOL/L (ref 3.5–5.2)
PROT SERPL-MCNC: 6.5 G/DL (ref 6–8.5)
PSA SERPL-MCNC: 4.04 NG/ML (ref 0–4)
RBC # BLD AUTO: 4.65 10*6/MM3 (ref 4.14–5.8)
SODIUM SERPL-SCNC: 144 MMOL/L (ref 136–145)
WBC # BLD AUTO: 8.45 10*3/MM3 (ref 3.4–10.8)

## 2022-12-14 PROCEDURE — 25010000002 DENOSUMAB 120 MG/1.7ML SOLUTION: Performed by: NURSE PRACTITIONER

## 2022-12-14 PROCEDURE — 96372 THER/PROPH/DIAG INJ SC/IM: CPT

## 2022-12-14 RX ADMIN — DENOSUMAB 120 MG: 120 INJECTION SUBCUTANEOUS at 08:49

## 2022-12-15 ENCOUNTER — SPECIALTY PHARMACY (OUTPATIENT)
Dept: ONCOLOGY | Facility: HOSPITAL | Age: 67
End: 2022-12-15

## 2022-12-15 NOTE — PROGRESS NOTES
Specialty Pharmacy Refill Coordination Note     Chris is a 67 y.o. male contacted today regarding refills of abiraterone 1000mg PO QD specialty medication(s).    Specialty medication(s) and dose(s) confirmed: yes    Refill Questions    Flowsheet Row Most Recent Value   Changes to allergies? No   Changes to medications? No   New conditions since last clinic visit No   Unplanned office visit, urgent care, ED, or hospital admission in the last 4 weeks  No   How does patient/caregiver feel medication is working? Good   Financial problems or insurance changes  No   Since the previous refill, were any specialty medication doses or scheduled injections missed or delayed?  No   Does this patient require a clinical escalation to a pharmacist? No          Delivery Questions    Flowsheet Row Most Recent Value   Delivery method [employer]   Delivery address correct? Yes   Delivery phone number [phone]   Preferred delivery time? Anytime   Number of medications in delivery 1   Medication being filled and delivered abiraterone   Doses left of specialty medications 3 days left   Is there any medication that is due not being filled? No   Supplies needed? No supplies needed   Cooler needed? No   Do any medications need mixed or dated? No   Additional comments no copay   Questions or concerns for the pharmacist? No   Explain any questions or concerns for the pharmacist n/a   Are any medications first time fills? No                 Follow-up: 28 day(s)     Catalina Montes De Oca, Pharmacy Technician  Specialty Pharmacy Technician

## 2023-01-09 ENCOUNTER — SPECIALTY PHARMACY (OUTPATIENT)
Dept: ONCOLOGY | Facility: HOSPITAL | Age: 68
End: 2023-01-09
Payer: MEDICARE

## 2023-01-09 DIAGNOSIS — C61 PROSTATE CANCER METASTATIC TO MULTIPLE SITES: ICD-10-CM

## 2023-01-09 RX ORDER — ROSUVASTATIN CALCIUM 20 MG/1
TABLET, COATED ORAL
Qty: 90 TABLET | Refills: 1 | Status: SHIPPED | OUTPATIENT
Start: 2023-01-09

## 2023-01-09 RX ORDER — ABIRATERONE 500 MG/1
1000 TABLET ORAL DAILY
Qty: 60 TABLET | Refills: 11 | Status: SHIPPED | OUTPATIENT
Start: 2023-01-09

## 2023-01-09 NOTE — PROGRESS NOTES
Re: Refills of Oral Specialty Medication - Zytiga (abiraterone acetate)    • Drug-Drug Interactions: The current medication list was reviewed and there are no relevant drug-drug interactions.  • Medication Allergies: The patient has NKDA  • Review of Labs/Dose Adjustments: NO DOSE CHANGE - I reviewed the most recent note and labs and the patient will continue without any dose changes.  I sent refills as described below.    Drug: Zytiga (abiraterone acetate)  Strength: 500 mg  Directions: Take 2 tablets by mouth daily  Quantity: 60  Refills: 11  Pharmacy prescription sent to: [facility]    Karen Pardo, CeasarD, [facility]  Oncology Clinical Pharmacist  1/9/2023  12:59 EST

## 2023-01-10 ENCOUNTER — LAB (OUTPATIENT)
Dept: ONCOLOGY | Facility: HOSPITAL | Age: 68
End: 2023-01-10
Payer: MEDICARE

## 2023-01-10 VITALS
DIASTOLIC BLOOD PRESSURE: 87 MMHG | HEART RATE: 73 BPM | BODY MASS INDEX: 38.77 KG/M2 | TEMPERATURE: 97 F | WEIGHT: 278 LBS | SYSTOLIC BLOOD PRESSURE: 159 MMHG | RESPIRATION RATE: 18 BRPM

## 2023-01-10 DIAGNOSIS — C61 PROSTATE CANCER METASTATIC TO MULTIPLE SITES: ICD-10-CM

## 2023-01-10 PROCEDURE — 36415 COLL VENOUS BLD VENIPUNCTURE: CPT

## 2023-01-11 ENCOUNTER — OFFICE VISIT (OUTPATIENT)
Dept: ONCOLOGY | Facility: CLINIC | Age: 68
End: 2023-01-11
Payer: MEDICARE

## 2023-01-11 ENCOUNTER — INFUSION (OUTPATIENT)
Dept: ONCOLOGY | Facility: HOSPITAL | Age: 68
End: 2023-01-11
Payer: MEDICARE

## 2023-01-11 VITALS
HEIGHT: 71 IN | SYSTOLIC BLOOD PRESSURE: 161 MMHG | HEART RATE: 80 BPM | DIASTOLIC BLOOD PRESSURE: 91 MMHG | OXYGEN SATURATION: 98 % | WEIGHT: 279 LBS | BODY MASS INDEX: 39.06 KG/M2 | TEMPERATURE: 98.4 F | RESPIRATION RATE: 20 BRPM

## 2023-01-11 DIAGNOSIS — C61 PROSTATE CANCER METASTATIC TO MULTIPLE SITES: Primary | ICD-10-CM

## 2023-01-11 LAB
ALBUMIN SERPL-MCNC: 4.7 G/DL (ref 3.5–5.2)
ALBUMIN/GLOB SERPL: 2.5 G/DL
ALP SERPL-CCNC: 92 U/L (ref 39–117)
ALT SERPL-CCNC: 23 U/L (ref 1–41)
AST SERPL-CCNC: 20 U/L (ref 1–40)
BASOPHILS # BLD AUTO: 0.07 10*3/MM3 (ref 0–0.2)
BASOPHILS NFR BLD AUTO: 0.8 % (ref 0–1.5)
BILIRUB SERPL-MCNC: 0.5 MG/DL (ref 0–1.2)
BUN SERPL-MCNC: 22 MG/DL (ref 8–23)
BUN/CREAT SERPL: 19.6 (ref 7–25)
CALCIUM SERPL-MCNC: 9.2 MG/DL (ref 8.6–10.5)
CHLORIDE SERPL-SCNC: 106 MMOL/L (ref 98–107)
CO2 SERPL-SCNC: 25.9 MMOL/L (ref 22–29)
CREAT SERPL-MCNC: 1.12 MG/DL (ref 0.76–1.27)
EGFRCR SERPLBLD CKD-EPI 2021: 72 ML/MIN/1.73
EOSINOPHIL # BLD AUTO: 0.22 10*3/MM3 (ref 0–0.4)
EOSINOPHIL NFR BLD AUTO: 2.6 % (ref 0.3–6.2)
ERYTHROCYTE [DISTWIDTH] IN BLOOD BY AUTOMATED COUNT: 13 % (ref 12.3–15.4)
GLOBULIN SER CALC-MCNC: 1.9 GM/DL
GLUCOSE SERPL-MCNC: 123 MG/DL (ref 65–99)
HCT VFR BLD AUTO: 40.9 % (ref 37.5–51)
HGB BLD-MCNC: 13.4 G/DL (ref 13–17.7)
IMM GRANULOCYTES # BLD AUTO: 0.04 10*3/MM3 (ref 0–0.05)
IMM GRANULOCYTES NFR BLD AUTO: 0.5 % (ref 0–0.5)
LYMPHOCYTES # BLD AUTO: 2.22 10*3/MM3 (ref 0.7–3.1)
LYMPHOCYTES NFR BLD AUTO: 26.2 % (ref 19.6–45.3)
MAGNESIUM SERPL-MCNC: 2.2 MG/DL (ref 1.6–2.4)
MCH RBC QN AUTO: 29.1 PG (ref 26.6–33)
MCHC RBC AUTO-ENTMCNC: 32.8 G/DL (ref 31.5–35.7)
MCV RBC AUTO: 88.7 FL (ref 79–97)
MONOCYTES # BLD AUTO: 0.62 10*3/MM3 (ref 0.1–0.9)
MONOCYTES NFR BLD AUTO: 7.3 % (ref 5–12)
NEUTROPHILS # BLD AUTO: 5.3 10*3/MM3 (ref 1.7–7)
NEUTROPHILS NFR BLD AUTO: 62.6 % (ref 42.7–76)
NRBC BLD AUTO-RTO: 0 /100 WBC (ref 0–0.2)
PHOSPHATE SERPL-MCNC: 3.1 MG/DL (ref 2.5–4.5)
PLATELET # BLD AUTO: 234 10*3/MM3 (ref 140–450)
POTASSIUM SERPL-SCNC: 4.2 MMOL/L (ref 3.5–5.2)
PROT SERPL-MCNC: 6.6 G/DL (ref 6–8.5)
PSA SERPL-MCNC: 3.45 NG/ML (ref 0–4)
RBC # BLD AUTO: 4.61 10*6/MM3 (ref 4.14–5.8)
SODIUM SERPL-SCNC: 143 MMOL/L (ref 136–145)
WBC # BLD AUTO: 8.47 10*3/MM3 (ref 3.4–10.8)

## 2023-01-11 PROCEDURE — 96372 THER/PROPH/DIAG INJ SC/IM: CPT

## 2023-01-11 PROCEDURE — 25010000002 DENOSUMAB 120 MG/1.7ML SOLUTION: Performed by: NURSE PRACTITIONER

## 2023-01-11 PROCEDURE — 99214 OFFICE O/P EST MOD 30 MIN: CPT | Performed by: NURSE PRACTITIONER

## 2023-01-11 RX ADMIN — DENOSUMAB 120 MG: 120 INJECTION SUBCUTANEOUS at 10:03

## 2023-01-11 NOTE — PROGRESS NOTES
CHIEF COMPLAINT: Prostate cancer    Problem List:  Oncology/Hematology History Overview Note   1.  Prostate cancer clinical T1c and 2M1B stage IVb treated with 4 cycles of Taxotere, stopped due to syncope with negative cardiac work-up, with marked drop in PSA of 18.6 from over 900 at baseline, continued on Zytiga and prednisone.  2.  Urinary retention with Goodwin in place 1/24/2022.  On finasteride 1/24/2022.  3.  Covid 19 December 2021    Oncology history timeline:  -11/29/2021  with symptoms of urinary retention.  -2/15/2022 prostate biopsy adenocarcinoma grade group 5 and 3 out of 12 cores, grade group 4 in 1 out of 12 cores, grade group 3 in 8 out of 12 cores.  -2/24/2022 CT chest abdomen pelvis and total body bone scan shows left third rib, right acetabulum, periaortic and retroperitoneal kizzy metastases complaining of pain in the left chest and right hip.  Also possible sclerotic left 10th rib on CT.  Spleen mildly enlarged 13.8 cm.  Hepatic steatosis.  Small liver cyst.  Fat stranding in the periaortic and mesenteric region consistent with reactive/inflammatory stranding.  Multiple enlarged periaortic and retroperitoneal nodes and lymphadenopathy largest 4.9 cm.  CT chest describes tiny sclerotic foci in left eighth rib and right lateral seventh rib and T1.  Multiple small mediastinal and bilateral axillary nodes seen with small hypodense left thyroid nodule.  Creatinine 1.7.  -3/4/2022 office note Dr. Rolando Matute: Patient was seen after his elevated PSA by Dr. Vera and prostate biopsy scheduled.  However, he developed COVID-19 and this was canceled and the patient did not reschedule due to lack of insurance.  Saw Dr. Matute back on 1/24/2022 with plans to get staging CTs and bone scan with results as outlined above.  Started Casodex and to return on 3/11/2022 for Lupron.  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  TURP planned for urinary  retention symptoms.    -3/15/2022 [facility] medical oncology initial consultation: I reviewed the above data with the patient.  With his fairly bulky retroperitoneal adenopathy and bone metastases including extra axial metastases, he would fit the data from the CHAARTED trial for which Taxotere x6 followed by Zytiga prednisone along with the planned Lupron already being planned by Dr. Matute would be reasonable.  Equally reasonable in terms of comparisons with bicalutamide and Lupron would be Zytiga prednisone plus Lupron or Xtandi plus Lupron or apalutamide plus Lupron.  None of these have been compared head-to-head but all have beaten combined androgen deprivation therapy with bicalutamide and Lupron.  At the age of 67 and in order to have as many bullets as possible down the road and hence saving some of the hormone blockade options for later and using chemotherapy when he is younger and healthier for a more time-limited.,  He has opted for the Taxotere x6 followed by Zytiga and prednisone.  We will also give him Xgeva to improve bone health and given metastatic disease, as with all metastatic prostate cancer patients, he needs genetic counseling both for his sake as well as his family's.  If he were to have BRCA1 or other such  mutations, then that also opens up the options for PARP inhibitors etc. down the road.  He has his TURP scheduled for 2/24/2022 and we will start treatment a couple of weeks after that and he will get chemo preparation visit in the meantime and I will get capital surgeons to place a port.  We will check his PSA after his TURP when he sees my nurse practitioner back early April for the start of chemotherapy and repeat the PSA and CT chest abdomen pelvis and bone scan after the Taxotere is complete.    -3/31/2022 chemotherapy education and needs assessment completed    -4/7/2022 began docetaxel and Xgeva.  .0.  We will do his Xgeva every 6 weeks to coordinate with his docetaxel  infusions.    -4/19/2022 patient stopped Casodex    -5/17/2022 patient reported seeing his PCP for an abscess in his suprapubic area.  Placed on clindamycin, will delay treatment 1 week.    -5/25/2022 PSA 34.3  -5/26/2022 [facility] Oncology clinic follow-up: Chris has an abscess in the suprapubic area, it has improved on antibiotic therapy but I am concerned if we treat him it will just flare back up unless it is drained.  I will get him to Dr. Miller who placed his port for I&D and culture.  He is on clindamycin and states he completes course of treatment tomorrow.  I will delay his treatment with Taxotere 1 more week.  We will give his Xgeva today.  I will see him back in 1 week for follow-up to assess whether or not we can resume his Taxotere.  His PSA has dropped nicely with treatment thus far, PSA yesterday was 34.3 which is down from a PSA of 259.0 when we started treatment, it has been as high as 911 in November.    -6/2/2022 [facility] Oncology clinic follow-up: Chris went to see Dr. Miller to have his abscess lanced however apparently there was a long wait and he left without being seen.  He did call his PCP and was given 5 more days of clindamycin and the abscess now seems to have resolved.  We discussed today that he is at risk for recurrence of infection due to immunocompromise state with chemotherapy.  He will notify us if he has any return of his abscess.  He does have intertrigo under his panniculus, I have advised him to keep this area dry, to apply topical drying/antifungal powders.  Also recommended looser clothing.  His counts are adequate to continue therapy, we will resume Taxotere today with cycle #3.  We will repeat restaging scans after 6 courses.  We are doing Xgeva every 6 weeks to coordinate with his Taxotere.  Once he finishes Taxotere we can then go back to every 4 weeks.  His next Xgeva is not due until 7/14/2022.  His calcium is running a little low, he is going to  calcium  supplement.    -6/23/2022 [facility] Oncology clinic follow-up: Chris overall is doing well on treatment with Taxotere.  Labs reviewed from yesterday and are unremarkable.  We will continue treatment today unchanged.  His suprapubic abscess resolved on clindamycin.  He will continue to use drying powder/antifungal powder under his panniculus for intertrigo.  Today will be cycle #4 of a planned 6 courses of Taxotere.  He is also on Xgeva, next dose due 7/14/2022, we are doing this every 6 weeks for now to line up with his chemotherapy, can go back to every 4 weeks after he finishes Taxotere.  Calcium from CMP yesterday was normal.    -7/13/2022 [facility] Oncology clinic follow-up: Mr. Morfin has had issues around day 3 after each treatment ranging from chest pain after his first treatment for which he did not seek medical attention, fatigue after the next few treatments, but 3 days after his most recent treatment on 6/23/2022 he had a syncopal episode at home and once again did not seek medical attention.  I discussed with him that this was not acceptable, if he has occurrences like this someone needs to call 911, it is difficult to figure out what is going on after the fact, the best time to work this up would be during the occurrence.  For now we will get labs today with CBC, CMP, PSA.  I will refer him to cardiology for further evaluation.  We will hold Taxotere most likely, I will see him tomorrow to go over his lab results.  I will also repeat his restaging scans with CT chest, abdomen and pelvis and will include CT of the head in light of his syncopal episode.      -7/13/2022 PSA 18.6    -7/14/2022 [facility] Oncology clinic follow-up: Mr. Morfin returns today to review his labs from yesterday.  Labs are unremarkable.  PSA down to 18.6 from a high of 259.0 in April prior to starting treatment.  CBC and CMP unremarkable, magnesium is normal at 2.3, phosphorus slightly low at 2.4.  We will hold therapy for now in light  of his syncopal episode on day 3 after his last treatment and prior episodes with chest pain and severe fatigue that all occurred on day 3 after his Taxotere.  We will restage him with CT head, chest, abdomen and pelvis (I am including the head in light of his syncopal episode).  I have also referred him to cardiology, he states that he received a call from them about making an appointment however he wanted to talk to us today first.  I emphasized the importance to him of making and keeping that appointment and he states understanding.  I also once again emphasized the requirement to seek emergency medical attention if he has any episodes in the future such as chest pain or syncopal events.  Whether or not we try and complete Taxotere with 2 more courses or move to the next line of therapy will be decided when he returns to discuss with Dr. Goodson and review his restaging scans.    -7/15/2022 saw Dr. Diaz cardiologist.  For syncope he ordered echocardiogram and 48-hour Holter monitor.    -7/15/2022 Holter monitor relatively benign.    -7/22/2022 CT brain with and without contrast negative.  CT chest abdomen and pelvis shows some nonspecific cecal wall thickening.  No progression in the chest.  T1 and ribs stable.  Decrease in multiple retroperitoneal and pelvic nodes.  Slight increase in right acetabular sclerotic lesion.  Persistent but improved circumferential bladder wall thickening.  Total body bone scan shows stable left third rib and no evidence of new bone metastases.    -7/19/2022 ejection fraction 65% with grade 1 diastolic dysfunction.    -7/26/2022 [facility] oncology clinic follow-up: Given presyncopal symptoms occurred 3 days after Taxotere and has not otherwise occurred any other time and his cardiology work-up is fairly unremarkable and his disease is under good control as witnessed by the report above of the CTs of head chest abdomen pelvis and bone scan, I will hold cycle 5 and 6 of Taxotere and  continue 1 now with Zytiga and prednisone.  With reference to the coincidental cecal wall thickening found on CT, we will get him to [facility] surgeons for colonoscopy.  He will see my nurse practitioner back in August for next cycle of Abiraterone prednisone.  He will continue his Lupron with Dr. Matute.  We will continue his Xgeva.  We will repeat his CT chest abdomen pelvis and bone scan again in 3 months.  He will see my nurse practitioner in the interim.    -8/23/2022 [facility] oncology clinic follow-up: No further presyncopal symptoms.  Feeling great other than for hot flashes.  Did commend for now we will continue on with his Zytiga prednisone and he will get his Xgeva today based on labs from 8/10/2022.  He opted out of getting the colonoscopy that I recommended and he will not change his mind.  He will continue getting the Lupron with Dr. Matute and I asked him to give the date of this appointment to my nurse when he sees her back in a month.  We will give his Xgeva today.  We will get CT chest abdomen pelvis and total body bone scan towards the middle to end of October.  Presently other than for the hot flashes feels great.    -9/20/2022 PSA 8.320    -9/22/2022 [facility] Oncology clinic follow-up: Chris is doing well on Zytiga, prednisone along with Xgeva.  Labs reviewed from 9/20/2022 are unremarkable, CBC was normal, CMP with creatinine of 1.34 otherwise normal, this is stable from his baseline.  PSA stable at 8.320.  He received Lupron recently with Dr. Matute, he thinks last week or so, states his next appointment is in February.  He will continue treatment unchanged.  We will repeat restaging CT chest, abdomen and pelvis and total body bone scan in October prior to return and I have ordered those today.  We will also repeat his PSA.    -9/22/2022 CT chest abdomen pelvisCompared to July 2022 [facility] showed no evidence of disease progression in the chest with no substantial sclerotic bony  lesions and decrease in size of retroperitoneal and pelvic nodes with persistent cecal wall thickening and suboptimal bladder distention.  Total body bone scan showed decrease in previously seen uptake in the left third rib with diffuse sclerosis representing treatment response with no new foci.    -10/18/2022 [facility] oncology follow-up: I reviewed bone scan and CT reports as above with no evidence of progression.  Tolerating Zytiga prednisone and Xgeva.  Getting Lupron regularly with Dr. Matute next appointment coming in February.  We will repeat his CT chest abdomen pelvis and total body bone scan in April.  We will follow with my nurse practitioner in the interim.    -1/10/2023 PSA 3.450     Prostate cancer metastatic to multiple sites (HCC)   3/15/2022 Initial Diagnosis    Prostate cancer metastatic to multiple sites (HCC)     3/15/2022 Cancer Staged    Staging form: Prostate, AJCC 8th Edition  - Clinical: Stage IVB (cT1c, cN1, cM1b, Grade Group: 5) - Signed by Fritz Goodson MD on 3/15/2022     4/7/2022 - 6/23/2022 Chemotherapy    Stopped after cycle 4 6/23/2022 due to syncope 3 days post infusions with negative cardiac work-up  OP PROSTATE DOCEtaxel     4/7/2022 -  Chemotherapy    OP SUPPORTIVE Denosumab (Xgeva) Q28D     7/26/2022 -  Chemotherapy    OP PROSTATE Abiraterone / PredniSONE           HISTORY OF PRESENT ILLNESS:  The patient is a 67 y.o. male, here for follow up on management of metastatic prostate cancer currently on therapy with abiraterone and prednisone along with Xgeva, he also is on Lupron that he receives every 6 months with urology and states next Lupron is due in February.  Mr. Morfin continues to do well on current therapy with no new concerns.  He denies any pain.  He reports that he has been gaining weight, he thinks that the prednisone is making him more hungry.  He states that he feels better than he has in some time.  No change in his bowel or bladder habits.      Past  "Medical History:   Diagnosis Date   • Cancer (HCC)    • Prostate cancer (HCC)      Past Surgical History:   Procedure Laterality Date   • PROSTATE BIOPSY  02/2022    dr. sue       No Known Allergies    Family History and Social History reviewed and changed as necessary    REVIEW OF SYSTEM:   No new somatic concerns    PHYSICAL EXAM:  Well-developed, well-nourished male in no distress  Lungs clear to auscultation bilaterally  Heart regular rate and rhythm      Vitals:    01/11/23 0926   BP: 161/91   Pulse: 80   Resp: 20   Temp: 98.4 °F (36.9 °C)   SpO2: 98%   Weight: 127 kg (279 lb)   Height: 180.3 cm (71\")     Vitals:    01/11/23 0926   PainSc: 0-No pain          ECOG score: 0           Vitals reviewed.  Labs reviewed    ECOG: (0) Fully Active - Able to Carry On All Pre-disease Performance Without Restriction    Lab Results   Component Value Date    HGB 13.4 01/10/2023    HCT 40.9 01/10/2023    MCV 88.7 01/10/2023     01/10/2023    WBC 8.47 01/10/2023    NEUTROABS 5.30 01/10/2023    LYMPHSABS 2.22 01/10/2023    MONOSABS 0.62 01/10/2023    EOSABS 0.22 01/10/2023    BASOSABS 0.07 01/10/2023       Lab Results   Component Value Date    GLUCOSE 123 (H) 01/10/2023    BUN 22 01/10/2023    CREATININE 1.12 01/10/2023     01/10/2023    K 4.2 01/10/2023     01/10/2023    CO2 25.9 01/10/2023    CALCIUM 9.2 01/10/2023    ALBUMIN 4.7 01/10/2023    BILITOT 0.5 01/10/2023    ALKPHOS 92 01/10/2023    AST 20 01/10/2023    ALT 23 01/10/2023     Lab Results   Component Value Date    PSA 3.450 01/10/2023    PSA 4.040 (H) 12/13/2022    PSA 5.500 (H) 10/18/2022    PSA 8.320 (H) 09/20/2022    PSA 8.3 (H) 08/23/2022    PSA 18.6 (H) 07/13/2022             ASSESSMENT & PLAN:  1.  Prostate cancer clinical T1c and 2M1B stage IVb treated with 4 cycles of Taxotere, stopped due to syncope with negative cardiac work-up, with marked drop in PSA of 18.6 from over 900 at baseline, continued on Zytiga and prednisone.  2.  " Urinary retention with Goodwin in place 1/24/2022.  On finasteride 1/24/2022.  3.  Covid 19 December 2021    Oncology history timeline:  -11/29/2021  with symptoms of urinary retention.  -2/15/2022 prostate biopsy adenocarcinoma grade group 5 and 3 out of 12 cores, grade group 4 in 1 out of 12 cores, grade group 3 in 8 out of 12 cores.  -2/24/2022 CT chest abdomen pelvis and total body bone scan shows left third rib, right acetabulum, periaortic and retroperitoneal kizzy metastases complaining of pain in the left chest and right hip.  Also possible sclerotic left 10th rib on CT.  Spleen mildly enlarged 13.8 cm.  Hepatic steatosis.  Small liver cyst.  Fat stranding in the periaortic and mesenteric region consistent with reactive/inflammatory stranding.  Multiple enlarged periaortic and retroperitoneal nodes and lymphadenopathy largest 4.9 cm.  CT chest describes tiny sclerotic foci in left eighth rib and right lateral seventh rib and T1.  Multiple small mediastinal and bilateral axillary nodes seen with small hypodense left thyroid nodule.  Creatinine 1.7.  -3/4/2022 office note Dr. Rolando Matute: Patient was seen after his elevated PSA by Dr. Vera and prostate biopsy scheduled.  However, he developed COVID-19 and this was canceled and the patient did not reschedule due to lack of insurance.  Saw Dr. Matute back on 1/24/2022 with plans to get staging CTs and bone scan with results as outlined above.  Started Casodex and to return on 3/11/2022 for Lupron.  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  TURP planned for urinary retention symptoms.    -3/15/2022 [facility] medical oncology initial consultation: I reviewed the above data with the patient.  With his fairly bulky retroperitoneal adenopathy and bone metastases including extra axial metastases, he would fit the data from the CHAARTED trial for which Taxotere x6 followed by Zytiga prednisone along  with the planned Lupron already being planned by Dr. Matute would be reasonable.  Equally reasonable in terms of comparisons with bicalutamide and Lupron would be Zytiga prednisone plus Lupron or Xtandi plus Lupron or apalutamide plus Lupron.  None of these have been compared head-to-head but all have beaten combined androgen deprivation therapy with bicalutamide and Lupron.  At the age of 67 and in order to have as many bullets as possible down the road and hence saving some of the hormone blockade options for later and using chemotherapy when he is younger and healthier for a more time-limited.,  He has opted for the Taxotere x6 followed by Zytiga and prednisone.  We will also give him Xgeva to improve bone health and given metastatic disease, as with all metastatic prostate cancer patients, he needs genetic counseling both for his sake as well as his family's.  If he were to have BRCA1 or other such  mutations, then that also opens up the options for PARP inhibitors etc. down the road.  He has his TURP scheduled for 2/24/2022 and we will start treatment a couple of weeks after that and he will get chemo preparation visit in the meantime and I will get capital surgeons to place a port.  We will check his PSA after his TURP when he sees my nurse practitioner back early April for the start of chemotherapy and repeat the PSA and CT chest abdomen pelvis and bone scan after the Taxotere is complete.    -3/31/2022 chemotherapy education and needs assessment completed    -4/7/2022 began docetaxel and Xgeva.  .0.  We will do his Xgeva every 6 weeks to coordinate with his docetaxel infusions.    -4/19/2022 patient stopped Casodex    -5/17/2022 patient reported seeing his PCP for an abscess in his suprapubic area.  Placed on clindamycin, will delay treatment 1 week.    -5/25/2022 PSA 34.3  -5/26/2022 [facility] clinic follow-up: Chris has an abscess in the suprapubic area, it has improved on antibiotic  therapy but I am concerned if we treat him it will just flare back up unless it is drained.  I will get him to Dr. Miller who placed his port for I&D and culture.  He is on clindamycin and states he completes course of treatment tomorrow.  I will delay his treatment with Taxotere 1 more week.  We will give his Xgeva today.  I will see him back in 1 week for follow-up to assess whether or not we can resume his Taxotere.  His PSA has dropped nicely with treatment thus far, PSA yesterday was 34.3 which is down from a PSA of 259.0 when we started treatment, it has been as high as 911 in November.    -6/2/2022 [facility] [place] Oncology clinic follow-up: Chris went to see Dr. Miller to have his abscess lanced however apparently there was a long wait and he left without being seen.  He did call his PCP and was given 5 more days of clindamycin and the abscess now seems to have resolved.  We discussed today that he is at risk for recurrence of infection due to immunocompromise state with chemotherapy.  He will notify us if he has any return of his abscess.  He does have intertrigo under his panniculus, I have advised him to keep this area dry, to apply topical drying/antifungal powders.  Also recommended looser clothing.  His counts are adequate to continue therapy, we will resume Taxotere today with cycle #3.  We will repeat restaging scans after 6 courses.  We are doing Xgeva every 6 weeks to coordinate with his Taxotere.  Once he finishes Taxotere we can then go back to every 4 weeks.  His next Xgeva is not due until 7/14/2022.  His calcium is running a little low, he is going to  calcium supplement.    -6/23/2022 [facility] [place] Oncology clinic follow-up: Chris overall is doing well on treatment with Taxotere.  Labs reviewed from yesterday and are unremarkable.  We will continue treatment today unchanged.  His suprapubic abscess resolved on clindamycin.  He will continue to use drying powder/antifungal powder under his panniculus  for intertrigo.  Today will be cycle #4 of a planned 6 courses of Taxotere.  He is also on Xgeva, next dose due 7/14/2022, we are doing this every 6 weeks for now to line up with his chemotherapy, can go back to every 4 weeks after he finishes Taxotere.  Calcium from CMP yesterday was normal.    -7/13/2022 [facility] Oncology clinic follow-up: Mr. Morfin has had issues around day 3 after each treatment ranging from chest pain after his first treatment for which he did not seek medical attention, fatigue after the next few treatments, but 3 days after his most recent treatment on 6/23/2022 he had a syncopal episode at home and once again did not seek medical attention.  I discussed with him that this was not acceptable, if he has occurrences like this someone needs to call 911, it is difficult to figure out what is going on after the fact, the best time to work this up would be during the occurrence.  For now we will get labs today with CBC, CMP, PSA.  I will refer him to cardiology for further evaluation.  We will hold Taxotere most likely, I will see him tomorrow to go over his lab results.  I will also repeat his restaging scans with CT chest, abdomen and pelvis and will include CT of the head in light of his syncopal episode.      -7/13/2022 PSA 18.6    -7/14/2022 [facility] Oncology clinic follow-up: Mr. Morfin returns today to review his labs from yesterday.  Labs are unremarkable.  PSA down to 18.6 from a high of 259.0 in April prior to starting treatment.  CBC and CMP unremarkable, magnesium is normal at 2.3, phosphorus slightly low at 2.4.  We will hold therapy for now in light of his syncopal episode on day 3 after his last treatment and prior episodes with chest pain and severe fatigue that all occurred on day 3 after his Taxotere.  We will restage him with CT head, chest, abdomen and pelvis (I am including the head in light of his syncopal episode).  I have also referred him to cardiology, he states that he  received a call from them about making an appointment however he wanted to talk to us today first.  I emphasized the importance to him of making and keeping that appointment and he states understanding.  I also once again emphasized the requirement to seek emergency medical attention if he has any episodes in the future such as chest pain or syncopal events.  Whether or not we try and complete Taxotere with 2 more courses or move to the next line of therapy will be decided when he returns to discuss with Dr. Goodson and review his restaging scans.    -7/15/2022 saw Dr. Diaz cardiologist.  For syncope he ordered echocardiogram and 48-hour Holter monitor.    -7/15/2022 Holter monitor relatively benign.    -7/22/2022 CT brain with and without contrast negative.  CT chest abdomen and pelvis shows some nonspecific cecal wall thickening.  No progression in the chest.  T1 and ribs stable.  Decrease in multiple retroperitoneal and pelvic nodes.  Slight increase in right acetabular sclerotic lesion.  Persistent but improved circumferential bladder wall thickening.  Total body bone scan shows stable left third rib and no evidence of new bone metastases.    -7/19/2022 ejection fraction 65% with grade 1 diastolic dysfunction.    -7/26/2022 [facility] follow-up: Given presyncopal symptoms occurred 3 days after Taxotere and has not otherwise occurred any other time and his cardiology work-up is fairly unremarkable and his disease is under good control as witnessed by the report above of the CTs of head chest abdomen pelvis and bone scan, I will hold cycle 5 and 6 of Taxotere and continue 1 now with Zytiga and prednisone.  With reference to the coincidental cecal wall thickening found on CT, we will get him to [facility] surgeons for colonoscopy.  He will see my nurse practitioner back in August for next cycle of Abiraterone prednisone.  He will continue his Lupron with Dr. Matute.  We will continue his Xgeva.  We  will repeat his CT chest abdomen pelvis and bone scan again in 3 months.  He will see my nurse practitioner in the interim.    -8/23/2022 [facility] clinic follow-up: No further presyncopal symptoms.  Feeling great other than for hot flashes.  Did commend for now we will continue on with his Zytiga prednisone and he will get his Xgeva today based on labs from 8/10/2022.  He opted out of getting the colonoscopy that I recommended and he will not change his mind.  He will continue getting the Lupron with Dr. Matute and I asked him to give the date of this appointment to my nurse when he sees her back in a month.  We will give his Xgeva today.  We will get CT chest abdomen pelvis and total body bone scan towards the middle to end of October.  Presently other than for the hot flashes feels great.    -9/20/2022 PSA 8.320    -9/22/2022 [facility] follow-up: Chris is doing well on Zytiga, prednisone along with Xgeva.  Labs reviewed from 9/20/2022 are unremarkable, CBC was normal, CMP with creatinine of 1.34 otherwise normal, this is stable from his baseline.  PSA stable at 8.320.  He received Lupron recently with Dr. Matute, he thinks last week or so, states his next appointment is in February.  He will continue treatment unchanged.  We will repeat restaging CT chest, abdomen and pelvis and total body bone scan in October prior to return and I have ordered those today.  We will also repeat his PSA.    -9/22/2022 CT chest abdomen pelvisCompared to July 2022 [facility] showed no evidence of disease progression in the chest with no substantial sclerotic bony lesions and decrease in size of retroperitoneal and pelvic nodes with persistent cecal wall thickening and suboptimal bladder distention.  Total body bone scan showed decrease in previously seen uptake in the left third rib with diffuse sclerosis representing treatment response with no new foci.    -10/18/2022 [facility]  follow-up: I reviewed bone scan and CT reports as above with no evidence of progression.  Tolerating Zytiga prednisone and Xgeva.  Getting Lupron regularly with Dr. Matute next appointment coming in February.  We will repeat his CT chest abdomen pelvis and total body bone scan in April.  We will follow with my nurse practitioner in the interim.    -11/16/2022 [facility] clinic follow-up: Mr. Morfin continues to do well, he is tolerating therapy with Zytiga, prednisone and Xgeva with no unusual side effects.  He has occasional hot flashes but these are tolerable.  He also continues on Lupron with Dr. Matute next Lupron injection in February.  We will continue therapy unchanged.  His labs as shown above are unremarkable.  PSA was not drawn but this months labs, we will repeat that next month.  We will repeat his restaging scans in April unless symptoms dictate the need to do those sooner or if he has any rise in his PSA of concern over time.    -1/10/2023 PSA 3.450  -1/11/2023 [facility] clinic follow-up: Mr. Morfin continues to do well on current therapy with Zytiga, prednisone and Xgeva with no unusual side effects.  He has no new worrisome symptoms.  He is due for his next Lupron injection in February with Dr. Matute.  His PSA continues to trend downward, current PSA from yesterday was 3.450.  We will continue therapy unchanged, he will receive Xgeva today.  Has had no hypocalcemia, his CMP, phosphorus and magnesium are all normal.  We will repeat restaging scans in April.    Return to clinic in 2 months for follow-up    I spent 30 minutes caring for Chris on this date of service. This time includes time spent by me in the following activities: preparing for the visit, reviewing tests, performing a medically appropriate examination and/or evaluation, ordering medications, tests, or procedures and documenting information in the medical record.     Susana Torres, APRN    01/11/2023

## 2023-01-18 ENCOUNTER — SPECIALTY PHARMACY (OUTPATIENT)
Dept: ONCOLOGY | Facility: HOSPITAL | Age: 68
End: 2023-01-18
Payer: MEDICARE

## 2023-01-18 ENCOUNTER — OFFICE VISIT (OUTPATIENT)
Dept: FAMILY MEDICINE CLINIC | Facility: CLINIC | Age: 68
End: 2023-01-18
Payer: MEDICARE

## 2023-01-18 VITALS
WEIGHT: 283 LBS | BODY MASS INDEX: 39.62 KG/M2 | SYSTOLIC BLOOD PRESSURE: 162 MMHG | HEART RATE: 75 BPM | HEIGHT: 71 IN | OXYGEN SATURATION: 96 % | DIASTOLIC BLOOD PRESSURE: 100 MMHG

## 2023-01-18 DIAGNOSIS — I10 ESSENTIAL HYPERTENSION: Primary | ICD-10-CM

## 2023-01-18 PROCEDURE — 99213 OFFICE O/P EST LOW 20 MIN: CPT | Performed by: STUDENT IN AN ORGANIZED HEALTH CARE EDUCATION/TRAINING PROGRAM

## 2023-01-18 RX ORDER — LOSARTAN POTASSIUM 25 MG/1
25 TABLET ORAL DAILY
Qty: 30 TABLET | Refills: 1 | Status: SHIPPED | OUTPATIENT
Start: 2023-01-18 | End: 2023-02-15 | Stop reason: SDUPTHER

## 2023-01-18 NOTE — PROGRESS NOTES
Specialty Pharmacy Patient Management Program  Oncology 6-Month Clinical Assessment       Chris Morfin is a 67 y.o. male with prostate cancer seen today to assess adherence and side effects.    Regimen: abiraterone 1000mg PO daily + prednisone 5mg PO BID (local pharmacy)    Reason for Outreach: Routine medication check-in .       Problem list reviewed by Karen Pardo PharmALEX on 1/18/2023 at 10:03 AM  Medicines reviewed by Karne Pardo PharmD on 1/18/2023 at 10:03 AM  Allergies reviewed by Karen Pardo PharmD on 1/18/2023 at 10:02 AM     Goals     •  Specialty Pharmacy General Goal (pt-stated)       Clinical goal/therapeutic target: stable or decreasing PSA and disease control              Medication Assessment:  • Medication Assessment  o Follow Up Clinical Assessment  o Medication(s) assessed: abiraterone  o Therapeutic appropriateness: Appropriate  o Medication tolerability: Tolerating with no to minimal ADRs  o Medication plan: Continue therapy with normal follow-up  o Quality of Life Improvement Scale: Moderately better  o Administration: Patient is taking every day at the same time .   o Patient can self administer medications: yes  o Medication Follow-Up Plan: routine follow-up  • Drug-drug interactions  o Completed medication reconciliation today to assess for drug interactions. Patient denies starting or stopping any medications.    o Assessed medication list for interactions, no significant drug interactions noted.   o Advised patient to call the clinic if any new medications are started so we can assess for drug-drug interactions.  • Discussed drug-food interactions, as relevant  • Vaccines are coordinated by the patient's oncologist and primary care provider.    Adherence Assessment:  Adherence Questions  Medication(s) assessed: abiraterone  On average, how many doses/injections does the patient miss per month?: 0  What are the identified reasons for non-adherence or missed doses? : no problems  identfied  What is the estimated medication adherence level?: % (Patient has a PDC of 100%.)  Based on the patient/caregiver response and refill history, does this patient require an MTP to track adherence improvements?: no    Quality of Life Assessment:  Quality of Life Assessment  Quality of Life Improvement Scale: Moderately better  -- Quality of Life: 8/10    Financial Assessment:  Medication availability/affordability: Patient has had no issues obtaining medication from pharmacy.      All questions addressed and patient had no additional concerns. Patient has pharmacy contact information.    Karen Pardo PharmD, [facility]  Oncology Clinical Pharmacist   [phone]

## 2023-01-18 NOTE — PROGRESS NOTES
"Chief Complaint  Blood Pressure Check    Subjective          Chris Morfin presents to [facility]  History of Present Illness    Patient is here for evaluation of his blood pressure.  He states that he has been told several times by his oncologist that he needs to have evaluation for his hypertension.  He states that prior to treatment for his prostate cancer he did not have any trouble with blood pressure.  He states that since he has been going for treatment he has had elevated blood pressures both in office and at home.  He denies any chest pain, shortness of breath, or headache.  He does not have any lower extremity edema.    Objective   Vital Signs:   /100 (BP Location: Left arm, Patient Position: Sitting, Cuff Size: Large Adult)   Pulse 75   Ht 180.3 cm (71\")   Wt 128 kg (283 lb)   SpO2 96%   BMI 39.47 kg/m²     Body mass index is 39.47 kg/m².    Review of Systems    Past History:  Medical History: has a past medical history of Cancer (HCC) and Prostate cancer (HCC).   Surgical History: has a past surgical history that includes Prostate biopsy (02/2022).   Family History: family history includes Cancer in his brother and sister.   Social History: reports that he has never smoked. He has never used smokeless tobacco. He reports that he does not currently use alcohol. He reports current drug use. Drug: Marijuana.      Current Outpatient Medications:   •  abiraterone acetate (ZYTIGA) 500 MG chemo tablet, Take 2 tablets by mouth Daily., Disp: 60 tablet, Rfl: 11  •  lidocaine-prilocaine (EMLA) 2.5-2.5 % cream, Apply 1 application topically to the appropriate area as directed As Needed (prior to port access)., Disp: 30 g, Rfl: 3  •  losartan (Cozaar) 25 MG tablet, Take 1 tablet by mouth Daily., Disp: 30 tablet, Rfl: 1  •  ondansetron (ZOFRAN) 8 MG tablet, Take 1 tablet by mouth 3 (Three) Times a Day As Needed for Nausea or Vomiting., Disp: 30 tablet, Rfl: 5  •  predniSONE " (DELTASONE) 5 MG tablet, Take 1 tablet by mouth 2 (Two) Times a Day., Disp: 60 tablet, Rfl: 11  •  rosuvastatin (CRESTOR) 20 MG tablet, TAKE ONE (1) TABLET BY MOUTH DAILY., Disp: 90 tablet, Rfl: 1    Allergies: Patient has no known allergies.    Physical Exam  Constitutional:       General: He is not in acute distress.     Appearance: He is not ill-appearing or toxic-appearing.   HENT:      Head: Normocephalic and atraumatic.   Cardiovascular:      Rate and Rhythm: Normal rate and regular rhythm.      Heart sounds: No murmur heard.  Pulmonary:      Effort: Pulmonary effort is normal. No respiratory distress.   Neurological:      General: No focal deficit present.      Mental Status: He is alert and oriented to person, place, and time.   Psychiatric:         Mood and Affect: Mood normal.         Thought Content: Thought content normal.          Result Review :                   Assessment and Plan    Diagnoses and all orders for this visit:    1. Essential hypertension (Primary)    Other orders  -     losartan (Cozaar) 25 MG tablet; Take 1 tablet by mouth Daily.  Dispense: 30 tablet; Refill: 1    Will start losartan 25 mg daily.  BMP in 2 weeks.  Follow-up with any new or worsening symptoms. Continue to monitor blood pressure at home at least 2-3 times per week.    Follow Up   No follow-ups on file.  Patient was given instructions and counseling regarding his condition or for health maintenance advice. Please see specific information pulled into the AVS if appropriate.     Teri Ram, DO

## 2023-01-19 ENCOUNTER — SPECIALTY PHARMACY (OUTPATIENT)
Dept: ONCOLOGY | Facility: HOSPITAL | Age: 68
End: 2023-01-19
Payer: MEDICARE

## 2023-01-19 NOTE — PROGRESS NOTES
Specialty Pharmacy Refill Coordination Note     Chris is a 67 y.o. male contacted today regarding refills of  abiraterone 1000mg PO QD specialty medication(s).      Specialty medication(s) and dose(s) confirmed: yes    Refill Questions    Flowsheet Row Most Recent Value   Changes to allergies? No   Changes to medications? No   New conditions since last clinic visit No   Unplanned office visit, urgent care, ED, or hospital admission in the last 4 weeks  No   How does patient/caregiver feel medication is working? Good   Financial problems or insurance changes  No   Since the previous refill, were any specialty medication doses or scheduled injections missed or delayed?  No   Does this patient require a clinical escalation to a pharmacist? No          Delivery Questions    Flowsheet Row Most Recent Value   Delivery method FedEx   Delivery address correct? Yes   Delivery phone number [phone]   Preferred delivery time? Anytime   Number of medications in delivery 1   Medication being filled and delivered abiraterone   Doses left of specialty medications 10 days left   Is there any medication that is due not being filled? No   Supplies needed? No supplies needed   Cooler needed? No   Do any medications need mixed or dated? No   Copay form of payment Credit card on file   Additional comments $1.45   Questions or concerns for the pharmacist? No   Explain any questions or concerns for the pharmacist n/a   Are any medications first time fills? No                 Follow-up: 28 day(s)     Catalina Montes De Oca, Pharmacy Technician  Specialty Pharmacy Technician

## 2023-02-07 ENCOUNTER — LAB (OUTPATIENT)
Dept: ONCOLOGY | Facility: HOSPITAL | Age: 68
End: 2023-02-07
Payer: MEDICARE

## 2023-02-07 VITALS
WEIGHT: 284 LBS | RESPIRATION RATE: 18 BRPM | DIASTOLIC BLOOD PRESSURE: 81 MMHG | BODY MASS INDEX: 39.61 KG/M2 | TEMPERATURE: 98.9 F | HEART RATE: 77 BPM | SYSTOLIC BLOOD PRESSURE: 156 MMHG

## 2023-02-07 DIAGNOSIS — C61 PROSTATE CANCER METASTATIC TO MULTIPLE SITES: ICD-10-CM

## 2023-02-07 PROCEDURE — 36415 COLL VENOUS BLD VENIPUNCTURE: CPT

## 2023-02-07 PROCEDURE — G0463 HOSPITAL OUTPT CLINIC VISIT: HCPCS

## 2023-02-08 ENCOUNTER — INFUSION (OUTPATIENT)
Dept: ONCOLOGY | Facility: HOSPITAL | Age: 68
End: 2023-02-08
Payer: MEDICARE

## 2023-02-08 VITALS
BODY MASS INDEX: 39.78 KG/M2 | RESPIRATION RATE: 17 BRPM | SYSTOLIC BLOOD PRESSURE: 153 MMHG | HEART RATE: 82 BPM | WEIGHT: 285.2 LBS | TEMPERATURE: 98.2 F | DIASTOLIC BLOOD PRESSURE: 85 MMHG

## 2023-02-08 DIAGNOSIS — C61 PROSTATE CANCER METASTATIC TO MULTIPLE SITES: Primary | ICD-10-CM

## 2023-02-08 LAB
ALBUMIN SERPL-MCNC: 4.6 G/DL (ref 3.5–5.2)
ALBUMIN/GLOB SERPL: 1.9 G/DL
ALP SERPL-CCNC: 86 U/L (ref 39–117)
ALT SERPL-CCNC: 23 U/L (ref 1–41)
AST SERPL-CCNC: 18 U/L (ref 1–40)
BASOPHILS # BLD AUTO: 0.1 10*3/MM3 (ref 0–0.2)
BASOPHILS NFR BLD AUTO: 1.1 % (ref 0–1.5)
BILIRUB SERPL-MCNC: 0.4 MG/DL (ref 0–1.2)
BUN SERPL-MCNC: 25 MG/DL (ref 8–23)
BUN/CREAT SERPL: 21.2 (ref 7–25)
CALCIUM SERPL-MCNC: 9.4 MG/DL (ref 8.6–10.5)
CHLORIDE SERPL-SCNC: 105 MMOL/L (ref 98–107)
CO2 SERPL-SCNC: 26.5 MMOL/L (ref 22–29)
CREAT SERPL-MCNC: 1.18 MG/DL (ref 0.76–1.27)
EGFRCR SERPLBLD CKD-EPI 2021: 67.6 ML/MIN/1.73
EOSINOPHIL # BLD AUTO: 0.25 10*3/MM3 (ref 0–0.4)
EOSINOPHIL NFR BLD AUTO: 2.8 % (ref 0.3–6.2)
ERYTHROCYTE [DISTWIDTH] IN BLOOD BY AUTOMATED COUNT: 12.5 % (ref 12.3–15.4)
GLOBULIN SER CALC-MCNC: 2.4 GM/DL
GLUCOSE SERPL-MCNC: 105 MG/DL (ref 65–99)
HCT VFR BLD AUTO: 41.7 % (ref 37.5–51)
HGB BLD-MCNC: 13.9 G/DL (ref 13–17.7)
IMM GRANULOCYTES # BLD AUTO: 0.04 10*3/MM3 (ref 0–0.05)
IMM GRANULOCYTES NFR BLD AUTO: 0.4 % (ref 0–0.5)
LYMPHOCYTES # BLD AUTO: 2.61 10*3/MM3 (ref 0.7–3.1)
LYMPHOCYTES NFR BLD AUTO: 28.8 % (ref 19.6–45.3)
MAGNESIUM SERPL-MCNC: 2.4 MG/DL (ref 1.6–2.4)
MCH RBC QN AUTO: 28.8 PG (ref 26.6–33)
MCHC RBC AUTO-ENTMCNC: 33.3 G/DL (ref 31.5–35.7)
MCV RBC AUTO: 86.5 FL (ref 79–97)
MONOCYTES # BLD AUTO: 0.73 10*3/MM3 (ref 0.1–0.9)
MONOCYTES NFR BLD AUTO: 8.1 % (ref 5–12)
NEUTROPHILS # BLD AUTO: 5.33 10*3/MM3 (ref 1.7–7)
NEUTROPHILS NFR BLD AUTO: 58.8 % (ref 42.7–76)
NRBC BLD AUTO-RTO: 0 /100 WBC (ref 0–0.2)
PHOSPHATE SERPL-MCNC: 3.7 MG/DL (ref 2.5–4.5)
PLATELET # BLD AUTO: 243 10*3/MM3 (ref 140–450)
POTASSIUM SERPL-SCNC: 4 MMOL/L (ref 3.5–5.2)
PROT SERPL-MCNC: 7 G/DL (ref 6–8.5)
PSA SERPL-MCNC: 3.25 NG/ML (ref 0–4)
RBC # BLD AUTO: 4.82 10*6/MM3 (ref 4.14–5.8)
SODIUM SERPL-SCNC: 143 MMOL/L (ref 136–145)
WBC # BLD AUTO: 9.06 10*3/MM3 (ref 3.4–10.8)

## 2023-02-08 PROCEDURE — 25010000002 DENOSUMAB 120 MG/1.7ML SOLUTION: Performed by: NURSE PRACTITIONER

## 2023-02-08 PROCEDURE — 96372 THER/PROPH/DIAG INJ SC/IM: CPT

## 2023-02-08 RX ADMIN — DENOSUMAB 120 MG: 120 INJECTION SUBCUTANEOUS at 10:37

## 2023-02-15 ENCOUNTER — OFFICE VISIT (OUTPATIENT)
Dept: FAMILY MEDICINE CLINIC | Facility: CLINIC | Age: 68
End: 2023-02-15
Payer: MEDICARE

## 2023-02-15 VITALS
OXYGEN SATURATION: 100 % | WEIGHT: 286 LBS | HEART RATE: 80 BPM | SYSTOLIC BLOOD PRESSURE: 160 MMHG | DIASTOLIC BLOOD PRESSURE: 88 MMHG | BODY MASS INDEX: 40.04 KG/M2 | HEIGHT: 71 IN

## 2023-02-15 DIAGNOSIS — I10 ESSENTIAL HYPERTENSION: Primary | ICD-10-CM

## 2023-02-15 PROCEDURE — 99213 OFFICE O/P EST LOW 20 MIN: CPT | Performed by: STUDENT IN AN ORGANIZED HEALTH CARE EDUCATION/TRAINING PROGRAM

## 2023-02-15 RX ORDER — LOSARTAN POTASSIUM 25 MG/1
TABLET ORAL
Qty: 30 TABLET | Refills: 1 | OUTPATIENT
Start: 2023-02-15

## 2023-02-15 RX ORDER — LOSARTAN POTASSIUM 25 MG/1
50 TABLET ORAL DAILY
Qty: 30 TABLET | Refills: 1 | Status: SHIPPED | OUTPATIENT
Start: 2023-02-15 | End: 2023-03-20

## 2023-02-15 NOTE — PROGRESS NOTES
"Chief Complaint  Hypertension    Subjective          Chris Morfin presents to [facility]  History of Present Illness    Patient states that he has been doing well with his blood pressure medication. He does not check his blood pressure at home. He denies any chest pain, SOA, HA, or LW edema. He does not check his BP at home as he does not have a BP cuff at home.     Objective   Vital Signs:   /88 (BP Location: Left arm, Patient Position: Sitting, Cuff Size: Large Adult)   Pulse 80   Ht 180.3 cm (71\")   Wt 130 kg (286 lb)   SpO2 100%   BMI 39.89 kg/m²     Body mass index is 39.89 kg/m².    Review of Systems    Past History:  Medical History: has a past medical history of Cancer (HCC) and Prostate cancer (HCC).   Surgical History: has a past surgical history that includes Prostate biopsy (02/2022).   Family History: family history includes Cancer in his brother and sister.   Social History: reports that he has never smoked. He has never used smokeless tobacco. He reports that he does not currently use alcohol. He reports current drug use. Drug: Marijuana.      Current Outpatient Medications:   •  losartan (Cozaar) 25 MG tablet, Take 2 tablets by mouth Daily., Disp: 30 tablet, Rfl: 1  •  abiraterone acetate (ZYTIGA) 500 MG chemo tablet, Take 2 tablets by mouth Daily., Disp: 60 tablet, Rfl: 11  •  lidocaine-prilocaine (EMLA) 2.5-2.5 % cream, Apply 1 application topically to the appropriate area as directed As Needed (prior to port access)., Disp: 30 g, Rfl: 3  •  ondansetron (ZOFRAN) 8 MG tablet, Take 1 tablet by mouth 3 (Three) Times a Day As Needed for Nausea or Vomiting., Disp: 30 tablet, Rfl: 5  •  predniSONE (DELTASONE) 5 MG tablet, Take 1 tablet by mouth 2 (Two) Times a Day., Disp: 60 tablet, Rfl: 11  •  rosuvastatin (CRESTOR) 20 MG tablet, TAKE ONE (1) TABLET BY MOUTH DAILY., Disp: 90 tablet, Rfl: 1    Allergies: Patient has no known allergies.    Physical " Exam  Constitutional:       General: He is not in acute distress.     Appearance: He is not ill-appearing or toxic-appearing.   HENT:      Head: Normocephalic and atraumatic.   Cardiovascular:      Rate and Rhythm: Normal rate and regular rhythm.      Heart sounds: No murmur heard.  Pulmonary:      Effort: Pulmonary effort is normal. No respiratory distress.   Neurological:      General: No focal deficit present.      Mental Status: He is alert and oriented to person, place, and time.   Psychiatric:         Mood and Affect: Mood normal.         Thought Content: Thought content normal.          Result Review :                   Assessment and Plan    Diagnoses and all orders for this visit:    1. Essential hypertension (Primary)  -     Basic Metabolic Panel; Future    Other orders  -     losartan (Cozaar) 25 MG tablet; Take 2 tablets by mouth Daily.  Dispense: 30 tablet; Refill: 1    Will increase from Losartan form 25mg to 50mg. Will have him return in 2 weeks for BMP. BP cuff order given to patient. Call with any new or worsening symptoms.     Follow Up   No follow-ups on file.  Patient was given instructions and counseling regarding his condition or for health maintenance advice. Please see specific information pulled into the AVS if appropriate.     Teri Ram DO

## 2023-02-17 ENCOUNTER — SPECIALTY PHARMACY (OUTPATIENT)
Dept: ONCOLOGY | Facility: HOSPITAL | Age: 68
End: 2023-02-17
Payer: MEDICARE

## 2023-02-17 NOTE — PROGRESS NOTES
Specialty Pharmacy Refill Coordination Note     Chris is a 67 y.o. male contacted today regarding refills of  abiraterone 1000mg PO QD specialty medication(s).    Patient request medication be mailed out on 2/22/23 for delivery on 2/23/23    Specialty medication(s) and dose(s) confirmed: yes    Refill Questions    Flowsheet Row Most Recent Value   Changes to allergies? No   Changes to medications? No   New conditions since last clinic visit No   Unplanned office visit, urgent care, ED, or hospital admission in the last 4 weeks  No   How does patient/caregiver feel medication is working? Good   Financial problems or insurance changes  No   Since the previous refill, were any specialty medication doses or scheduled injections missed or delayed?  No   Does this patient require a clinical escalation to a pharmacist? No          Delivery Questions    Flowsheet Row Most Recent Value   Delivery method FedEx   Delivery address correct? Yes   Delivery phone number [phone]   Preferred delivery time? Anytime   Number of medications in delivery 1   Medication being filled and delivered abiraterone   Doses left of specialty medications 7 days left   Is there any medication that is due not being filled? No   Supplies needed? No supplies needed   Cooler needed? No   Do any medications need mixed or dated? No   Additional comments no copay   Questions or concerns for the pharmacist? No   Explain any questions or concerns for the pharmacist n/a   Are any medications first time fills? No                 Follow-up: 28 day(s)     Catalina Montes De Oca, Pharmacy Technician  Specialty Pharmacy Technician

## 2023-03-07 ENCOUNTER — LAB (OUTPATIENT)
Dept: ONCOLOGY | Facility: HOSPITAL | Age: 68
End: 2023-03-07
Payer: MEDICARE

## 2023-03-07 VITALS
RESPIRATION RATE: 18 BRPM | TEMPERATURE: 98.6 F | WEIGHT: 286 LBS | DIASTOLIC BLOOD PRESSURE: 89 MMHG | HEART RATE: 70 BPM | BODY MASS INDEX: 39.89 KG/M2 | SYSTOLIC BLOOD PRESSURE: 171 MMHG

## 2023-03-07 DIAGNOSIS — C61 PROSTATE CANCER METASTATIC TO MULTIPLE SITES: ICD-10-CM

## 2023-03-07 PROCEDURE — G0463 HOSPITAL OUTPT CLINIC VISIT: HCPCS

## 2023-03-07 PROCEDURE — 36415 COLL VENOUS BLD VENIPUNCTURE: CPT

## 2023-03-08 ENCOUNTER — OFFICE VISIT (OUTPATIENT)
Dept: ONCOLOGY | Facility: CLINIC | Age: 68
End: 2023-03-08
Payer: MEDICARE

## 2023-03-08 ENCOUNTER — INFUSION (OUTPATIENT)
Dept: ONCOLOGY | Facility: HOSPITAL | Age: 68
End: 2023-03-08
Payer: MEDICARE

## 2023-03-08 VITALS
DIASTOLIC BLOOD PRESSURE: 88 MMHG | BODY MASS INDEX: 40.18 KG/M2 | TEMPERATURE: 98.4 F | HEART RATE: 80 BPM | OXYGEN SATURATION: 96 % | SYSTOLIC BLOOD PRESSURE: 160 MMHG | HEIGHT: 71 IN | RESPIRATION RATE: 20 BRPM | WEIGHT: 287 LBS

## 2023-03-08 DIAGNOSIS — C61 PROSTATE CANCER METASTATIC TO MULTIPLE SITES: Primary | ICD-10-CM

## 2023-03-08 LAB
ALBUMIN SERPL-MCNC: 4.3 G/DL (ref 3.5–5.2)
ALBUMIN/GLOB SERPL: 1.7 G/DL
ALP SERPL-CCNC: 86 U/L (ref 39–117)
ALT SERPL-CCNC: 25 U/L (ref 1–41)
AST SERPL-CCNC: 19 U/L (ref 1–40)
BASOPHILS # BLD AUTO: 0.08 10*3/MM3 (ref 0–0.2)
BASOPHILS NFR BLD AUTO: 1 % (ref 0–1.5)
BILIRUB SERPL-MCNC: 0.3 MG/DL (ref 0–1.2)
BUN SERPL-MCNC: 23 MG/DL (ref 8–23)
BUN/CREAT SERPL: 19.7 (ref 7–25)
CALCIUM SERPL-MCNC: 9.4 MG/DL (ref 8.6–10.5)
CHLORIDE SERPL-SCNC: 106 MMOL/L (ref 98–107)
CO2 SERPL-SCNC: 26.9 MMOL/L (ref 22–29)
CREAT SERPL-MCNC: 1.17 MG/DL (ref 0.76–1.27)
EGFRCR SERPLBLD CKD-EPI 2021: 67.9 ML/MIN/1.73
EOSINOPHIL # BLD AUTO: 0.2 10*3/MM3 (ref 0–0.4)
EOSINOPHIL NFR BLD AUTO: 2.5 % (ref 0.3–6.2)
ERYTHROCYTE [DISTWIDTH] IN BLOOD BY AUTOMATED COUNT: 12.3 % (ref 12.3–15.4)
GLOBULIN SER CALC-MCNC: 2.6 GM/DL
GLUCOSE SERPL-MCNC: 102 MG/DL (ref 65–99)
HCT VFR BLD AUTO: 39.6 % (ref 37.5–51)
HGB BLD-MCNC: 13.2 G/DL (ref 13–17.7)
IMM GRANULOCYTES # BLD AUTO: 0.03 10*3/MM3 (ref 0–0.05)
IMM GRANULOCYTES NFR BLD AUTO: 0.4 % (ref 0–0.5)
LYMPHOCYTES # BLD AUTO: 2.22 10*3/MM3 (ref 0.7–3.1)
LYMPHOCYTES NFR BLD AUTO: 28.1 % (ref 19.6–45.3)
MAGNESIUM SERPL-MCNC: 2.2 MG/DL (ref 1.6–2.4)
MCH RBC QN AUTO: 29 PG (ref 26.6–33)
MCHC RBC AUTO-ENTMCNC: 33.3 G/DL (ref 31.5–35.7)
MCV RBC AUTO: 87 FL (ref 79–97)
MONOCYTES # BLD AUTO: 0.65 10*3/MM3 (ref 0.1–0.9)
MONOCYTES NFR BLD AUTO: 8.2 % (ref 5–12)
NEUTROPHILS # BLD AUTO: 4.71 10*3/MM3 (ref 1.7–7)
NEUTROPHILS NFR BLD AUTO: 59.8 % (ref 42.7–76)
NRBC BLD AUTO-RTO: 0 /100 WBC (ref 0–0.2)
PHOSPHATE SERPL-MCNC: 3.9 MG/DL (ref 2.5–4.5)
PLATELET # BLD AUTO: 216 10*3/MM3 (ref 140–450)
POTASSIUM SERPL-SCNC: 3.9 MMOL/L (ref 3.5–5.2)
PROT SERPL-MCNC: 6.9 G/DL (ref 6–8.5)
PSA SERPL-MCNC: 2.46 NG/ML (ref 0–4)
RBC # BLD AUTO: 4.55 10*6/MM3 (ref 4.14–5.8)
SODIUM SERPL-SCNC: 143 MMOL/L (ref 136–145)
WBC # BLD AUTO: 7.89 10*3/MM3 (ref 3.4–10.8)

## 2023-03-08 PROCEDURE — 96372 THER/PROPH/DIAG INJ SC/IM: CPT

## 2023-03-08 PROCEDURE — 99214 OFFICE O/P EST MOD 30 MIN: CPT | Performed by: NURSE PRACTITIONER

## 2023-03-08 PROCEDURE — 25010000002 DENOSUMAB 120 MG/1.7ML SOLUTION: Performed by: NURSE PRACTITIONER

## 2023-03-08 RX ADMIN — DENOSUMAB 120 MG: 120 INJECTION SUBCUTANEOUS at 10:25

## 2023-03-08 NOTE — PROGRESS NOTES
CHIEF COMPLAINT:  Intermittent back pain    Problem List:  Oncology/Hematology History Overview Note   1.  Prostate cancer clinical T1c and 2M1B stage IVb treated with 4 cycles of Taxotere, stopped due to syncope with negative cardiac work-up, with marked drop in PSA of 18.6 from over 900 at baseline, continued on Zytiga and prednisone.  2.  Urinary retention with Goodwin in place 1/24/2022.  On finasteride 1/24/2022.  3.  Covid 19 December 2021    Oncology history timeline:  -11/29/2021  with symptoms of urinary retention.  -2/15/2022 prostate biopsy adenocarcinoma grade group 5 and 3 out of 12 cores, grade group 4 in 1 out of 12 cores, grade group 3 in 8 out of 12 cores.  -2/24/2022 CT chest abdomen pelvis and total body bone scan shows left third rib, right acetabulum, periaortic and retroperitoneal kizzy metastases complaining of pain in the left chest and right hip.  Also possible sclerotic left 10th rib on CT.  Spleen mildly enlarged 13.8 cm.  Hepatic steatosis.  Small liver cyst.  Fat stranding in the periaortic and mesenteric region consistent with reactive/inflammatory stranding.  Multiple enlarged periaortic and retroperitoneal nodes and lymphadenopathy largest 4.9 cm.  CT chest describes tiny sclerotic foci in left eighth rib and right lateral seventh rib and T1.  Multiple small mediastinal and bilateral axillary nodes seen with small hypodense left thyroid nodule.  Creatinine 1.7.  -3/4/2022 office note Dr. Rolando Matute: Patient was seen after his elevated PSA by Dr. Vera and prostate biopsy scheduled.  However, he developed COVID-19 and this was canceled and the patient did not reschedule due to lack of insurance.  Saw Dr. Matute back on 1/24/2022 with plans to get staging CTs and bone scan with results as outlined above.  Started Casodex and to return on 3/11/2022 for Lupron.  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  TURP planned for  urinary retention symptoms.    -3/15/2022 [facility] medical oncology initial consultation: I reviewed the above data with the patient.  With his fairly bulky retroperitoneal adenopathy and bone metastases including extra axial metastases, he would fit the data from the CHAARTED trial for which Taxotere x6 followed by Zytiga prednisone along with the planned Lupron already being planned by Dr. Matute would be reasonable.  Equally reasonable in terms of comparisons with bicalutamide and Lupron would be Zytiga prednisone plus Lupron or Xtandi plus Lupron or apalutamide plus Lupron.  None of these have been compared head-to-head but all have beaten combined androgen deprivation therapy with bicalutamide and Lupron.  At the age of 67 and in order to have as many bullets as possible down the road and hence saving some of the hormone blockade options for later and using chemotherapy when he is younger and healthier for a more time-limited.,  He has opted for the Taxotere x6 followed by Zytiga and prednisone.  We will also give him Xgeva to improve bone health and given metastatic disease, as with all metastatic prostate cancer patients, he needs genetic counseling both for his sake as well as his family's.  If he were to have BRCA1 or other such  mutations, then that also opens up the options for PARP inhibitors etc. down the road.  He has his TURP scheduled for 2/24/2022 and we will start treatment a couple of weeks after that and he will get chemo preparation visit in the meantime and I will get capital surgeons to place a port.  We will check his PSA after his TURP when he sees my nurse practitioner back early April for the start of chemotherapy and repeat the PSA and CT chest abdomen pelvis and bone scan after the Taxotere is complete.    -3/31/2022 chemotherapy education and needs assessment completed    -4/7/2022 began docetaxel and Xgeva.  .0.  We will do his Xgeva every 6 weeks to coordinate with his  docetaxel infusions.    -4/19/2022 patient stopped Casodex    -5/17/2022 patient reported seeing his PCP for an abscess in his suprapubic area.  Placed on clindamycin, will delay treatment 1 week.    -5/25/2022 PSA 34.3  -5/26/2022 [facility] Oncology clinic follow-up: Chris has an abscess in the suprapubic area, it has improved on antibiotic therapy but I am concerned if we treat him it will just flare back up unless it is drained.  I will get him to Dr. Miller who placed his port for I&D and culture.  He is on clindamycin and states he completes course of treatment tomorrow.  I will delay his treatment with Taxotere 1 more week.  We will give his Xgeva today.  I will see him back in 1 week for follow-up to assess whether or not we can resume his Taxotere.  His PSA has dropped nicely with treatment thus far, PSA yesterday was 34.3 which is down from a PSA of 259.0 when we started treatment, it has been as high as 911 in November.    -6/2/2022 [facility] Oncology clinic follow-up: Chris went to see Dr. Miller to have his abscess lanced however apparently there was a long wait and he left without being seen.  He did call his PCP and was given 5 more days of clindamycin and the abscess now seems to have resolved.  We discussed today that he is at risk for recurrence of infection due to immunocompromise state with chemotherapy.  He will notify us if he has any return of his abscess.  He does have intertrigo under his panniculus, I have advised him to keep this area dry, to apply topical drying/antifungal powders.  Also recommended looser clothing.  His counts are adequate to continue therapy, we will resume Taxotere today with cycle #3.  We will repeat restaging scans after 6 courses.  We are doing Xgeva every 6 weeks to coordinate with his Taxotere.  Once he finishes Taxotere we can then go back to every 4 weeks.  His next Xgeva is not due until 7/14/2022.  His calcium is running a little low, he is going to  calcium  supplement.    -6/23/2022 [facility] Oncology clinic follow-up: Chris overall is doing well on treatment with Taxotere.  Labs reviewed from yesterday and are unremarkable.  We will continue treatment today unchanged.  His suprapubic abscess resolved on clindamycin.  He will continue to use drying powder/antifungal powder under his panniculus for intertrigo.  Today will be cycle #4 of a planned 6 courses of Taxotere.  He is also on Xgeva, next dose due 7/14/2022, we are doing this every 6 weeks for now to line up with his chemotherapy, can go back to every 4 weeks after he finishes Taxotere.  Calcium from CMP yesterday was normal.    -7/13/2022 [facility] Oncology clinic follow-up: Mr. Morfin has had issues around day 3 after each treatment ranging from chest pain after his first treatment for which he did not seek medical attention, fatigue after the next few treatments, but 3 days after his most recent treatment on 6/23/2022 he had a syncopal episode at home and once again did not seek medical attention.  I discussed with him that this was not acceptable, if he has occurrences like this someone needs to call 911, it is difficult to figure out what is going on after the fact, the best time to work this up would be during the occurrence.  For now we will get labs today with CBC, CMP, PSA.  I will refer him to cardiology for further evaluation.  We will hold Taxotere most likely, I will see him tomorrow to go over his lab results.  I will also repeat his restaging scans with CT chest, abdomen and pelvis and will include CT of the head in light of his syncopal episode.      -7/13/2022 PSA 18.6    -7/14/2022 [facility] Oncology clinic follow-up: Mr. Morfin returns today to review his labs from yesterday.  Labs are unremarkable.  PSA down to 18.6 from a high of 259.0 in April prior to starting treatment.  CBC and CMP unremarkable, magnesium is normal at 2.3, phosphorus slightly low at 2.4.  We will hold therapy for now in light  of his syncopal episode on day 3 after his last treatment and prior episodes with chest pain and severe fatigue that all occurred on day 3 after his Taxotere.  We will restage him with CT head, chest, abdomen and pelvis (I am including the head in light of his syncopal episode).  I have also referred him to cardiology, he states that he received a call from them about making an appointment however he wanted to talk to us today first.  I emphasized the importance to him of making and keeping that appointment and he states understanding.  I also once again emphasized the requirement to seek emergency medical attention if he has any episodes in the future such as chest pain or syncopal events.  Whether or not we try and complete Taxotere with 2 more courses or move to the next line of therapy will be decided when he returns to discuss with Dr. Goodson and review his restaging scans.    -7/15/2022 saw Dr. Diaz cardiologist.  For syncope he ordered echocardiogram and 48-hour Holter monitor.    -7/15/2022 Holter monitor relatively benign.    -7/22/2022 CT brain with and without contrast negative.  CT chest abdomen and pelvis shows some nonspecific cecal wall thickening.  No progression in the chest.  T1 and ribs stable.  Decrease in multiple retroperitoneal and pelvic nodes.  Slight increase in right acetabular sclerotic lesion.  Persistent but improved circumferential bladder wall thickening.  Total body bone scan shows stable left third rib and no evidence of new bone metastases.    -7/19/2022 ejection fraction 65% with grade 1 diastolic dysfunction.    -7/26/2022 [facility] oncology clinic follow-up: Given presyncopal symptoms occurred 3 days after Taxotere and has not otherwise occurred any other time and his cardiology work-up is fairly unremarkable and his disease is under good control as witnessed by the report above of the CTs of head chest abdomen pelvis and bone scan, I will hold cycle 5 and 6 of Taxotere and  continue 1 now with Zytiga and prednisone.  With reference to the coincidental cecal wall thickening found on CT, we will get him to [facility] surgeons for colonoscopy.  He will see my nurse practitioner back in August for next cycle of Abiraterone prednisone.  He will continue his Lupron with Dr. Matute.  We will continue his Xgeva.  We will repeat his CT chest abdomen pelvis and bone scan again in 3 months.  He will see my nurse practitioner in the interim.    -8/23/2022 [facility] oncology clinic follow-up: No further presyncopal symptoms.  Feeling great other than for hot flashes.  Did commend for now we will continue on with his Zytiga prednisone and he will get his Xgeva today based on labs from 8/10/2022.  He opted out of getting the colonoscopy that I recommended and he will not change his mind.  He will continue getting the Lupron with Dr. Matute and I asked him to give the date of this appointment to my nurse when he sees her back in a month.  We will give his Xgeva today.  We will get CT chest abdomen pelvis and total body bone scan towards the middle to end of October.  Presently other than for the hot flashes feels great.    -9/20/2022 PSA 8.320    -9/22/2022 [facility] Oncology clinic follow-up: Chris is doing well on Zytiga, prednisone along with Xgeva.  Labs reviewed from 9/20/2022 are unremarkable, CBC was normal, CMP with creatinine of 1.34 otherwise normal, this is stable from his baseline.  PSA stable at 8.320.  He received Lupron recently with Dr. Matute, he thinks last week or so, states his next appointment is in February.  He will continue treatment unchanged.  We will repeat restaging CT chest, abdomen and pelvis and total body bone scan in October prior to return and I have ordered those today.  We will also repeat his PSA.    -9/22/2022 CT chest abdomen pelvisCompared to July 2022 [facility] showed no evidence of disease progression in the chest with no substantial sclerotic bony  lesions and decrease in size of retroperitoneal and pelvic nodes with persistent cecal wall thickening and suboptimal bladder distention.  Total body bone scan showed decrease in previously seen uptake in the left third rib with diffuse sclerosis representing treatment response with no new foci.    -10/18/2022 [facility] medical oncology follow-up: I reviewed bone scan and CT reports as above with no evidence of progression.  Tolerating Zytiga prednisone and Xgeva.  Getting Lupron regularly with Dr. Matute next appointment coming in February.  We will repeat his CT chest abdomen pelvis and total body bone scan in April.  We will follow with my nurse practitioner in the interim.    -1/10/2023 PSA 3.450     Prostate cancer metastatic to multiple sites (HCC)   3/15/2022 Initial Diagnosis    Prostate cancer metastatic to multiple sites (HCC)     3/15/2022 Cancer Staged    Staging form: Prostate, AJCC 8th Edition  - Clinical: Stage IVB (cT1c, cN1, cM1b, Grade Group: 5) - Signed by Fritz Goodson MD on 3/15/2022     4/7/2022 - 6/23/2022 Chemotherapy    Stopped after cycle 4 6/23/2022 due to syncope 3 days post infusions with negative cardiac work-up  OP PROSTATE DOCEtaxel     4/7/2022 -  Chemotherapy    OP SUPPORTIVE Denosumab (Xgeva) Q28D     7/26/2022 -  Chemotherapy    OP PROSTATE Abiraterone / PredniSONE           HISTORY OF PRESENT ILLNESS:  The patient is a 68 y.o. male, here for follow up on management of metastatic prostate cancer currently on therapy with abiraterone and prednisone along with Xgeva, he also is on Lupron that he receives every 6 months with urology, received his most recent Lupron injection on 2/24/2023.  Mr. Morfin continues to do well with no new concerns.  He reports that he has intermittent pain in his back, it seemed worse a few weeks ago when it flared up but has since improved and only bothers him occasionally.  It has not required any over-the-counter pain relief medications or other  "intervention.  He is eating well, weight is stable.  No change in his bowel or bladder habits    Past Medical History:   Diagnosis Date   • Cancer (HCC)    • Prostate cancer (HCC)      Past Surgical History:   Procedure Laterality Date   • PROSTATE BIOPSY  02/2022    dr. sue       No Known Allergies    Family History and Social History reviewed and changed as necessary    REVIEW OF SYSTEM:   No new somatic concerns    PHYSICAL EXAM:  Well-developed, well-nourished male in no distress  Lungs clear to auscultation bilaterally  Heart regular rate and rhythm      Vitals:    03/08/23 0939   BP: 160/88   Pulse: 80   Resp: 20   Temp: 98.4 °F (36.9 °C)   SpO2: 96%   Weight: 130 kg (287 lb)   Height: 180.3 cm (71\")     Vitals:    03/08/23 0939   PainSc: 0-No pain          ECOG score: 0           Vitals reviewed.  Labs reviewed, I also reviewed notes from urology visit 2/24/2023 with Dr. Sunny Sue    ECOG: (0) Fully Active - Able to Carry On All Pre-disease Performance Without Restriction    Lab Results   Component Value Date    HGB 13.2 03/07/2023    HCT 39.6 03/07/2023    MCV 87.0 03/07/2023     03/07/2023    WBC 7.89 03/07/2023    NEUTROABS 4.71 03/07/2023    LYMPHSABS 2.22 03/07/2023    MONOSABS 0.65 03/07/2023    EOSABS 0.20 03/07/2023    BASOSABS 0.08 03/07/2023       Lab Results   Component Value Date    GLUCOSE 102 (H) 03/07/2023    BUN 23 03/07/2023    CREATININE 1.17 03/07/2023     03/07/2023    K 3.9 03/07/2023     03/07/2023    CO2 26.9 03/07/2023    CALCIUM 9.4 03/07/2023    ALBUMIN 4.3 03/07/2023    BILITOT 0.3 03/07/2023    ALKPHOS 86 03/07/2023    AST 19 03/07/2023    ALT 25 03/07/2023     Lab Results   Component Value Date    PSA 2.460 03/07/2023    PSA 3.250 02/07/2023    PSA 3.450 01/10/2023    PSA 4.040 (H) 12/13/2022    PSA 5.500 (H) 10/18/2022    PSA 8.320 (H) 09/20/2022             ASSESSMENT & PLAN:  1.  Prostate cancer clinical T1c and 2M1B stage IVb treated with 4 " cycles of Taxotere, stopped due to syncope with negative cardiac work-up, with marked drop in PSA of 18.6 from over 900 at baseline, continued on Zytiga and prednisone.  2.  Urinary retention with Goodwin in place 1/24/2022.  On finasteride 1/24/2022.  3.  Covid 19 December 2021  4.  Hypertension    Oncology history timeline:  -11/29/2021  with symptoms of urinary retention.  -2/15/2022 prostate biopsy adenocarcinoma grade group 5 and 3 out of 12 cores, grade group 4 in 1 out of 12 cores, grade group 3 in 8 out of 12 cores.  -2/24/2022 CT chest abdomen pelvis and total body bone scan shows left third rib, right acetabulum, periaortic and retroperitoneal kizzy metastases complaining of pain in the left chest and right hip.  Also possible sclerotic left 10th rib on CT.  Spleen mildly enlarged 13.8 cm.  Hepatic steatosis.  Small liver cyst.  Fat stranding in the periaortic and mesenteric region consistent with reactive/inflammatory stranding.  Multiple enlarged periaortic and retroperitoneal nodes and lymphadenopathy largest 4.9 cm.  CT chest describes tiny sclerotic foci in left eighth rib and right lateral seventh rib and T1.  Multiple small mediastinal and bilateral axillary nodes seen with small hypodense left thyroid nodule.  Creatinine 1.7.  -3/4/2022 office note Dr. Rolando Matute: Patient was seen after his elevated PSA by Dr. Vera and prostate biopsy scheduled.  However, he developed COVID-19 and this was canceled and the patient did not reschedule due to lack of insurance.  Saw Dr. Matute back on 1/24/2022 with plans to get staging CTs and bone scan with results as outlined above.  Started Casodex and to return on 3/11/2022 for Lupron.  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  TURP planned for urinary retention symptoms.    -3/15/2022 [facility] medical oncology initial consultation: I reviewed the above data with the patient.  With his fairly  bulky retroperitoneal adenopathy and bone metastases including extra axial metastases, he would fit the data from the CHAARTED trial for which Taxotere x6 followed by Zytiga prednisone along with the planned Lupron already being planned by Dr. Matute would be reasonable.  Equally reasonable in terms of comparisons with bicalutamide and Lupron would be Zytiga prednisone plus Lupron or Xtandi plus Lupron or apalutamide plus Lupron.  None of these have been compared head-to-head but all have beaten combined androgen deprivation therapy with bicalutamide and Lupron.  At the age of 67 and in order to have as many bullets as possible down the road and hence saving some of the hormone blockade options for later and using chemotherapy when he is younger and healthier for a more time-limited.,  He has opted for the Taxotere x6 followed by Zytiga and prednisone.  We will also give him Xgeva to improve bone health and given metastatic disease, as with all metastatic prostate cancer patients, he needs genetic counseling both for his sake as well as his family's.  If he were to have BRCA1 or other such  mutations, then that also opens up the options for PARP inhibitors etc. down the road.  He has his TURP scheduled for 2/24/2022 and we will start treatment a couple of weeks after that and he will get chemo preparation visit in the meantime and I will get capital surgeons to place a port.  We will check his PSA after his TURP when he sees my nurse practitioner back early April for the start of chemotherapy and repeat the PSA and CT chest abdomen pelvis and bone scan after the Taxotere is complete.    -3/31/2022 chemotherapy education and needs assessment completed    -4/7/2022 began docetaxel and Xgeva.  .0.  We will do his Xgeva every 6 weeks to coordinate with his docetaxel infusions.    -4/19/2022 patient stopped Casodex    -5/17/2022 patient reported seeing his PCP for an abscess in his suprapubic area.  Placed  on clindamycin, will delay treatment 1 week.    -5/25/2022 PSA 34.3  -5/26/2022 [facility] Oncology clinic follow-up: Chris has an abscess in the suprapubic area, it has improved on antibiotic therapy but I am concerned if we treat him it will just flare back up unless it is drained.  I will get him to Dr. Miller who placed his port for I&D and culture.  He is on clindamycin and states he completes course of treatment tomorrow.  I will delay his treatment with Taxotere 1 more week.  We will give his Xgeva today.  I will see him back in 1 week for follow-up to assess whether or not we can resume his Taxotere.  His PSA has dropped nicely with treatment thus far, PSA yesterday was 34.3 which is down from a PSA of 259.0 when we started treatment, it has been as high as 911 in November.    -6/2/2022 [facility] Oncology clinic follow-up: Chris went to see Dr. Miller to have his abscess lanced however apparently there was a long wait and he left without being seen.  He did call his PCP and was given 5 more days of clindamycin and the abscess now seems to have resolved.  We discussed today that he is at risk for recurrence of infection due to immunocompromise state with chemotherapy.  He will notify us if he has any return of his abscess.  He does have intertrigo under his panniculus, I have advised him to keep this area dry, to apply topical drying/antifungal powders.  Also recommended looser clothing.  His counts are adequate to continue therapy, we will resume Taxotere today with cycle #3.  We will repeat restaging scans after 6 courses.  We are doing Xgeva every 6 weeks to coordinate with his Taxotere.  Once he finishes Taxotere we can then go back to every 4 weeks.  His next Xgeva is not due until 7/14/2022.  His calcium is running a little low, he is going to  calcium supplement.    -6/23/2022 [facility] Oncology clinic follow-up: Chris overall is doing well on treatment with Taxotere.  Labs reviewed from yesterday and  are unremarkable.  We will continue treatment today unchanged.  His suprapubic abscess resolved on clindamycin.  He will continue to use drying powder/antifungal powder under his panniculus for intertrigo.  Today will be cycle #4 of a planned 6 courses of Taxotere.  He is also on Xgeva, next dose due 7/14/2022, we are doing this every 6 weeks for now to line up with his chemotherapy, can go back to every 4 weeks after he finishes Taxotere.  Calcium from CMP yesterday was normal.    -7/13/2022 [facility] Oncology clinic follow-up: Mr. Morfin has had issues around day 3 after each treatment ranging from chest pain after his first treatment for which he did not seek medical attention, fatigue after the next few treatments, but 3 days after his most recent treatment on 6/23/2022 he had a syncopal episode at home and once again did not seek medical attention.  I discussed with him that this was not acceptable, if he has occurrences like this someone needs to call 911, it is difficult to figure out what is going on after the fact, the best time to work this up would be during the occurrence.  For now we will get labs today with CBC, CMP, PSA.  I will refer him to cardiology for further evaluation.  We will hold Taxotere most likely, I will see him tomorrow to go over his lab results.  I will also repeat his restaging scans with CT chest, abdomen and pelvis and will include CT of the head in light of his syncopal episode.      -7/13/2022 PSA 18.6    -7/14/2022 [facility] Oncology clinic follow-up: Mr. Morfin returns today to review his labs from yesterday.  Labs are unremarkable.  PSA down to 18.6 from a high of 259.0 in April prior to starting treatment.  CBC and CMP unremarkable, magnesium is normal at 2.3, phosphorus slightly low at 2.4.  We will hold therapy for now in light of his syncopal episode on day 3 after his last treatment and prior episodes with chest pain and severe fatigue that all occurred on day 3 after his  Taxotere.  We will restage him with CT head, chest, abdomen and pelvis (I am including the head in light of his syncopal episode).  I have also referred him to cardiology, he states that he received a call from them about making an appointment however he wanted to talk to us today first.  I emphasized the importance to him of making and keeping that appointment and he states understanding.  I also once again emphasized the requirement to seek emergency medical attention if he has any episodes in the future such as chest pain or syncopal events.  Whether or not we try and complete Taxotere with 2 more courses or move to the next line of therapy will be decided when he returns to discuss with Dr. Goodson and review his restaging scans.    -7/15/2022 saw Dr. Diaz cardiologist.  For syncope he ordered echocardiogram and 48-hour Holter monitor.    -7/15/2022 Holter monitor relatively benign.    -7/22/2022 CT brain with and without contrast negative.  CT chest abdomen and pelvis shows some nonspecific cecal wall thickening.  No progression in the chest.  T1 and ribs stable.  Decrease in multiple retroperitoneal and pelvic nodes.  Slight increase in right acetabular sclerotic lesion.  Persistent but improved circumferential bladder wall thickening.  Total body bone scan shows stable left third rib and no evidence of new bone metastases.    -7/19/2022 ejection fraction 65% with grade 1 diastolic dysfunction.    -7/26/2022 [facility] clinic follow-up: Given presyncopal symptoms occurred 3 days after Taxotere and has not otherwise occurred any other time and his cardiology work-up is fairly unremarkable and his disease is under good control as witnessed by the report above of the CTs of head chest abdomen pelvis and bone scan, I will hold cycle 5 and 6 of Taxotere and continue 1 now with Zytiga and prednisone.  With reference to the coincidental cecal wall thickening found on CT, we will get him to [facility] surgeons for  colonoscopy.  He will see my nurse practitioner back in August for next cycle of Abiraterone prednisone.  He will continue his Lupron with Dr. Matute.  We will continue his Xgeva.  We will repeat his CT chest abdomen pelvis and bone scan again in 3 months.  He will see my nurse practitioner in the interim.    -8/23/2022 [facility] oncology clinic follow-up: No further presyncopal symptoms.  Feeling great other than for hot flashes.  Did commend for now we will continue on with his Zytiga prednisone and he will get his Xgeva today based on labs from 8/10/2022.  He opted out of getting the colonoscopy that I recommended and he will not change his mind.  He will continue getting the Lupron with Dr. Matute and I asked him to give the date of this appointment to my nurse when he sees her back in a month.  We will give his Xgeva today.  We will get CT chest abdomen pelvis and total body bone scan towards the middle to end of October.  Presently other than for the hot flashes feels great.    -9/20/2022 PSA 8.320    -9/22/2022 [facility] Oncology clinic follow-up: Chris is doing well on Zytiga, prednisone along with Xgeva.  Labs reviewed from 9/20/2022 are unremarkable, CBC was normal, CMP with creatinine of 1.34 otherwise normal, this is stable from his baseline.  PSA stable at 8.320.  He received Lupron recently with Dr. Matute, he thinks last week or so, states his next appointment is in February.  He will continue treatment unchanged.  We will repeat restaging CT chest, abdomen and pelvis and total body bone scan in October prior to return and I have ordered those today.  We will also repeat his PSA.    -9/22/2022 CT chest abdomen pelvisCompared to July 2022 [facility] showed no evidence of disease progression in the chest with no substantial sclerotic bony lesions and decrease in size of retroperitoneal and pelvic nodes with persistent cecal wall thickening and suboptimal bladder distention.  Total body bone  scan showed decrease in previously seen uptake in the left third rib with diffuse sclerosis representing treatment response with no new foci.    -10/18/2022 [facility] follow-up: I reviewed bone scan and CT reports as above with no evidence of progression.  Tolerating Zytiga prednisone and Xgeva.  Getting Lupron regularly with Dr. Matute next appointment coming in February.  We will repeat his CT chest abdomen pelvis and total body bone scan in April.  We will follow with my nurse practitioner in the interim.    -11/16/2022 [facility] follow-up: Mr. Morfin continues to do well, he is tolerating therapy with Zytiga, prednisone and Xgeva with no unusual side effects.  He has occasional hot flashes but these are tolerable.  He also continues on Lupron with Dr. Matute next Lupron injection in February.  We will continue therapy unchanged.  His labs as shown above are unremarkable.  PSA was not drawn but this months labs, we will repeat that next month.  We will repeat his restaging scans in April unless symptoms dictate the need to do those sooner or if he has any rise in his PSA of concern over time.    -1/10/2023 PSA 3.450  -1/11/2023 [facility] follow-up: Mr. Morfin continues to do well on current therapy with Zytiga, prednisone and Xgeva with no unusual side effects.  He has no new worrisome symptoms.  He is due for his next Lupron injection in February with Dr. Matute.  His PSA continues to trend downward, current PSA from yesterday was 3.450.  We will continue therapy unchanged, he will receive Xgeva today.  Has had no hypocalcemia, his CMP, phosphorus and magnesium are all normal.  We will repeat restaging scans in April.    -3/7/2023 PSA 2.460  -3/8/2023 [facility] follow-up:  Mr. Morfin is doing well.  He is tolerating therapy with Zytiga, prednisone and Xgeva with no significant side effects.  He has hot flashes but these are tolerable.  He had Lupron  "injection 2/24/2023 with Dr. Matute.  His PSA continues to decline, current PSA 2.460.  He will continue therapy unchanged.  We will repeat restaging scans late April prior to return in 2 months and I have ordered those today.  He is working with his PCP Rodrigo Ram on his hypertension.  His b/p today was 160/88.  He was to have increased his losartan but I do not think he has done that yet as he said \"I still have some of my old prescription left\".    Return to clinic in 2 months for follow-up.    I spent 30 minutes caring for Chris on this date of service. This time includes time spent by me in the following activities: preparing for the visit, reviewing tests, obtaining and/or reviewing a separately obtained history, performing a medically appropriate examination and/or evaluation, ordering medications, tests, or procedures and documenting information in the medical record.     Susana Torres, APRN    03/08/2023          "

## 2023-03-20 ENCOUNTER — OFFICE VISIT (OUTPATIENT)
Dept: FAMILY MEDICINE CLINIC | Facility: CLINIC | Age: 68
End: 2023-03-20
Payer: MEDICARE

## 2023-03-20 ENCOUNTER — SPECIALTY PHARMACY (OUTPATIENT)
Dept: ONCOLOGY | Facility: HOSPITAL | Age: 68
End: 2023-03-20
Payer: MEDICARE

## 2023-03-20 VITALS
OXYGEN SATURATION: 97 % | HEIGHT: 71 IN | WEIGHT: 290 LBS | SYSTOLIC BLOOD PRESSURE: 168 MMHG | HEART RATE: 69 BPM | DIASTOLIC BLOOD PRESSURE: 96 MMHG | BODY MASS INDEX: 40.6 KG/M2

## 2023-03-20 DIAGNOSIS — I10 ESSENTIAL HYPERTENSION: Primary | ICD-10-CM

## 2023-03-20 PROCEDURE — 99213 OFFICE O/P EST LOW 20 MIN: CPT | Performed by: STUDENT IN AN ORGANIZED HEALTH CARE EDUCATION/TRAINING PROGRAM

## 2023-03-20 RX ORDER — LOSARTAN POTASSIUM 50 MG/1
50 TABLET ORAL DAILY
Qty: 30 TABLET | Refills: 1 | Status: SHIPPED | OUTPATIENT
Start: 2023-03-20

## 2023-03-20 NOTE — PROGRESS NOTES
"Chief Complaint  Hypertension    Subjective          Chris Morfin presents to [facility]  History of Present Illness    Patient states that he is here for follow up with his blood pressure.     He states that he is still taking the 25mg tablets only one once daily. He has been continuing to monitor his blood pressure and it remains elevated.     He states that he has no chest pain, SOA, HA or LE edema. He states that he has           Objective   Vital Signs:   /96 (BP Location: Left arm, Patient Position: Sitting, Cuff Size: Large Adult)   Pulse 69   Ht 180.3 cm (71\")   Wt 132 kg (290 lb)   SpO2 97%   BMI 40.45 kg/m²     Body mass index is 40.45 kg/m².    Review of Systems    Past History:  Medical History: has a past medical history of Cancer (HCC) and Prostate cancer (HCC).   Surgical History: has a past surgical history that includes Prostate biopsy (02/2022).   Family History: family history includes Cancer in his brother and sister.   Social History: reports that he has never smoked. He has never used smokeless tobacco. He reports that he does not currently use alcohol. He reports current drug use. Drug: Marijuana.      Current Outpatient Medications:   •  abiraterone acetate (ZYTIGA) 500 MG chemo tablet, Take 2 tablets by mouth Daily., Disp: 60 tablet, Rfl: 11  •  lidocaine-prilocaine (EMLA) 2.5-2.5 % cream, Apply 1 application topically to the appropriate area as directed As Needed (prior to port access)., Disp: 30 g, Rfl: 3  •  losartan (Cozaar) 50 MG tablet, Take 1 tablet by mouth Daily., Disp: 30 tablet, Rfl: 1  •  ondansetron (ZOFRAN) 8 MG tablet, Take 1 tablet by mouth 3 (Three) Times a Day As Needed for Nausea or Vomiting., Disp: 30 tablet, Rfl: 5  •  predniSONE (DELTASONE) 5 MG tablet, Take 1 tablet by mouth 2 (Two) Times a Day., Disp: 60 tablet, Rfl: 11  •  rosuvastatin (CRESTOR) 20 MG tablet, TAKE ONE (1) TABLET BY MOUTH DAILY., Disp: 90 tablet, Rfl: " 1    Allergies: Patient has no known allergies.    Physical Exam  Constitutional:       General: He is not in acute distress.     Appearance: He is not ill-appearing or toxic-appearing.   HENT:      Head: Normocephalic and atraumatic.   Cardiovascular:      Rate and Rhythm: Normal rate and regular rhythm.      Heart sounds: No murmur heard.  Pulmonary:      Effort: Pulmonary effort is normal. No respiratory distress.   Musculoskeletal:      Right lower leg: No edema.      Left lower leg: No edema.   Neurological:      General: No focal deficit present.      Mental Status: He is alert and oriented to person, place, and time.   Psychiatric:         Mood and Affect: Mood normal.         Thought Content: Thought content normal.          Result Review :                   Assessment and Plan    Diagnoses and all orders for this visit:    1. Essential hypertension (Primary)    Other orders  -     losartan (Cozaar) 50 MG tablet; Take 1 tablet by mouth Daily.  Dispense: 30 tablet; Refill: 1    Patient with continued elevated blood pressure, but he reportedly is only on 25mg. Will increase from 25mg to 50 mg and send in new script. Call with new or worsening symptoms.     Follow up in 2 months or sooner with new or worsening symptoms.     Follow Up   Return in about 2 months (around 5/20/2023) for Recheck.  Patient was given instructions and counseling regarding his condition or for health maintenance advice. Please see specific information pulled into the AVS if appropriate.     Teri Ram, DO

## 2023-03-20 NOTE — PROGRESS NOTES
Specialty Pharmacy Refill Coordination Note     Chris is a 68 y.o. male contacted today regarding refills of abiraterone acetate (ZYTIGA) 500 MG chemo tablet - Take two tablets po qd specialty medication(s).    Set up to ship out on Tuesday 03/21, due to be delivered to patient on Wednesday 03/22 due to same day shipping cut off time at 12pm.    Specialty medication(s) and dose(s) confirmed: yes    Refill Questions    Flowsheet Row Most Recent Value   Changes to allergies? No   Changes to medications? No   New conditions since last clinic visit No   Unplanned office visit, urgent care, ED, or hospital admission in the last 4 weeks  No   How does patient/caregiver feel medication is working? Very good   Financial problems or insurance changes  No   Since the previous refill, were any specialty medication doses or scheduled injections missed or delayed?  No   Does this patient require a clinical escalation to a pharmacist? No          Delivery Questions    Flowsheet Row Most Recent Value   Delivery method [employer]   Delivery address correct? Yes   Delivery phone number [phone]   Preferred delivery time? Anytime   Number of medications in delivery 1   Medication being filled and delivered Abiraterone   Doses left of specialty medications 4 days left   Is there any medication that is due not being filled? No   Supplies needed? No supplies needed   Cooler needed? No   Do any medications need mixed or dated? No   Additional comments No copay   Questions or concerns for the pharmacist? No   Explain any questions or concerns for the pharmacist n/a   Are any medications first time fills? No                 Follow-up: 28 day(s)     Juan Salamanca, Pharmacy Technician  Specialty Pharmacy Technician

## 2023-04-04 ENCOUNTER — LAB (OUTPATIENT)
Dept: ONCOLOGY | Facility: HOSPITAL | Age: 68
End: 2023-04-04
Payer: MEDICARE

## 2023-04-04 VITALS
WEIGHT: 284.5 LBS | RESPIRATION RATE: 18 BRPM | DIASTOLIC BLOOD PRESSURE: 89 MMHG | BODY MASS INDEX: 39.68 KG/M2 | HEART RATE: 74 BPM | SYSTOLIC BLOOD PRESSURE: 143 MMHG | TEMPERATURE: 99 F

## 2023-04-04 DIAGNOSIS — C61 PROSTATE CANCER METASTATIC TO MULTIPLE SITES: ICD-10-CM

## 2023-04-04 PROCEDURE — G0463 HOSPITAL OUTPT CLINIC VISIT: HCPCS

## 2023-04-04 PROCEDURE — 36415 COLL VENOUS BLD VENIPUNCTURE: CPT

## 2023-04-05 ENCOUNTER — INFUSION (OUTPATIENT)
Dept: ONCOLOGY | Facility: HOSPITAL | Age: 68
End: 2023-04-05
Payer: MEDICARE

## 2023-04-05 VITALS
WEIGHT: 284.2 LBS | BODY MASS INDEX: 39.64 KG/M2 | TEMPERATURE: 98.4 F | HEART RATE: 86 BPM | DIASTOLIC BLOOD PRESSURE: 91 MMHG | SYSTOLIC BLOOD PRESSURE: 151 MMHG | RESPIRATION RATE: 17 BRPM

## 2023-04-05 DIAGNOSIS — C61 PROSTATE CANCER METASTATIC TO MULTIPLE SITES: Primary | ICD-10-CM

## 2023-04-05 LAB
ALBUMIN SERPL-MCNC: 4.5 G/DL (ref 3.5–5.2)
ALBUMIN/GLOB SERPL: 1.8 G/DL
ALP SERPL-CCNC: 81 U/L (ref 39–117)
ALT SERPL-CCNC: 27 U/L (ref 1–41)
AST SERPL-CCNC: 22 U/L (ref 1–40)
BASOPHILS # BLD AUTO: 0.06 10*3/MM3 (ref 0–0.2)
BASOPHILS NFR BLD AUTO: 0.9 % (ref 0–1.5)
BILIRUB SERPL-MCNC: 0.4 MG/DL (ref 0–1.2)
BUN SERPL-MCNC: 27 MG/DL (ref 8–23)
BUN/CREAT SERPL: 21.6 (ref 7–25)
CALCIUM SERPL-MCNC: 9.4 MG/DL (ref 8.6–10.5)
CHLORIDE SERPL-SCNC: 108 MMOL/L (ref 98–107)
CO2 SERPL-SCNC: 24.9 MMOL/L (ref 22–29)
CREAT SERPL-MCNC: 1.25 MG/DL (ref 0.76–1.27)
EGFRCR SERPLBLD CKD-EPI 2021: 62.7 ML/MIN/1.73
EOSINOPHIL # BLD AUTO: 0.17 10*3/MM3 (ref 0–0.4)
EOSINOPHIL NFR BLD AUTO: 2.5 % (ref 0.3–6.2)
ERYTHROCYTE [DISTWIDTH] IN BLOOD BY AUTOMATED COUNT: 12.8 % (ref 12.3–15.4)
GLOBULIN SER CALC-MCNC: 2.5 GM/DL
GLUCOSE SERPL-MCNC: 119 MG/DL (ref 65–99)
HCT VFR BLD AUTO: 40.1 % (ref 37.5–51)
HGB BLD-MCNC: 13.2 G/DL (ref 13–17.7)
IMM GRANULOCYTES # BLD AUTO: 0.02 10*3/MM3 (ref 0–0.05)
IMM GRANULOCYTES NFR BLD AUTO: 0.3 % (ref 0–0.5)
LYMPHOCYTES # BLD AUTO: 1.94 10*3/MM3 (ref 0.7–3.1)
LYMPHOCYTES NFR BLD AUTO: 28.2 % (ref 19.6–45.3)
MAGNESIUM SERPL-MCNC: 2.2 MG/DL (ref 1.6–2.4)
MCH RBC QN AUTO: 28.9 PG (ref 26.6–33)
MCHC RBC AUTO-ENTMCNC: 32.9 G/DL (ref 31.5–35.7)
MCV RBC AUTO: 87.9 FL (ref 79–97)
MONOCYTES # BLD AUTO: 0.47 10*3/MM3 (ref 0.1–0.9)
MONOCYTES NFR BLD AUTO: 6.8 % (ref 5–12)
NEUTROPHILS # BLD AUTO: 4.21 10*3/MM3 (ref 1.7–7)
NEUTROPHILS NFR BLD AUTO: 61.3 % (ref 42.7–76)
NRBC BLD AUTO-RTO: 0 /100 WBC (ref 0–0.2)
PHOSPHATE SERPL-MCNC: 3.1 MG/DL (ref 2.5–4.5)
PLATELET # BLD AUTO: 211 10*3/MM3 (ref 140–450)
POTASSIUM SERPL-SCNC: 4.1 MMOL/L (ref 3.5–5.2)
PROT SERPL-MCNC: 7 G/DL (ref 6–8.5)
PSA SERPL-MCNC: 2.55 NG/ML (ref 0–4)
RBC # BLD AUTO: 4.56 10*6/MM3 (ref 4.14–5.8)
SODIUM SERPL-SCNC: 143 MMOL/L (ref 136–145)
WBC # BLD AUTO: 6.87 10*3/MM3 (ref 3.4–10.8)

## 2023-04-05 PROCEDURE — 96372 THER/PROPH/DIAG INJ SC/IM: CPT

## 2023-04-05 PROCEDURE — 25010000002 DENOSUMAB 120 MG/1.7ML SOLUTION: Performed by: NURSE PRACTITIONER

## 2023-04-05 RX ADMIN — DENOSUMAB 120 MG: 120 INJECTION SUBCUTANEOUS at 11:02

## 2023-04-18 ENCOUNTER — TELEPHONE (OUTPATIENT)
Dept: ONCOLOGY | Facility: CLINIC | Age: 68
End: 2023-04-18
Payer: MEDICARE

## 2023-04-18 NOTE — TELEPHONE ENCOUNTER
Caller: Chris Morfin    Relationship: Self    Best call back number: [phone]    What is the best time to reach you: ANYTIME    Who are you requesting to speak with (clinical staff, provider,  specific staff member):     What was the call regarding: PT RETURNING MISSED CALL NO VM LEFT    Do you require a callback: YES IF NEEDED

## 2023-04-19 ENCOUNTER — SPECIALTY PHARMACY (OUTPATIENT)
Dept: ONCOLOGY | Facility: HOSPITAL | Age: 68
End: 2023-04-19
Payer: MEDICARE

## 2023-04-19 RX ORDER — LOSARTAN POTASSIUM 50 MG/1
TABLET ORAL
Qty: 30 TABLET | Refills: 1 | Status: SHIPPED | OUTPATIENT
Start: 2023-04-19

## 2023-04-19 NOTE — TELEPHONE ENCOUNTER
Caller: Chris Morfin    Relationship: Self    Best call back number: [phone]    Who are you requesting to speak with (clinical staff, provider,  specific staff member): SPECIALTY PHARMACY    What was the call regarding: PATIENT RETURNING MISSED CALL REGARDING ZYTIGA    Do you require a callback: YES

## 2023-04-19 NOTE — PROGRESS NOTES
Specialty Pharmacy Refill Coordination Note     Chris is a 68 y.o. male contacted today regarding refills of abiraterone acetate (ZYTIGA) 500 MG tablet - Take 2 tablets po qd specialty medication(s).      Scheduled to ship on Wednesday 04/19, set to be delivered to patient on Thursday 04/20.    Specialty medication(s) and dose(s) confirmed: yes    Refill Questions    Flowsheet Row Most Recent Value   Changes to allergies? No   Changes to medications? No   New conditions since last clinic visit No   Unplanned office visit, urgent care, ED, or hospital admission in the last 4 weeks  No   How does patient/caregiver feel medication is working? Good   Financial problems or insurance changes  No   Since the previous refill, were any specialty medication doses or scheduled injections missed or delayed?  No   Does this patient require a clinical escalation to a pharmacist? No          Delivery Questions    Flowsheet Row Most Recent Value   Delivery method [employer]   Delivery address correct? Yes   Delivery phone number [phone]   Preferred delivery time? Anytime   Number of medications in delivery 1   Medication being filled and delivered Abiraterone   Doses left of specialty medications 4 days left   Is there any medication that is due not being filled? No   Supplies needed? No supplies needed   Cooler needed? No   Do any medications need mixed or dated? No   Additional comments No copay   Questions or concerns for the pharmacist? No   Explain any questions or concerns for the pharmacist N/A   Are any medications first time fills? No                 Follow-up: 28 day(s)     Juan Salamanca, Pharmacy Technician  Specialty Pharmacy Technician

## 2023-05-02 ENCOUNTER — OFFICE VISIT (OUTPATIENT)
Dept: ONCOLOGY | Facility: CLINIC | Age: 68
End: 2023-05-02
Payer: MEDICARE

## 2023-05-02 ENCOUNTER — LAB (OUTPATIENT)
Dept: ONCOLOGY | Facility: HOSPITAL | Age: 68
End: 2023-05-02
Payer: MEDICARE

## 2023-05-02 VITALS — SYSTOLIC BLOOD PRESSURE: 154 MMHG | HEART RATE: 89 BPM | DIASTOLIC BLOOD PRESSURE: 79 MMHG

## 2023-05-02 VITALS
HEIGHT: 71 IN | WEIGHT: 286 LBS | DIASTOLIC BLOOD PRESSURE: 98 MMHG | SYSTOLIC BLOOD PRESSURE: 170 MMHG | RESPIRATION RATE: 18 BRPM | HEART RATE: 82 BPM | BODY MASS INDEX: 40.04 KG/M2 | OXYGEN SATURATION: 96 % | TEMPERATURE: 98.9 F

## 2023-05-02 DIAGNOSIS — C61 PROSTATE CANCER METASTATIC TO MULTIPLE SITES: ICD-10-CM

## 2023-05-02 DIAGNOSIS — N18.31 STAGE 3A CHRONIC KIDNEY DISEASE (CKD): ICD-10-CM

## 2023-05-02 DIAGNOSIS — C79.9 SECONDARY MALIGNANT NEOPLASM OF UNSPECIFIED SITE (CODE): Primary | ICD-10-CM

## 2023-05-02 DIAGNOSIS — C61 PROSTATE CANCER METASTATIC TO MULTIPLE SITES: Primary | Chronic | ICD-10-CM

## 2023-05-02 PROCEDURE — 36415 COLL VENOUS BLD VENIPUNCTURE: CPT

## 2023-05-02 PROCEDURE — 1126F AMNT PAIN NOTED NONE PRSNT: CPT | Performed by: INTERNAL MEDICINE

## 2023-05-02 PROCEDURE — G0463 HOSPITAL OUTPT CLINIC VISIT: HCPCS

## 2023-05-02 PROCEDURE — 99214 OFFICE O/P EST MOD 30 MIN: CPT | Performed by: INTERNAL MEDICINE

## 2023-05-02 PROCEDURE — 1159F MED LIST DOCD IN RCRD: CPT | Performed by: INTERNAL MEDICINE

## 2023-05-02 PROCEDURE — 1160F RVW MEDS BY RX/DR IN RCRD: CPT | Performed by: INTERNAL MEDICINE

## 2023-05-02 NOTE — LETTER
May 2, 2023       No Recipients    Patient: Chris Morfin   YOB: 1955   Date of Visit: 5/2/2023       Dear Teri Ram, DO    Chris Morfin was in my office today. Below is a copy of my note.    If you have questions, please do not hesitate to call me. I look forward to following Chris along with you.         Sincerely,        Fritz Goodson MD        CC:   No Recipients    CHIEF COMPLAINT: No new somatic complaints    Problem List:  Oncology/Hematology History Overview Note   1.  Prostate cancer clinical T1c and 2M1B stage IVb treated with 4 cycles of Taxotere, stopped due to syncope with negative cardiac work-up, with marked drop in PSA of 18.6 from over 900 at baseline, continued on Zytiga and prednisone.  2.  Urinary retention with Goodwin in place 1/24/2022.  On finasteride 1/24/2022.  3.  Covid 19 December 2021  4.  Hypertension    Oncology history timeline:  -11/29/2021  with symptoms of urinary retention.  -2/15/2022 prostate biopsy adenocarcinoma grade group 5 and 3 out of 12 cores, grade group 4 in 1 out of 12 cores, grade group 3 in 8 out of 12 cores.  -2/24/2022 CT chest abdomen pelvis and total body bone scan shows left third rib, right acetabulum, periaortic and retroperitoneal kizzy metastases complaining of pain in the left chest and right hip.  Also possible sclerotic left 10th rib on CT.  Spleen mildly enlarged 13.8 cm.  Hepatic steatosis.  Small liver cyst.  Fat stranding in the periaortic and mesenteric region consistent with reactive/inflammatory stranding.  Multiple enlarged periaortic and retroperitoneal nodes and lymphadenopathy largest 4.9 cm.  CT chest describes tiny sclerotic foci in left eighth rib and right lateral seventh rib and T1.  Multiple small mediastinal and bilateral axillary nodes seen with small hypodense left thyroid nodule.  Creatinine 1.7.  -3/4/2022 office note Dr. Rolando Matute: Patient was seen after his elevated PSA by Dr. Vera and  prostate biopsy scheduled.  However, he developed COVID-19 and this was canceled and the patient did not reschedule due to lack of insurance.  Saw Dr. Matute back on 1/24/2022 with plans to get staging CTs and bone scan with results as outlined above.  Started Casodex and to return on 3/11/2022 for Lupron.  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  TURP planned for urinary retention symptoms.    -3/15/2022 [facility] medical oncology initial consultation: I reviewed the above data with the patient.  With his fairly bulky retroperitoneal adenopathy and bone metastases including extra axial metastases, he would fit the data from the CHAARTED trial for which Taxotere x6 followed by Zytiga prednisone along with the planned Lupron already being planned by Dr. Matute would be reasonable.  Equally reasonable in terms of comparisons with bicalutamide and Lupron would be Zytiga prednisone plus Lupron or Xtandi plus Lupron or apalutamide plus Lupron.  None of these have been compared head-to-head but all have beaten combined androgen deprivation therapy with bicalutamide and Lupron.  At the age of 67 and in order to have as many bullets as possible down the road and hence saving some of the hormone blockade options for later and using chemotherapy when he is younger and healthier for a more time-limited.,  He has opted for the Taxotere x6 followed by Zytiga and prednisone.  We will also give him Xgeva to improve bone health and given metastatic disease, as with all metastatic prostate cancer patients, he needs genetic counseling both for his sake as well as his family's.  If he were to have BRCA1 or other such  mutations, then that also opens up the options for PARP inhibitors etc. down the road.  He has his TURP scheduled for 2/24/2022 and we will start treatment a couple of weeks after that and he will get chemo preparation visit in the meantime and I will get  capital surgeons to place a port.  We will check his PSA after his TURP when he sees my nurse practitioner back early April for the start of chemotherapy and repeat the PSA and CT chest abdomen pelvis and bone scan after the Taxotere is complete.    -3/31/2022 chemotherapy education and needs assessment completed    -4/7/2022 began docetaxel and Xgeva.  .0.  We will do his Xgeva every 6 weeks to coordinate with his docetaxel infusions.    -4/19/2022 patient stopped Casodex    -5/17/2022 patient reported seeing his PCP for an abscess in his suprapubic area.  Placed on clindamycin, will delay treatment 1 week.    -5/25/2022 PSA 34.3  -5/26/2022 [facility] Oncology clinic follow-up: Chris has an abscess in the suprapubic area, it has improved on antibiotic therapy but I am concerned if we treat him it will just flare back up unless it is drained.  I will get him to Dr. Miller who placed his port for I&D and culture.  He is on clindamycin and states he completes course of treatment tomorrow.  I will delay his treatment with Taxotere 1 more week.  We will give his Xgeva today.  I will see him back in 1 week for follow-up to assess whether or not we can resume his Taxotere.  His PSA has dropped nicely with treatment thus far, PSA yesterday was 34.3 which is down from a PSA of 259.0 when we started treatment, it has been as high as 911 in November.    -6/2/2022 [facility] Oncology clinic follow-up: Chris went to see Dr. Miller to have his abscess lanced however apparently there was a long wait and he left without being seen.  He did call his PCP and was given 5 more days of clindamycin and the abscess now seems to have resolved.  We discussed today that he is at risk for recurrence of infection due to immunocompromise state with chemotherapy.  He will notify us if he has any return of his abscess.  He does have intertrigo under his panniculus, I have advised him to keep this area dry, to apply topical drying/antifungal  powders.  Also recommended looser clothing.  His counts are adequate to continue therapy, we will resume Taxotere today with cycle #3.  We will repeat restaging scans after 6 courses.  We are doing Xgeva every 6 weeks to coordinate with his Taxotere.  Once he finishes Taxotere we can then go back to every 4 weeks.  His next Xgeva is not due until 7/14/2022.  His calcium is running a little low, he is going to  calcium supplement.    -6/23/2022 [facility] follow-up: Chris huerta is doing well on treatment with Taxotere.  Labs reviewed from yesterday and are unremarkable.  We will continue treatment today unchanged.  His suprapubic abscess resolved on clindamycin.  He will continue to use drying powder/antifungal powder under his panniculus for intertrigo.  Today will be cycle #4 of a planned 6 courses of Taxotere.  He is also on Xgeva, next dose due 7/14/2022, we are doing this every 6 weeks for now to line up with his chemotherapy, can go back to every 4 weeks after he finishes Taxotere.  Calcium from CMP yesterday was normal.    -7/13/2022 [facility] clinic follow-up: Mr. Morfin has had issues around day 3 after each treatment ranging from chest pain after his first treatment for which he did not seek medical attention, fatigue after the next few treatments, but 3 days after his most recent treatment on 6/23/2022 he had a syncopal episode at home and once again did not seek medical attention.  I discussed with him that this was not acceptable, if he has occurrences like this someone needs to call 911, it is difficult to figure out what is going on after the fact, the best time to work this up would be during the occurrence.  For now we will get labs today with CBC, CMP, PSA.  I will refer him to cardiology for further evaluation.  We will hold Taxotere most likely, I will see him tomorrow to go over his lab results.  I will also repeat his restaging scans with CT chest, abdomen and pelvis  and will include CT of the head in light of his syncopal episode.      -7/13/2022 PSA 18.6    -7/14/2022 [facility] [place] operated by [facility] Oncology clinic follow-up: Mr. Morfin returns today to review his labs from yesterday.  Labs are unremarkable.  PSA down to 18.6 from a high of 259.0 in April prior to starting treatment.  CBC and CMP unremarkable, magnesium is normal at 2.3, phosphorus slightly low at 2.4.  We will hold therapy for now in light of his syncopal episode on day 3 after his last treatment and prior episodes with chest pain and severe fatigue that all occurred on day 3 after his Taxotere.  We will restage him with CT head, chest, abdomen and pelvis (I am including the head in light of his syncopal episode).  I have also referred him to cardiology, he states that he received a call from them about making an appointment however he wanted to talk to us today first.  I emphasized the importance to him of making and keeping that appointment and he states understanding.  I also once again emphasized the requirement to seek emergency medical attention if he has any episodes in the future such as chest pain or syncopal events.  Whether or not we try and complete Taxotere with 2 more courses or move to the next line of therapy will be decided when he returns to discuss with Dr. Goodson and review his restaging scans.    -7/15/2022 saw Dr. Diaz cardiologist.  For syncope he ordered echocardiogram and 48-hour Holter monitor.    -7/15/2022 Holter monitor relatively benign.    -7/22/2022 CT brain with and without contrast negative.  CT chest abdomen and pelvis shows some nonspecific cecal wall thickening.  No progression in the chest.  T1 and ribs stable.  Decrease in multiple retroperitoneal and pelvic nodes.  Slight increase in right acetabular sclerotic lesion.  Persistent but improved circumferential bladder wall thickening.  Total body bone scan shows stable left third rib and no evidence of new bone metastases.    -7/19/2022  ejection fraction 65% with grade 1 diastolic dysfunction.    -7/26/2022 [facility] oncology clinic follow-up: Given presyncopal symptoms occurred 3 days after Taxotere and has not otherwise occurred any other time and his cardiology work-up is fairly unremarkable and his disease is under good control as witnessed by the report above of the CTs of head chest abdomen pelvis and bone scan, I will hold cycle 5 and 6 of Taxotere and continue 1 now with Zytiga and prednisone.  With reference to the coincidental cecal wall thickening found on CT, we will get him to [facility] for colonoscopy.  He will see my nurse practitioner back in August for next cycle of Abiraterone prednisone.  He will continue his Lupron with Dr. Matute.  We will continue his Xgeva.  We will repeat his CT chest abdomen pelvis and bone scan again in 3 months.  He will see my nurse practitioner in the interim.    -8/23/2022 [facility] oncology clinic follow-up: No further presyncopal symptoms.  Feeling great other than for hot flashes.  Did commend for now we will continue on with his Zytiga prednisone and he will get his Xgeva today based on labs from 8/10/2022.  He opted out of getting the colonoscopy that I recommended and he will not change his mind.  He will continue getting the Lupron with Dr. Matute and I asked him to give the date of this appointment to my nurse when he sees her back in a month.  We will give his Xgeva today.  We will get CT chest abdomen pelvis and total body bone scan towards the middle to end of October.  Presently other than for the hot flashes feels great.    -9/20/2022 PSA 8.320    -9/22/2022 [facility] clinic follow-up: Chris is doing well on Zytiga, prednisone along with Xgeva.  Labs reviewed from 9/20/2022 are unremarkable, CBC was normal, CMP with creatinine of 1.34 otherwise normal, this is stable from his baseline.  PSA stable at 8.320.  He received Lupron recently with Dr. Matute, he thinks last week  or so, states his next appointment is in February.  He will continue treatment unchanged.  We will repeat restaging CT chest, abdomen and pelvis and total body bone scan in October prior to return and I have ordered those today.  We will also repeat his PSA.    -9/22/2022 CT chest abdomen pelvisCompared to July 2022 [facility] showed no evidence of disease progression in the chest with no substantial sclerotic bony lesions and decrease in size of retroperitoneal and pelvic nodes with persistent cecal wall thickening and suboptimal bladder distention.  Total body bone scan showed decrease in previously seen uptake in the left third rib with diffuse sclerosis representing treatment response with no new foci.    -10/18/2022 [facility] follow-up: I reviewed bone scan and CT reports as above with no evidence of progression.  Tolerating Zytiga prednisone and Xgeva.  Getting Lupron regularly with Dr. Matute next appointment coming in February.  We will repeat his CT chest abdomen pelvis and total body bone scan in April.  He will follow with my nurse practitioner in the interim.    -1/10/2023 PSA 3.450  -1/11/2023 [facility] clinic follow-up: Mr. Morfin continues to do well on current therapy with Zytiga, prednisone and Xgeva with no unusual side effects.  He has no new worrisome symptoms.  He is due for his next Lupron injection in February with Dr. Matute.  His PSA continues to trend downward, current PSA from yesterday was 3.450.  We will continue therapy unchanged, he will receive Xgeva today.  Has had no hypocalcemia, his CMP, phosphorus and magnesium are all normal.  We will repeat restaging scans in April.  -3/7/2023 PSA 2.460  -3/8/2023 [facility] clinic follow-up:  Mr. Morfin is doing well.  He is tolerating therapy with Zytiga, prednisone and Xgeva with no significant side effects.  He has hot flashes but these are tolerable.  He had Lupron injection 2/24/2023 with   "Giovani.  His PSA continues to decline, current PSA 2.460.  He will continue therapy unchanged.  We will repeat restaging scans late April prior to return in 2 months and I have ordered those today.  He is working with his PCP Rodrigo Ram on his hypertension.  His b/p today was 160/88.  He was to have increased his losartan but I do not think he has done that yet as he said \"I still have some of my old prescription left\".  -4/4/2023 PSA 2.55  - 4/26/2023 CT chest abdomen pelvis [place] comparison 10/10/2022 showed stable left third rib, T1, left fourth and eighth rib, right seventh rib.  Probable liver cyst.  Few diverticuli.  Mild further decrease in a few of the previously enlarged nodes.  Left external iliac 12 mm compared to 16 mm.  Right common iliac 7 mm stable.  Lower left periaortic 8 mm previously 13 mm.  No new lytic or blastic osseous lesions.  Total body bone scan comparison 10/10/2022, 7/22/2022, and CT 4/26/2023.  No new sites of increased uptake.  1 focal left third rib hotspot consistent with bone metastasis.    -5/2/2023 [facility] medical oncology follow-up: I reviewed interval notes since I last saw him from my nurse practitioner as outlined above in interval images and reports thereof from [facility] that shows no new sites of bone metastasis and no kizzy or visceral progression.  PSA stabilized around 2.5.  In the absence of symptomatic or radiographic progression, I would be unlikely to change therapy just on the basis of a PSA rise and for now the PSA is stable.  We will continue Zytiga prednisone and Xgeva and he will follow-up with my nurse practitioner 5/30/2023 with repeat CTs and bone scan in 6 months.  I asked him to check with his primary care today regarding his systolic in the 170s.     Prostate cancer metastatic to multiple sites   3/15/2022 Initial Diagnosis    Prostate cancer metastatic to multiple sites (HCC)     3/15/2022 Cancer Staged    Staging form: Prostate, AJCC 8th " "Edition  - Clinical: Stage IVB (cT1c, cN1, cM1b, Grade Group: 5) - Signed by Fritz Goodson MD on 3/15/2022     4/7/2022 - 6/23/2022 Chemotherapy    Stopped after cycle 4 6/23/2022 due to syncope 3 days post infusions with negative cardiac work-up  OP PROSTATE DOCEtaxel     4/7/2022 -  Chemotherapy    OP SUPPORTIVE Denosumab (Xgeva) Q28D     7/26/2022 -  Chemotherapy    OP PROSTATE Abiraterone / PredniSONE           HISTORY OF PRESENT ILLNESS:  The patient is a 68 y.o. male, here for follow up on management of metastatic prostate cancer stable on Zytiga and prednisone Xgeva without new somatic complaints    Past Medical History:   Diagnosis Date   • Cancer    • Prostate cancer      Past Surgical History:   Procedure Laterality Date   • PROSTATE BIOPSY  02/2022    dr. sue       No Known Allergies    Family History and Social History reviewed and changed as necessary    REVIEW OF SYSTEM:   No new somatic complaints    PHYSICAL EXAM:  No respiratory distress or tachycardia.  No rashes.    Vitals:    05/02/23 0942   BP: 170/98   Pulse: 82   Resp: 18   Temp: 98.9 °F (37.2 °C)   SpO2: 96%   Weight: 130 kg (286 lb)   Height: 180.3 cm (71\")     Vitals:    05/02/23 0942   PainSc: 0-No pain          ECOG score: 0           Vitals reviewed.    ECOG: (0) Fully Active - Able to Carry On All Pre-disease Performance Without Restriction    Lab Results   Component Value Date    HGB 13.2 04/04/2023    HCT 40.1 04/04/2023    MCV 87.9 04/04/2023     04/04/2023    WBC 6.87 04/04/2023    NEUTROABS 4.21 04/04/2023    LYMPHSABS 1.94 04/04/2023    MONOSABS 0.47 04/04/2023    EOSABS 0.17 04/04/2023    BASOSABS 0.06 04/04/2023       Lab Results   Component Value Date    GLUCOSE 119 (H) 04/04/2023    BUN 27 (H) 04/04/2023    CREATININE 1.25 04/04/2023     04/04/2023    K 4.1 04/04/2023     (H) 04/04/2023    CO2 24.9 04/04/2023    CALCIUM 9.4 04/04/2023    ALBUMIN 4.5 04/04/2023    BILITOT 0.4 04/04/2023    ALKPHOS 81 " 04/04/2023    AST 22 04/04/2023    ALT 27 04/04/2023            ASSESSMENT & PLAN:  1.  Prostate cancer clinical T1c and 2M1B stage IVb treated with 4 cycles of Taxotere, stopped due to syncope with negative cardiac work-up, with marked drop in PSA of 18.6 from over 900 at baseline, continued on Zytiga and prednisone.  2.  Urinary retention with Goodwin in place 1/24/2022.  On finasteride 1/24/2022.  3.  Covid 19 December 2021  4.  Hypertension    Oncology history timeline:  -11/29/2021  with symptoms of urinary retention.  -2/15/2022 prostate biopsy adenocarcinoma grade group 5 and 3 out of 12 cores, grade group 4 in 1 out of 12 cores, grade group 3 in 8 out of 12 cores.  -2/24/2022 CT chest abdomen pelvis and total body bone scan shows left third rib, right acetabulum, periaortic and retroperitoneal kizzy metastases complaining of pain in the left chest and right hip.  Also possible sclerotic left 10th rib on CT.  Spleen mildly enlarged 13.8 cm.  Hepatic steatosis.  Small liver cyst.  Fat stranding in the periaortic and mesenteric region consistent with reactive/inflammatory stranding.  Multiple enlarged periaortic and retroperitoneal nodes and lymphadenopathy largest 4.9 cm.  CT chest describes tiny sclerotic foci in left eighth rib and right lateral seventh rib and T1.  Multiple small mediastinal and bilateral axillary nodes seen with small hypodense left thyroid nodule.  Creatinine 1.7.  -3/4/2022 office note Dr. Rolando Matute: Patient was seen after his elevated PSA by Dr. Vera and prostate biopsy scheduled.  However, he developed COVID-19 and this was canceled and the patient did not reschedule due to lack of insurance.  Saw Dr. Matute back on 1/24/2022 with plans to get staging CTs and bone scan with results as outlined above.  Started Casodex and to return on 3/11/2022 for Lupron.  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  TURP planned for  urinary retention symptoms.    -3/15/2022 [facility] medical oncology initial consultation: I reviewed the above data with the patient.  With his fairly bulky retroperitoneal adenopathy and bone metastases including extra axial metastases, he would fit the data from the CHAARTED trial for which Taxotere x6 followed by Zytiga prednisone along with the planned Lupron already being planned by Dr. Matute would be reasonable.  Equally reasonable in terms of comparisons with bicalutamide and Lupron would be Zytiga prednisone plus Lupron or Xtandi plus Lupron or apalutamide plus Lupron.  None of these have been compared head-to-head but all have beaten combined androgen deprivation therapy with bicalutamide and Lupron.  At the age of 67 and in order to have as many bullets as possible down the road and hence saving some of the hormone blockade options for later and using chemotherapy when he is younger and healthier for a more time-limited.,  He has opted for the Taxotere x6 followed by Zytiga and prednisone.  We will also give him Xgeva to improve bone health and given metastatic disease, as with all metastatic prostate cancer patients, he needs genetic counseling both for his sake as well as his family's.  If he were to have BRCA1 or other such  mutations, then that also opens up the options for PARP inhibitors etc. down the road.  He has his TURP scheduled for 2/24/2022 and we will start treatment a couple of weeks after that and he will get chemo preparation visit in the meantime and I will get capital surgeons to place a port.  We will check his PSA after his TURP when he sees my nurse practitioner back early April for the start of chemotherapy and repeat the PSA and CT chest abdomen pelvis and bone scan after the Taxotere is complete.    -3/31/2022 chemotherapy education and needs assessment completed    -4/7/2022 began docetaxel and Xgeva.  .0.  We will do his Xgeva every 6 weeks to coordinate with his  docetaxel infusions.    -4/19/2022 patient stopped Casodex    -5/17/2022 patient reported seeing his PCP for an abscess in his suprapubic area.  Placed on clindamycin, will delay treatment 1 week.    -5/25/2022 PSA 34.3  -5/26/2022 [facility] Oncology clinic follow-up: Chris has an abscess in the suprapubic area, it has improved on antibiotic therapy but I am concerned if we treat him it will just flare back up unless it is drained.  I will get him to Dr. Miller who placed his port for I&D and culture.  He is on clindamycin and states he completes course of treatment tomorrow.  I will delay his treatment with Taxotere 1 more week.  We will give his Xgeva today.  I will see him back in 1 week for follow-up to assess whether or not we can resume his Taxotere.  His PSA has dropped nicely with treatment thus far, PSA yesterday was 34.3 which is down from a PSA of 259.0 when we started treatment, it has been as high as 911 in November.    -6/2/2022 [facility] Oncology clinic follow-up: Chris went to see Dr. Miller to have his abscess lanced however apparently there was a long wait and he left without being seen.  He did call his PCP and was given 5 more days of clindamycin and the abscess now seems to have resolved.  We discussed today that he is at risk for recurrence of infection due to immunocompromise state with chemotherapy.  He will notify us if he has any return of his abscess.  He does have intertrigo under his panniculus, I have advised him to keep this area dry, to apply topical drying/antifungal powders.  Also recommended looser clothing.  His counts are adequate to continue therapy, we will resume Taxotere today with cycle #3.  We will repeat restaging scans after 6 courses.  We are doing Xgeva every 6 weeks to coordinate with his Taxotere.  Once he finishes Taxotere we can then go back to every 4 weeks.  His next Xgeva is not due until 7/14/2022.  His calcium is running a little low, he is going to  calcium  supplement.    -6/23/2022 [facility] Oncology clinic follow-up: Chris overall is doing well on treatment with Taxotere.  Labs reviewed from yesterday and are unremarkable.  We will continue treatment today unchanged.  His suprapubic abscess resolved on clindamycin.  He will continue to use drying powder/antifungal powder under his panniculus for intertrigo.  Today will be cycle #4 of a planned 6 courses of Taxotere.  He is also on Xgeva, next dose due 7/14/2022, we are doing this every 6 weeks for now to line up with his chemotherapy, can go back to every 4 weeks after he finishes Taxotere.  Calcium from CMP yesterday was normal.    -7/13/2022 [facility] Oncology clinic follow-up: Mr. Morfin has had issues around day 3 after each treatment ranging from chest pain after his first treatment for which he did not seek medical attention, fatigue after the next few treatments, but 3 days after his most recent treatment on 6/23/2022 he had a syncopal episode at home and once again did not seek medical attention.  I discussed with him that this was not acceptable, if he has occurrences like this someone needs to call 911, it is difficult to figure out what is going on after the fact, the best time to work this up would be during the occurrence.  For now we will get labs today with CBC, CMP, PSA.  I will refer him to cardiology for further evaluation.  We will hold Taxotere most likely, I will see him tomorrow to go over his lab results.  I will also repeat his restaging scans with CT chest, abdomen and pelvis and will include CT of the head in light of his syncopal episode.      -7/13/2022 PSA 18.6    -7/14/2022 [facility] Oncology clinic follow-up: Mr. Morfin returns today to review his labs from yesterday.  Labs are unremarkable.  PSA down to 18.6 from a high of 259.0 in April prior to starting treatment.  CBC and CMP unremarkable, magnesium is normal at 2.3, phosphorus slightly low at 2.4.  We will hold therapy for now in light  of his syncopal episode on day 3 after his last treatment and prior episodes with chest pain and severe fatigue that all occurred on day 3 after his Taxotere.  We will restage him with CT head, chest, abdomen and pelvis (I am including the head in light of his syncopal episode).  I have also referred him to cardiology, he states that he received a call from them about making an appointment however he wanted to talk to us today first.  I emphasized the importance to him of making and keeping that appointment and he states understanding.  I also once again emphasized the requirement to seek emergency medical attention if he has any episodes in the future such as chest pain or syncopal events.  Whether or not we try and complete Taxotere with 2 more courses or move to the next line of therapy will be decided when he returns to discuss with Dr. Goodson and review his restaging scans.    -7/15/2022 saw Dr. Diaz cardiologist.  For syncope he ordered echocardiogram and 48-hour Holter monitor.    -7/15/2022 Holter monitor relatively benign.    -7/22/2022 CT brain with and without contrast negative.  CT chest abdomen and pelvis shows some nonspecific cecal wall thickening.  No progression in the chest.  T1 and ribs stable.  Decrease in multiple retroperitoneal and pelvic nodes.  Slight increase in right acetabular sclerotic lesion.  Persistent but improved circumferential bladder wall thickening.  Total body bone scan shows stable left third rib and no evidence of new bone metastases.    -7/19/2022 ejection fraction 65% with grade 1 diastolic dysfunction.    -7/26/2022 [facility] oncology clinic follow-up: Given presyncopal symptoms occurred 3 days after Taxotere and has not otherwise occurred any other time and his cardiology work-up is fairly unremarkable and his disease is under good control as witnessed by the report above of the CTs of head chest abdomen pelvis and bone scan, I will hold cycle 5 and 6 of Taxotere and  continue 1 now with Zytiga and prednisone.  With reference to the coincidental cecal wall thickening found on CT, we will get him to [facility] surgeons for colonoscopy.  He will see my nurse practitioner back in August for next cycle of Abiraterone prednisone.  He will continue his Lupron with Dr. Matute.  We will continue his Xgeva.  We will repeat his CT chest abdomen pelvis and bone scan again in 3 months.  He will see my nurse practitioner in the interim.    -8/23/2022 [facility], [place], operated by [facility] oncology clinic follow-up: No further presyncopal symptoms.  Feeling great other than for hot flashes.  Did commend for now we will continue on with his Zytiga prednisone and he will get his Xgeva today based on labs from 8/10/2022.  He opted out of getting the colonoscopy that I recommended and he will not change his mind.  He will continue getting the Lupron with Dr. Matute and I asked him to give the date of this appointment to my nurse when he sees her back in a month.  We will give his Xgeva today.  We will get CT chest abdomen pelvis and total body bone scan towards the middle to end of October.  Presently other than for the hot flashes feels great.    -9/20/2022 PSA 8.320    -9/22/2022 [facility], [place], operated by [facility] Oncology clinic follow-up: Chris is doing well on Zytiga, prednisone along with Xgeva.  Labs reviewed from 9/20/2022 are unremarkable, CBC was normal, CMP with creatinine of 1.34 otherwise normal, this is stable from his baseline.  PSA stable at 8.320.  He received Lupron recently with Dr. Matute, he thinks last week or so, states his next appointment is in February.  He will continue treatment unchanged.  We will repeat restaging CT chest, abdomen and pelvis and total body bone scan in October prior to return and I have ordered those today.  We will also repeat his PSA.    -9/22/2022 CT chest abdomen pelvisCompared to July 2022 [facility] showed no evidence of disease progression in the chest with no substantial sclerotic bony  "lesions and decrease in size of retroperitoneal and pelvic nodes with persistent cecal wall thickening and suboptimal bladder distention.  Total body bone scan showed decrease in previously seen uptake in the left third rib with diffuse sclerosis representing treatment response with no new foci.    -10/18/2022 [facility], [place], operated by [facility] medical oncology follow-up: I reviewed bone scan and CT reports as above with no evidence of progression.  Tolerating Zytiga prednisone and Xgeva.  Getting Lupron regularly with Dr. Matute next appointment coming in February.  We will repeat his CT chest abdomen pelvis and total body bone scan in April.  He will follow with my nurse practitioner in the interim.    -1/10/2023 PSA 3.450  -1/11/2023 [facility], [place], operated by [facility] Oncology clinic follow-up: Mr. Morfin continues to do well on current therapy with Zytiga, prednisone and Xgeva with no unusual side effects.  He has no new worrisome symptoms.  He is due for his next Lupron injection in February with Dr. Matute.  His PSA continues to trend downward, current PSA from yesterday was 3.450.  We will continue therapy unchanged, he will receive Xgeva today.  Has had no hypocalcemia, his CMP, phosphorus and magnesium are all normal.  We will repeat restaging scans in April.  -3/7/2023 PSA 2.460  -3/8/2023 [facility], [place], operated by [facility] Oncology clinic follow-up:  Mr. Morfin is doing well.  He is tolerating therapy with Zytiga, prednisone and Xgeva with no significant side effects.  He has hot flashes but these are tolerable.  He had Lupron injection 2/24/2023 with Dr. Matute.  His PSA continues to decline, current PSA 2.460.  He will continue therapy unchanged.  We will repeat restaging scans late April prior to return in 2 months and I have ordered those today.  He is working with his PCP Rodrigo Ram on his hypertension.  His b/p today was 160/88.  He was to have increased his losartan but I do not think he has done that yet as he said \"I still have some of my old prescription " "left\".  -4/4/2023 PSA 2.55  - 4/26/2023 CT chest abdomen pelvis [place] comparison 10/10/2022 showed stable left third rib, T1, left fourth and eighth rib, right seventh rib.  Probable liver cyst.  Few diverticuli.  Mild further decrease in a few of the previously enlarged nodes.  Left external iliac 12 mm compared to 16 mm.  Right common iliac 7 mm stable.  Lower left periaortic 8 mm previously 13 mm.  No new lytic or blastic osseous lesions.  Total body bone scan comparison 10/10/2022, 7/22/2022, and CT 4/26/2023.  No new sites of increased uptake.  1 focal left third rib hotspot consistent with bone metastasis.    -5/2/2023 [facility] medical oncology follow-up: I reviewed interval notes since I last saw him from my nurse practitioner as outlined above in interval images and reports thereof from [facility] that shows no new sites of bone metastasis and no kizzy or visceral progression.  PSA stabilized around 2.5.  In the absence of symptomatic or radiographic progression, I would be unlikely to change therapy just on the basis of a PSA rise and for now the PSA is stable.  We will continue Zytiga prednisone and Xgeva and he will follow-up with my nurse practitioner 5/30/2023 with repeat CTs and bone scan in 6 months.  I asked him to check with his primary care today regarding his systolic in the 170s.    Total time of care today inclusive of time spent today prior to patient's arrival reviewing interval data and information from my nurse practitioner and interval images and reports of images as outlined above and during visit interviewing him as to signs or symptoms of his disease and the need to get with primary care for tighter management of his hypertension and after visit instituting follow-up as outlined took 35 minutes of patient care time throughout the day today.  Fritz Goodson MD    05/02/2023      "

## 2023-05-02 NOTE — PROGRESS NOTES
CHIEF COMPLAINT: No new somatic complaints    Problem List:  Oncology/Hematology History Overview Note   1.  Prostate cancer clinical T1c and 2M1B stage IVb treated with 4 cycles of Taxotere, stopped due to syncope with negative cardiac work-up, with marked drop in PSA of 18.6 from over 900 at baseline, continued on Zytiga and prednisone.  2.  Urinary retention with Goodwin in place 1/24/2022.  On finasteride 1/24/2022.  3.  Covid 19 December 2021  4.  Hypertension    Oncology history timeline:  -11/29/2021  with symptoms of urinary retention.  -2/15/2022 prostate biopsy adenocarcinoma grade group 5 and 3 out of 12 cores, grade group 4 in 1 out of 12 cores, grade group 3 in 8 out of 12 cores.  -2/24/2022 CT chest abdomen pelvis and total body bone scan shows left third rib, right acetabulum, periaortic and retroperitoneal kizzy metastases complaining of pain in the left chest and right hip.  Also possible sclerotic left 10th rib on CT.  Spleen mildly enlarged 13.8 cm.  Hepatic steatosis.  Small liver cyst.  Fat stranding in the periaortic and mesenteric region consistent with reactive/inflammatory stranding.  Multiple enlarged periaortic and retroperitoneal nodes and lymphadenopathy largest 4.9 cm.  CT chest describes tiny sclerotic foci in left eighth rib and right lateral seventh rib and T1.  Multiple small mediastinal and bilateral axillary nodes seen with small hypodense left thyroid nodule.  Creatinine 1.7.  -3/4/2022 office note Dr. Rolando Matute: Patient was seen after his elevated PSA by Dr. Vera and prostate biopsy scheduled.  However, he developed COVID-19 and this was canceled and the patient did not reschedule due to lack of insurance.  Saw Dr. Matute back on 1/24/2022 with plans to get staging CTs and bone scan with results as outlined above.  Started Casodex and to return on 3/11/2022 for Lupron.  Referral made to medical oncology for other additional castrate naïve prostate cancer  therapies.  TURP planned for urinary retention symptoms.    -3/15/2022 [facility], operated by [facility] medical oncology initial consultation: I reviewed the above data with the patient.  With his fairly bulky retroperitoneal adenopathy and bone metastases including extra axial metastases, he would fit the data from the CHAARTED trial for which Taxotere x6 followed by Zytiga prednisone along with the planned Lupron already being planned by Dr. Matute would be reasonable.  Equally reasonable in terms of comparisons with bicalutamide and Lupron would be Zytiga prednisone plus Lupron or Xtandi plus Lupron or apalutamide plus Lupron.  None of these have been compared head-to-head but all have beaten combined androgen deprivation therapy with bicalutamide and Lupron.  At the age of 67 and in order to have as many bullets as possible down the road and hence saving some of the hormone blockade options for later and using chemotherapy when he is younger and healthier for a more time-limited.,  He has opted for the Taxotere x6 followed by Zytiga and prednisone.  We will also give him Xgeva to improve bone health and given metastatic disease, as with all metastatic prostate cancer patients, he needs genetic counseling both for his sake as well as his family's.  If he were to have BRCA1 or other such  mutations, then that also opens up the options for PARP inhibitors etc. down the road.  He has his TURP scheduled for 2/24/2022 and we will start treatment a couple of weeks after that and he will get chemo preparation visit in the meantime and I will get capital surgeons to place a port.  We will check his PSA after his TURP when he sees my nurse practitioner back early April for the start of chemotherapy and repeat the PSA and CT chest abdomen pelvis and bone scan after the Taxotere is complete.    -3/31/2022 chemotherapy education and needs assessment completed    -4/7/2022 began docetaxel and Xgeva.  .0.  We will do his Xgeva every 6  weeks to coordinate with his docetaxel infusions.    -4/19/2022 patient stopped Casodex    -5/17/2022 patient reported seeing his PCP for an abscess in his suprapubic area.  Placed on clindamycin, will delay treatment 1 week.    -5/25/2022 PSA 34.3  -5/26/2022 [facility] clinic follow-up: Chris has an abscess in the suprapubic area, it has improved on antibiotic therapy but I am concerned if we treat him it will just flare back up unless it is drained.  I will get him to Dr. Miller who placed his port for I&D and culture.  He is on clindamycin and states he completes course of treatment tomorrow.  I will delay his treatment with Taxotere 1 more week.  We will give his Xgeva today.  I will see him back in 1 week for follow-up to assess whether or not we can resume his Taxotere.  His PSA has dropped nicely with treatment thus far, PSA yesterday was 34.3 which is down from a PSA of 259.0 when we started treatment, it has been as high as 911 in November.    -6/2/2022 [facility] Oncology clinic follow-up: Chris went to see Dr. Miller to have his abscess lanced however apparently there was a long wait and he left without being seen.  He did call his PCP and was given 5 more days of clindamycin and the abscess now seems to have resolved.  We discussed today that he is at risk for recurrence of infection due to immunocompromise state with chemotherapy.  He will notify us if he has any return of his abscess.  He does have intertrigo under his panniculus, I have advised him to keep this area dry, to apply topical drying/antifungal powders.  Also recommended looser clothing.  His counts are adequate to continue therapy, we will resume Taxotere today with cycle #3.  We will repeat restaging scans after 6 courses.  We are doing Xgeva every 6 weeks to coordinate with his Taxotere.  Once he finishes Taxotere we can then go back to every 4 weeks.  His next Xgeva is not due until 7/14/2022.  His calcium is running a little low,  he is going to  calcium supplement.    -6/23/2022 [facility] follow-up: Chris overall is doing well on treatment with Taxotere.  Labs reviewed from yesterday and are unremarkable.  We will continue treatment today unchanged.  His suprapubic abscess resolved on clindamycin.  He will continue to use drying powder/antifungal powder under his panniculus for intertrigo.  Today will be cycle #4 of a planned 6 courses of Taxotere.  He is also on Xgeva, next dose due 7/14/2022, we are doing this every 6 weeks for now to line up with his chemotherapy, can go back to every 4 weeks after he finishes Taxotere.  Calcium from CMP yesterday was normal.    -7/13/2022 [facility] clinic follow-up: Mr. Morfin has had issues around day 3 after each treatment ranging from chest pain after his first treatment for which he did not seek medical attention, fatigue after the next few treatments, but 3 days after his most recent treatment on 6/23/2022 he had a syncopal episode at home and once again did not seek medical attention.  I discussed with him that this was not acceptable, if he has occurrences like this someone needs to call 911, it is difficult to figure out what is going on after the fact, the best time to work this up would be during the occurrence.  For now we will get labs today with CBC, CMP, PSA.  I will refer him to cardiology for further evaluation.  We will hold Taxotere most likely, I will see him tomorrow to go over his lab results.  I will also repeat his restaging scans with CT chest, abdomen and pelvis and will include CT of the head in light of his syncopal episode.      -7/13/2022 PSA 18.6    -7/14/2022 [facility] clinic follow-up: Mr. Morfin returns today to review his labs from yesterday.  Labs are unremarkable.  PSA down to 18.6 from a high of 259.0 in April prior to starting treatment.  CBC and CMP unremarkable, magnesium is normal at 2.3, phosphorus slightly low at 2.4.  We will  hold therapy for now in light of his syncopal episode on day 3 after his last treatment and prior episodes with chest pain and severe fatigue that all occurred on day 3 after his Taxotere.  We will restage him with CT head, chest, abdomen and pelvis (I am including the head in light of his syncopal episode).  I have also referred him to cardiology, he states that he received a call from them about making an appointment however he wanted to talk to us today first.  I emphasized the importance to him of making and keeping that appointment and he states understanding.  I also once again emphasized the requirement to seek emergency medical attention if he has any episodes in the future such as chest pain or syncopal events.  Whether or not we try and complete Taxotere with 2 more courses or move to the next line of therapy will be decided when he returns to discuss with Dr. Goodson and review his restaging scans.    -7/15/2022 saw Dr. Diaz cardiologist.  For syncope he ordered echocardiogram and 48-hour Holter monitor.    -7/15/2022 Holter monitor relatively benign.    -7/22/2022 CT brain with and without contrast negative.  CT chest abdomen and pelvis shows some nonspecific cecal wall thickening.  No progression in the chest.  T1 and ribs stable.  Decrease in multiple retroperitoneal and pelvic nodes.  Slight increase in right acetabular sclerotic lesion.  Persistent but improved circumferential bladder wall thickening.  Total body bone scan shows stable left third rib and no evidence of new bone metastases.    -7/19/2022 ejection fraction 65% with grade 1 diastolic dysfunction.    -7/26/2022 [facility] clinic follow-up: Given presyncopal symptoms occurred 3 days after Taxotere and has not otherwise occurred any other time and his cardiology work-up is fairly unremarkable and his disease is under good control as witnessed by the report above of the CTs of head chest abdomen pelvis and bone scan, I will hold  cycle 5 and 6 of Taxotere and continue 1 now with Zytiga and prednisone.  With reference to the coincidental cecal wall thickening found on CT, we will get him to [facility] surgeons for colonoscopy.  He will see my nurse practitioner back in August for next cycle of Abiraterone prednisone.  He will continue his Lupron with Dr. Matute.  We will continue his Xgeva.  We will repeat his CT chest abdomen pelvis and bone scan again in 3 months.  He will see my nurse practitioner in the interim.    -8/23/2022 [facility] clinic follow-up: No further presyncopal symptoms.  Feeling great other than for hot flashes.  Did commend for now we will continue on with his Zytiga prednisone and he will get his Xgeva today based on labs from 8/10/2022.  He opted out of getting the colonoscopy that I recommended and he will not change his mind.  He will continue getting the Lupron with Dr. Matute and I asked him to give the date of this appointment to my nurse when he sees her back in a month.  We will give his Xgeva today.  We will get CT chest abdomen pelvis and total body bone scan towards the middle to end of October.  Presently other than for the hot flashes feels great.    -9/20/2022 PSA 8.320    -9/22/2022 [facility] clinic follow-up: Chris is doing well on Zytiga, prednisone along with Xgeva.  Labs reviewed from 9/20/2022 are unremarkable, CBC was normal, CMP with creatinine of 1.34 otherwise normal, this is stable from his baseline.  PSA stable at 8.320.  He received Lupron recently with Dr. Matute, he thinks last week or so, states his next appointment is in February.  He will continue treatment unchanged.  We will repeat restaging CT chest, abdomen and pelvis and total body bone scan in October prior to return and I have ordered those today.  We will also repeat his PSA.    -9/22/2022 CT chest abdomen pelvisCompared to July 2022 [facility] showed no evidence of disease progression in the chest with no  "substantial sclerotic bony lesions and decrease in size of retroperitoneal and pelvic nodes with persistent cecal wall thickening and suboptimal bladder distention.  Total body bone scan showed decrease in previously seen uptake in the left third rib with diffuse sclerosis representing treatment response with no new foci.    -10/18/2022 [facility] medical oncology follow-up: I reviewed bone scan and CT reports as above with no evidence of progression.  Tolerating Zytiga prednisone and Xgeva.  Getting Lupron regularly with Dr. Matute next appointment coming in February.  We will repeat his CT chest abdomen pelvis and total body bone scan in April.  He will follow with my nurse practitioner in the interim.    -1/10/2023 PSA 3.450  -1/11/2023 [facility] Oncology clinic follow-up: Mr. Morfin continues to do well on current therapy with Zytiga, prednisone and Xgeva with no unusual side effects.  He has no new worrisome symptoms.  He is due for his next Lupron injection in February with Dr. Matute.  His PSA continues to trend downward, current PSA from yesterday was 3.450.  We will continue therapy unchanged, he will receive Xgeva today.  Has had no hypocalcemia, his CMP, phosphorus and magnesium are all normal.  We will repeat restaging scans in April.  -3/7/2023 PSA 2.460  -3/8/2023 [facility] Oncology clinic follow-up:  Mr. Morfin is doing well.  He is tolerating therapy with Zytiga, prednisone and Xgeva with no significant side effects.  He has hot flashes but these are tolerable.  He had Lupron injection 2/24/2023 with Dr. Matute.  His PSA continues to decline, current PSA 2.460.  He will continue therapy unchanged.  We will repeat restaging scans late April prior to return in 2 months and I have ordered those today.  He is working with his PCP Rodrigo Ram on his hypertension.  His b/p today was 160/88.  He was to have increased his losartan but I do not think he has done that yet as he said \"I still have some of my old " "prescription left\".  -4/4/2023 PSA 2.55  - 4/26/2023 CT chest abdomen pelvis [place] comparison 10/10/2022 showed stable left third rib, T1, left fourth and eighth rib, right seventh rib.  Probable liver cyst.  Few diverticuli.  Mild further decrease in a few of the previously enlarged nodes.  Left external iliac 12 mm compared to 16 mm.  Right common iliac 7 mm stable.  Lower left periaortic 8 mm previously 13 mm.  No new lytic or blastic osseous lesions.  Total body bone scan comparison 10/10/2022, 7/22/2022, and CT 4/26/2023.  No new sites of increased uptake.  1 focal left third rib hotspot consistent with bone metastasis.    -5/2/2023 [facility] [place] medical oncology follow-up: I reviewed interval notes since I last saw him from my nurse practitioner as outlined above in interval images and reports thereof from [facility] that shows no new sites of bone metastasis and no kizzy or visceral progression.  PSA stabilized around 2.5.  In the absence of symptomatic or radiographic progression, I would be unlikely to change therapy just on the basis of a PSA rise and for now the PSA is stable.  We will continue Zytiga prednisone and Xgeva and he will follow-up with my nurse practitioner 5/30/2023 with repeat CTs and bone scan in 6 months.  I asked him to check with his primary care today regarding his systolic in the 170s.     Prostate cancer metastatic to multiple sites   3/15/2022 Initial Diagnosis    Prostate cancer metastatic to multiple sites (HCC)     3/15/2022 Cancer Staged    Staging form: Prostate, AJCC 8th Edition  - Clinical: Stage IVB (cT1c, cN1, cM1b, Grade Group: 5) - Signed by Fritz Goodson MD on 3/15/2022     4/7/2022 - 6/23/2022 Chemotherapy    Stopped after cycle 4 6/23/2022 due to syncope 3 days post infusions with negative cardiac work-up  OP PROSTATE DOCEtaxel     4/7/2022 -  Chemotherapy    OP SUPPORTIVE Denosumab (Xgeva) Q28D     7/26/2022 -  Chemotherapy    OP PROSTATE Abiraterone / " "PredniSONE           HISTORY OF PRESENT ILLNESS:  The patient is a 68 y.o. male, here for follow up on management of metastatic prostate cancer stable on Zytiga and prednisone Xgeva without new somatic complaints    Past Medical History:   Diagnosis Date   • Cancer    • Prostate cancer      Past Surgical History:   Procedure Laterality Date   • PROSTATE BIOPSY  02/2022    dr. sue       No Known Allergies    Family History and Social History reviewed and changed as necessary    REVIEW OF SYSTEM:   No new somatic complaints    PHYSICAL EXAM:  No respiratory distress or tachycardia.  No rashes.    Vitals:    05/02/23 0942   BP: 170/98   Pulse: 82   Resp: 18   Temp: 98.9 °F (37.2 °C)   SpO2: 96%   Weight: 130 kg (286 lb)   Height: 180.3 cm (71\")     Vitals:    05/02/23 0942   PainSc: 0-No pain          ECOG score: 0           Vitals reviewed.    ECOG: (0) Fully Active - Able to Carry On All Pre-disease Performance Without Restriction    Lab Results   Component Value Date    HGB 13.2 04/04/2023    HCT 40.1 04/04/2023    MCV 87.9 04/04/2023     04/04/2023    WBC 6.87 04/04/2023    NEUTROABS 4.21 04/04/2023    LYMPHSABS 1.94 04/04/2023    MONOSABS 0.47 04/04/2023    EOSABS 0.17 04/04/2023    BASOSABS 0.06 04/04/2023       Lab Results   Component Value Date    GLUCOSE 119 (H) 04/04/2023    BUN 27 (H) 04/04/2023    CREATININE 1.25 04/04/2023     04/04/2023    K 4.1 04/04/2023     (H) 04/04/2023    CO2 24.9 04/04/2023    CALCIUM 9.4 04/04/2023    ALBUMIN 4.5 04/04/2023    BILITOT 0.4 04/04/2023    ALKPHOS 81 04/04/2023    AST 22 04/04/2023    ALT 27 04/04/2023             ASSESSMENT & PLAN:  1.  Prostate cancer clinical T1c and 2M1B stage IVb treated with 4 cycles of Taxotere, stopped due to syncope with negative cardiac work-up, with marked drop in PSA of 18.6 from over 900 at baseline, continued on Zytiga and prednisone.  2.  Urinary retention with Goodwin in place 1/24/2022.  On finasteride " 1/24/2022.  3.  Covid 19 December 2021  4.  Hypertension    Oncology history timeline:  -11/29/2021  with symptoms of urinary retention.  -2/15/2022 prostate biopsy adenocarcinoma grade group 5 and 3 out of 12 cores, grade group 4 in 1 out of 12 cores, grade group 3 in 8 out of 12 cores.  -2/24/2022 CT chest abdomen pelvis and total body bone scan shows left third rib, right acetabulum, periaortic and retroperitoneal kizzy metastases complaining of pain in the left chest and right hip.  Also possible sclerotic left 10th rib on CT.  Spleen mildly enlarged 13.8 cm.  Hepatic steatosis.  Small liver cyst.  Fat stranding in the periaortic and mesenteric region consistent with reactive/inflammatory stranding.  Multiple enlarged periaortic and retroperitoneal nodes and lymphadenopathy largest 4.9 cm.  CT chest describes tiny sclerotic foci in left eighth rib and right lateral seventh rib and T1.  Multiple small mediastinal and bilateral axillary nodes seen with small hypodense left thyroid nodule.  Creatinine 1.7.  -3/4/2022 office note Dr. Rolando Matute: Patient was seen after his elevated PSA by Dr. Vera and prostate biopsy scheduled.  However, he developed COVID-19 and this was canceled and the patient did not reschedule due to lack of insurance.  Saw Dr. Matute back on 1/24/2022 with plans to get staging CTs and bone scan with results as outlined above.  Started Casodex and to return on 3/11/2022 for Lupron.  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  TURP planned for urinary retention symptoms.    -3/15/2022 [facility] medical oncology initial consultation: I reviewed the above data with the patient.  With his fairly bulky retroperitoneal adenopathy and bone metastases including extra axial metastases, he would fit the data from the CHAARTED trial for which Taxotere x6 followed by Zytiga prednisone along with the planned Lupron already being planned by  Dr. Matute would be reasonable.  Equally reasonable in terms of comparisons with bicalutamide and Lupron would be Zytiga prednisone plus Lupron or Xtandi plus Lupron or apalutamide plus Lupron.  None of these have been compared head-to-head but all have beaten combined androgen deprivation therapy with bicalutamide and Lupron.  At the age of 67 and in order to have as many bullets as possible down the road and hence saving some of the hormone blockade options for later and using chemotherapy when he is younger and healthier for a more time-limited.,  He has opted for the Taxotere x6 followed by Zytiga and prednisone.  We will also give him Xgeva to improve bone health and given metastatic disease, as with all metastatic prostate cancer patients, he needs genetic counseling both for his sake as well as his family's.  If he were to have BRCA1 or other such  mutations, then that also opens up the options for PARP inhibitors etc. down the road.  He has his TURP scheduled for 2/24/2022 and we will start treatment a couple of weeks after that and he will get chemo preparation visit in the meantime and I will get capital surgeons to place a port.  We will check his PSA after his TURP when he sees my nurse practitioner back early April for the start of chemotherapy and repeat the PSA and CT chest abdomen pelvis and bone scan after the Taxotere is complete.    -3/31/2022 chemotherapy education and needs assessment completed    -4/7/2022 began docetaxel and Xgeva.  .0.  We will do his Xgeva every 6 weeks to coordinate with his docetaxel infusions.    -4/19/2022 patient stopped Casodex    -5/17/2022 patient reported seeing his PCP for an abscess in his suprapubic area.  Placed on clindamycin, will delay treatment 1 week.    -5/25/2022 PSA 34.3  -5/26/2022 [facility] follow-up: Chris has an abscess in the suprapubic area, it has improved on antibiotic therapy but I am concerned if we treat him it  will just flare back up unless it is drained.  I will get him to Dr. Miller who placed his port for I&D and culture.  He is on clindamycin and states he completes course of treatment tomorrow.  I will delay his treatment with Taxotere 1 more week.  We will give his Xgeva today.  I will see him back in 1 week for follow-up to assess whether or not we can resume his Taxotere.  His PSA has dropped nicely with treatment thus far, PSA yesterday was 34.3 which is down from a PSA of 259.0 when we started treatment, it has been as high as 911 in November.    -6/2/2022 [facility] Oncology clinic follow-up: Chris went to see Dr. Miller to have his abscess lanced however apparently there was a long wait and he left without being seen.  He did call his PCP and was given 5 more days of clindamycin and the abscess now seems to have resolved.  We discussed today that he is at risk for recurrence of infection due to immunocompromise state with chemotherapy.  He will notify us if he has any return of his abscess.  He does have intertrigo under his panniculus, I have advised him to keep this area dry, to apply topical drying/antifungal powders.  Also recommended looser clothing.  His counts are adequate to continue therapy, we will resume Taxotere today with cycle #3.  We will repeat restaging scans after 6 courses.  We are doing Xgeva every 6 weeks to coordinate with his Taxotere.  Once he finishes Taxotere we can then go back to every 4 weeks.  His next Xgeva is not due until 7/14/2022.  His calcium is running a little low, he is going to  calcium supplement.    -6/23/2022 [facility] Oncology clinic follow-up: Chris overall is doing well on treatment with Taxotere.  Labs reviewed from yesterday and are unremarkable.  We will continue treatment today unchanged.  His suprapubic abscess resolved on clindamycin.  He will continue to use drying powder/antifungal powder under his panniculus for intertrigo.  Today will be cycle #4 of a  planned 6 courses of Taxotere.  He is also on Xgeva, next dose due 7/14/2022, we are doing this every 6 weeks for now to line up with his chemotherapy, can go back to every 4 weeks after he finishes Taxotere.  Calcium from CMP yesterday was normal.    -7/13/2022 [facility] Oncology clinic follow-up: Mr. Morfin has had issues around day 3 after each treatment ranging from chest pain after his first treatment for which he did not seek medical attention, fatigue after the next few treatments, but 3 days after his most recent treatment on 6/23/2022 he had a syncopal episode at home and once again did not seek medical attention.  I discussed with him that this was not acceptable, if he has occurrences like this someone needs to call 911, it is difficult to figure out what is going on after the fact, the best time to work this up would be during the occurrence.  For now we will get labs today with CBC, CMP, PSA.  I will refer him to cardiology for further evaluation.  We will hold Taxotere most likely, I will see him tomorrow to go over his lab results.  I will also repeat his restaging scans with CT chest, abdomen and pelvis and will include CT of the head in light of his syncopal episode.      -7/13/2022 PSA 18.6    -7/14/2022 [facility] Oncology clinic follow-up: Mr. Morfin returns today to review his labs from yesterday.  Labs are unremarkable.  PSA down to 18.6 from a high of 259.0 in April prior to starting treatment.  CBC and CMP unremarkable, magnesium is normal at 2.3, phosphorus slightly low at 2.4.  We will hold therapy for now in light of his syncopal episode on day 3 after his last treatment and prior episodes with chest pain and severe fatigue that all occurred on day 3 after his Taxotere.  We will restage him with CT head, chest, abdomen and pelvis (I am including the head in light of his syncopal episode).  I have also referred him to cardiology, he states that he received a call from them about making an  appointment however he wanted to talk to us today first.  I emphasized the importance to him of making and keeping that appointment and he states understanding.  I also once again emphasized the requirement to seek emergency medical attention if he has any episodes in the future such as chest pain or syncopal events.  Whether or not we try and complete Taxotere with 2 more courses or move to the next line of therapy will be decided when he returns to discuss with Dr. Goodson and review his restaging scans.    -7/15/2022 saw Dr. Diaz cardiologist.  For syncope he ordered echocardiogram and 48-hour Holter monitor.    -7/15/2022 Holter monitor relatively benign.    -7/22/2022 CT brain with and without contrast negative.  CT chest abdomen and pelvis shows some nonspecific cecal wall thickening.  No progression in the chest.  T1 and ribs stable.  Decrease in multiple retroperitoneal and pelvic nodes.  Slight increase in right acetabular sclerotic lesion.  Persistent but improved circumferential bladder wall thickening.  Total body bone scan shows stable left third rib and no evidence of new bone metastases.    -7/19/2022 ejection fraction 65% with grade 1 diastolic dysfunction.    -7/26/2022 [facility] follow-up: Given presyncopal symptoms occurred 3 days after Taxotere and has not otherwise occurred any other time and his cardiology work-up is fairly unremarkable and his disease is under good control as witnessed by the report above of the CTs of head chest abdomen pelvis and bone scan, I will hold cycle 5 and 6 of Taxotere and continue 1 now with Zytiga and prednisone.  With reference to the coincidental cecal wall thickening found on CT, we will get him to [facility] surgeons for colonoscopy.  He will see my nurse practitioner back in August for next cycle of Abiraterone prednisone.  He will continue his Lupron with Dr. Matute.  We will continue his Xgeva.  We will repeat his CT chest abdomen pelvis and  bone scan again in 3 months.  He will see my nurse practitioner in the interim.    -8/23/2022 [facility] follow-up: No further presyncopal symptoms.  Feeling great other than for hot flashes.  Did commend for now we will continue on with his Zytiga prednisone and he will get his Xgeva today based on labs from 8/10/2022.  He opted out of getting the colonoscopy that I recommended and he will not change his mind.  He will continue getting the Lupron with Dr. Matute and I asked him to give the date of this appointment to my nurse when he sees her back in a month.  We will give his Xgeva today.  We will get CT chest abdomen pelvis and total body bone scan towards the middle to end of October.  Presently other than for the hot flashes feels great.    -9/20/2022 PSA 8.320    -9/22/2022 [facility] follow-up: Chris is doing well on Zytiga, prednisone along with Xgeva.  Labs reviewed from 9/20/2022 are unremarkable, CBC was normal, CMP with creatinine of 1.34 otherwise normal, this is stable from his baseline.  PSA stable at 8.320.  He received Lupron recently with Dr. Matute, he thinks last week or so, states his next appointment is in February.  He will continue treatment unchanged.  We will repeat restaging CT chest, abdomen and pelvis and total body bone scan in October prior to return and I have ordered those today.  We will also repeat his PSA.    -9/22/2022 CT chest abdomen pelvisCompared to July 2022 [facility] showed no evidence of disease progression in the chest with no substantial sclerotic bony lesions and decrease in size of retroperitoneal and pelvic nodes with persistent cecal wall thickening and suboptimal bladder distention.  Total body bone scan showed decrease in previously seen uptake in the left third rib with diffuse sclerosis representing treatment response with no new foci.    -10/18/2022 [facility] follow-up: I reviewed bone scan and CT reports as  "above with no evidence of progression.  Tolerating Zytiga prednisone and Xgeva.  Getting Lupron regularly with Dr. Matute next appointment coming in February.  We will repeat his CT chest abdomen pelvis and total body bone scan in April.  He will follow with my nurse practitioner in the interim.    -1/10/2023 PSA 3.450  -1/11/2023 [facility] Oncology clinic follow-up: Mr. Morfin continues to do well on current therapy with Zytiga, prednisone and Xgeva with no unusual side effects.  He has no new worrisome symptoms.  He is due for his next Lupron injection in February with Dr. Matute.  His PSA continues to trend downward, current PSA from yesterday was 3.450.  We will continue therapy unchanged, he will receive Xgeva today.  Has had no hypocalcemia, his CMP, phosphorus and magnesium are all normal.  We will repeat restaging scans in April.  -3/7/2023 PSA 2.460  -3/8/2023 [facility] Oncology clinic follow-up:  Mr. Morfin is doing well.  He is tolerating therapy with Zytiga, prednisone and Xgeva with no significant side effects.  He has hot flashes but these are tolerable.  He had Lupron injection 2/24/2023 with Dr. Matute.  His PSA continues to decline, current PSA 2.460.  He will continue therapy unchanged.  We will repeat restaging scans late April prior to return in 2 months and I have ordered those today.  He is working with his PCP Rodrigo Ram on his hypertension.  His b/p today was 160/88.  He was to have increased his losartan but I do not think he has done that yet as he said \"I still have some of my old prescription left\".  -4/4/2023 PSA 2.55  - 4/26/2023 CT chest abdomen pelvis [place] comparison 10/10/2022 showed stable left third rib, T1, left fourth and eighth rib, right seventh rib.  Probable liver cyst.  Few diverticuli.  Mild further decrease in a few of the previously enlarged nodes.  Left external iliac 12 mm compared to 16 mm.  Right common iliac 7 mm stable.  Lower left periaortic 8 mm previously " 13 mm.  No new lytic or blastic osseous lesions.  Total body bone scan comparison 10/10/2022, 7/22/2022, and CT 4/26/2023.  No new sites of increased uptake.  1 focal left third rib hotspot consistent with bone metastasis.    -5/2/2023 [facility] medical oncology follow-up: I reviewed interval notes since I last saw him from my nurse practitioner as outlined above in interval images and reports thereof from [facility] that shows no new sites of bone metastasis and no kizzy or visceral progression.  PSA stabilized around 2.5.  In the absence of symptomatic or radiographic progression, I would be unlikely to change therapy just on the basis of a PSA rise and for now the PSA is stable.  We will continue Zytiga prednisone and Xgeva and he will follow-up with my nurse practitioner 5/30/2023 with repeat CTs and bone scan in 6 months.  I asked him to check with his primary care today regarding his systolic in the 170s.    Total time of care today inclusive of time spent today prior to patient's arrival reviewing interval data and information from my nurse practitioner and interval images and reports of images as outlined above and during visit interviewing him as to signs or symptoms of his disease and the need to get with primary care for tighter management of his hypertension and after visit instituting follow-up as outlined took 35 minutes of patient care time throughout the day today.  Fritz Goodson MD    05/02/2023

## 2023-05-03 ENCOUNTER — INFUSION (OUTPATIENT)
Dept: ONCOLOGY | Facility: HOSPITAL | Age: 68
End: 2023-05-03
Payer: MEDICARE

## 2023-05-03 VITALS
RESPIRATION RATE: 17 BRPM | DIASTOLIC BLOOD PRESSURE: 109 MMHG | TEMPERATURE: 97.9 F | SYSTOLIC BLOOD PRESSURE: 180 MMHG | WEIGHT: 295 LBS | HEART RATE: 74 BPM | BODY MASS INDEX: 41.14 KG/M2

## 2023-05-03 DIAGNOSIS — C61 PROSTATE CANCER METASTATIC TO MULTIPLE SITES: Primary | ICD-10-CM

## 2023-05-03 LAB
ALBUMIN SERPL-MCNC: 4.7 G/DL (ref 3.5–5.2)
ALBUMIN/GLOB SERPL: 1.9 G/DL
ALP SERPL-CCNC: 85 U/L (ref 39–117)
ALT SERPL-CCNC: 29 U/L (ref 1–41)
AST SERPL-CCNC: 18 U/L (ref 1–40)
BASOPHILS # BLD AUTO: 0.07 10*3/MM3 (ref 0–0.2)
BASOPHILS NFR BLD AUTO: 0.9 % (ref 0–1.5)
BILIRUB SERPL-MCNC: 0.4 MG/DL (ref 0–1.2)
BUN SERPL-MCNC: 25 MG/DL (ref 8–23)
BUN/CREAT SERPL: 20.7 (ref 7–25)
CALCIUM SERPL-MCNC: 10.1 MG/DL (ref 8.6–10.5)
CHLORIDE SERPL-SCNC: 107 MMOL/L (ref 98–107)
CO2 SERPL-SCNC: 26.4 MMOL/L (ref 22–29)
CREAT SERPL-MCNC: 1.21 MG/DL (ref 0.76–1.27)
EGFRCR SERPLBLD CKD-EPI 2021: 65.2 ML/MIN/1.73
EOSINOPHIL # BLD AUTO: 0.21 10*3/MM3 (ref 0–0.4)
EOSINOPHIL NFR BLD AUTO: 2.7 % (ref 0.3–6.2)
ERYTHROCYTE [DISTWIDTH] IN BLOOD BY AUTOMATED COUNT: 13.1 % (ref 12.3–15.4)
GLOBULIN SER CALC-MCNC: 2.5 GM/DL
GLUCOSE SERPL-MCNC: 108 MG/DL (ref 65–99)
HCT VFR BLD AUTO: 42.4 % (ref 37.5–51)
HGB BLD-MCNC: 13.8 G/DL (ref 13–17.7)
IMM GRANULOCYTES # BLD AUTO: 0.06 10*3/MM3 (ref 0–0.05)
IMM GRANULOCYTES NFR BLD AUTO: 0.8 % (ref 0–0.5)
LYMPHOCYTES # BLD AUTO: 2.04 10*3/MM3 (ref 0.7–3.1)
LYMPHOCYTES NFR BLD AUTO: 26.5 % (ref 19.6–45.3)
MAGNESIUM SERPL-MCNC: 2.2 MG/DL (ref 1.6–2.4)
MCH RBC QN AUTO: 28.2 PG (ref 26.6–33)
MCHC RBC AUTO-ENTMCNC: 32.5 G/DL (ref 31.5–35.7)
MCV RBC AUTO: 86.5 FL (ref 79–97)
MONOCYTES # BLD AUTO: 0.58 10*3/MM3 (ref 0.1–0.9)
MONOCYTES NFR BLD AUTO: 7.5 % (ref 5–12)
NEUTROPHILS # BLD AUTO: 4.74 10*3/MM3 (ref 1.7–7)
NEUTROPHILS NFR BLD AUTO: 61.6 % (ref 42.7–76)
NRBC BLD AUTO-RTO: 0 /100 WBC (ref 0–0.2)
PHOSPHATE SERPL-MCNC: 3 MG/DL (ref 2.5–4.5)
PLATELET # BLD AUTO: 233 10*3/MM3 (ref 140–450)
POTASSIUM SERPL-SCNC: 4.3 MMOL/L (ref 3.5–5.2)
PROT SERPL-MCNC: 7.2 G/DL (ref 6–8.5)
PSA SERPL-MCNC: 2.17 NG/ML (ref 0–4)
RBC # BLD AUTO: 4.9 10*6/MM3 (ref 4.14–5.8)
SODIUM SERPL-SCNC: 144 MMOL/L (ref 136–145)
WBC # BLD AUTO: 7.7 10*3/MM3 (ref 3.4–10.8)

## 2023-05-03 PROCEDURE — 25010000002 DENOSUMAB 120 MG/1.7ML SOLUTION: Performed by: INTERNAL MEDICINE

## 2023-05-03 PROCEDURE — 96372 THER/PROPH/DIAG INJ SC/IM: CPT

## 2023-05-03 RX ADMIN — DENOSUMAB 120 MG: 120 INJECTION SUBCUTANEOUS at 09:07

## 2023-05-17 ENCOUNTER — OFFICE VISIT (OUTPATIENT)
Dept: FAMILY MEDICINE CLINIC | Facility: CLINIC | Age: 68
End: 2023-05-17
Payer: MEDICARE

## 2023-05-17 ENCOUNTER — SPECIALTY PHARMACY (OUTPATIENT)
Dept: ONCOLOGY | Facility: HOSPITAL | Age: 68
End: 2023-05-17
Payer: MEDICARE

## 2023-05-17 VITALS — BODY MASS INDEX: 40.45 KG/M2 | DIASTOLIC BLOOD PRESSURE: 98 MMHG | WEIGHT: 290 LBS | SYSTOLIC BLOOD PRESSURE: 172 MMHG

## 2023-05-17 DIAGNOSIS — I10 ESSENTIAL HYPERTENSION: Primary | ICD-10-CM

## 2023-05-17 PROCEDURE — 99213 OFFICE O/P EST LOW 20 MIN: CPT | Performed by: STUDENT IN AN ORGANIZED HEALTH CARE EDUCATION/TRAINING PROGRAM

## 2023-05-17 RX ORDER — LOSARTAN POTASSIUM 100 MG/1
100 TABLET ORAL DAILY
Qty: 30 TABLET | Refills: 1 | Status: SHIPPED | OUTPATIENT
Start: 2023-05-17

## 2023-05-17 NOTE — PROGRESS NOTES
Specialty Pharmacy Refill Coordination Note     Chris is a 68 y.o. male contacted today regarding refills of  abiraterone 1000mg PO QD specialty medication(s).    Specialty medication(s) and dose(s) confirmed: yes    Refill Questions    Flowsheet Row Most Recent Value   Changes to allergies? No   Changes to medications? No   New conditions since last clinic visit No   Unplanned office visit, urgent care, ED, or hospital admission in the last 4 weeks  No   How does patient/caregiver feel medication is working? Good   Financial problems or insurance changes  No   Since the previous refill, were any specialty medication doses or scheduled injections missed or delayed?  No   Does this patient require a clinical escalation to a pharmacist? No          Delivery Questions    Flowsheet Row Most Recent Value   Delivery method [employer]   Delivery address correct? Yes   Delivery phone number [phone]   Preferred delivery time? Anytime   Number of medications in delivery 1   Medication being filled and delivered abiraterone   Doses left of specialty medications 4 days left   Is there any medication that is due not being filled? No   Supplies needed? No supplies needed   Cooler needed? No   Do any medications need mixed or dated? No   Additional comments no copay   Questions or concerns for the pharmacist? No   Explain any questions or concerns for the pharmacist n/a   Are any medications first time fills? No                 Follow-up: 28 day(s)     Catalina Montes De Oca, Pharmacy Technician  Specialty Pharmacy Technician

## 2023-05-17 NOTE — PROGRESS NOTES
Chief Complaint  No chief complaint on file.    Subjective          Chris Morfin presents to [facility] PRIMARY CARE  History of Present Illness    Patient presents to the office for evaluation of elevated blood pressure. He states that he does not check his blood pressure at home. He has no Chest pain, but has some SOA at baseline, without worsening. He has no Le edema. He is here for further titration of his blood pressure medication.         Objective   Vital Signs:   /98 (BP Location: Left arm, Patient Position: Sitting, Cuff Size: Large Adult)   Wt 132 kg (290 lb)   BMI 40.45 kg/m²     Body mass index is 40.45 kg/m².    Review of Systems    Past History:  Medical History: has a past medical history of Cancer and Prostate cancer.   Surgical History: has a past surgical history that includes Prostate biopsy (02/2022).   Family History: family history includes Cancer in his brother and sister.   Social History: reports that he has never smoked. He has never used smokeless tobacco. He reports that he does not currently use alcohol. He reports current drug use. Drug: Marijuana.      Current Outpatient Medications:   •  abiraterone acetate (ZYTIGA) 500 MG tablet, Take 2 tablets by mouth Daily., Disp: 60 tablet, Rfl: 11  •  lidocaine-prilocaine (EMLA) 2.5-2.5 % cream, Apply 1 application topically to the appropriate area as directed As Needed (prior to port access)., Disp: 30 g, Rfl: 3  •  losartan (Cozaar) 100 MG tablet, Take 1 tablet by mouth Daily., Disp: 30 tablet, Rfl: 1  •  ondansetron (ZOFRAN) 8 MG tablet, Take 1 tablet by mouth 3 (Three) Times a Day As Needed for Nausea or Vomiting., Disp: 30 tablet, Rfl: 5  •  predniSONE (DELTASONE) 5 MG tablet, Take 1 tablet by mouth 2 (Two) Times a Day., Disp: 60 tablet, Rfl: 11  •  rosuvastatin (CRESTOR) 20 MG tablet, TAKE ONE (1) TABLET BY MOUTH DAILY., Disp: 90 tablet, Rfl: 1    Allergies: Patient has no known allergies.    Physical  Exam  Constitutional:       General: He is not in acute distress.     Appearance: He is not ill-appearing or toxic-appearing.   HENT:      Head: Normocephalic and atraumatic.   Cardiovascular:      Rate and Rhythm: Normal rate and regular rhythm.      Heart sounds: No murmur heard.  Pulmonary:      Effort: Pulmonary effort is normal. No respiratory distress.   Neurological:      General: No focal deficit present.      Mental Status: He is alert and oriented to person, place, and time.   Psychiatric:         Mood and Affect: Mood normal.         Thought Content: Thought content normal.          Result Review :                   Assessment and Plan    Diagnoses and all orders for this visit:    1. Essential hypertension (Primary)    Other orders  -     losartan (Cozaar) 100 MG tablet; Take 1 tablet by mouth Daily.  Dispense: 30 tablet; Refill: 1    Will increase losartan from 50 mg to 100 mg.  Advised patient to contact the office with any new or worsening symptoms.  Continue to monitor blood pressure at home and contact us with elevated blood pressures over 150/90 consistently.  Follow-up in 1 month or sooner with any new or worsening symptoms    Follow Up   No follow-ups on file.  Patient was given instructions and counseling regarding his condition or for health maintenance advice. Please see specific information pulled into the AVS if appropriate.     Teri Ram, DO

## 2023-05-30 ENCOUNTER — LAB (OUTPATIENT)
Dept: ONCOLOGY | Facility: HOSPITAL | Age: 68
End: 2023-05-30

## 2023-05-30 VITALS
DIASTOLIC BLOOD PRESSURE: 98 MMHG | HEART RATE: 85 BPM | WEIGHT: 285.4 LBS | RESPIRATION RATE: 16 BRPM | SYSTOLIC BLOOD PRESSURE: 152 MMHG | TEMPERATURE: 98.3 F | BODY MASS INDEX: 39.81 KG/M2

## 2023-05-30 DIAGNOSIS — C61 PROSTATE CANCER METASTATIC TO MULTIPLE SITES: ICD-10-CM

## 2023-05-30 PROCEDURE — G0463 HOSPITAL OUTPT CLINIC VISIT: HCPCS

## 2023-05-30 PROCEDURE — 36415 COLL VENOUS BLD VENIPUNCTURE: CPT

## 2023-05-31 ENCOUNTER — INFUSION (OUTPATIENT)
Dept: ONCOLOGY | Facility: HOSPITAL | Age: 68
End: 2023-05-31

## 2023-05-31 ENCOUNTER — OFFICE VISIT (OUTPATIENT)
Dept: ONCOLOGY | Facility: CLINIC | Age: 68
End: 2023-05-31

## 2023-05-31 VITALS
RESPIRATION RATE: 20 BRPM | HEART RATE: 80 BPM | HEIGHT: 71 IN | TEMPERATURE: 97.3 F | SYSTOLIC BLOOD PRESSURE: 160 MMHG | DIASTOLIC BLOOD PRESSURE: 90 MMHG | WEIGHT: 286 LBS | BODY MASS INDEX: 40.04 KG/M2 | OXYGEN SATURATION: 92 %

## 2023-05-31 DIAGNOSIS — C61 PROSTATE CANCER METASTATIC TO MULTIPLE SITES: Primary | ICD-10-CM

## 2023-05-31 LAB
ALBUMIN SERPL-MCNC: 4.5 G/DL (ref 3.5–5.2)
ALBUMIN/GLOB SERPL: 1.7 G/DL
ALP SERPL-CCNC: 88 U/L (ref 39–117)
ALT SERPL-CCNC: 27 U/L (ref 1–41)
AST SERPL-CCNC: 19 U/L (ref 1–40)
BASOPHILS # BLD AUTO: 0.03 10*3/MM3 (ref 0–0.2)
BASOPHILS NFR BLD AUTO: 0.6 % (ref 0–1.5)
BILIRUB SERPL-MCNC: 0.2 MG/DL (ref 0–1.2)
BUN SERPL-MCNC: 27 MG/DL (ref 8–23)
BUN/CREAT SERPL: 20.1 (ref 7–25)
CALCIUM SERPL-MCNC: 9.3 MG/DL (ref 8.6–10.5)
CHLORIDE SERPL-SCNC: 109 MMOL/L (ref 98–107)
CO2 SERPL-SCNC: 23.2 MMOL/L (ref 22–29)
CREAT SERPL-MCNC: 1.34 MG/DL (ref 0.76–1.27)
EGFRCR SERPLBLD CKD-EPI 2021: 57.7 ML/MIN/1.73
EOSINOPHIL # BLD AUTO: 0.13 10*3/MM3 (ref 0–0.4)
EOSINOPHIL NFR BLD AUTO: 2.5 % (ref 0.3–6.2)
ERYTHROCYTE [DISTWIDTH] IN BLOOD BY AUTOMATED COUNT: 12.8 % (ref 12.3–15.4)
GLOBULIN SER CALC-MCNC: 2.7 GM/DL
GLUCOSE SERPL-MCNC: 98 MG/DL (ref 65–99)
HCT VFR BLD AUTO: 41.6 % (ref 37.5–51)
HGB BLD-MCNC: 13.7 G/DL (ref 13–17.7)
IMM GRANULOCYTES # BLD AUTO: 0.03 10*3/MM3 (ref 0–0.05)
IMM GRANULOCYTES NFR BLD AUTO: 0.6 % (ref 0–0.5)
LYMPHOCYTES # BLD AUTO: 1.23 10*3/MM3 (ref 0.7–3.1)
LYMPHOCYTES NFR BLD AUTO: 23.3 % (ref 19.6–45.3)
MAGNESIUM SERPL-MCNC: 2.3 MG/DL (ref 1.6–2.4)
MCH RBC QN AUTO: 28.1 PG (ref 26.6–33)
MCHC RBC AUTO-ENTMCNC: 32.9 G/DL (ref 31.5–35.7)
MCV RBC AUTO: 85.4 FL (ref 79–97)
MONOCYTES # BLD AUTO: 0.61 10*3/MM3 (ref 0.1–0.9)
MONOCYTES NFR BLD AUTO: 11.6 % (ref 5–12)
NEUTROPHILS # BLD AUTO: 3.24 10*3/MM3 (ref 1.7–7)
NEUTROPHILS NFR BLD AUTO: 61.4 % (ref 42.7–76)
NRBC BLD AUTO-RTO: 0 /100 WBC (ref 0–0.2)
PHOSPHATE SERPL-MCNC: 2.2 MG/DL (ref 2.5–4.5)
PLATELET # BLD AUTO: 218 10*3/MM3 (ref 140–450)
POTASSIUM SERPL-SCNC: 4.2 MMOL/L (ref 3.5–5.2)
PROT SERPL-MCNC: 7.2 G/DL (ref 6–8.5)
PSA SERPL-MCNC: 1.94 NG/ML (ref 0–4)
RBC # BLD AUTO: 4.87 10*6/MM3 (ref 4.14–5.8)
SODIUM SERPL-SCNC: 144 MMOL/L (ref 136–145)
WBC # BLD AUTO: 5.27 10*3/MM3 (ref 3.4–10.8)

## 2023-05-31 PROCEDURE — 1126F AMNT PAIN NOTED NONE PRSNT: CPT | Performed by: NURSE PRACTITIONER

## 2023-05-31 PROCEDURE — 1160F RVW MEDS BY RX/DR IN RCRD: CPT | Performed by: NURSE PRACTITIONER

## 2023-05-31 PROCEDURE — 99214 OFFICE O/P EST MOD 30 MIN: CPT | Performed by: NURSE PRACTITIONER

## 2023-05-31 PROCEDURE — 1159F MED LIST DOCD IN RCRD: CPT | Performed by: NURSE PRACTITIONER

## 2023-05-31 PROCEDURE — 96372 THER/PROPH/DIAG INJ SC/IM: CPT

## 2023-05-31 PROCEDURE — 25010000002 DENOSUMAB 120 MG/1.7ML SOLUTION: Performed by: NURSE PRACTITIONER

## 2023-05-31 RX ADMIN — DENOSUMAB 120 MG: 120 INJECTION SUBCUTANEOUS at 10:41

## 2023-05-31 NOTE — PROGRESS NOTES
CHIEF COMPLAINT:  Hypertension    Problem List:  Oncology/Hematology History Overview Note   1.  Prostate cancer clinical T1c and 2M1B stage IVb treated with 4 cycles of Taxotere, stopped due to syncope with negative cardiac work-up, with marked drop in PSA of 18.6 from over 900 at baseline, continued on Zytiga and prednisone.  2.  Urinary retention with Goodwin in place 1/24/2022.  On finasteride 1/24/2022.  3.  Covid 19 December 2021  4.  Hypertension    Oncology history timeline:  -11/29/2021  with symptoms of urinary retention.  -2/15/2022 prostate biopsy adenocarcinoma grade group 5 and 3 out of 12 cores, grade group 4 in 1 out of 12 cores, grade group 3 in 8 out of 12 cores.  -2/24/2022 CT chest abdomen pelvis and total body bone scan shows left third rib, right acetabulum, periaortic and retroperitoneal kizzy metastases complaining of pain in the left chest and right hip.  Also possible sclerotic left 10th rib on CT.  Spleen mildly enlarged 13.8 cm.  Hepatic steatosis.  Small liver cyst.  Fat stranding in the periaortic and mesenteric region consistent with reactive/inflammatory stranding.  Multiple enlarged periaortic and retroperitoneal nodes and lymphadenopathy largest 4.9 cm.  CT chest describes tiny sclerotic foci in left eighth rib and right lateral seventh rib and T1.  Multiple small mediastinal and bilateral axillary nodes seen with small hypodense left thyroid nodule.  Creatinine 1.7.  -3/4/2022 office note Dr. Rolando Matute: Patient was seen after his elevated PSA by Dr. Vera and prostate biopsy scheduled.  However, he developed COVID-19 and this was canceled and the patient did not reschedule due to lack of insurance.  Saw Dr. Matute back on 1/24/2022 with plans to get staging CTs and bone scan with results as outlined above.  Started Casodex and to return on 3/11/2022 for Lupron.  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  TURP  planned for urinary retention symptoms.    -3/15/2022 [facility] medical oncology initial consultation: I reviewed the above data with the patient.  With his fairly bulky retroperitoneal adenopathy and bone metastases including extra axial metastases, he would fit the data from the CHAARTED trial for which Taxotere x6 followed by Zytiga prednisone along with the planned Lupron already being planned by Dr. Matute would be reasonable.  Equally reasonable in terms of comparisons with bicalutamide and Lupron would be Zytiga prednisone plus Lupron or Xtandi plus Lupron or apalutamide plus Lupron.  None of these have been compared head-to-head but all have beaten combined androgen deprivation therapy with bicalutamide and Lupron.  At the age of 67 and in order to have as many bullets as possible down the road and hence saving some of the hormone blockade options for later and using chemotherapy when he is younger and healthier for a more time-limited.,  He has opted for the Taxotere x6 followed by Zytiga and prednisone.  We will also give him Xgeva to improve bone health and given metastatic disease, as with all metastatic prostate cancer patients, he needs genetic counseling both for his sake as well as his family's.  If he were to have BRCA1 or other such  mutations, then that also opens up the options for PARP inhibitors etc. down the road.  He has his TURP scheduled for 2/24/2022 and we will start treatment a couple of weeks after that and he will get chemo preparation visit in the meantime and I will get capital surgeons to place a port.  We will check his PSA after his TURP when he sees my nurse practitioner back early April for the start of chemotherapy and repeat the PSA and CT chest abdomen pelvis and bone scan after the Taxotere is complete.    -3/31/2022 chemotherapy education and needs assessment completed    -4/7/2022 began docetaxel and Xgeva.  .0.  We will do his Xgeva every 6 weeks to  coordinate with his docetaxel infusions.    -4/19/2022 patient stopped Casodex    -5/17/2022 patient reported seeing his PCP for an abscess in his suprapubic area.  Placed on clindamycin, will delay treatment 1 week.    -5/25/2022 PSA 34.3  -5/26/2022 [facility] Oncology clinic follow-up: Chris has an abscess in the suprapubic area, it has improved on antibiotic therapy but I am concerned if we treat him it will just flare back up unless it is drained.  I will get him to Dr. Miller who placed his port for I&D and culture.  He is on clindamycin and states he completes course of treatment tomorrow.  I will delay his treatment with Taxotere 1 more week.  We will give his Xgeva today.  I will see him back in 1 week for follow-up to assess whether or not we can resume his Taxotere.  His PSA has dropped nicely with treatment thus far, PSA yesterday was 34.3 which is down from a PSA of 259.0 when we started treatment, it has been as high as 911 in November.    -6/2/2022 [facility] Oncology clinic follow-up: Chris went to see Dr. Miller to have his abscess lanced however apparently there was a long wait and he left without being seen.  He did call his PCP and was given 5 more days of clindamycin and the abscess now seems to have resolved.  We discussed today that he is at risk for recurrence of infection due to immunocompromise state with chemotherapy.  He will notify us if he has any return of his abscess.  He does have intertrigo under his panniculus, I have advised him to keep this area dry, to apply topical drying/antifungal powders.  Also recommended looser clothing.  His counts are adequate to continue therapy, we will resume Taxotere today with cycle #3.  We will repeat restaging scans after 6 courses.  We are doing Xgeva every 6 weeks to coordinate with his Taxotere.  Once he finishes Taxotere we can then go back to every 4 weeks.  His next Xgeva is not due until 7/14/2022.  His calcium is running a little low, he is  going to  calcium supplement.    -6/23/2022 [facility], [place] Oncology clinic follow-up: Chris overall is doing well on treatment with Taxotere.  Labs reviewed from yesterday and are unremarkable.  We will continue treatment today unchanged.  His suprapubic abscess resolved on clindamycin.  He will continue to use drying powder/antifungal powder under his panniculus for intertrigo.  Today will be cycle #4 of a planned 6 courses of Taxotere.  He is also on Xgeva, next dose due 7/14/2022, we are doing this every 6 weeks for now to line up with his chemotherapy, can go back to every 4 weeks after he finishes Taxotere.  Calcium from CMP yesterday was normal.    -7/13/2022 [facility], [place] Oncology clinic follow-up: Mr. Morfin has had issues around day 3 after each treatment ranging from chest pain after his first treatment for which he did not seek medical attention, fatigue after the next few treatments, but 3 days after his most recent treatment on 6/23/2022 he had a syncopal episode at home and once again did not seek medical attention.  I discussed with him that this was not acceptable, if he has occurrences like this someone needs to call 911, it is difficult to figure out what is going on after the fact, the best time to work this up would be during the occurrence.  For now we will get labs today with CBC, CMP, PSA.  I will refer him to cardiology for further evaluation.  We will hold Taxotere most likely, I will see him tomorrow to go over his lab results.  I will also repeat his restaging scans with CT chest, abdomen and pelvis and will include CT of the head in light of his syncopal episode.      -7/13/2022 PSA 18.6    -7/14/2022 [facility], [place] Oncology clinic follow-up: Mr. Morfin returns today to review his labs from yesterday.  Labs are unremarkable.  PSA down to 18.6 from a high of 259.0 in April prior to starting treatment.  CBC and CMP unremarkable, magnesium is normal at 2.3, phosphorus slightly low at 2.4.  We will hold  therapy for now in light of his syncopal episode on day 3 after his last treatment and prior episodes with chest pain and severe fatigue that all occurred on day 3 after his Taxotere.  We will restage him with CT head, chest, abdomen and pelvis (I am including the head in light of his syncopal episode).  I have also referred him to cardiology, he states that he received a call from them about making an appointment however he wanted to talk to us today first.  I emphasized the importance to him of making and keeping that appointment and he states understanding.  I also once again emphasized the requirement to seek emergency medical attention if he has any episodes in the future such as chest pain or syncopal events.  Whether or not we try and complete Taxotere with 2 more courses or move to the next line of therapy will be decided when he returns to discuss with Dr. Goodson and review his restaging scans.    -7/15/2022 saw Dr. Diaz cardiologist.  For syncope he ordered echocardiogram and 48-hour Holter monitor.    -7/15/2022 Holter monitor relatively benign.    -7/22/2022 CT brain with and without contrast negative.  CT chest abdomen and pelvis shows some nonspecific cecal wall thickening.  No progression in the chest.  T1 and ribs stable.  Decrease in multiple retroperitoneal and pelvic nodes.  Slight increase in right acetabular sclerotic lesion.  Persistent but improved circumferential bladder wall thickening.  Total body bone scan shows stable left third rib and no evidence of new bone metastases.    -7/19/2022 ejection fraction 65% with grade 1 diastolic dysfunction.    -7/26/2022 [facility] oncology clinic follow-up: Given presyncopal symptoms occurred 3 days after Taxotere and has not otherwise occurred any other time and his cardiology work-up is fairly unremarkable and his disease is under good control as witnessed by the report above of the CTs of head chest abdomen pelvis and bone scan, I will hold cycle 5  and 6 of Taxotere and continue 1 now with Zytiga and prednisone.  With reference to the coincidental cecal wall thickening found on CT, we will get him to [facility] surgeons for colonoscopy.  He will see my nurse practitioner back in August for next cycle of Abiraterone prednisone.  He will continue his Lupron with Dr. Matute.  We will continue his Xgeva.  We will repeat his CT chest abdomen pelvis and bone scan again in 3 months.  He will see my nurse practitioner in the interim.    -8/23/2022 [facility] oncology clinic follow-up: No further presyncopal symptoms.  Feeling great other than for hot flashes.  Did commend for now we will continue on with his Zytiga prednisone and he will get his Xgeva today based on labs from 8/10/2022.  He opted out of getting the colonoscopy that I recommended and he will not change his mind.  He will continue getting the Lupron with Dr. Matute and I asked him to give the date of this appointment to my nurse when he sees her back in a month.  We will give his Xgeva today.  We will get CT chest abdomen pelvis and total body bone scan towards the middle to end of October.  Presently other than for the hot flashes feels great.    -9/20/2022 PSA 8.320    -9/22/2022 [facility] Oncology clinic follow-up: Chris is doing well on Zytiga, prednisone along with Xgeva.  Labs reviewed from 9/20/2022 are unremarkable, CBC was normal, CMP with creatinine of 1.34 otherwise normal, this is stable from his baseline.  PSA stable at 8.320.  He received Lupron recently with Dr. Matute, he thinks last week or so, states his next appointment is in February.  He will continue treatment unchanged.  We will repeat restaging CT chest, abdomen and pelvis and total body bone scan in October prior to return and I have ordered those today.  We will also repeat his PSA.    -9/22/2022 CT chest abdomen pelvisCompared to July 2022 [facility] showed no evidence of disease progression in the chest with no  "substantial sclerotic bony lesions and decrease in size of retroperitoneal and pelvic nodes with persistent cecal wall thickening and suboptimal bladder distention.  Total body bone scan showed decrease in previously seen uptake in the left third rib with diffuse sclerosis representing treatment response with no new foci.    -10/18/2022 [facility] medical oncology follow-up: I reviewed bone scan and CT reports as above with no evidence of progression.  Tolerating Zytiga prednisone and Xgeva.  Getting Lupron regularly with Dr. Matute next appointment coming in February.  We will repeat his CT chest abdomen pelvis and total body bone scan in April.  He will follow with my nurse practitioner in the interim.    -1/10/2023 PSA 3.450  -1/11/2023 [facility] Oncology clinic follow-up: Mr. Morfin continues to do well on current therapy with Zytiga, prednisone and Xgeva with no unusual side effects.  He has no new worrisome symptoms.  He is due for his next Lupron injection in February with Dr. Matute.  His PSA continues to trend downward, current PSA from yesterday was 3.450.  We will continue therapy unchanged, he will receive Xgeva today.  Has had no hypocalcemia, his CMP, phosphorus and magnesium are all normal.  We will repeat restaging scans in April.  -3/7/2023 PSA 2.460  -3/8/2023 [facility] Oncology clinic follow-up:  Mr. Morfin is doing well.  He is tolerating therapy with Zytiga, prednisone and Xgeva with no significant side effects.  He has hot flashes but these are tolerable.  He had Lupron injection 2/24/2023 with Dr. Matute.  His PSA continues to decline, current PSA 2.460.  He will continue therapy unchanged.  We will repeat restaging scans late April prior to return in 2 months and I have ordered those today.  He is working with his PCP Rodrigo Ram on his hypertension.  His b/p today was 160/88.  He was to have increased his losartan but I do not think he has done that yet as he said \"I still have some of my old " "prescription left\".  -4/4/2023 PSA 2.55  - 4/26/2023 CT chest abdomen pelvis [place] comparison 10/10/2022 showed stable left third rib, T1, left fourth and eighth rib, right seventh rib.  Probable liver cyst.  Few diverticuli.  Mild further decrease in a few of the previously enlarged nodes.  Left external iliac 12 mm compared to 16 mm.  Right common iliac 7 mm stable.  Lower left periaortic 8 mm previously 13 mm.  No new lytic or blastic osseous lesions.  Total body bone scan comparison 10/10/2022, 7/22/2022, and CT 4/26/2023.  No new sites of increased uptake.  1 focal left third rib hotspot consistent with bone metastasis.    -5/2/2023 [facility] medical oncology follow-up: I reviewed interval notes since I last saw him from my nurse practitioner as outlined above in interval images and reports thereof from [facility] that shows no new sites of bone metastasis and no kizzy or visceral progression.  PSA stabilized around 2.5.  In the absence of symptomatic or radiographic progression, I would be unlikely to change therapy just on the basis of a PSA rise and for now the PSA is stable.  We will continue Zytiga prednisone and Xgeva and he will follow-up with my nurse practitioner 5/30/2023 with repeat CTs and bone scan in 6 months.  I asked him to check with his primary care today regarding his systolic in the 170s.     Prostate cancer metastatic to multiple sites   3/15/2022 Initial Diagnosis    Prostate cancer metastatic to multiple sites (HCC)     3/15/2022 Cancer Staged    Staging form: Prostate, AJCC 8th Edition  - Clinical: Stage IVB (cT1c, cN1, cM1b, Grade Group: 5) - Signed by Fritz Goodson MD on 3/15/2022     4/7/2022 - 6/23/2022 Chemotherapy    Stopped after cycle 4 6/23/2022 due to syncope 3 days post infusions with negative cardiac work-up  OP PROSTATE DOCEtaxel     4/7/2022 -  Chemotherapy    OP SUPPORTIVE Denosumab (Xgeva) Q28D     7/26/2022 -  Chemotherapy    OP PROSTATE Abiraterone / " "PredniSONE           HISTORY OF PRESENT ILLNESS:  The patient is a 68 y.o. male, here for follow up on management of metastatic prostate cancer stable on Zytiga and prednisone Xgeva.  Chris reports that he has been feeling fairly well this past month, he has noticed a little more fatigue.  He also has a cough in the morning with phlegm.  He thinks he has some seasonal allergies, he is not taking any medication for this.  He has been working with his primary care provider regarding his hypertension, recently increased his losartan to 100 mg daily.    Past Medical History:   Diagnosis Date   • Cancer    • Prostate cancer      Past Surgical History:   Procedure Laterality Date   • PROSTATE BIOPSY  02/2022    dr. sue       No Known Allergies    Family History and Social History reviewed and changed as necessary    REVIEW OF SYSTEM:   Postnasal drip and sinus drainage, some shortness of breath and cough    PHYSICAL EXAM:  Well-developed, well-nourished male in no distress  Lungs clear to auscultation bilaterally, respirations regular nonlabored      Vitals:    05/31/23 0954 05/31/23 1028   BP: (!) 210/92 160/90   Pulse: 80    Resp: 20    Temp: 97.3 °F (36.3 °C)    TempSrc: Infrared    SpO2: 92%    Weight: 130 kg (286 lb)    Height: 180.3 cm (71\")      Vitals:    05/31/23 0954   PainSc: 0-No pain          ECOG score: 0           Vitals reviewed.  Labs reviewed    ECOG: (0) Fully Active - Able to Carry On All Pre-disease Performance Without Restriction    Lab Results   Component Value Date    HGB 13.7 05/30/2023    HCT 41.6 05/30/2023    MCV 85.4 05/30/2023     05/30/2023    WBC 5.27 05/30/2023    NEUTROABS 3.24 05/30/2023    LYMPHSABS 1.23 05/30/2023    MONOSABS 0.61 05/30/2023    EOSABS 0.13 05/30/2023    BASOSABS 0.03 05/30/2023       Lab Results   Component Value Date    GLUCOSE 98 05/30/2023    BUN 27 (H) 05/30/2023    CREATININE 1.34 (H) 05/30/2023     05/30/2023    K 4.2 05/30/2023     (H) " 05/30/2023    CO2 23.2 05/30/2023    CALCIUM 9.3 05/30/2023    ALBUMIN 4.5 05/30/2023    BILITOT 0.2 05/30/2023    ALKPHOS 88 05/30/2023    AST 19 05/30/2023    ALT 27 05/30/2023     Lab Results   Component Value Date    PSA 1.940 05/30/2023    PSA 2.170 05/02/2023    PSA 2.550 04/04/2023    PSA 2.460 03/07/2023    PSA 3.250 02/07/2023    PSA 3.450 01/10/2023             ASSESSMENT & PLAN:  1.  Prostate cancer clinical T1c and 2M1B stage IVb treated with 4 cycles of Taxotere, stopped due to syncope with negative cardiac work-up, with marked drop in PSA of 18.6 from over 900 at baseline, continued on Zytiga and prednisone.  2.  Urinary retention with Goodwin in place 1/24/2022.  On finasteride 1/24/2022.  3.  Covid 19 December 2021  4.  Hypertension    Oncology history timeline:  -11/29/2021  with symptoms of urinary retention.  -2/15/2022 prostate biopsy adenocarcinoma grade group 5 and 3 out of 12 cores, grade group 4 in 1 out of 12 cores, grade group 3 in 8 out of 12 cores.  -2/24/2022 CT chest abdomen pelvis and total body bone scan shows left third rib, right acetabulum, periaortic and retroperitoneal kizzy metastases complaining of pain in the left chest and right hip.  Also possible sclerotic left 10th rib on CT.  Spleen mildly enlarged 13.8 cm.  Hepatic steatosis.  Small liver cyst.  Fat stranding in the periaortic and mesenteric region consistent with reactive/inflammatory stranding.  Multiple enlarged periaortic and retroperitoneal nodes and lymphadenopathy largest 4.9 cm.  CT chest describes tiny sclerotic foci in left eighth rib and right lateral seventh rib and T1.  Multiple small mediastinal and bilateral axillary nodes seen with small hypodense left thyroid nodule.  Creatinine 1.7.  -3/4/2022 office note Dr. Rolando Matute: Patient was seen after his elevated PSA by Dr. Vera and prostate biopsy scheduled.  However, he developed COVID-19 and this was canceled and the patient did not reschedule  due to lack of insurance.  Saw Dr. Matute back on 1/24/2022 with plans to get staging CTs and bone scan with results as outlined above.  Started Casodex and to return on 3/11/2022 for Lupron.  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  TURP planned for urinary retention symptoms.    -3/15/2022 [facility] medical oncology initial consultation: I reviewed the above data with the patient.  With his fairly bulky retroperitoneal adenopathy and bone metastases including extra axial metastases, he would fit the data from the CHAARTED trial for which Taxotere x6 followed by Zytiga prednisone along with the planned Lupron already being planned by Dr. Matute would be reasonable.  Equally reasonable in terms of comparisons with bicalutamide and Lupron would be Zytiga prednisone plus Lupron or Xtandi plus Lupron or apalutamide plus Lupron.  None of these have been compared head-to-head but all have beaten combined androgen deprivation therapy with bicalutamide and Lupron.  At the age of 67 and in order to have as many bullets as possible down the road and hence saving some of the hormone blockade options for later and using chemotherapy when he is younger and healthier for a more time-limited.,  He has opted for the Taxotere x6 followed by Zytiga and prednisone.  We will also give him Xgeva to improve bone health and given metastatic disease, as with all metastatic prostate cancer patients, he needs genetic counseling both for his sake as well as his family's.  If he were to have BRCA1 or other such  mutations, then that also opens up the options for PARP inhibitors etc. down the road.  He has his TURP scheduled for 2/24/2022 and we will start treatment a couple of weeks after that and he will get chemo preparation visit in the meantime and I will get capital surgeons to place a port.  We will check his PSA after his TURP when he sees my nurse practitioner back early  April for the start of chemotherapy and repeat the PSA and CT chest abdomen pelvis and bone scan after the Taxotere is complete.    -3/31/2022 chemotherapy education and needs assessment completed    -4/7/2022 began docetaxel and Xgeva.  .0.  We will do his Xgeva every 6 weeks to coordinate with his docetaxel infusions.    -4/19/2022 patient stopped Casodex    -5/17/2022 patient reported seeing his PCP for an abscess in his suprapubic area.  Placed on clindamycin, will delay treatment 1 week.    -5/25/2022 PSA 34.3  -5/26/2022 [facility] Oncology clinic follow-up: Chris has an abscess in the suprapubic area, it has improved on antibiotic therapy but I am concerned if we treat him it will just flare back up unless it is drained.  I will get him to Dr. Miller who placed his port for I&D and culture.  He is on clindamycin and states he completes course of treatment tomorrow.  I will delay his treatment with Taxotere 1 more week.  We will give his Xgeva today.  I will see him back in 1 week for follow-up to assess whether or not we can resume his Taxotere.  His PSA has dropped nicely with treatment thus far, PSA yesterday was 34.3 which is down from a PSA of 259.0 when we started treatment, it has been as high as 911 in November.    -6/2/2022 [facility] Oncology clinic follow-up: Chris went to see Dr. Miller to have his abscess lanced however apparently there was a long wait and he left without being seen.  He did call his PCP and was given 5 more days of clindamycin and the abscess now seems to have resolved.  We discussed today that he is at risk for recurrence of infection due to immunocompromise state with chemotherapy.  He will notify us if he has any return of his abscess.  He does have intertrigo under his panniculus, I have advised him to keep this area dry, to apply topical drying/antifungal powders.  Also recommended looser clothing.  His counts are adequate to continue therapy, we will resume Taxotere today  with cycle #3.  We will repeat restaging scans after 6 courses.  We are doing Xgeva every 6 weeks to coordinate with his Taxotere.  Once he finishes Taxotere we can then go back to every 4 weeks.  His next Xgeva is not due until 7/14/2022.  His calcium is running a little low, he is going to  calcium supplement.    -6/23/2022 [facility] Oncology clinic follow-up: Chris huerta is doing well on treatment with Taxotere.  Labs reviewed from yesterday and are unremarkable.  We will continue treatment today unchanged.  His suprapubic abscess resolved on clindamycin.  He will continue to use drying powder/antifungal powder under his panniculus for intertrigo.  Today will be cycle #4 of a planned 6 courses of Taxotere.  He is also on Xgeva, next dose due 7/14/2022, we are doing this every 6 weeks for now to line up with his chemotherapy, can go back to every 4 weeks after he finishes Taxotere.  Calcium from CMP yesterday was normal.    -7/13/2022 [facility] Oncology clinic follow-up: Mr. Morfin has had issues around day 3 after each treatment ranging from chest pain after his first treatment for which he did not seek medical attention, fatigue after the next few treatments, but 3 days after his most recent treatment on 6/23/2022 he had a syncopal episode at home and once again did not seek medical attention.  I discussed with him that this was not acceptable, if he has occurrences like this someone needs to call 911, it is difficult to figure out what is going on after the fact, the best time to work this up would be during the occurrence.  For now we will get labs today with CBC, CMP, PSA.  I will refer him to cardiology for further evaluation.  We will hold Taxotere most likely, I will see him tomorrow to go over his lab results.  I will also repeat his restaging scans with CT chest, abdomen and pelvis and will include CT of the head in light of his syncopal episode.      -7/13/2022 PSA 18.6    -7/14/2022 [facility]  Oncology clinic follow-up: Mr. Morfin returns today to review his labs from yesterday.  Labs are unremarkable.  PSA down to 18.6 from a high of 259.0 in April prior to starting treatment.  CBC and CMP unremarkable, magnesium is normal at 2.3, phosphorus slightly low at 2.4.  We will hold therapy for now in light of his syncopal episode on day 3 after his last treatment and prior episodes with chest pain and severe fatigue that all occurred on day 3 after his Taxotere.  We will restage him with CT head, chest, abdomen and pelvis (I am including the head in light of his syncopal episode).  I have also referred him to cardiology, he states that he received a call from them about making an appointment however he wanted to talk to us today first.  I emphasized the importance to him of making and keeping that appointment and he states understanding.  I also once again emphasized the requirement to seek emergency medical attention if he has any episodes in the future such as chest pain or syncopal events.  Whether or not we try and complete Taxotere with 2 more courses or move to the next line of therapy will be decided when he returns to discuss with Dr. Goodson and review his restaging scans.    -7/15/2022 saw Dr. Diaz cardiologist.  For syncope he ordered echocardiogram and 48-hour Holter monitor.    -7/15/2022 Holter monitor relatively benign.    -7/22/2022 CT brain with and without contrast negative.  CT chest abdomen and pelvis shows some nonspecific cecal wall thickening.  No progression in the chest.  T1 and ribs stable.  Decrease in multiple retroperitoneal and pelvic nodes.  Slight increase in right acetabular sclerotic lesion.  Persistent but improved circumferential bladder wall thickening.  Total body bone scan shows stable left third rib and no evidence of new bone metastases.    -7/19/2022 ejection fraction 65% with grade 1 diastolic dysfunction.    -7/26/2022 [facility] clinic follow-up: Given  presyncopal symptoms occurred 3 days after Taxotere and has not otherwise occurred any other time and his cardiology work-up is fairly unremarkable and his disease is under good control as witnessed by the report above of the CTs of head chest abdomen pelvis and bone scan, I will hold cycle 5 and 6 of Taxotere and continue 1 now with Zytiga and prednisone.  With reference to the coincidental cecal wall thickening found on CT, we will get him to [facility] surgeons for colonoscopy.  He will see my nurse practitioner back in August for next cycle of Abiraterone prednisone.  He will continue his Lupron with Dr. Matute.  We will continue his Xgeva.  We will repeat his CT chest abdomen pelvis and bone scan again in 3 months.  He will see my nurse practitioner in the interim.    -8/23/2022 [facility] clinic follow-up: No further presyncopal symptoms.  Feeling great other than for hot flashes.  Did commend for now we will continue on with his Zytiga prednisone and he will get his Xgeva today based on labs from 8/10/2022.  He opted out of getting the colonoscopy that I recommended and he will not change his mind.  He will continue getting the Lupron with Dr. Matute and I asked him to give the date of this appointment to my nurse when he sees her back in a month.  We will give his Xgeva today.  We will get CT chest abdomen pelvis and total body bone scan towards the middle to end of October.  Presently other than for the hot flashes feels great.    -9/20/2022 PSA 8.320    -9/22/2022 [facility] clinic follow-up: Chris is doing well on Zytiga, prednisone along with Xgeva.  Labs reviewed from 9/20/2022 are unremarkable, CBC was normal, CMP with creatinine of 1.34 otherwise normal, this is stable from his baseline.  PSA stable at 8.320.  He received Lupron recently with Dr. Matute, he thinks last week or so, states his next appointment is in February.  He will continue treatment unchanged.  We will repeat  restaging CT chest, abdomen and pelvis and total body bone scan in October prior to return and I have ordered those today.  We will also repeat his PSA.    -9/22/2022 CT chest abdomen pelvisCompared to July 2022 [facility] showed no evidence of disease progression in the chest with no substantial sclerotic bony lesions and decrease in size of retroperitoneal and pelvic nodes with persistent cecal wall thickening and suboptimal bladder distention.  Total body bone scan showed decrease in previously seen uptake in the left third rib with diffuse sclerosis representing treatment response with no new foci.    -10/18/2022 [facility] medical oncology follow-up: I reviewed bone scan and CT reports as above with no evidence of progression.  Tolerating Zytiga prednisone and Xgeva.  Getting Lupron regularly with Dr. Matute next appointment coming in February.  We will repeat his CT chest abdomen pelvis and total body bone scan in April.  He will follow with my nurse practitioner in the interim.    -1/10/2023 PSA 3.450  -1/11/2023 [facility] Oncology clinic follow-up: Mr. Morfin continues to do well on current therapy with Zytiga, prednisone and Xgeva with no unusual side effects.  He has no new worrisome symptoms.  He is due for his next Lupron injection in February with Dr. Matute.  His PSA continues to trend downward, current PSA from yesterday was 3.450.  We will continue therapy unchanged, he will receive Xgeva today.  Has had no hypocalcemia, his CMP, phosphorus and magnesium are all normal.  We will repeat restaging scans in April.  -3/7/2023 PSA 2.460  -3/8/2023 [facility] Oncology clinic follow-up:  Mr. Morfin is doing well.  He is tolerating therapy with Zytiga, prednisone and Xgeva with no significant side effects.  He has hot flashes but these are tolerable.  He had Lupron injection 2/24/2023 with Dr. Matute.  His PSA continues to decline, current PSA 2.460.  He will continue therapy unchanged.  We will repeat  "restaging scans late April prior to return in 2 months and I have ordered those today.  He is working with his PCP Rodrigo Ram on his hypertension.  His b/p today was 160/88.  He was to have increased his losartan but I do not think he has done that yet as he said \"I still have some of my old prescription left\".  -4/4/2023 PSA 2.55  - 4/26/2023 CT chest abdomen pelvis [place] comparison 10/10/2022 showed stable left third rib, T1, left fourth and eighth rib, right seventh rib.  Probable liver cyst.  Few diverticuli.  Mild further decrease in a few of the previously enlarged nodes.  Left external iliac 12 mm compared to 16 mm.  Right common iliac 7 mm stable.  Lower left periaortic 8 mm previously 13 mm.  No new lytic or blastic osseous lesions.  Total body bone scan comparison 10/10/2022, 7/22/2022, and CT 4/26/2023.  No new sites of increased uptake.  1 focal left third rib hotspot consistent with bone metastasis.    -5/2/2023 [facility] medical oncology follow-up: I reviewed interval notes since I last saw him from my nurse practitioner as outlined above in interval images and reports thereof from [facility] that shows no new sites of bone metastasis and no kizzy or visceral progression.  PSA stabilized around 2.5.  In the absence of symptomatic or radiographic progression, I would be unlikely to change therapy just on the basis of a PSA rise and for now the PSA is stable.  We will continue Zytiga prednisone and Xgeva and he will follow-up with my nurse practitioner 5/30/2023 with repeat CTs and bone scan in 6 months.  I asked him to check with his primary care today regarding his systolic in the 170s.    -5/31/2023 [facility] clinic follow-up: Chris continues to do well on current therapy with Zytiga, prednisone and Xgeva along with Lupron that he receives with Dr. Matute.  His PSA remains stable, it was lower this month compared to last month, current PSA 1.940.  Continues to deal with " hypertension, on arrival today his blood pressure was 210/92 however this was using a wrist cuff, when I rechecked it manually his blood pressure was 160/90.  Still has room for improvement despite recent increase in his losartan to 100 mg daily.  He will follow-up with Dr. Ram for further management.  We will continue therapy unchanged.  We will repeat restaging scans in October.  For his seasonal allergies I discussed with him that he could take over-the-counter generic Zyrtec or Claritin.    Return to clinic in 2 months for follow-up    This was a level 4, moderate MDM visit with 2 or more stable chronic illnesses, review of labs and management of drug therapy requiring intensive monitoring for toxicity.    Susana Torres, APRN    05/31/2023

## 2023-06-14 ENCOUNTER — SPECIALTY PHARMACY (OUTPATIENT)
Dept: ONCOLOGY | Facility: HOSPITAL | Age: 68
End: 2023-06-14
Payer: MEDICARE

## 2023-06-14 NOTE — PROGRESS NOTES
Specialty Pharmacy Refill Coordination Note     Chris is a 68 y.o. male contacted today regarding refills of  abiraterone 1000mg PO QD specialty medication(s).      Specialty medication(s) and dose(s) confirmed: yes    Refill Questions      Flowsheet Row Most Recent Value   Changes to allergies? No   Changes to medications? No   New conditions since last clinic visit No   Unplanned office visit, urgent care, ED, or hospital admission in the last 4 weeks  No   How does patient/caregiver feel medication is working? Good   Financial problems or insurance changes  No   Since the previous refill, were any specialty medication doses or scheduled injections missed or delayed?  No   Does this patient require a clinical escalation to a pharmacist? No            Delivery Questions      Flowsheet Row Most Recent Value   Delivery method [employer]   Delivery address correct? Yes   Delivery phone number [phone]   Preferred delivery time? Anytime   Number of medications in delivery 1   Medication being filled and delivered abiraterone   Doses left of specialty medications 4 days left   Is there any medication that is due not being filled? No   Supplies needed? No supplies needed   Cooler needed? No   Do any medications need mixed or dated? No   Additional comments no copay   Questions or concerns for the pharmacist? No   Explain any questions or concerns for the pharmacist n/a   Are any medications first time fills? No                   Follow-up: 28 day(s)     Catalina Montes De Oca, Pharmacy Technician  Specialty Pharmacy Technician

## 2023-06-15 RX ORDER — LOSARTAN POTASSIUM 100 MG/1
TABLET ORAL
Qty: 30 TABLET | Refills: 0 | Status: SHIPPED | OUTPATIENT
Start: 2023-06-15

## 2023-07-24 DIAGNOSIS — C61 PROSTATE CANCER METASTATIC TO MULTIPLE SITES: Primary | ICD-10-CM

## 2023-07-25 ENCOUNTER — LAB (OUTPATIENT)
Dept: ONCOLOGY | Facility: HOSPITAL | Age: 68
End: 2023-07-25
Payer: MEDICARE

## 2023-07-25 VITALS
DIASTOLIC BLOOD PRESSURE: 83 MMHG | HEART RATE: 70 BPM | BODY MASS INDEX: 39.14 KG/M2 | WEIGHT: 280.6 LBS | RESPIRATION RATE: 18 BRPM | TEMPERATURE: 97.9 F | SYSTOLIC BLOOD PRESSURE: 142 MMHG

## 2023-07-25 DIAGNOSIS — C61 PROSTATE CANCER METASTATIC TO MULTIPLE SITES: ICD-10-CM

## 2023-07-25 PROCEDURE — G0463 HOSPITAL OUTPT CLINIC VISIT: HCPCS

## 2023-07-25 PROCEDURE — 36415 COLL VENOUS BLD VENIPUNCTURE: CPT

## 2023-07-26 ENCOUNTER — OFFICE VISIT (OUTPATIENT)
Dept: ONCOLOGY | Facility: CLINIC | Age: 68
End: 2023-07-26
Payer: MEDICARE

## 2023-07-26 ENCOUNTER — INFUSION (OUTPATIENT)
Dept: ONCOLOGY | Facility: HOSPITAL | Age: 68
End: 2023-07-26
Payer: MEDICARE

## 2023-07-26 ENCOUNTER — SPECIALTY PHARMACY (OUTPATIENT)
Dept: ONCOLOGY | Facility: HOSPITAL | Age: 68
End: 2023-07-26
Payer: MEDICARE

## 2023-07-26 VITALS
DIASTOLIC BLOOD PRESSURE: 92 MMHG | TEMPERATURE: 98.4 F | HEIGHT: 71 IN | BODY MASS INDEX: 38.78 KG/M2 | HEART RATE: 82 BPM | WEIGHT: 277 LBS | SYSTOLIC BLOOD PRESSURE: 165 MMHG | OXYGEN SATURATION: 97 % | RESPIRATION RATE: 18 BRPM

## 2023-07-26 DIAGNOSIS — C61 PROSTATE CANCER METASTATIC TO MULTIPLE SITES: Primary | Chronic | ICD-10-CM

## 2023-07-26 DIAGNOSIS — C61 PROSTATE CANCER METASTATIC TO MULTIPLE SITES: Primary | ICD-10-CM

## 2023-07-26 LAB
ALBUMIN SERPL-MCNC: 4.6 G/DL (ref 3.5–5.2)
ALBUMIN/GLOB SERPL: 1.9 G/DL
ALP SERPL-CCNC: 97 U/L (ref 39–117)
ALT SERPL-CCNC: 35 U/L (ref 1–41)
AST SERPL-CCNC: 20 U/L (ref 1–40)
BASOPHILS # BLD AUTO: 0.07 10*3/MM3 (ref 0–0.2)
BASOPHILS NFR BLD AUTO: 0.9 % (ref 0–1.5)
BILIRUB SERPL-MCNC: 0.3 MG/DL (ref 0–1.2)
BUN SERPL-MCNC: 33 MG/DL (ref 8–23)
BUN/CREAT SERPL: 18 (ref 7–25)
CALCIUM SERPL-MCNC: 10 MG/DL (ref 8.6–10.5)
CHLORIDE SERPL-SCNC: 106 MMOL/L (ref 98–107)
CO2 SERPL-SCNC: 24.2 MMOL/L (ref 22–29)
CREAT SERPL-MCNC: 1.83 MG/DL (ref 0.76–1.27)
EGFRCR SERPLBLD CKD-EPI 2021: 39.7 ML/MIN/1.73
EOSINOPHIL # BLD AUTO: 0.18 10*3/MM3 (ref 0–0.4)
EOSINOPHIL NFR BLD AUTO: 2.2 % (ref 0.3–6.2)
ERYTHROCYTE [DISTWIDTH] IN BLOOD BY AUTOMATED COUNT: 13.5 % (ref 12.3–15.4)
GLOBULIN SER CALC-MCNC: 2.4 GM/DL
GLUCOSE SERPL-MCNC: 122 MG/DL (ref 65–99)
HCT VFR BLD AUTO: 41.3 % (ref 37.5–51)
HGB BLD-MCNC: 13.5 G/DL (ref 13–17.7)
IMM GRANULOCYTES # BLD AUTO: 0.03 10*3/MM3 (ref 0–0.05)
IMM GRANULOCYTES NFR BLD AUTO: 0.4 % (ref 0–0.5)
LYMPHOCYTES # BLD AUTO: 2.09 10*3/MM3 (ref 0.7–3.1)
LYMPHOCYTES NFR BLD AUTO: 25.4 % (ref 19.6–45.3)
MAGNESIUM SERPL-MCNC: 2.1 MG/DL (ref 1.6–2.4)
MCH RBC QN AUTO: 28.4 PG (ref 26.6–33)
MCHC RBC AUTO-ENTMCNC: 32.7 G/DL (ref 31.5–35.7)
MCV RBC AUTO: 86.9 FL (ref 79–97)
MONOCYTES # BLD AUTO: 0.7 10*3/MM3 (ref 0.1–0.9)
MONOCYTES NFR BLD AUTO: 8.5 % (ref 5–12)
NEUTROPHILS # BLD AUTO: 5.16 10*3/MM3 (ref 1.7–7)
NEUTROPHILS NFR BLD AUTO: 62.6 % (ref 42.7–76)
NRBC BLD AUTO-RTO: 0 /100 WBC (ref 0–0.2)
PHOSPHATE SERPL-MCNC: 3.4 MG/DL (ref 2.5–4.5)
PLATELET # BLD AUTO: 249 10*3/MM3 (ref 140–450)
POTASSIUM SERPL-SCNC: 4.1 MMOL/L (ref 3.5–5.2)
PROT SERPL-MCNC: 7 G/DL (ref 6–8.5)
PSA SERPL-MCNC: 2.14 NG/ML (ref 0–4)
RBC # BLD AUTO: 4.75 10*6/MM3 (ref 4.14–5.8)
SODIUM SERPL-SCNC: 141 MMOL/L (ref 136–145)
WBC # BLD AUTO: 8.23 10*3/MM3 (ref 3.4–10.8)
WRITTEN AUTHORIZATION: NORMAL

## 2023-07-26 PROCEDURE — 96372 THER/PROPH/DIAG INJ SC/IM: CPT

## 2023-07-26 PROCEDURE — 25010000002 DENOSUMAB 120 MG/1.7ML SOLUTION: Performed by: NURSE PRACTITIONER

## 2023-07-26 PROCEDURE — 1126F AMNT PAIN NOTED NONE PRSNT: CPT | Performed by: NURSE PRACTITIONER

## 2023-07-26 PROCEDURE — 99214 OFFICE O/P EST MOD 30 MIN: CPT | Performed by: NURSE PRACTITIONER

## 2023-07-26 RX ORDER — PREDNISONE 5 MG/1
5 TABLET ORAL 2 TIMES DAILY
Qty: 60 TABLET | Refills: 11 | Status: SHIPPED | OUTPATIENT
Start: 2023-07-26

## 2023-07-26 RX ADMIN — DENOSUMAB 120 MG: 120 INJECTION SUBCUTANEOUS at 10:12

## 2023-07-26 NOTE — PROGRESS NOTES
CHIEF COMPLAINT:  fatigue with new blood pressure medicine    Problem List:  Oncology/Hematology History Overview Note   1.  Prostate cancer clinical T1c and 2M1B stage IVb treated with 4 cycles of Taxotere, stopped due to syncope with negative cardiac work-up, with marked drop in PSA of 18.6 from over 900 at baseline, continued on Zytiga and prednisone.  2.  Urinary retention with Goodwin in place 1/24/2022.  On finasteride 1/24/2022.  3.  Covid 19 December 2021  4.  Hypertension    Oncology history timeline:  -11/29/2021  with symptoms of urinary retention.  -2/15/2022 prostate biopsy adenocarcinoma grade group 5 and 3 out of 12 cores, grade group 4 in 1 out of 12 cores, grade group 3 in 8 out of 12 cores.  -2/24/2022 CT chest abdomen pelvis and total body bone scan shows left third rib, right acetabulum, periaortic and retroperitoneal kizzy metastases complaining of pain in the left chest and right hip.  Also possible sclerotic left 10th rib on CT.  Spleen mildly enlarged 13.8 cm.  Hepatic steatosis.  Small liver cyst.  Fat stranding in the periaortic and mesenteric region consistent with reactive/inflammatory stranding.  Multiple enlarged periaortic and retroperitoneal nodes and lymphadenopathy largest 4.9 cm.  CT chest describes tiny sclerotic foci in left eighth rib and right lateral seventh rib and T1.  Multiple small mediastinal and bilateral axillary nodes seen with small hypodense left thyroid nodule.  Creatinine 1.7.  -3/4/2022 office note Dr. Rolando Matute: Patient was seen after his elevated PSA by Dr. Vera and prostate biopsy scheduled.  However, he developed COVID-19 and this was canceled and the patient did not reschedule due to lack of insurance.  Saw Dr. Matute back on 1/24/2022 with plans to get staging CTs and bone scan with results as outlined above.  Started Casodex and to return on 3/11/2022 for Lupron.  Referral made to medical oncology for other additional castrate naïve prostate  cancer therapies.  TURP planned for urinary retention symptoms.    -3/15/2022 [facility] medical oncology initial consultation: I reviewed the above data with the patient.  With his fairly bulky retroperitoneal adenopathy and bone metastases including extra axial metastases, he would fit the data from the CHAARTED trial for which Taxotere x6 followed by Zytiga prednisone along with the planned Lupron already being planned by Dr. Matute would be reasonable.  Equally reasonable in terms of comparisons with bicalutamide and Lupron would be Zytiga prednisone plus Lupron or Xtandi plus Lupron or apalutamide plus Lupron.  None of these have been compared head-to-head but all have beaten combined androgen deprivation therapy with bicalutamide and Lupron.  At the age of 67 and in order to have as many bullets as possible down the road and hence saving some of the hormone blockade options for later and using chemotherapy when he is younger and healthier for a more time-limited.,  He has opted for the Taxotere x6 followed by Zytiga and prednisone.  We will also give him Xgeva to improve bone health and given metastatic disease, as with all metastatic prostate cancer patients, he needs genetic counseling both for his sake as well as his family's.  If he were to have BRCA1 or other such  mutations, then that also opens up the options for PARP inhibitors etc. down the road.  He has his TURP scheduled for 2/24/2022 and we will start treatment a couple of weeks after that and he will get chemo preparation visit in the meantime and I will get capital surgeons to place a port.  We will check his PSA after his TURP when he sees my nurse practitioner back early April for the start of chemotherapy and repeat the PSA and CT chest abdomen pelvis and bone scan after the Taxotere is complete.    -3/31/2022 chemotherapy education and needs assessment completed    -4/7/2022 began docetaxel and Xgeva.  .0.  We will do his Xgeva  every 6 weeks to coordinate with his docetaxel infusions.    -4/19/2022 patient stopped Casodex    -5/17/2022 patient reported seeing his PCP for an abscess in his suprapubic area.  Placed on clindamycin, will delay treatment 1 week.    -5/25/2022 PSA 34.3  -5/26/2022 [facility] clinic follow-up: Chris has an abscess in the suprapubic area, it has improved on antibiotic therapy but I am concerned if we treat him it will just flare back up unless it is drained.  I will get him to Dr. Miller who placed his port for I&D and culture.  He is on clindamycin and states he completes course of treatment tomorrow.  I will delay his treatment with Taxotere 1 more week.  We will give his Xgeva today.  I will see him back in 1 week for follow-up to assess whether or not we can resume his Taxotere.  His PSA has dropped nicely with treatment thus far, PSA yesterday was 34.3 which is down from a PSA of 259.0 when we started treatment, it has been as high as 911 in November.    -6/2/2022 [facility] Oncology clinic follow-up: Chris went to see Dr. Miller to have his abscess lanced however apparently there was a long wait and he left without being seen.  He did call his PCP and was given 5 more days of clindamycin and the abscess now seems to have resolved.  We discussed today that he is at risk for recurrence of infection due to immunocompromise state with chemotherapy.  He will notify us if he has any return of his abscess.  He does have intertrigo under his panniculus, I have advised him to keep this area dry, to apply topical drying/antifungal powders.  Also recommended looser clothing.  His counts are adequate to continue therapy, we will resume Taxotere today with cycle #3.  We will repeat restaging scans after 6 courses.  We are doing Xgeva every 6 weeks to coordinate with his Taxotere.  Once he finishes Taxotere we can then go back to every 4 weeks.  His next Xgeva is not due until 7/14/2022.  His calcium is running a  little low, he is going to  calcium supplement.    -6/23/2022 [facility] clinic follow-up: Chris overall is doing well on treatment with Taxotere.  Labs reviewed from yesterday and are unremarkable.  We will continue treatment today unchanged.  His suprapubic abscess resolved on clindamycin.  He will continue to use drying powder/antifungal powder under his panniculus for intertrigo.  Today will be cycle #4 of a planned 6 courses of Taxotere.  He is also on Xgeva, next dose due 7/14/2022, we are doing this every 6 weeks for now to line up with his chemotherapy, can go back to every 4 weeks after he finishes Taxotere.  Calcium from CMP yesterday was normal.    -7/13/2022 [facility] clinic follow-up: Mr. Morfin has had issues around day 3 after each treatment ranging from chest pain after his first treatment for which he did not seek medical attention, fatigue after the next few treatments, but 3 days after his most recent treatment on 6/23/2022 he had a syncopal episode at home and once again did not seek medical attention.  I discussed with him that this was not acceptable, if he has occurrences like this someone needs to call 911, it is difficult to figure out what is going on after the fact, the best time to work this up would be during the occurrence.  For now we will get labs today with CBC, CMP, PSA.  I will refer him to cardiology for further evaluation.  We will hold Taxotere most likely, I will see him tomorrow to go over his lab results.  I will also repeat his restaging scans with CT chest, abdomen and pelvis and will include CT of the head in light of his syncopal episode.      -7/13/2022 PSA 18.6    -7/14/2022 [facility] clinic follow-up: Mr. Morfin returns today to review his labs from yesterday.  Labs are unremarkable.  PSA down to 18.6 from a high of 259.0 in April prior to starting treatment.  CBC and CMP unremarkable, magnesium is normal at 2.3, phosphorus slightly low at  2.4.  We will hold therapy for now in light of his syncopal episode on day 3 after his last treatment and prior episodes with chest pain and severe fatigue that all occurred on day 3 after his Taxotere.  We will restage him with CT head, chest, abdomen and pelvis (I am including the head in light of his syncopal episode).  I have also referred him to cardiology, he states that he received a call from them about making an appointment however he wanted to talk to us today first.  I emphasized the importance to him of making and keeping that appointment and he states understanding.  I also once again emphasized the requirement to seek emergency medical attention if he has any episodes in the future such as chest pain or syncopal events.  Whether or not we try and complete Taxotere with 2 more courses or move to the next line of therapy will be decided when he returns to discuss with Dr. Goodson and review his restaging scans.    -7/15/2022 saw Dr. Diaz cardiologist.  For syncope he ordered echocardiogram and 48-hour Holter monitor.    -7/15/2022 Holter monitor relatively benign.    -7/22/2022 CT brain with and without contrast negative.  CT chest abdomen and pelvis shows some nonspecific cecal wall thickening.  No progression in the chest.  T1 and ribs stable.  Decrease in multiple retroperitoneal and pelvic nodes.  Slight increase in right acetabular sclerotic lesion.  Persistent but improved circumferential bladder wall thickening.  Total body bone scan shows stable left third rib and no evidence of new bone metastases.    -7/19/2022 ejection fraction 65% with grade 1 diastolic dysfunction.    -7/26/2022 [facility] clinic follow-up: Given presyncopal symptoms occurred 3 days after Taxotere and has not otherwise occurred any other time and his cardiology work-up is fairly unremarkable and his disease is under good control as witnessed by the report above of the CTs of head chest abdomen pelvis and bone scan, I  will hold cycle 5 and 6 of Taxotere and continue 1 now with Zytiga and prednisone.  With reference to the coincidental cecal wall thickening found on CT, we will get him to [facility] surgeons for colonoscopy.  He will see my nurse practitioner back in August for next cycle of Abiraterone prednisone.  He will continue his Lupron with Dr. Matute.  We will continue his Xgeva.  We will repeat his CT chest abdomen pelvis and bone scan again in 3 months.  He will see my nurse practitioner in the interim.    -8/23/2022 [facility] oncology clinic follow-up: No further presyncopal symptoms.  Feeling great other than for hot flashes.  Did commend for now we will continue on with his Zytiga prednisone and he will get his Xgeva today based on labs from 8/10/2022.  He opted out of getting the colonoscopy that I recommended and he will not change his mind.  He will continue getting the Lupron with Dr. Matute and I asked him to give the date of this appointment to my nurse when he sees her back in a month.  We will give his Xgeva today.  We will get CT chest abdomen pelvis and total body bone scan towards the middle to end of October.  Presently other than for the hot flashes feels great.    -9/20/2022 PSA 8.320    -9/22/2022 [facility] Oncology clinic follow-up: Chris is doing well on Zytiga, prednisone along with Xgeva.  Labs reviewed from 9/20/2022 are unremarkable, CBC was normal, CMP with creatinine of 1.34 otherwise normal, this is stable from his baseline.  PSA stable at 8.320.  He received Lupron recently with Dr. Matute, he thinks last week or so, states his next appointment is in February.  He will continue treatment unchanged.  We will repeat restaging CT chest, abdomen and pelvis and total body bone scan in October prior to return and I have ordered those today.  We will also repeat his PSA.    -9/22/2022 CT chest abdomen pelvisCompared to July 2022 [facility] showed no evidence of disease progression in the  "chest with no substantial sclerotic bony lesions and decrease in size of retroperitoneal and pelvic nodes with persistent cecal wall thickening and suboptimal bladder distention.  Total body bone scan showed decrease in previously seen uptake in the left third rib with diffuse sclerosis representing treatment response with no new foci.    -10/18/2022 [facility] medical oncology follow-up: I reviewed bone scan and CT reports as above with no evidence of progression.  Tolerating Zytiga prednisone and Xgeva.  Getting Lupron regularly with Dr. Matute next appointment coming in February.  We will repeat his CT chest abdomen pelvis and total body bone scan in April.  He will follow with my nurse practitioner in the interim.    -1/10/2023 PSA 3.450  -1/11/2023 [facility] Oncology clinic follow-up: Mr. Morfin continues to do well on current therapy with Zytiga, prednisone and Xgeva with no unusual side effects.  He has no new worrisome symptoms.  He is due for his next Lupron injection in February with Dr. Matute.  His PSA continues to trend downward, current PSA from yesterday was 3.450.  We will continue therapy unchanged, he will receive Xgeva today.  Has had no hypocalcemia, his CMP, phosphorus and magnesium are all normal.  We will repeat restaging scans in April.  -3/7/2023 PSA 2.460  -3/8/2023 [facility] Oncology clinic follow-up:  Mr. Morfin is doing well.  He is tolerating therapy with Zytiga, prednisone and Xgeva with no significant side effects.  He has hot flashes but these are tolerable.  He had Lupron injection 2/24/2023 with Dr. Matute.  His PSA continues to decline, current PSA 2.460.  He will continue therapy unchanged.  We will repeat restaging scans late April prior to return in 2 months and I have ordered those today.  He is working with his PCP Rodrigo Ram on his hypertension.  His b/p today was 160/88.  He was to have increased his losartan but I do not think he has done that yet as he said \"I still have " "some of my old prescription left\".  -4/4/2023 PSA 2.55  - 4/26/2023 CT chest abdomen pelvis [facility] comparison 10/10/2022 showed stable left third rib, T1, left fourth and eighth rib, right seventh rib.  Probable liver cyst.  Few diverticuli.  Mild further decrease in a few of the previously enlarged nodes.  Left external iliac 12 mm compared to 16 mm.  Right common iliac 7 mm stable.  Lower left periaortic 8 mm previously 13 mm.  No new lytic or blastic osseous lesions.  Total body bone scan comparison 10/10/2022, 7/22/2022, and CT 4/26/2023.  No new sites of increased uptake.  1 focal left third rib hotspot consistent with bone metastasis.    -5/2/2023 [facility] medical oncology follow-up: I reviewed interval notes since I last saw him from my nurse practitioner as outlined above in interval images and reports thereof from [facility] that shows no new sites of bone metastasis and no kizzy or visceral progression.  PSA stabilized around 2.5.  In the absence of symptomatic or radiographic progression, I would be unlikely to change therapy just on the basis of a PSA rise and for now the PSA is stable.  We will continue Zytiga prednisone and Xgeva and he will follow-up with my nurse practitioner 5/30/2023 with repeat CTs and bone scan in 6 months.  I asked him to check with his primary care today regarding his systolic in the 170s.    -5/31/2023 [facility] clinic follow-up: Chris continues to do well on current therapy with Zytiga, prednisone and Xgeva along with Lupron that he receives with Dr. Matute.  His PSA remains stable, it was lower this month compared to last month, current PSA 1.940.  Continues to deal with hypertension, on arrival today his blood pressure was 210/92 however this was using a wrist cuff, when I rechecked it manually his blood pressure was 160/90.  Still has room for improvement despite recent increase in his losartan to 100 mg daily.  He will follow-up with Dr. Ram for " further management.  We will continue therapy unchanged.  We will repeat restaging scans in October.     Prostate cancer metastatic to multiple sites   3/15/2022 Initial Diagnosis    Prostate cancer metastatic to multiple sites (HCC)     3/15/2022 Cancer Staged    Staging form: Prostate, AJCC 8th Edition  - Clinical: Stage IVB (cT1c, cN1, cM1b, Grade Group: 5) - Signed by Fritz Goodson MD on 3/15/2022     4/7/2022 - 6/23/2022 Chemotherapy    Stopped after cycle 4 6/23/2022 due to syncope 3 days post infusions with negative cardiac work-up  OP PROSTATE DOCEtaxel     4/7/2022 -  Chemotherapy    OP SUPPORTIVE Denosumab (Xgeva) Q28D       7/26/2022 -  Chemotherapy    OP PROSTATE Abiraterone / PredniSONE           HISTORY OF PRESENT ILLNESS:  The patient is a 68 y.o. male, here for follow up on management of metastatic prostate cancer stable on Zytiga and prednisone Xgeva.  He requests a refill of his prednisone.  Chris reports that he has been a little more fatigued this past month, he states that he is on an additional blood pressure medication now, he is taking amlodipine along with his losartan, he states when he first started taking it he was quite fatigued but now he is getting more used to it.  Blood pressure today is 165/92.  He has no new or concerning bone pain.  He reports that his appetite has been good, his weight is stable.  No change in his bowel or bladder habits.      Past Medical History:   Diagnosis Date    Cancer     Prostate cancer      Past Surgical History:   Procedure Laterality Date    PROSTATE BIOPSY  02/2022    dr. sue       No Known Allergies    Family History and Social History reviewed and changed as necessary    REVIEW OF SYSTEM:   Mild fatigue    PHYSICAL EXAM:  Well-developed, well-nourished male in no distress  Lungs clear to auscultation bilaterally, respirations regular nonlabored  Heart regular rate and rhythm    Vitals:    07/26/23 0936   BP: 165/92   Pulse: 82   Resp: 18  "  Temp: 98.4 °F (36.9 °C)   SpO2: 97%   Weight: 126 kg (277 lb)   Height: 180.3 cm (71\")     Vitals:    07/26/23 0936   PainSc: 0-No pain          ECOG score: 0           Vitals reviewed.  Labs reviewed    ECOG: (0) Fully Active - Able to Carry On All Pre-disease Performance Without Restriction    Lab Results   Component Value Date    HGB 13.5 07/25/2023    HCT 41.3 07/25/2023    MCV 86.9 07/25/2023     07/25/2023    WBC 8.23 07/25/2023    NEUTROABS 5.16 07/25/2023    LYMPHSABS 2.09 07/25/2023    MONOSABS 0.70 07/25/2023    EOSABS 0.18 07/25/2023    BASOSABS 0.07 07/25/2023       Lab Results   Component Value Date    GLUCOSE 122 (H) 07/25/2023    BUN 33 (H) 07/25/2023    CREATININE 1.83 (H) 07/25/2023     07/25/2023    K 4.1 07/25/2023     07/25/2023    CO2 24.2 07/25/2023    CALCIUM 10.0 07/25/2023    ALBUMIN 4.6 07/25/2023    BILITOT 0.3 07/25/2023    ALKPHOS 97 07/25/2023    AST 20 07/25/2023    ALT 35 07/25/2023     Lab Results   Component Value Date    PSA 1.590 06/27/2023    PSA 1.940 05/30/2023    PSA 2.170 05/02/2023    PSA 2.550 04/04/2023    PSA 2.460 03/07/2023    PSA 3.250 02/07/2023             ASSESSMENT & PLAN:  1.  Prostate cancer clinical T1c and 2M1B stage IVb treated with 4 cycles of Taxotere, stopped due to syncope with negative cardiac work-up, with marked drop in PSA of 18.6 from over 900 at baseline, continued on Zytiga and prednisone.  2.  Urinary retention with Goodwin in place 1/24/2022.  On finasteride 1/24/2022.  3.  Covid 19 December 2021  4.  Hypertension    Oncology history timeline:  -11/29/2021  with symptoms of urinary retention.  -2/15/2022 prostate biopsy adenocarcinoma grade group 5 and 3 out of 12 cores, grade group 4 in 1 out of 12 cores, grade group 3 in 8 out of 12 cores.  -2/24/2022 CT chest abdomen pelvis and total body bone scan shows left third rib, right acetabulum, periaortic and retroperitoneal kizzy metastases complaining of pain in the left " chest and right hip.  Also possible sclerotic left 10th rib on CT.  Spleen mildly enlarged 13.8 cm.  Hepatic steatosis.  Small liver cyst.  Fat stranding in the periaortic and mesenteric region consistent with reactive/inflammatory stranding.  Multiple enlarged periaortic and retroperitoneal nodes and lymphadenopathy largest 4.9 cm.  CT chest describes tiny sclerotic foci in left eighth rib and right lateral seventh rib and T1.  Multiple small mediastinal and bilateral axillary nodes seen with small hypodense left thyroid nodule.  Creatinine 1.7.  -3/4/2022 office note Dr. Rolando Matute: Patient was seen after his elevated PSA by Dr. Vera and prostate biopsy scheduled.  However, he developed COVID-19 and this was canceled and the patient did not reschedule due to lack of insurance.  Saw Dr. Matute back on 1/24/2022 with plans to get staging CTs and bone scan with results as outlined above.  Started Casodex and to return on 3/11/2022 for Lupron.  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  TURP planned for urinary retention symptoms.    -3/15/2022 [facility] medical oncology initial consultation: I reviewed the above data with the patient.  With his fairly bulky retroperitoneal adenopathy and bone metastases including extra axial metastases, he would fit the data from the CHAARTED trial for which Taxotere x6 followed by Zytiga prednisone along with the planned Lupron already being planned by Dr. Matute would be reasonable.  Equally reasonable in terms of comparisons with bicalutamide and Lupron would be Zytiga prednisone plus Lupron or Xtandi plus Lupron or apalutamide plus Lupron.  None of these have been compared head-to-head but all have beaten combined androgen deprivation therapy with bicalutamide and Lupron.  At the age of 67 and in order to have as many bullets as possible down the road and hence saving some of the hormone blockade options for later  and using chemotherapy when he is younger and healthier for a more time-limited.,  He has opted for the Taxotere x6 followed by Zytiga and prednisone.  We will also give him Xgeva to improve bone health and given metastatic disease, as with all metastatic prostate cancer patients, he needs genetic counseling both for his sake as well as his family's.  If he were to have BRCA1 or other such  mutations, then that also opens up the options for PARP inhibitors etc. down the road.  He has his TURP scheduled for 2/24/2022 and we will start treatment a couple of weeks after that and he will get chemo preparation visit in the meantime and I will get capital surgeons to place a port.  We will check his PSA after his TURP when he sees my nurse practitioner back early April for the start of chemotherapy and repeat the PSA and CT chest abdomen pelvis and bone scan after the Taxotere is complete.    -3/31/2022 chemotherapy education and needs assessment completed    -4/7/2022 began docetaxel and Xgeva.  .0.  We will do his Xgeva every 6 weeks to coordinate with his docetaxel infusions.    -4/19/2022 patient stopped Casodex    -5/17/2022 patient reported seeing his PCP for an abscess in his suprapubic area.  Placed on clindamycin, will delay treatment 1 week.    -5/25/2022 PSA 34.3  -5/26/2022 [facility] clinic follow-up: Chris has an abscess in the suprapubic area, it has improved on antibiotic therapy but I am concerned if we treat him it will just flare back up unless it is drained.  I will get him to Dr. Miller who placed his port for I&D and culture.  He is on clindamycin and states he completes course of treatment tomorrow.  I will delay his treatment with Taxotere 1 more week.  We will give his Xgeva today.  I will see him back in 1 week for follow-up to assess whether or not we can resume his Taxotere.  His PSA has dropped nicely with treatment thus far, PSA yesterday was 34.3 which is down from a PSA  of 259.0 when we started treatment, it has been as high as 911 in November.    -6/2/2022 [facility] clinic follow-up: Chris went to see Dr. Miller to have his abscess lanced however apparently there was a long wait and he left without being seen.  He did call his PCP and was given 5 more days of clindamycin and the abscess now seems to have resolved.  We discussed today that he is at risk for recurrence of infection due to immunocompromise state with chemotherapy.  He will notify us if he has any return of his abscess.  He does have intertrigo under his panniculus, I have advised him to keep this area dry, to apply topical drying/antifungal powders.  Also recommended looser clothing.  His counts are adequate to continue therapy, we will resume Taxotere today with cycle #3.  We will repeat restaging scans after 6 courses.  We are doing Xgeva every 6 weeks to coordinate with his Taxotere.  Once he finishes Taxotere we can then go back to every 4 weeks.  His next Xgeva is not due until 7/14/2022.  His calcium is running a little low, he is going to  calcium supplement.    -6/23/2022 [facility] follow-up: Chris overall is doing well on treatment with Taxotere.  Labs reviewed from yesterday and are unremarkable.  We will continue treatment today unchanged.  His suprapubic abscess resolved on clindamycin.  He will continue to use drying powder/antifungal powder under his panniculus for intertrigo.  Today will be cycle #4 of a planned 6 courses of Taxotere.  He is also on Xgeva, next dose due 7/14/2022, we are doing this every 6 weeks for now to line up with his chemotherapy, can go back to every 4 weeks after he finishes Taxotere.  Calcium from [facility] yesterday was normal.    -7/13/2022 [facility] follow-up: Mr. Morfin has had issues around day 3 after each treatment ranging from chest pain after his first treatment for which he did not seek medical attention, fatigue after the next few  treatments, but 3 days after his most recent treatment on 6/23/2022 he had a syncopal episode at home and once again did not seek medical attention.  I discussed with him that this was not acceptable, if he has occurrences like this someone needs to call 911, it is difficult to figure out what is going on after the fact, the best time to work this up would be during the occurrence.  For now we will get labs today with CBC, CMP, PSA.  I will refer him to cardiology for further evaluation.  We will hold Taxotere most likely, I will see him tomorrow to go over his lab results.  I will also repeat his restaging scans with CT chest, abdomen and pelvis and will include CT of the head in light of his syncopal episode.      -7/13/2022 PSA 18.6    -7/14/2022 [facility] follow-up: Mr. Morfin returns today to review his labs from yesterday.  Labs are unremarkable.  PSA down to 18.6 from a high of 259.0 in April prior to starting treatment.  CBC and CMP unremarkable, magnesium is normal at 2.3, phosphorus slightly low at 2.4.  We will hold therapy for now in light of his syncopal episode on day 3 after his last treatment and prior episodes with chest pain and severe fatigue that all occurred on day 3 after his Taxotere.  We will restage him with CT head, chest, abdomen and pelvis (I am including the head in light of his syncopal episode).  I have also referred him to cardiology, he states that he received a call from them about making an appointment however he wanted to talk to us today first.  I emphasized the importance to him of making and keeping that appointment and he states understanding.  I also once again emphasized the requirement to seek emergency medical attention if he has any episodes in the future such as chest pain or syncopal events.  Whether or not we try and complete Taxotere with 2 more courses or move to the next line of therapy will be decided when he returns to discuss with Dr. Goodson and  review his restaging scans.    -7/15/2022 saw Dr. Diaz cardiologist.  For syncope he ordered echocardiogram and 48-hour Holter monitor.    -7/15/2022 Holter monitor relatively benign.    -7/22/2022 CT brain with and without contrast negative.  CT chest abdomen and pelvis shows some nonspecific cecal wall thickening.  No progression in the chest.  T1 and ribs stable.  Decrease in multiple retroperitoneal and pelvic nodes.  Slight increase in right acetabular sclerotic lesion.  Persistent but improved circumferential bladder wall thickening.  Total body bone scan shows stable left third rib and no evidence of new bone metastases.    -7/19/2022 ejection fraction 65% with grade 1 diastolic dysfunction.    -7/26/2022 [facility] follow-up: Given presyncopal symptoms occurred 3 days after Taxotere and has not otherwise occurred any other time and his cardiology work-up is fairly unremarkable and his disease is under good control as witnessed by the report above of the CTs of head chest abdomen pelvis and bone scan, I will hold cycle 5 and 6 of Taxotere and continue 1 now with Zytiga and prednisone.  With reference to the coincidental cecal wall thickening found on CT, we will get him to [facility] surgeons for colonoscopy.  He will see my nurse practitioner back in August for next cycle of Abiraterone prednisone.  He will continue his Lupron with Dr. Matute.  We will continue his Xgeva.  We will repeat his CT chest abdomen pelvis and bone scan again in 3 months.  He will see my nurse practitioner in the interim.    -8/23/2022 [facility] follow-up: No further presyncopal symptoms.  Feeling great other than for hot flashes.  Did commend for now we will continue on with his Zytiga prednisone and he will get his Xgeva today based on labs from 8/10/2022.  He opted out of getting the colonoscopy that I recommended and he will not change his mind.  He will continue getting the Lupron with Dr. Matute  and I asked him to give the date of this appointment to my nurse when he sees her back in a month.  We will give his Xgeva today.  We will get CT chest abdomen pelvis and total body bone scan towards the middle to end of October.  Presently other than for the hot flashes feels great.    -9/20/2022 PSA 8.320    -9/22/2022 [facility] follow-up: Chris is doing well on Zytiga, prednisone along with Xgeva.  Labs reviewed from 9/20/2022 are unremarkable, CBC was normal, CMP with creatinine of 1.34 otherwise normal, this is stable from his baseline.  PSA stable at 8.320.  He received Lupron recently with Dr. Matute, he thinks last week or so, states his next appointment is in February.  He will continue treatment unchanged.  We will repeat restaging CT chest, abdomen and pelvis and total body bone scan in October prior to return and I have ordered those today.  We will also repeat his PSA.    -9/22/2022 CT chest abdomen pelvisCompared to July 2022 [facility] showed no evidence of disease progression in the chest with no substantial sclerotic bony lesions and decrease in size of retroperitoneal and pelvic nodes with persistent cecal wall thickening and suboptimal bladder distention.  Total body bone scan showed decrease in previously seen uptake in the left third rib with diffuse sclerosis representing treatment response with no new foci.    -10/18/2022 [facility] follow-up: I reviewed bone scan and CT reports as above with no evidence of progression.  Tolerating Zytiga prednisone and Xgeva.  Getting Lupron regularly with Dr. Matute next appointment coming in February.  We will repeat his CT chest abdomen pelvis and total body bone scan in April.  He will follow with my nurse practitioner in the interim.    -1/10/2023 PSA 3.450  -1/11/2023 [facility] follow-up: Mr. Morfin continues to do well on current therapy with Zytiga, prednisone and Xgeva with no unusual side  "effects.  He has no new worrisome symptoms.  He is due for his next Lupron injection in February with Dr. Matute.  His PSA continues to trend downward, current PSA from yesterday was 3.450.  We will continue therapy unchanged, he will receive Xgeva today.  Has had no hypocalcemia, his CMP, phosphorus and magnesium are all normal.  We will repeat restaging scans in April.  -3/7/2023 PSA 2.460  -3/8/2023 [facility] follow-up:  Mr. Morfin is doing well.  He is tolerating therapy with Zytiga, prednisone and Xgeva with no significant side effects.  He has hot flashes but these are tolerable.  He had Lupron injection 2/24/2023 with Dr. Matute.  His PSA continues to decline, current PSA 2.460.  He will continue therapy unchanged.  We will repeat restaging scans late April prior to return in 2 months and I have ordered those today.  He is working with his PCP Rodrigo Ram on his hypertension.  His b/p today was 160/88.  He was to have increased his losartan but I do not think he has done that yet as he said \"I still have some of my old prescription left\".  -4/4/2023 PSA 2.55  - 4/26/2023 CT chest abdomen pelvis [facility] comparison 10/10/2022 showed stable left third rib, T1, left fourth and eighth rib, right seventh rib.  Probable liver cyst.  Few diverticuli.  Mild further decrease in a few of the previously enlarged nodes.  Left external iliac 12 mm compared to 16 mm.  Right common iliac 7 mm stable.  Lower left periaortic 8 mm previously 13 mm.  No new lytic or blastic osseous lesions.  Total body bone scan comparison 10/10/2022, 7/22/2022, and CT 4/26/2023.  No new sites of increased uptake.  1 focal left third rib hotspot consistent with bone metastasis.    -5/2/2023 [facility] follow-up: I reviewed interval notes since I last saw him from my nurse practitioner as outlined above in interval images and reports thereof from [facility] that shows no new sites of bone metastasis and no " kizzy or visceral progression.  PSA stabilized around 2.5.  In the absence of symptomatic or radiographic progression, I would be unlikely to change therapy just on the basis of a PSA rise and for now the PSA is stable.  We will continue Zytiga prednisone and Xgeva and he will follow-up with my nurse practitioner 5/30/2023 with repeat CTs and bone scan in 6 months.  I asked him to check with his primary care today regarding his systolic in the 170s.    -5/31/2023 [facility] follow-up: Chris continues to do well on current therapy with Zytiga, prednisone and Xgeva along with Lupron that he receives with Dr. Matute.  His PSA remains stable, it was lower this month compared to last month, current PSA 1.940.  Continues to deal with hypertension, on arrival today his blood pressure was 210/92 however this was using a wrist cuff, when I rechecked it manually his blood pressure was 160/90.  Still has room for improvement despite recent increase in his losartan to 100 mg daily.  He will follow-up with Dr. Ram for further management.  We will continue therapy unchanged.  We will repeat restaging scans in October.  For his seasonal allergies I discussed with him that he could take over-the-counter generic Zyrtec or Claritin.    -7/26/2023 [facility] follow-up: Chris overall continues to do well on current therapy with Zytiga, prednisone and Xgeva.  He receives Lupron every 6 months with Dr. Matute and states that is scheduled for August.  PSA from yesterday is pending, PSA on 6/27/2023 was 1.590 which was continuing to decline, in February it was 3.250.  His creatinine this past month has bumped up.  He feels he is staying hydrated but I encouraged him to increase his fluid intake.  I discussed with him that this could be from his hypertension, some of his medications.  His blood pressure today is 165/92.  He has an appointment with his primary care provider Dr. Teri Ram next week and  can further discuss with her.  No adjustment needed for Xgeva.  I refilled his prednisone today.  I will see him back in 1 month for follow-up with continued monitoring of his labs.  He may end up needing to see nephrology.  We will repeat restaging scans in October.    This was a level 4, moderate MDM visit with management of side effects of therapy, review of labs and management of drug therapy requiring intensive monitoring for toxicity.    Susana Torres, APRN    07/26/2023

## 2023-08-04 ENCOUNTER — OFFICE VISIT (OUTPATIENT)
Dept: FAMILY MEDICINE CLINIC | Facility: CLINIC | Age: 68
End: 2023-08-04
Payer: MEDICARE

## 2023-08-04 VITALS
SYSTOLIC BLOOD PRESSURE: 120 MMHG | OXYGEN SATURATION: 100 % | WEIGHT: 282 LBS | DIASTOLIC BLOOD PRESSURE: 80 MMHG | BODY MASS INDEX: 39.48 KG/M2 | HEART RATE: 80 BPM | HEIGHT: 71 IN

## 2023-08-04 DIAGNOSIS — I10 ESSENTIAL HYPERTENSION: Primary | ICD-10-CM

## 2023-08-04 PROCEDURE — 99213 OFFICE O/P EST LOW 20 MIN: CPT | Performed by: STUDENT IN AN ORGANIZED HEALTH CARE EDUCATION/TRAINING PROGRAM

## 2023-08-04 RX ORDER — AMLODIPINE BESYLATE 5 MG/1
7.5 TABLET ORAL DAILY
Qty: 45 TABLET | Refills: 1 | Status: SHIPPED | OUTPATIENT
Start: 2023-08-04 | End: 2023-09-03

## 2023-08-15 RX ORDER — LOSARTAN POTASSIUM 100 MG/1
100 TABLET ORAL DAILY
Qty: 30 TABLET | Refills: 1 | Status: SHIPPED | OUTPATIENT
Start: 2023-08-15

## 2023-08-15 NOTE — TELEPHONE ENCOUNTER
Caller: MorfinChris cortes    Relationship: Self    Best call back number:  [phone]     Requested Prescriptions:   Requested Prescriptions     Pending Prescriptions Disp Refills    losartan (COZAAR) 100 MG tablet 30 tablet 0     Sig: Take 1 tablet by mouth Daily.        Pharmacy where request should be sent: [address] [phone] [facility] [phone] [facility]     Last office visit with prescribing clinician: 8/4/2023   Last telemedicine visit with prescribing clinician: Visit date not found   Next office visit with prescribing clinician: 10/5/2023     Additional details provided by patient: ONE PILL LEFT.    Does the patient have less than a 3 day supply:  [x] Yes  [] No    Would you like a call back once the refill request has been completed: [] Yes [x] No    If the office needs to give you a call back, can they leave a voicemail: [] Yes [x] No    Reza Venegas   08/15/23 10:00 EDT

## 2023-08-17 ENCOUNTER — SPECIALTY PHARMACY (OUTPATIENT)
Dept: ONCOLOGY | Facility: HOSPITAL | Age: 68
End: 2023-08-17
Payer: MEDICARE

## 2023-08-17 NOTE — PROGRESS NOTES
Specialty Pharmacy Refill Coordination Note     Chris is a 68 y.o. male contacted today regarding refills of  abiraterone 1000mg po QD specialty medication(s).      Specialty medication(s) and dose(s) confirmed: yes    Refill Questions      Flowsheet Row Most Recent Value   Changes to allergies? No   Changes to medications? No   New conditions since last clinic visit No   Unplanned office visit, urgent care, ED, or hospital admission in the last 4 weeks  No   How does patient/caregiver feel medication is working? Very good   Financial problems or insurance changes  No   Since the previous refill, were any specialty medication doses or scheduled injections missed or delayed?  No   Does this patient require a clinical escalation to a pharmacist? No            Delivery Questions      Flowsheet Row Most Recent Value   Delivery method [employer]   Delivery address correct? Yes   Delivery phone number [phone]   Preferred delivery time? Anytime   Number of medications in delivery 1   Medication being filled and delivered abiraterone   Doses left of specialty medications 5 days left   Is there any medication that is due not being filled? No   Supplies needed? No supplies needed   Cooler needed? No   Do any medications need mixed or dated? No   Additional comments no copay   Questions or concerns for the pharmacist? No   Explain any questions or concerns for the pharmacist n/a   Are any medications first time fills? No                   Follow-up: 28 day(s)     Catalina Montes De Oca, Pharmacy Technician  Specialty Pharmacy Technician

## 2023-08-22 ENCOUNTER — LAB (OUTPATIENT)
Dept: ONCOLOGY | Facility: HOSPITAL | Age: 68
End: 2023-08-22
Payer: MEDICARE

## 2023-08-22 VITALS
DIASTOLIC BLOOD PRESSURE: 85 MMHG | RESPIRATION RATE: 18 BRPM | TEMPERATURE: 98.1 F | HEART RATE: 66 BPM | SYSTOLIC BLOOD PRESSURE: 141 MMHG | WEIGHT: 281.4 LBS | BODY MASS INDEX: 39.25 KG/M2

## 2023-08-22 DIAGNOSIS — C61 PROSTATE CANCER METASTATIC TO MULTIPLE SITES: Chronic | ICD-10-CM

## 2023-08-22 PROCEDURE — G0463 HOSPITAL OUTPT CLINIC VISIT: HCPCS

## 2023-08-22 PROCEDURE — 36415 COLL VENOUS BLD VENIPUNCTURE: CPT

## 2023-08-23 ENCOUNTER — INFUSION (OUTPATIENT)
Dept: ONCOLOGY | Facility: HOSPITAL | Age: 68
End: 2023-08-23
Payer: MEDICARE

## 2023-08-23 ENCOUNTER — SPECIALTY PHARMACY (OUTPATIENT)
Dept: ONCOLOGY | Facility: HOSPITAL | Age: 68
End: 2023-08-23
Payer: MEDICARE

## 2023-08-23 ENCOUNTER — OFFICE VISIT (OUTPATIENT)
Dept: ONCOLOGY | Facility: CLINIC | Age: 68
End: 2023-08-23
Payer: MEDICARE

## 2023-08-23 VITALS
WEIGHT: 284 LBS | OXYGEN SATURATION: 95 % | DIASTOLIC BLOOD PRESSURE: 86 MMHG | TEMPERATURE: 99.2 F | SYSTOLIC BLOOD PRESSURE: 161 MMHG | HEART RATE: 76 BPM | BODY MASS INDEX: 39.76 KG/M2 | HEIGHT: 71 IN | RESPIRATION RATE: 18 BRPM

## 2023-08-23 DIAGNOSIS — N28.9 ACUTE RENAL INSUFFICIENCY: ICD-10-CM

## 2023-08-23 DIAGNOSIS — C61 PROSTATE CANCER METASTATIC TO MULTIPLE SITES: Primary | ICD-10-CM

## 2023-08-23 DIAGNOSIS — C61 PROSTATE CANCER METASTATIC TO MULTIPLE SITES: Primary | Chronic | ICD-10-CM

## 2023-08-23 LAB
ALBUMIN SERPL-MCNC: 4.5 G/DL (ref 3.5–5.2)
ALBUMIN/GLOB SERPL: 2 G/DL
ALP SERPL-CCNC: 94 U/L (ref 39–117)
ALT SERPL-CCNC: 29 U/L (ref 1–41)
AST SERPL-CCNC: 18 U/L (ref 1–40)
BASOPHILS # BLD AUTO: 0.08 10*3/MM3 (ref 0–0.2)
BASOPHILS NFR BLD AUTO: 0.9 % (ref 0–1.5)
BILIRUB SERPL-MCNC: 0.4 MG/DL (ref 0–1.2)
BUN SERPL-MCNC: 40 MG/DL (ref 8–23)
BUN/CREAT SERPL: 22 (ref 7–25)
CALCIUM SERPL-MCNC: 10 MG/DL (ref 8.6–10.5)
CHLORIDE SERPL-SCNC: 107 MMOL/L (ref 98–107)
CO2 SERPL-SCNC: 24.3 MMOL/L (ref 22–29)
CREAT SERPL-MCNC: 1.82 MG/DL (ref 0.76–1.27)
EGFRCR SERPLBLD CKD-EPI 2021: 40 ML/MIN/1.73
EOSINOPHIL # BLD AUTO: 0.22 10*3/MM3 (ref 0–0.4)
EOSINOPHIL NFR BLD AUTO: 2.6 % (ref 0.3–6.2)
ERYTHROCYTE [DISTWIDTH] IN BLOOD BY AUTOMATED COUNT: 13.6 % (ref 12.3–15.4)
GLOBULIN SER CALC-MCNC: 2.3 GM/DL
GLUCOSE SERPL-MCNC: 105 MG/DL (ref 65–99)
HCT VFR BLD AUTO: 38 % (ref 37.5–51)
HGB BLD-MCNC: 12.4 G/DL (ref 13–17.7)
IMM GRANULOCYTES # BLD AUTO: 0.05 10*3/MM3 (ref 0–0.05)
IMM GRANULOCYTES NFR BLD AUTO: 0.6 % (ref 0–0.5)
LYMPHOCYTES # BLD AUTO: 1.8 10*3/MM3 (ref 0.7–3.1)
LYMPHOCYTES NFR BLD AUTO: 20.9 % (ref 19.6–45.3)
MAGNESIUM SERPL-MCNC: 2.3 MG/DL (ref 1.6–2.4)
MCH RBC QN AUTO: 27.9 PG (ref 26.6–33)
MCHC RBC AUTO-ENTMCNC: 32.6 G/DL (ref 31.5–35.7)
MCV RBC AUTO: 85.6 FL (ref 79–97)
MONOCYTES # BLD AUTO: 0.67 10*3/MM3 (ref 0.1–0.9)
MONOCYTES NFR BLD AUTO: 7.8 % (ref 5–12)
NEUTROPHILS # BLD AUTO: 5.79 10*3/MM3 (ref 1.7–7)
NEUTROPHILS NFR BLD AUTO: 67.2 % (ref 42.7–76)
NRBC BLD AUTO-RTO: 0 /100 WBC (ref 0–0.2)
PHOSPHATE SERPL-MCNC: 3.2 MG/DL (ref 2.5–4.5)
PLATELET # BLD AUTO: 251 10*3/MM3 (ref 140–450)
POTASSIUM SERPL-SCNC: 4 MMOL/L (ref 3.5–5.2)
PROT SERPL-MCNC: 6.8 G/DL (ref 6–8.5)
PSA SERPL-MCNC: 1.86 NG/ML (ref 0–4)
RBC # BLD AUTO: 4.44 10*6/MM3 (ref 4.14–5.8)
SODIUM SERPL-SCNC: 142 MMOL/L (ref 136–145)
WBC # BLD AUTO: 8.61 10*3/MM3 (ref 3.4–10.8)
WRITTEN AUTHORIZATION: NORMAL

## 2023-08-23 PROCEDURE — 1126F AMNT PAIN NOTED NONE PRSNT: CPT | Performed by: NURSE PRACTITIONER

## 2023-08-23 PROCEDURE — 99214 OFFICE O/P EST MOD 30 MIN: CPT | Performed by: NURSE PRACTITIONER

## 2023-08-23 PROCEDURE — 96372 THER/PROPH/DIAG INJ SC/IM: CPT

## 2023-08-23 PROCEDURE — 1159F MED LIST DOCD IN RCRD: CPT | Performed by: NURSE PRACTITIONER

## 2023-08-23 PROCEDURE — 1160F RVW MEDS BY RX/DR IN RCRD: CPT | Performed by: NURSE PRACTITIONER

## 2023-08-23 PROCEDURE — 25010000002 DENOSUMAB 120 MG/1.7ML SOLUTION: Performed by: NURSE PRACTITIONER

## 2023-08-23 RX ADMIN — DENOSUMAB 120 MG: 120 INJECTION SUBCUTANEOUS at 11:17

## 2023-08-23 NOTE — PROGRESS NOTES
CHIEF COMPLAINT:   Fatigue    Problem List:  Oncology/Hematology History Overview Note   1.  Prostate cancer clinical T1c and 2M1B stage IVb treated with 4 cycles of Taxotere, stopped due to syncope with negative cardiac work-up, with marked drop in PSA of 18.6 from over 900 at baseline, continued on Zytiga and prednisone.  2.  Urinary retention with Goodwin in place 1/24/2022.  On finasteride 1/24/2022.  3.  Covid 19 December 2021  4.  Hypertension    Oncology history timeline:  -11/29/2021  with symptoms of urinary retention.  -2/15/2022 prostate biopsy adenocarcinoma grade group 5 and 3 out of 12 cores, grade group 4 in 1 out of 12 cores, grade group 3 in 8 out of 12 cores.  -2/24/2022 CT chest abdomen pelvis and total body bone scan shows left third rib, right acetabulum, periaortic and retroperitoneal kizzy metastases complaining of pain in the left chest and right hip.  Also possible sclerotic left 10th rib on CT.  Spleen mildly enlarged 13.8 cm.  Hepatic steatosis.  Small liver cyst.  Fat stranding in the periaortic and mesenteric region consistent with reactive/inflammatory stranding.  Multiple enlarged periaortic and retroperitoneal nodes and lymphadenopathy largest 4.9 cm.  CT chest describes tiny sclerotic foci in left eighth rib and right lateral seventh rib and T1.  Multiple small mediastinal and bilateral axillary nodes seen with small hypodense left thyroid nodule.  Creatinine 1.7.  -3/4/2022 office note Dr. Rolando Matute: Patient was seen after his elevated PSA by Dr. Vera and prostate biopsy scheduled.  However, he developed COVID-19 and this was canceled and the patient did not reschedule due to lack of insurance.  Saw Dr. Matute back on 1/24/2022 with plans to get staging CTs and bone scan with results as outlined above.  Started Casodex and to return on 3/11/2022 for Lupron.  Referral made to medical oncology for other additional castrate na‹ve prostate cancer therapies.  TURP planned  for urinary retention symptoms.    -3/15/2022 [facility], [place], operated by [facility] medical oncology initial consultation: I reviewed the above data with the patient.  With his fairly bulky retroperitoneal adenopathy and bone metastases including extra axial metastases, he would fit the data from the CHAARTED trial for which Taxotere x6 followed by Zytiga prednisone along with the planned Lupron already being planned by Dr. Matute would be reasonable.  Equally reasonable in terms of comparisons with bicalutamide and Lupron would be Zytiga prednisone plus Lupron or Xtandi plus Lupron or apalutamide plus Lupron.  None of these have been compared head-to-head but all have beaten combined androgen deprivation therapy with bicalutamide and Lupron.  At the age of 67 and in order to have as many bullets as possible down the road and hence saving some of the hormone blockade options for later and using chemotherapy when he is younger and healthier for a more time-limited.,  He has opted for the Taxotere x6 followed by Zytiga and prednisone.  We will also give him Xgeva to improve bone health and given metastatic disease, as with all metastatic prostate cancer patients, he needs genetic counseling both for his sake as well as his family's.  If he were to have BRCA1 or other such  mutations, then that also opens up the options for PARP inhibitors etc. down the road.  He has his TURP scheduled for 2/24/2022 and we will start treatment a couple of weeks after that and he will get chemo preparation visit in the meantime and I will get capital surgeons to place a port.  We will check his PSA after his TURP when he sees my nurse practitioner back early April for the start of chemotherapy and repeat the PSA and CT chest abdomen pelvis and bone scan after the Taxotere is complete.    -3/31/2022 chemotherapy education and needs assessment completed    -4/7/2022 began docetaxel and Xgeva.  .0.  We will do his Xgeva every 6 weeks to coordinate with  his docetaxel infusions.    -4/19/2022 patient stopped Casodex    -5/17/2022 patient reported seeing his PCP for an abscess in his suprapubic area.  Placed on clindamycin, will delay treatment 1 week.    -5/25/2022 PSA 34.3  -5/26/2022 [facility] Oncology clinic follow-up: Chris has an abscess in the suprapubic area, it has improved on antibiotic therapy but I am concerned if we treat him it will just flare back up unless it is drained.  I will get him to Dr. Miller who placed his port for I&D and culture.  He is on clindamycin and states he completes course of treatment tomorrow.  I will delay his treatment with Taxotere 1 more week.  We will give his Xgeva today.  I will see him back in 1 week for follow-up to assess whether or not we can resume his Taxotere.  His PSA has dropped nicely with treatment thus far, PSA yesterday was 34.3 which is down from a PSA of 259.0 when we started treatment, it has been as high as 911 in November.    -6/2/2022 [facility] Oncology clinic follow-up: Chris went to see Dr. Miller to have his abscess lanced however apparently there was a long wait and he left without being seen.  He did call his PCP and was given 5 more days of clindamycin and the abscess now seems to have resolved.  We discussed today that he is at risk for recurrence of infection due to immunocompromise state with chemotherapy.  He will notify us if he has any return of his abscess.  He does have intertrigo under his panniculus, I have advised him to keep this area dry, to apply topical drying/antifungal powders.  Also recommended looser clothing.  His counts are adequate to continue therapy, we will resume Taxotere today with cycle #3.  We will repeat restaging scans after 6 courses.  We are doing Xgeva every 6 weeks to coordinate with his Taxotere.  Once he finishes Taxotere we can then go back to every 4 weeks.  His next Xgeva is not due until 7/14/2022.  His calcium is running a little low, he is going to   calcium supplement.    -6/23/2022 [facility] Oncology clinic follow-up: Chris overall is doing well on treatment with Taxotere.  Labs reviewed from yesterday and are unremarkable.  We will continue treatment today unchanged.  His suprapubic abscess resolved on clindamycin.  He will continue to use drying powder/antifungal powder under his panniculus for intertrigo.  Today will be cycle #4 of a planned 6 courses of Taxotere.  He is also on Xgeva, next dose due 7/14/2022, we are doing this every 6 weeks for now to line up with his chemotherapy, can go back to every 4 weeks after he finishes Taxotere.  Calcium from CMP yesterday was normal.    -7/13/2022 [facility] Oncology clinic follow-up: Mr. Morfin has had issues around day 3 after each treatment ranging from chest pain after his first treatment for which he did not seek medical attention, fatigue after the next few treatments, but 3 days after his most recent treatment on 6/23/2022 he had a syncopal episode at home and once again did not seek medical attention.  I discussed with him that this was not acceptable, if he has occurrences like this someone needs to call 911, it is difficult to figure out what is going on after the fact, the best time to work this up would be during the occurrence.  For now we will get labs today with CBC, CMP, PSA.  I will refer him to cardiology for further evaluation.  We will hold Taxotere most likely, I will see him tomorrow to go over his lab results.  I will also repeat his restaging scans with CT chest, abdomen and pelvis and will include CT of the head in light of his syncopal episode.      -7/13/2022 PSA 18.6    -7/14/2022 [facility] Oncology clinic follow-up: Mr. Morfin returns today to review his labs from yesterday.  Labs are unremarkable.  PSA down to 18.6 from a high of 259.0 in April prior to starting treatment.  CBC and CMP unremarkable, magnesium is normal at 2.3, phosphorus slightly low at 2.4.  We will hold therapy for now in  light of his syncopal episode on day 3 after his last treatment and prior episodes with chest pain and severe fatigue that all occurred on day 3 after his Taxotere.  We will restage him with CT head, chest, abdomen and pelvis (I am including the head in light of his syncopal episode).  I have also referred him to cardiology, he states that he received a call from them about making an appointment however he wanted to talk to us today first.  I emphasized the importance to him of making and keeping that appointment and he states understanding.  I also once again emphasized the requirement to seek emergency medical attention if he has any episodes in the future such as chest pain or syncopal events.  Whether or not we try and complete Taxotere with 2 more courses or move to the next line of therapy will be decided when he returns to discuss with Dr. Goodson and review his restaging scans.    -7/15/2022 saw Dr. Diaz cardiologist.  For syncope he ordered echocardiogram and 48-hour Holter monitor.    -7/15/2022 Holter monitor relatively benign.    -7/22/2022 CT brain with and without contrast negative.  CT chest abdomen and pelvis shows some nonspecific cecal wall thickening.  No progression in the chest.  T1 and ribs stable.  Decrease in multiple retroperitoneal and pelvic nodes.  Slight increase in right acetabular sclerotic lesion.  Persistent but improved circumferential bladder wall thickening.  Total body bone scan shows stable left third rib and no evidence of new bone metastases.    -7/19/2022 ejection fraction 65% with grade 1 diastolic dysfunction.    -7/26/2022 [facility] clinic follow-up: Given presyncopal symptoms occurred 3 days after Taxotere and has not otherwise occurred any other time and his cardiology work-up is fairly unremarkable and his disease is under good control as witnessed by the report above of the CTs of head chest abdomen pelvis and bone scan, I will hold cycle 5 and 6 of Taxotere  and continue 1 now with Zytiga and prednisone.  With reference to the coincidental cecal wall thickening found on CT, we will get him to [facility] surgeons for colonoscopy.  He will see my nurse practitioner back in August for next cycle of Abiraterone prednisone.  He will continue his Lupron with Dr. Matute.  We will continue his Xgeva.  We will repeat his CT chest abdomen pelvis and bone scan again in 3 months.  He will see my nurse practitioner in the interim.    -8/23/2022 [facility] clinic follow-up: No further presyncopal symptoms.  Feeling great other than for hot flashes.  Did commend for now we will continue on with his Zytiga prednisone and he will get his Xgeva today based on labs from 8/10/2022.  He opted out of getting the colonoscopy that I recommended and he will not change his mind.  He will continue getting the Lupron with Dr. Matute and I asked him to give the date of this appointment to my nurse when he sees her back in a month.  We will give his Xgeva today.  We will get CT chest abdomen pelvis and total body bone scan towards the middle to end of October.  Presently other than for the hot flashes feels great.    -9/20/2022 PSA 8.320    -9/22/2022 [facility] clinic follow-up: Chris is doing well on Zytiga, prednisone along with Xgeva.  Labs reviewed from 9/20/2022 are unremarkable, CBC was normal, CMP with creatinine of 1.34 otherwise normal, this is stable from his baseline.  PSA stable at 8.320.  He received Lupron recently with Dr. Matute, he thinks last week or so, states his next appointment is in February.  He will continue treatment unchanged.  We will repeat restaging CT chest, abdomen and pelvis and total body bone scan in October prior to return and I have ordered those today.  We will also repeat his PSA.    -9/22/2022 CT chest abdomen pelvisCompared to July 2022 [facility] showed no evidence of disease progression in the chest with no substantial sclerotic bony  "lesions and decrease in size of retroperitoneal and pelvic nodes with persistent cecal wall thickening and suboptimal bladder distention.  Total body bone scan showed decrease in previously seen uptake in the left third rib with diffuse sclerosis representing treatment response with no new foci.    -10/18/2022 [facility] medical oncology follow-up: I reviewed bone scan and CT reports as above with no evidence of progression.  Tolerating Zytiga prednisone and Xgeva.  Getting Lupron regularly with Dr. Matute next appointment coming in February.  We will repeat his CT chest abdomen pelvis and total body bone scan in April.  He will follow with my nurse practitioner in the interim.    -1/10/2023 PSA 3.450  -1/11/2023 [facility] Oncology clinic follow-up: Mr. Morfin continues to do well on current therapy with Zytiga, prednisone and Xgeva with no unusual side effects.  He has no new worrisome symptoms.  He is due for his next Lupron injection in February with Dr. Matute.  His PSA continues to trend downward, current PSA from yesterday was 3.450.  We will continue therapy unchanged, he will receive Xgeva today.  Has had no hypocalcemia, his CMP, phosphorus and magnesium are all normal.  We will repeat restaging scans in April.  -3/7/2023 PSA 2.460  -3/8/2023 [facility] Oncology clinic follow-up:  Mr. Morfin is doing well.  He is tolerating therapy with Zytiga, prednisone and Xgeva with no significant side effects.  He has hot flashes but these are tolerable.  He had Lupron injection 2/24/2023 with Dr. Matute.  His PSA continues to decline, current PSA 2.460.  He will continue therapy unchanged.  We will repeat restaging scans late April prior to return in 2 months and I have ordered those today.  He is working with his PCP Rodrigo Ram on his hypertension.  His b/p today was 160/88.  He was to have increased his losartan but I do not think he has done that yet as he said \"I still have some of my old prescription " "left\".  -4/4/2023 PSA 2.55  - 4/26/2023 CT chest abdomen pelvis [place] comparison 10/10/2022 showed stable left third rib, T1, left fourth and eighth rib, right seventh rib.  Probable liver cyst.  Few diverticuli.  Mild further decrease in a few of the previously enlarged nodes.  Left external iliac 12 mm compared to 16 mm.  Right common iliac 7 mm stable.  Lower left periaortic 8 mm previously 13 mm.  No new lytic or blastic osseous lesions.  Total body bone scan comparison 10/10/2022, 7/22/2022, and CT 4/26/2023.  No new sites of increased uptake.  1 focal left third rib hotspot consistent with bone metastasis.    -5/2/2023 [facility] oncology follow-up: I reviewed interval notes since I last saw him from my nurse practitioner as outlined above in interval images and reports thereof from [facility] that shows no new sites of bone metastasis and no kizzy or visceral progression.  PSA stabilized around 2.5.  In the absence of symptomatic or radiographic progression, I would be unlikely to change therapy just on the basis of a PSA rise and for now the PSA is stable.  We will continue Zytiga prednisone and Xgeva and he will follow-up with my nurse practitioner 5/30/2023 with repeat CTs and bone scan in 6 months.  I asked him to check with his primary care today regarding his systolic in the 170s.    -5/31/2023 [facility] follow-up: Chris continues to do well on current therapy with Zytiga, prednisone and Xgeva along with Lupron that he receives with Dr. Matute.  His PSA remains stable, it was lower this month compared to last month, current PSA 1.940.  Continues to deal with hypertension, on arrival today his blood pressure was 210/92 however this was using a wrist cuff, when I rechecked it manually his blood pressure was 160/90.  Still has room for improvement despite recent increase in his losartan to 100 mg daily.  He will follow-up with Dr. Ram for further management.  We will " continue therapy unchanged.  We will repeat restaging scans in October.    -7/26/2023 [facility] Oncology clinic follow-up: Chris overall continues to do well on current therapy with Zytiga, prednisone and Xgeva.  He receives Lupron every 6 months with Dr. Matute and states that is scheduled for August.  PSA from yesterday is pending, PSA on 6/27/2023 was 1.590 which was continuing to decline, in February it was 3.250.  His creatinine this past month has bumped up.  He feels he is staying hydrated but I encouraged him to increase his fluid intake.  I discussed with him that this could be from his hypertension, some of his medications.  His blood pressure today is 165/92.  He has an appointment with his primary care provider Dr. Teri Ram next week and can further discuss with her.  No adjustment needed for Xgeva.  I refilled his prednisone today.  I will see him back in 1 month for follow-up with continued monitoring of his labs.  He may end up needing to see nephrology.  We will repeat restaging scans in October.     Prostate cancer metastatic to multiple sites   3/15/2022 Initial Diagnosis    Prostate cancer metastatic to multiple sites (HCC)     3/15/2022 Cancer Staged    Staging form: Prostate, AJCC 8th Edition  - Clinical: Stage IVB (cT1c, cN1, cM1b, Grade Group: 5) - Signed by Fritz Goodson MD on 3/15/2022     4/7/2022 - 6/23/2022 Chemotherapy    Stopped after cycle 4 6/23/2022 due to syncope 3 days post infusions with negative cardiac work-up  OP PROSTATE DOCEtaxel     4/7/2022 -  Chemotherapy    OP SUPPORTIVE Denosumab (Xgeva) Q28D       7/26/2022 -  Chemotherapy    OP PROSTATE Abiraterone / PredniSONE           HISTORY OF PRESENT ILLNESS:  The patient is a 68 y.o. male, here for follow up on management of metastatic prostate cancer stable on Zytiga and prednisone Xgeva along with Lupron.  He received Lupron recently with Dr. Matute on 8/7/2023.  He reports that his last few urinalysis has shown  "microscopic hematuria, he is scheduled for cystoscopy in a few weeks.  He reports ongoing fatigue.  He has seen his primary care provider and they did decrease one of his antihypertensives and he states that he has felt a little better since then.  Blood pressure today 161/86.  He denies any new pain.  No change in his bowel or bladder habits.  Appetite is good, weight stable.      Past Medical History:   Diagnosis Date    Cancer     Prostate cancer      Past Surgical History:   Procedure Laterality Date    PROSTATE BIOPSY  02/2022    dr. sue       No Known Allergies    Family History and Social History reviewed and changed as necessary    REVIEW OF SYSTEM:   Mild fatigue    PHYSICAL EXAM:  Well-developed, well-nourished male in no distress  Lungs clear to auscultation bilaterally, respirations regular nonlabored  Heart regular rate and rhythm    Vitals:    08/23/23 1028   BP: 161/86   Pulse: 76   Resp: 18   Temp: 99.2 øF (37.3 øC)   SpO2: 95%   Weight: 129 kg (284 lb)   Height: 180.3 cm (71\")     Vitals:    08/23/23 1028   PainSc: 0-No pain          ECOG score: 0           Vitals reviewed.  Labs reviewed    ECOG: (0) Fully Active - Able to Carry On All Pre-disease Performance Without Restriction    Lab Results   Component Value Date    HGB 12.4 (L) 08/22/2023    HCT 38.0 08/22/2023    MCV 85.6 08/22/2023     08/22/2023    WBC 8.61 08/22/2023    NEUTROABS 5.79 08/22/2023    LYMPHSABS 1.80 08/22/2023    MONOSABS 0.67 08/22/2023    EOSABS 0.22 08/22/2023    BASOSABS 0.08 08/22/2023       Lab Results   Component Value Date    GLUCOSE 105 (H) 08/22/2023    BUN 40 (H) 08/22/2023    CREATININE 1.82 (H) 08/22/2023     08/22/2023    K 4.0 08/22/2023     08/22/2023    CO2 24.3 08/22/2023    CALCIUM 10.0 08/22/2023    ALBUMIN 4.5 08/22/2023    BILITOT 0.4 08/22/2023    ALKPHOS 94 08/22/2023    AST 18 08/22/2023    ALT 29 08/22/2023     Lab Results   Component Value Date    PSA 2.140 07/25/2023    PSA " 1.590 06/27/2023    PSA 1.940 05/30/2023    PSA 2.170 05/02/2023    PSA 2.550 04/04/2023    PSA 2.460 03/07/2023             ASSESSMENT & PLAN:  1.  Prostate cancer clinical T1c and 2M1B stage IVb treated with 4 cycles of Taxotere, stopped due to syncope with negative cardiac work-up, with marked drop in PSA of 18.6 from over 900 at baseline, continued on Zytiga and prednisone.  2.  Urinary retention with Goodwin in place 1/24/2022.  On finasteride 1/24/2022.  3.  Covid 19 December 2021  4.  Hypertension    Oncology history timeline:  -11/29/2021  with symptoms of urinary retention.  -2/15/2022 prostate biopsy adenocarcinoma grade group 5 and 3 out of 12 cores, grade group 4 in 1 out of 12 cores, grade group 3 in 8 out of 12 cores.  -2/24/2022 CT chest abdomen pelvis and total body bone scan shows left third rib, right acetabulum, periaortic and retroperitoneal kizzy metastases complaining of pain in the left chest and right hip.  Also possible sclerotic left 10th rib on CT.  Spleen mildly enlarged 13.8 cm.  Hepatic steatosis.  Small liver cyst.  Fat stranding in the periaortic and mesenteric region consistent with reactive/inflammatory stranding.  Multiple enlarged periaortic and retroperitoneal nodes and lymphadenopathy largest 4.9 cm.  CT chest describes tiny sclerotic foci in left eighth rib and right lateral seventh rib and T1.  Multiple small mediastinal and bilateral axillary nodes seen with small hypodense left thyroid nodule.  Creatinine 1.7.  -3/4/2022 office note Dr. Rolando Matute: Patient was seen after his elevated PSA by Dr. Vera and prostate biopsy scheduled.  However, he developed COVID-19 and this was canceled and the patient did not reschedule due to lack of insurance.  Saw Dr. Matute back on 1/24/2022 with plans to get staging CTs and bone scan with results as outlined above.  Started Casodex and to return on 3/11/2022 for Lupron.  Referral made to medical oncology for other additional  castrate na‹ve prostate cancer therapies.  TURP planned for urinary retention symptoms.    -3/15/2022 [facility] medical oncology initial consultation: I reviewed the above data with the patient.  With his fairly bulky retroperitoneal adenopathy and bone metastases including extra axial metastases, he would fit the data from the CHAARTED trial for which Taxotere x6 followed by Zytiga prednisone along with the planned Lupron already being planned by Dr. Matute would be reasonable.  Equally reasonable in terms of comparisons with bicalutamide and Lupron would be Zytiga prednisone plus Lupron or Xtandi plus Lupron or apalutamide plus Lupron.  None of these have been compared head-to-head but all have beaten combined androgen deprivation therapy with bicalutamide and Lupron.  At the age of 67 and in order to have as many bullets as possible down the road and hence saving some of the hormone blockade options for later and using chemotherapy when he is younger and healthier for a more time-limited.,  He has opted for the Taxotere x6 followed by Zytiga and prednisone.  We will also give him Xgeva to improve bone health and given metastatic disease, as with all metastatic prostate cancer patients, he needs genetic counseling both for his sake as well as his family's.  If he were to have BRCA1 or other such  mutations, then that also opens up the options for PARP inhibitors etc. down the road.  He has his TURP scheduled for 2/24/2022 and we will start treatment a couple of weeks after that and he will get chemo preparation visit in the meantime and I will get capital surgeons to place a port.  We will check his PSA after his TURP when he sees my nurse practitioner back early April for the start of chemotherapy and repeat the PSA and CT chest abdomen pelvis and bone scan after the Taxotere is complete.    -3/31/2022 chemotherapy education and needs assessment completed    -4/7/2022 began docetaxel and Xgeva.  .0.   We will do his Xgeva every 6 weeks to coordinate with his docetaxel infusions.    -4/19/2022 patient stopped Casodex    -5/17/2022 patient reported seeing his PCP for an abscess in his suprapubic area.  Placed on clindamycin, will delay treatment 1 week.    -5/25/2022 PSA 34.3  -5/26/2022 [facility] Oncology clinic follow-up: Chris has an abscess in the suprapubic area, it has improved on antibiotic therapy but I am concerned if we treat him it will just flare back up unless it is drained.  I will get him to Dr. Miller who placed his port for I&D and culture.  He is on clindamycin and states he completes course of treatment tomorrow.  I will delay his treatment with Taxotere 1 more week.  We will give his Xgeva today.  I will see him back in 1 week for follow-up to assess whether or not we can resume his Taxotere.  His PSA has dropped nicely with treatment thus far, PSA yesterday was 34.3 which is down from a PSA of 259.0 when we started treatment, it has been as high as 911 in November.    -6/2/2022 [facility] Oncology clinic follow-up: Chris went to see Dr. Miller to have his abscess lanced however apparently there was a long wait and he left without being seen.  He did call his PCP and was given 5 more days of clindamycin and the abscess now seems to have resolved.  We discussed today that he is at risk for recurrence of infection due to immunocompromise state with chemotherapy.  He will notify us if he has any return of his abscess.  He does have intertrigo under his panniculus, I have advised him to keep this area dry, to apply topical drying/antifungal powders.  Also recommended looser clothing.  His counts are adequate to continue therapy, we will resume Taxotere today with cycle #3.  We will repeat restaging scans after 6 courses.  We are doing Xgeva every 6 weeks to coordinate with his Taxotere.  Once he finishes Taxotere we can then go back to every 4 weeks.  His next Xgeva is not due until 7/14/2022.  His  calcium is running a little low, he is going to  calcium supplement.    -6/23/2022 [facility] clinic follow-up: Chris overall is doing well on treatment with Taxotere.  Labs reviewed from yesterday and are unremarkable.  We will continue treatment today unchanged.  His suprapubic abscess resolved on clindamycin.  He will continue to use drying powder/antifungal powder under his panniculus for intertrigo.  Today will be cycle #4 of a planned 6 courses of Taxotere.  He is also on Xgeva, next dose due 7/14/2022, we are doing this every 6 weeks for now to line up with his chemotherapy, can go back to every 4 weeks after he finishes Taxotere.  Calcium from CMP yesterday was normal.    -7/13/2022 [facility] clinic follow-up: Mr. Morfin has had issues around day 3 after each treatment ranging from chest pain after his first treatment for which he did not seek medical attention, fatigue after the next few treatments, but 3 days after his most recent treatment on 6/23/2022 he had a syncopal episode at home and once again did not seek medical attention.  I discussed with him that this was not acceptable, if he has occurrences like this someone needs to call 911, it is difficult to figure out what is going on after the fact, the best time to work this up would be during the occurrence.  For now we will get labs today with CBC, CMP, PSA.  I will refer him to cardiology for further evaluation.  We will hold Taxotere most likely, I will see him tomorrow to go over his lab results.  I will also repeat his restaging scans with CT chest, abdomen and pelvis and will include CT of the head in light of his syncopal episode.      -7/13/2022 PSA 18.6    -7/14/2022 [facility] clinic follow-up: Mr. Morfin returns today to review his labs from yesterday.  Labs are unremarkable.  PSA down to 18.6 from a high of 259.0 in April prior to starting treatment.  CBC and CMP unremarkable, magnesium is normal at 2.3,  phosphorus slightly low at 2.4.  We will hold therapy for now in light of his syncopal episode on day 3 after his last treatment and prior episodes with chest pain and severe fatigue that all occurred on day 3 after his Taxotere.  We will restage him with CT head, chest, abdomen and pelvis (I am including the head in light of his syncopal episode).  I have also referred him to cardiology, he states that he received a call from them about making an appointment however he wanted to talk to us today first.  I emphasized the importance to him of making and keeping that appointment and he states understanding.  I also once again emphasized the requirement to seek emergency medical attention if he has any episodes in the future such as chest pain or syncopal events.  Whether or not we try and complete Taxotere with 2 more courses or move to the next line of therapy will be decided when he returns to discuss with Dr. Goodson and review his restaging scans.    -7/15/2022 saw Dr. Diaz cardiologist.  For syncope he ordered echocardiogram and 48-hour Holter monitor.    -7/15/2022 Holter monitor relatively benign.    -7/22/2022 CT brain with and without contrast negative.  CT chest abdomen and pelvis shows some nonspecific cecal wall thickening.  No progression in the chest.  T1 and ribs stable.  Decrease in multiple retroperitoneal and pelvic nodes.  Slight increase in right acetabular sclerotic lesion.  Persistent but improved circumferential bladder wall thickening.  Total body bone scan shows stable left third rib and no evidence of new bone metastases.    -7/19/2022 ejection fraction 65% with grade 1 diastolic dysfunction.    -7/26/2022 [facility] clinic follow-up: Given presyncopal symptoms occurred 3 days after Taxotere and has not otherwise occurred any other time and his cardiology work-up is fairly unremarkable and his disease is under good control as witnessed by the report above of the CTs of head chest  abdomen pelvis and bone scan, I will hold cycle 5 and 6 of Taxotere and continue 1 now with Zytiga and prednisone.  With reference to the coincidental cecal wall thickening found on CT, we will get him to [facility] surgeons for colonoscopy.  He will see my nurse practitioner back in August for next cycle of Abiraterone prednisone.  He will continue his Lupron with Dr. Matute.  We will continue his Xgeva.  We will repeat his CT chest abdomen pelvis and bone scan again in 3 months.  He will see my nurse practitioner in the interim.    -8/23/2022 [facility]ology clinic follow-up: No further presyncopal symptoms.  Feeling great other than for hot flashes.  Did commend for now we will continue on with his Zytiga prednisone and he will get his Xgeva today based on labs from 8/10/2022.  He opted out of getting the colonoscopy that I recommended and he will not change his mind.  He will continue getting the Lupron with Dr. Matute and I asked him to give the date of this appointment to my nurse when he sees her back in a month.  We will give his Xgeva today.  We will get CT chest abdomen pelvis and total body bone scan towards the middle to end of October.  Presently other than for the hot flashes feels great.    -9/20/2022 PSA 8.320    -9/22/2022 [facility] clinic follow-up: Chris is doing well on Zytiga, prednisone along with Xgeva.  Labs reviewed from 9/20/2022 are unremarkable, CBC was normal, CMP with creatinine of 1.34 otherwise normal, this is stable from his baseline.  PSA stable at 8.320.  He received Lupron recently with Dr. Matute, he thinks last week or so, states his next appointment is in February.  He will continue treatment unchanged.  We will repeat restaging CT chest, abdomen and pelvis and total body bone scan in October prior to return and I have ordered those today.  We will also repeat his PSA.    -9/22/2022 CT chest abdomen pelvisCompared to July 2022 [facility] showed no evidence of  disease progression in the chest with no substantial sclerotic bony lesions and decrease in size of retroperitoneal and pelvic nodes with persistent cecal wall thickening and suboptimal bladder distention.  Total body bone scan showed decrease in previously seen uptake in the left third rib with diffuse sclerosis representing treatment response with no new foci.    -10/18/2022 [facility] medical oncology follow-up: I reviewed bone scan and CT reports as above with no evidence of progression.  Tolerating Zytiga prednisone and Xgeva.  Getting Lupron regularly with Dr. Matute next appointment coming in February.  We will repeat his CT chest abdomen pelvis and total body bone scan in April.  He will follow with my nurse practitioner in the interim.    -1/10/2023 PSA 3.450  -1/11/2023 [facility] Oncology clinic follow-up: Mr. Morfin continues to do well on current therapy with Zytiga, prednisone and Xgeva with no unusual side effects.  He has no new worrisome symptoms.  He is due for his next Lupron injection in February with Dr. Matute.  His PSA continues to trend downward, current PSA from yesterday was 3.450.  We will continue therapy unchanged, he will receive Xgeva today.  Has had no hypocalcemia, his CMP, phosphorus and magnesium are all normal.  We will repeat restaging scans in April.  -3/7/2023 PSA 2.460  -3/8/2023 [facility] Oncology clinic follow-up:  Mr. Morfin is doing well.  He is tolerating therapy with Zytiga, prednisone and Xgeva with no significant side effects.  He has hot flashes but these are tolerable.  He had Lupron injection 2/24/2023 with Dr. Matute.  His PSA continues to decline, current PSA 2.460.  He will continue therapy unchanged.  We will repeat restaging scans late April prior to return in 2 months and I have ordered those today.  He is working with his PCP Rodrigo Ram on his hypertension.  His b/p today was 160/88.  He was to have increased his losartan but I do not think he has done that yet  "as he said \"I still have some of my old prescription left\".  -4/4/2023 PSA 2.55  - 4/26/2023 CT chest abdomen pelvis [place] comparison 10/10/2022 showed stable left third rib, T1, left fourth and eighth rib, right seventh rib.  Probable liver cyst.  Few diverticuli.  Mild further decrease in a few of the previously enlarged nodes.  Left external iliac 12 mm compared to 16 mm.  Right common iliac 7 mm stable.  Lower left periaortic 8 mm previously 13 mm.  No new lytic or blastic osseous lesions.  Total body bone scan comparison 10/10/2022, 7/22/2022, and CT 4/26/2023.  No new sites of increased uptake.  1 focal left third rib hotspot consistent with bone metastasis.    -5/2/2023 [facility] oncology follow-up: I reviewed interval notes since I last saw him from my nurse practitioner as outlined above in interval images and reports thereof from [facility] that shows no new sites of bone metastasis and no kizzy or visceral progression.  PSA stabilized around 2.5.  In the absence of symptomatic or radiographic progression, I would be unlikely to change therapy just on the basis of a PSA rise and for now the PSA is stable.  We will continue Zytiga prednisone and Xgeva and he will follow-up with my nurse practitioner 5/30/2023 with repeat CTs and bone scan in 6 months.  I asked him to check with his primary care today regarding his systolic in the 170s.    -5/31/2023 [facility] clinic follow-up: Chris continues to do well on current therapy with Zytiga, prednisone and Xgeva along with Lupron that he receives with Dr. Matute.  His PSA remains stable, it was lower this month compared to last month, current PSA 1.940.  Continues to deal with hypertension, on arrival today his blood pressure was 210/92 however this was using a wrist cuff, when I rechecked it manually his blood pressure was 160/90.  Still has room for improvement despite recent increase in his losartan to 100 mg daily.  He will follow-up " with Dr. Ram for further management.  We will continue therapy unchanged.  We will repeat restaging scans in October.  For his seasonal allergies I discussed with him that he could take over-the-counter generic Zyrtec or Claritin.    -7/26/2023 [facility] [place] Oncology clinic follow-up: Chris overall continues to do well on current therapy with Zytiga, prednisone and Xgeva.  He receives Lupron every 6 months with Dr. Matute and states that is scheduled for August.  PSA from yesterday is pending, PSA on 6/27/2023 was 1.590 which was continuing to decline, in February it was 3.250.  His creatinine this past month has bumped up.  He feels he is staying hydrated but I encouraged him to increase his fluid intake.  I discussed with him that this could be from his hypertension, some of his medications.  His blood pressure today is 165/92.  He has an appointment with his primary care provider Dr. Teri Ram next week and can further discuss with her.  No adjustment needed for Xgeva.  I refilled his prednisone today.  I will see him back in 1 month for follow-up with continued monitoring of his labs.  He may end up needing to see nephrology.  We will repeat restaging scans in October.    -8/23/2023 [facility] [place] Oncology clinic follow-up: Chris continues on therapy with Zytiga, prednisone and Xgeva, he also receives Lupron every 6 months with Dr. Matute with most recent given on 8/7/2023.  He is scheduled for a cystoscopy in a few weeks as his last few UAs per the patient's report had microscopic hematuria, he has had no steve hematuria.  His creatinine continues to remain elevated at 1.82, BUN is 40, GFR is 40.  I will get him to nephrology for further evaluation.  Blood pressure today was fairly good at 161/86, he continues to follow with Dr. Ram for management.  PSA on 7/25/2023 was up slightly from prior month, PSA in July was 2.140, in June it was 1.590, PSA from yesterday is pending.  I plan on repeating restaging  CT scans and bone scans in October however if his PSA is up significantly then I will do sooner.  I will see him back for follow-up in 1 month.    Return to clinic in 1 month for follow-up    This was a level 4, moderate MDM visit with management of side effects of therapy, review of labs, referral to nephrology, management of drug therapy requiring intensive monitoring for toxicity.    Susana Torres, APRN    08/23/2023

## 2023-08-30 ENCOUNTER — TELEPHONE (OUTPATIENT)
Dept: ONCOLOGY | Facility: CLINIC | Age: 68
End: 2023-08-30

## 2023-08-30 NOTE — TELEPHONE ENCOUNTER
Caller: Chris Morfin    Relationship: Self    Best call back number: [phone]     Who are you requesting to speak with (clinical staff, provider,  specific staff member): OUTGOING REFERRALS     What was the call regarding: PATIENT HAS NOT HEARD FROM NEPHROLOGY REFERRAL AT THIS TIME

## 2023-09-11 ENCOUNTER — SPECIALTY PHARMACY (OUTPATIENT)
Dept: ONCOLOGY | Facility: HOSPITAL | Age: 68
End: 2023-09-11
Payer: MEDICARE

## 2023-09-11 NOTE — PROGRESS NOTES
Specialty Pharmacy Refill Coordination Note     Chris is a 68 y.o. male contacted today regarding refills of  abiraterone 1000mg PO QD specialty medication(s).    Patient requested medication be shipped out on 9/18/23 for delivery on 9/19/23.    Specialty medication(s) and dose(s) confirmed: yes    Refill Questions      Flowsheet Row Most Recent Value   Changes to allergies? No   Changes to medications? No   New conditions since last clinic visit No   Unplanned office visit, urgent care, ED, or hospital admission in the last 4 weeks  No   How does patient/caregiver feel medication is working? Very good   Financial problems or insurance changes  No   Since the previous refill, were any specialty medication doses or scheduled injections missed or delayed?  No   Does this patient require a clinical escalation to a pharmacist? No            Delivery Questions      Flowsheet Row Most Recent Value   Delivery method [employer]   Delivery address correct? Yes   Delivery phone number [phone]   Preferred delivery time? Anytime   Number of medications in delivery 1   Medication being filled and delivered abiraterone   Doses left of specialty medications 10 days left   Is there any medication that is due not being filled? No   Supplies needed? No supplies needed   Cooler needed? No   Do any medications need mixed or dated? No   Additional comments no copay   Questions or concerns for the pharmacist? No   Explain any questions or concerns for the pharmacist n/a   Are any medications first time fills? No                   Follow-up: 28 day(s)     Catalina Montes De Oca, Pharmacy Technician  Specialty Pharmacy Technician

## 2023-09-14 ENCOUNTER — TELEPHONE (OUTPATIENT)
Dept: ONCOLOGY | Facility: CLINIC | Age: 68
End: 2023-09-14
Payer: MEDICARE

## 2023-09-14 NOTE — TELEPHONE ENCOUNTER
Caller: Chris Morfin    Relationship: Self    Best call back number: [phone]     Who are you requesting to speak with (clinical staff, provider,  specific staff member): OUTGOING REFERRALS       What was the call regarding: PATIENT HAS NOT HEARD BACK FROM NEPHROLOGY REFERRAL

## 2023-09-14 NOTE — TELEPHONE ENCOUNTER
I CALLED PT AGAIN TO LET HIM KNOW THAT REF OFFICE IS STILL WORKING ON REF. AND AS SOON AS WE KNOW SOMETHING WE WILL CALL HIM

## 2023-09-19 ENCOUNTER — LAB (OUTPATIENT)
Dept: ONCOLOGY | Facility: HOSPITAL | Age: 68
End: 2023-09-19
Payer: MEDICARE

## 2023-09-19 VITALS
BODY MASS INDEX: 39.33 KG/M2 | DIASTOLIC BLOOD PRESSURE: 89 MMHG | HEART RATE: 69 BPM | SYSTOLIC BLOOD PRESSURE: 128 MMHG | TEMPERATURE: 98.5 F | RESPIRATION RATE: 16 BRPM | WEIGHT: 282 LBS

## 2023-09-19 DIAGNOSIS — C61 PROSTATE CANCER METASTATIC TO MULTIPLE SITES: Chronic | ICD-10-CM

## 2023-09-19 DIAGNOSIS — C61 PROSTATE CANCER METASTATIC TO MULTIPLE SITES: Primary | ICD-10-CM

## 2023-09-19 PROCEDURE — 36415 COLL VENOUS BLD VENIPUNCTURE: CPT

## 2023-09-20 ENCOUNTER — INFUSION (OUTPATIENT)
Dept: ONCOLOGY | Facility: HOSPITAL | Age: 68
End: 2023-09-20
Payer: MEDICARE

## 2023-09-20 ENCOUNTER — OFFICE VISIT (OUTPATIENT)
Dept: ONCOLOGY | Facility: CLINIC | Age: 68
End: 2023-09-20
Payer: MEDICARE

## 2023-09-20 ENCOUNTER — SPECIALTY PHARMACY (OUTPATIENT)
Dept: ONCOLOGY | Facility: HOSPITAL | Age: 68
End: 2023-09-20
Payer: MEDICARE

## 2023-09-20 VITALS
OXYGEN SATURATION: 97 % | BODY MASS INDEX: 39.48 KG/M2 | DIASTOLIC BLOOD PRESSURE: 90 MMHG | RESPIRATION RATE: 18 BRPM | TEMPERATURE: 99.3 F | HEIGHT: 71 IN | SYSTOLIC BLOOD PRESSURE: 159 MMHG | HEART RATE: 70 BPM | WEIGHT: 282 LBS

## 2023-09-20 DIAGNOSIS — C61 PROSTATE CANCER METASTATIC TO MULTIPLE SITES: Primary | ICD-10-CM

## 2023-09-20 DIAGNOSIS — C61 PROSTATE CANCER METASTATIC TO MULTIPLE SITES: Primary | Chronic | ICD-10-CM

## 2023-09-20 LAB
ALBUMIN SERPL-MCNC: 4.5 G/DL (ref 3.5–5.2)
ALBUMIN/GLOB SERPL: 1.9 G/DL
ALP SERPL-CCNC: 91 U/L (ref 39–117)
ALT SERPL-CCNC: 36 U/L (ref 1–41)
AST SERPL-CCNC: 23 U/L (ref 1–40)
BASOPHILS # BLD AUTO: 0.07 10*3/MM3 (ref 0–0.2)
BASOPHILS NFR BLD AUTO: 0.9 % (ref 0–1.5)
BILIRUB SERPL-MCNC: 0.2 MG/DL (ref 0–1.2)
BUN SERPL-MCNC: 34 MG/DL (ref 8–23)
BUN/CREAT SERPL: 22.5 (ref 7–25)
CALCIUM SERPL-MCNC: 10 MG/DL (ref 8.6–10.5)
CHLORIDE SERPL-SCNC: 107 MMOL/L (ref 98–107)
CO2 SERPL-SCNC: 25.7 MMOL/L (ref 22–29)
CREAT SERPL-MCNC: 1.51 MG/DL (ref 0.76–1.27)
EGFRCR SERPLBLD CKD-EPI 2021: 50 ML/MIN/1.73
EOSINOPHIL # BLD AUTO: 0.22 10*3/MM3 (ref 0–0.4)
EOSINOPHIL NFR BLD AUTO: 2.8 % (ref 0.3–6.2)
ERYTHROCYTE [DISTWIDTH] IN BLOOD BY AUTOMATED COUNT: 13.9 % (ref 12.3–15.4)
GLOBULIN SER CALC-MCNC: 2.4 GM/DL
GLUCOSE SERPL-MCNC: 101 MG/DL (ref 65–99)
HCT VFR BLD AUTO: 40.6 % (ref 37.5–51)
HGB BLD-MCNC: 13.3 G/DL (ref 13–17.7)
IMM GRANULOCYTES # BLD AUTO: 0.04 10*3/MM3 (ref 0–0.05)
IMM GRANULOCYTES NFR BLD AUTO: 0.5 % (ref 0–0.5)
LYMPHOCYTES # BLD AUTO: 1.7 10*3/MM3 (ref 0.7–3.1)
LYMPHOCYTES NFR BLD AUTO: 21.5 % (ref 19.6–45.3)
MAGNESIUM SERPL-MCNC: 2.2 MG/DL (ref 1.6–2.4)
MCH RBC QN AUTO: 28.7 PG (ref 26.6–33)
MCHC RBC AUTO-ENTMCNC: 32.8 G/DL (ref 31.5–35.7)
MCV RBC AUTO: 87.5 FL (ref 79–97)
MONOCYTES # BLD AUTO: 0.57 10*3/MM3 (ref 0.1–0.9)
MONOCYTES NFR BLD AUTO: 7.2 % (ref 5–12)
NEUTROPHILS # BLD AUTO: 5.29 10*3/MM3 (ref 1.7–7)
NEUTROPHILS NFR BLD AUTO: 67.1 % (ref 42.7–76)
NRBC BLD AUTO-RTO: 0 /100 WBC (ref 0–0.2)
PHOSPHATE SERPL-MCNC: 2.9 MG/DL (ref 2.5–4.5)
PLATELET # BLD AUTO: 233 10*3/MM3 (ref 140–450)
POTASSIUM SERPL-SCNC: 4.4 MMOL/L (ref 3.5–5.2)
PROT SERPL-MCNC: 6.9 G/DL (ref 6–8.5)
PSA SERPL-MCNC: 1.81 NG/ML (ref 0–4)
RBC # BLD AUTO: 4.64 10*6/MM3 (ref 4.14–5.8)
SODIUM SERPL-SCNC: 142 MMOL/L (ref 136–145)
WBC # BLD AUTO: 7.89 10*3/MM3 (ref 3.4–10.8)

## 2023-09-20 PROCEDURE — 1126F AMNT PAIN NOTED NONE PRSNT: CPT | Performed by: NURSE PRACTITIONER

## 2023-09-20 PROCEDURE — 99214 OFFICE O/P EST MOD 30 MIN: CPT | Performed by: NURSE PRACTITIONER

## 2023-09-20 PROCEDURE — 25010000002 DENOSUMAB 120 MG/1.7ML SOLUTION: Performed by: NURSE PRACTITIONER

## 2023-09-20 PROCEDURE — 96372 THER/PROPH/DIAG INJ SC/IM: CPT

## 2023-09-20 RX ORDER — AMLODIPINE BESYLATE 5 MG/1
TABLET ORAL
COMMUNITY
Start: 2023-09-18

## 2023-09-20 RX ADMIN — DENOSUMAB 120 MG: 120 INJECTION SUBCUTANEOUS at 09:14

## 2023-09-20 NOTE — PROGRESS NOTES
CHIEF COMPLAINT:  Prostate cancer    Problem List:  Oncology/Hematology History Overview Note   1.  Prostate cancer clinical T1c and 2M1B stage IVb treated with 4 cycles of Taxotere, stopped due to syncope with negative cardiac work-up, with marked drop in PSA of 18.6 from over 900 at baseline, continued on Zytiga and prednisone.  2.  Urinary retention with Goodwin in place 1/24/2022.  On finasteride 1/24/2022.  3.  Covid 19 December 2021  4.  Hypertension    Oncology history timeline:  -11/29/2021  with symptoms of urinary retention.  -2/15/2022 prostate biopsy adenocarcinoma grade group 5 and 3 out of 12 cores, grade group 4 in 1 out of 12 cores, grade group 3 in 8 out of 12 cores.  -2/24/2022 CT chest abdomen pelvis and total body bone scan shows left third rib, right acetabulum, periaortic and retroperitoneal kizzy metastases complaining of pain in the left chest and right hip.  Also possible sclerotic left 10th rib on CT.  Spleen mildly enlarged 13.8 cm.  Hepatic steatosis.  Small liver cyst.  Fat stranding in the periaortic and mesenteric region consistent with reactive/inflammatory stranding.  Multiple enlarged periaortic and retroperitoneal nodes and lymphadenopathy largest 4.9 cm.  CT chest describes tiny sclerotic foci in left eighth rib and right lateral seventh rib and T1.  Multiple small mediastinal and bilateral axillary nodes seen with small hypodense left thyroid nodule.  Creatinine 1.7.  -3/4/2022 office note Dr. Rolando Matute: Patient was seen after his elevated PSA by Dr. Vera and prostate biopsy scheduled.  However, he developed COVID-19 and this was canceled and the patient did not reschedule due to lack of insurance.  Saw Dr. Matute back on 1/24/2022 with plans to get staging CTs and bone scan with results as outlined above.  Started Casodex and to return on 3/11/2022 for Lupron.  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  TURP  planned for urinary retention symptoms.    -3/15/2022 [facility], [place], operated by [facility] medical oncology initial consultation: I reviewed the above data with the patient.  With his fairly bulky retroperitoneal adenopathy and bone metastases including extra axial metastases, he would fit the data from the CHAARTED trial for which Taxotere x6 followed by Zytiga prednisone along with the planned Lupron already being planned by Dr. Matute would be reasonable.  Equally reasonable in terms of comparisons with bicalutamide and Lupron would be Zytiga prednisone plus Lupron or Xtandi plus Lupron or apalutamide plus Lupron.  None of these have been compared head-to-head but all have beaten combined androgen deprivation therapy with bicalutamide and Lupron.  At the age of 67 and in order to have as many bullets as possible down the road and hence saving some of the hormone blockade options for later and using chemotherapy when he is younger and healthier for a more time-limited.,  He has opted for the Taxotere x6 followed by Zytiga and prednisone.  We will also give him Xgeva to improve bone health and given metastatic disease, as with all metastatic prostate cancer patients, he needs genetic counseling both for his sake as well as his family's.  If he were to have BRCA1 or other such  mutations, then that also opens up the options for PARP inhibitors etc. down the road.  He has his TURP scheduled for 2/24/2022 and we will start treatment a couple of weeks after that and he will get chemo preparation visit in the meantime and I will get capital surgeons to place a port.  We will check his PSA after his TURP when he sees my nurse practitioner back early April for the start of chemotherapy and repeat the PSA and CT chest abdomen pelvis and bone scan after the Taxotere is complete.    -3/31/2022 chemotherapy education and needs assessment completed    -4/7/2022 began docetaxel and Xgeva.  .0.  We will do his Xgeva every 6 weeks to  coordinate with his docetaxel infusions.    -4/19/2022 patient stopped Casodex    -5/17/2022 patient reported seeing his PCP for an abscess in his suprapubic area.  Placed on clindamycin, will delay treatment 1 week.    -5/25/2022 PSA 34.3  -5/26/2022 [facility] Oncology clinic follow-up: Chris has an abscess in the suprapubic area, it has improved on antibiotic therapy but I am concerned if we treat him it will just flare back up unless it is drained.  I will get him to Dr. Miller who placed his port for I&D and culture.  He is on clindamycin and states he completes course of treatment tomorrow.  I will delay his treatment with Taxotere 1 more week.  We will give his Xgeva today.  I will see him back in 1 week for follow-up to assess whether or not we can resume his Taxotere.  His PSA has dropped nicely with treatment thus far, PSA yesterday was 34.3 which is down from a PSA of 259.0 when we started treatment, it has been as high as 911 in November.    -6/2/2022 [facility] Oncology clinic follow-up: Chris went to see Dr. Miller to have his abscess lanced however apparently there was a long wait and he left without being seen.  He did call his PCP and was given 5 more days of clindamycin and the abscess now seems to have resolved.  We discussed today that he is at risk for recurrence of infection due to immunocompromise state with chemotherapy.  He will notify us if he has any return of his abscess.  He does have intertrigo under his panniculus, I have advised him to keep this area dry, to apply topical drying/antifungal powders.  Also recommended looser clothing.  His counts are adequate to continue therapy, we will resume Taxotere today with cycle #3.  We will repeat restaging scans after 6 courses.  We are doing Xgeva every 6 weeks to coordinate with his Taxotere.  Once he finishes Taxotere we can then go back to every 4 weeks.  His next Xgeva is not due until 7/14/2022.  His calcium is running a little low, he is  going to  calcium supplement.    -6/23/2022 [facility] Oncology clinic follow-up: Chris overall is doing well on treatment with Taxotere.  Labs reviewed from yesterday and are unremarkable.  We will continue treatment today unchanged.  His suprapubic abscess resolved on clindamycin.  He will continue to use drying powder/antifungal powder under his panniculus for intertrigo.  Today will be cycle #4 of a planned 6 courses of Taxotere.  He is also on Xgeva, next dose due 7/14/2022, we are doing this every 6 weeks for now to line up with his chemotherapy, can go back to every 4 weeks after he finishes Taxotere.  Calcium from CMP yesterday was normal.    -7/13/2022 [facility] Oncology clinic follow-up: Mr. Morfin has had issues around day 3 after each treatment ranging from chest pain after his first treatment for which he did not seek medical attention, fatigue after the next few treatments, but 3 days after his most recent treatment on 6/23/2022 he had a syncopal episode at home and once again did not seek medical attention.  I discussed with him that this was not acceptable, if he has occurrences like this someone needs to call 911, it is difficult to figure out what is going on after the fact, the best time to work this up would be during the occurrence.  For now we will get labs today with CBC, CMP, PSA.  I will refer him to cardiology for further evaluation.  We will hold Taxotere most likely, I will see him tomorrow to go over his lab results.  I will also repeat his restaging scans with CT chest, abdomen and pelvis and will include CT of the head in light of his syncopal episode.      -7/13/2022 PSA 18.6    -7/14/2022 [facility] Oncology clinic follow-up: Mr. Morfin returns today to review his labs from yesterday.  Labs are unremarkable.  PSA down to 18.6 from a high of 259.0 in April prior to starting treatment.  CBC and CMP unremarkable, magnesium is normal at 2.3, phosphorus slightly low at 2.4.  We will hold  therapy for now in light of his syncopal episode on day 3 after his last treatment and prior episodes with chest pain and severe fatigue that all occurred on day 3 after his Taxotere.  We will restage him with CT head, chest, abdomen and pelvis (I am including the head in light of his syncopal episode).  I have also referred him to cardiology, he states that he received a call from them about making an appointment however he wanted to talk to us today first.  I emphasized the importance to him of making and keeping that appointment and he states understanding.  I also once again emphasized the requirement to seek emergency medical attention if he has any episodes in the future such as chest pain or syncopal events.  Whether or not we try and complete Taxotere with 2 more courses or move to the next line of therapy will be decided when he returns to discuss with Dr. Goodson and review his restaging scans.    -7/15/2022 saw Dr. Diaz cardiologist.  For syncope he ordered echocardiogram and 48-hour Holter monitor.    -7/15/2022 Holter monitor relatively benign.    -7/22/2022 CT brain with and without contrast negative.  CT chest abdomen and pelvis shows some nonspecific cecal wall thickening.  No progression in the chest.  T1 and ribs stable.  Decrease in multiple retroperitoneal and pelvic nodes.  Slight increase in right acetabular sclerotic lesion.  Persistent but improved circumferential bladder wall thickening.  Total body bone scan shows stable left third rib and no evidence of new bone metastases.    -7/19/2022 ejection fraction 65% with grade 1 diastolic dysfunction.    -7/26/2022 [facility] oncology clinic follow-up: Given presyncopal symptoms occurred 3 days after Taxotere and has not otherwise occurred any other time and his cardiology work-up is fairly unremarkable and his disease is under good control as witnessed by the report above of the CTs of head chest abdomen pelvis and bone scan, I will hold cycle 5  and 6 of Taxotere and continue 1 now with Zytiga and prednisone.  With reference to the coincidental cecal wall thickening found on CT, we will get him to [facility] surgeons for colonoscopy.  He will see my nurse practitioner back in August for next cycle of Abiraterone prednisone.  He will continue his Lupron with Dr. Matute.  We will continue his Xgeva.  We will repeat his CT chest abdomen pelvis and bone scan again in 3 months.  He will see my nurse practitioner in the interim.    -8/23/2022 [facility] oncology clinic follow-up: No further presyncopal symptoms.  Feeling great other than for hot flashes.  Did commend for now we will continue on with his Zytiga prednisone and he will get his Xgeva today based on labs from 8/10/2022.  He opted out of getting the colonoscopy that I recommended and he will not change his mind.  He will continue getting the Lupron with Dr. Matute and I asked him to give the date of this appointment to my nurse when he sees her back in a month.  We will give his Xgeva today.  We will get CT chest abdomen pelvis and total body bone scan towards the middle to end of October.  Presently other than for the hot flashes feels great.    -9/20/2022 PSA 8.320    -9/22/2022 [facility] Oncology clinic follow-up: Chris is doing well on Zytiga, prednisone along with Xgeva.  Labs reviewed from 9/20/2022 are unremarkable, CBC was normal, CMP with creatinine of 1.34 otherwise normal, this is stable from his baseline.  PSA stable at 8.320.  He received Lupron recently with Dr. Matute, he thinks last week or so, states his next appointment is in February.  He will continue treatment unchanged.  We will repeat restaging CT chest, abdomen and pelvis and total body bone scan in October prior to return and I have ordered those today.  We will also repeat his PSA.    -9/22/2022 CT chest abdomen pelvisCompared to July 2022 [facility] showed no evidence of disease progression in the chest with no  "substantial sclerotic bony lesions and decrease in size of retroperitoneal and pelvic nodes with persistent cecal wall thickening and suboptimal bladder distention.  Total body bone scan showed decrease in previously seen uptake in the left third rib with diffuse sclerosis representing treatment response with no new foci.    -10/18/2022 [facility] medical oncology follow-up: I reviewed bone scan and CT reports as above with no evidence of progression.  Tolerating Zytiga prednisone and Xgeva.  Getting Lupron regularly with Dr. Matute next appointment coming in February.  We will repeat his CT chest abdomen pelvis and total body bone scan in April.  He will follow with my nurse practitioner in the interim.    -1/10/2023 PSA 3.450  -1/11/2023 [facility] Oncology clinic follow-up: Mr. Morfin continues to do well on current therapy with Zytiga, prednisone and Xgeva with no unusual side effects.  He has no new worrisome symptoms.  He is due for his next Lupron injection in February with Dr. Matute.  His PSA continues to trend downward, current PSA from yesterday was 3.450.  We will continue therapy unchanged, he will receive Xgeva today.  Has had no hypocalcemia, his CMP, phosphorus and magnesium are all normal.  We will repeat restaging scans in April.  -3/7/2023 PSA 2.460  -3/8/2023 [facility] Oncology clinic follow-up:  Mr. Morfin is doing well.  He is tolerating therapy with Zytiga, prednisone and Xgeva with no significant side effects.  He has hot flashes but these are tolerable.  He had Lupron injection 2/24/2023 with Dr. Matute.  His PSA continues to decline, current PSA 2.460.  He will continue therapy unchanged.  We will repeat restaging scans late April prior to return in 2 months and I have ordered those today.  He is working with his PCP Rodrigo Ram on his hypertension.  His b/p today was 160/88.  He was to have increased his losartan but I do not think he has done that yet as he said \"I still have some of my old " "prescription left\".  -4/4/2023 PSA 2.55  - 4/26/2023 CT chest abdomen pelvis [place] comparison 10/10/2022 showed stable left third rib, T1, left fourth and eighth rib, right seventh rib.  Probable liver cyst.  Few diverticuli.  Mild further decrease in a few of the previously enlarged nodes.  Left external iliac 12 mm compared to 16 mm.  Right common iliac 7 mm stable.  Lower left periaortic 8 mm previously 13 mm.  No new lytic or blastic osseous lesions.  Total body bone scan comparison 10/10/2022, 7/22/2022, and CT 4/26/2023.  No new sites of increased uptake.  1 focal left third rib hotspot consistent with bone metastasis.    -5/2/2023 [facility] medical oncology follow-up: I reviewed interval notes since I last saw him from my nurse practitioner as outlined above in interval images and reports thereof from [facility] that shows no new sites of bone metastasis and no kizzy or visceral progression.  PSA stabilized around 2.5.  In the absence of symptomatic or radiographic progression, I would be unlikely to change therapy just on the basis of a PSA rise and for now the PSA is stable.  We will continue Zytiga prednisone and Xgeva and he will follow-up with my nurse practitioner 5/30/2023 with repeat CTs and bone scan in 6 months.  I asked him to check with his primary care today regarding his systolic in the 170s.    -5/31/2023 [facility] Oncology clinic follow-up: Chris continues to do well on current therapy with Zytiga, prednisone and Xgeva along with Lupron that he receives with Dr. Matute.  His PSA remains stable, it was lower this month compared to last month, current PSA 1.940.  Continues to deal with hypertension, on arrival today his blood pressure was 210/92 however this was using a wrist cuff, when I rechecked it manually his blood pressure was 160/90.  Still has room for improvement despite recent increase in his losartan to 100 mg daily.  He will follow-up with Dr. Ram for further management. "  We will continue therapy unchanged.  We will repeat restaging scans in October.    -7/26/2023 [facility] Oncology clinic follow-up: Chris overall continues to do well on current therapy with Zytiga, prednisone and Xgeva.  He receives Lupron every 6 months with Dr. Matute and states that is scheduled for August.  PSA from yesterday is pending, PSA on 6/27/2023 was 1.590 which was continuing to decline, in February it was 3.250.  His creatinine this past month has bumped up.  He feels he is staying hydrated but I encouraged him to increase his fluid intake.  I discussed with him that this could be from his hypertension, some of his medications.  His blood pressure today is 165/92.  He has an appointment with his primary care provider Dr. Teri Ram next week and can further discuss with her.  No adjustment needed for Xgeva.  I refilled his prednisone today.  I will see him back in 1 month for follow-up with continued monitoring of his labs.  He may end up needing to see nephrology.  We will repeat restaging scans in October.    -8/23/2023 [facility] Oncology clinic follow-up: Chris continues on therapy with Zytiga, prednisone and Xgeva, he also receives Lupron every 6 months with Dr. Matute with most recent given on 8/7/2023.  He is scheduled for a cystoscopy in a few weeks as his last few UAs per the patient's report had microscopic hematuria, he has had no steve hematuria.  His creatinine continues to remain elevated at 1.82, BUN is 40, GFR is 40.  I will get him to nephrology for further evaluation.  Blood pressure today was fairly good at 161/86, he continues to follow with Dr. Rma for management.  PSA on 7/25/2023 was up slightly from prior month, PSA in July was 2.140, in June it was 1.590, PSA from yesterday is pending.  I plan on repeating restaging CT scans and bone scans in October however if his PSA is up significantly then I will do sooner.  I will see him back for follow-up in 1  "month.  -8/23/2024 PSA 1.860     Prostate cancer metastatic to multiple sites   3/15/2022 Initial Diagnosis    Prostate cancer metastatic to multiple sites (HCC)     3/15/2022 Cancer Staged    Staging form: Prostate, AJCC 8th Edition  - Clinical: Stage IVB (cT1c, cN1, cM1b, Grade Group: 5) - Signed by Fritz Goodson MD on 3/15/2022     4/7/2022 - 6/23/2022 Chemotherapy    Stopped after cycle 4 6/23/2022 due to syncope 3 days post infusions with negative cardiac work-up  OP PROSTATE DOCEtaxel     4/7/2022 -  Chemotherapy    OP SUPPORTIVE Denosumab (Xgeva) Q28D       7/26/2022 -  Chemotherapy    OP PROSTATE Abiraterone / PredniSONE           HISTORY OF PRESENT ILLNESS:  The patient is a 68 y.o. male, here for follow up on management of metastatic prostate cancer stable on Zytiga and prednisone Xgeva along with Lupron.  He last received Lupron with Dr. Sue on 8/7/2023.  Chris reports overall he has been feeling well this past month with no new concerns.  He denies any new pain.  Continues to work with his primary care provider regarding his hypertension.  Remains active at home.      Past Medical History:   Diagnosis Date    Cancer     Prostate cancer      Past Surgical History:   Procedure Laterality Date    PROSTATE BIOPSY  02/2022    dr. sue       No Known Allergies    Family History and Social History reviewed and changed as necessary    REVIEW OF SYSTEM:   Mild fatigue    PHYSICAL EXAM:  Well-developed, well-nourished male in no distress  Respirations regular and unlabored    Vitals:    09/20/23 0829   BP: 159/90   Pulse: 70   Resp: 18   Temp: 99.3 °F (37.4 °C)   SpO2: 97%   Weight: 128 kg (282 lb)   Height: 180.3 cm (71\")     There were no vitals filed for this visit.         ECOG score: 0           Vitals reviewed.  Labs reviewed    ECOG: (0) Fully Active - Able to Carry On All Pre-disease Performance Without Restriction    Lab Results   Component Value Date    HGB 13.3 09/19/2023    HCT 40.6 09/19/2023 "    MCV 87.5 09/19/2023     09/19/2023    WBC 7.89 09/19/2023    NEUTROABS 5.29 09/19/2023    LYMPHSABS 1.70 09/19/2023    MONOSABS 0.57 09/19/2023    EOSABS 0.22 09/19/2023    BASOSABS 0.07 09/19/2023       Lab Results   Component Value Date    GLUCOSE 101 (H) 09/19/2023    BUN 34 (H) 09/19/2023    CREATININE 1.51 (H) 09/19/2023     09/19/2023    K 4.4 09/19/2023     09/19/2023    CO2 25.7 09/19/2023    CALCIUM 10.0 09/19/2023    ALBUMIN 4.5 09/19/2023    BILITOT 0.2 09/19/2023    ALKPHOS 91 09/19/2023    AST 23 09/19/2023    ALT 36 09/19/2023     Lab Results   Component Value Date    PSA 1.810 09/19/2023    PSA 1.860 08/22/2023    PSA 2.140 07/25/2023    PSA 1.590 06/27/2023    PSA 1.940 05/30/2023    PSA 2.170 05/02/2023             ASSESSMENT & PLAN:  1.  Prostate cancer clinical T1c and 2M1B stage IVb treated with 4 cycles of Taxotere, stopped due to syncope with negative cardiac work-up, with marked drop in PSA of 18.6 from over 900 at baseline, continued on Zytiga and prednisone.  2.  Urinary retention with Goodwin in place 1/24/2022.  On finasteride 1/24/2022.  3.  Covid 19 December 2021  4.  Hypertension    Oncology history timeline:  -11/29/2021  with symptoms of urinary retention.  -2/15/2022 prostate biopsy adenocarcinoma grade group 5 and 3 out of 12 cores, grade group 4 in 1 out of 12 cores, grade group 3 in 8 out of 12 cores.  -2/24/2022 CT chest abdomen pelvis and total body bone scan shows left third rib, right acetabulum, periaortic and retroperitoneal kizzy metastases complaining of pain in the left chest and right hip.  Also possible sclerotic left 10th rib on CT.  Spleen mildly enlarged 13.8 cm.  Hepatic steatosis.  Small liver cyst.  Fat stranding in the periaortic and mesenteric region consistent with reactive/inflammatory stranding.  Multiple enlarged periaortic and retroperitoneal nodes and lymphadenopathy largest 4.9 cm.  CT chest describes tiny sclerotic foci in left  eighth rib and right lateral seventh rib and T1.  Multiple small mediastinal and bilateral axillary nodes seen with small hypodense left thyroid nodule.  Creatinine 1.7.  -3/4/2022 office note Dr. Rolando Matute: Patient was seen after his elevated PSA by Dr. Vera and prostate biopsy scheduled.  However, he developed COVID-19 and this was canceled and the patient did not reschedule due to lack of insurance.  Saw Dr. Matute back on 1/24/2022 with plans to get staging CTs and bone scan with results as outlined above.  Started Casodex and to return on 3/11/2022 for Lupron.  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  TURP planned for urinary retention symptoms.    -3/15/2022 [facility] medical oncology initial consultation: I reviewed the above data with the patient.  With his fairly bulky retroperitoneal adenopathy and bone metastases including extra axial metastases, he would fit the data from the CHAARTED trial for which Taxotere x6 followed by Zytiga prednisone along with the planned Lupron already being planned by Dr. Matute would be reasonable.  Equally reasonable in terms of comparisons with bicalutamide and Lupron would be Zytiga prednisone plus Lupron or Xtandi plus Lupron or apalutamide plus Lupron.  None of these have been compared head-to-head but all have beaten combined androgen deprivation therapy with bicalutamide and Lupron.  At the age of 67 and in order to have as many bullets as possible down the road and hence saving some of the hormone blockade options for later and using chemotherapy when he is younger and healthier for a more time-limited.,  He has opted for the Taxotere x6 followed by Zytiga and prednisone.  We will also give him Xgeva to improve bone health and given metastatic disease, as with all metastatic prostate cancer patients, he needs genetic counseling both for his sake as well as his family's.  If he were to have BRCA1 or  other such  mutations, then that also opens up the options for PARP inhibitors etc. down the road.  He has his TURP scheduled for 2/24/2022 and we will start treatment a couple of weeks after that and he will get chemo preparation visit in the meantime and I will get capital surgeons to place a port.  We will check his PSA after his TURP when he sees my nurse practitioner back early April for the start of chemotherapy and repeat the PSA and CT chest abdomen pelvis and bone scan after the Taxotere is complete.    -3/31/2022 chemotherapy education and needs assessment completed    -4/7/2022 began docetaxel and Xgeva.  .0.  We will do his Xgeva every 6 weeks to coordinate with his docetaxel infusions.    -4/19/2022 patient stopped Casodex    -5/17/2022 patient reported seeing his PCP for an abscess in his suprapubic area.  Placed on clindamycin, will delay treatment 1 week.    -5/25/2022 PSA 34.3  -5/26/2022 [facility] follow-up: Chris has an abscess in the suprapubic area, it has improved on antibiotic therapy but I am concerned if we treat him it will just flare back up unless it is drained.  I will get him to Dr. Miller who placed his port for I&D and culture.  He is on clindamycin and states he completes course of treatment tomorrow.  I will delay his treatment with Taxotere 1 more week.  We will give his Xgeva today.  I will see him back in 1 week for follow-up to assess whether or not we can resume his Taxotere.  His PSA has dropped nicely with treatment thus far, PSA yesterday was 34.3 which is down from a PSA of 259.0 when we started treatment, it has been as high as 911 in November.    -6/2/2022 [facility] follow-up: Chris went to see Dr. Miller to have his abscess lanced however apparently there was a long wait and he left without being seen.  He did call his PCP and was given 5 more days of clindamycin and the abscess now seems to have resolved.  We discussed today that  he is at risk for recurrence of infection due to immunocompromise state with chemotherapy.  He will notify us if he has any return of his abscess.  He does have intertrigo under his panniculus, I have advised him to keep this area dry, to apply topical drying/antifungal powders.  Also recommended looser clothing.  His counts are adequate to continue therapy, we will resume Taxotere today with cycle #3.  We will repeat restaging scans after 6 courses.  We are doing Xgeva every 6 weeks to coordinate with his Taxotere.  Once he finishes Taxotere we can then go back to every 4 weeks.  His next Xgeva is not due until 7/14/2022.  His calcium is running a little low, he is going to  calcium supplement.    -6/23/2022 [facility] follow-up: Chris huerta is doing well on treatment with Taxotere.  Labs reviewed from yesterday and are unremarkable.  We will continue treatment today unchanged.  His suprapubic abscess resolved on clindamycin.  He will continue to use drying powder/antifungal powder under his panniculus for intertrigo.  Today will be cycle #4 of a planned 6 courses of Taxotere.  He is also on Xgeva, next dose due 7/14/2022, we are doing this every 6 weeks for now to line up with his chemotherapy, can go back to every 4 weeks after he finishes Taxotere.  Calcium from [facility] yesterday was normal.    -7/13/2022 [facility] follow-up: Mr. Morfin has had issues around day 3 after each treatment ranging from chest pain after his first treatment for which he did not seek medical attention, fatigue after the next few treatments, but 3 days after his most recent treatment on 6/23/2022 he had a syncopal episode at home and once again did not seek medical attention.  I discussed with him that this was not acceptable, if he has occurrences like this someone needs to call 911, it is difficult to figure out what is going on after the fact, the best time to work this up would be during the  occurrence.  For now we will get labs today with CBC, CMP, PSA.  I will refer him to cardiology for further evaluation.  We will hold Taxotere most likely, I will see him tomorrow to go over his lab results.  I will also repeat his restaging scans with CT chest, abdomen and pelvis and will include CT of the head in light of his syncopal episode.      -7/13/2022 PSA 18.6    -7/14/2022 [facility] clinic follow-up: Mr. Morfin returns today to review his labs from yesterday.  Labs are unremarkable.  PSA down to 18.6 from a high of 259.0 in April prior to starting treatment.  CBC and CMP unremarkable, magnesium is normal at 2.3, phosphorus slightly low at 2.4.  We will hold therapy for now in light of his syncopal episode on day 3 after his last treatment and prior episodes with chest pain and severe fatigue that all occurred on day 3 after his Taxotere.  We will restage him with CT head, chest, abdomen and pelvis (I am including the head in light of his syncopal episode).  I have also referred him to cardiology, he states that he received a call from them about making an appointment however he wanted to talk to us today first.  I emphasized the importance to him of making and keeping that appointment and he states understanding.  I also once again emphasized the requirement to seek emergency medical attention if he has any episodes in the future such as chest pain or syncopal events.  Whether or not we try and complete Taxotere with 2 more courses or move to the next line of therapy will be decided when he returns to discuss with Dr. Goodson and review his restaging scans.    -7/15/2022 saw Dr. Diaz cardiologist.  For syncope he ordered echocardiogram and 48-hour Holter monitor.    -7/15/2022 Holter monitor relatively benign.    -7/22/2022 CT brain with and without contrast negative.  CT chest abdomen and pelvis shows some nonspecific cecal wall thickening.  No progression in the chest.  T1 and ribs stable.   Decrease in multiple retroperitoneal and pelvic nodes.  Slight increase in right acetabular sclerotic lesion.  Persistent but improved circumferential bladder wall thickening.  Total body bone scan shows stable left third rib and no evidence of new bone metastases.    -7/19/2022 ejection fraction 65% with grade 1 diastolic dysfunction.    -7/26/2022 [facility] follow[facility]: Given presyncopal symptoms occurred 3 days after Taxotere and has not otherwise occurred any other time and his cardiology work-up is fairly unremarkable and his disease is under good control as witnessed by the report above of the CTs of head chest abdomen pelvis and bone scan, I will hold cycle 5 and 6 of Taxotere and continue 1 now with Zytiga and prednisone.  With reference to the coincidental cecal wall thickening found on CT, we will get him to [facility] surgeons for colonoscopy.  He will see my nurse practitioner back in August for next cycle of Abiraterone prednisone.  He will continue his Lupron with Dr. Matute.  We will continue his Xgeva.  We will repeat his CT chest abdomen pelvis and bone scan again in 3 months.  He will see my nurse practitioner in the interim.    -8/23/2022 [facility] follow[facility]: No further presyncopal symptoms.  Feeling great other than for hot flashes.  Did commend for now we will continue on with his Zytiga prednisone and he will get his Xgeva today based on labs from 8/10/2022.  He opted out of getting the colonoscopy that I recommended and he will not change his mind.  He will continue getting the Lupron with Dr. Matute and I asked him to give the date of this appointment to my nurse when he sees her back in a month.  We will give his Xgeva today.  We will get CT chest abdomen pelvis and total body bone scan towards the middle to end of October.  Presently other than for the hot flashes feels great.    -9/20/2022 PSA 8.320    -9/22/2022 [facility] follow-up: Chris is doing  well on Zytiga, prednisone along with Xgeva.  Labs reviewed from 9/20/2022 are unremarkable, CBC was normal, CMP with creatinine of 1.34 otherwise normal, this is stable from his baseline.  PSA stable at 8.320.  He received Lupron recently with Dr. Matute, he thinks last week or so, states his next appointment is in February.  He will continue treatment unchanged.  We will repeat restaging CT chest, abdomen and pelvis and total body bone scan in October prior to return and I have ordered those today.  We will also repeat his PSA.    -9/22/2022 CT chest abdomen pelvisCompared to July 2022 [facility] showed no evidence of disease progression in the chest with no substantial sclerotic bony lesions and decrease in size of retroperitoneal and pelvic nodes with persistent cecal wall thickening and suboptimal bladder distention.  Total body bone scan showed decrease in previously seen uptake in the left third rib with diffuse sclerosis representing treatment response with no new foci.    -10/18/2022 [facility] follow-up: I reviewed bone scan and CT reports as above with no evidence of progression.  Tolerating Zytiga prednisone and Xgeva.  Getting Lupron regularly with Dr. Matute next appointment coming in February.  We will repeat his CT chest abdomen pelvis and total body bone scan in April.  He will follow with my nurse practitioner in the interim.    -1/10/2023 PSA 3.450  -1/11/2023 [facility] follow-up: Mr. Morfin continues to do well on current therapy with Zytiga, prednisone and Xgeva with no unusual side effects.  He has no new worrisome symptoms.  He is due for his next Lupron injection in February with Dr. Matute.  His PSA continues to trend downward, current PSA from yesterday was 3.450.  We will continue therapy unchanged, he will receive Xgeva today.  Has had no hypocalcemia, his CMP, phosphorus and magnesium are all normal.  We will repeat restaging scans in  "April.  -3/7/2023 PSA 2.460  -3/8/2023 [facility] follow-up:  Mr. Morfin is doing well.  He is tolerating therapy with Zytiga, prednisone and Xgeva with no significant side effects.  He has hot flashes but these are tolerable.  He had Lupron injection 2/24/2023 with Dr. Matute.  His PSA continues to decline, current PSA 2.460.  He will continue therapy unchanged.  We will repeat restaging scans late April prior to return in 2 months and I have ordered those today.  He is working with his PCP Rodrigo Ram on his hypertension.  His b/p today was 160/88.  He was to have increased his losartan but I do not think he has done that yet as he said \"I still have some of my old prescription left\".  -4/4/2023 PSA 2.55  - 4/26/2023 CT chest abdomen pelvis [facility] comparison 10/10/2022 showed stable left third rib, T1, left fourth and eighth rib, right seventh rib.  Probable liver cyst.  Few diverticuli.  Mild further decrease in a few of the previously enlarged nodes.  Left external iliac 12 mm compared to 16 mm.  Right common iliac 7 mm stable.  Lower left periaortic 8 mm previously 13 mm.  No new lytic or blastic osseous lesions.  Total body bone scan comparison 10/10/2022, 7/22/2022, and CT 4/26/2023.  No new sites of increased uptake.  1 focal left third rib hotspot consistent with bone metastasis.    -5/2/2023 [facility] follow-up: I reviewed interval notes since I last saw him from my nurse practitioner as outlined above in interval images and reports thereof from [facility] that shows no new sites of bone metastasis and no kizzy or visceral progression.  PSA stabilized around 2.5.  In the absence of symptomatic or radiographic progression, I would be unlikely to change therapy just on the basis of a PSA rise and for now the PSA is stable.  We will continue Zytiga prednisone and Xgeva and he will follow-up with my nurse practitioner 5/30/2023 with repeat CTs and bone scan in 6 months.  " I asked him to check with his primary care today regarding his systolic in the 170s.    -5/31/2023 [facility] [place] [place] [facility] follow-up: Chris continues to do well on current therapy with Zytiga, prednisone and Xgeva along with Lupron that he receives with Dr. Matute.  His PSA remains stable, it was lower this month compared to last month, current PSA 1.940.  Continues to deal with hypertension, on arrival today his blood pressure was 210/92 however this was using a wrist cuff, when I rechecked it manually his blood pressure was 160/90.  Still has room for improvement despite recent increase in his losartan to 100 mg daily.  He will follow-up with Dr. Ram for further management.  We will continue therapy unchanged.  We will repeat restaging scans in October.  For his seasonal allergies I discussed with him that he could take over-the-counter generic Zyrtec or Claritin.    -7/26/2023 [facility] [place] [place] [facility] clinic follow-up: Chris overall continues to do well on current therapy with Zytiga, prednisone and Xgeva.  He receives Lupron every 6 months with Dr. Matute and states that is scheduled for August.  PSA from yesterday is pending, PSA on 6/27/2023 was 1.590 which was continuing to decline, in February it was 3.250.  His creatinine this past month has bumped up.  He feels he is staying hydrated but I encouraged him to increase his fluid intake.  I discussed with him that this could be from his hypertension, some of his medications.  His blood pressure today is 165/92.  He has an appointment with his primary care provider Dr. Teri Ram next week and can further discuss with her.  No adjustment needed for Xgeva.  I refilled his prednisone today.  I will see him back in 1 month for follow-up with continued monitoring of his labs.  He may end up needing to see nephrology.  We will repeat restaging scans in October.    -8/23/2023 [facility] [place] [place] [facility] clinic follow-up: Chris continues on therapy with Zytiga, prednisone  and Xgeva, he also receives Lupron every 6 months with Dr. Matute with most recent given on 8/7/2023.  He is scheduled for a cystoscopy in a few weeks as his last few UAs per the patient's report had microscopic hematuria, he has had no steve hematuria.  His creatinine continues to remain elevated at 1.82, BUN is 40, GFR is 40.  I will get him to nephrology for further evaluation.  Blood pressure today was fairly good at 161/86, he continues to follow with Dr. Ram for management.  PSA on 7/25/2023 was up slightly from prior month, PSA in July was 2.140, in June it was 1.590, PSA from yesterday is pending.  I plan on repeating restaging CT scans and bone scans in October however if his PSA is up significantly then I will do sooner.  I will see him back for follow-up in 1 month.    -9/20/2023 [facility] clinic follow-up: Chris is tolerating treatment with Zytiga, prednisone and Xgeva.  He is up-to-date with his Lupron injection, last received in August with Dr. Matute.  He had cystoscopy on 9/8/2023 that was normal. I referred him to nephrology when I saw him last in August as his creatinine has been increasing, creatinine yesterday was a little better 1.51, GFR is 50, creatinine clearance 63.8.  He is still awaiting an appointment with nephrology, per our  it will be early November.  We will repeat his restaging scans prior to return and I will do his CT scans without contrast in light of his new renal insufficiency.  We will also get total body bone scan and I have ordered those today.  His PSA yesterday was 1.810.    Return to clinic in 1 month for follow-up    I spent 30 minutes caring for Chris on this date of service. This time includes time spent by me in the following activities: preparing for the visit, reviewing tests, obtaining and/or reviewing a separately obtained history, performing a medically appropriate examination and/or evaluation, ordering medications, tests, or procedures,  referring and communicating with other health care professionals, documenting information in the medical record, and independently interpreting results and communicating that information with the patient/family/caregiver.     Susana Torres, APRN    09/20/2023

## 2023-10-05 ENCOUNTER — OFFICE VISIT (OUTPATIENT)
Dept: FAMILY MEDICINE CLINIC | Facility: CLINIC | Age: 68
End: 2023-10-05
Payer: MEDICARE

## 2023-10-05 VITALS
WEIGHT: 275 LBS | OXYGEN SATURATION: 98 % | SYSTOLIC BLOOD PRESSURE: 132 MMHG | HEART RATE: 73 BPM | BODY MASS INDEX: 38.5 KG/M2 | HEIGHT: 71 IN | DIASTOLIC BLOOD PRESSURE: 90 MMHG

## 2023-10-05 DIAGNOSIS — I10 ESSENTIAL HYPERTENSION: Primary | ICD-10-CM

## 2023-10-05 PROCEDURE — 99213 OFFICE O/P EST LOW 20 MIN: CPT | Performed by: STUDENT IN AN ORGANIZED HEALTH CARE EDUCATION/TRAINING PROGRAM

## 2023-10-05 RX ORDER — LOSARTAN POTASSIUM 100 MG/1
100 TABLET ORAL DAILY
Qty: 90 TABLET | Refills: 1 | Status: SHIPPED | OUTPATIENT
Start: 2023-10-05

## 2023-10-05 RX ORDER — AMLODIPINE BESYLATE 5 MG/1
7.5 TABLET ORAL DAILY
Qty: 135 TABLET | Refills: 0 | Status: SHIPPED | OUTPATIENT
Start: 2023-10-05 | End: 2024-01-03

## 2023-10-05 NOTE — PROGRESS NOTES
"Chief Complaint  Hypertension    Subjective          Chris Morfin presents to [facility]  History of Present Illness     Patient presents to the office for follow up on HTN. He states that he is doing much better with the 7.5mg dose of Amlodipine. He has no further dizziness and he has been told at the oncologist that his blood pressure is much better. He does not check his BP at home. He has no CP, SOA, HA or Le edema.       Objective   Vital Signs:   /90   Pulse 73   Ht 180.3 cm (71\")   Wt 125 kg (275 lb)   SpO2 98%   BMI 38.35 kg/m²     Body mass index is 38.35 kg/m².    Review of Systems    Past History:  Medical History: has a past medical history of Cancer and Prostate cancer.   Surgical History: has a past surgical history that includes Prostate biopsy (02/2022).   Family History: family history includes Cancer in his brother and sister.   Social History: reports that he has never smoked. He has never used smokeless tobacco. He reports that he does not currently use alcohol. He reports current drug use. Drug: Marijuana.      Current Outpatient Medications:     amLODIPine (NORVASC) 5 MG tablet, Take 1.5 tablets by mouth Daily for 90 days., Disp: 135 tablet, Rfl: 0    losartan (COZAAR) 100 MG tablet, Take 1 tablet by mouth Daily., Disp: 90 tablet, Rfl: 1    abiraterone acetate (ZYTIGA) 500 MG tablet, Take 2 tablets by mouth Daily., Disp: 60 tablet, Rfl: 11    lidocaine-prilocaine (EMLA) 2.5-2.5 % cream, Apply 1 application topically to the appropriate area as directed As Needed (prior to port access)., Disp: 30 g, Rfl: 3    ondansetron (ZOFRAN) 8 MG tablet, Take 1 tablet by mouth 3 (Three) Times a Day As Needed for Nausea or Vomiting., Disp: 30 tablet, Rfl: 5    predniSONE (DELTASONE) 5 MG tablet, Take 1 tablet by mouth 2 (Two) Times a Day., Disp: 60 tablet, Rfl: 11    rosuvastatin (CRESTOR) 20 MG tablet, TAKE ONE (1) TABLET BY MOUTH DAILY., Disp: 90 tablet, Rfl: " 1    Allergies: Patient has no known allergies.    Physical Exam  Constitutional:       General: He is not in acute distress.     Appearance: He is not ill-appearing or toxic-appearing.   HENT:      Head: Normocephalic and atraumatic.   Cardiovascular:      Rate and Rhythm: Normal rate and regular rhythm.      Heart sounds: No murmur heard.  Pulmonary:      Effort: Pulmonary effort is normal. No respiratory distress.   Musculoskeletal:      Right lower leg: No edema.      Left lower leg: No edema.   Neurological:      General: No focal deficit present.      Mental Status: He is alert and oriented to person, place, and time.   Psychiatric:         Mood and Affect: Mood normal.         Thought Content: Thought content normal.        Result Review :                   Assessment and Plan    Diagnoses and all orders for this visit:    1. Essential hypertension (Primary)    Other orders  -     amLODIPine (NORVASC) 5 MG tablet; Take 1.5 tablets by mouth Daily for 90 days.  Dispense: 135 tablet; Refill: 0  -     losartan (COZAAR) 100 MG tablet; Take 1 tablet by mouth Daily.  Dispense: 90 tablet; Refill: 1    Improved. Will continue current dose of medications at this time. Follow up in 3 months.     Blood work from Oncology reviewed at this visit. K and CR at baseline.     Follow Up   No follow-ups on file.  Patient was given instructions and counseling regarding his condition or for health maintenance advice. Please see specific information pulled into the AVS if appropriate.     Teri Ram, DO

## 2023-10-17 ENCOUNTER — LAB (OUTPATIENT)
Dept: ONCOLOGY | Facility: HOSPITAL | Age: 68
End: 2023-10-17
Payer: MEDICARE

## 2023-10-17 ENCOUNTER — SPECIALTY PHARMACY (OUTPATIENT)
Dept: ONCOLOGY | Facility: HOSPITAL | Age: 68
End: 2023-10-17
Payer: MEDICARE

## 2023-10-17 ENCOUNTER — OFFICE VISIT (OUTPATIENT)
Dept: ONCOLOGY | Facility: CLINIC | Age: 68
End: 2023-10-17
Payer: MEDICARE

## 2023-10-17 VITALS
SYSTOLIC BLOOD PRESSURE: 156 MMHG | DIASTOLIC BLOOD PRESSURE: 92 MMHG | TEMPERATURE: 98.4 F | RESPIRATION RATE: 18 BRPM | WEIGHT: 275 LBS | OXYGEN SATURATION: 97 % | HEIGHT: 71 IN | HEART RATE: 74 BPM | BODY MASS INDEX: 38.5 KG/M2

## 2023-10-17 DIAGNOSIS — C61 PROSTATE CANCER METASTATIC TO MULTIPLE SITES: Primary | Chronic | ICD-10-CM

## 2023-10-17 DIAGNOSIS — C79.9 SECONDARY MALIGNANT NEOPLASM OF UNSPECIFIED SITE (CODE): ICD-10-CM

## 2023-10-17 DIAGNOSIS — C61 PROSTATE CANCER METASTATIC TO MULTIPLE SITES: Chronic | ICD-10-CM

## 2023-10-17 DIAGNOSIS — N18.31 STAGE 3A CHRONIC KIDNEY DISEASE (CKD): ICD-10-CM

## 2023-10-17 PROCEDURE — 36415 COLL VENOUS BLD VENIPUNCTURE: CPT

## 2023-10-17 NOTE — PROGRESS NOTES
Specialty Pharmacy Refill Coordination Note     Chris is a 68 y.o. male contacted today regarding refills of  abiraterone 1000mg PO QD specialty medication(s).    Medication scheduled for delivery on 10/24/23.    Specialty medication(s) and dose(s) confirmed: yes    Refill Questions      Flowsheet Row Most Recent Value   Changes to allergies? No   Changes to medications? No   New conditions since last clinic visit No   Unplanned office visit, urgent care, ED, or hospital admission in the last 4 weeks  No   How does patient/caregiver feel medication is working? Very good   Financial problems or insurance changes  No   Since the previous refill, were any specialty medication doses or scheduled injections missed or delayed?  No   Does this patient require a clinical escalation to a pharmacist? No            Delivery Questions      Flowsheet Row Most Recent Value   Delivery method [employer]   Delivery address correct? Yes   Delivery phone number [phone]   Preferred delivery time? Anytime   Number of medications in delivery 1   Medication(s) being filled and delivered Abiraterone Acetate   Doses left of specialty medications 10 days left   Is there any medication that is due not being filled? No   Supplies needed? No supplies needed   Cooler needed? No   Do any medications need mixed or dated? No   Additional comments no copay   Questions or concerns for the pharmacist? No   Explain any questions or concerns for the pharmacist n/a   Are any medications first time fills? No                   Follow-up: 28 day(s)     Catalina Montes De Oca, Pharmacy Technician  Specialty Pharmacy Technician

## 2023-10-17 NOTE — PROGRESS NOTES
CHIEF COMPLAINT: No new somatic complaints    Problem List:  Oncology/Hematology History Overview Note   1.  Prostate cancer clinical T1c and 2M1B stage IVb treated with 4 cycles of Taxotere, stopped due to syncope with negative cardiac work-up, with marked drop in PSA of 18.6 from over 900 at baseline, continued on Zytiga and prednisone.  2.  Urinary retention with Goodwin in place 1/24/2022.  On finasteride 1/24/2022.  3.  Covid 19 December 2021  4.  Hypertension    Oncology history timeline:  -11/29/2021  with symptoms of urinary retention.  -2/15/2022 prostate biopsy adenocarcinoma grade group 5 and 3 out of 12 cores, grade group 4 in 1 out of 12 cores, grade group 3 in 8 out of 12 cores.  -2/24/2022 CT chest abdomen pelvis and total body bone scan shows left third rib, right acetabulum, periaortic and retroperitoneal kizzy metastases complaining of pain in the left chest and right hip.  Also possible sclerotic left 10th rib on CT.  Spleen mildly enlarged 13.8 cm.  Hepatic steatosis.  Small liver cyst.  Fat stranding in the periaortic and mesenteric region consistent with reactive/inflammatory stranding.  Multiple enlarged periaortic and retroperitoneal nodes and lymphadenopathy largest 4.9 cm.  CT chest describes tiny sclerotic foci in left eighth rib and right lateral seventh rib and T1.  Multiple small mediastinal and bilateral axillary nodes seen with small hypodense left thyroid nodule.  Creatinine 1.7.  -3/4/2022 office note Dr. Rolando Matute: Patient was seen after his elevated PSA by Dr. Vera and prostate biopsy scheduled.  However, he developed COVID-19 and this was canceled and the patient did not reschedule due to lack of insurance.  Saw Dr. Matute back on 1/24/2022 with plans to get staging CTs and bone scan with results as outlined above.  Started Casodex and to return on 3/11/2022 for Lupron.  Referral made to medical oncology for other additional castrate naïve prostate cancer  therapies.  TURP planned for urinary retention symptoms.    -3/15/2022 [facility] medical oncology initial consultation: I reviewed the above data with the patient.  With his fairly bulky retroperitoneal adenopathy and bone metastases including extra axial metastases, he would fit the data from the CHAARTED trial for which Taxotere x6 followed by Zytiga prednisone along with the planned Lupron already being planned by Dr. Matute would be reasonable.  Equally reasonable in terms of comparisons with bicalutamide and Lupron would be Zytiga prednisone plus Lupron or Xtandi plus Lupron or apalutamide plus Lupron.  None of these have been compared head-to-head but all have beaten combined androgen deprivation therapy with bicalutamide and Lupron.  At the age of 67 and in order to have as many bullets as possible down the road and hence saving some of the hormone blockade options for later and using chemotherapy when he is younger and healthier for a more time-limited.,  He has opted for the Taxotere x6 followed by Zytiga and prednisone.  We will also give him Xgeva to improve bone health and given metastatic disease, as with all metastatic prostate cancer patients, he needs genetic counseling both for his sake as well as his family's.  If he were to have BRCA1 or other such  mutations, then that also opens up the options for PARP inhibitors etc. down the road.  He has his TURP scheduled for 2/24/2022 and we will start treatment a couple of weeks after that and he will get chemo preparation visit in the meantime and I will get capital surgeons to place a port.  We will check his PSA after his TURP when he sees my nurse practitioner back early April for the start of chemotherapy and repeat the PSA and CT chest abdomen pelvis and bone scan after the Taxotere is complete.    -3/31/2022 chemotherapy education and needs assessment completed    -4/7/2022 began docetaxel and Xgeva.  .0.  We will do his Xgeva every 6  weeks to coordinate with his docetaxel infusions.    -4/19/2022 patient stopped Casodex    -5/17/2022 patient reported seeing his PCP for an abscess in his suprapubic area.  Placed on clindamycin, will delay treatment 1 week.    -5/25/2022 PSA 34.3  -5/26/2022 [facility] On[facility] clinic follow-up: Chris has an abscess in the suprapubic area, it has improved on antibiotic therapy but I am concerned if we treat him it will just flare back up unless it is drained.  I will get him to Dr. Miller who placed his port for I&D and culture.  He is on clindamycin and states he completes course of treatment tomorrow.  I will delay his treatment with Taxotere 1 more week.  We will give his Xgeva today.  I will see him back in 1 week for follow-up to assess whether or not we can resume his Taxotere.  His PSA has dropped nicely with treatment thus far, PSA yesterday was 34.3 which is down from a PSA of 259.0 when we started treatment, it has been as high as 911 in November.    -6/2/2022 [facility] Oncology clinic follow-up: Chris went to see Dr. Miller to have his abscess lanced however apparently there was a long wait and he left without being seen.  He did call his PCP and was given 5 more days of clindamycin and the abscess now seems to have resolved.  We discussed today that he is at risk for recurrence of infection due to immunocompromise state with chemotherapy.  He will notify us if he has any return of his abscess.  He does have intertrigo under his panniculus, I have advised him to keep this area dry, to apply topical drying/antifungal powders.  Also recommended looser clothing.  His counts are adequate to continue therapy, we will resume Taxotere today with cycle #3.  We will repeat restaging scans after 6 courses.  We are doing Xgeva every 6 weeks to coordinate with his Taxotere.  Once he finishes Taxotere we can then go back to every 4 weeks.  His next Xgeva is not due until 7/14/2022.  His calcium is running a little low,  he is going to  calcium supplement.    -6/23/2022 [facility] clinic follow-up: Chris overall is doing well on treatment with Taxotere.  Labs reviewed from yesterday and are unremarkable.  We will continue treatment today unchanged.  His suprapubic abscess resolved on clindamycin.  He will continue to use drying powder/antifungal powder under his panniculus for intertrigo.  Today will be cycle #4 of a planned 6 courses of Taxotere.  He is also on Xgeva, next dose due 7/14/2022, we are doing this every 6 weeks for now to line up with his chemotherapy, can go back to every 4 weeks after he finishes Taxotere.  Calcium from CMP yesterday was normal.    -7/13/2022 [facility] clinic follow-up: Mr. Morfin has had issues around day 3 after each treatment ranging from chest pain after his first treatment for which he did not seek medical attention, fatigue after the next few treatments, but 3 days after his most recent treatment on 6/23/2022 he had a syncopal episode at home and once again did not seek medical attention.  I discussed with him that this was not acceptable, if he has occurrences like this someone needs to call 911, it is difficult to figure out what is going on after the fact, the best time to work this up would be during the occurrence.  For now we will get labs today with CBC, CMP, PSA.  I will refer him to cardiology for further evaluation.  We will hold Taxotere most likely, I will see him tomorrow to go over his lab results.  I will also repeat his restaging scans with CT chest, abdomen and pelvis and will include CT of the head in light of his syncopal episode.      -7/13/2022 PSA 18.6    -7/14/2022 [facility] clinic follow-up: Mr. Morfin returns today to review his labs from yesterday.  Labs are unremarkable.  PSA down to 18.6 from a high of 259.0 in April prior to starting treatment.  CBC and CMP unremarkable, magnesium is normal at 2.3, phosphorus slightly low at 2.4.  We will  hold therapy for now in light of his syncopal episode on day 3 after his last treatment and prior episodes with chest pain and severe fatigue that all occurred on day 3 after his Taxotere.  We will restage him with CT head, chest, abdomen and pelvis (I am including the head in light of his syncopal episode).  I have also referred him to cardiology, he states that he received a call from them about making an appointment however he wanted to talk to us today first.  I emphasized the importance to him of making and keeping that appointment and he states understanding.  I also once again emphasized the requirement to seek emergency medical attention if he has any episodes in the future such as chest pain or syncopal events.  Whether or not we try and complete Taxotere with 2 more courses or move to the next line of therapy will be decided when he returns to discuss with Dr. Goodson and review his restaging scans.    -7/15/2022 saw Dr. Diaz cardiologist.  For syncope he ordered echocardiogram and 48-hour Holter monitor.    -7/15/2022 Holter monitor relatively benign.    -7/22/2022 CT brain with and without contrast negative.  CT chest abdomen and pelvis shows some nonspecific cecal wall thickening.  No progression in the chest.  T1 and ribs stable.  Decrease in multiple retroperitoneal and pelvic nodes.  Slight increase in right acetabular sclerotic lesion.  Persistent but improved circumferential bladder wall thickening.  Total body bone scan shows stable left third rib and no evidence of new bone metastases.    -7/19/2022 ejection fraction 65% with grade 1 diastolic dysfunction.    -7/26/2022 [facility] clinic follow-up: Given presyncopal symptoms occurred 3 days after Taxotere and has not otherwise occurred any other time and his cardiology work-up is fairly unremarkable and his disease is under good control as witnessed by the report above of the CTs of head chest abdomen pelvis and bone scan, I will hold  cycle 5 and 6 of Taxotere and continue 1 now with Zytiga and prednisone.  With reference to the coincidental cecal wall thickening found on CT, we will get him to [facility] surgeons for colonoscopy.  He will see my nurse practitioner back in August for next cycle of Abiraterone prednisone.  He will continue his Lupron with Dr. Matute.  We will continue his Xgeva.  We will repeat his CT chest abdomen pelvis and bone scan again in 3 months.  He will see my nurse practitioner in the interim.    -8/23/2022 [facility] clinic follow-up: No further presyncopal symptoms.  Feeling great other than for hot flashes.  Did commend for now we will continue on with his Zytiga prednisone and he will get his Xgeva today based on labs from 8/10/2022.  He opted out of getting the colonoscopy that I recommended and he will not change his mind.  He will continue getting the Lupron with Dr. Matute and I asked him to give the date of this appointment to my nurse when he sees her back in a month.  We will give his Xgeva today.  We will get CT chest abdomen pelvis and total body bone scan towards the middle to end of October.  Presently other than for the hot flashes feels great.    -9/20/2022 PSA 8.320    -9/22/2022 [facility] clinic follow-up: Chris is doing well on Zytiga, prednisone along with Xgeva.  Labs reviewed from 9/20/2022 are unremarkable, CBC was normal, CMP with creatinine of 1.34 otherwise normal, this is stable from his baseline.  PSA stable at 8.320.  He received Lupron recently with Dr. Matute, he thinks last week or so, states his next appointment is in February.  He will continue treatment unchanged.  We will repeat restaging CT chest, abdomen and pelvis and total body bone scan in October prior to return and I have ordered those today.  We will also repeat his PSA.    -9/22/2022 CT chest abdomen pelvisCompared to July 2022 [facility] showed no evidence of disease progression in the chest with no  "substantial sclerotic bony lesions and decrease in size of retroperitoneal and pelvic nodes with persistent cecal wall thickening and suboptimal bladder distention.  Total body bone scan showed decrease in previously seen uptake in the left third rib with diffuse sclerosis representing treatment response with no new foci.    -10/18/2022 [facility] medical oncology follow-up: I reviewed bone scan and CT reports as above with no evidence of progression.  Tolerating Zytiga prednisone and Xgeva.  Getting Lupron regularly with Dr. Matute next appointment coming in February.  We will repeat his CT chest abdomen pelvis and total body bone scan in April.  He will follow with my nurse practitioner in the interim.    -1/10/2023 PSA 3.450  -1/11/2023 [facility] Oncology clinic follow-up: Mr. Morfin continues to do well on current therapy with Zytiga, prednisone and Xgeva with no unusual side effects.  He has no new worrisome symptoms.  He is due for his next Lupron injection in February with Dr. Matute.  His PSA continues to trend downward, current PSA from yesterday was 3.450.  We will continue therapy unchanged, he will receive Xgeva today.  Has had no hypocalcemia, his CMP, phosphorus and magnesium are all normal.  We will repeat restaging scans in April.  -3/7/2023 PSA 2.460  -3/8/2023 [facility] Oncology clinic follow-up:  Mr. Morfin is doing well.  He is tolerating therapy with Zytiga, prednisone and Xgeva with no significant side effects.  He has hot flashes but these are tolerable.  He had Lupron injection 2/24/2023 with Dr. Matute.  His PSA continues to decline, current PSA 2.460.  He will continue therapy unchanged.  We will repeat restaging scans late April prior to return in 2 months and I have ordered those today.  He is working with his PCP Rodrigo Ram on his hypertension.  His b/p today was 160/88.  He was to have increased his losartan but I do not think he has done that yet as he said \"I still have some of my old " "prescription left\".  -4/4/2023 PSA 2.55  - 4/26/2023 CT chest abdomen pelvis [facility] comparison 10/10/2022 showed stable left third rib, T1, left fourth and eighth rib, right seventh rib.  Probable liver cyst.  Few diverticuli.  Mild further decrease in a few of the previously enlarged nodes.  Left external iliac 12 mm compared to 16 mm.  Right common iliac 7 mm stable.  Lower left periaortic 8 mm previously 13 mm.  No new lytic or blastic osseous lesions.  Total body bone scan comparison 10/10/2022, 7/22/2022, and CT 4/26/2023.  No new sites of increased uptake.  1 focal left third rib hotspot consistent with bone metastasis.    -5/2/2023 [facility] medical oncology follow-up: I reviewed interval notes since I last saw him from my nurse practitioner as outlined above in interval images and reports thereof from [facility] that shows no new sites of bone metastasis and no kizzy or visceral progression.  PSA stabilized around 2.5.  In the absence of symptomatic or radiographic progression, I would be unlikely to change therapy just on the basis of a PSA rise and for now the PSA is stable.  We will continue Zytiga prednisone and Xgeva and he will follow-up with my nurse practitioner 5/30/2023 with repeat CTs and bone scan in 6 months.  I asked him to check with his primary care today regarding his systolic in the 170s.    -5/31/2023 [facility] clinic follow-up: Chris continues to do well on current therapy with Zytiga, prednisone and Xgeva along with Lupron that he receives with Dr. Matute.  His PSA remains stable, it was lower this month compared to last month, current PSA 1.940.  Continues to deal with hypertension, on arrival today his blood pressure was 210/92 however this was using a wrist cuff, when I rechecked it manually his blood pressure was 160/90.  Still has room for improvement despite recent increase in his losartan to 100 mg daily.  He will follow-up with Dr. Ram for further management. "  We will continue therapy unchanged.  We will repeat restaging scans in October.    -7/26/2023 [facility] Oncology clinic follow-up: Chris overall continues to do well on current therapy with Zytiga, prednisone and Xgeva.  He receives Lupron every 6 months with Dr. Matute and states that is scheduled for August.  PSA from yesterday is pending, PSA on 6/27/2023 was 1.590 which was continuing to decline, in February it was 3.250.  His creatinine this past month has bumped up.  He feels he is staying hydrated but I encouraged him to increase his fluid intake.  I discussed with him that this could be from his hypertension, some of his medications.  His blood pressure today is 165/92.  He has an appointment with his primary care provider Dr. Teri Ram next week and can further discuss with her.  No adjustment needed for Xgeva.  I refilled his prednisone today.  I will see him back in 1 month for follow-up with continued monitoring of his labs.  He may end up needing to see nephrology.  We will repeat restaging scans in October.    -9/29/2023 PSA 1.810  -8/23/2023 [facility] Oncology clinic follow-up: Chris continues on therapy with Zytiga, prednisone and Xgeva, he also receives Lupron every 6 months with Dr. Matute with most recent given on 8/7/2023.  He is scheduled for a cystoscopy in a few weeks as his last few UAs per the patient's report had microscopic hematuria, he has had no steve hematuria.  His creatinine continues to remain elevated at 1.82, BUN is 40, GFR is 40.  I will get him to nephrology for further evaluation.  Blood pressure today was fairly good at 161/86, he continues to follow with Dr. Ram for management.  PSA on 7/25/2023 was up slightly from prior month, PSA in July was 2.140, in June it was 1.590, PSA from yesterday is pending.  I plan on repeating restaging CT scans and bone scans in October however if his PSA is up significantly then I will do sooner.  I will see him back for follow-up  in 1 month.  -8/23/2024 PSA 1.860    -9/20/2023 [facility] clinic follow-up: Chris is tolerating treatment with Zytiga, prednisone and Xgeva.  He is up-to-date with his Lupron injection, last received in August with Dr. Matute.  He had cystoscopy on 9/8/2023 that was normal. I referred him to nephrology when I saw him last in August as his creatinine has been increasing, creatinine yesterday was a little better 1.51, GFR is 50, creatinine clearance 63.8.  He is still awaiting an appointment with nephrology, per our  it will be early November.  We will repeat his restaging scans prior to return and I will do his CT scans without contrast in light of his new renal insufficiency.  We will also get total body bone scan and I have ordered those today.  His PSA yesterday was 1.810.    -10/9/2023 CT chest abdomen Pelvis without contrast compared to April 2023 shows slightly prominent pelvic lymph nodes left external iliac 8 mm compared to prior study.  Right internal iliac heterogenous 19 mm compared to 11 mm prior.  Stable sclerotic bone lesions and no new lesions.  Stable bone scan with no new sites of disease    -10/17/2023 [facility] oncology clinic follow-up: With elevated creatinine, CTs done without contrast showed very slight increased prominence by few millimeters of some internal iliac and external iliac nodes for which PSMA PET could be done but for now I will check his PSA and have him see my nurse practitioner back in a week and unless the PSA is significantly rising I would continue the Zytiga, prednisone, Xgeva and February dose of Lupron with Dr. Matute and make sure he follows with nephrology.  If his PSA is significantly rising then my nurse practitioner will order PSMA PET.     Prostate cancer metastatic to multiple sites   3/15/2022 Initial Diagnosis    Prostate cancer metastatic to multiple sites (HCC)     3/15/2022 Cancer Staged    Staging form: Prostate, AJCC 8th Edition  - Clinical:  "Stage IVB (cT1c, cN1, cM1b, Grade Group: 5) - Signed by Fritz Goodsno MD on 3/15/2022     4/7/2022 - 6/23/2022 Chemotherapy    Stopped after cycle 4 6/23/2022 due to syncope 3 days post infusions with negative cardiac work-up  OP PROSTATE DOCEtaxel         4/7/2022 -  Chemotherapy    OP SUPPORTIVE Denosumab (Xgeva) Q28D     7/26/2022 -  Chemotherapy    OP PROSTATE Abiraterone / PredniSONE           HISTORY OF PRESENT ILLNESS:  The patient is a 68 y.o. male, here for follow up on management of metastatic prostate cancer.  Tolerating Zytiga prednisone.    Past Medical History:   Diagnosis Date    Cancer     Prostate cancer      Past Surgical History:   Procedure Laterality Date    PROSTATE BIOPSY  02/2022    dr. sue       No Known Allergies    Family History and Social History reviewed and changed as necessary    REVIEW OF SYSTEM:   No new somatic complaints    PHYSICAL EXAM:  No jaundice icterus or rash.  No focal motor or sensory deficits.  No palpable cervical axillary or inguinal nodes    Vitals:    10/17/23 0835   BP: 156/92   Pulse: 74   Resp: 18   Temp: 98.4 °F (36.9 °C)   SpO2: 97%   Weight: 125 kg (275 lb)   Height: 180.3 cm (71\")     Vitals:    10/17/23 0835   PainSc: 0-No pain          ECOG score: 0           Vitals reviewed.    ECOG: (0) Fully Active - Able to Carry On All Pre-disease Performance Without Restriction    Lab Results   Component Value Date    HGB 13.3 09/19/2023    HCT 40.6 09/19/2023    MCV 87.5 09/19/2023     09/19/2023    WBC 7.89 09/19/2023    NEUTROABS 5.29 09/19/2023    LYMPHSABS 1.70 09/19/2023    MONOSABS 0.57 09/19/2023    EOSABS 0.22 09/19/2023    BASOSABS 0.07 09/19/2023       Lab Results   Component Value Date    GLUCOSE 101 (H) 09/19/2023    BUN 34 (H) 09/19/2023    CREATININE 1.51 (H) 09/19/2023     09/19/2023    K 4.4 09/19/2023     09/19/2023    CO2 25.7 09/19/2023    CALCIUM 10.0 09/19/2023    ALBUMIN 4.5 09/19/2023    BILITOT 0.2 09/19/2023    ALKPHOS " 91 09/19/2023    AST 23 09/19/2023    ALT 36 09/19/2023             ASSESSMENT & PLAN:  1.  Prostate cancer clinical T1c and 2M1B stage IVb treated with 4 cycles of Taxotere, stopped due to syncope with negative cardiac work-up, with marked drop in PSA of 18.6 from over 900 at baseline, continued on Zytiga and prednisone.  2.  Urinary retention with Goodwin in place 1/24/2022.  On finasteride 1/24/2022.  3.  Covid 19 December 2021  4.  Hypertension    Oncology history timeline:  -11/29/2021  with symptoms of urinary retention.  -2/15/2022 prostate biopsy adenocarcinoma grade group 5 and 3 out of 12 cores, grade group 4 in 1 out of 12 cores, grade group 3 in 8 out of 12 cores.  -2/24/2022 CT chest abdomen pelvis and total body bone scan shows left third rib, right acetabulum, periaortic and retroperitoneal kizzy metastases complaining of pain in the left chest and right hip.  Also possible sclerotic left 10th rib on CT.  Spleen mildly enlarged 13.8 cm.  Hepatic steatosis.  Small liver cyst.  Fat stranding in the periaortic and mesenteric region consistent with reactive/inflammatory stranding.  Multiple enlarged periaortic and retroperitoneal nodes and lymphadenopathy largest 4.9 cm.  CT chest describes tiny sclerotic foci in left eighth rib and right lateral seventh rib and T1.  Multiple small mediastinal and bilateral axillary nodes seen with small hypodense left thyroid nodule.  Creatinine 1.7.  -3/4/2022 office note Dr. Rolando Matute: Patient was seen after his elevated PSA by Dr. Vera and prostate biopsy scheduled.  However, he developed COVID-19 and this was canceled and the patient did not reschedule due to lack of insurance.  Saw Dr. Matute back on 1/24/2022 with plans to get staging CTs and bone scan with results as outlined above.  Started Casodex and to return on 3/11/2022 for Lupron.  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  TURP planned  for urinary retention symptoms.    -3/15/2022 [facility] medical oncology initial consultation: I reviewed the above data with the patient.  With his fairly bulky retroperitoneal adenopathy and bone metastases including extra axial metastases, he would fit the data from the CHAARTED trial for which Taxotere x6 followed by Zytiga prednisone along with the planned Lupron already being planned by Dr. Matute would be reasonable.  Equally reasonable in terms of comparisons with bicalutamide and Lupron would be Zytiga prednisone plus Lupron or Xtandi plus Lupron or apalutamide plus Lupron.  None of these have been compared head-to-head but all have beaten combined androgen deprivation therapy with bicalutamide and Lupron.  At the age of 67 and in order to have as many bullets as possible down the road and hence saving some of the hormone blockade options for later and using chemotherapy when he is younger and healthier for a more time-limited.,  He has opted for the Taxotere x6 followed by Zytiga and prednisone.  We will also give him Xgeva to improve bone health and given metastatic disease, as with all metastatic prostate cancer patients, he needs genetic counseling both for his sake as well as his family's.  If he were to have BRCA1 or other such  mutations, then that also opens up the options for PARP inhibitors etc. down the road.  He has his TURP scheduled for 2/24/2022 and we will start treatment a couple of weeks after that and he will get chemo preparation visit in the meantime and I will get capital surgeons to place a port.  We will check his PSA after his TURP when he sees my nurse practitioner back early April for the start of chemotherapy and repeat the PSA and CT chest abdomen pelvis and bone scan after the Taxotere is complete.    -3/31/2022 chemotherapy education and needs assessment completed    -4/7/2022 began docetaxel and Xgeva.  .0.  We will do his Xgeva every 6 weeks to coordinate with  his docetaxel infusions.    -4/19/2022 patient stopped Casodex    -5/17/2022 patient reported seeing his PCP for an abscess in his suprapubic area.  Placed on clindamycin, will delay treatment 1 week.    -5/25/2022 PSA 34.3  -5/26/2022 [facility] Oncology clinic follow-up: Chris has an abscess in the suprapubic area, it has improved on antibiotic therapy but I am concerned if we treat him it will just flare back up unless it is drained.  I will get him to Dr. Miller who placed his port for I&D and culture.  He is on clindamycin and states he completes course of treatment tomorrow.  I will delay his treatment with Taxotere 1 more week.  We will give his Xgeva today.  I will see him back in 1 week for follow-up to assess whether or not we can resume his Taxotere.  His PSA has dropped nicely with treatment thus far, PSA yesterday was 34.3 which is down from a PSA of 259.0 when we started treatment, it has been as high as 911 in November.    -6/2/2022 [facility] Oncology clinic follow-up: Chris went to see Dr. Miller to have his abscess lanced however apparently there was a long wait and he left without being seen.  He did call his PCP and was given 5 more days of clindamycin and the abscess now seems to have resolved.  We discussed today that he is at risk for recurrence of infection due to immunocompromise state with chemotherapy.  He will notify us if he has any return of his abscess.  He does have intertrigo under his panniculus, I have advised him to keep this area dry, to apply topical drying/antifungal powders.  Also recommended looser clothing.  His counts are adequate to continue therapy, we will resume Taxotere today with cycle #3.  We will repeat restaging scans after 6 courses.  We are doing Xgeva every 6 weeks to coordinate with his Taxotere.  Once he finishes Taxotere we can then go back to every 4 weeks.  His next Xgeva is not due until 7/14/2022.  His calcium is running a little low, he is going to   calcium supplement.    -6/23/2022 [facility] Oncology clinic follow-up: Chris overall is doing well on treatment with Taxotere.  Labs reviewed from yesterday and are unremarkable.  We will continue treatment today unchanged.  His suprapubic abscess resolved on clindamycin.  He will continue to use drying powder/antifungal powder under his panniculus for intertrigo.  Today will be cycle #4 of a planned 6 courses of Taxotere.  He is also on Xgeva, next dose due 7/14/2022, we are doing this every 6 weeks for now to line up with his chemotherapy, can go back to every 4 weeks after he finishes Taxotere.  Calcium from CMP yesterday was normal.    -7/13/2022 [facility] Oncology clinic follow-up: Mr. Morfin has had issues around day 3 after each treatment ranging from chest pain after his first treatment for which he did not seek medical attention, fatigue after the next few treatments, but 3 days after his most recent treatment on 6/23/2022 he had a syncopal episode at home and once again did not seek medical attention.  I discussed with him that this was not acceptable, if he has occurrences like this someone needs to call 911, it is difficult to figure out what is going on after the fact, the best time to work this up would be during the occurrence.  For now we will get labs today with CBC, CMP, PSA.  I will refer him to cardiology for further evaluation.  We will hold Taxotere most likely, I will see him tomorrow to go over his lab results.  I will also repeat his restaging scans with CT chest, abdomen and pelvis and will include CT of the head in light of his syncopal episode.      -7/13/2022 PSA 18.6    -7/14/2022 [facility] Oncology clinic follow-up: Mr. Morfin returns today to review his labs from yesterday.  Labs are unremarkable.  PSA down to 18.6 from a high of 259.0 in April prior to starting treatment.  CBC and CMP unremarkable, magnesium is normal at 2.3, phosphorus slightly low at 2.4.  We will hold therapy for now in  light of his syncopal episode on day 3 after his last treatment and prior episodes with chest pain and severe fatigue that all occurred on day 3 after his Taxotere.  We will restage him with CT head, chest, abdomen and pelvis (I am including the head in light of his syncopal episode).  I have also referred him to cardiology, he states that he received a call from them about making an appointment however he wanted to talk to us today first.  I emphasized the importance to him of making and keeping that appointment and he states understanding.  I also once again emphasized the requirement to seek emergency medical attention if he has any episodes in the future such as chest pain or syncopal events.  Whether or not we try and complete Taxotere with 2 more courses or move to the next line of therapy will be decided when he returns to discuss with Dr. Goodson and review his restaging scans.    -7/15/2022 saw Dr. Diaz cardiologist.  For syncope he ordered echocardiogram and 48-hour Holter monitor.    -7/15/2022 Holter monitor relatively benign.    -7/22/2022 CT brain with and without contrast negative.  CT chest abdomen and pelvis shows some nonspecific cecal wall thickening.  No progression in the chest.  T1 and ribs stable.  Decrease in multiple retroperitoneal and pelvic nodes.  Slight increase in right acetabular sclerotic lesion.  Persistent but improved circumferential bladder wall thickening.  Total body bone scan shows stable left third rib and no evidence of new bone metastases.    -7/19/2022 ejection fraction 65% with grade 1 diastolic dysfunction.    -7/26/2022 [facility] clinic follow-up: Given presyncopal symptoms occurred 3 days after Taxotere and has not otherwise occurred any other time and his cardiology work-up is fairly unremarkable and his disease is under good control as witnessed by the report above of the CTs of head chest abdomen pelvis and bone scan, I will hold cycle 5 and 6 of Taxotere  and continue 1 now with Zytiga and prednisone.  With reference to the coincidental cecal wall thickening found on CT, we will get him to [facility] surgeons for colonoscopy.  He will see my nurse practitioner back in August for next cycle of Abiraterone prednisone.  He will continue his Lupron with Dr. Matute.  We will continue his Xgeva.  We will repeat his CT chest abdomen pelvis and bone scan again in 3 months.  He will see my nurse practitioner in the interim.    -8/23/2022 [facility] follow[facility]: No further presyncopal symptoms.  Feeling great other than for hot flashes.  Did commend for now we will continue on with his Zytiga prednisone and he will get his Xgeva today based on labs from 8/10/2022.  He opted out of getting the colonoscopy that I recommended and he will not change his mind.  He will continue getting the Lupron with Dr. Matute and I asked him to give the date of this appointment to my nurse when he sees her back in a month.  We will give his Xgeva today.  We will get CT chest abdomen pelvis and total body bone scan towards the middle to end of October.  Presently other than for the hot flashes feels great.    -9/20/2022 PSA 8.320    -9/22/2022 [facility] follow-up: Chris is doing well on Zytiga, prednisone along with Xgeva.  Labs reviewed from 9/20/2022 are unremarkable, CBC was normal, CMP with creatinine of 1.34 otherwise normal, this is stable from his baseline.  PSA stable at 8.320.  He received Lupron recently with Dr. Matute, he thinks last week or so, states his next appointment is in February.  He will continue treatment unchanged.  We will repeat restaging CT chest, abdomen and pelvis and total body bone scan in October prior to return and I have ordered those today.  We will also repeat his PSA.    -9/22/2022 CT chest abdomen pelvisCompared to July 2022 [facility] showed no evidence of disease progression in the chest with no substantial sclerotic bony  "lesions and decrease in size of retroperitoneal and pelvic nodes with persistent cecal wall thickening and suboptimal bladder distention.  Total body bone scan showed decrease in previously seen uptake in the left third rib with diffuse sclerosis representing treatment response with no new foci.    -10/18/2022 [facility] medical oncology follow-up: I reviewed bone scan and CT reports as above with no evidence of progression.  Tolerating Zytiga prednisone and Xgeva.  Getting Lupron regularly with Dr. Matute next appointment coming in February.  We will repeat his CT chest abdomen pelvis and total body bone scan in April.  He will follow with my nurse practitioner in the interim.    -1/10/2023 PSA 3.450  -1/11/2023 [facility] Oncology clinic follow-up: Mr. Morfin continues to do well on current therapy with Zytiga, prednisone and Xgeva with no unusual side effects.  He has no new worrisome symptoms.  He is due for his next Lupron injection in February with Dr. Matute.  His PSA continues to trend downward, current PSA from yesterday was 3.450.  We will continue therapy unchanged, he will receive Xgeva today.  Has had no hypocalcemia, his CMP, phosphorus and magnesium are all normal.  We will repeat restaging scans in April.  -3/7/2023 PSA 2.460  -3/8/2023 [facility] Oncology clinic follow-up:  Mr. Morfin is doing well.  He is tolerating therapy with Zytiga, prednisone and Xgeva with no significant side effects.  He has hot flashes but these are tolerable.  He had Lupron injection 2/24/2023 with Dr. Matute.  His PSA continues to decline, current PSA 2.460.  He will continue therapy unchanged.  We will repeat restaging scans late April prior to return in 2 months and I have ordered those today.  He is working with his PCP Rodrigo Ram on his hypertension.  His b/p today was 160/88.  He was to have increased his losartan but I do not think he has done that yet as he said \"I still have some of my old prescription " "left\".  -4/4/2023 PSA 2.55  - 4/26/2023 CT chest abdomen pelvis [facility] comparison 10/10/2022 showed stable left third rib, T1, left fourth and eighth rib, right seventh rib.  Probable liver cyst.  Few diverticuli.  Mild further decrease in a few of the previously enlarged nodes.  Left external iliac 12 mm compared to 16 mm.  Right common iliac 7 mm stable.  Lower left periaortic 8 mm previously 13 mm.  No new lytic or blastic osseous lesions.  Total body bone scan comparison 10/10/2022, 7/22/2022, and CT 4/26/2023.  No new sites of increased uptake.  1 focal left third rib hotspot consistent with bone metastasis.    -5/2/2023 [facility]: I reviewed interval notes since I last saw him from my nurse practitioner as outlined above in interval images and reports thereof from [facility] that shows no new sites of bone metastasis and no kizzy or visceral progression.  PSA stabilized around 2.5.  In the absence of symptomatic or radiographic progression, I would be unlikely to change therapy just on the basis of a PSA rise and for now the PSA is stable.  We will continue Zytiga prednisone and Xgeva and he will follow-up with my nurse practitioner 5/30/2023 with repeat CTs and bone scan in 6 months.  I asked him to check with his primary care today regarding his systolic in the 170s.    -5/31/2023 [facility] follow-up: Chris continues to do well on current therapy with Zytiga, prednisone and Xgeva along with Lupron that he receives with Dr. Matute.  His PSA remains stable, it was lower this month compared to last month, current PSA 1.940.  Continues to deal with hypertension, on arrival today his blood pressure was 210/92 however this was using a wrist cuff, when I rechecked it manually his blood pressure was 160/90.  Still has room for improvement despite recent increase in his losartan to 100 mg daily.  He will follow-up with Dr. Ram for further management.  We will " continue therapy unchanged.  We will repeat restaging scans in October.    -7/26/2023 [facility] [place] Oncology clinic follow-up: Chris overall continues to do well on current therapy with Zytiga, prednisone and Xgeva.  He receives Lupron every 6 months with Dr. Matute and states that is scheduled for August.  PSA from yesterday is pending, PSA on 6/27/2023 was 1.590 which was continuing to decline, in February it was 3.250.  His creatinine this past month has bumped up.  He feels he is staying hydrated but I encouraged him to increase his fluid intake.  I discussed with him that this could be from his hypertension, some of his medications.  His blood pressure today is 165/92.  He has an appointment with his primary care provider Dr. Teri Ram next week and can further discuss with her.  No adjustment needed for Xgeva.  I refilled his prednisone today.  I will see him back in 1 month for follow-up with continued monitoring of his labs.  He may end up needing to see nephrology.  We will repeat restaging scans in October.    -9/29/2023 PSA 1.810  -8/23/2023 [facility] [place] Oncology clinic follow-up: Chris continues on therapy with Zytiga, prednisone and Xgeva, he also receives Lupron every 6 months with Dr. Matute with most recent given on 8/7/2023.  He is scheduled for a cystoscopy in a few weeks as his last few UAs per the patient's report had microscopic hematuria, he has had no steve hematuria.  His creatinine continues to remain elevated at 1.82, BUN is 40, GFR is 40.  I will get him to nephrology for further evaluation.  Blood pressure today was fairly good at 161/86, he continues to follow with Dr. Ram for management.  PSA on 7/25/2023 was up slightly from prior month, PSA in July was 2.140, in June it was 1.590, PSA from yesterday is pending.  I plan on repeating restaging CT scans and bone scans in October however if his PSA is up significantly then I will do sooner.  I will see him back for follow-up in 1  month.  -8/23/2024 PSA 1.860    -9/20/2023 [facility] follow-up: Chris is tolerating treatment with Zytiga, prednisone and Xgeva.  He is up-to-date with his Lupron injection, last received in August with Dr. Matute.  He had cystoscopy on 9/8/2023 that was normal. I referred him to nephrology when I saw him last in August as his creatinine has been increasing, creatinine yesterday was a little better 1.51, GFR is 50, creatinine clearance 63.8.  He is still awaiting an appointment with nephrology, per our  it will be early November.  We will repeat his restaging scans prior to return and I will do his CT scans without contrast in light of his new renal insufficiency.  We will also get total body bone scan and I have ordered those today.  His PSA yesterday was 1.810.    -10/9/2023 CT chest abdomen Pelvis without contrast compared to April 2023 shows slightly prominent pelvic lymph nodes left external iliac 8 mm compared to prior study.  Right internal iliac heterogenous 19 mm compared to 11 mm prior.  Stable sclerotic bone lesions and no new lesions.  Stable bone scan with no new sites of disease    -10/17/2023 [facility] oncology clinic follow-up: With elevated creatinine, CTs done without contrast showed very slight increased prominence by few millimeters of some internal iliac and external iliac nodes for which PSMA PET could be done but for now I will check his PSA and have him see my nurse practitioner back in a week and unless the PSA is significantly rising I would continue the Zytiga, prednisone, Xgeva and February dose of Lupron with Dr. Matute and make sure he follows with nephrology.  If his PSA is significantly rising then my nurse practitioner will order PSMA PET.    Total time of care today inclusive of time spent today prior to patient's arrival reviewing interval images and reports thereof and during visit translating that information to the patient and interviewing him as to  signs or symptoms of his disease and management thereof and after visit instituting this plan took 45 minutes of patient care time throughout the day today.  Fritz Goodson MD    10/17/2023

## 2023-10-17 NOTE — LETTER
October 17, 2023       No Recipients    Patient: Chris Morfin   YOB: 1955   Date of Visit: 10/17/2023     Dear Teri Ram, DO:       Thank you for referring Chris Morfin to me for evaluation. Below are the relevant portions of my assessment and plan of care.    If you have questions, please do not hesitate to call me. I look forward to following Chris along with you.         Sincerely,        Fritz Goodson MD        CC:   No Recipients    Fritz Goodson MD  10/17/23 0847  Sign when Signing Visit  CHIEF COMPLAINT: No new somatic complaints    Problem List:  Oncology/Hematology History Overview Note   1.  Prostate cancer clinical T1c and 2M1B stage IVb treated with 4 cycles of Taxotere, stopped due to syncope with negative cardiac work-up, with marked drop in PSA of 18.6 from over 900 at baseline, continued on Zytiga and prednisone.  2.  Urinary retention with Goodwin in place 1/24/2022.  On finasteride 1/24/2022.  3.  Covid 19 December 2021  4.  Hypertension    Oncology history timeline:  -11/29/2021  with symptoms of urinary retention.  -2/15/2022 prostate biopsy adenocarcinoma grade group 5 and 3 out of 12 cores, grade group 4 in 1 out of 12 cores, grade group 3 in 8 out of 12 cores.  -2/24/2022 CT chest abdomen pelvis and total body bone scan shows left third rib, right acetabulum, periaortic and retroperitoneal kizzy metastases complaining of pain in the left chest and right hip.  Also possible sclerotic left 10th rib on CT.  Spleen mildly enlarged 13.8 cm.  Hepatic steatosis.  Small liver cyst.  Fat stranding in the periaortic and mesenteric region consistent with reactive/inflammatory stranding.  Multiple enlarged periaortic and retroperitoneal nodes and lymphadenopathy largest 4.9 cm.  CT chest describes tiny sclerotic foci in left eighth rib and right lateral seventh rib and T1.  Multiple small mediastinal and bilateral axillary nodes seen with small hypodense left thyroid  nodule.  Creatinine 1.7.  -3/4/2022 office note Dr. Rolando Matute: Patient was seen after his elevated PSA by Dr. Vera and prostate biopsy scheduled.  However, he developed COVID-19 and this was canceled and the patient did not reschedule due to lack of insurance.  Saw Dr. Matute back on 1/24/2022 with plans to get staging CTs and bone scan with results as outlined above.  Started Casodex and to return on 3/11/2022 for Lupron.  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  TURP planned for urinary retention symptoms.    -3/15/2022 [facility], [place] medical oncology initial consultation: I reviewed the above data with the patient.  With his fairly bulky retroperitoneal adenopathy and bone metastases including extra axial metastases, he would fit the data from the CHAARTED trial for which Taxotere x6 followed by Zytiga prednisone along with the planned Lupron already being planned by Dr. Matute would be reasonable.  Equally reasonable in terms of comparisons with bicalutamide and Lupron would be Zytiga prednisone plus Lupron or Xtandi plus Lupron or apalutamide plus Lupron.  None of these have been compared head-to-head but all have beaten combined androgen deprivation therapy with bicalutamide and Lupron.  At the age of 67 and in order to have as many bullets as possible down the road and hence saving some of the hormone blockade options for later and using chemotherapy when he is younger and healthier for a more time-limited.,  He has opted for the Taxotere x6 followed by Zytiga and prednisone.  We will also give him Xgeva to improve bone health and given metastatic disease, as with all metastatic prostate cancer patients, he needs genetic counseling both for his sake as well as his family's.  If he were to have BRCA1 or other such  mutations, then that also opens up the options for PARP inhibitors etc. down the road.  He has his TURP scheduled for 2/24/2022  and we will start treatment a couple of weeks after that and he will get chemo preparation visit in the meantime and I will get capital surgeons to place a port.  We will check his PSA after his TURP when he sees my nurse practitioner back early April for the start of chemotherapy and repeat the PSA and CT chest abdomen pelvis and bone scan after the Taxotere is complete.    -3/31/2022 chemotherapy education and needs assessment completed    -4/7/2022 began docetaxel and Xgeva.  .0.  We will do his Xgeva every 6 weeks to coordinate with his docetaxel infusions.    -4/19/2022 patient stopped Casodex    -5/17/2022 patient reported seeing his PCP for an abscess in his suprapubic area.  Placed on clindamycin, will delay treatment 1 week.    -5/25/2022 PSA 34.3  -5/26/2022 [facility] [place] Oncology clinic follow-up: Chris has an abscess in the suprapubic area, it has improved on antibiotic therapy but I am concerned if we treat him it will just flare back up unless it is drained.  I will get him to Dr. Miller who placed his port for I&D and culture.  He is on clindamycin and states he completes course of treatment tomorrow.  I will delay his treatment with Taxotere 1 more week.  We will give his Xgeva today.  I will see him back in 1 week for follow-up to assess whether or not we can resume his Taxotere.  His PSA has dropped nicely with treatment thus far, PSA yesterday was 34.3 which is down from a PSA of 259.0 when we started treatment, it has been as high as 911 in November.    -6/2/2022 [facility] [place] Oncology clinic follow-up: Chris went to see Dr. Miller to have his abscess lanced however apparently there was a long wait and he left without being seen.  He did call his PCP and was given 5 more days of clindamycin and the abscess now seems to have resolved.  We discussed today that he is at risk for recurrence of infection due to immunocompromise state with chemotherapy.  He will notify us if he has any return of his  abscess.  He does have intertrigo under his panniculus, I have advised him to keep this area dry, to apply topical drying/antifungal powders.  Also recommended looser clothing.  His counts are adequate to continue therapy, we will resume Taxotere today with cycle #3.  We will repeat restaging scans after 6 courses.  We are doing Xgeva every 6 weeks to coordinate with his Taxotere.  Once he finishes Taxotere we can then go back to every 4 weeks.  His next Xgeva is not due until 7/14/2022.  His calcium is running a little low, he is going to  calcium supplement.    -6/23/2022 [facility] clinic follow-up: Chris overall is doing well on treatment with Taxotere.  Labs reviewed from yesterday and are unremarkable.  We will continue treatment today unchanged.  His suprapubic abscess resolved on clindamycin.  He will continue to use drying powder/antifungal powder under his panniculus for intertrigo.  Today will be cycle #4 of a planned 6 courses of Taxotere.  He is also on Xgeva, next dose due 7/14/2022, we are doing this every 6 weeks for now to line up with his chemotherapy, can go back to every 4 weeks after he finishes Taxotere.  Calcium from CMP yesterday was normal.    -7/13/2022 [facility] clinic follow-up: Mr. Morfin has had issues around day 3 after each treatment ranging from chest pain after his first treatment for which he did not seek medical attention, fatigue after the next few treatments, but 3 days after his most recent treatment on 6/23/2022 he had a syncopal episode at home and once again did not seek medical attention.  I discussed with him that this was not acceptable, if he has occurrences like this someone needs to call 911, it is difficult to figure out what is going on after the fact, the best time to work this up would be during the occurrence.  For now we will get labs today with CBC, CMP, PSA.  I will refer him to cardiology for further evaluation.  We will hold Taxotere most  likely, I will see him tomorrow to go over his lab results.  I will also repeat his restaging scans with CT chest, abdomen and pelvis and will include CT of the head in light of his syncopal episode.      -7/13/2022 PSA 18.6    -7/14/2022 [facility] Oncology clinic follow-up: Mr. Morfin returns today to review his labs from yesterday.  Labs are unremarkable.  PSA down to 18.6 from a high of 259.0 in April prior to starting treatment.  CBC and CMP unremarkable, magnesium is normal at 2.3, phosphorus slightly low at 2.4.  We will hold therapy for now in light of his syncopal episode on day 3 after his last treatment and prior episodes with chest pain and severe fatigue that all occurred on day 3 after his Taxotere.  We will restage him with CT head, chest, abdomen and pelvis (I am including the head in light of his syncopal episode).  I have also referred him to cardiology, he states that he received a call from them about making an appointment however he wanted to talk to us today first.  I emphasized the importance to him of making and keeping that appointment and he states understanding.  I also once again emphasized the requirement to seek emergency medical attention if he has any episodes in the future such as chest pain or syncopal events.  Whether or not we try and complete Taxotere with 2 more courses or move to the next line of therapy will be decided when he returns to discuss with Dr. Goodson and review his restaging scans.    -7/15/2022 saw Dr. Diaz cardiologist.  For syncope he ordered echocardiogram and 48-hour Holter monitor.    -7/15/2022 Holter monitor relatively benign.    -7/22/2022 CT brain with and without contrast negative.  CT chest abdomen and pelvis shows some nonspecific cecal wall thickening.  No progression in the chest.  T1 and ribs stable.  Decrease in multiple retroperitoneal and pelvic nodes.  Slight increase in right acetabular sclerotic lesion.  Persistent but improved circumferential  bladder wall thickening.  Total body bone scan shows stable left third rib and no evidence of new bone metastases.    -7/19/2022 ejection fraction 65% with grade 1 diastolic dysfunction.    -7/26/2022 [facility] clinic follow-up: Given presyncopal symptoms occurred 3 days after Taxotere and has not otherwise occurred any other time and his cardiology work-up is fairly unremarkable and his disease is under good control as witnessed by the report above of the CTs of head chest abdomen pelvis and bone scan, I will hold cycle 5 and 6 of Taxotere and continue 1 now with Zytiga and prednisone.  With reference to the coincidental cecal wall thickening found on CT, we will get him to [facility] for colonoscopy.  He will see my nurse practitioner back in August for next cycle of Abiraterone prednisone.  He will continue his Lupron with Dr. Matute.  We will continue his Xgeva.  We will repeat his CT chest abdomen pelvis and bone scan again in 3 months.  He will see my nurse practitioner in the interim.    -8/23/2022 [facility] clinic follow-up: No further presyncopal symptoms.  Feeling great other than for hot flashes.  Did commend for now we will continue on with his Zytiga prednisone and he will get his Xgeva today based on labs from 8/10/2022.  He opted out of getting the colonoscopy that I recommended and he will not change his mind.  He will continue getting the Lupron with Dr. Matute and I asked him to give the date of this appointment to my nurse when he sees her back in a month.  We will give his Xgeva today.  We will get CT chest abdomen pelvis and total body bone scan towards the middle to end of October.  Presently other than for the hot flashes feels great.    -9/20/2022 PSA 8.320    -9/22/2022 [facility] follow-up: Chris is doing well on Zytiga, prednisone along with Xgeva.  Labs reviewed from 9/20/2022 are unremarkable, CBC was normal, CMP with creatinine of 1.34 otherwise  normal, this is stable from his baseline.  PSA stable at 8.320.  He received Lupron recently with Dr. Matute, he thinks last week or so, states his next appointment is in February.  He will continue treatment unchanged.  We will repeat restaging CT chest, abdomen and pelvis and total body bone scan in October prior to return and I have ordered those today.  We will also repeat his PSA.    -9/22/2022 CT chest abdomen pelvisCompared to July 2022 [facility] showed no evidence of disease progression in the chest with no substantial sclerotic bony lesions and decrease in size of retroperitoneal and pelvic nodes with persistent cecal wall thickening and suboptimal bladder distention.  Total body bone scan showed decrease in previously seen uptake in the left third rib with diffuse sclerosis representing treatment response with no new foci.    -10/18/2022 [facility] medical oncology follow-up: I reviewed bone scan and CT reports as above with no evidence of progression.  Tolerating Zytiga prednisone and Xgeva.  Getting Lupron regularly with Dr. Matute next appointment coming in February.  We will repeat his CT chest abdomen pelvis and total body bone scan in April.  He will follow with my nurse practitioner in the interim.    -1/10/2023 PSA 3.450  -1/11/2023 [facility] clinic follow-up: Mr. Morfin continues to do well on current therapy with Zytiga, prednisone and Xgeva with no unusual side effects.  He has no new worrisome symptoms.  He is due for his next Lupron injection in February with Dr. Matute.  His PSA continues to trend downward, current PSA from yesterday was 3.450.  We will continue therapy unchanged, he will receive Xgeva today.  Has had no hypocalcemia, his CMP, phosphorus and magnesium are all normal.  We will repeat restaging scans in April.  -3/7/2023 PSA 2.460  -3/8/2023 [facility] clinic follow-up:  Mr. Morfin is doing well.  He is tolerating therapy with Zytiga, prednisone and  "Xgeva with no significant side effects.  He has hot flashes but these are tolerable.  He had Lupron injection 2/24/2023 with Dr. Matute.  His PSA continues to decline, current PSA 2.460.  He will continue therapy unchanged.  We will repeat restaging scans late April prior to return in 2 months and I have ordered those today.  He is working with his PCP Rodrigo Ram on his hypertension.  His b/p today was 160/88.  He was to have increased his losartan but I do not think he has done that yet as he said \"I still have some of my old prescription left\".  -4/4/2023 PSA 2.55  - 4/26/2023 CT chest abdomen pelvis [facility] comparison 10/10/2022 showed stable left third rib, T1, left fourth and eighth rib, right seventh rib.  Probable liver cyst.  Few diverticuli.  Mild further decrease in a few of the previously enlarged nodes.  Left external iliac 12 mm compared to 16 mm.  Right common iliac 7 mm stable.  Lower left periaortic 8 mm previously 13 mm.  No new lytic or blastic osseous lesions.  Total body bone scan comparison 10/10/2022, 7/22/2022, and CT 4/26/2023.  No new sites of increased uptake.  1 focal left third rib hotspot consistent with bone metastasis.    -5/2/2023 [facility]ology follow-up: I reviewed interval notes since I last saw him from my nurse practitioner as outlined above in interval images and reports thereof from [facility] that shows no new sites of bone metastasis and no kizzy or visceral progression.  PSA stabilized around 2.5.  In the absence of symptomatic or radiographic progression, I would be unlikely to change therapy just on the basis of a PSA rise and for now the PSA is stable.  We will continue Zytiga prednisone and Xgeva and he will follow-up with my nurse practitioner 5/30/2023 with repeat CTs and bone scan in 6 months.  I asked him to check with his primary care today regarding his systolic in the 170s.    -5/31/2023 [facility] clinic follow-up: Chris continues to " do well on current therapy with Zytiga, prednisone and Xgeva along with Lupron that he receives with Dr. Matute.  His PSA remains stable, it was lower this month compared to last month, current PSA 1.940.  Continues to deal with hypertension, on arrival today his blood pressure was 210/92 however this was using a wrist cuff, when I rechecked it manually his blood pressure was 160/90.  Still has room for improvement despite recent increase in his losartan to 100 mg daily.  He will follow-up with Dr. Ram for further management.  We will continue therapy unchanged.  We will repeat restaging scans in October.    -7/26/2023 [facility] Oncology clinic follow-up: Chris overall continues to do well on current therapy with Zytiga, prednisone and Xgeva.  He receives Lupron every 6 months with Dr. Matute and states that is scheduled for August.  PSA from yesterday is pending, PSA on 6/27/2023 was 1.590 which was continuing to decline, in February it was 3.250.  His creatinine this past month has bumped up.  He feels he is staying hydrated but I encouraged him to increase his fluid intake.  I discussed with him that this could be from his hypertension, some of his medications.  His blood pressure today is 165/92.  He has an appointment with his primary care provider Dr. Teri Ram next week and can further discuss with her.  No adjustment needed for Xgeva.  I refilled his prednisone today.  I will see him back in 1 month for follow-up with continued monitoring of his labs.  He may end up needing to see nephrology.  We will repeat restaging scans in October.    -9/29/2023 PSA 1.810  -8/23/2023 [facility] Oncology clinic follow-up: Chris continues on therapy with Zytiga, prednisone and Xgeva, he also receives Lupron every 6 months with Dr. Matute with most recent given on 8/7/2023.  He is scheduled for a cystoscopy in a few weeks as his last few UAs per the patient's report had microscopic hematuria, he has had no steve  hematuria.  His creatinine continues to remain elevated at 1.82, BUN is 40, GFR is 40.  I will get him to nephrology for further evaluation.  Blood pressure today was fairly good at 161/86, he continues to follow with Dr. Ram for management.  PSA on 7/25/2023 was up slightly from prior month, PSA in July was 2.140, in June it was 1.590, PSA from yesterday is pending.  I plan on repeating restaging CT scans and bone scans in October however if his PSA is up significantly then I will do sooner.  I will see him back for follow-up in 1 month.  -8/23/2024 PSA 1.860    -9/20/2023 [facility] clinic follow-up: Chris is tolerating treatment with Zytiga, prednisone and Xgeva.  He is up-to-date with his Lupron injection, last received in August with Dr. Matute.  He had cystoscopy on 9/8/2023 that was normal. I referred him to nephrology when I saw him last in August as his creatinine has been increasing, creatinine yesterday was a little better 1.51, GFR is 50, creatinine clearance 63.8.  He is still awaiting an appointment with nephrology, per our  it will be early November.  We will repeat his restaging scans prior to return and I will do his CT scans without contrast in light of his new renal insufficiency.  We will also get total body bone scan and I have ordered those today.  His PSA yesterday was 1.810.    -10/9/2023 CT chest abdomen Pelvis without contrast compared to April 2023 shows slightly prominent pelvic lymph nodes left external iliac 8 mm compared to prior study.  Right internal iliac heterogenous 19 mm compared to 11 mm prior.  Stable sclerotic bone lesions and no new lesions.  Stable bone scan with no new sites of disease    -10/17/2023 [facility] oncology clinic follow-up: With elevated creatinine, CTs done without contrast showed very slight increased prominence by few millimeters of some internal iliac and external iliac nodes for which PSMA PET could be done but for now I will check his  "PSA and have him see my nurse practitioner back in a week and unless the PSA is significantly rising I would continue the Zytiga, prednisone, Xgeva and February dose of Lupron with Dr. Sue and make sure he follows with nephrology.  If his PSA is significantly rising then my nurse practitioner will order PSMA PET.     Prostate cancer metastatic to multiple sites   3/15/2022 Initial Diagnosis    Prostate cancer metastatic to multiple sites (HCC)     3/15/2022 Cancer Staged    Staging form: Prostate, AJCC 8th Edition  - Clinical: Stage IVB (cT1c, cN1, cM1b, Grade Group: 5) - Signed by Fritz Goodson MD on 3/15/2022     4/7/2022 - 6/23/2022 Chemotherapy    Stopped after cycle 4 6/23/2022 due to syncope 3 days post infusions with negative cardiac work-up  OP PROSTATE DOCEtaxel         4/7/2022 -  Chemotherapy    OP SUPPORTIVE Denosumab (Xgeva) Q28D     7/26/2022 -  Chemotherapy    OP PROSTATE Abiraterone / PredniSONE           HISTORY OF PRESENT ILLNESS:  The patient is a 68 y.o. male, here for follow up on management of metastatic prostate cancer.  Tolerating Zytiga prednisone.    Past Medical History:   Diagnosis Date   • Cancer    • Prostate cancer      Past Surgical History:   Procedure Laterality Date   • PROSTATE BIOPSY  02/2022    dr. sue       No Known Allergies    Family History and Social History reviewed and changed as necessary    REVIEW OF SYSTEM:   No new somatic complaints    PHYSICAL EXAM:  No jaundice icterus or rash.  No focal motor or sensory deficits.  No palpable cervical axillary or inguinal nodes    Vitals:    10/17/23 0835   BP: 156/92   Pulse: 74   Resp: 18   Temp: 98.4 °F (36.9 °C)   SpO2: 97%   Weight: 125 kg (275 lb)   Height: 180.3 cm (71\")     Vitals:    10/17/23 0835   PainSc: 0-No pain          ECOG score: 0           Vitals reviewed.    ECOG: (0) Fully Active - Able to Carry On All Pre-disease Performance Without Restriction    Lab Results   Component Value Date    HGB 13.3 " 09/19/2023    HCT 40.6 09/19/2023    MCV 87.5 09/19/2023     09/19/2023    WBC 7.89 09/19/2023    NEUTROABS 5.29 09/19/2023    LYMPHSABS 1.70 09/19/2023    MONOSABS 0.57 09/19/2023    EOSABS 0.22 09/19/2023    BASOSABS 0.07 09/19/2023       Lab Results   Component Value Date    GLUCOSE 101 (H) 09/19/2023    BUN 34 (H) 09/19/2023    CREATININE 1.51 (H) 09/19/2023     09/19/2023    K 4.4 09/19/2023     09/19/2023    CO2 25.7 09/19/2023    CALCIUM 10.0 09/19/2023    ALBUMIN 4.5 09/19/2023    BILITOT 0.2 09/19/2023    ALKPHOS 91 09/19/2023    AST 23 09/19/2023    ALT 36 09/19/2023             ASSESSMENT & PLAN:  1.  Prostate cancer clinical T1c and 2M1B stage IVb treated with 4 cycles of Taxotere, stopped due to syncope with negative cardiac work-up, with marked drop in PSA of 18.6 from over 900 at baseline, continued on Zytiga and prednisone.  2.  Urinary retention with Goodwin in place 1/24/2022.  On finasteride 1/24/2022.  3.  Covid 19 December 2021  4.  Hypertension    Oncology history timeline:  -11/29/2021  with symptoms of urinary retention.  -2/15/2022 prostate biopsy adenocarcinoma grade group 5 and 3 out of 12 cores, grade group 4 in 1 out of 12 cores, grade group 3 in 8 out of 12 cores.  -2/24/2022 CT chest abdomen pelvis and total body bone scan shows left third rib, right acetabulum, periaortic and retroperitoneal kizzy metastases complaining of pain in the left chest and right hip.  Also possible sclerotic left 10th rib on CT.  Spleen mildly enlarged 13.8 cm.  Hepatic steatosis.  Small liver cyst.  Fat stranding in the periaortic and mesenteric region consistent with reactive/inflammatory stranding.  Multiple enlarged periaortic and retroperitoneal nodes and lymphadenopathy largest 4.9 cm.  CT chest describes tiny sclerotic foci in left eighth rib and right lateral seventh rib and T1.  Multiple small mediastinal and bilateral axillary nodes seen with small hypodense left thyroid  nodule.  Creatinine 1.7.  -3/4/2022 office note Dr. Rolando Matute: Patient was seen after his elevated PSA by Dr. Vera and prostate biopsy scheduled.  However, he developed COVID-19 and this was canceled and the patient did not reschedule due to lack of insurance.  Saw Dr. Matute back on 1/24/2022 with plans to get staging CTs and bone scan with results as outlined above.  Started Casodex and to return on 3/11/2022 for Lupron.  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  TURP planned for urinary retention symptoms.    -3/15/2022 [facility] medical oncology initial consultation: I reviewed the above data with the patient.  With his fairly bulky retroperitoneal adenopathy and bone metastases including extra axial metastases, he would fit the data from the CHAARTED trial for which Taxotere x6 followed by Zytiga prednisone along with the planned Lupron already being planned by Dr. Matute would be reasonable.  Equally reasonable in terms of comparisons with bicalutamide and Lupron would be Zytiga prednisone plus Lupron or Xtandi plus Lupron or apalutamide plus Lupron.  None of these have been compared head-to-head but all have beaten combined androgen deprivation therapy with bicalutamide and Lupron.  At the age of 67 and in order to have as many bullets as possible down the road and hence saving some of the hormone blockade options for later and using chemotherapy when he is younger and healthier for a more time-limited.,  He has opted for the Taxotere x6 followed by Zytiga and prednisone.  We will also give him Xgeva to improve bone health and given metastatic disease, as with all metastatic prostate cancer patients, he needs genetic counseling both for his sake as well as his family's.  If he were to have BRCA1 or other such  mutations, then that also opens up the options for PARP inhibitors etc. down the road.  He has his TURP scheduled for 2/24/2022  and we will start treatment a couple of weeks after that and he will get chemo preparation visit in the meantime and I will get capital surgeons to place a port.  We will check his PSA after his TURP when he sees my nurse practitioner back early April for the start of chemotherapy and repeat the PSA and CT chest abdomen pelvis and bone scan after the Taxotere is complete.    -3/31/2022 chemotherapy education and needs assessment completed    -4/7/2022 began docetaxel and Xgeva.  .0.  We will do his Xgeva every 6 weeks to coordinate with his docetaxel infusions.    -4/19/2022 patient stopped Casodex    -5/17/2022 patient reported seeing his PCP for an abscess in his suprapubic area.  Placed on clindamycin, will delay treatment 1 week.    -5/25/2022 PSA 34.3  -5/26/2022 [facility] Oncology clinic follow-up: Chris has an abscess in the suprapubic area, it has improved on antibiotic therapy but I am concerned if we treat him it will just flare back up unless it is drained.  I will get him to Dr. Miller who placed his port for I&D and culture.  He is on clindamycin and states he completes course of treatment tomorrow.  I will delay his treatment with Taxotere 1 more week.  We will give his Xgeva today.  I will see him back in 1 week for follow-up to assess whether or not we can resume his Taxotere.  His PSA has dropped nicely with treatment thus far, PSA yesterday was 34.3 which is down from a PSA of 259.0 when we started treatment, it has been as high as 911 in November.    -6/2/2022 [facility] Oncology clinic follow-up: Chris went to see Dr. Miller to have his abscess lanced however apparently there was a long wait and he left without being seen.  He did call his PCP and was given 5 more days of clindamycin and the abscess now seems to have resolved.  We discussed today that he is at risk for recurrence of infection due to immunocompromise state with chemotherapy.  He will notify us if he has any return of his  abscess.  He does have intertrigo under his panniculus, I have advised him to keep this area dry, to apply topical drying/antifungal powders.  Also recommended looser clothing.  His counts are adequate to continue therapy, we will resume Taxotere today with cycle #3.  We will repeat restaging scans after 6 courses.  We are doing Xgeva every 6 weeks to coordinate with his Taxotere.  Once he finishes Taxotere we can then go back to every 4 weeks.  His next Xgeva is not due until 7/14/2022.  His calcium is running a little low, he is going to  calcium supplement.    -6/23/2022 [facility] clinic follow-up: Chris overall is doing well on treatment with Taxotere.  Labs reviewed from yesterday and are unremarkable.  We will continue treatment today unchanged.  His suprapubic abscess resolved on clindamycin.  He will continue to use drying powder/antifungal powder under his panniculus for intertrigo.  Today will be cycle #4 of a planned 6 courses of Taxotere.  He is also on Xgeva, next dose due 7/14/2022, we are doing this every 6 weeks for now to line up with his chemotherapy, can go back to every 4 weeks after he finishes Taxotere.  Calcium from CMP yesterday was normal.    -7/13/2022 [facility] clinic follow-up: Mr. Morfin has had issues around day 3 after each treatment ranging from chest pain after his first treatment for which he did not seek medical attention, fatigue after the next few treatments, but 3 days after his most recent treatment on 6/23/2022 he had a syncopal episode at home and once again did not seek medical attention.  I discussed with him that this was not acceptable, if he has occurrences like this someone needs to call 911, it is difficult to figure out what is going on after the fact, the best time to work this up would be during the occurrence.  For now we will get labs today with CBC, CMP, PSA.  I will refer him to cardiology for further evaluation.  We will hold Taxotere most  likely, I will see him tomorrow to go over his lab results.  I will also repeat his restaging scans with CT chest, abdomen and pelvis and will include CT of the head in light of his syncopal episode.      -7/13/2022 PSA 18.6    -7/14/2022 [facility] Oncology clinic follow-up: Mr. Morfin returns today to review his labs from yesterday.  Labs are unremarkable.  PSA down to 18.6 from a high of 259.0 in April prior to starting treatment.  CBC and CMP unremarkable, magnesium is normal at 2.3, phosphorus slightly low at 2.4.  We will hold therapy for now in light of his syncopal episode on day 3 after his last treatment and prior episodes with chest pain and severe fatigue that all occurred on day 3 after his Taxotere.  We will restage him with CT head, chest, abdomen and pelvis (I am including the head in light of his syncopal episode).  I have also referred him to cardiology, he states that he received a call from them about making an appointment however he wanted to talk to us today first.  I emphasized the importance to him of making and keeping that appointment and he states understanding.  I also once again emphasized the requirement to seek emergency medical attention if he has any episodes in the future such as chest pain or syncopal events.  Whether or not we try and complete Taxotere with 2 more courses or move to the next line of therapy will be decided when he returns to discuss with Dr. Goodson and review his restaging scans.    -7/15/2022 saw Dr. Diaz cardiologist.  For syncope he ordered echocardiogram and 48-hour Holter monitor.    -7/15/2022 Holter monitor relatively benign.    -7/22/2022 CT brain with and without contrast negative.  CT chest abdomen and pelvis shows some nonspecific cecal wall thickening.  No progression in the chest.  T1 and ribs stable.  Decrease in multiple retroperitoneal and pelvic nodes.  Slight increase in right acetabular sclerotic lesion.  Persistent but improved circumferential  bladder wall thickening.  Total body bone scan shows stable left third rib and no evidence of new bone metastases.    -7/19/2022 ejection fraction 65% with grade 1 diastolic dysfunction.    -7/26/2022 [facility] oncology clinic follow-up: Given presyncopal symptoms occurred 3 days after Taxotere and has not otherwise occurred any other time and his cardiology work-up is fairly unremarkable and his disease is under good control as witnessed by the report above of the CTs of head chest abdomen pelvis and bone scan, I will hold cycle 5 and 6 of Taxotere and continue 1 now with Zytiga and prednisone.  With reference to the coincidental cecal wall thickening found on CT, we will get him to [facility] for colonoscopy.  He will see my nurse practitioner back in August for next cycle of Abiraterone prednisone.  He will continue his Lupron with Dr. Matute.  We will continue his Xgeva.  We will repeat his CT chest abdomen pelvis and bone scan again in 3 months.  He will see my nurse practitioner in the interim.    -8/23/2022 [facility] oncology clinic follow-up: No further presyncopal symptoms.  Feeling great other than for hot flashes.  Did commend for now we will continue on with his Zytiga prednisone and he will get his Xgeva today based on labs from 8/10/2022.  He opted out of getting the colonoscopy that I recommended and he will not change his mind.  He will continue getting the Lupron with Dr. Matute and I asked him to give the date of this appointment to my nurse when he sees her back in a month.  We will give his Xgeva today.  We will get CT chest abdomen pelvis and total body bone scan towards the middle to end of October.  Presently other than for the hot flashes feels great.    -9/20/2022 PSA 8.320    -9/22/2022 [facility] Oncology clinic follow-up: Chris is doing well on Zytiga, prednisone along with Xgeva.  Labs reviewed from 9/20/2022 are unremarkable, CBC was normal, CMP with creatinine of 1.34 otherwise  normal, this is stable from his baseline.  PSA stable at 8.320.  He received Lupron recently with Dr. Matute, he thinks last week or so, states his next appointment is in February.  He will continue treatment unchanged.  We will repeat restaging CT chest, abdomen and pelvis and total body bone scan in October prior to return and I have ordered those today.  We will also repeat his PSA.    -9/22/2022 CT chest abdomen pelvisCompared to July 2022 [facility] showed no evidence of disease progression in the chest with no substantial sclerotic bony lesions and decrease in size of retroperitoneal and pelvic nodes with persistent cecal wall thickening and suboptimal bladder distention.  Total body bone scan showed decrease in previously seen uptake in the left third rib with diffuse sclerosis representing treatment response with no new foci.    -10/18/2022 [facility] medical oncology follow-up: I reviewed bone scan and CT reports as above with no evidence of progression.  Tolerating Zytiga prednisone and Xgeva.  Getting Lupron regularly with Dr. Matute next appointment coming in February.  We will repeat his CT chest abdomen pelvis and total body bone scan in April.  He will follow with my nurse practitioner in the interim.    -1/10/2023 PSA 3.450  -1/11/2023 [facility] Oncology clinic follow-up: Mr. Morfin continues to do well on current therapy with Zytiga, prednisone and Xgeva with no unusual side effects.  He has no new worrisome symptoms.  He is due for his next Lupron injection in February with Dr. Matute.  His PSA continues to trend downward, current PSA from yesterday was 3.450.  We will continue therapy unchanged, he will receive Xgeva today.  Has had no hypocalcemia, his CMP, phosphorus and magnesium are all normal.  We will repeat restaging scans in April.  -3/7/2023 PSA 2.460  -3/8/2023 [facility] Oncology clinic follow-up:  Mr. Morfin is doing well.  He is tolerating therapy with Zytiga, prednisone and  "Xgeva with no significant side effects.  He has hot flashes but these are tolerable.  He had Lupron injection 2/24/2023 with Dr. Matute.  His PSA continues to decline, current PSA 2.460.  He will continue therapy unchanged.  We will repeat restaging scans late April prior to return in 2 months and I have ordered those today.  He is working with his PCP Rodrigo Ram on his hypertension.  His b/p today was 160/88.  He was to have increased his losartan but I do not think he has done that yet as he said \"I still have some of my old prescription left\".  -4/4/2023 PSA 2.55  - 4/26/2023 CT chest abdomen pelvis [place] comparison 10/10/2022 showed stable left third rib, T1, left fourth and eighth rib, right seventh rib.  Probable liver cyst.  Few diverticuli.  Mild further decrease in a few of the previously enlarged nodes.  Left external iliac 12 mm compared to 16 mm.  Right common iliac 7 mm stable.  Lower left periaortic 8 mm previously 13 mm.  No new lytic or blastic osseous lesions.  Total body bone scan comparison 10/10/2022, 7/22/2022, and CT 4/26/2023.  No new sites of increased uptake.  1 focal left third rib hotspot consistent with bone metastasis.    -5/2/2023 [facility] medical oncology follow-up: I reviewed interval notes since I last saw him from my nurse practitioner as outlined above in interval images and reports thereof from [facility] that shows no new sites of bone metastasis and no kizzy or visceral progression.  PSA stabilized around 2.5.  In the absence of symptomatic or radiographic progression, I would be unlikely to change therapy just on the basis of a PSA rise and for now the PSA is stable.  We will continue Zytiga prednisone and Xgeva and he will follow-up with my nurse practitioner 5/30/2023 with repeat CTs and bone scan in 6 months.  I asked him to check with his primary care today regarding his systolic in the 170s.    -5/31/2023 [facility] Oncology clinic follow-up: Chris continues to " do well on current therapy with Zytiga, prednisone and Xgeva along with Lupron that he receives with Dr. Matute.  His PSA remains stable, it was lower this month compared to last month, current PSA 1.940.  Continues to deal with hypertension, on arrival today his blood pressure was 210/92 however this was using a wrist cuff, when I rechecked it manually his blood pressure was 160/90.  Still has room for improvement despite recent increase in his losartan to 100 mg daily.  He will follow-up with Dr. Ram for further management.  We will continue therapy unchanged.  We will repeat restaging scans in October.    -7/26/2023 [facility] [place] Oncology clinic follow-up: Chris overall continues to do well on current therapy with Zytiga, prednisone and Xgeva.  He receives Lupron every 6 months with Dr. Matute and states that is scheduled for August.  PSA from yesterday is pending, PSA on 6/27/2023 was 1.590 which was continuing to decline, in February it was 3.250.  His creatinine this past month has bumped up.  He feels he is staying hydrated but I encouraged him to increase his fluid intake.  I discussed with him that this could be from his hypertension, some of his medications.  His blood pressure today is 165/92.  He has an appointment with his primary care provider Dr. Teri Ram next week and can further discuss with her.  No adjustment needed for Xgeva.  I refilled his prednisone today.  I will see him back in 1 month for follow-up with continued monitoring of his labs.  He may end up needing to see nephrology.  We will repeat restaging scans in October.    -9/29/2023 PSA 1.810  -8/23/2023 [facility] [place] Oncology clinic follow-up: Chris continues on therapy with Zytiga, prednisone and Xgeva, he also receives Lupron every 6 months with Dr. Matute with most recent given on 8/7/2023.  He is scheduled for a cystoscopy in a few weeks as his last few UAs per the patient's report had microscopic hematuria, he has had no steve  hematuria.  His creatinine continues to remain elevated at 1.82, BUN is 40, GFR is 40.  I will get him to nephrology for further evaluation.  Blood pressure today was fairly good at 161/86, he continues to follow with Dr. Ram for management.  PSA on 7/25/2023 was up slightly from prior month, PSA in July was 2.140, in June it was 1.590, PSA from yesterday is pending.  I plan on repeating restaging CT scans and bone scans in October however if his PSA is up significantly then I will do sooner.  I will see him back for follow-up in 1 month.  -8/23/2024 PSA 1.860    -9/20/2023 [facility] clinic follow-up: Chris is tolerating treatment with Zytiga, prednisone and Xgeva.  He is up-to-date with his Lupron injection, last received in August with Dr. Matute.  He had cystoscopy on 9/8/2023 that was normal. I referred him to nephrology when I saw him last in August as his creatinine has been increasing, creatinine yesterday was a little better 1.51, GFR is 50, creatinine clearance 63.8.  He is still awaiting an appointment with nephrology, per our  it will be early November.  We will repeat his restaging scans prior to return and I will do his CT scans without contrast in light of his new renal insufficiency.  We will also get total body bone scan and I have ordered those today.  His PSA yesterday was 1.810.    -10/9/2023 CT chest abdomen Pelvis without contrast compared to April 2023 shows slightly prominent pelvic lymph nodes left external iliac 8 mm compared to prior study.  Right internal iliac heterogenous 19 mm compared to 11 mm prior.  Stable sclerotic bone lesions and no new lesions.  Stable bone scan with no new sites of disease    -10/17/2023 [facility] oncology clinic follow-up: With elevated creatinine, CTs done without contrast showed very slight increased prominence by few millimeters of some internal iliac and external iliac nodes for which PSMA PET could be done but for now I will check his  PSA and have him see my nurse practitioner back in a week and unless the PSA is significantly rising I would continue the Zytiga, prednisone, Xgeva and February dose of Lupron with Dr. Matute and make sure he follows with nephrology.  If his PSA is significantly rising then my nurse practitioner will order PSMA PET.    Total time of care today inclusive of time spent today prior to patient's arrival reviewing interval images and reports thereof and during visit translating that information to the patient and interviewing him as to signs or symptoms of his disease and management thereof and after visit instituting this plan took 45 minutes of patient care time throughout the day today.  Fritz Goodson MD    10/17/2023

## 2023-10-17 NOTE — LETTER
October 17, 2023       No Recipients    Patient: Chris Morfin   YOB: 1955   Date of Visit: 10/17/2023     Dear Teri Ram, DO:       Thank you for referring Chris Morfin to me for evaluation. Below are the relevant portions of my assessment and plan of care.    If you have questions, please do not hesitate to call me. I look forward to following Chris along with you.         Sincerely,        Fritz Goodson MD        CC:   No Recipients    Fritz Goodson MD  10/17/23 0847  Sign when Signing Visit  CHIEF COMPLAINT: No new somatic complaints    Problem List:  Oncology/Hematology History Overview Note   1.  Prostate cancer clinical T1c and 2M1B stage IVb treated with 4 cycles of Taxotere, stopped due to syncope with negative cardiac work-up, with marked drop in PSA of 18.6 from over 900 at baseline, continued on Zytiga and prednisone.  2.  Urinary retention with Goodwin in place 1/24/2022.  On finasteride 1/24/2022.  3.  Covid 19 December 2021  4.  Hypertension    Oncology history timeline:  -11/29/2021  with symptoms of urinary retention.  -2/15/2022 prostate biopsy adenocarcinoma grade group 5 and 3 out of 12 cores, grade group 4 in 1 out of 12 cores, grade group 3 in 8 out of 12 cores.  -2/24/2022 CT chest abdomen pelvis and total body bone scan shows left third rib, right acetabulum, periaortic and retroperitoneal kizzy metastases complaining of pain in the left chest and right hip.  Also possible sclerotic left 10th rib on CT.  Spleen mildly enlarged 13.8 cm.  Hepatic steatosis.  Small liver cyst.  Fat stranding in the periaortic and mesenteric region consistent with reactive/inflammatory stranding.  Multiple enlarged periaortic and retroperitoneal nodes and lymphadenopathy largest 4.9 cm.  CT chest describes tiny sclerotic foci in left eighth rib and right lateral seventh rib and T1.  Multiple small mediastinal and bilateral axillary nodes seen with small hypodense left thyroid  nodule.  Creatinine 1.7.  -3/4/2022 office note Dr. Rolando Matute: Patient was seen after his elevated PSA by Dr. Vera and prostate biopsy scheduled.  However, he developed COVID-19 and this was canceled and the patient did not reschedule due to lack of insurance.  Saw Dr. Matute back on 1/24/2022 with plans to get staging CTs and bone scan with results as outlined above.  Started Casodex and to return on 3/11/2022 for Lupron.  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  TURP planned for urinary retention symptoms.    -3/15/2022 [facility] medical oncology initial consultation: I reviewed the above data with the patient.  With his fairly bulky retroperitoneal adenopathy and bone metastases including extra axial metastases, he would fit the data from the CHAARTED trial for which Taxotere x6 followed by Zytiga prednisone along with the planned Lupron already being planned by Dr. Matute would be reasonable.  Equally reasonable in terms of comparisons with bicalutamide and Lupron would be Zytiga prednisone plus Lupron or Xtandi plus Lupron or apalutamide plus Lupron.  None of these have been compared head-to-head but all have beaten combined androgen deprivation therapy with bicalutamide and Lupron.  At the age of 67 and in order to have as many bullets as possible down the road and hence saving some of the hormone blockade options for later and using chemotherapy when he is younger and healthier for a more time-limited.,  He has opted for the Taxotere x6 followed by Zytiga and prednisone.  We will also give him Xgeva to improve bone health and given metastatic disease, as with all metastatic prostate cancer patients, he needs genetic counseling both for his sake as well as his family's.  If he were to have BRCA1 or other such  mutations, then that also opens up the options for PARP inhibitors etc. down the road.  He has his TURP scheduled for 2/24/2022  and we will start treatment a couple of weeks after that and he will get chemo preparation visit in the meantime and I will get capital surgeons to place a port.  We will check his PSA after his TURP when he sees my nurse practitioner back early April for the start of chemotherapy and repeat the PSA and CT chest abdomen pelvis and bone scan after the Taxotere is complete.    -3/31/2022 chemotherapy education and needs assessment completed    -4/7/2022 began docetaxel and Xgeva.  .0.  We will do his Xgeva every 6 weeks to coordinate with his docetaxel infusions.    -4/19/2022 patient stopped Casodex    -5/17/2022 patient reported seeing his PCP for an abscess in his suprapubic area.  Placed on clindamycin, will delay treatment 1 week.    -5/25/2022 PSA 34.3  -5/26/2022 [facility] Oncology clinic follow-up: Chris has an abscess in the suprapubic area, it has improved on antibiotic therapy but I am concerned if we treat him it will just flare back up unless it is drained.  I will get him to Dr. Miller who placed his port for I&D and culture.  He is on clindamycin and states he completes course of treatment tomorrow.  I will delay his treatment with Taxotere 1 more week.  We will give his Xgeva today.  I will see him back in 1 week for follow-up to assess whether or not we can resume his Taxotere.  His PSA has dropped nicely with treatment thus far, PSA yesterday was 34.3 which is down from a PSA of 259.0 when we started treatment, it has been as high as 911 in November.    -6/2/2022 [facility] Oncology clinic follow-up: Chris went to see Dr. Miller to have his abscess lanced however apparently there was a long wait and he left without being seen.  He did call his PCP and was given 5 more days of clindamycin and the abscess now seems to have resolved.  We discussed today that he is at risk for recurrence of infection due to immunocompromise state with chemotherapy.  He will notify us if he has any return of his  abscess.  He does have intertrigo under his panniculus, I have advised him to keep this area dry, to apply topical drying/antifungal powders.  Also recommended looser clothing.  His counts are adequate to continue therapy, we will resume Taxotere today with cycle #3.  We will repeat restaging scans after 6 courses.  We are doing Xgeva every 6 weeks to coordinate with his Taxotere.  Once he finishes Taxotere we can then go back to every 4 weeks.  His next Xgeva is not due until 7/14/2022.  His calcium is running a little low, he is going to  calcium supplement.    -6/23/2022 [facility] clinic follow-up: Chris overall is doing well on treatment with Taxotere.  Labs reviewed from yesterday and are unremarkable.  We will continue treatment today unchanged.  His suprapubic abscess resolved on clindamycin.  He will continue to use drying powder/antifungal powder under his panniculus for intertrigo.  Today will be cycle #4 of a planned 6 courses of Taxotere.  He is also on Xgeva, next dose due 7/14/2022, we are doing this every 6 weeks for now to line up with his chemotherapy, can go back to every 4 weeks after he finishes Taxotere.  Calcium from CMP yesterday was normal.    -7/13/2022 [facility] clinic follow-up: Mr. Morfin has had issues around day 3 after each treatment ranging from chest pain after his first treatment for which he did not seek medical attention, fatigue after the next few treatments, but 3 days after his most recent treatment on 6/23/2022 he had a syncopal episode at home and once again did not seek medical attention.  I discussed with him that this was not acceptable, if he has occurrences like this someone needs to call 911, it is difficult to figure out what is going on after the fact, the best time to work this up would be during the occurrence.  For now we will get labs today with CBC, CMP, PSA.  I will refer him to cardiology for further evaluation.  We will hold Taxotere most  likely, I will see him tomorrow to go over his lab results.  I will also repeat his restaging scans with CT chest, abdomen and pelvis and will include CT of the head in light of his syncopal episode.      -7/13/2022 PSA 18.6    -7/14/2022 [facility] Oncology clinic follow-up: Mr. Morfin returns today to review his labs from yesterday.  Labs are unremarkable.  PSA down to 18.6 from a high of 259.0 in April prior to starting treatment.  CBC and CMP unremarkable, magnesium is normal at 2.3, phosphorus slightly low at 2.4.  We will hold therapy for now in light of his syncopal episode on day 3 after his last treatment and prior episodes with chest pain and severe fatigue that all occurred on day 3 after his Taxotere.  We will restage him with CT head, chest, abdomen and pelvis (I am including the head in light of his syncopal episode).  I have also referred him to cardiology, he states that he received a call from them about making an appointment however he wanted to talk to us today first.  I emphasized the importance to him of making and keeping that appointment and he states understanding.  I also once again emphasized the requirement to seek emergency medical attention if he has any episodes in the future such as chest pain or syncopal events.  Whether or not we try and complete Taxotere with 2 more courses or move to the next line of therapy will be decided when he returns to discuss with Dr. Goodson and review his restaging scans.    -7/15/2022 saw Dr. Diaz cardiologist.  For syncope he ordered echocardiogram and 48-hour Holter monitor.    -7/15/2022 Holter monitor relatively benign.    -7/22/2022 CT brain with and without contrast negative.  CT chest abdomen and pelvis shows some nonspecific cecal wall thickening.  No progression in the chest.  T1 and ribs stable.  Decrease in multiple retroperitoneal and pelvic nodes.  Slight increase in right acetabular sclerotic lesion.  Persistent but improved circumferential  bladder wall thickening.  Total body bone scan shows stable left third rib and no evidence of new bone metastases.    -7/19/2022 ejection fraction 65% with grade 1 diastolic dysfunction.    -7/26/2022 [facility] clinic follow-up: Given presyncopal symptoms occurred 3 days after Taxotere and has not otherwise occurred any other time and his cardiology work-up is fairly unremarkable and his disease is under good control as witnessed by the report above of the CTs of head chest abdomen pelvis and bone scan, I will hold cycle 5 and 6 of Taxotere and continue 1 now with Zytiga and prednisone.  With reference to the coincidental cecal wall thickening found on CT, we will get him to [facility] for colonoscopy.  He will see my nurse practitioner back in August for next cycle of Abiraterone prednisone.  He will continue his Lupron with Dr. Matute.  We will continue his Xgeva.  We will repeat his CT chest abdomen pelvis and bone scan again in 3 months.  He will see my nurse practitioner in the interim.    -8/23/2022 [facility] clinic follow-up: No further presyncopal symptoms.  Feeling great other than for hot flashes.  Did commend for now we will continue on with his Zytiga prednisone and he will get his Xgeva today based on labs from 8/10/2022.  He opted out of getting the colonoscopy that I recommended and he will not change his mind.  He will continue getting the Lupron with Dr. Matute and I asked him to give the date of this appointment to my nurse when he sees her back in a month.  We will give his Xgeva today.  We will get CT chest abdomen pelvis and total body bone scan towards the middle to end of October.  Presently other than for the hot flashes feels great.    -9/20/2022 PSA 8.320    -9/22/2022 [facility] follow-up: Chris is doing well on Zytiga, prednisone along with Xgeva.  Labs reviewed from 9/20/2022 are unremarkable, CBC was normal, CMP with creatinine of 1.34 otherwise  normal, this is stable from his baseline.  PSA stable at 8.320.  He received Lupron recently with Dr. Matute, he thinks last week or so, states his next appointment is in February.  He will continue treatment unchanged.  We will repeat restaging CT chest, abdomen and pelvis and total body bone scan in October prior to return and I have ordered those today.  We will also repeat his PSA.    -9/22/2022 CT chest abdomen pelvisCompared to July 2022 [facility] showed no evidence of disease progression in the chest with no substantial sclerotic bony lesions and decrease in size of retroperitoneal and pelvic nodes with persistent cecal wall thickening and suboptimal bladder distention.  Total body bone scan showed decrease in previously seen uptake in the left third rib with diffuse sclerosis representing treatment response with no new foci.    -10/18/2022 [facility] follow-up: I reviewed bone scan and CT reports as above with no evidence of progression.  Tolerating Zytiga prednisone and Xgeva.  Getting Lupron regularly with Dr. Matute next appointment coming in February.  We will repeat his CT chest abdomen pelvis and total body bone scan in April.  He will follow with my nurse practitioner in the interim.    -1/10/2023 PSA 3.450  -1/11/2023 [facility] clinic follow-up: Mr. Morfin continues to do well on current therapy with Zytiga, prednisone and Xgeva with no unusual side effects.  He has no new worrisome symptoms.  He is due for his next Lupron injection in February with Dr. Matute.  His PSA continues to trend downward, current PSA from yesterday was 3.450.  We will continue therapy unchanged, he will receive Xgeva today.  Has had no hypocalcemia, his CMP, phosphorus and magnesium are all normal.  We will repeat restaging scans in April.  -3/7/2023 PSA 2.460  -3/8/2023 [facility] clinic follow-up:  Mr. Morfin is doing well.  He is tolerating therapy with Zytiga, prednisone and  "Xgeva with no significant side effects.  He has hot flashes but these are tolerable.  He had Lupron injection 2/24/2023 with Dr. Matute.  His PSA continues to decline, current PSA 2.460.  He will continue therapy unchanged.  We will repeat restaging scans late April prior to return in 2 months and I have ordered those today.  He is working with his PCP Rodrigo Ram on his hypertension.  His b/p today was 160/88.  He was to have increased his losartan but I do not think he has done that yet as he said \"I still have some of my old prescription left\".  -4/4/2023 PSA 2.55  - 4/26/2023 CT chest abdomen pelvis [place] comparison 10/10/2022 showed stable left third rib, T1, left fourth and eighth rib, right seventh rib.  Probable liver cyst.  Few diverticuli.  Mild further decrease in a few of the previously enlarged nodes.  Left external iliac 12 mm compared to 16 mm.  Right common iliac 7 mm stable.  Lower left periaortic 8 mm previously 13 mm.  No new lytic or blastic osseous lesions.  Total body bone scan comparison 10/10/2022, 7/22/2022, and CT 4/26/2023.  No new sites of increased uptake.  1 focal left third rib hotspot consistent with bone metastasis.    -5/2/2023 [facility] follow-up: I reviewed interval notes since I last saw him from my nurse practitioner as outlined above in interval images and reports thereof from [facility] that shows no new sites of bone metastasis and no kizzy or visceral progression.  PSA stabilized around 2.5.  In the absence of symptomatic or radiographic progression, I would be unlikely to change therapy just on the basis of a PSA rise and for now the PSA is stable.  We will continue Zytiga prednisone and Xgeva and he will follow-up with my nurse practitioner 5/30/2023 with repeat CTs and bone scan in 6 months.  I asked him to check with his primary care today regarding his systolic in the 170s.    -5/31/2023 [facility] follow-up: Chris continues to " do well on current therapy with Zytiga, prednisone and Xgeva along with Lupron that he receives with Dr. Matute.  His PSA remains stable, it was lower this month compared to last month, current PSA 1.940.  Continues to deal with hypertension, on arrival today his blood pressure was 210/92 however this was using a wrist cuff, when I rechecked it manually his blood pressure was 160/90.  Still has room for improvement despite recent increase in his losartan to 100 mg daily.  He will follow-up with Dr. Ram for further management.  We will continue therapy unchanged.  We will repeat restaging scans in October.    -7/26/2023 [facility] Oncology clinic follow-up: Chris overall continues to do well on current therapy with Zytiga, prednisone and Xgeva.  He receives Lupron every 6 months with Dr. Matute and states that is scheduled for August.  PSA from yesterday is pending, PSA on 6/27/2023 was 1.590 which was continuing to decline, in February it was 3.250.  His creatinine this past month has bumped up.  He feels he is staying hydrated but I encouraged him to increase his fluid intake.  I discussed with him that this could be from his hypertension, some of his medications.  His blood pressure today is 165/92.  He has an appointment with his primary care provider Dr. Teri Ram next week and can further discuss with her.  No adjustment needed for Xgeva.  I refilled his prednisone today.  I will see him back in 1 month for follow-up with continued monitoring of his labs.  He may end up needing to see nephrology.  We will repeat restaging scans in October.    -9/29/2023 PSA 1.810  -8/23/2023 [facility] Oncology clinic follow-up: Chris continues on therapy with Zytiga, prednisone and Xgeva, he also receives Lupron every 6 months with Dr. Matute with most recent given on 8/7/2023.  He is scheduled for a cystoscopy in a few weeks as his last few UAs per the patient's report had microscopic hematuria, he has had no steve  hematuria.  His creatinine continues to remain elevated at 1.82, BUN is 40, GFR is 40.  I will get him to nephrology for further evaluation.  Blood pressure today was fairly good at 161/86, he continues to follow with Dr. Ram for management.  PSA on 7/25/2023 was up slightly from prior month, PSA in July was 2.140, in June it was 1.590, PSA from yesterday is pending.  I plan on repeating restaging CT scans and bone scans in October however if his PSA is up significantly then I will do sooner.  I will see him back for follow-up in 1 month.  -8/23/2024 PSA 1.860    -9/20/2023 [facility] clinic follow-up: Chris is tolerating treatment with Zytiga, prednisone and Xgeva.  He is up-to-date with his Lupron injection, last received in August with Dr. Matute.  He had cystoscopy on 9/8/2023 that was normal. I referred him to nephrology when I saw him last in August as his creatinine has been increasing, creatinine yesterday was a little better 1.51, GFR is 50, creatinine clearance 63.8.  He is still awaiting an appointment with nephrology, per our  it will be early November.  We will repeat his restaging scans prior to return and I will do his CT scans without contrast in light of his new renal insufficiency.  We will also get total body bone scan and I have ordered those today.  His PSA yesterday was 1.810.    -10/9/2023 CT chest abdomen Pelvis without contrast compared to April 2023 shows slightly prominent pelvic lymph nodes left external iliac 8 mm compared to prior study.  Right internal iliac heterogenous 19 mm compared to 11 mm prior.  Stable sclerotic bone lesions and no new lesions.  Stable bone scan with no new sites of disease    -10/17/2023 [facility] oncology clinic follow-up: With elevated creatinine, CTs done without contrast showed very slight increased prominence by few millimeters of some internal iliac and external iliac nodes for which PSMA PET could be done but for now I will check his  "PSA and have him see my nurse practitioner back in a week and unless the PSA is significantly rising I would continue the Zytiga, prednisone, Xgeva and February dose of Lupron with Dr. Sue and make sure he follows with nephrology.  If his PSA is significantly rising then my nurse practitioner will order PSMA PET.     Prostate cancer metastatic to multiple sites   3/15/2022 Initial Diagnosis    Prostate cancer metastatic to multiple sites (HCC)     3/15/2022 Cancer Staged    Staging form: Prostate, AJCC 8th Edition  - Clinical: Stage IVB (cT1c, cN1, cM1b, Grade Group: 5) - Signed by Fritz Goodson MD on 3/15/2022     4/7/2022 - 6/23/2022 Chemotherapy    Stopped after cycle 4 6/23/2022 due to syncope 3 days post infusions with negative cardiac work-up  OP PROSTATE DOCEtaxel         4/7/2022 -  Chemotherapy    OP SUPPORTIVE Denosumab (Xgeva) Q28D     7/26/2022 -  Chemotherapy    OP PROSTATE Abiraterone / PredniSONE           HISTORY OF PRESENT ILLNESS:  The patient is a 68 y.o. male, here for follow up on management of metastatic prostate cancer.  Tolerating Zytiga prednisone.    Past Medical History:   Diagnosis Date   • Cancer    • Prostate cancer      Past Surgical History:   Procedure Laterality Date   • PROSTATE BIOPSY  02/2022    dr. sue       No Known Allergies    Family History and Social History reviewed and changed as necessary    REVIEW OF SYSTEM:   No new somatic complaints    PHYSICAL EXAM:  No jaundice icterus or rash.  No focal motor or sensory deficits.  No palpable cervical axillary or inguinal nodes    Vitals:    10/17/23 0835   BP: 156/92   Pulse: 74   Resp: 18   Temp: 98.4 °F (36.9 °C)   SpO2: 97%   Weight: 125 kg (275 lb)   Height: 180.3 cm (71\")     Vitals:    10/17/23 0835   PainSc: 0-No pain          ECOG score: 0           Vitals reviewed.    ECOG: (0) Fully Active - Able to Carry On All Pre-disease Performance Without Restriction    Lab Results   Component Value Date    HGB 13.3 " 09/19/2023    HCT 40.6 09/19/2023    MCV 87.5 09/19/2023     09/19/2023    WBC 7.89 09/19/2023    NEUTROABS 5.29 09/19/2023    LYMPHSABS 1.70 09/19/2023    MONOSABS 0.57 09/19/2023    EOSABS 0.22 09/19/2023    BASOSABS 0.07 09/19/2023       Lab Results   Component Value Date    GLUCOSE 101 (H) 09/19/2023    BUN 34 (H) 09/19/2023    CREATININE 1.51 (H) 09/19/2023     09/19/2023    K 4.4 09/19/2023     09/19/2023    CO2 25.7 09/19/2023    CALCIUM 10.0 09/19/2023    ALBUMIN 4.5 09/19/2023    BILITOT 0.2 09/19/2023    ALKPHOS 91 09/19/2023    AST 23 09/19/2023    ALT 36 09/19/2023             ASSESSMENT & PLAN:  1.  Prostate cancer clinical T1c and 2M1B stage IVb treated with 4 cycles of Taxotere, stopped due to syncope with negative cardiac work-up, with marked drop in PSA of 18.6 from over 900 at baseline, continued on Zytiga and prednisone.  2.  Urinary retention with Goodwin in place 1/24/2022.  On finasteride 1/24/2022.  3.  Covid 19 December 2021  4.  Hypertension    Oncology history timeline:  -11/29/2021  with symptoms of urinary retention.  -2/15/2022 prostate biopsy adenocarcinoma grade group 5 and 3 out of 12 cores, grade group 4 in 1 out of 12 cores, grade group 3 in 8 out of 12 cores.  -2/24/2022 CT chest abdomen pelvis and total body bone scan shows left third rib, right acetabulum, periaortic and retroperitoneal kizzy metastases complaining of pain in the left chest and right hip.  Also possible sclerotic left 10th rib on CT.  Spleen mildly enlarged 13.8 cm.  Hepatic steatosis.  Small liver cyst.  Fat stranding in the periaortic and mesenteric region consistent with reactive/inflammatory stranding.  Multiple enlarged periaortic and retroperitoneal nodes and lymphadenopathy largest 4.9 cm.  CT chest describes tiny sclerotic foci in left eighth rib and right lateral seventh rib and T1.  Multiple small mediastinal and bilateral axillary nodes seen with small hypodense left thyroid  nodule.  Creatinine 1.7.  -3/4/2022 office note Dr. Rolando Matute: Patient was seen after his elevated PSA by Dr. Vera and prostate biopsy scheduled.  However, he developed COVID-19 and this was canceled and the patient did not reschedule due to lack of insurance.  Saw Dr. Matute back on 1/24/2022 with plans to get staging CTs and bone scan with results as outlined above.  Started Casodex and to return on 3/11/2022 for Lupron.  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  TURP planned for urinary retention symptoms.    -3/15/2022 [facility] medical oncology initial consultation: I reviewed the above data with the patient.  With his fairly bulky retroperitoneal adenopathy and bone metastases including extra axial metastases, he would fit the data from the CHAARTED trial for which Taxotere x6 followed by Zytiga prednisone along with the planned Lupron already being planned by Dr. Matute would be reasonable.  Equally reasonable in terms of comparisons with bicalutamide and Lupron would be Zytiga prednisone plus Lupron or Xtandi plus Lupron or apalutamide plus Lupron.  None of these have been compared head-to-head but all have beaten combined androgen deprivation therapy with bicalutamide and Lupron.  At the age of 67 and in order to have as many bullets as possible down the road and hence saving some of the hormone blockade options for later and using chemotherapy when he is younger and healthier for a more time-limited.,  He has opted for the Taxotere x6 followed by Zytiga and prednisone.  We will also give him Xgeva to improve bone health and given metastatic disease, as with all metastatic prostate cancer patients, he needs genetic counseling both for his sake as well as his family's.  If he were to have BRCA1 or other such  mutations, then that also opens up the options for PARP inhibitors etc. down the road.  He has his TURP scheduled for 2/24/2022  and we will start treatment a couple of weeks after that and he will get chemo preparation visit in the meantime and I will get capital surgeons to place a port.  We will check his PSA after his TURP when he sees my nurse practitioner back early April for the start of chemotherapy and repeat the PSA and CT chest abdomen pelvis and bone scan after the Taxotere is complete.    -3/31/2022 chemotherapy education and needs assessment completed    -4/7/2022 began docetaxel and Xgeva.  .0.  We will do his Xgeva every 6 weeks to coordinate with his docetaxel infusions.    -4/19/2022 patient stopped Casodex    -5/17/2022 patient reported seeing his PCP for an abscess in his suprapubic area.  Placed on clindamycin, will delay treatment 1 week.    -5/25/2022 PSA 34.3  -5/26/2022 [facility] [place] Oncology clinic follow-up: Chris has an abscess in the suprapubic area, it has improved on antibiotic therapy but I am concerned if we treat him it will just flare back up unless it is drained.  I will get him to Dr. Miller who placed his port for I&D and culture.  He is on clindamycin and states he completes course of treatment tomorrow.  I will delay his treatment with Taxotere 1 more week.  We will give his Xgeva today.  I will see him back in 1 week for follow-up to assess whether or not we can resume his Taxotere.  His PSA has dropped nicely with treatment thus far, PSA yesterday was 34.3 which is down from a PSA of 259.0 when we started treatment, it has been as high as 911 in November.    -6/2/2022 [facility] [place] Oncology clinic follow-up: Chris went to see Dr. Miller to have his abscess lanced however apparently there was a long wait and he left without being seen.  He did call his PCP and was given 5 more days of clindamycin and the abscess now seems to have resolved.  We discussed today that he is at risk for recurrence of infection due to immunocompromise state with chemotherapy.  He will notify us if he has any return of his  abscess.  He does have intertrigo under his panniculus, I have advised him to keep this area dry, to apply topical drying/antifungal powders.  Also recommended looser clothing.  His counts are adequate to continue therapy, we will resume Taxotere today with cycle #3.  We will repeat restaging scans after 6 courses.  We are doing Xgeva every 6 weeks to coordinate with his Taxotere.  Once he finishes Taxotere we can then go back to every 4 weeks.  His next Xgeva is not due until 7/14/2022.  His calcium is running a little low, he is going to  calcium supplement.    -6/23/2022 [facility] clinic follow-up: Chris overall is doing well on treatment with Taxotere.  Labs reviewed from yesterday and are unremarkable.  We will continue treatment today unchanged.  His suprapubic abscess resolved on clindamycin.  He will continue to use drying powder/antifungal powder under his panniculus for intertrigo.  Today will be cycle #4 of a planned 6 courses of Taxotere.  He is also on Xgeva, next dose due 7/14/2022, we are doing this every 6 weeks for now to line up with his chemotherapy, can go back to every 4 weeks after he finishes Taxotere.  Calcium from CMP yesterday was normal.    -7/13/2022 [facility] On[facility] clinic follow-up: Mr. Morfin has had issues around day 3 after each treatment ranging from chest pain after his first treatment for which he did not seek medical attention, fatigue after the next few treatments, but 3 days after his most recent treatment on 6/23/2022 he had a syncopal episode at home and once again did not seek medical attention.  I discussed with him that this was not acceptable, if he has occurrences like this someone needs to call 911, it is difficult to figure out what is going on after the fact, the best time to work this up would be during the occurrence.  For now we will get labs today with CBC, CMP, PSA.  I will refer him to cardiology for further evaluation.  We will hold Taxotere most  likely, I will see him tomorrow to go over his lab results.  I will also repeat his restaging scans with CT chest, abdomen and pelvis and will include CT of the head in light of his syncopal episode.      -7/13/2022 PSA 18.6    -7/14/2022 [facility] Oncology clinic follow-up: Mr. Morfin returns today to review his labs from yesterday.  Labs are unremarkable.  PSA down to 18.6 from a high of 259.0 in April prior to starting treatment.  CBC and CMP unremarkable, magnesium is normal at 2.3, phosphorus slightly low at 2.4.  We will hold therapy for now in light of his syncopal episode on day 3 after his last treatment and prior episodes with chest pain and severe fatigue that all occurred on day 3 after his Taxotere.  We will restage him with CT head, chest, abdomen and pelvis (I am including the head in light of his syncopal episode).  I have also referred him to cardiology, he states that he received a call from them about making an appointment however he wanted to talk to us today first.  I emphasized the importance to him of making and keeping that appointment and he states understanding.  I also once again emphasized the requirement to seek emergency medical attention if he has any episodes in the future such as chest pain or syncopal events.  Whether or not we try and complete Taxotere with 2 more courses or move to the next line of therapy will be decided when he returns to discuss with Dr. Goodson and review his restaging scans.    -7/15/2022 saw Dr. Diaz cardiologist.  For syncope he ordered echocardiogram and 48-hour Holter monitor.    -7/15/2022 Holter monitor relatively benign.    -7/22/2022 CT brain with and without contrast negative.  CT chest abdomen and pelvis shows some nonspecific cecal wall thickening.  No progression in the chest.  T1 and ribs stable.  Decrease in multiple retroperitoneal and pelvic nodes.  Slight increase in right acetabular sclerotic lesion.  Persistent but improved circumferential  bladder wall thickening.  Total body bone scan shows stable left third rib and no evidence of new bone metastases.    -7/19/2022 ejection fraction 65% with grade 1 diastolic dysfunction.    -7/26/2022 [facility] oncology clinic follow-up: Given presyncopal symptoms occurred 3 days after Taxotere and has not otherwise occurred any other time and his cardiology work-up is fairly unremarkable and his disease is under good control as witnessed by the report above of the CTs of head chest abdomen pelvis and bone scan, I will hold cycle 5 and 6 of Taxotere and continue 1 now with Zytiga and prednisone.  With reference to the coincidental cecal wall thickening found on CT, we will get him to [facility] for colonoscopy.  He will see my nurse practitioner back in August for next cycle of Abiraterone prednisone.  He will continue his Lupron with Dr. Matute.  We will continue his Xgeva.  We will repeat his CT chest abdomen pelvis and bone scan again in 3 months.  He will see my nurse practitioner in the interim.    -8/23/2022 [facility] oncology clinic follow-up: No further presyncopal symptoms.  Feeling great other than for hot flashes.  Did commend for now we will continue on with his Zytiga prednisone and he will get his Xgeva today based on labs from 8/10/2022.  He opted out of getting the colonoscopy that I recommended and he will not change his mind.  He will continue getting the Lupron with Dr. Matute and I asked him to give the date of this appointment to my nurse when he sees her back in a month.  We will give his Xgeva today.  We will get CT chest abdomen pelvis and total body bone scan towards the middle to end of October.  Presently other than for the hot flashes feels great.    -9/20/2022 PSA 8.320    -9/22/2022 [facility] On[facility] clinic follow-up: Chris is doing well on Zytiga, prednisone along with Xgeva.  Labs reviewed from 9/20/2022 are unremarkable, CBC was normal, CMP with creatinine of 1.34 otherwise  normal, this is stable from his baseline.  PSA stable at 8.320.  He received Lupron recently with Dr. Matute, he thinks last week or so, states his next appointment is in February.  He will continue treatment unchanged.  We will repeat restaging CT chest, abdomen and pelvis and total body bone scan in October prior to return and I have ordered those today.  We will also repeat his PSA.    -9/22/2022 CT chest abdomen pelvisCompared to July 2022 [facility] showed no evidence of disease progression in the chest with no substantial sclerotic bony lesions and decrease in size of retroperitoneal and pelvic nodes with persistent cecal wall thickening and suboptimal bladder distention.  Total body bone scan showed decrease in previously seen uptake in the left third rib with diffuse sclerosis representing treatment response with no new foci.    -10/18/2022 [facility] medical oncology follow-up: I reviewed bone scan and CT reports as above with no evidence of progression.  Tolerating Zytiga prednisone and Xgeva.  Getting Lupron regularly with Dr. Matute next appointment coming in February.  We will repeat his CT chest abdomen pelvis and total body bone scan in April.  He will follow with my nurse practitioner in the interim.    -1/10/2023 PSA 3.450  -1/11/2023 [facility] clinic follow-up: Mr. Morfin continues to do well on current therapy with Zytiga, prednisone and Xgeva with no unusual side effects.  He has no new worrisome symptoms.  He is due for his next Lupron injection in February with Dr. Matute.  His PSA continues to trend downward, current PSA from yesterday was 3.450.  We will continue therapy unchanged, he will receive Xgeva today.  Has had no hypocalcemia, his CMP, phosphorus and magnesium are all normal.  We will repeat restaging scans in April.  -3/7/2023 PSA 2.460  -3/8/2023 [facility] clinic follow-up:  Mr. Morfin is doing well.  He is tolerating therapy with Zytiga, prednisone and  "Xgeva with no significant side effects.  He has hot flashes but these are tolerable.  He had Lupron injection 2/24/2023 with Dr. Matute.  His PSA continues to decline, current PSA 2.460.  He will continue therapy unchanged.  We will repeat restaging scans late April prior to return in 2 months and I have ordered those today.  He is working with his PCP Rodrigo Ram on his hypertension.  His b/p today was 160/88.  He was to have increased his losartan but I do not think he has done that yet as he said \"I still have some of my old prescription left\".  -4/4/2023 PSA 2.55  - 4/26/2023 CT chest abdomen pelvis [place] comparison 10/10/2022 showed stable left third rib, T1, left fourth and eighth rib, right seventh rib.  Probable liver cyst.  Few diverticuli.  Mild further decrease in a few of the previously enlarged nodes.  Left external iliac 12 mm compared to 16 mm.  Right common iliac 7 mm stable.  Lower left periaortic 8 mm previously 13 mm.  No new lytic or blastic osseous lesions.  Total body bone scan comparison 10/10/2022, 7/22/2022, and CT 4/26/2023.  No new sites of increased uptake.  1 focal left third rib hotspot consistent with bone metastasis.    -5/2/2023 [facility] medical oncology follow-up: I reviewed interval notes since I last saw him from my nurse practitioner as outlined above in interval images and reports thereof from [facility] that shows no new sites of bone metastasis and no kizyz or visceral progression.  PSA stabilized around 2.5.  In the absence of symptomatic or radiographic progression, I would be unlikely to change therapy just on the basis of a PSA rise and for now the PSA is stable.  We will continue Zytiga prednisone and Xgeva and he will follow-up with my nurse practitioner 5/30/2023 with repeat CTs and bone scan in 6 months.  I asked him to check with his primary care today regarding his systolic in the 170s.    -5/31/2023 [facility] clinic follow-up: Chris continues to " do well on current therapy with Zytiga, prednisone and Xgeva along with Lupron that he receives with Dr. Matute.  His PSA remains stable, it was lower this month compared to last month, current PSA 1.940.  Continues to deal with hypertension, on arrival today his blood pressure was 210/92 however this was using a wrist cuff, when I rechecked it manually his blood pressure was 160/90.  Still has room for improvement despite recent increase in his losartan to 100 mg daily.  He will follow-up with Dr. Ram for further management.  We will continue therapy unchanged.  We will repeat restaging scans in October.    -7/26/2023 [facility] clinic follow-up: Chris overall continues to do well on current therapy with Zytiga, prednisone and Xgeva.  He receives Lupron every 6 months with Dr. Matute and states that is scheduled for August.  PSA from yesterday is pending, PSA on 6/27/2023 was 1.590 which was continuing to decline, in February it was 3.250.  His creatinine this past month has bumped up.  He feels he is staying hydrated but I encouraged him to increase his fluid intake.  I discussed with him that this could be from his hypertension, some of his medications.  His blood pressure today is 165/92.  He has an appointment with his primary care provider Dr. Teri Ram next week and can further discuss with her.  No adjustment needed for Xgeva.  I refilled his prednisone today.  I will see him back in 1 month for follow-up with continued monitoring of his labs.  He may end up needing to see nephrology.  We will repeat restaging scans in October.    -9/29/2023 PSA 1.810  -8/23/2023 [facility] clinic follow-up: Chris continues on therapy with Zytiga, prednisone and Xgeva, he also receives Lupron every 6 months with Dr. Matute with most recent given on 8/7/2023.  He is scheduled for a cystoscopy in a few weeks as his last few UAs per the patient's report had microscopic hematuria, he has had no steve  hematuria.  His creatinine continues to remain elevated at 1.82, BUN is 40, GFR is 40.  I will get him to nephrology for further evaluation.  Blood pressure today was fairly good at 161/86, he continues to follow with Dr. Ram for management.  PSA on 7/25/2023 was up slightly from prior month, PSA in July was 2.140, in June it was 1.590, PSA from yesterday is pending.  I plan on repeating restaging CT scans and bone scans in October however if his PSA is up significantly then I will do sooner.  I will see him back for follow-up in 1 month.  -8/23/2024 PSA 1.860    -9/20/2023 [facility] clinic follow-up: Chris is tolerating treatment with Zytiga, prednisone and Xgeva.  He is up-to-date with his Lupron injection, last received in August with Dr. Matute.  He had cystoscopy on 9/8/2023 that was normal. I referred him to nephrology when I saw him last in August as his creatinine has been increasing, creatinine yesterday was a little better 1.51, GFR is 50, creatinine clearance 63.8.  He is still awaiting an appointment with nephrology, per our  it will be early November.  We will repeat his restaging scans prior to return and I will do his CT scans without contrast in light of his new renal insufficiency.  We will also get total body bone scan and I have ordered those today.  His PSA yesterday was 1.810.    -10/9/2023 CT chest abdomen Pelvis without contrast compared to April 2023 shows slightly prominent pelvic lymph nodes left external iliac 8 mm compared to prior study.  Right internal iliac heterogenous 19 mm compared to 11 mm prior.  Stable sclerotic bone lesions and no new lesions.  Stable bone scan with no new sites of disease    -10/17/2023 [facility] oncology clinic follow-up: With elevated creatinine, CTs done without contrast showed very slight increased prominence by few millimeters of some internal iliac and external iliac nodes for which PSMA PET could be done but for now I will check his  PSA and have him see my nurse practitioner back in a week and unless the PSA is significantly rising I would continue the Zytiga, prednisone, Xgeva and February dose of Lupron with Dr. Matute and make sure he follows with nephrology.  If his PSA is significantly rising then my nurse practitioner will order PSMA PET.    Total time of care today inclusive of time spent today prior to patient's arrival reviewing interval images and reports thereof and during visit translating that information to the patient and interviewing him as to signs or symptoms of his disease and management thereof and after visit instituting this plan took 45 minutes of patient care time throughout the day today.  Fritz Goodson MD    10/17/2023

## 2023-10-18 ENCOUNTER — INFUSION (OUTPATIENT)
Dept: ONCOLOGY | Facility: HOSPITAL | Age: 68
End: 2023-10-18
Payer: MEDICARE

## 2023-10-18 VITALS
RESPIRATION RATE: 18 BRPM | BODY MASS INDEX: 38.3 KG/M2 | WEIGHT: 274.6 LBS | HEART RATE: 72 BPM | DIASTOLIC BLOOD PRESSURE: 84 MMHG | TEMPERATURE: 98 F | SYSTOLIC BLOOD PRESSURE: 151 MMHG

## 2023-10-18 DIAGNOSIS — C61 PROSTATE CANCER METASTATIC TO MULTIPLE SITES: Primary | ICD-10-CM

## 2023-10-18 LAB
ALBUMIN SERPL-MCNC: 4.4 G/DL (ref 3.9–4.9)
ALBUMIN/GLOB SERPL: 1.8 {RATIO} (ref 1.2–2.2)
ALP SERPL-CCNC: 96 IU/L (ref 44–121)
ALT SERPL-CCNC: 32 IU/L (ref 0–44)
AST SERPL-CCNC: 22 IU/L (ref 0–40)
BASOPHILS # BLD AUTO: 0.1 X10E3/UL (ref 0–0.2)
BASOPHILS NFR BLD AUTO: 1 %
BILIRUB SERPL-MCNC: 0.4 MG/DL (ref 0–1.2)
BUN SERPL-MCNC: 25 MG/DL (ref 8–27)
BUN/CREAT SERPL: 16 (ref 10–24)
CALCIUM SERPL-MCNC: 9.1 MG/DL (ref 8.6–10.2)
CHLORIDE SERPL-SCNC: 106 MMOL/L (ref 96–106)
CO2 SERPL-SCNC: 23 MMOL/L (ref 20–29)
CREAT SERPL-MCNC: 1.59 MG/DL (ref 0.76–1.27)
EGFRCR SERPLBLD CKD-EPI 2021: 47 ML/MIN/1.73
EOSINOPHIL # BLD AUTO: 0.2 X10E3/UL (ref 0–0.4)
EOSINOPHIL NFR BLD AUTO: 2 %
ERYTHROCYTE [DISTWIDTH] IN BLOOD BY AUTOMATED COUNT: 13.4 % (ref 11.6–15.4)
GLOBULIN SER CALC-MCNC: 2.4 G/DL (ref 1.5–4.5)
GLUCOSE SERPL-MCNC: 89 MG/DL (ref 70–99)
HCT VFR BLD AUTO: 39.1 % (ref 37.5–51)
HGB BLD-MCNC: 12.7 G/DL (ref 13–17.7)
IMM GRANULOCYTES # BLD AUTO: 0 X10E3/UL (ref 0–0.1)
IMM GRANULOCYTES NFR BLD AUTO: 0 %
LYMPHOCYTES # BLD AUTO: 2.5 X10E3/UL (ref 0.7–3.1)
LYMPHOCYTES NFR BLD AUTO: 26 %
MAGNESIUM SERPL-MCNC: 2.2 MG/DL (ref 1.6–2.3)
MCH RBC QN AUTO: 28.6 PG (ref 26.6–33)
MCHC RBC AUTO-ENTMCNC: 32.5 G/DL (ref 31.5–35.7)
MCV RBC AUTO: 88 FL (ref 79–97)
MONOCYTES # BLD AUTO: 0.8 X10E3/UL (ref 0.1–0.9)
MONOCYTES NFR BLD AUTO: 9 %
NEUTROPHILS # BLD AUTO: 5.8 X10E3/UL (ref 1.4–7)
NEUTROPHILS NFR BLD AUTO: 62 %
PHOSPHATE SERPL-MCNC: 2.6 MG/DL (ref 2.8–4.1)
PLATELET # BLD AUTO: 253 X10E3/UL (ref 150–450)
POTASSIUM SERPL-SCNC: 4.2 MMOL/L (ref 3.5–5.2)
PROT SERPL-MCNC: 6.8 G/DL (ref 6–8.5)
RBC # BLD AUTO: 4.44 X10E6/UL (ref 4.14–5.8)
SODIUM SERPL-SCNC: 144 MMOL/L (ref 134–144)
WBC # BLD AUTO: 9.4 X10E3/UL (ref 3.4–10.8)

## 2023-10-18 PROCEDURE — 96372 THER/PROPH/DIAG INJ SC/IM: CPT

## 2023-10-18 PROCEDURE — 36415 COLL VENOUS BLD VENIPUNCTURE: CPT

## 2023-10-18 PROCEDURE — 25010000002 DENOSUMAB 120 MG/1.7ML SOLUTION: Performed by: INTERNAL MEDICINE

## 2023-10-18 RX ADMIN — DENOSUMAB 120 MG: 120 INJECTION SUBCUTANEOUS at 09:07

## 2023-10-23 ENCOUNTER — TELEPHONE (OUTPATIENT)
Dept: ONCOLOGY | Facility: CLINIC | Age: 68
End: 2023-10-23
Payer: MEDICARE

## 2023-10-23 ENCOUNTER — DOCUMENTATION (OUTPATIENT)
Dept: ONCOLOGY | Facility: CLINIC | Age: 68
End: 2023-10-23
Payer: MEDICARE

## 2023-10-23 NOTE — TELEPHONE ENCOUNTER
Caller: Chris Morfin    Relationship to patient: Self    Best call back number: [phone]    Chief complaint:     Type of visit: F/U 2    Requested date: NOT R/S WILL BE BACK DOWN IN TWO WEEKS FOR INJECTION    If rescheduling, when is the original appointment: 10/25/0023    Additional notes:

## 2023-10-23 NOTE — PROGRESS NOTES
Patient called stating that he did not want to come for appointment this week as he did not have enough money for gas to make an additional appointment.  I did review his labs from 10/17/2023, his PSA was 1.5 which is actually down from 1 month ago when it was 1.810.  Discussed with , she is going to notify the patient that he can come in as scheduled for follow-up with his next infusion in November.

## 2023-11-14 ENCOUNTER — LAB (OUTPATIENT)
Dept: ONCOLOGY | Facility: HOSPITAL | Age: 68
End: 2023-11-14
Payer: MEDICARE

## 2023-11-14 VITALS
WEIGHT: 274.9 LBS | SYSTOLIC BLOOD PRESSURE: 146 MMHG | DIASTOLIC BLOOD PRESSURE: 85 MMHG | TEMPERATURE: 98.1 F | RESPIRATION RATE: 18 BRPM | BODY MASS INDEX: 38.34 KG/M2 | HEART RATE: 78 BPM

## 2023-11-14 DIAGNOSIS — C61 PROSTATE CANCER METASTATIC TO MULTIPLE SITES: Primary | ICD-10-CM

## 2023-11-14 DIAGNOSIS — C61 PROSTATE CANCER METASTATIC TO MULTIPLE SITES: ICD-10-CM

## 2023-11-14 PROCEDURE — 36415 COLL VENOUS BLD VENIPUNCTURE: CPT

## 2023-11-15 ENCOUNTER — INFUSION (OUTPATIENT)
Dept: ONCOLOGY | Facility: HOSPITAL | Age: 68
End: 2023-11-15
Payer: MEDICARE

## 2023-11-15 ENCOUNTER — OFFICE VISIT (OUTPATIENT)
Dept: ONCOLOGY | Facility: CLINIC | Age: 68
End: 2023-11-15
Payer: MEDICARE

## 2023-11-15 VITALS
WEIGHT: 274 LBS | TEMPERATURE: 98.4 F | HEART RATE: 79 BPM | HEIGHT: 71 IN | OXYGEN SATURATION: 97 % | BODY MASS INDEX: 38.36 KG/M2 | DIASTOLIC BLOOD PRESSURE: 98 MMHG | SYSTOLIC BLOOD PRESSURE: 163 MMHG | RESPIRATION RATE: 18 BRPM

## 2023-11-15 DIAGNOSIS — C61 PROSTATE CANCER METASTATIC TO MULTIPLE SITES: Primary | ICD-10-CM

## 2023-11-15 LAB
ALBUMIN SERPL-MCNC: 4.5 G/DL (ref 3.5–5.2)
ALBUMIN/GLOB SERPL: 1.9 G/DL
ALP SERPL-CCNC: 94 U/L (ref 39–117)
ALT SERPL-CCNC: 26 U/L (ref 1–41)
AST SERPL-CCNC: 19 U/L (ref 1–40)
BASOPHILS # BLD AUTO: 0.06 10*3/MM3 (ref 0–0.2)
BASOPHILS NFR BLD AUTO: 0.8 % (ref 0–1.5)
BILIRUB SERPL-MCNC: 0.4 MG/DL (ref 0–1.2)
BUN SERPL-MCNC: 30 MG/DL (ref 8–23)
BUN/CREAT SERPL: 18 (ref 7–25)
CALCIUM SERPL-MCNC: 9.6 MG/DL (ref 8.6–10.5)
CHLORIDE SERPL-SCNC: 108 MMOL/L (ref 98–107)
CO2 SERPL-SCNC: 25.6 MMOL/L (ref 22–29)
CREAT SERPL-MCNC: 1.67 MG/DL (ref 0.76–1.27)
EGFRCR SERPLBLD CKD-EPI 2021: 44.3 ML/MIN/1.73
EOSINOPHIL # BLD AUTO: 0.17 10*3/MM3 (ref 0–0.4)
EOSINOPHIL NFR BLD AUTO: 2.3 % (ref 0.3–6.2)
ERYTHROCYTE [DISTWIDTH] IN BLOOD BY AUTOMATED COUNT: 12.9 % (ref 12.3–15.4)
GLOBULIN SER CALC-MCNC: 2.4 GM/DL
GLUCOSE SERPL-MCNC: 136 MG/DL (ref 65–99)
HCT VFR BLD AUTO: 38.5 % (ref 37.5–51)
HGB BLD-MCNC: 12.7 G/DL (ref 13–17.7)
IMM GRANULOCYTES # BLD AUTO: 0.02 10*3/MM3 (ref 0–0.05)
IMM GRANULOCYTES NFR BLD AUTO: 0.3 % (ref 0–0.5)
LYMPHOCYTES # BLD AUTO: 1.75 10*3/MM3 (ref 0.7–3.1)
LYMPHOCYTES NFR BLD AUTO: 23.3 % (ref 19.6–45.3)
MAGNESIUM SERPL-MCNC: 2.3 MG/DL (ref 1.6–2.4)
MCH RBC QN AUTO: 29 PG (ref 26.6–33)
MCHC RBC AUTO-ENTMCNC: 33 G/DL (ref 31.5–35.7)
MCV RBC AUTO: 87.9 FL (ref 79–97)
MONOCYTES # BLD AUTO: 0.5 10*3/MM3 (ref 0.1–0.9)
MONOCYTES NFR BLD AUTO: 6.7 % (ref 5–12)
NEUTROPHILS # BLD AUTO: 5 10*3/MM3 (ref 1.7–7)
NEUTROPHILS NFR BLD AUTO: 66.6 % (ref 42.7–76)
NRBC BLD AUTO-RTO: 0 /100 WBC (ref 0–0.2)
PHOSPHATE SERPL-MCNC: 2.5 MG/DL (ref 2.5–4.5)
PLATELET # BLD AUTO: 235 10*3/MM3 (ref 140–450)
POTASSIUM SERPL-SCNC: 3.9 MMOL/L (ref 3.5–5.2)
PROT SERPL-MCNC: 6.9 G/DL (ref 6–8.5)
PSA SERPL-MCNC: 1.51 NG/ML (ref 0–4)
RBC # BLD AUTO: 4.38 10*6/MM3 (ref 4.14–5.8)
SODIUM SERPL-SCNC: 143 MMOL/L (ref 136–145)
WBC # BLD AUTO: 7.5 10*3/MM3 (ref 3.4–10.8)

## 2023-11-15 PROCEDURE — 99214 OFFICE O/P EST MOD 30 MIN: CPT | Performed by: NURSE PRACTITIONER

## 2023-11-15 PROCEDURE — 96372 THER/PROPH/DIAG INJ SC/IM: CPT

## 2023-11-15 PROCEDURE — 1159F MED LIST DOCD IN RCRD: CPT | Performed by: NURSE PRACTITIONER

## 2023-11-15 PROCEDURE — 1126F AMNT PAIN NOTED NONE PRSNT: CPT | Performed by: NURSE PRACTITIONER

## 2023-11-15 PROCEDURE — 25010000002 DENOSUMAB 120 MG/1.7ML SOLUTION: Performed by: NURSE PRACTITIONER

## 2023-11-15 PROCEDURE — 1160F RVW MEDS BY RX/DR IN RCRD: CPT | Performed by: NURSE PRACTITIONER

## 2023-11-15 RX ADMIN — DENOSUMAB 120 MG: 120 INJECTION SUBCUTANEOUS at 08:55

## 2023-11-15 NOTE — PROGRESS NOTES
CHIEF COMPLAINT: No new somatic complaints    Problem List:  Oncology/Hematology History Overview Note   1.  Prostate cancer clinical T1c and 2M1B stage IVb treated with 4 cycles of Taxotere, stopped due to syncope with negative cardiac work-up, with marked drop in PSA of 18.6 from over 900 at baseline, continued on Zytiga and prednisone.  2.  Urinary retention with Goodwin in place 1/24/2022.  On finasteride 1/24/2022.  3.  Covid 19 December 2021  4.  Hypertension    Oncology history timeline:  -11/29/2021  with symptoms of urinary retention.  -2/15/2022 prostate biopsy adenocarcinoma grade group 5 and 3 out of 12 cores, grade group 4 in 1 out of 12 cores, grade group 3 in 8 out of 12 cores.  -2/24/2022 CT chest abdomen pelvis and total body bone scan shows left third rib, right acetabulum, periaortic and retroperitoneal kizzy metastases complaining of pain in the left chest and right hip.  Also possible sclerotic left 10th rib on CT.  Spleen mildly enlarged 13.8 cm.  Hepatic steatosis.  Small liver cyst.  Fat stranding in the periaortic and mesenteric region consistent with reactive/inflammatory stranding.  Multiple enlarged periaortic and retroperitoneal nodes and lymphadenopathy largest 4.9 cm.  CT chest describes tiny sclerotic foci in left eighth rib and right lateral seventh rib and T1.  Multiple small mediastinal and bilateral axillary nodes seen with small hypodense left thyroid nodule.  Creatinine 1.7.  -3/4/2022 office note Dr. Rolando Matute: Patient was seen after his elevated PSA by Dr. Vera and prostate biopsy scheduled.  However, he developed COVID-19 and this was canceled and the patient did not reschedule due to lack of insurance.  Saw Dr. Matute back on 1/24/2022 with plans to get staging CTs and bone scan with results as outlined above.  Started Casodex and to return on 3/11/2022 for Lupron.  Referral made to medical oncology for other additional castrate naïve prostate cancer  therapies.  TURP planned for urinary retention symptoms.    -3/15/2022 [facility] medical oncology initial consultation: I reviewed the above data with the patient.  With his fairly bulky retroperitoneal adenopathy and bone metastases including extra axial metastases, he would fit the data from the CHAARTED trial for which Taxotere x6 followed by Zytiga prednisone along with the planned Lupron already being planned by Dr. Matute would be reasonable.  Equally reasonable in terms of comparisons with bicalutamide and Lupron would be Zytiga prednisone plus Lupron or Xtandi plus Lupron or apalutamide plus Lupron.  None of these have been compared head-to-head but all have beaten combined androgen deprivation therapy with bicalutamide and Lupron.  At the age of 67 and in order to have as many bullets as possible down the road and hence saving some of the hormone blockade options for later and using chemotherapy when he is younger and healthier for a more time-limited.,  He has opted for the Taxotere x6 followed by Zytiga and prednisone.  We will also give him Xgeva to improve bone health and given metastatic disease, as with all metastatic prostate cancer patients, he needs genetic counseling both for his sake as well as his family's.  If he were to have BRCA1 or other such  mutations, then that also opens up the options for PARP inhibitors etc. down the road.  He has his TURP scheduled for 2/24/2022 and we will start treatment a couple of weeks after that and he will get chemo preparation visit in the meantime and I will get capital surgeons to place a port.  We will check his PSA after his TURP when he sees my nurse practitioner back early April for the start of chemotherapy and repeat the PSA and CT chest abdomen pelvis and bone scan after the Taxotere is complete.    -3/31/2022 chemotherapy education and needs assessment completed    -4/7/2022 began docetaxel and Xgeva.  .0.  We will do his Xgeva every 6  weeks to coordinate with his docetaxel infusions.    -4/19/2022 patient stopped Casodex    -5/17/2022 patient reported seeing his PCP for an abscess in his suprapubic area.  Placed on clindamycin, will delay treatment 1 week.    -5/25/2022 PSA 34.3  -5/26/2022 [facility] Oncology clinic follow-up: Chris has an abscess in the suprapubic area, it has improved on antibiotic therapy but I am concerned if we treat him it will just flare back up unless it is drained.  I will get him to Dr. Miller who placed his port for I&D and culture.  He is on clindamycin and states he completes course of treatment tomorrow.  I will delay his treatment with Taxotere 1 more week.  We will give his Xgeva today.  I will see him back in 1 week for follow-up to assess whether or not we can resume his Taxotere.  His PSA has dropped nicely with treatment thus far, PSA yesterday was 34.3 which is down from a PSA of 259.0 when we started treatment, it has been as high as 911 in November.    -6/2/2022 [facility] Oncology clinic follow-up: Chris went to see Dr. Miller to have his abscess lanced however apparently there was a long wait and he left without being seen.  He did call his PCP and was given 5 more days of clindamycin and the abscess now seems to have resolved.  We discussed today that he is at risk for recurrence of infection due to immunocompromise state with chemotherapy.  He will notify us if he has any return of his abscess.  He does have intertrigo under his panniculus, I have advised him to keep this area dry, to apply topical drying/antifungal powders.  Also recommended looser clothing.  His counts are adequate to continue therapy, we will resume Taxotere today with cycle #3.  We will repeat restaging scans after 6 courses.  We are doing Xgeva every 6 weeks to coordinate with his Taxotere.  Once he finishes Taxotere we can then go back to every 4 weeks.  His next Xgeva is not due until 7/14/2022.  His calcium is running a little low,  he is going to  calcium supplement.    -6/23/2022 [facility] clinic follow-up: Chris overall is doing well on treatment with Taxotere.  Labs reviewed from yesterday and are unremarkable.  We will continue treatment today unchanged.  His suprapubic abscess resolved on clindamycin.  He will continue to use drying powder/antifungal powder under his panniculus for intertrigo.  Today will be cycle #4 of a planned 6 courses of Taxotere.  He is also on Xgeva, next dose due 7/14/2022, we are doing this every 6 weeks for now to line up with his chemotherapy, can go back to every 4 weeks after he finishes Taxotere.  Calcium from CMP yesterday was normal.    -7/13/2022 [facility] On[facility] clinic follow-up: Mr. Morfin has had issues around day 3 after each treatment ranging from chest pain after his first treatment for which he did not seek medical attention, fatigue after the next few treatments, but 3 days after his most recent treatment on 6/23/2022 he had a syncopal episode at home and once again did not seek medical attention.  I discussed with him that this was not acceptable, if he has occurrences like this someone needs to call 911, it is difficult to figure out what is going on after the fact, the best time to work this up would be during the occurrence.  For now we will get labs today with CBC, CMP, PSA.  I will refer him to cardiology for further evaluation.  We will hold Taxotere most likely, I will see him tomorrow to go over his lab results.  I will also repeat his restaging scans with CT chest, abdomen and pelvis and will include CT of the head in light of his syncopal episode.      -7/13/2022 PSA 18.6    -7/14/2022 [facility] clinic follow-up: Mr. Morfin returns today to review his labs from yesterday.  Labs are unremarkable.  PSA down to 18.6 from a high of 259.0 in April prior to starting treatment.  CBC and CMP unremarkable, magnesium is normal at 2.3, phosphorus slightly low at 2.4.  We will  hold therapy for now in light of his syncopal episode on day 3 after his last treatment and prior episodes with chest pain and severe fatigue that all occurred on day 3 after his Taxotere.  We will restage him with CT head, chest, abdomen and pelvis (I am including the head in light of his syncopal episode).  I have also referred him to cardiology, he states that he received a call from them about making an appointment however he wanted to talk to us today first.  I emphasized the importance to him of making and keeping that appointment and he states understanding.  I also once again emphasized the requirement to seek emergency medical attention if he has any episodes in the future such as chest pain or syncopal events.  Whether or not we try and complete Taxotere with 2 more courses or move to the next line of therapy will be decided when he returns to discuss with Dr. Goodson and review his restaging scans.    -7/15/2022 saw Dr. Diaz cardiologist.  For syncope he ordered echocardiogram and 48-hour Holter monitor.    -7/15/2022 Holter monitor relatively benign.    -7/22/2022 CT brain with and without contrast negative.  CT chest abdomen and pelvis shows some nonspecific cecal wall thickening.  No progression in the chest.  T1 and ribs stable.  Decrease in multiple retroperitoneal and pelvic nodes.  Slight increase in right acetabular sclerotic lesion.  Persistent but improved circumferential bladder wall thickening.  Total body bone scan shows stable left third rib and no evidence of new bone metastases.    -7/19/2022 ejection fraction 65% with grade 1 diastolic dysfunction.    -7/26/2022 [facility] clinic follow-up: Given presyncopal symptoms occurred 3 days after Taxotere and has not otherwise occurred any other time and his cardiology work-up is fairly unremarkable and his disease is under good control as witnessed by the report above of the CTs of head chest abdomen pelvis and bone scan, I will hold  cycle 5 and 6 of Taxotere and continue 1 now with Zytiga and prednisone.  With reference to the coincidental cecal wall thickening found on CT, we will get him to [facility] surgeons for colonoscopy.  He will see my nurse practitioner back in August for next cycle of Abiraterone prednisone.  He will continue his Lupron with Dr. Matute.  We will continue his Xgeva.  We will repeat his CT chest abdomen pelvis and bone scan again in 3 months.  He will see my nurse practitioner in the interim.    -8/23/2022 [facility] oncology clinic follow-up: No further presyncopal symptoms.  Feeling great other than for hot flashes.  Did commend for now we will continue on with his Zytiga prednisone and he will get his Xgeva today based on labs from 8/10/2022.  He opted out of getting the colonoscopy that I recommended and he will not change his mind.  He will continue getting the Lupron with Dr. Matute and I asked him to give the date of this appointment to my nurse when he sees her back in a month.  We will give his Xgeva today.  We will get CT chest abdomen pelvis and total body bone scan towards the middle to end of October.  Presently other than for the hot flashes feels great.    -9/20/2022 PSA 8.320    -9/22/2022 [facility] clinic follow-up: Chris is doing well on Zytiga, prednisone along with Xgeva.  Labs reviewed from 9/20/2022 are unremarkable, CBC was normal, CMP with creatinine of 1.34 otherwise normal, this is stable from his baseline.  PSA stable at 8.320.  He received Lupron recently with Dr. Matute, he thinks last week or so, states his next appointment is in February.  He will continue treatment unchanged.  We will repeat restaging CT chest, abdomen and pelvis and total body bone scan in October prior to return and I have ordered those today.  We will also repeat his PSA.    -9/22/2022 CT chest abdomen pelvisCompared to July 2022 [facility] showed no evidence of disease progression in the chest with no  "substantial sclerotic bony lesions and decrease in size of retroperitoneal and pelvic nodes with persistent cecal wall thickening and suboptimal bladder distention.  Total body bone scan showed decrease in previously seen uptake in the left third rib with diffuse sclerosis representing treatment response with no new foci.    -10/18/2022 [facility] medical oncology follow-up: I reviewed bone scan and CT reports as above with no evidence of progression.  Tolerating Zytiga prednisone and Xgeva.  Getting Lupron regularly with Dr. Matute next appointment coming in February.  We will repeat his CT chest abdomen pelvis and total body bone scan in April.  He will follow with my nurse practitioner in the interim.    -1/10/2023 PSA 3.450  -1/11/2023 [facility] Oncology clinic follow-up: Mr. Morfin continues to do well on current therapy with Zytiga, prednisone and Xgeva with no unusual side effects.  He has no new worrisome symptoms.  He is due for his next Lupron injection in February with Dr. Matute.  His PSA continues to trend downward, current PSA from yesterday was 3.450.  We will continue therapy unchanged, he will receive Xgeva today.  Has had no hypocalcemia, his CMP, phosphorus and magnesium are all normal.  We will repeat restaging scans in April.  -3/7/2023 PSA 2.460  -3/8/2023 [facility] Oncology clinic follow-up:  Mr. Morfin is doing well.  He is tolerating therapy with Zytiga, prednisone and Xgeva with no significant side effects.  He has hot flashes but these are tolerable.  He had Lupron injection 2/24/2023 with Dr. Matute.  His PSA continues to decline, current PSA 2.460.  He will continue therapy unchanged.  We will repeat restaging scans late April prior to return in 2 months and I have ordered those today.  He is working with his PCP Rodrigo Ram on his hypertension.  His b/p today was 160/88.  He was to have increased his losartan but I do not think he has done that yet as he said \"I still have some of my old " "prescription left\".  -4/4/2023 PSA 2.55  - 4/26/2023 CT chest abdomen pelvis [facility] comparison 10/10/2022 showed stable left third rib, T1, left fourth and eighth rib, right seventh rib.  Probable liver cyst.  Few diverticuli.  Mild further decrease in a few of the previously enlarged nodes.  Left external iliac 12 mm compared to 16 mm.  Right common iliac 7 mm stable.  Lower left periaortic 8 mm previously 13 mm.  No new lytic or blastic osseous lesions.  Total body bone scan comparison 10/10/2022, 7/22/2022, and CT 4/26/2023.  No new sites of increased uptake.  1 focal left third rib hotspot consistent with bone metastasis.    -5/2/2023 [facility] medical oncology follow-up: I reviewed interval notes since I last saw him from my nurse practitioner as outlined above in interval images and reports thereof from [facility] that shows no new sites of bone metastasis and no kizzy or visceral progression.  PSA stabilized around 2.5.  In the absence of symptomatic or radiographic progression, I would be unlikely to change therapy just on the basis of a PSA rise and for now the PSA is stable.  We will continue Zytiga prednisone and Xgeva and he will follow-up with my nurse practitioner 5/30/2023 with repeat CTs and bone scan in 6 months.  I asked him to check with his primary care today regarding his systolic in the 170s.    -5/31/2023 [facility] clinic follow-up: Chris continues to do well on current therapy with Zytiga, prednisone and Xgeva along with Lupron that he receives with Dr. Matute.  His PSA remains stable, it was lower this month compared to last month, current PSA 1.940.  Continues to deal with hypertension, on arrival today his blood pressure was 210/92 however this was using a wrist cuff, when I rechecked it manually his blood pressure was 160/90.  Still has room for improvement despite recent increase in his losartan to 100 mg daily.  He will follow-up with Dr. Ram for further management. "  We will continue therapy unchanged.  We will repeat restaging scans in October.    -7/26/2023 [facility] Oncology clinic follow-up: Chris overall continues to do well on current therapy with Zytiga, prednisone and Xgeva.  He receives Lupron every 6 months with Dr. Matute and states that is scheduled for August.  PSA from yesterday is pending, PSA on 6/27/2023 was 1.590 which was continuing to decline, in February it was 3.250.  His creatinine this past month has bumped up.  He feels he is staying hydrated but I encouraged him to increase his fluid intake.  I discussed with him that this could be from his hypertension, some of his medications.  His blood pressure today is 165/92.  He has an appointment with his primary care provider Dr. Teri Ram next week and can further discuss with her.  No adjustment needed for Xgeva.  I refilled his prednisone today.  I will see him back in 1 month for follow-up with continued monitoring of his labs.  He may end up needing to see nephrology.  We will repeat restaging scans in October.    -9/29/2023 PSA 1.810  -8/23/2023 [facility] Oncology clinic follow-up: Chris continues on therapy with Zytiga, prednisone and Xgeva, he also receives Lupron every 6 months with Dr. Matute with most recent given on 8/7/2023.  He is scheduled for a cystoscopy in a few weeks as his last few UAs per the patient's report had microscopic hematuria, he has had no steve hematuria.  His creatinine continues to remain elevated at 1.82, BUN is 40, GFR is 40.  I will get him to nephrology for further evaluation.  Blood pressure today was fairly good at 161/86, he continues to follow with Dr. Ram for management.  PSA on 7/25/2023 was up slightly from prior month, PSA in July was 2.140, in June it was 1.590, PSA from yesterday is pending.  I plan on repeating restaging CT scans and bone scans in October however if his PSA is up significantly then I will do sooner.  I will see him back for follow-up  in 1 month.  -8/23/2024 PSA 1.860    -9/20/2023 [facility] clinic follow-up: Chris is tolerating treatment with Zytiga, prednisone and Xgeva.  He is up-to-date with his Lupron injection, last received in August with Dr. Matute.  He had cystoscopy on 9/8/2023 that was normal. I referred him to nephrology when I saw him last in August as his creatinine has been increasing, creatinine yesterday was a little better 1.51, GFR is 50, creatinine clearance 63.8.  He is still awaiting an appointment with nephrology, per our  it will be early November.  We will repeat his restaging scans prior to return and I will do his CT scans without contrast in light of his new renal insufficiency.  We will also get total body bone scan and I have ordered those today.  His PSA yesterday was 1.810.    -10/9/2023 CT chest abdomen Pelvis without contrast compared to April 2023 shows slightly prominent pelvic lymph nodes left external iliac 8 mm compared to prior study.  Right internal iliac heterogenous 19 mm compared to 11 mm prior.  Stable sclerotic bone lesions and no new lesions.  Stable bone scan with no new sites of disease    -10/17/2023 [facility] oncology clinic follow-up: With elevated creatinine, CTs done without contrast showed very slight increased prominence by few millimeters of some internal iliac and external iliac nodes for which PSMA PET could be done but for now I will check his PSA and have him see my nurse practitioner back in a week and unless the PSA is significantly rising I would continue the Zytiga, prednisone, Xgeva and February dose of Lupron with Dr. Matute and make sure he follows with nephrology.  If his PSA is significantly rising then my nurse practitioner will order PSMA PET.     Prostate cancer metastatic to multiple sites   3/15/2022 Initial Diagnosis    Prostate cancer metastatic to multiple sites (HCC)     3/15/2022 Cancer Staged    Staging form: Prostate, AJCC 8th Edition  - Clinical:  "Stage IVB (cT1c, cN1, cM1b, Grade Group: 5) - Signed by Fritz Goodson MD on 3/15/2022     4/7/2022 - 6/23/2022 Chemotherapy    Stopped after cycle 4 6/23/2022 due to syncope 3 days post infusions with negative cardiac work-up  OP PROSTATE DOCEtaxel     4/7/2022 -  Chemotherapy    OP SUPPORTIVE Denosumab (Xgeva) Q28D     7/26/2022 -  Chemotherapy    OP PROSTATE Abiraterone / PredniSONE           HISTORY OF PRESENT ILLNESS:  The patient is a 68 y.o. male, here for follow up on management of metastatic prostate cancer currently on treatment with Zytiga, prednisone, xgeva and lupron that he receives with Dr. Sue.  Mr. Morfin reports that he had his consultation with nephrologist and will continue to follow with them.  No change was made in his medications.  He reports that his blood pressure when he is not here has been under better control.  He denies any pain.  His appetite is normal, no change in his bowel or bladder habits.    Past Medical History:   Diagnosis Date    Cancer     Prostate cancer      Past Surgical History:   Procedure Laterality Date    PROSTATE BIOPSY  02/2022    dr. sue       No Known Allergies    Family History and Social History reviewed and changed as necessary    REVIEW OF SYSTEM:   No new somatic complaints    PHYSICAL EXAM:  Well-developed, well-nourished healthy-appearing male in no distress  Heart regular rate and rhythm  Lungs clear to auscultation bilaterally    Vitals:    11/15/23 0826   BP: 163/98   Pulse: 79   Resp: 18   Temp: 98.4 °F (36.9 °C)   SpO2: 97%   Weight: 124 kg (274 lb)   Height: 180.3 cm (71\")     Vitals:    11/15/23 0826   PainSc: 0-No pain          ECOG score: 0           Vitals reviewed.    ECOG: (0) Fully Active - Able to Carry On All Pre-disease Performance Without Restriction    Lab Results   Component Value Date    HGB 12.7 (L) 11/14/2023    HCT 38.5 11/14/2023    MCV 87.9 11/14/2023     11/14/2023    WBC 7.50 11/14/2023    NEUTROABS 5.00 11/14/2023 "    LYMPHSABS 1.75 11/14/2023    MONOSABS 0.50 11/14/2023    EOSABS 0.17 11/14/2023    BASOSABS 0.06 11/14/2023       Lab Results   Component Value Date    GLUCOSE 136 (H) 11/14/2023    BUN 30 (H) 11/14/2023    CREATININE 1.67 (H) 11/14/2023     11/14/2023    K 3.9 11/14/2023     (H) 11/14/2023    CO2 25.6 11/14/2023    CALCIUM 9.6 11/14/2023    ALBUMIN 4.5 11/14/2023    BILITOT 0.4 11/14/2023    ALKPHOS 94 11/14/2023    AST 19 11/14/2023    ALT 26 11/14/2023     Lab Results   Component Value Date    PSA 1.510 11/14/2023    PSA 1.5 10/17/2023    PSA 1.810 09/19/2023    PSA 1.860 08/22/2023             ASSESSMENT & PLAN:  1.  Prostate cancer clinical T1c and 2M1B stage IVb treated with 4 cycles of Taxotere, stopped due to syncope with negative cardiac work-up, with marked drop in PSA of 18.6 from over 900 at baseline, continued on Zytiga and prednisone.  2.  Urinary retention with Goodwin in place 1/24/2022.  On finasteride 1/24/2022.  3.  Covid 19 December 2021  4.  Hypertension    Oncology history timeline:  -11/29/2021  with symptoms of urinary retention.  -2/15/2022 prostate biopsy adenocarcinoma grade group 5 and 3 out of 12 cores, grade group 4 in 1 out of 12 cores, grade group 3 in 8 out of 12 cores.  -2/24/2022 CT chest abdomen pelvis and total body bone scan shows left third rib, right acetabulum, periaortic and retroperitoneal kizzy metastases complaining of pain in the left chest and right hip.  Also possible sclerotic left 10th rib on CT.  Spleen mildly enlarged 13.8 cm.  Hepatic steatosis.  Small liver cyst.  Fat stranding in the periaortic and mesenteric region consistent with reactive/inflammatory stranding.  Multiple enlarged periaortic and retroperitoneal nodes and lymphadenopathy largest 4.9 cm.  CT chest describes tiny sclerotic foci in left eighth rib and right lateral seventh rib and T1.  Multiple small mediastinal and bilateral axillary nodes seen with small hypodense left  thyroid nodule.  Creatinine 1.7.  -3/4/2022 office note Dr. Rolando Matute: Patient was seen after his elevated PSA by Dr. Vera and prostate biopsy scheduled.  However, he developed COVID-19 and this was canceled and the patient did not reschedule due to lack of insurance.  Saw Dr. Matute back on 1/24/2022 with plans to get staging CTs and bone scan with results as outlined above.  Started Casodex and to return on 3/11/2022 for Lupron.  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  TURP planned for urinary retention symptoms.    -3/15/2022 [facility] medical oncology initial consultation: I reviewed the above data with the patient.  With his fairly bulky retroperitoneal adenopathy and bone metastases including extra axial metastases, he would fit the data from the CHAARTED trial for which Taxotere x6 followed by Zytiga prednisone along with the planned Lupron already being planned by Dr. Matute would be reasonable.  Equally reasonable in terms of comparisons with bicalutamide and Lupron would be Zytiga prednisone plus Lupron or Xtandi plus Lupron or apalutamide plus Lupron.  None of these have been compared head-to-head but all have beaten combined androgen deprivation therapy with bicalutamide and Lupron.  At the age of 67 and in order to have as many bullets as possible down the road and hence saving some of the hormone blockade options for later and using chemotherapy when he is younger and healthier for a more time-limited.,  He has opted for the Taxotere x6 followed by Zytiga and prednisone.  We will also give him Xgeva to improve bone health and given metastatic disease, as with all metastatic prostate cancer patients, he needs genetic counseling both for his sake as well as his family's.  If he were to have BRCA1 or other such  mutations, then that also opens up the options for PARP inhibitors etc. down the road.  He has his TURP scheduled for  2/24/2022 and we will start treatment a couple of weeks after that and he will get chemo preparation visit in the meantime and I will get capital surgeons to place a port.  We will check his PSA after his TURP when he sees my nurse practitioner back early April for the start of chemotherapy and repeat the PSA and CT chest abdomen pelvis and bone scan after the Taxotere is complete.    -3/31/2022 chemotherapy education and needs assessment completed    -4/7/2022 began docetaxel and Xgeva.  .0.  We will do his Xgeva every 6 weeks to coordinate with his docetaxel infusions.    -4/19/2022 patient stopped Casodex    -5/17/2022 patient reported seeing his PCP for an abscess in his suprapubic area.  Placed on clindamycin, will delay treatment 1 week.    -5/25/2022 PSA 34.3  -5/26/2022 [facility] Oncology clinic follow-up: Chris has an abscess in the suprapubic area, it has improved on antibiotic therapy but I am concerned if we treat him it will just flare back up unless it is drained.  I will get him to Dr. Miller who placed his port for I&D and culture.  He is on clindamycin and states he completes course of treatment tomorrow.  I will delay his treatment with Taxotere 1 more week.  We will give his Xgeva today.  I will see him back in 1 week for follow-up to assess whether or not we can resume his Taxotere.  His PSA has dropped nicely with treatment thus far, PSA yesterday was 34.3 which is down from a PSA of 259.0 when we started treatment, it has been as high as 911 in November.    -6/2/2022 [facility] Oncology clinic follow-up: Chris went to see Dr. Miller to have his abscess lanced however apparently there was a long wait and he left without being seen.  He did call his PCP and was given 5 more days of clindamycin and the abscess now seems to have resolved.  We discussed today that he is at risk for recurrence of infection due to immunocompromise state with chemotherapy.  He will notify us if he has any return of  his abscess.  He does have intertrigo under his panniculus, I have advised him to keep this area dry, to apply topical drying/antifungal powders.  Also recommended looser clothing.  His counts are adequate to continue therapy, we will resume Taxotere today with cycle #3.  We will repeat restaging scans after 6 courses.  We are doing Xgeva every 6 weeks to coordinate with his Taxotere.  Once he finishes Taxotere we can then go back to every 4 weeks.  His next Xgeva is not due until 7/14/2022.  His calcium is running a little low, he is going to  calcium supplement.    -6/23/2022 [facility] clinic follow-up: Chris huerta is doing well on treatment with Taxotere.  Labs reviewed from yesterday and are unremarkable.  We will continue treatment today unchanged.  His suprapubic abscess resolved on clindamycin.  He will continue to use drying powder/antifungal powder under his panniculus for intertrigo.  Today will be cycle #4 of a planned 6 courses of Taxotere.  He is also on Xgeva, next dose due 7/14/2022, we are doing this every 6 weeks for now to line up with his chemotherapy, can go back to every 4 weeks after he finishes Taxotere.  Calcium from CMP yesterday was normal.    -7/13/2022 [facility] clinic follow-up: Mr. Morfin has had issues around day 3 after each treatment ranging from chest pain after his first treatment for which he did not seek medical attention, fatigue after the next few treatments, but 3 days after his most recent treatment on 6/23/2022 he had a syncopal episode at home and once again did not seek medical attention.  I discussed with him that this was not acceptable, if he has occurrences like this someone needs to call 911, it is difficult to figure out what is going on after the fact, the best time to work this up would be during the occurrence.  For now we will get labs today with CBC, CMP, PSA.  I will refer him to cardiology for further evaluation.  We will hold Taxotere  most likely, I will see him tomorrow to go over his lab results.  I will also repeat his restaging scans with CT chest, abdomen and pelvis and will include CT of the head in light of his syncopal episode.      -7/13/2022 PSA 18.6    -7/14/2022 [facility] Oncology clinic follow-up: Mr. Morfin returns today to review his labs from yesterday.  Labs are unremarkable.  PSA down to 18.6 from a high of 259.0 in April prior to starting treatment.  CBC and CMP unremarkable, magnesium is normal at 2.3, phosphorus slightly low at 2.4.  We will hold therapy for now in light of his syncopal episode on day 3 after his last treatment and prior episodes with chest pain and severe fatigue that all occurred on day 3 after his Taxotere.  We will restage him with CT head, chest, abdomen and pelvis (I am including the head in light of his syncopal episode).  I have also referred him to cardiology, he states that he received a call from them about making an appointment however he wanted to talk to us today first.  I emphasized the importance to him of making and keeping that appointment and he states understanding.  I also once again emphasized the requirement to seek emergency medical attention if he has any episodes in the future such as chest pain or syncopal events.  Whether or not we try and complete Taxotere with 2 more courses or move to the next line of therapy will be decided when he returns to discuss with Dr. Goodson and review his restaging scans.    -7/15/2022 saw Dr. Diaz cardiologist.  For syncope he ordered echocardiogram and 48-hour Holter monitor.    -7/15/2022 Holter monitor relatively benign.    -7/22/2022 CT brain with and without contrast negative.  CT chest abdomen and pelvis shows some nonspecific cecal wall thickening.  No progression in the chest.  T1 and ribs stable.  Decrease in multiple retroperitoneal and pelvic nodes.  Slight increase in right acetabular sclerotic lesion.  Persistent but improved  circumferential bladder wall thickening.  Total body bone scan shows stable left third rib and no evidence of new bone metastases.    -7/19/2022 ejection fraction 65% with grade 1 diastolic dysfunction.    -7/26/2022 [facility] oncology clinic follow-up: Given presyncopal symptoms occurred 3 days after Taxotere and has not otherwise occurred any other time and his cardiology work-up is fairly unremarkable and his disease is under good control as witnessed by the report above of the CTs of head chest abdomen pelvis and bone scan, I will hold cycle 5 and 6 of Taxotere and continue 1 now with Zytiga and prednisone.  With reference to the coincidental cecal wall thickening found on CT, we will get him to [facility] for colonoscopy.  He will see my nurse practitioner back in August for next cycle of Abiraterone prednisone.  He will continue his Lupron with Dr. Matute.  We will continue his Xgeva.  We will repeat his CT chest abdomen pelvis and bone scan again in 3 months.  He will see my nurse practitioner in the interim.    -8/23/2022 [facility] oncology clinic follow-up: No further presyncopal symptoms.  Feeling great other than for hot flashes.  Did commend for now we will continue on with his Zytiga prednisone and he will get his Xgeva today based on labs from 8/10/2022.  He opted out of getting the colonoscopy that I recommended and he will not change his mind.  He will continue getting the Lupron with Dr. Matute and I asked him to give the date of this appointment to my nurse when he sees her back in a month.  We will give his Xgeva today.  We will get CT chest abdomen pelvis and total body bone scan towards the middle to end of October.  Presently other than for the hot flashes feels great.    -9/20/2022 PSA 8.320    -9/22/2022 [facility] clinic follow-up: Chris is doing well on Zytiga, prednisone along with Xgeva.  Labs reviewed from 9/20/2022 are unremarkable, CBC was normal, CMP with creatinine of  1.34 otherwise normal, this is stable from his baseline.  PSA stable at 8.320.  He received Lupron recently with Dr. Matute, he thinks last week or so, states his next appointment is in February.  He will continue treatment unchanged.  We will repeat restaging CT chest, abdomen and pelvis and total body bone scan in October prior to return and I have ordered those today.  We will also repeat his PSA.    -9/22/2022 CT chest abdomen pelvisCompared to July 2022 [facility] showed no evidence of disease progression in the chest with no substantial sclerotic bony lesions and decrease in size of retroperitoneal and pelvic nodes with persistent cecal wall thickening and suboptimal bladder distention.  Total body bone scan showed decrease in previously seen uptake in the left third rib with diffuse sclerosis representing treatment response with no new foci.    -10/18/2022 [facility] medical oncology follow-up: I reviewed bone scan and CT reports as above with no evidence of progression.  Tolerating Zytiga prednisone and Xgeva.  Getting Lupron regularly with Dr. Matute next appointment coming in February.  We will repeat his CT chest abdomen pelvis and total body bone scan in April.  He will follow with my nurse practitioner in the interim.    -1/10/2023 PSA 3.450  -1/11/2023 [facility] clinic follow-up: Mr. Morfin continues to do well on current therapy with Zytiga, prednisone and Xgeva with no unusual side effects.  He has no new worrisome symptoms.  He is due for his next Lupron injection in February with Dr. Matute.  His PSA continues to trend downward, current PSA from yesterday was 3.450.  We will continue therapy unchanged, he will receive Xgeva today.  Has had no hypocalcemia, his CMP, phosphorus and magnesium are all normal.  We will repeat restaging scans in April.  -3/7/2023 PSA 2.460  -3/8/2023 [facility] clinic follow-up:  Mr. Morfin is doing well.  He is tolerating therapy with Zytiga,  "prednisone and Xgeva with no significant side effects.  He has hot flashes but these are tolerable.  He had Lupron injection 2/24/2023 with Dr. Matute.  His PSA continues to decline, current PSA 2.460.  He will continue therapy unchanged.  We will repeat restaging scans late April prior to return in 2 months and I have ordered those today.  He is working with his PCP Rodrigo Ram on his hypertension.  His b/p today was 160/88.  He was to have increased his losartan but I do not think he has done that yet as he said \"I still have some of my old prescription left\".  -4/4/2023 PSA 2.55  - 4/26/2023 CT chest abdomen pelvis [place] comparison 10/10/2022 showed stable left third rib, T1, left fourth and eighth rib, right seventh rib.  Probable liver cyst.  Few diverticuli.  Mild further decrease in a few of the previously enlarged nodes.  Left external iliac 12 mm compared to 16 mm.  Right common iliac 7 mm stable.  Lower left periaortic 8 mm previously 13 mm.  No new lytic or blastic osseous lesions.  Total body bone scan comparison 10/10/2022, 7/22/2022, and CT 4/26/2023.  No new sites of increased uptake.  1 focal left third rib hotspot consistent with bone metastasis.    -5/2/2023 [facility] medical oncology follow-up: I reviewed interval notes since I last saw him from my nurse practitioner as outlined above in interval images and reports thereof from [facility] that shows no new sites of bone metastasis and no kizzy or visceral progression.  PSA stabilized around 2.5.  In the absence of symptomatic or radiographic progression, I would be unlikely to change therapy just on the basis of a PSA rise and for now the PSA is stable.  We will continue Zytiga prednisone and Xgeva and he will follow-up with my nurse practitioner 5/30/2023 with repeat CTs and bone scan in 6 months.  I asked him to check with his primary care today regarding his systolic in the 170s.    -5/31/2023 [facility] clinic follow-up: Chris" continues to do well on current therapy with Zytiga, prednisone and Xgeva along with Lupron that he receives with Dr. Matute.  His PSA remains stable, it was lower this month compared to last month, current PSA 1.940.  Continues to deal with hypertension, on arrival today his blood pressure was 210/92 however this was using a wrist cuff, when I rechecked it manually his blood pressure was 160/90.  Still has room for improvement despite recent increase in his losartan to 100 mg daily.  He will follow-up with Dr. Ram for further management.  We will continue therapy unchanged.  We will repeat restaging scans in October.    -7/26/2023 [facility] clinic follow-up: Chris overall continues to do well on current therapy with Zytiga, prednisone and Xgeva.  He receives Lupron every 6 months with Dr. Matute and states that is scheduled for August.  PSA from yesterday is pending, PSA on 6/27/2023 was 1.590 which was continuing to decline, in February it was 3.250.  His creatinine this past month has bumped up.  He feels he is staying hydrated but I encouraged him to increase his fluid intake.  I discussed with him that this could be from his hypertension, some of his medications.  His blood pressure today is 165/92.  He has an appointment with his primary care provider Dr. Teri Ram next week and can further discuss with her.  No adjustment needed for Xgeva.  I refilled his prednisone today.  I will see him back in 1 month for follow-up with continued monitoring of his labs.  He may end up needing to see nephrology.  We will repeat restaging scans in October.    -9/29/2023 PSA 1.810  -8/23/2023 [facility] clinic follow-up: Chris continues on therapy with Zytiga, prednisone and Xgeva, he also receives Lupron every 6 months with Dr. Matute with most recent given on 8/7/2023.  He is scheduled for a cystoscopy in a few weeks as his last few UAs per the patient's report had microscopic hematuria, he has  had no steve hematuria.  His creatinine continues to remain elevated at 1.82, BUN is 40, GFR is 40.  I will get him to nephrology for further evaluation.  Blood pressure today was fairly good at 161/86, he continues to follow with Dr. Ram for management.  PSA on 7/25/2023 was up slightly from prior month, PSA in July was 2.140, in June it was 1.590, PSA from yesterday is pending.  I plan on repeating restaging CT scans and bone scans in October however if his PSA is up significantly then I will do sooner.  I will see him back for follow-up in 1 month.  -8/23/2024 PSA 1.860    -9/20/2023 [facility] clinic follow-up: Chris is tolerating treatment with Zytiga, prednisone and Xgeva.  He is up-to-date with his Lupron injection, last received in August with Dr. Matute.  He had cystoscopy on 9/8/2023 that was normal. I referred him to nephrology when I saw him last in August as his creatinine has been increasing, creatinine yesterday was a little better 1.51, GFR is 50, creatinine clearance 63.8.  He is still awaiting an appointment with nephrology, per our  it will be early November.  We will repeat his restaging scans prior to return and I will do his CT scans without contrast in light of his new renal insufficiency.  We will also get total body bone scan and I have ordered those today.  His PSA yesterday was 1.810.    -10/9/2023 CT chest abdomen Pelvis without contrast compared to April 2023 shows slightly prominent pelvic lymph nodes left external iliac 8 mm compared to prior study.  Right internal iliac heterogenous 19 mm compared to 11 mm prior.  Stable sclerotic bone lesions and no new lesions.  Stable bone scan with no new sites of disease    -10/17/2023 [facility] oncology clinic follow-up: With elevated creatinine, CTs done without contrast showed very slight increased prominence by few millimeters of some internal iliac and external iliac nodes for which PSMA PET could be done but for now I will  check his PSA and have him see my nurse practitioner back in a week and unless the PSA is significantly rising I would continue the Zytiga, prednisone, Xgeva and February dose of Lupron with Dr. Matute and make sure he follows with nephrology.  If his PSA is significantly rising then my nurse practitioner will order PSMA PET.    -11/15/2023 [facility] Oncology clinic follow-up: Mr. Ferrara continues to do well on current therapy with Zytiga, prednisone, Xgeva and Lupron that he receives with Dr. Matute.  His PSA is stable at 1.5.  Would not change therapy for now, we will plan on repeating restaging scans in February unless PSA starts to rise or he has new concerns.  Overall he feels good.  He is now established with nephrology and will continue to follow with them regarding his renal insufficiency, I reviewed note from consultation with Dr. Armstrong 11/6/2023.    Return to clinic in 1 month for follow-up    I spent 30 minutes caring for Chris on this date of service. This time includes time spent by me in the following activities: preparing for the visit, reviewing tests, obtaining and/or reviewing a separately obtained history, performing a medically appropriate examination and/or evaluation, ordering medications, tests, or procedures, documenting information in the medical record, and independently interpreting results and communicating that information with the patient/family/caregiver.     Susana Torres, APRN    11/15/2023

## 2023-11-16 ENCOUNTER — SPECIALTY PHARMACY (OUTPATIENT)
Dept: ONCOLOGY | Facility: HOSPITAL | Age: 68
End: 2023-11-16
Payer: MEDICARE

## 2023-11-22 ENCOUNTER — SPECIALTY PHARMACY (OUTPATIENT)
Dept: ONCOLOGY | Facility: HOSPITAL | Age: 68
End: 2023-11-22
Payer: MEDICARE

## 2023-11-22 NOTE — PROGRESS NOTES
Specialty Pharmacy Refill Coordination Note     Chris is a 68 y.o. male contacted today regarding refills of  abiraterone 1000mg PO QD specialty medication(s).    Medication is scheduled to be shipped out on 11/27/23 because of the holiday.     Specialty medication(s) and dose(s) confirmed: yes    Refill Questions      Flowsheet Row Most Recent Value   Changes to allergies? No   Changes to medications? No   New conditions since last clinic visit No   Unplanned office visit, urgent care, ED, or hospital admission in the last 4 weeks  No   How does patient/caregiver feel medication is working? Good   Financial problems or insurance changes  No   Since the previous refill, were any specialty medication doses or scheduled injections missed or delayed?  No   Does this patient require a clinical escalation to a pharmacist? No            Delivery Questions      Flowsheet Row Most Recent Value   Delivery method [employer]   Delivery address correct? Yes   Delivery phone number [phone]   Preferred delivery time? Anytime   Number of medications in delivery 1   Medication(s) being filled and delivered Abiraterone Acetate   Doses left of specialty medications 4 days left   Is there any medication that is due not being filled? No   Supplies needed? No supplies needed   Cooler needed? No   Do any medications need mixed or dated? No   Additional comments no copay   Questions or concerns for the pharmacist? No   Explain any questions or concerns for the pharmacist n/a   Are any medications first time fills? No                   Follow-up: 28 day(s)     Catalina Montes De Oca, Pharmacy Technician  Specialty Pharmacy Technician

## 2023-12-12 ENCOUNTER — LAB (OUTPATIENT)
Dept: ONCOLOGY | Facility: HOSPITAL | Age: 68
End: 2023-12-12
Payer: MEDICARE

## 2023-12-12 VITALS
BODY MASS INDEX: 37.94 KG/M2 | SYSTOLIC BLOOD PRESSURE: 159 MMHG | TEMPERATURE: 99.1 F | HEART RATE: 75 BPM | WEIGHT: 272 LBS | RESPIRATION RATE: 18 BRPM | DIASTOLIC BLOOD PRESSURE: 91 MMHG

## 2023-12-12 DIAGNOSIS — C61 PROSTATE CANCER METASTATIC TO MULTIPLE SITES: Primary | ICD-10-CM

## 2023-12-12 DIAGNOSIS — C61 PROSTATE CANCER METASTATIC TO MULTIPLE SITES: ICD-10-CM

## 2023-12-12 PROCEDURE — 36415 COLL VENOUS BLD VENIPUNCTURE: CPT

## 2023-12-13 ENCOUNTER — INFUSION (OUTPATIENT)
Dept: ONCOLOGY | Facility: HOSPITAL | Age: 68
End: 2023-12-13
Payer: MEDICARE

## 2023-12-13 ENCOUNTER — OFFICE VISIT (OUTPATIENT)
Dept: ONCOLOGY | Facility: CLINIC | Age: 68
End: 2023-12-13
Payer: MEDICARE

## 2023-12-13 VITALS
RESPIRATION RATE: 18 BRPM | OXYGEN SATURATION: 95 % | TEMPERATURE: 97.8 F | WEIGHT: 272 LBS | BODY MASS INDEX: 38.08 KG/M2 | HEIGHT: 71 IN | HEART RATE: 82 BPM | SYSTOLIC BLOOD PRESSURE: 175 MMHG | DIASTOLIC BLOOD PRESSURE: 94 MMHG

## 2023-12-13 DIAGNOSIS — C61 PROSTATE CANCER METASTATIC TO MULTIPLE SITES: Primary | ICD-10-CM

## 2023-12-13 LAB
ALBUMIN SERPL-MCNC: 4 G/DL (ref 3.5–5.2)
ALBUMIN/GLOB SERPL: 1.5 G/DL
ALP SERPL-CCNC: 84 U/L (ref 39–117)
ALT SERPL-CCNC: 18 U/L (ref 1–41)
AST SERPL-CCNC: 17 U/L (ref 1–40)
BASOPHILS # BLD AUTO: 0.06 10*3/MM3 (ref 0–0.2)
BASOPHILS NFR BLD AUTO: 0.9 % (ref 0–1.5)
BILIRUB SERPL-MCNC: 0.3 MG/DL (ref 0–1.2)
BUN SERPL-MCNC: 30 MG/DL (ref 8–23)
BUN/CREAT SERPL: 19.5 (ref 7–25)
CALCIUM SERPL-MCNC: 9.5 MG/DL (ref 8.6–10.5)
CHLORIDE SERPL-SCNC: 106 MMOL/L (ref 98–107)
CO2 SERPL-SCNC: 23.8 MMOL/L (ref 22–29)
CREAT SERPL-MCNC: 1.54 MG/DL (ref 0.76–1.27)
EGFRCR SERPLBLD CKD-EPI 2021: 48.8 ML/MIN/1.73
EOSINOPHIL # BLD AUTO: 0.16 10*3/MM3 (ref 0–0.4)
EOSINOPHIL NFR BLD AUTO: 2.4 % (ref 0.3–6.2)
ERYTHROCYTE [DISTWIDTH] IN BLOOD BY AUTOMATED COUNT: 12.5 % (ref 12.3–15.4)
GLOBULIN SER CALC-MCNC: 2.6 GM/DL
GLUCOSE SERPL-MCNC: 115 MG/DL (ref 65–99)
HCT VFR BLD AUTO: 39.7 % (ref 37.5–51)
HGB BLD-MCNC: 12.4 G/DL (ref 13–17.7)
IMM GRANULOCYTES # BLD AUTO: 0.03 10*3/MM3 (ref 0–0.05)
IMM GRANULOCYTES NFR BLD AUTO: 0.5 % (ref 0–0.5)
LYMPHOCYTES # BLD AUTO: 1.47 10*3/MM3 (ref 0.7–3.1)
LYMPHOCYTES NFR BLD AUTO: 22.4 % (ref 19.6–45.3)
MAGNESIUM SERPL-MCNC: 2 MG/DL (ref 1.6–2.4)
MCH RBC QN AUTO: 26.7 PG (ref 26.6–33)
MCHC RBC AUTO-ENTMCNC: 31.2 G/DL (ref 31.5–35.7)
MCV RBC AUTO: 85.4 FL (ref 79–97)
MONOCYTES # BLD AUTO: 0.54 10*3/MM3 (ref 0.1–0.9)
MONOCYTES NFR BLD AUTO: 8.2 % (ref 5–12)
NEUTROPHILS # BLD AUTO: 4.3 10*3/MM3 (ref 1.7–7)
NEUTROPHILS NFR BLD AUTO: 65.6 % (ref 42.7–76)
NRBC BLD AUTO-RTO: 0 /100 WBC (ref 0–0.2)
PHOSPHATE SERPL-MCNC: 3 MG/DL (ref 2.5–4.5)
PLATELET # BLD AUTO: 238 10*3/MM3 (ref 140–450)
POTASSIUM SERPL-SCNC: 4.1 MMOL/L (ref 3.5–5.2)
PROT SERPL-MCNC: 6.6 G/DL (ref 6–8.5)
RBC # BLD AUTO: 4.65 10*6/MM3 (ref 4.14–5.8)
SODIUM SERPL-SCNC: 141 MMOL/L (ref 136–145)
WBC # BLD AUTO: 6.56 10*3/MM3 (ref 3.4–10.8)

## 2023-12-13 PROCEDURE — 1159F MED LIST DOCD IN RCRD: CPT | Performed by: NURSE PRACTITIONER

## 2023-12-13 PROCEDURE — 1126F AMNT PAIN NOTED NONE PRSNT: CPT | Performed by: NURSE PRACTITIONER

## 2023-12-13 PROCEDURE — 99213 OFFICE O/P EST LOW 20 MIN: CPT | Performed by: NURSE PRACTITIONER

## 2023-12-13 PROCEDURE — 1160F RVW MEDS BY RX/DR IN RCRD: CPT | Performed by: NURSE PRACTITIONER

## 2023-12-13 PROCEDURE — 25010000002 DENOSUMAB 120 MG/1.7ML SOLUTION: Performed by: NURSE PRACTITIONER

## 2023-12-13 PROCEDURE — 96372 THER/PROPH/DIAG INJ SC/IM: CPT

## 2023-12-13 RX ADMIN — DENOSUMAB 120 MG: 120 INJECTION SUBCUTANEOUS at 10:26

## 2023-12-13 NOTE — PROGRESS NOTES
CHIEF COMPLAINT: No new somatic complaints    Problem List:  Oncology/Hematology History Overview Note   1.  Prostate cancer clinical T1c and 2M1B stage IVb treated with 4 cycles of Taxotere, stopped due to syncope with negative cardiac work-up, with marked drop in PSA of 18.6 from over 900 at baseline, continued on Zytiga and prednisone.  2.  Urinary retention with Goodwin in place 1/24/2022.  On finasteride 1/24/2022.  3.  Covid 19 December 2021  4.  Hypertension    Oncology history timeline:  -11/29/2021  with symptoms of urinary retention.  -2/15/2022 prostate biopsy adenocarcinoma grade group 5 and 3 out of 12 cores, grade group 4 in 1 out of 12 cores, grade group 3 in 8 out of 12 cores.  -2/24/2022 CT chest abdomen pelvis and total body bone scan shows left third rib, right acetabulum, periaortic and retroperitoneal kizzy metastases complaining of pain in the left chest and right hip.  Also possible sclerotic left 10th rib on CT.  Spleen mildly enlarged 13.8 cm.  Hepatic steatosis.  Small liver cyst.  Fat stranding in the periaortic and mesenteric region consistent with reactive/inflammatory stranding.  Multiple enlarged periaortic and retroperitoneal nodes and lymphadenopathy largest 4.9 cm.  CT chest describes tiny sclerotic foci in left eighth rib and right lateral seventh rib and T1.  Multiple small mediastinal and bilateral axillary nodes seen with small hypodense left thyroid nodule.  Creatinine 1.7.  -3/4/2022 office note Dr. Rolando Matute: Patient was seen after his elevated PSA by Dr. Vera and prostate biopsy scheduled.  However, he developed COVID-19 and this was canceled and the patient did not reschedule due to lack of insurance.  Saw Dr. Matute back on 1/24/2022 with plans to get staging CTs and bone scan with results as outlined above.  Started Casodex and to return on 3/11/2022 for Lupron.  Referral made to medical oncology for other additional castrate naïve prostate cancer  therapies.  TURP planned for urinary retention symptoms.    -3/15/2022 [facility] medical oncology initial consultation: I reviewed the above data with the patient.  With his fairly bulky retroperitoneal adenopathy and bone metastases including extra axial metastases, he would fit the data from the CHAARTED trial for which Taxotere x6 followed by Zytiga prednisone along with the planned Lupron already being planned by Dr. Matute would be reasonable.  Equally reasonable in terms of comparisons with bicalutamide and Lupron would be Zytiga prednisone plus Lupron or Xtandi plus Lupron or apalutamide plus Lupron.  None of these have been compared head-to-head but all have beaten combined androgen deprivation therapy with bicalutamide and Lupron.  At the age of 67 and in order to have as many bullets as possible down the road and hence saving some of the hormone blockade options for later and using chemotherapy when he is younger and healthier for a more time-limited.,  He has opted for the Taxotere x6 followed by Zytiga and prednisone.  We will also give him Xgeva to improve bone health and given metastatic disease, as with all metastatic prostate cancer patients, he needs genetic counseling both for his sake as well as his family's.  If he were to have BRCA1 or other such  mutations, then that also opens up the options for PARP inhibitors etc. down the road.  He has his TURP scheduled for 2/24/2022 and we will start treatment a couple of weeks after that and he will get chemo preparation visit in the meantime and I will get capital surgeons to place a port.  We will check his PSA after his TURP when he sees my nurse practitioner back early April for the start of chemotherapy and repeat the PSA and CT chest abdomen pelvis and bone scan after the Taxotere is complete.    -3/31/2022 chemotherapy education and needs assessment completed    -4/7/2022 began docetaxel and Xgeva.  .0.  We will do his Xgeva every 6  weeks to coordinate with his docetaxel infusions.    -4/19/2022 patient stopped Casodex    -5/17/2022 patient reported seeing his PCP for an abscess in his suprapubic area.  Placed on clindamycin, will delay treatment 1 week.    -5/25/2022 PSA 34.3  -5/26/2022 [facility] Oncology clinic follow-up: Chris has an abscess in the suprapubic area, it has improved on antibiotic therapy but I am concerned if we treat him it will just flare back up unless it is drained.  I will get him to Dr. Miller who placed his port for I&D and culture.  He is on clindamycin and states he completes course of treatment tomorrow.  I will delay his treatment with Taxotere 1 more week.  We will give his Xgeva today.  I will see him back in 1 week for follow-up to assess whether or not we can resume his Taxotere.  His PSA has dropped nicely with treatment thus far, PSA yesterday was 34.3 which is down from a PSA of 259.0 when we started treatment, it has been as high as 911 in November.    -6/2/2022 [facility] Oncology clinic follow-up: Chris went to see Dr. Miller to have his abscess lanced however apparently there was a long wait and he left without being seen.  He did call his PCP and was given 5 more days of clindamycin and the abscess now seems to have resolved.  We discussed today that he is at risk for recurrence of infection due to immunocompromise state with chemotherapy.  He will notify us if he has any return of his abscess.  He does have intertrigo under his panniculus, I have advised him to keep this area dry, to apply topical drying/antifungal powders.  Also recommended looser clothing.  His counts are adequate to continue therapy, we will resume Taxotere today with cycle #3.  We will repeat restaging scans after 6 courses.  We are doing Xgeva every 6 weeks to coordinate with his Taxotere.  Once he finishes Taxotere we can then go back to every 4 weeks.  His next Xgeva is not due until 7/14/2022.  His calcium is running a little low,  he is going to  calcium supplement.    -6/23/2022 [facility] clinic follow-up: Chris overall is doing well on treatment with Taxotere.  Labs reviewed from yesterday and are unremarkable.  We will continue treatment today unchanged.  His suprapubic abscess resolved on clindamycin.  He will continue to use drying powder/antifungal powder under his panniculus for intertrigo.  Today will be cycle #4 of a planned 6 courses of Taxotere.  He is also on Xgeva, next dose due 7/14/2022, we are doing this every 6 weeks for now to line up with his chemotherapy, can go back to every 4 weeks after he finishes Taxotere.  Calcium from CMP yesterday was normal.    -7/13/2022 [facility] clinic follow-up: Mr. Morfin has had issues around day 3 after each treatment ranging from chest pain after his first treatment for which he did not seek medical attention, fatigue after the next few treatments, but 3 days after his most recent treatment on 6/23/2022 he had a syncopal episode at home and once again did not seek medical attention.  I discussed with him that this was not acceptable, if he has occurrences like this someone needs to call 911, it is difficult to figure out what is going on after the fact, the best time to work this up would be during the occurrence.  For now we will get labs today with CBC, CMP, PSA.  I will refer him to cardiology for further evaluation.  We will hold Taxotere most likely, I will see him tomorrow to go over his lab results.  I will also repeat his restaging scans with CT chest, abdomen and pelvis and will include CT of the head in light of his syncopal episode.      -7/13/2022 PSA 18.6    -7/14/2022 [facility] clinic follow-up: Mr. Morfin returns today to review his labs from yesterday.  Labs are unremarkable.  PSA down to 18.6 from a high of 259.0 in April prior to starting treatment.  CBC and CMP unremarkable, magnesium is normal at 2.3, phosphorus slightly low at 2.4.  We will  hold therapy for now in light of his syncopal episode on day 3 after his last treatment and prior episodes with chest pain and severe fatigue that all occurred on day 3 after his Taxotere.  We will restage him with CT head, chest, abdomen and pelvis (I am including the head in light of his syncopal episode).  I have also referred him to cardiology, he states that he received a call from them about making an appointment however he wanted to talk to us today first.  I emphasized the importance to him of making and keeping that appointment and he states understanding.  I also once again emphasized the requirement to seek emergency medical attention if he has any episodes in the future such as chest pain or syncopal events.  Whether or not we try and complete Taxotere with 2 more courses or move to the next line of therapy will be decided when he returns to discuss with Dr. Goodson and review his restaging scans.    -7/15/2022 saw Dr. Diaz cardiologist.  For syncope he ordered echocardiogram and 48-hour Holter monitor.    -7/15/2022 Holter monitor relatively benign.    -7/22/2022 CT brain with and without contrast negative.  CT chest abdomen and pelvis shows some nonspecific cecal wall thickening.  No progression in the chest.  T1 and ribs stable.  Decrease in multiple retroperitoneal and pelvic nodes.  Slight increase in right acetabular sclerotic lesion.  Persistent but improved circumferential bladder wall thickening.  Total body bone scan shows stable left third rib and no evidence of new bone metastases.    -7/19/2022 ejection fraction 65% with grade 1 diastolic dysfunction.    -7/26/2022 [facility]ology clinic follow-up: Given presyncopal symptoms occurred 3 days after Taxotere and has not otherwise occurred any other time and his cardiology work-up is fairly unremarkable and his disease is under good control as witnessed by the report above of the CTs of head chest abdomen pelvis and bone scan, I will hold  cycle 5 and 6 of Taxotere and continue 1 now with Zytiga and prednisone.  With reference to the coincidental cecal wall thickening found on CT, we will get him to [facility] surgeons for colonoscopy.  He will see my nurse practitioner back in August for next cycle of Abiraterone prednisone.  He will continue his Lupron with Dr. Matute.  We will continue his Xgeva.  We will repeat his CT chest abdomen pelvis and bone scan again in 3 months.  He will see my nurse practitioner in the interim.    -8/23/2022 [facility] oncology clinic follow-up: No further presyncopal symptoms.  Feeling great other than for hot flashes.  Did commend for now we will continue on with his Zytiga prednisone and he will get his Xgeva today based on labs from 8/10/2022.  He opted out of getting the colonoscopy that I recommended and he will not change his mind.  He will continue getting the Lupron with Dr. Matute and I asked him to give the date of this appointment to my nurse when he sees her back in a month.  We will give his Xgeva today.  We will get CT chest abdomen pelvis and total body bone scan towards the middle to end of October.  Presently other than for the hot flashes feels great.    -9/20/2022 PSA 8.320    -9/22/2022 [facility] clinic follow-up: Chris is doing well on Zytiga, prednisone along with Xgeva.  Labs reviewed from 9/20/2022 are unremarkable, CBC was normal, CMP with creatinine of 1.34 otherwise normal, this is stable from his baseline.  PSA stable at 8.320.  He received Lupron recently with Dr. Matute, he thinks last week or so, states his next appointment is in February.  He will continue treatment unchanged.  We will repeat restaging CT chest, abdomen and pelvis and total body bone scan in October prior to return and I have ordered those today.  We will also repeat his PSA.    -9/22/2022 CT chest abdomen pelvisCompared to July 2022 [facility] showed no evidence of disease progression in the chest with no  "substantial sclerotic bony lesions and decrease in size of retroperitoneal and pelvic nodes with persistent cecal wall thickening and suboptimal bladder distention.  Total body bone scan showed decrease in previously seen uptake in the left third rib with diffuse sclerosis representing treatment response with no new foci.    -10/18/2022 [facility] [place] medical oncology follow-up: I reviewed bone scan and CT reports as above with no evidence of progression.  Tolerating Zytiga prednisone and Xgeva.  Getting Lupron regularly with Dr. Matute next appointment coming in February.  We will repeat his CT chest abdomen pelvis and total body bone scan in April.  He will follow with my nurse practitioner in the interim.    -1/10/2023 PSA 3.450  -1/11/2023 [facility] [place] Oncology clinic follow-up: Mr. Morfin continues to do well on current therapy with Zytiga, prednisone and Xgeva with no unusual side effects.  He has no new worrisome symptoms.  He is due for his next Lupron injection in February with Dr. Matute.  His PSA continues to trend downward, current PSA from yesterday was 3.450.  We will continue therapy unchanged, he will receive Xgeva today.  Has had no hypocalcemia, his CMP, phosphorus and magnesium are all normal.  We will repeat restaging scans in April.  -3/7/2023 PSA 2.460  -3/8/2023 [facility] [place] Oncology clinic follow-up:  Mr. Morfin is doing well.  He is tolerating therapy with Zytiga, prednisone and Xgeva with no significant side effects.  He has hot flashes but these are tolerable.  He had Lupron injection 2/24/2023 with Dr. Matute.  His PSA continues to decline, current PSA 2.460.  He will continue therapy unchanged.  We will repeat restaging scans late April prior to return in 2 months and I have ordered those today.  He is working with his PCP Rodrigo Ram on his hypertension.  His b/p today was 160/88.  He was to have increased his losartan but I do not think he has done that yet as he said \"I still have some of my old " "prescription left\".  -4/4/2023 PSA 2.55  - 4/26/2023 CT chest abdomen pelvis [facility] comparison 10/10/2022 showed stable left third rib, T1, left fourth and eighth rib, right seventh rib.  Probable liver cyst.  Few diverticuli.  Mild further decrease in a few of the previously enlarged nodes.  Left external iliac 12 mm compared to 16 mm.  Right common iliac 7 mm stable.  Lower left periaortic 8 mm previously 13 mm.  No new lytic or blastic osseous lesions.  Total body bone scan comparison 10/10/2022, 7/22/2022, and CT 4/26/2023.  No new sites of increased uptake.  1 focal left third rib hotspot consistent with bone metastasis.    -5/2/2023 [facility] medical oncology follow-up: I reviewed interval notes since I last saw him from my nurse practitioner as outlined above in interval images and reports thereof from [facility] that shows no new sites of bone metastasis and no kizzy or visceral progression.  PSA stabilized around 2.5.  In the absence of symptomatic or radiographic progression, I would be unlikely to change therapy just on the basis of a PSA rise and for now the PSA is stable.  We will continue Zytiga prednisone and Xgeva and he will follow-up with my nurse practitioner 5/30/2023 with repeat CTs and bone scan in 6 months.  I asked him to check with his primary care today regarding his systolic in the 170s.    -5/31/2023 [facility] On[facility] clinic follow-up: Chris continues to do well on current therapy with Zytiga, prednisone and Xgeva along with Lupron that he receives with Dr. Matute.  His PSA remains stable, it was lower this month compared to last month, current PSA 1.940.  Continues to deal with hypertension, on arrival today his blood pressure was 210/92 however this was using a wrist cuff, when I rechecked it manually his blood pressure was 160/90.  Still has room for improvement despite recent increase in his losartan to 100 mg daily.  He will follow-up with Dr. Ram for further management. "  We will continue therapy unchanged.  We will repeat restaging scans in October.    -7/26/2023 [facility] Oncology clinic follow-up: Chris overall continues to do well on current therapy with Zytiga, prednisone and Xgeva.  He receives Lupron every 6 months with Dr. Matute and states that is scheduled for August.  PSA from yesterday is pending, PSA on 6/27/2023 was 1.590 which was continuing to decline, in February it was 3.250.  His creatinine this past month has bumped up.  He feels he is staying hydrated but I encouraged him to increase his fluid intake.  I discussed with him that this could be from his hypertension, some of his medications.  His blood pressure today is 165/92.  He has an appointment with his primary care provider Dr. Teri Ram next week and can further discuss with her.  No adjustment needed for Xgeva.  I refilled his prednisone today.  I will see him back in 1 month for follow-up with continued monitoring of his labs.  He may end up needing to see nephrology.  We will repeat restaging scans in October.    -9/29/2023 PSA 1.810  -8/23/2023 [facility] Oncology clinic follow-up: Chris continues on therapy with Zytiga, prednisone and Xgeva, he also receives Lupron every 6 months with Dr. Matute with most recent given on 8/7/2023.  He is scheduled for a cystoscopy in a few weeks as his last few UAs per the patient's report had microscopic hematuria, he has had no steve hematuria.  His creatinine continues to remain elevated at 1.82, BUN is 40, GFR is 40.  I will get him to nephrology for further evaluation.  Blood pressure today was fairly good at 161/86, he continues to follow with Dr. Ram for management.  PSA on 7/25/2023 was up slightly from prior month, PSA in July was 2.140, in June it was 1.590, PSA from yesterday is pending.  I plan on repeating restaging CT scans and bone scans in October however if his PSA is up significantly then I will do sooner.  I will see him back for follow-up  in 1 month.  -8/23/2024 PSA 1.860    -9/20/2023 [facility] clinic follow-up: Chris is tolerating treatment with Zytiga, prednisone and Xgeva.  He is up-to-date with his Lupron injection, last received in August with Dr. Matute.  He had cystoscopy on 9/8/2023 that was normal. I referred him to nephrology when I saw him last in August as his creatinine has been increasing, creatinine yesterday was a little better 1.51, GFR is 50, creatinine clearance 63.8.  He is still awaiting an appointment with nephrology, per our  it will be early November.  We will repeat his restaging scans prior to return and I will do his CT scans without contrast in light of his new renal insufficiency.  We will also get total body bone scan and I have ordered those today.  His PSA yesterday was 1.810.    -10/9/2023 CT chest abdomen Pelvis without contrast compared to April 2023 shows slightly prominent pelvic lymph nodes left external iliac 8 mm compared to prior study.  Right internal iliac heterogenous 19 mm compared to 11 mm prior.  Stable sclerotic bone lesions and no new lesions.  Stable bone scan with no new sites of disease    -10/17/2023 [facility]ology clinic follow-up: With elevated creatinine, CTs done without contrast showed very slight increased prominence by few millimeters of some internal iliac and external iliac nodes for which PSMA PET could be done but for now I will check his PSA and have him see my nurse practitioner back in a week and unless the PSA is significantly rising I would continue the Zytiga, prednisone, Xgeva and February dose of Lupron with Dr. Matute and make sure he follows with nephrology.  If his PSA is significantly rising then my nurse practitioner will order PSMA PET.    -10/17/2023 PSA 1.5  -11/14/2023 PSA 1.510  -11/15/2023 [facility] clinic follow-up: Mr. Ferrara continues to do well on current therapy with Zytiga, prednisone, Xgeva and Lupron that he receives with   [facility].  His PSA is stable at 1.5.  Would not change therapy for now, we will plan on repeating restaging scans in February unless PSA starts to rise or he has new concerns.  Overall he feels good.  He is now established with nephrology and will continue to follow with them regarding his renal insufficiency, I reviewed note from consultation with Dr. Armstrong 11/6/2023.     Prostate cancer metastatic to multiple sites   3/15/2022 Initial Diagnosis    Prostate cancer metastatic to multiple sites (HCC)     3/15/2022 Cancer Staged    Staging form: Prostate, AJCC 8th Edition  - Clinical: Stage IVB (cT1c, cN1, cM1b, Grade Group: 5) - Signed by Fritz Goodson MD on 3/15/2022     4/7/2022 - 6/23/2022 Chemotherapy    Stopped after cycle 4 6/23/2022 due to syncope 3 days post infusions with negative cardiac work-up  OP PROSTATE DOCEtaxel     4/7/2022 -  Chemotherapy    OP SUPPORTIVE Denosumab (Xgeva) Q28D     7/26/2022 -  Chemotherapy    OP PROSTATE Abiraterone / PredniSONE           HISTORY OF PRESENT ILLNESS:  The patient is a 68 y.o. male, here for follow up on management of metastatic prostate cancer currently on treatment with Zytiga, prednisone, xgeva and lupron that he receives with Dr. Sue.  Chris is doing well today with no new concerns.  He denies any change in his health since we saw him last, no pain.  Appetite is good, no change in his bowel or bladder habits.    Past Medical History:   Diagnosis Date    Cancer     Prostate cancer      Past Surgical History:   Procedure Laterality Date    PROSTATE BIOPSY  02/2022    dr. sue       No Known Allergies    Family History and Social History reviewed and changed as necessary    REVIEW OF SYSTEM:   No new somatic complaints    PHYSICAL EXAM:  Well-developed, well-nourished male in no distress  Respirations regular nonlabored    Vitals:    12/13/23 0928   BP: 175/94   Pulse: 82   Resp: 18   Temp: 97.8 °F (36.6 °C)   SpO2: 95%   Weight: 123 kg (272 lb)   Height:  "180.3 cm (71\")     Vitals:    12/13/23 0928   PainSc: 0-No pain          ECOG score: 0           Vitals reviewed.  Labs reviewed    ECOG: (0) Fully Active - Able to Carry On All Pre-disease Performance Without Restriction    Lab Results   Component Value Date    HGB 12.4 (L) 12/12/2023    HCT 39.7 12/12/2023    MCV 85.4 12/12/2023     12/12/2023    WBC 6.56 12/12/2023    NEUTROABS 4.30 12/12/2023    LYMPHSABS 1.47 12/12/2023    MONOSABS 0.54 12/12/2023    EOSABS 0.16 12/12/2023    BASOSABS 0.06 12/12/2023       Lab Results   Component Value Date    GLUCOSE 115 (H) 12/12/2023    BUN 30 (H) 12/12/2023    CREATININE 1.54 (H) 12/12/2023     12/12/2023    K 4.1 12/12/2023     12/12/2023    CO2 23.8 12/12/2023    CALCIUM 9.5 12/12/2023    ALBUMIN 4.0 12/12/2023    BILITOT 0.3 12/12/2023    ALKPHOS 84 12/12/2023    AST 17 12/12/2023    ALT 18 12/12/2023     Lab Results   Component Value Date    PSA 1.510 11/14/2023    PSA 1.5 10/17/2023    PSA 1.810 09/19/2023    PSA 1.860 08/22/2023             ASSESSMENT & PLAN:  1.  Prostate cancer clinical T1c and 2M1B stage IVb treated with 4 cycles of Taxotere, stopped due to syncope with negative cardiac work-up, with marked drop in PSA of 18.6 from over 900 at baseline, continued on Zytiga and prednisone.  2.  Urinary retention with Goodwin in place 1/24/2022.  On finasteride 1/24/2022.  3.  Covid 19 December 2021  4.  Hypertension    Oncology history timeline:  -11/29/2021  with symptoms of urinary retention.  -2/15/2022 prostate biopsy adenocarcinoma grade group 5 and 3 out of 12 cores, grade group 4 in 1 out of 12 cores, grade group 3 in 8 out of 12 cores.  -2/24/2022 CT chest abdomen pelvis and total body bone scan shows left third rib, right acetabulum, periaortic and retroperitoneal kizzy metastases complaining of pain in the left chest and right hip.  Also possible sclerotic left 10th rib on CT.  Spleen mildly enlarged 13.8 cm.  Hepatic steatosis.  " Small liver cyst.  Fat stranding in the periaortic and mesenteric region consistent with reactive/inflammatory stranding.  Multiple enlarged periaortic and retroperitoneal nodes and lymphadenopathy largest 4.9 cm.  CT chest describes tiny sclerotic foci in left eighth rib and right lateral seventh rib and T1.  Multiple small mediastinal and bilateral axillary nodes seen with small hypodense left thyroid nodule.  Creatinine 1.7.  -3/4/2022 office note Dr. Rolando Matute: Patient was seen after his elevated PSA by Dr. Vera and prostate biopsy scheduled.  However, he developed COVID-19 and this was canceled and the patient did not reschedule due to lack of insurance.  Saw Dr. Matute back on 1/24/2022 with plans to get staging CTs and bone scan with results as outlined above.  Started Casodex and to return on 3/11/2022 for Lupron.  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  TURP planned for urinary retention symptoms.    -3/15/2022 [facility] medical oncology initial consultation: I reviewed the above data with the patient.  With his fairly bulky retroperitoneal adenopathy and bone metastases including extra axial metastases, he would fit the data from the CHAARTED trial for which Taxotere x6 followed by Zytiga prednisone along with the planned Lupron already being planned by Dr. Matute would be reasonable.  Equally reasonable in terms of comparisons with bicalutamide and Lupron would be Zytiga prednisone plus Lupron or Xtandi plus Lupron or apalutamide plus Lupron.  None of these have been compared head-to-head but all have beaten combined androgen deprivation therapy with bicalutamide and Lupron.  At the age of 67 and in order to have as many bullets as possible down the road and hence saving some of the hormone blockade options for later and using chemotherapy when he is younger and healthier for a more time-limited.,  He has opted for the Taxotere x6  followed by Zytiga and prednisone.  We will also give him Xgeva to improve bone health and given metastatic disease, as with all metastatic prostate cancer patients, he needs genetic counseling both for his sake as well as his family's.  If he were to have BRCA1 or other such  mutations, then that also opens up the options for PARP inhibitors etc. down the road.  He has his TURP scheduled for 2/24/2022 and we will start treatment a couple of weeks after that and he will get chemo preparation visit in the meantime and I will get capital surgeons to place a port.  We will check his PSA after his TURP when he sees my nurse practitioner back early April for the start of chemotherapy and repeat the PSA and CT chest abdomen pelvis and bone scan after the Taxotere is complete.    -3/31/2022 chemotherapy education and needs assessment completed    -4/7/2022 began docetaxel and Xgeva.  .0.  We will do his Xgeva every 6 weeks to coordinate with his docetaxel infusions.    -4/19/2022 patient stopped Casodex    -5/17/2022 patient reported seeing his PCP for an abscess in his suprapubic area.  Placed on clindamycin, will delay treatment 1 week.    -5/25/2022 PSA 34.3  -5/26/2022 [facility] Oncology clinic follow-up: Chris has an abscess in the suprapubic area, it has improved on antibiotic therapy but I am concerned if we treat him it will just flare back up unless it is drained.  I will get him to Dr. Miller who placed his port for I&D and culture.  He is on clindamycin and states he completes course of treatment tomorrow.  I will delay his treatment with Taxotere 1 more week.  We will give his Xgeva today.  I will see him back in 1 week for follow-up to assess whether or not we can resume his Taxotere.  His PSA has dropped nicely with treatment thus far, PSA yesterday was 34.3 which is down from a PSA of 259.0 when we started treatment, it has been as high as 911 in November.    -6/2/2022 [facility] Oncology clinic  follow-up: Chris went to see Dr. Miller to have his abscess lanced however apparently there was a long wait and he left without being seen.  He did call his PCP and was given 5 more days of clindamycin and the abscess now seems to have resolved.  We discussed today that he is at risk for recurrence of infection due to immunocompromise state with chemotherapy.  He will notify us if he has any return of his abscess.  He does have intertrigo under his panniculus, I have advised him to keep this area dry, to apply topical drying/antifungal powders.  Also recommended looser clothing.  His counts are adequate to continue therapy, we will resume Taxotere today with cycle #3.  We will repeat restaging scans after 6 courses.  We are doing Xgeva every 6 weeks to coordinate with his Taxotere.  Once he finishes Taxotere we can then go back to every 4 weeks.  His next Xgeva is not due until 7/14/2022.  His calcium is running a little low, he is going to  calcium supplement.    -6/23/2022 [facility] clinic follow-up: Chris overall is doing well on treatment with Taxotere.  Labs reviewed from yesterday and are unremarkable.  We will continue treatment today unchanged.  His suprapubic abscess resolved on clindamycin.  He will continue to use drying powder/antifungal powder under his panniculus for intertrigo.  Today will be cycle #4 of a planned 6 courses of Taxotere.  He is also on Xgeva, next dose due 7/14/2022, we are doing this every 6 weeks for now to line up with his chemotherapy, can go back to every 4 weeks after he finishes Taxotere.  Calcium from [facility] yesterday was normal.    -7/13/2022 [facility] clinic follow-up: Mr. Morfin has had issues around day 3 after each treatment ranging from chest pain after his first treatment for which he did not seek medical attention, fatigue after the next few treatments, but 3 days after his most recent treatment on 6/23/2022 he had a syncopal episode at home and once  again did not seek medical attention.  I discussed with him that this was not acceptable, if he has occurrences like this someone needs to call 911, it is difficult to figure out what is going on after the fact, the best time to work this up would be during the occurrence.  For now we will get labs today with CBC, CMP, PSA.  I will refer him to cardiology for further evaluation.  We will hold Taxotere most likely, I will see him tomorrow to go over his lab results.  I will also repeat his restaging scans with CT chest, abdomen and pelvis and will include CT of the head in light of his syncopal episode.      -7/13/2022 PSA 18.6    -7/14/2022 [facility] Oncology clinic follow-up: Mr. Morfin returns today to review his labs from yesterday.  Labs are unremarkable.  PSA down to 18.6 from a high of 259.0 in April prior to starting treatment.  CBC and CMP unremarkable, magnesium is normal at 2.3, phosphorus slightly low at 2.4.  We will hold therapy for now in light of his syncopal episode on day 3 after his last treatment and prior episodes with chest pain and severe fatigue that all occurred on day 3 after his Taxotere.  We will restage him with CT head, chest, abdomen and pelvis (I am including the head in light of his syncopal episode).  I have also referred him to cardiology, he states that he received a call from them about making an appointment however he wanted to talk to us today first.  I emphasized the importance to him of making and keeping that appointment and he states understanding.  I also once again emphasized the requirement to seek emergency medical attention if he has any episodes in the future such as chest pain or syncopal events.  Whether or not we try and complete Taxotere with 2 more courses or move to the next line of therapy will be decided when he returns to discuss with Dr. Goodson and review his restaging scans.    -7/15/2022 saw Dr. Diaz cardiologist.  For syncope he ordered echocardiogram and  48-hour Holter monitor.    -7/15/2022 Holter monitor relatively benign.    -7/22/2022 CT brain with and without contrast negative.  CT chest abdomen and pelvis shows some nonspecific cecal wall thickening.  No progression in the chest.  T1 and ribs stable.  Decrease in multiple retroperitoneal and pelvic nodes.  Slight increase in right acetabular sclerotic lesion.  Persistent but improved circumferential bladder wall thickening.  Total body bone scan shows stable left third rib and no evidence of new bone metastases.    -7/19/2022 ejection fraction 65% with grade 1 diastolic dysfunction.    -7/26/2022 [facility] oncology clinic follow-up: Given presyncopal symptoms occurred 3 days after Taxotere and has not otherwise occurred any other time and his cardiology work-up is fairly unremarkable and his disease is under good control as witnessed by the report above of the CTs of head chest abdomen pelvis and bone scan, I will hold cycle 5 and 6 of Taxotere and continue 1 now with Zytiga and prednisone.  With reference to the coincidental cecal wall thickening found on CT, we will get him to [facility] surgeons for colonoscopy.  He will see my nurse practitioner back in August for next cycle of Abiraterone prednisone.  He will continue his Lupron with Dr. Matute.  We will continue his Xgeva.  We will repeat his CT chest abdomen pelvis and bone scan again in 3 months.  He will see my nurse practitioner in the interim.    -8/23/2022 [facility] oncology clinic follow-up: No further presyncopal symptoms.  Feeling great other than for hot flashes.  Did commend for now we will continue on with his Zytiga prednisone and he will get his Xgeva today based on labs from 8/10/2022.  He opted out of getting the colonoscopy that I recommended and he will not change his mind.  He will continue getting the Lupron with Dr. Matute and I asked him to give the date of this appointment to my nurse when he sees her back in a month.  We will give  his Xgeva today.  We will get CT chest abdomen pelvis and total body bone scan towards the middle to end of October.  Presently other than for the hot flashes feels great.    -9/20/2022 PSA 8.320    -9/22/2022 [facility] follow-up: Chris is doing well on Zytiga, prednisone along with Xgeva.  Labs reviewed from 9/20/2022 are unremarkable, CBC was normal, CMP with creatinine of 1.34 otherwise normal, this is stable from his baseline.  PSA stable at 8.320.  He received Lupron recently with Dr. Matute, he thinks last week or so, states his next appointment is in February.  He will continue treatment unchanged.  We will repeat restaging CT chest, abdomen and pelvis and total body bone scan in October prior to return and I have ordered those today.  We will also repeat his PSA.    -9/22/2022 CT chest abdomen pelvisCompared to July 2022 [facility] showed no evidence of disease progression in the chest with no substantial sclerotic bony lesions and decrease in size of retroperitoneal and pelvic nodes with persistent cecal wall thickening and suboptimal bladder distention.  Total body bone scan showed decrease in previously seen uptake in the left third rib with diffuse sclerosis representing treatment response with no new foci.    -10/18/2022 [facility] medical oncology follow-up: I reviewed bone scan and CT reports as above with no evidence of progression.  Tolerating Zytiga prednisone and Xgeva.  Getting Lupron regularly with Dr. Matute next appointment coming in February.  We will repeat his CT chest abdomen pelvis and total body bone scan in April.  He will follow with my nurse practitioner in the interim.    -1/10/2023 PSA 3.450  -1/11/2023 [facility] clinic follow-up: Mr. Morfin continues to do well on current therapy with Zytiga, prednisone and Xgeva with no unusual side effects.  He has no new worrisome symptoms.  He is due for his next Lupron injection in February with Dr. Matute.  His  "PSA continues to trend downward, current PSA from yesterday was 3.450.  We will continue therapy unchanged, he will receive Xgeva today.  Has had no hypocalcemia, his CMP, phosphorus and magnesium are all normal.  We will repeat restaging scans in April.  -3/7/2023 PSA 2.460  -3/8/2023 [facility] clinic follow-up:  Mr. Morfin is doing well.  He is tolerating therapy with Zytiga, prednisone and Xgeva with no significant side effects.  He has hot flashes but these are tolerable.  He had Lupron injection 2/24/2023 with Dr. Matute.  His PSA continues to decline, current PSA 2.460.  He will continue therapy unchanged.  We will repeat restaging scans late April prior to return in 2 months and I have ordered those today.  He is working with his PCP Rodrigo Ram on his hypertension.  His b/p today was 160/88.  He was to have increased his losartan but I do not think he has done that yet as he said \"I still have some of my old prescription left\".  -4/4/2023 PSA 2.55  - 4/26/2023 CT chest abdomen pelvis [place] comparison 10/10/2022 showed stable left third rib, T1, left fourth and eighth rib, right seventh rib.  Probable liver cyst.  Few diverticuli.  Mild further decrease in a few of the previously enlarged nodes.  Left external iliac 12 mm compared to 16 mm.  Right common iliac 7 mm stable.  Lower left periaortic 8 mm previously 13 mm.  No new lytic or blastic osseous lesions.  Total body bone scan comparison 10/10/2022, 7/22/2022, and CT 4/26/2023.  No new sites of increased uptake.  1 focal left third rib hotspot consistent with bone metastasis.    -5/2/2023 [facility] medical oncology follow-up: I reviewed interval notes since I last saw him from my nurse practitioner as outlined above in interval images and reports thereof from [facility] that shows no new sites of bone metastasis and no kizzy or visceral progression.  PSA stabilized around 2.5.  In the absence of symptomatic or radiographic progression, I " would be unlikely to change therapy just on the basis of a PSA rise and for now the PSA is stable.  We will continue Zytiga prednisone and Xgeva and he will follow-up with my nurse practitioner 5/30/2023 with repeat CTs and bone scan in 6 months.  I asked him to check with his primary care today regarding his systolic in the 170s.    -5/31/2023 [facility] Oncology clinic follow-up: Chris continues to do well on current therapy with Zytiga, prednisone and Xgeva along with Lupron that he receives with Dr. Matute.  His PSA remains stable, it was lower this month compared to last month, current PSA 1.940.  Continues to deal with hypertension, on arrival today his blood pressure was 210/92 however this was using a wrist cuff, when I rechecked it manually his blood pressure was 160/90.  Still has room for improvement despite recent increase in his losartan to 100 mg daily.  He will follow-up with Dr. Ram for further management.  We will continue therapy unchanged.  We will repeat restaging scans in October.    -7/26/2023 [facility] Oncology clinic follow-up: Chris overall continues to do well on current therapy with Zytiga, prednisone and Xgeva.  He receives Lupron every 6 months with Dr. Matute and states that is scheduled for August.  PSA from yesterday is pending, PSA on 6/27/2023 was 1.590 which was continuing to decline, in February it was 3.250.  His creatinine this past month has bumped up.  He feels he is staying hydrated but I encouraged him to increase his fluid intake.  I discussed with him that this could be from his hypertension, some of his medications.  His blood pressure today is 165/92.  He has an appointment with his primary care provider Dr. Teri Ram next week and can further discuss with her.  No adjustment needed for Xgeva.  I refilled his prednisone today.  I will see him back in 1 month for follow-up with continued monitoring of his labs.  He may end up needing to see nephrology.  We will  repeat restaging scans in October.    -9/29/2023 PSA 1.810  -8/23/2023 [facility] Oncology clinic follow-up: Chris continues on therapy with Zytiga, prednisone and Xgeva, he also receives Lupron every 6 months with Dr. Matute with most recent given on 8/7/2023.  He is scheduled for a cystoscopy in a few weeks as his last few UAs per the patient's report had microscopic hematuria, he has had no steve hematuria.  His creatinine continues to remain elevated at 1.82, BUN is 40, GFR is 40.  I will get him to nephrology for further evaluation.  Blood pressure today was fairly good at 161/86, he continues to follow with Dr. Ram for management.  PSA on 7/25/2023 was up slightly from prior month, PSA in July was 2.140, in June it was 1.590, PSA from yesterday is pending.  I plan on repeating restaging CT scans and bone scans in October however if his PSA is up significantly then I will do sooner.  I will see him back for follow-up in 1 month.  -8/23/2024 PSA 1.860    -9/20/2023 [facility] Oncology clinic follow-up: Chris is tolerating treatment with Zytiga, prednisone and Xgeva.  He is up-to-date with his Lupron injection, last received in August with Dr. Matute.  He had cystoscopy on 9/8/2023 that was normal. I referred him to nephrology when I saw him last in August as his creatinine has been increasing, creatinine yesterday was a little better 1.51, GFR is 50, creatinine clearance 63.8.  He is still awaiting an appointment with nephrology, per our  it will be early November.  We will repeat his restaging scans prior to return and I will do his CT scans without contrast in light of his new renal insufficiency.  We will also get total body bone scan and I have ordered those today.  His PSA yesterday was 1.810.    -10/9/2023 CT chest abdomen Pelvis without contrast compared to April 2023 shows slightly prominent pelvic lymph nodes left external iliac 8 mm compared to prior study.  Right internal iliac heterogenous  19 mm compared to 11 mm prior.  Stable sclerotic bone lesions and no new lesions.  Stable bone scan with no new sites of disease    -10/17/2023 [facility] oncology clinic follow-up: With elevated creatinine, CTs done without contrast showed very slight increased prominence by few millimeters of some internal iliac and external iliac nodes for which PSMA PET could be done but for now I will check his PSA and have him see my nurse practitioner back in a week and unless the PSA is significantly rising I would continue the Zytiga, prednisone, Xgeva and February dose of Lupron with Dr. Matute and make sure he follows with nephrology.  If his PSA is significantly rising then my nurse practitioner will order PSMA PET.    -11/15/2023 [facility] clinic follow-up: Mr. Ferrara continues to do well on current therapy with Zytiga, prednisone, Xgeva and Lupron that he receives with Dr. Matute.  His PSA is stable at 1.5.  Would not change therapy for now, we will plan on repeating restaging scans in February unless PSA starts to rise or he has new concerns.  Overall he feels good.  He is now established with nephrology and will continue to follow with them regarding his renal insufficiency, I reviewed note from consultation with Dr. Armstrong 11/6/2023.    -12/13/2023 [facility] clinic follow-up: Chris continues to do well on current therapy with Zytiga, prednisone, Xgeva and Lupron.  He thinks his next Lupron with Dr. Matute is around February.  His CBC and CMP remain unremarkable.  He will continue treatment unchanged.  We will repeat his PSA next month.  Will plan on restaging scans in April in the absence of new symptoms and as long as his PSA remains stable.  Continues to deal with hypertension, blood pressure today 175/94, encouraged him to follow-up with PCP.    I spent 20 minutes caring for Chris on this date of service. This time includes time spent by me in the following activities: preparing for the visit,  reviewing tests, performing a medically appropriate examination and/or evaluation, ordering medications, tests, or procedures, and documenting information in the medical record.     Susana Torres, APRN    12/13/2023

## 2023-12-14 ENCOUNTER — SPECIALTY PHARMACY (OUTPATIENT)
Dept: ONCOLOGY | Facility: HOSPITAL | Age: 68
End: 2023-12-14
Payer: MEDICARE

## 2023-12-21 ENCOUNTER — SPECIALTY PHARMACY (OUTPATIENT)
Dept: ONCOLOGY | Facility: HOSPITAL | Age: 68
End: 2023-12-21
Payer: MEDICARE

## 2023-12-21 NOTE — PROGRESS NOTES
Specialty Pharmacy Refill Coordination Note     Chris is a 68 y.o. male contacted today regarding refills of  abiraterone 1000mg PO QD of specialty medication(s).      Specialty medication(s) and dose(s) confirmed: yes, no    Refill Questions      Flowsheet Row Most Recent Value   Changes to allergies? No   Changes to medications? No   New conditions or infections since last clinic visit No   Unplanned office visit, urgent care, ED, or hospital admission in the last 4 weeks  No   How does patient/caregiver feel medication is working? Good   Financial problems or insurance changes  No   Since the previous refill, were any specialty medication doses or scheduled injections missed or delayed?  No   Does this patient require a clinical escalation to a pharmacist? No            Delivery Questions      Flowsheet Row Most Recent Value   Delivery method [employer]   Delivery address verified with patient/caregiver? Yes   Delivery address Home   Number of medications in delivery 1   Medication(s) being filled and delivered Abiraterone Acetate   Doses left of specialty medications 5 days   Copay verified? Yes   Copay amount $0   Copay form of payment No copayment ($0)                   Follow-up: 30 day(s)     Catalina Montes De Oca, Pharmacy Technician  Specialty Pharmacy Technician

## 2023-12-28 ENCOUNTER — SPECIALTY PHARMACY (OUTPATIENT)
Dept: ONCOLOGY | Facility: HOSPITAL | Age: 68
End: 2023-12-28
Payer: MEDICARE

## 2023-12-28 DIAGNOSIS — C61 PROSTATE CANCER METASTATIC TO MULTIPLE SITES: ICD-10-CM

## 2023-12-28 RX ORDER — ABIRATERONE 500 MG/1
1000 TABLET ORAL DAILY
Qty: 60 TABLET | Refills: 11 | Status: SHIPPED | OUTPATIENT
Start: 2023-12-28

## 2023-12-28 NOTE — PROGRESS NOTES
Re: Refills of Oral Specialty Medication - Zytiga (abiraterone acetate)    Drug-Drug Interactions: The current medication list was reviewed and there are no relevant drug-drug interactions with the specialty medication.  Medication Allergies: The patient has NKDA.  Review of Labs/Dose Adjustments: NO DOSE CHANGE - I reviewed the most recent note and labs and the patient will continue without any dose changes.  I sent refills as described below.    Drug: Zytiga (abiraterone acetate)  Strength: 500 mg  Directions: Take 2 tablets by mouth daily  Quantity: 60  Refills: 11  Pharmacy prescription sent to: [facility]    Ann Cowden Mayer, PharmD, [facility]  Oncology Clinical Pharmacist  Phone [phone]    12/28/2023  13:45 EST

## 2023-12-28 NOTE — PROGRESS NOTES
Specialty Pharmacy Patient Management Program  Oncology 6-Month Clinical Assessment       Chris Morfin is a 68 y.o. male with metastatic prostate cancer seen today to assess adherence and side effects.    Reason for Outreach: Routine medication check-in .    Regimen:   Zytiga (abiraterone) 500mg tablets - take 1000mg (2 tablets) PO daily  Prednisone 5mg tablets -- take 5mg (1 tablet) PO BID  Lupron / Eligard every 3 months with Dr. Matute    Specialty Pharmacy Goal   Goals Addressed Today         Specialty Pharmacy General Goal (pt-stated)       Clinical goal/therapeutic target: stable or decreasing PSA and disease control                Problem List   Problem list reviewed by Karla Gotti, PharmD on 12/28/2023 at  1:41 PM  Patient Active Problem List   Diagnosis Code    Prostate cancer metastatic to multiple sites C61    Encounter for care related to vascular access port Z45.2    Stage 3a chronic kidney disease (CKD) N18.31    Lipid screening Z13.220    Chest pain at rest R07.9    Syncope and collapse R55    Coronary artery disease involving native coronary artery of native heart without angina pectoris I25.10     Medication Assessment for Specialty Medication(s):  Medication Assessment  Follow Up Clinical Assessment  Linked Medication(s) Assessed: Abiraterone Acetate  Therapeutic appropriateness: Appropriate  Medication tolerability: Tolerating with no to minimal ADRs (patient notes consistent fatigue, but manageable)  Medication plan: Continue therapy with normal follow-up  Quality of Life Improvement Scale: 8-Moderately better  Administration: Patient is taking every day at the same time  and on an empty stomach .   Patient can self administer medications: Yes  Medication Follow-Up Plan: Next clinical assessment and Refill coordination  Lab Review: The labs listed below have been reviewed. No dose adjustments are needed for the oral specialty medication(s) based on the labs.    Lab Results   Component Value  Date    GLUCOSE 115 (H) 12/12/2023    CALCIUM 9.5 12/12/2023     12/12/2023    K 4.1 12/12/2023    CO2 23.8 12/12/2023     12/12/2023    BUN 30 (H) 12/12/2023    CREATININE 1.54 (H) 12/12/2023    BCR 19.5 12/12/2023     Lab Results   Component Value Date    WBC 6.56 12/12/2023    RBC 4.65 12/12/2023    HGB 12.4 (L) 12/12/2023    HCT 39.7 12/12/2023    MCV 85.4 12/12/2023    MCH 26.7 12/12/2023    MCHC 31.2 (L) 12/12/2023    RDW 12.5 12/12/2023     12/12/2023    NEUTRORELPCT 65.6 12/12/2023    LYMPHORELPCT 22.4 12/12/2023    MONORELPCT 8.2 12/12/2023    EOSRELPCT 2.4 12/12/2023    BASORELPCT 0.9 12/12/2023    NEUTROABS 4.30 12/12/2023    LYMPHSABS 1.47 12/12/2023    MONOSABS 0.54 12/12/2023    EOSABS 0.16 12/12/2023    BASOSABS 0.06 12/12/2023    NRBC 0.0 12/12/2023     Drug-drug interactions  Completed medication reconciliation today to assess for drug interactions. Patient denies starting or stopping any medications.  Med list updated per our discussion.  Assessed medication list for interactions, no significant drug interactions noted.   Advised patient to call the clinic if any new medications are started so we can assess for drug-drug interactions.  Drug-food interactions discussed: eating grapefruit and drinking grapefruit juice.  Vaccines are coordinated by the patient's oncologist and primary care provider.    Medications   Medicines reviewed by Karla Gotti, PharmD on 12/28/2023 at  1:41 PM  Prior to Admission medications    Medication Sig Start Date End Date Taking? Authorizing Provider   abiraterone acetate (ZYTIGA) 500 MG tablet Take 2 tablets by mouth Daily. on an empty stomach. 12/28/23  Yes Fritz Goodson MD   amLODIPine (NORVASC) 5 MG tablet Take 1.5 tablets by mouth Daily for 90 days. 10/5/23 1/3/24  Teri Ram DO   losartan (COZAAR) 100 MG tablet Take 1 tablet by mouth Daily. 10/5/23   Teri Ram DO   ondansetron (ZOFRAN) 8 MG tablet Take 1 tablet by mouth 3  (Three) Times a Day As Needed for Nausea or Vomiting. 7/26/22   Fritz Goodson MD   predniSONE (DELTASONE) 5 MG tablet Take 1 tablet by mouth 2 (Two) Times a Day. 7/26/23   Susana Torres APRN   abiraterone acetate (ZYTIGA) 500 MG tablet Take 2 tablets by mouth Daily. 1/9/23 12/28/23  Fritz Goodson MD   lidocaine-prilocaine (EMLA) 2.5-2.5 % cream Apply 1 application topically to the appropriate area as directed As Needed (prior to port access). 3/31/22 12/28/23  Susana Torres APRN   rosuvastatin (CRESTOR) 20 MG tablet TAKE ONE (1) TABLET BY MOUTH DAILY. 1/9/23 12/28/23  Ang Diaz MD     Allergies  Known allergies and reactions were discussed with the patient. The patient's chart has been reviewed for allergy information and updated as necessary.   No Known Allergies    Allergies reviewed by Kalra Gotti, PharmD on 12/28/2023 at  1:39 PM    Hospitalizations and Urgent Care Visits Since Last Assessment:  Unplanned hospitalizations or inpatient admissions: None  ED Visits: None  Urgent Office Visits: None    Adherence Assessment:  Adherence Questions  Linked Medication(s) Assessed: Abiraterone Acetate  On average, how many doses/injections does the patient miss per month?: 0 (patient denies missing any doses of Zytiga - takes it qAM @ 0730)  What are the identified reasons for non-adherence or missed doses? : no problems identified  What is the estimated medication adherence level?: % (PDC 97%)  Based on the patient/caregiver response and refill history, does this patient require an MTP to track adherence improvements?: no    Quality of Life Assessment:  Quality of Life Assessment  Quality of Life Improvement Scale: 8-Moderately better  -- Quality of Life: 8/10    Financial Assessment:  Medication availability/affordability: Patient has had no issues obtaining medication from pharmacy.    Attestation:  I attest the patient was actively involved in and has agreed to the above plan of care. If the  prescribed therapy is at any point deemed not appropriate based on the current or future assessments, a consultation will be initiated with the patient's specialty care provider to determine the best course of action. The revised plan of therapy will be documented along with any required assessments and/or additional patient education provided.     All questions addressed and patient had no additional concerns. Patient has pharmacy contact information.    Ann Cowden Mayer, PharmD, [facility]  Oncology Clinical Pharmacist  Phone [phone]    12/28/2023  13:51 EST

## 2024-01-05 ENCOUNTER — OFFICE VISIT (OUTPATIENT)
Dept: FAMILY MEDICINE CLINIC | Facility: CLINIC | Age: 69
End: 2024-01-05
Payer: MEDICARE

## 2024-01-05 VITALS
DIASTOLIC BLOOD PRESSURE: 80 MMHG | HEART RATE: 60 BPM | WEIGHT: 270 LBS | HEIGHT: 71 IN | BODY MASS INDEX: 37.8 KG/M2 | OXYGEN SATURATION: 97 % | SYSTOLIC BLOOD PRESSURE: 138 MMHG

## 2024-01-05 DIAGNOSIS — N18.31 STAGE 3A CHRONIC KIDNEY DISEASE (CKD): ICD-10-CM

## 2024-01-05 DIAGNOSIS — C61 PROSTATE CANCER METASTATIC TO MULTIPLE SITES: ICD-10-CM

## 2024-01-05 DIAGNOSIS — Z00.00 WELLNESS EXAMINATION: Primary | ICD-10-CM

## 2024-01-05 DIAGNOSIS — I10 ESSENTIAL HYPERTENSION: ICD-10-CM

## 2024-01-05 DIAGNOSIS — Z13.220 LIPID SCREENING: ICD-10-CM

## 2024-01-05 DIAGNOSIS — Z11.59 ENCOUNTER FOR HEPATITIS C SCREENING TEST FOR LOW RISK PATIENT: ICD-10-CM

## 2024-01-05 RX ORDER — AMLODIPINE BESYLATE 5 MG/1
7.5 TABLET ORAL DAILY
Qty: 135 TABLET | Refills: 1 | Status: SHIPPED | OUTPATIENT
Start: 2024-01-05 | End: 2024-07-03

## 2024-01-05 NOTE — PROGRESS NOTES
The ABCs of the Annual Wellness Visit  Subsequent Medicare Wellness Visit    Subjective      Chris Morfin is a 68 y.o. male who presents for a Subsequent Medicare Wellness Visit.    The following portions of the patient's history were reviewed and   updated as appropriate: allergies, current medications, past family history, past medical history, past social history, past surgical history, and problem list.    Compared to one year ago, the patient feels his physical   health is the same.    Compared to one year ago, the patient feels his mental   health is the same.    Recent Hospitalizations:  He was not admitted to the hospital during the last year.       Current Medical Providers:  Patient Care Team:  Teri Ram DO as PCP - General (Family Medicine)  Sunny Matute MD (Urology)  Pro Farr MD as Consulting Physician (Vascular Surgery)  Ilir Armstrong MD as Consulting Physician (Nephrology)    Outpatient Medications Prior to Visit   Medication Sig Dispense Refill    abiraterone acetate (ZYTIGA) 500 MG tablet Take 2 tablets by mouth Daily. on an empty stomach. 60 tablet 11    losartan (COZAAR) 100 MG tablet Take 1 tablet by mouth Daily. 90 tablet 1    ondansetron (ZOFRAN) 8 MG tablet Take 1 tablet by mouth 3 (Three) Times a Day As Needed for Nausea or Vomiting. 30 tablet 5    predniSONE (DELTASONE) 5 MG tablet Take 1 tablet by mouth 2 (Two) Times a Day. 60 tablet 11     No facility-administered medications prior to visit.       No opioid medication identified on active medication list. I have reviewed chart for other potential  high risk medication/s and harmful drug interactions in the elderly.        Aspirin is not on active medication list.  Aspirin use is not indicated based on review of current medical condition/s. Risk of harm outweighs potential benefits.  .    Patient Active Problem List   Diagnosis    Prostate cancer metastatic to multiple sites    Encounter for care  "related to vascular access port    Stage 3a chronic kidney disease (CKD)    Lipid screening    Chest pain at rest    Syncope and collapse    Coronary artery disease involving native coronary artery of native heart without angina pectoris     Advance Care Planning   Advance Care Planning     Advance Directive is not on file.  ACP discussion was held with the patient during this visit. Patient does not have an advance directive, declines further assistance.     Objective    Vitals:    24 0813   BP: 138/80   Pulse: 60   SpO2: 97%   Weight: 122 kg (270 lb)   Height: 180.3 cm (71\")     Estimated body mass index is 37.66 kg/m² as calculated from the following:    Height as of this encounter: 180.3 cm (71\").    Weight as of this encounter: 122 kg (270 lb).    Class 2 Severe Obesity (BMI >=35 and <=39.9). Obesity-related health conditions include the following: hypertension, diabetes mellitus, and osteoarthritis. Obesity is unchanged. BMI is is above average; BMI management plan is completed. We discussed portion control and increasing exercise.      Does the patient have evidence of cognitive impairment?   No            HEALTH RISK ASSESSMENT    Smoking Status:  Social History     Tobacco Use   Smoking Status Never   Smokeless Tobacco Never     Alcohol Consumption:  Social History     Substance and Sexual Activity   Alcohol Use Not Currently    Comment: no drinking x's 20 years     Fall Risk Screen:    STEADI Fall Risk Assessment was completed, and patient is at LOW risk for falls.Assessment completed on:2024    Depression Screenin/5/2024     8:14 AM   PHQ-2/PHQ-9 Depression Screening   Little Interest or Pleasure in Doing Things 0-->not at all   Feeling Down, Depressed or Hopeless 0-->not at all   PHQ-9: Brief Depression Severity Measure Score 0       Health Habits and Functional and Cognitive Screenin/5/2024     8:14 AM   Functional & Cognitive Status   Do you have difficulty preparing food " and eating? No   Do you have difficulty bathing yourself, getting dressed or grooming yourself? No   Do you have difficulty using the toilet? No   Do you have difficulty moving around from place to place? No   Do you have trouble with steps or getting out of a bed or a chair? No   Current Diet Well Balanced Diet   Dental Exam Not up to date   Eye Exam Not up to date   Exercise (times per week) 5 times per week   Current Exercises Include Yard Work;Walking;House Cleaning   Do you need help using the phone?  No   Are you deaf or do you have serious difficulty hearing?  No   Do you need help to go to places out of walking distance? No   Do you need help shopping? No   Do you need help preparing meals?  No   Do you need help with housework?  No   Do you need help with laundry? No   Do you need help taking your medications? No   Do you need help managing money? No   Do you ever drive or ride in a car without wearing a seat belt? No   Have you felt unusual stress, anger or loneliness in the last month? No   Who do you live with? Child   If you need help, do you have trouble finding someone available to you? No   Have you been bothered in the last four weeks by sexual problems? No   Do you have difficulty concentrating, remembering or making decisions? No       Age-appropriate Screening Schedule:  Refer to the list below for future screening recommendations based on patient's age, sex and/or medical conditions. Orders for these recommended tests are listed in the plan section. The patient has been provided with a written plan.    Health Maintenance   Topic Date Due    HEPATITIS C SCREENING  Never done    ANNUAL WELLNESS VISIT  Never done    INFLUENZA VACCINE  Never done    ZOSTER VACCINE (1 of 2) 01/05/2024 (Originally 2/18/2005)    COVID-19 Vaccine (1) 01/07/2024 (Originally 1955)    COLORECTAL CANCER SCREENING  05/27/2024 (Originally 1955)    Pneumococcal Vaccine 65+ (1 of 1 - PCV) 01/05/2025 (Originally  2/18/2020)    TDAP/TD VACCINES (1 - Tdap) 01/05/2025 (Originally 2/18/1974)    BMI FOLLOWUP  01/05/2025                  CMS Preventative Services Quick Reference  Risk Factors Identified During Encounter:    Immunizations Discussed/Encouraged: Td, Influenza, Prevnar 20 (Pneumococcal 20-valent conjugate), Shingrix, and COVID19    The above risks/problems have been discussed with the patient.  Pertinent information has been shared with the patient in the After Visit Summary.    Diagnoses and all orders for this visit:    1. Wellness examination (Primary)    2. Encounter for hepatitis C screening test for low risk patient  -     Hepatitis C antibody    3. Lipid screening  -     Lipid Panel    4. Stage 3a chronic kidney disease (CKD)    5. Essential hypertension    6. Prostate cancer metastatic to multiple sites    Other orders  -     amLODIPine (NORVASC) 5 MG tablet; Take 1.5 tablets by mouth Daily for 180 days.  Dispense: 135 tablet; Refill: 1    Blood work ordered and will contact with results when available. Will adjust medications based on abnormalities seen.     Past medical and surgical history as well as allergies, family history and social history were reviewed, and discussed with patient.  Chronic conditions were reviewed as well as medications.   Anticipatory guidance handouts including healthy diet, health maintenance, as well as regular exercise and general instructions were given via [employer], and patient was able to ask questions and discuss any concerns.          Follow Up:   Next Medicare Wellness visit to be scheduled in 1 year.      An After Visit Summary and PPPS were made available to the patient.

## 2024-01-09 ENCOUNTER — LAB (OUTPATIENT)
Dept: ONCOLOGY | Facility: HOSPITAL | Age: 69
End: 2024-01-09
Payer: MEDICARE

## 2024-01-09 VITALS
TEMPERATURE: 98.3 F | WEIGHT: 268.8 LBS | DIASTOLIC BLOOD PRESSURE: 79 MMHG | RESPIRATION RATE: 18 BRPM | HEART RATE: 74 BPM | BODY MASS INDEX: 37.49 KG/M2 | SYSTOLIC BLOOD PRESSURE: 159 MMHG

## 2024-01-09 DIAGNOSIS — C61 PROSTATE CANCER METASTATIC TO MULTIPLE SITES: ICD-10-CM

## 2024-01-09 PROCEDURE — 36415 COLL VENOUS BLD VENIPUNCTURE: CPT

## 2024-01-10 ENCOUNTER — SPECIALTY PHARMACY (OUTPATIENT)
Dept: ONCOLOGY | Facility: HOSPITAL | Age: 69
End: 2024-01-10
Payer: MEDICARE

## 2024-01-10 ENCOUNTER — INFUSION (OUTPATIENT)
Dept: ONCOLOGY | Facility: HOSPITAL | Age: 69
End: 2024-01-10
Payer: MEDICARE

## 2024-01-10 ENCOUNTER — OFFICE VISIT (OUTPATIENT)
Dept: ONCOLOGY | Facility: CLINIC | Age: 69
End: 2024-01-10
Payer: MEDICARE

## 2024-01-10 VITALS
RESPIRATION RATE: 18 BRPM | TEMPERATURE: 98.2 F | DIASTOLIC BLOOD PRESSURE: 90 MMHG | BODY MASS INDEX: 37.66 KG/M2 | HEART RATE: 70 BPM | HEIGHT: 71 IN | OXYGEN SATURATION: 98 % | SYSTOLIC BLOOD PRESSURE: 172 MMHG | WEIGHT: 269 LBS

## 2024-01-10 DIAGNOSIS — C61 PROSTATE CANCER METASTATIC TO MULTIPLE SITES: Primary | ICD-10-CM

## 2024-01-10 LAB
ALBUMIN SERPL-MCNC: 4 G/DL (ref 3.5–5.2)
ALBUMIN/GLOB SERPL: 1.5 G/DL
ALP SERPL-CCNC: 86 U/L (ref 39–117)
ALT SERPL-CCNC: 21 U/L (ref 1–41)
AST SERPL-CCNC: 14 U/L (ref 1–40)
BASOPHILS # BLD AUTO: 0.08 10*3/MM3 (ref 0–0.2)
BASOPHILS NFR BLD AUTO: 1.1 % (ref 0–1.5)
BILIRUB SERPL-MCNC: 0.4 MG/DL (ref 0–1.2)
BUN SERPL-MCNC: 27 MG/DL (ref 8–23)
BUN/CREAT SERPL: 16.5 (ref 7–25)
CALCIUM SERPL-MCNC: 9.5 MG/DL (ref 8.6–10.5)
CHLORIDE SERPL-SCNC: 107 MMOL/L (ref 98–107)
CO2 SERPL-SCNC: 23.3 MMOL/L (ref 22–29)
CREAT SERPL-MCNC: 1.64 MG/DL (ref 0.76–1.27)
EGFRCR SERPLBLD CKD-EPI 2021: 45.3 ML/MIN/1.73
EOSINOPHIL # BLD AUTO: 0.16 10*3/MM3 (ref 0–0.4)
EOSINOPHIL NFR BLD AUTO: 2.2 % (ref 0.3–6.2)
ERYTHROCYTE [DISTWIDTH] IN BLOOD BY AUTOMATED COUNT: 13 % (ref 12.3–15.4)
GLOBULIN SER CALC-MCNC: 2.6 GM/DL
GLUCOSE SERPL-MCNC: 130 MG/DL (ref 65–99)
HCT VFR BLD AUTO: 38.7 % (ref 37.5–51)
HGB BLD-MCNC: 12.9 G/DL (ref 13–17.7)
IMM GRANULOCYTES # BLD AUTO: 0.03 10*3/MM3 (ref 0–0.05)
IMM GRANULOCYTES NFR BLD AUTO: 0.4 % (ref 0–0.5)
LYMPHOCYTES # BLD AUTO: 2.12 10*3/MM3 (ref 0.7–3.1)
LYMPHOCYTES NFR BLD AUTO: 29.7 % (ref 19.6–45.3)
MAGNESIUM SERPL-MCNC: 2 MG/DL (ref 1.6–2.4)
MCH RBC QN AUTO: 28.8 PG (ref 26.6–33)
MCHC RBC AUTO-ENTMCNC: 33.3 G/DL (ref 31.5–35.7)
MCV RBC AUTO: 86.4 FL (ref 79–97)
MONOCYTES # BLD AUTO: 0.51 10*3/MM3 (ref 0.1–0.9)
MONOCYTES NFR BLD AUTO: 7.2 % (ref 5–12)
NEUTROPHILS # BLD AUTO: 4.23 10*3/MM3 (ref 1.7–7)
NEUTROPHILS NFR BLD AUTO: 59.4 % (ref 42.7–76)
NRBC BLD AUTO-RTO: 0 /100 WBC (ref 0–0.2)
PHOSPHATE SERPL-MCNC: 2.7 MG/DL (ref 2.5–4.5)
PLATELET # BLD AUTO: 235 10*3/MM3 (ref 140–450)
POTASSIUM SERPL-SCNC: 4.1 MMOL/L (ref 3.5–5.2)
PROT SERPL-MCNC: 6.6 G/DL (ref 6–8.5)
PSA SERPL-MCNC: 1.05 NG/ML (ref 0–4)
RBC # BLD AUTO: 4.48 10*6/MM3 (ref 4.14–5.8)
SODIUM SERPL-SCNC: 141 MMOL/L (ref 136–145)
WBC # BLD AUTO: 7.13 10*3/MM3 (ref 3.4–10.8)

## 2024-01-10 PROCEDURE — 25010000002 DENOSUMAB 120 MG/1.7ML SOLUTION: Performed by: NURSE PRACTITIONER

## 2024-01-10 PROCEDURE — 96372 THER/PROPH/DIAG INJ SC/IM: CPT

## 2024-01-10 RX ADMIN — DENOSUMAB 120 MG: 120 INJECTION SUBCUTANEOUS at 10:03

## 2024-01-10 NOTE — PROGRESS NOTES
CHIEF COMPLAINT: No new somatic complaints    Problem List:  Oncology/Hematology History Overview Note   1.  Prostate cancer clinical T1c and 2M1B stage IVb treated with 4 cycles of Taxotere, stopped due to syncope with negative cardiac work-up, with marked drop in PSA of 18.6 from over 900 at baseline, continued on Zytiga and prednisone.  2.  Urinary retention with Goodwin in place 1/24/2022.  On finasteride 1/24/2022.  3.  Covid 19 December 2021  4.  Hypertension    Oncology history timeline:  -11/29/2021  with symptoms of urinary retention.  -2/15/2022 prostate biopsy adenocarcinoma grade group 5 and 3 out of 12 cores, grade group 4 in 1 out of 12 cores, grade group 3 in 8 out of 12 cores.  -2/24/2022 CT chest abdomen pelvis and total body bone scan shows left third rib, right acetabulum, periaortic and retroperitoneal kizzy metastases complaining of pain in the left chest and right hip.  Also possible sclerotic left 10th rib on CT.  Spleen mildly enlarged 13.8 cm.  Hepatic steatosis.  Small liver cyst.  Fat stranding in the periaortic and mesenteric region consistent with reactive/inflammatory stranding.  Multiple enlarged periaortic and retroperitoneal nodes and lymphadenopathy largest 4.9 cm.  CT chest describes tiny sclerotic foci in left eighth rib and right lateral seventh rib and T1.  Multiple small mediastinal and bilateral axillary nodes seen with small hypodense left thyroid nodule.  Creatinine 1.7.  -3/4/2022 office note Dr. Rolando Matute: Patient was seen after his elevated PSA by Dr. Vera and prostate biopsy scheduled.  However, he developed COVID-19 and this was canceled and the patient did not reschedule due to lack of insurance.  Saw Dr. Matute back on 1/24/2022 with plans to get staging CTs and bone scan with results as outlined above.  Started Casodex and to return on 3/11/2022 for Lupron.  Referral made to medical oncology for other additional castrate naïve prostate cancer  therapies.  TURP planned for urinary retention symptoms.    -3/15/2022 [facility] medical oncology initial consultation: I reviewed the above data with the patient.  With his fairly bulky retroperitoneal adenopathy and bone metastases including extra axial metastases, he would fit the data from the CHAARTED trial for which Taxotere x6 followed by Zytiga prednisone along with the planned Lupron already being planned by Dr. Matute would be reasonable.  Equally reasonable in terms of comparisons with bicalutamide and Lupron would be Zytiga prednisone plus Lupron or Xtandi plus Lupron or apalutamide plus Lupron.  None of these have been compared head-to-head but all have beaten combined androgen deprivation therapy with bicalutamide and Lupron.  At the age of 67 and in order to have as many bullets as possible down the road and hence saving some of the hormone blockade options for later and using chemotherapy when he is younger and healthier for a more time-limited.,  He has opted for the Taxotere x6 followed by Zytiga and prednisone.  We will also give him Xgeva to improve bone health and given metastatic disease, as with all metastatic prostate cancer patients, he needs genetic counseling both for his sake as well as his family's.  If he were to have BRCA1 or other such  mutations, then that also opens up the options for PARP inhibitors etc. down the road.  He has his TURP scheduled for 2/24/2022 and we will start treatment a couple of weeks after that and he will get chemo preparation visit in the meantime and I will get capital surgeons to place a port.  We will check his PSA after his TURP when he sees my nurse practitioner back early April for the start of chemotherapy and repeat the PSA and CT chest abdomen pelvis and bone scan after the Taxotere is complete.    -3/31/2022 chemotherapy education and needs assessment completed    -4/7/2022 began docetaxel and Xgeva.  .0.  We will do his Xgeva every 6  weeks to coordinate with his docetaxel infusions.    -4/19/2022 patient stopped Casodex    -5/17/2022 patient reported seeing his PCP for an abscess in his suprapubic area.  Placed on clindamycin, will delay treatment 1 week.    -5/25/2022 PSA 34.3  -5/26/2022 [facility] Oncology clinic follow-up: Chris has an abscess in the suprapubic area, it has improved on antibiotic therapy but I am concerned if we treat him it will just flare back up unless it is drained.  I will get him to Dr. Miller who placed his port for I&D and culture.  He is on clindamycin and states he completes course of treatment tomorrow.  I will delay his treatment with Taxotere 1 more week.  We will give his Xgeva today.  I will see him back in 1 week for follow-up to assess whether or not we can resume his Taxotere.  His PSA has dropped nicely with treatment thus far, PSA yesterday was 34.3 which is down from a PSA of 259.0 when we started treatment, it has been as high as 911 in November.    -6/2/2022 [facility] Oncology clinic follow-up: Chris went to see Dr. Miller to have his abscess lanced however apparently there was a long wait and he left without being seen.  He did call his PCP and was given 5 more days of clindamycin and the abscess now seems to have resolved.  We discussed today that he is at risk for recurrence of infection due to immunocompromise state with chemotherapy.  He will notify us if he has any return of his abscess.  He does have intertrigo under his panniculus, I have advised him to keep this area dry, to apply topical drying/antifungal powders.  Also recommended looser clothing.  His counts are adequate to continue therapy, we will resume Taxotere today with cycle #3.  We will repeat restaging scans after 6 courses.  We are doing Xgeva every 6 weeks to coordinate with his Taxotere.  Once he finishes Taxotere we can then go back to every 4 weeks.  His next Xgeva is not due until 7/14/2022.  His calcium is running a little low,  he is going to  calcium supplement.    -6/23/2022 [facility] clinic follow-up: Chris overall is doing well on treatment with Taxotere.  Labs reviewed from yesterday and are unremarkable.  We will continue treatment today unchanged.  His suprapubic abscess resolved on clindamycin.  He will continue to use drying powder/antifungal powder under his panniculus for intertrigo.  Today will be cycle #4 of a planned 6 courses of Taxotere.  He is also on Xgeva, next dose due 7/14/2022, we are doing this every 6 weeks for now to line up with his chemotherapy, can go back to every 4 weeks after he finishes Taxotere.  Calcium from CMP yesterday was normal.    -7/13/2022 [facility] clinic follow-up: Mr. Morfin has had issues around day 3 after each treatment ranging from chest pain after his first treatment for which he did not seek medical attention, fatigue after the next few treatments, but 3 days after his most recent treatment on 6/23/2022 he had a syncopal episode at home and once again did not seek medical attention.  I discussed with him that this was not acceptable, if he has occurrences like this someone needs to call 911, it is difficult to figure out what is going on after the fact, the best time to work this up would be during the occurrence.  For now we will get labs today with CBC, CMP, PSA.  I will refer him to cardiology for further evaluation.  We will hold Taxotere most likely, I will see him tomorrow to go over his lab results.  I will also repeat his restaging scans with CT chest, abdomen and pelvis and will include CT of the head in light of his syncopal episode.      -7/13/2022 PSA 18.6    -7/14/2022 [facility] clinic follow-up: Mr. Morfin returns today to review his labs from yesterday.  Labs are unremarkable.  PSA down to 18.6 from a high of 259.0 in April prior to starting treatment.  CBC and CMP unremarkable, magnesium is normal at 2.3, phosphorus slightly low at 2.4.  We will  hold therapy for now in light of his syncopal episode on day 3 after his last treatment and prior episodes with chest pain and severe fatigue that all occurred on day 3 after his Taxotere.  We will restage him with CT head, chest, abdomen and pelvis (I am including the head in light of his syncopal episode).  I have also referred him to cardiology, he states that he received a call from them about making an appointment however he wanted to talk to us today first.  I emphasized the importance to him of making and keeping that appointment and he states understanding.  I also once again emphasized the requirement to seek emergency medical attention if he has any episodes in the future such as chest pain or syncopal events.  Whether or not we try and complete Taxotere with 2 more courses or move to the next line of therapy will be decided when he returns to discuss with Dr. Goodson and review his restaging scans.    -7/15/2022 saw Dr. Diaz cardiologist.  For syncope he ordered echocardiogram and 48-hour Holter monitor.    -7/15/2022 Holter monitor relatively benign.    -7/22/2022 CT brain with and without contrast negative.  CT chest abdomen and pelvis shows some nonspecific cecal wall thickening.  No progression in the chest.  T1 and ribs stable.  Decrease in multiple retroperitoneal and pelvic nodes.  Slight increase in right acetabular sclerotic lesion.  Persistent but improved circumferential bladder wall thickening.  Total body bone scan shows stable left third rib and no evidence of new bone metastases.    -7/19/2022 ejection fraction 65% with grade 1 diastolic dysfunction.    -7/26/2022 [facility] clinic follow-up: Given presyncopal symptoms occurred 3 days after Taxotere and has not otherwise occurred any other time and his cardiology work-up is fairly unremarkable and his disease is under good control as witnessed by the report above of the CTs of head chest abdomen pelvis and bone scan, I will hold  cycle 5 and 6 of Taxotere and continue 1 now with Zytiga and prednisone.  With reference to the coincidental cecal wall thickening found on CT, we will get him to [facility] surgeons for colonoscopy.  He will see my nurse practitioner back in August for next cycle of Abiraterone prednisone.  He will continue his Lupron with Dr. Matute.  We will continue his Xgeva.  We will repeat his CT chest abdomen pelvis and bone scan again in 3 months.  He will see my nurse practitioner in the interim.    -8/23/2022 [facility] clinic follow-up: No further presyncopal symptoms.  Feeling great other than for hot flashes.  Did commend for now we will continue on with his Zytiga prednisone and he will get his Xgeva today based on labs from 8/10/2022.  He opted out of getting the colonoscopy that I recommended and he will not change his mind.  He will continue getting the Lupron with Dr. Matute and I asked him to give the date of this appointment to my nurse when he sees her back in a month.  We will give his Xgeva today.  We will get CT chest abdomen pelvis and total body bone scan towards the middle to end of October.  Presently other than for the hot flashes feels great.    -9/20/2022 PSA 8.320    -9/22/2022 [facility] follow-up: Chris is doing well on Zytiga, prednisone along with Xgeva.  Labs reviewed from 9/20/2022 are unremarkable, CBC was normal, CMP with creatinine of 1.34 otherwise normal, this is stable from his baseline.  PSA stable at 8.320.  He received Lupron recently with Dr. Matute, he thinks last week or so, states his next appointment is in February.  He will continue treatment unchanged.  We will repeat restaging CT chest, abdomen and pelvis and total body bone scan in October prior to return and I have ordered those today.  We will also repeat his PSA.    -9/22/2022 CT chest abdomen pelvisCompared to July 2022 [facility] showed no evidence of disease progression in the chest with no  "substantial sclerotic bony lesions and decrease in size of retroperitoneal and pelvic nodes with persistent cecal wall thickening and suboptimal bladder distention.  Total body bone scan showed decrease in previously seen uptake in the left third rib with diffuse sclerosis representing treatment response with no new foci.    -10/18/2022 [facility] medical oncology follow-up: I reviewed bone scan and CT reports as above with no evidence of progression.  Tolerating Zytiga prednisone and Xgeva.  Getting Lupron regularly with Dr. Matute next appointment coming in February.  We will repeat his CT chest abdomen pelvis and total body bone scan in April.  He will follow with my nurse practitioner in the interim.    -1/10/2023 PSA 3.450  -1/11/2023 [facility] Oncology clinic follow-up: Mr. Morfin continues to do well on current therapy with Zytiga, prednisone and Xgeva with no unusual side effects.  He has no new worrisome symptoms.  He is due for his next Lupron injection in February with Dr. Matute.  His PSA continues to trend downward, current PSA from yesterday was 3.450.  We will continue therapy unchanged, he will receive Xgeva today.  Has had no hypocalcemia, his CMP, phosphorus and magnesium are all normal.  We will repeat restaging scans in April.  -3/7/2023 PSA 2.460  -3/8/2023 [facility] Oncology clinic follow-up:  Mr. Morfin is doing well.  He is tolerating therapy with Zytiga, prednisone and Xgeva with no significant side effects.  He has hot flashes but these are tolerable.  He had Lupron injection 2/24/2023 with Dr. Matute.  His PSA continues to decline, current PSA 2.460.  He will continue therapy unchanged.  We will repeat restaging scans late April prior to return in 2 months and I have ordered those today.  He is working with his PCP Rodrigo Ram on his hypertension.  His b/p today was 160/88.  He was to have increased his losartan but I do not think he has done that yet as he said \"I still have some of my old " "prescription left\".  -4/4/2023 PSA 2.55  - 4/26/2023 CT chest abdomen pelvis [place] comparison 10/10/2022 showed stable left third rib, T1, left fourth and eighth rib, right seventh rib.  Probable liver cyst.  Few diverticuli.  Mild further decrease in a few of the previously enlarged nodes.  Left external iliac 12 mm compared to 16 mm.  Right common iliac 7 mm stable.  Lower left periaortic 8 mm previously 13 mm.  No new lytic or blastic osseous lesions.  Total body bone scan comparison 10/10/2022, 7/22/2022, and CT 4/26/2023.  No new sites of increased uptake.  1 focal left third rib hotspot consistent with bone metastasis.    -5/2/2023 [facility] medical oncology follow-up: I reviewed interval notes since I last saw him from my nurse practitioner as outlined above in interval images and reports thereof from [facility] that shows no new sites of bone metastasis and no kizzy or visceral progression.  PSA stabilized around 2.5.  In the absence of symptomatic or radiographic progression, I would be unlikely to change therapy just on the basis of a PSA rise and for now the PSA is stable.  We will continue Zytiga prednisone and Xgeva and he will follow-up with my nurse practitioner 5/30/2023 with repeat CTs and bone scan in 6 months.  I asked him to check with his primary care today regarding his systolic in the 170s.    -5/31/2023 [facility] clinic follow-up: Chris continues to do well on current therapy with Zytiga, prednisone and Xgeva along with Lupron that he receives with Dr. Matute.  His PSA remains stable, it was lower this month compared to last month, current PSA 1.940.  Continues to deal with hypertension, on arrival today his blood pressure was 210/92 however this was using a wrist cuff, when I rechecked it manually his blood pressure was 160/90.  Still has room for improvement despite recent increase in his losartan to 100 mg daily.  He will follow-up with Dr. Ram for further management. "  We will continue therapy unchanged.  We will repeat restaging scans in October.    -7/26/2023 [facility] Oncology clinic follow-up: Chris overall continues to do well on current therapy with Zytiga, prednisone and Xgeva.  He receives Lupron every 6 months with Dr. Matute and states that is scheduled for August.  PSA from yesterday is pending, PSA on 6/27/2023 was 1.590 which was continuing to decline, in February it was 3.250.  His creatinine this past month has bumped up.  He feels he is staying hydrated but I encouraged him to increase his fluid intake.  I discussed with him that this could be from his hypertension, some of his medications.  His blood pressure today is 165/92.  He has an appointment with his primary care provider Dr. Teri Ram next week and can further discuss with her.  No adjustment needed for Xgeva.  I refilled his prednisone today.  I will see him back in 1 month for follow-up with continued monitoring of his labs.  He may end up needing to see nephrology.  We will repeat restaging scans in October.    -9/29/2023 PSA 1.810  -8/23/2023 [facility] Oncology clinic follow-up: Chris continues on therapy with Zytiga, prednisone and Xgeva, he also receives Lupron every 6 months with Dr. Matute with most recent given on 8/7/2023.  He is scheduled for a cystoscopy in a few weeks as his last few UAs per the patient's report had microscopic hematuria, he has had no steve hematuria.  His creatinine continues to remain elevated at 1.82, BUN is 40, GFR is 40.  I will get him to nephrology for further evaluation.  Blood pressure today was fairly good at 161/86, he continues to follow with Dr. Ram for management.  PSA on 7/25/2023 was up slightly from prior month, PSA in July was 2.140, in June it was 1.590, PSA from yesterday is pending.  I plan on repeating restaging CT scans and bone scans in October however if his PSA is up significantly then I will do sooner.  I will see him back for follow-up  in 1 month.  -8/23/2024 PSA 1.860    -9/20/2023 [facility] clinic follow-up: Chris is tolerating treatment with Zytiga, prednisone and Xgeva.  He is up-to-date with his Lupron injection, last received in August with Dr. Matute.  He had cystoscopy on 9/8/2023 that was normal. I referred him to nephrology when I saw him last in August as his creatinine has been increasing, creatinine yesterday was a little better 1.51, GFR is 50, creatinine clearance 63.8.  He is still awaiting an appointment with nephrology, per our  it will be early November.  We will repeat his restaging scans prior to return and I will do his CT scans without contrast in light of his new renal insufficiency.  We will also get total body bone scan and I have ordered those today.  His PSA yesterday was 1.810.    -10/9/2023 CT chest abdomen Pelvis without contrast compared to April 2023 shows slightly prominent pelvic lymph nodes left external iliac 8 mm compared to prior study.  Right internal iliac heterogenous 19 mm compared to 11 mm prior.  Stable sclerotic bone lesions and no new lesions.  Stable bone scan with no new sites of disease    -10/17/2023 [facility] oncology clinic follow-up: With elevated creatinine, CTs done without contrast showed very slight increased prominence by few millimeters of some internal iliac and external iliac nodes for which PSMA PET could be done but for now I will check his PSA and have him see my nurse practitioner back in a week and unless the PSA is significantly rising I would continue the Zytiga, prednisone, Xgeva and February dose of Lupron with Dr. Matute and make sure he follows with nephrology.  If his PSA is significantly rising then my nurse practitioner will order PSMA PET.    -10/17/2023 PSA 1.5  -11/14/2023 PSA 1.510  -11/15/2023 [facility] clinic follow-up: Mr. Ferrara continues to do well on current therapy with Zytiga, prednisone, Xgeva and Lupron that he receives with   [facility].  His PSA is stable at 1.5.  Would not change therapy for now, we will plan on repeating restaging scans in February unless PSA starts to rise or he has new concerns.  Overall he feels good.  He is now established with nephrology and will continue to follow with them regarding his renal insufficiency, I reviewed note from consultation with Dr. Armstrong 11/6/2023.     Prostate cancer metastatic to multiple sites   3/15/2022 Initial Diagnosis    Prostate cancer metastatic to multiple sites (HCC)     3/15/2022 Cancer Staged    Staging form: Prostate, AJCC 8th Edition  - Clinical: Stage IVB (cT1c, cN1, cM1b, Grade Group: 5) - Signed by Fritz Goodson MD on 3/15/2022     4/7/2022 - 6/23/2022 Chemotherapy    Stopped after cycle 4 6/23/2022 due to syncope 3 days post infusions with negative cardiac work-up  OP PROSTATE DOCEtaxel     4/7/2022 -  Chemotherapy    OP SUPPORTIVE Denosumab (Xgeva) Q28D     7/26/2022 -  Chemotherapy    OP PROSTATE Abiraterone / PredniSONE           HISTORY OF PRESENT ILLNESS:  The patient is a 68 y.o. male, here for follow up on management of metastatic prostate cancer currently on treatment with Zytiga, prednisone, xgeva and lupron.  Chris continues to do well on current therapy with no new concerns.  He has no new pain.  His appetite is good, weight is stable, no change in his bowel or bladder habits.    Past Medical History:   Diagnosis Date    Cancer     Prostate cancer      Past Surgical History:   Procedure Laterality Date    PROSTATE BIOPSY  02/2022    dr. sue       No Known Allergies    Family History and Social History reviewed and changed as necessary    REVIEW OF SYSTEM:   No new somatic complaints    PHYSICAL EXAM:  Well-developed, well-nourished male in no distress  Respirations regular nonlabored, lungs clear to auscultation bilaterally  Heart regular rate and rhythm  Scattered ecchymosis on bilateral forearms    Vitals:    01/10/24 0928   BP: 172/90   Pulse: 70   Resp:  "18   Temp: 98.2 °F (36.8 °C)   SpO2: 98%   Weight: 122 kg (269 lb)   Height: 180.3 cm (71\")     Vitals:    01/10/24 0928   PainSc: 0-No pain          ECOG score: 0           Vitals reviewed.  Labs reviewed    ECOG: (0) Fully Active - Able to Carry On All Pre-disease Performance Without Restriction    Lab Results   Component Value Date    HGB 12.9 (L) 01/09/2024    HCT 38.7 01/09/2024    MCV 86.4 01/09/2024     01/09/2024    WBC 7.13 01/09/2024    NEUTROABS 4.23 01/09/2024    LYMPHSABS 2.12 01/09/2024    MONOSABS 0.51 01/09/2024    EOSABS 0.16 01/09/2024    BASOSABS 0.08 01/09/2024       Lab Results   Component Value Date    GLUCOSE 130 (H) 01/09/2024    BUN 27 (H) 01/09/2024    CREATININE 1.64 (H) 01/09/2024     01/09/2024    K 4.1 01/09/2024     01/09/2024    CO2 23.3 01/09/2024    CALCIUM 9.5 01/09/2024    ALBUMIN 4.0 01/09/2024    BILITOT 0.4 01/09/2024    ALKPHOS 86 01/09/2024    AST 14 01/09/2024    ALT 21 01/09/2024     Lab Results   Component Value Date    PSA 1.050 01/09/2024    PSA 1.510 11/14/2023    PSA 1.5 10/17/2023    PSA 1.810 09/19/2023             ASSESSMENT & PLAN:  1.  Prostate cancer clinical T1c and 2M1B stage IVb treated with 4 cycles of Taxotere, stopped due to syncope with negative cardiac work-up, with marked drop in PSA of 18.6 from over 900 at baseline, continued on Zytiga and prednisone.  2.  Urinary retention with Goodwin in place 1/24/2022.  On finasteride 1/24/2022.  3.  Covid 19 December 2021  4.  Hypertension  5.  Chronic kidney disease stage 3a    Oncology history timeline:  -11/29/2021  with symptoms of urinary retention.  -2/15/2022 prostate biopsy adenocarcinoma grade group 5 and 3 out of 12 cores, grade group 4 in 1 out of 12 cores, grade group 3 in 8 out of 12 cores.  -2/24/2022 CT chest abdomen pelvis and total body bone scan shows left third rib, right acetabulum, periaortic and retroperitoneal kizzy metastases complaining of pain in the left chest and " right hip.  Also possible sclerotic left 10th rib on CT.  Spleen mildly enlarged 13.8 cm.  Hepatic steatosis.  Small liver cyst.  Fat stranding in the periaortic and mesenteric region consistent with reactive/inflammatory stranding.  Multiple enlarged periaortic and retroperitoneal nodes and lymphadenopathy largest 4.9 cm.  CT chest describes tiny sclerotic foci in left eighth rib and right lateral seventh rib and T1.  Multiple small mediastinal and bilateral axillary nodes seen with small hypodense left thyroid nodule.  Creatinine 1.7.  -3/4/2022 office note Dr. Rolando Matute: Patient was seen after his elevated PSA by Dr. Vera and prostate biopsy scheduled.  However, he developed COVID-19 and this was canceled and the patient did not reschedule due to lack of insurance.  Saw Dr. Matute back on 1/24/2022 with plans to get staging CTs and bone scan with results as outlined above.  Started Casodex and to return on 3/11/2022 for Lupron.  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  TURP planned for urinary retention symptoms.    -3/15/2022 [facility] medical oncology initial consultation: I reviewed the above data with the patient.  With his fairly bulky retroperitoneal adenopathy and bone metastases including extra axial metastases, he would fit the data from the CHAARTED trial for which Taxotere x6 followed by Zytiga prednisone along with the planned Lupron already being planned by Dr. Matute would be reasonable.  Equally reasonable in terms of comparisons with bicalutamide and Lupron would be Zytiga prednisone plus Lupron or Xtandi plus Lupron or apalutamide plus Lupron.  None of these have been compared head-to-head but all have beaten combined androgen deprivation therapy with bicalutamide and Lupron.  At the age of 67 and in order to have as many bullets as possible down the road and hence saving some of the hormone blockade options for later and using  chemotherapy when he is younger and healthier for a more time-limited.,  He has opted for the Taxotere x6 followed by Zytiga and prednisone.  We will also give him Xgeva to improve bone health and given metastatic disease, as with all metastatic prostate cancer patients, he needs genetic counseling both for his sake as well as his family's.  If he were to have BRCA1 or other such  mutations, then that also opens up the options for PARP inhibitors etc. down the road.  He has his TURP scheduled for 2/24/2022 and we will start treatment a couple of weeks after that and he will get chemo preparation visit in the meantime and I will get capital surgeons to place a port.  We will check his PSA after his TURP when he sees my nurse practitioner back early April for the start of chemotherapy and repeat the PSA and CT chest abdomen pelvis and bone scan after the Taxotere is complete.    -3/31/2022 chemotherapy education and needs assessment completed    -4/7/2022 began docetaxel and Xgeva.  .0.  We will do his Xgeva every 6 weeks to coordinate with his docetaxel infusions.    -4/19/2022 patient stopped Casodex    -5/17/2022 patient reported seeing his PCP for an abscess in his suprapubic area.  Placed on clindamycin, will delay treatment 1 week.    -5/25/2022 PSA 34.3  -5/26/2022 [facility] follow-up: Chris has an abscess in the suprapubic area, it has improved on antibiotic therapy but I am concerned if we treat him it will just flare back up unless it is drained.  I will get him to Dr. Miller who placed his port for I&D and culture.  He is on clindamycin and states he completes course of treatment tomorrow.  I will delay his treatment with Taxotere 1 more week.  We will give his Xgeva today.  I will see him back in 1 week for follow-up to assess whether or not we can resume his Taxotere.  His PSA has dropped nicely with treatment thus far, PSA yesterday was 34.3 which is down from a PSA of 259.0  when we started treatment, it has been as high as 911 in November.    -6/2/2022 [facility] follow-up: Chris went to see Dr. Miller to have his abscess lanced however apparently there was a long wait and he left without being seen.  He did call his PCP and was given 5 more days of clindamycin and the abscess now seems to have resolved.  We discussed today that he is at risk for recurrence of infection due to immunocompromise state with chemotherapy.  He will notify us if he has any return of his abscess.  He does have intertrigo under his panniculus, I have advised him to keep this area dry, to apply topical drying/antifungal powders.  Also recommended looser clothing.  His counts are adequate to continue therapy, we will resume Taxotere today with cycle #3.  We will repeat restaging scans after 6 courses.  We are doing Xgeva every 6 weeks to coordinate with his Taxotere.  Once he finishes Taxotere we can then go back to every 4 weeks.  His next Xgeva is not due until 7/14/2022.  His calcium is running a little low, he is going to  calcium supplement.    -6/23/2022 [facility] follow-up: Chris overall is doing well on treatment with Taxotere.  Labs reviewed from yesterday and are unremarkable.  We will continue treatment today unchanged.  His suprapubic abscess resolved on clindamycin.  He will continue to use drying powder/antifungal powder under his panniculus for intertrigo.  Today will be cycle #4 of a planned 6 courses of Taxotere.  He is also on Xgeva, next dose due 7/14/2022, we are doing this every 6 weeks for now to line up with his chemotherapy, can go back to every 4 weeks after he finishes Taxotere.  Calcium from [facility] yesterday was normal.    -7/13/2022 [facility] follow-up: Mr. Morfin has had issues around day 3 after each treatment ranging from chest pain after his first treatment for which he did not seek medical attention, fatigue after the next few  treatments, but 3 days after his most recent treatment on 6/23/2022 he had a syncopal episode at home and once again did not seek medical attention.  I discussed with him that this was not acceptable, if he has occurrences like this someone needs to call 911, it is difficult to figure out what is going on after the fact, the best time to work this up would be during the occurrence.  For now we will get labs today with CBC, CMP, PSA.  I will refer him to cardiology for further evaluation.  We will hold Taxotere most likely, I will see him tomorrow to go over his lab results.  I will also repeat his restaging scans with CT chest, abdomen and pelvis and will include CT of the head in light of his syncopal episode.      -7/13/2022 PSA 18.6    -7/14/2022 [facility] clinic follow-up: Mr. Morfin returns today to review his labs from yesterday.  Labs are unremarkable.  PSA down to 18.6 from a high of 259.0 in April prior to starting treatment.  CBC and CMP unremarkable, magnesium is normal at 2.3, phosphorus slightly low at 2.4.  We will hold therapy for now in light of his syncopal episode on day 3 after his last treatment and prior episodes with chest pain and severe fatigue that all occurred on day 3 after his Taxotere.  We will restage him with CT head, chest, abdomen and pelvis (I am including the head in light of his syncopal episode).  I have also referred him to cardiology, he states that he received a call from them about making an appointment however he wanted to talk to us today first.  I emphasized the importance to him of making and keeping that appointment and he states understanding.  I also once again emphasized the requirement to seek emergency medical attention if he has any episodes in the future such as chest pain or syncopal events.  Whether or not we try and complete Taxotere with 2 more courses or move to the next line of therapy will be decided when he returns to discuss with Dr. Goodson and  review his restaging scans.    -7/15/2022 saw Dr. Diaz cardiologist.  For syncope he ordered echocardiogram and 48-hour Holter monitor.    -7/15/2022 Holter monitor relatively benign.    -7/22/2022 CT brain with and without contrast negative.  CT chest abdomen and pelvis shows some nonspecific cecal wall thickening.  No progression in the chest.  T1 and ribs stable.  Decrease in multiple retroperitoneal and pelvic nodes.  Slight increase in right acetabular sclerotic lesion.  Persistent but improved circumferential bladder wall thickening.  Total body bone scan shows stable left third rib and no evidence of new bone metastases.    -7/19/2022 ejection fraction 65% with grade 1 diastolic dysfunction.    -7/26/2022 [facility] follow-up: Given presyncopal symptoms occurred 3 days after Taxotere and has not otherwise occurred any other time and his cardiology work-up is fairly unremarkable and his disease is under good control as witnessed by the report above of the CTs of head chest abdomen pelvis and bone scan, I will hold cycle 5 and 6 of Taxotere and continue 1 now with Zytiga and prednisone.  With reference to the coincidental cecal wall thickening found on CT, we will get him to [facility] surgeons for colonoscopy.  He will see my nurse practitioner back in August for next cycle of Abiraterone prednisone.  He will continue his Lupron with Dr. Matute.  We will continue his Xgeva.  We will repeat his CT chest abdomen pelvis and bone scan again in 3 months.  He will see my nurse practitioner in the interim.    -8/23/2022 [facility] follow-up: No further presyncopal symptoms.  Feeling great other than for hot flashes.  Did commend for now we will continue on with his Zytiga prednisone and he will get his Xgeva today based on labs from 8/10/2022.  He opted out of getting the colonoscopy that I recommended and he will not change his mind.  He will continue getting the Lupron with Dr. Matute  and I asked him to give the date of this appointment to my nurse when he sees her back in a month.  We will give his Xgeva today.  We will get CT chest abdomen pelvis and total body bone scan towards the middle to end of October.  Presently other than for the hot flashes feels great.    -9/20/2022 PSA 8.320    -9/22/2022 [facility] follow-up: Chris is doing well on Zytiga, prednisone along with Xgeva.  Labs reviewed from 9/20/2022 are unremarkable, CBC was normal, CMP with creatinine of 1.34 otherwise normal, this is stable from his baseline.  PSA stable at 8.320.  He received Lupron recently with Dr. Matute, he thinks last week or so, states his next appointment is in February.  He will continue treatment unchanged.  We will repeat restaging CT chest, abdomen and pelvis and total body bone scan in October prior to return and I have ordered those today.  We will also repeat his PSA.    -9/22/2022 CT chest abdomen pelvisCompared to July 2022 [facility] showed no evidence of disease progression in the chest with no substantial sclerotic bony lesions and decrease in size of retroperitoneal and pelvic nodes with persistent cecal wall thickening and suboptimal bladder distention.  Total body bone scan showed decrease in previously seen uptake in the left third rib with diffuse sclerosis representing treatment response with no new foci.    -10/18/2022 [facility] follow-up: I reviewed bone scan and CT reports as above with no evidence of progression.  Tolerating Zytiga prednisone and Xgeva.  Getting Lupron regularly with Dr. Matute next appointment coming in February.  We will repeat his CT chest abdomen pelvis and total body bone scan in April.  He will follow with my nurse practitioner in the interim.    -1/10/2023 PSA 3.450  -1/11/2023 [facility] follow-up: Mr. Morfin continues to do well on current therapy with Zytiga, prednisone and Xgeva with no unusual side  "effects.  He has no new worrisome symptoms.  He is due for his next Lupron injection in February with Dr. Matute.  His PSA continues to trend downward, current PSA from yesterday was 3.450.  We will continue therapy unchanged, he will receive Xgeva today.  Has had no hypocalcemia, his CMP, phosphorus and magnesium are all normal.  We will repeat restaging scans in April.  -3/7/2023 PSA 2.460  -3/8/2023 [facility] follow-up:  Mr. Morfin is doing well.  He is tolerating therapy with Zytiga, prednisone and Xgeva with no significant side effects.  He has hot flashes but these are tolerable.  He had Lupron injection 2/24/2023 with Dr. Matute.  His PSA continues to decline, current PSA 2.460.  He will continue therapy unchanged.  We will repeat restaging scans late April prior to return in 2 months and I have ordered those today.  He is working with his PCP Rodrigo Ram on his hypertension.  His b/p today was 160/88.  He was to have increased his losartan but I do not think he has done that yet as he said \"I still have some of my old prescription left\".  -4/4/2023 PSA 2.55  - 4/26/2023 CT chest abdomen pelvis [place] comparison 10/10/2022 showed stable left third rib, T1, left fourth and eighth rib, right seventh rib.  Probable liver cyst.  Few diverticuli.  Mild further decrease in a few of the previously enlarged nodes.  Left external iliac 12 mm compared to 16 mm.  Right common iliac 7 mm stable.  Lower left periaortic 8 mm previously 13 mm.  No new lytic or blastic osseous lesions.  Total body bone scan comparison 10/10/2022, 7/22/2022, and CT 4/26/2023.  No new sites of increased uptake.  1 focal left third rib hotspot consistent with bone metastasis.    -5/2/2023 [facility] follow-up: I reviewed interval notes since I last saw him from my nurse practitioner as outlined above in interval images and reports thereof from [facility] that shows no new sites of bone metastasis and no " kizzy or visceral progression.  PSA stabilized around 2.5.  In the absence of symptomatic or radiographic progression, I would be unlikely to change therapy just on the basis of a PSA rise and for now the PSA is stable.  We will continue Zytiga prednisone and Xgeva and he will follow-up with my nurse practitioner 5/30/2023 with repeat CTs and bone scan in 6 months.  I asked him to check with his primary care today regarding his systolic in the 170s.    -5/31/2023 [facility] clinic follow-up: Chris continues to do well on current therapy with Zytiga, prednisone and Xgeva along with Lupron that he receives with Dr. Matute.  His PSA remains stable, it was lower this month compared to last month, current PSA 1.940.  Continues to deal with hypertension, on arrival today his blood pressure was 210/92 however this was using a wrist cuff, when I rechecked it manually his blood pressure was 160/90.  Still has room for improvement despite recent increase in his losartan to 100 mg daily.  He will follow-up with Dr. Ram for further management.  We will continue therapy unchanged.  We will repeat restaging scans in October.    -7/26/2023 [facility] clinic follow-up: Chris overall continues to do well on current therapy with Zytiga, prednisone and Xgeva.  He receives Lupron every 6 months with Dr. Matute and states that is scheduled for August.  PSA from yesterday is pending, PSA on 6/27/2023 was 1.590 which was continuing to decline, in February it was 3.250.  His creatinine this past month has bumped up.  He feels he is staying hydrated but I encouraged him to increase his fluid intake.  I discussed with him that this could be from his hypertension, some of his medications.  His blood pressure today is 165/92.  He has an appointment with his primary care provider Dr. Teri Ram next week and can further discuss with her.  No adjustment needed for Xgeva.  I refilled his prednisone today.  I will see him  back in 1 month for follow-up with continued monitoring of his labs.  He may end up needing to see nephrology.  We will repeat restaging scans in October.    -9/29/2023 PSA 1.810  -8/23/2023 [facility] clinic follow-up: Chris continues on therapy with Zytiga, prednisone and Xgeva, he also receives Lupron every 6 months with Dr. Matute with most recent given on 8/7/2023.  He is scheduled for a cystoscopy in a few weeks as his last few UAs per the patient's report had microscopic hematuria, he has had no steve hematuria.  His creatinine continues to remain elevated at 1.82, BUN is 40, GFR is 40.  I will get him to nephrology for further evaluation.  Blood pressure today was fairly good at 161/86, he continues to follow with Dr. Ram for management.  PSA on 7/25/2023 was up slightly from prior month, PSA in July was 2.140, in June it was 1.590, PSA from yesterday is pending.  I plan on repeating restaging CT scans and bone scans in October however if his PSA is up significantly then I will do sooner.  I will see him back for follow-up in 1 month.  -8/23/2024 PSA 1.860    -9/20/2023 [facility] clinic follow-up: Chris is tolerating treatment with Zytiga, prednisone and Xgeva.  He is up-to-date with his Lupron injection, last received in August with Dr. Matute.  He had cystoscopy on 9/8/2023 that was normal. I referred him to nephrology when I saw him last in August as his creatinine has been increasing, creatinine yesterday was a little better 1.51, GFR is 50, creatinine clearance 63.8.  He is still awaiting an appointment with nephrology, per our  it will be early November.  We will repeat his restaging scans prior to return and I will do his CT scans without contrast in light of his new renal insufficiency.  We will also get total body bone scan and I have ordered those today.  His PSA yesterday was 1.810.    -10/9/2023 CT chest abdomen Pelvis without contrast compared to April 2023 shows  slightly prominent pelvic lymph nodes left external iliac 8 mm compared to prior study.  Right internal iliac heterogenous 19 mm compared to 11 mm prior.  Stable sclerotic bone lesions and no new lesions.  Stable bone scan with no new sites of disease    -10/17/2023 [facility] follow-up: With elevated creatinine, CTs done without contrast showed very slight increased prominence by few millimeters of some internal iliac and external iliac nodes for which PSMA PET could be done but for now I will check his PSA and have him see my nurse practitioner back in a week and unless the PSA is significantly rising I would continue the Zytiga, prednisone, Xgeva and February dose of Lupron with Dr. Matute and make sure he follows with nephrology.  If his PSA is significantly rising then my nurse practitioner will order PSMA PET.    -11/15/2023 [facility] follow-up: Mr. Ferrara continues to do well on current therapy with Zytiga, prednisone, Xgeva and Lupron that he receives with Dr. Matute.  His PSA is stable at 1.5.  Would not change therapy for now, we will plan on repeating restaging scans in February unless PSA starts to rise or he has new concerns.  Overall he feels good.  He is now established with nephrology and will continue to follow with them regarding his renal insufficiency, I reviewed note from consultation with Dr. Armstrong 11/6/2023.    -12/13/2023 [facility] follow-up: Chris continues to do well on current therapy with Zytiga, prednisone, Xgeva and Lupron.  He thinks his next Lupron with Dr. Matute is around February.  His CBC and CMP remain unremarkable.  He will continue treatment unchanged.  We will repeat his PSA next month.  Will plan on restaging scans in April in the absence of new symptoms and as long as his PSA remains stable.  Continues to deal with hypertension, blood pressure today 175/94, encouraged him to follow-up with PCP.    -1/10/2024 [facility]  clinic follow-up: Chris overall continues to tolerate current therapy with Zytiga, prednisone, Xgeva and Lupron.  He states his next Lupron injection with Dr. Matute is due late February.  His PSA is stable at 1.050. Creatinine stable at 1.64, he is following with nephrology, he has a an appointment with Dr. Armstrong in February for follow-up.  Hypertension being managed by his primary care provider, target systolic ideally 120s-140s, today it is running a little high at 172/90, yesterday when he was here for labs it was 138/80.  We will continue therapy unchanged with plans to repeat restaging scans in April.    I spent 30 minutes caring for Chris on this date of service. This time includes time spent by me in the following activities: preparing for the visit, reviewing tests, obtaining and/or reviewing a separately obtained history, performing a medically appropriate examination and/or evaluation, ordering medications, tests, or procedures, documenting information in the medical record, and independently interpreting results and communicating that information with the patient/family/caregiver.     Susana Torres, APRN    01/10/2024

## 2024-01-15 ENCOUNTER — SPECIALTY PHARMACY (OUTPATIENT)
Dept: ONCOLOGY | Facility: HOSPITAL | Age: 69
End: 2024-01-15
Payer: MEDICARE

## 2024-01-15 NOTE — PROGRESS NOTES
Specialty Pharmacy Refill Coordination Note     Chris is a 68 y.o. male contacted today regarding refills of  abiraterone 1000mg PO QD of specialty medication(s).      Specialty medication(s) and dose(s) confirmed: yes    Refill Questions      Flowsheet Row Most Recent Value   Changes to allergies? No   Changes to medications? No   New conditions or infections since last clinic visit No   Unplanned office visit, urgent care, ED, or hospital admission in the last 4 weeks  No   How does patient/caregiver feel medication is working? Good   Financial problems or insurance changes  No   Since the previous refill, were any specialty medication doses or scheduled injections missed or delayed?  No   Does this patient require a clinical escalation to a pharmacist? No            Delivery Questions      Flowsheet Row Most Recent Value   Delivery method [employer]   Delivery address verified with patient/caregiver? Yes   Delivery address Home   Number of medications in delivery 1   Medication(s) being filled and delivered Abiraterone Acetate   Doses left of specialty medications 4 DAYS LEFT   Copay verified? Yes   Copay amount $1.55   Copay form of payment Credit/debit on file                   Follow-up: 30 day(s)     Catalina Montes De Oca, Pharmacy Technician  Specialty Pharmacy Technician

## 2024-01-23 RX ORDER — LOSARTAN POTASSIUM 100 MG/1
100 TABLET ORAL DAILY
Qty: 90 TABLET | Refills: 1 | OUTPATIENT
Start: 2024-01-23

## 2024-02-01 ENCOUNTER — OFFICE VISIT (OUTPATIENT)
Dept: FAMILY MEDICINE CLINIC | Facility: CLINIC | Age: 69
End: 2024-02-01
Payer: MEDICARE

## 2024-02-01 VITALS
WEIGHT: 271 LBS | DIASTOLIC BLOOD PRESSURE: 88 MMHG | HEIGHT: 71 IN | HEART RATE: 79 BPM | OXYGEN SATURATION: 97 % | BODY MASS INDEX: 37.94 KG/M2 | SYSTOLIC BLOOD PRESSURE: 152 MMHG

## 2024-02-01 DIAGNOSIS — Z13.220 LIPID SCREENING: ICD-10-CM

## 2024-02-01 DIAGNOSIS — I10 ESSENTIAL HYPERTENSION: Primary | ICD-10-CM

## 2024-02-01 DIAGNOSIS — N18.31 STAGE 3A CHRONIC KIDNEY DISEASE (CKD): ICD-10-CM

## 2024-02-01 LAB
BILIRUB BLD-MCNC: NEGATIVE MG/DL
CLARITY, POC: CLEAR
COLOR UR: YELLOW
EXPIRATION DATE: NORMAL
GLUCOSE UR STRIP-MCNC: NEGATIVE MG/DL
KETONES UR QL: NEGATIVE
LEUKOCYTE EST, POC: NEGATIVE
Lab: NORMAL
NITRITE UR-MCNC: NEGATIVE MG/ML
PH UR: 5.5 [PH] (ref 5–8)
PROT UR STRIP-MCNC: NEGATIVE MG/DL
RBC # UR STRIP: NEGATIVE /UL
SP GR UR: 1.02 (ref 1–1.03)
UROBILINOGEN UR QL: NORMAL

## 2024-02-01 RX ORDER — DENOSUMAB 120 MG/1.7ML
INJECTION SUBCUTANEOUS
COMMUNITY
Start: 2023-11-06

## 2024-02-01 RX ORDER — LEUPROLIDE ACETATE 45 MG
KIT INTRAMUSCULAR
COMMUNITY
Start: 2023-11-06

## 2024-02-01 NOTE — PROGRESS NOTES
"Chief Complaint  Hypertension    Subjective          Chris Morfin presents to [facility]  History of Present Illness    Patient states that he does not check his blood pressure at home.     He states that he has not been having any trouble with the medications at this time. He states that he takes the blood pressure medications around 9-930 am and he has not taken his meds yet this morning. He states that he has not had any chest pain, palpitations, LE edema or near syncope.       Objective   Vital Signs:   /88   Pulse 79   Ht 180.3 cm (71\")   Wt 123 kg (271 lb)   SpO2 97%   BMI 37.80 kg/m²     Body mass index is 37.8 kg/m².    Review of Systems    Past History:  Medical History: has a past medical history of Cancer and Prostate cancer.   Surgical History: has a past surgical history that includes Prostate biopsy (02/2022).   Family History: family history includes Cancer in his brother and sister.   Social History: reports that he has never smoked. He has never used smokeless tobacco. He reports that he does not currently use alcohol. He reports current drug use. Drug: Marijuana.      Current Outpatient Medications:     denosumab (Xgeva) 120 MG/1.7ML solution injection, 1.7 ml Subcutaneous ONCE A MONTH, Disp: , Rfl:     leuprolide (Lupron Depot, 6-Month,) 45 MG kit injection, as directed Intramuscular Q 6 MONTHS, Disp: , Rfl:     abiraterone acetate (ZYTIGA) 500 MG tablet, Take 2 tablets by mouth Daily. on an empty stomach., Disp: 60 tablet, Rfl: 11    amLODIPine (NORVASC) 5 MG tablet, Take 1.5 tablets by mouth Daily for 180 days., Disp: 135 tablet, Rfl: 1    losartan (COZAAR) 100 MG tablet, Take 1 tablet by mouth Daily., Disp: 90 tablet, Rfl: 1    ondansetron (ZOFRAN) 8 MG tablet, Take 1 tablet by mouth 3 (Three) Times a Day As Needed for Nausea or Vomiting., Disp: 30 tablet, Rfl: 5    predniSONE (DELTASONE) 5 MG tablet, Take 1 tablet by mouth 2 (Two) Times a Day., Disp: " 60 tablet, Rfl: 11    Allergies: Patient has no known allergies.    Physical Exam  Constitutional:       General: He is not in acute distress.     Appearance: He is not ill-appearing or toxic-appearing.   HENT:      Head: Normocephalic and atraumatic.   Cardiovascular:      Rate and Rhythm: Normal rate and regular rhythm.      Heart sounds: No murmur heard.  Pulmonary:      Effort: Pulmonary effort is normal. No respiratory distress.   Neurological:      General: No focal deficit present.      Mental Status: He is alert and oriented to person, place, and time.   Psychiatric:         Mood and Affect: Mood normal.         Thought Content: Thought content normal.          Result Review :                   Assessment and Plan    Diagnoses and all orders for this visit:    1. Essential hypertension (Primary)    2. Stage 3a chronic kidney disease (CKD)  -     Basic Metabolic Panel  -     CBC & Differential  -     POC Urinalysis Dipstick, Automated  -     Microalbumin / Creatinine Urine Ratio - Urine, Clean Catch    3. Lipid screening  -     Lipid Panel    Blood work ordered and will contact with results when available. Will adjust medications based on abnormalities seen.     Recommend ed that he monitor blood pressure at home to make sure that his levels are where they need to be. Will continue current doses of medications at this time.     Follow up in 3 months or sooner with any new or worsening symptoms.       Follow Up   No follow-ups on file.  Patient was given instructions and counseling regarding his condition or for health maintenance advice. Please see specific information pulled into the AVS if appropriate.     Teri Ram, DO

## 2024-02-02 LAB
ALBUMIN/CREAT UR: 15 MG/G CREAT (ref 0–29)
BASOPHILS # BLD AUTO: 0.1 X10E3/UL (ref 0–0.2)
BASOPHILS NFR BLD AUTO: 1 %
BUN SERPL-MCNC: 25 MG/DL (ref 8–27)
BUN/CREAT SERPL: 17 (ref 10–24)
CALCIUM SERPL-MCNC: 10.1 MG/DL (ref 8.6–10.2)
CHLORIDE SERPL-SCNC: 104 MMOL/L (ref 96–106)
CHOLEST SERPL-MCNC: 169 MG/DL (ref 100–199)
CO2 SERPL-SCNC: 23 MMOL/L (ref 20–29)
CREAT SERPL-MCNC: 1.47 MG/DL (ref 0.76–1.27)
CREAT UR-MCNC: 73.5 MG/DL
EGFRCR SERPLBLD CKD-EPI 2021: 52 ML/MIN/1.73
EOSINOPHIL # BLD AUTO: 0.2 X10E3/UL (ref 0–0.4)
EOSINOPHIL NFR BLD AUTO: 2 %
ERYTHROCYTE [DISTWIDTH] IN BLOOD BY AUTOMATED COUNT: 13.1 % (ref 11.6–15.4)
GLUCOSE SERPL-MCNC: 103 MG/DL (ref 70–99)
HCT VFR BLD AUTO: 40.9 % (ref 37.5–51)
HDLC SERPL-MCNC: 59 MG/DL
HGB BLD-MCNC: 13.3 G/DL (ref 13–17.7)
IMM GRANULOCYTES # BLD AUTO: 0 X10E3/UL (ref 0–0.1)
IMM GRANULOCYTES NFR BLD AUTO: 1 %
LDLC SERPL CALC-MCNC: 90 MG/DL (ref 0–99)
LYMPHOCYTES # BLD AUTO: 2.3 X10E3/UL (ref 0.7–3.1)
LYMPHOCYTES NFR BLD AUTO: 26 %
MCH RBC QN AUTO: 28.1 PG (ref 26.6–33)
MCHC RBC AUTO-ENTMCNC: 32.5 G/DL (ref 31.5–35.7)
MCV RBC AUTO: 87 FL (ref 79–97)
MICROALBUMIN UR-MCNC: 11.1 UG/ML
MONOCYTES # BLD AUTO: 0.6 X10E3/UL (ref 0.1–0.9)
MONOCYTES NFR BLD AUTO: 7 %
NEUTROPHILS # BLD AUTO: 5.6 X10E3/UL (ref 1.4–7)
NEUTROPHILS NFR BLD AUTO: 63 %
PLATELET # BLD AUTO: 266 X10E3/UL (ref 150–450)
POTASSIUM SERPL-SCNC: 4.4 MMOL/L (ref 3.5–5.2)
RBC # BLD AUTO: 4.73 X10E6/UL (ref 4.14–5.8)
SODIUM SERPL-SCNC: 142 MMOL/L (ref 134–144)
TRIGL SERPL-MCNC: 109 MG/DL (ref 0–149)
VLDLC SERPL CALC-MCNC: 20 MG/DL (ref 5–40)
WBC # BLD AUTO: 8.8 X10E3/UL (ref 3.4–10.8)

## 2024-02-06 ENCOUNTER — LAB (OUTPATIENT)
Dept: ONCOLOGY | Facility: HOSPITAL | Age: 69
End: 2024-02-06
Payer: MEDICARE

## 2024-02-06 VITALS
WEIGHT: 269.6 LBS | BODY MASS INDEX: 37.6 KG/M2 | HEART RATE: 68 BPM | DIASTOLIC BLOOD PRESSURE: 89 MMHG | RESPIRATION RATE: 17 BRPM | TEMPERATURE: 97.9 F | SYSTOLIC BLOOD PRESSURE: 167 MMHG

## 2024-02-06 DIAGNOSIS — C61 PROSTATE CANCER METASTATIC TO MULTIPLE SITES: ICD-10-CM

## 2024-02-06 PROCEDURE — 36415 COLL VENOUS BLD VENIPUNCTURE: CPT

## 2024-02-07 ENCOUNTER — OFFICE VISIT (OUTPATIENT)
Dept: ONCOLOGY | Facility: CLINIC | Age: 69
End: 2024-02-07
Payer: MEDICARE

## 2024-02-07 ENCOUNTER — SPECIALTY PHARMACY (OUTPATIENT)
Dept: ONCOLOGY | Facility: HOSPITAL | Age: 69
End: 2024-02-07
Payer: MEDICARE

## 2024-02-07 ENCOUNTER — INFUSION (OUTPATIENT)
Dept: ONCOLOGY | Facility: HOSPITAL | Age: 69
End: 2024-02-07
Payer: MEDICARE

## 2024-02-07 VITALS
DIASTOLIC BLOOD PRESSURE: 92 MMHG | HEIGHT: 71 IN | RESPIRATION RATE: 18 BRPM | WEIGHT: 269 LBS | TEMPERATURE: 97.6 F | BODY MASS INDEX: 37.66 KG/M2 | SYSTOLIC BLOOD PRESSURE: 155 MMHG | OXYGEN SATURATION: 96 % | HEART RATE: 75 BPM

## 2024-02-07 DIAGNOSIS — C61 PROSTATE CANCER METASTATIC TO MULTIPLE SITES: Primary | ICD-10-CM

## 2024-02-07 LAB
ALBUMIN SERPL-MCNC: 4.3 G/DL (ref 3.5–5.2)
ALBUMIN/GLOB SERPL: 1.9 G/DL
ALP SERPL-CCNC: 86 U/L (ref 39–117)
ALT SERPL-CCNC: 19 U/L (ref 1–41)
AST SERPL-CCNC: 17 U/L (ref 1–40)
BASOPHILS # BLD AUTO: 0.06 10*3/MM3 (ref 0–0.2)
BASOPHILS NFR BLD AUTO: 0.9 % (ref 0–1.5)
BILIRUB SERPL-MCNC: 0.4 MG/DL (ref 0–1.2)
BUN SERPL-MCNC: 24 MG/DL (ref 8–23)
BUN/CREAT SERPL: 19.8 (ref 7–25)
CALCIUM SERPL-MCNC: 9.2 MG/DL (ref 8.6–10.5)
CHLORIDE SERPL-SCNC: 106 MMOL/L (ref 98–107)
CO2 SERPL-SCNC: 23.8 MMOL/L (ref 22–29)
CREAT SERPL-MCNC: 1.21 MG/DL (ref 0.76–1.27)
EGFRCR SERPLBLD CKD-EPI 2021: 65.2 ML/MIN/1.73
EOSINOPHIL # BLD AUTO: 0.19 10*3/MM3 (ref 0–0.4)
EOSINOPHIL NFR BLD AUTO: 2.9 % (ref 0.3–6.2)
ERYTHROCYTE [DISTWIDTH] IN BLOOD BY AUTOMATED COUNT: 12.9 % (ref 12.3–15.4)
GLOBULIN SER CALC-MCNC: 2.3 GM/DL
GLUCOSE SERPL-MCNC: 130 MG/DL (ref 65–99)
HCT VFR BLD AUTO: 39.1 % (ref 37.5–51)
HGB BLD-MCNC: 12.9 G/DL (ref 13–17.7)
IMM GRANULOCYTES # BLD AUTO: 0.02 10*3/MM3 (ref 0–0.05)
IMM GRANULOCYTES NFR BLD AUTO: 0.3 % (ref 0–0.5)
LYMPHOCYTES # BLD AUTO: 1.7 10*3/MM3 (ref 0.7–3.1)
LYMPHOCYTES NFR BLD AUTO: 25.9 % (ref 19.6–45.3)
MAGNESIUM SERPL-MCNC: 2.2 MG/DL (ref 1.6–2.4)
MCH RBC QN AUTO: 28.5 PG (ref 26.6–33)
MCHC RBC AUTO-ENTMCNC: 33 G/DL (ref 31.5–35.7)
MCV RBC AUTO: 86.5 FL (ref 79–97)
MONOCYTES # BLD AUTO: 0.54 10*3/MM3 (ref 0.1–0.9)
MONOCYTES NFR BLD AUTO: 8.2 % (ref 5–12)
NEUTROPHILS # BLD AUTO: 4.06 10*3/MM3 (ref 1.7–7)
NEUTROPHILS NFR BLD AUTO: 61.8 % (ref 42.7–76)
NRBC BLD AUTO-RTO: 0 /100 WBC (ref 0–0.2)
PHOSPHATE SERPL-MCNC: 3.2 MG/DL (ref 2.5–4.5)
PLATELET # BLD AUTO: 243 10*3/MM3 (ref 140–450)
POTASSIUM SERPL-SCNC: 4.2 MMOL/L (ref 3.5–5.2)
PROT SERPL-MCNC: 6.6 G/DL (ref 6–8.5)
PSA SERPL-MCNC: 0.75 NG/ML (ref 0–4)
RBC # BLD AUTO: 4.52 10*6/MM3 (ref 4.14–5.8)
SODIUM SERPL-SCNC: 141 MMOL/L (ref 136–145)
WBC # BLD AUTO: 6.57 10*3/MM3 (ref 3.4–10.8)

## 2024-02-07 PROCEDURE — 1159F MED LIST DOCD IN RCRD: CPT | Performed by: NURSE PRACTITIONER

## 2024-02-07 PROCEDURE — 1160F RVW MEDS BY RX/DR IN RCRD: CPT | Performed by: NURSE PRACTITIONER

## 2024-02-07 PROCEDURE — 99214 OFFICE O/P EST MOD 30 MIN: CPT | Performed by: NURSE PRACTITIONER

## 2024-02-07 PROCEDURE — 1126F AMNT PAIN NOTED NONE PRSNT: CPT | Performed by: NURSE PRACTITIONER

## 2024-02-07 PROCEDURE — 25010000002 DENOSUMAB 120 MG/1.7ML SOLUTION: Performed by: NURSE PRACTITIONER

## 2024-02-07 PROCEDURE — 96372 THER/PROPH/DIAG INJ SC/IM: CPT

## 2024-02-07 RX ADMIN — DENOSUMAB 120 MG: 120 INJECTION SUBCUTANEOUS at 09:28

## 2024-02-07 NOTE — PROGRESS NOTES
CHIEF COMPLAINT: No new somatic complaints    Problem List:  Oncology/Hematology History Overview Note   1.  Prostate cancer clinical T1c and 2M1B stage IVb treated with 4 cycles of Taxotere, stopped due to syncope with negative cardiac work-up, with marked drop in PSA of 18.6 from over 900 at baseline, continued on Zytiga and prednisone.  2.  Urinary retention with Goodwin in place 1/24/2022.  On finasteride 1/24/2022.  3.  Covid 19 December 2021  4.  Hypertension  5.  Stage 3a chronic kidney disease    Oncology history timeline:  -11/29/2021  with symptoms of urinary retention.  -2/15/2022 prostate biopsy adenocarcinoma grade group 5 and 3 out of 12 cores, grade group 4 in 1 out of 12 cores, grade group 3 in 8 out of 12 cores.  -2/24/2022 CT chest abdomen pelvis and total body bone scan shows left third rib, right acetabulum, periaortic and retroperitoneal kizzy metastases complaining of pain in the left chest and right hip.  Also possible sclerotic left 10th rib on CT.  Spleen mildly enlarged 13.8 cm.  Hepatic steatosis.  Small liver cyst.  Fat stranding in the periaortic and mesenteric region consistent with reactive/inflammatory stranding.  Multiple enlarged periaortic and retroperitoneal nodes and lymphadenopathy largest 4.9 cm.  CT chest describes tiny sclerotic foci in left eighth rib and right lateral seventh rib and T1.  Multiple small mediastinal and bilateral axillary nodes seen with small hypodense left thyroid nodule.  Creatinine 1.7.  -3/4/2022 office note Dr. Rolando Matute: Patient was seen after his elevated PSA by Dr. Vera and prostate biopsy scheduled.  However, he developed COVID-19 and this was canceled and the patient did not reschedule due to lack of insurance.  Saw Dr. Matute back on 1/24/2022 with plans to get staging CTs and bone scan with results as outlined above.  Started Casodex and to return on 3/11/2022 for Lupron.  Referral made to medical oncology for other additional  castrate naïve prostate cancer therapies.  TURP planned for urinary retention symptoms.    -3/15/2022 [facility] medical oncology initial consultation: I reviewed the above data with the patient.  With his fairly bulky retroperitoneal adenopathy and bone metastases including extra axial metastases, he would fit the data from the CHAARTED trial for which Taxotere x6 followed by Zytiga prednisone along with the planned Lupron already being planned by Dr. Matute would be reasonable.  Equally reasonable in terms of comparisons with bicalutamide and Lupron would be Zytiga prednisone plus Lupron or Xtandi plus Lupron or apalutamide plus Lupron.  None of these have been compared head-to-head but all have beaten combined androgen deprivation therapy with bicalutamide and Lupron.  At the age of 67 and in order to have as many bullets as possible down the road and hence saving some of the hormone blockade options for later and using chemotherapy when he is younger and healthier for a more time-limited.,  He has opted for the Taxotere x6 followed by Zytiga and prednisone.  We will also give him Xgeva to improve bone health and given metastatic disease, as with all metastatic prostate cancer patients, he needs genetic counseling both for his sake as well as his family's.  If he were to have BRCA1 or other such  mutations, then that also opens up the options for PARP inhibitors etc. down the road.  He has his TURP scheduled for 2/24/2022 and we will start treatment a couple of weeks after that and he will get chemo preparation visit in the meantime and I will get capital surgeons to place a port.  We will check his PSA after his TURP when he sees my nurse practitioner back early April for the start of chemotherapy and repeat the PSA and CT chest abdomen pelvis and bone scan after the Taxotere is complete.    -3/31/2022 chemotherapy education and needs assessment completed    -4/7/2022 began docetaxel and Xgeva.  .0.   We will do his Xgeva every 6 weeks to coordinate with his docetaxel infusions.    -4/19/2022 patient stopped Casodex    -5/17/2022 patient reported seeing his PCP for an abscess in his suprapubic area.  Placed on clindamycin, will delay treatment 1 week.    -5/25/2022 PSA 34.3  -5/26/2022 [facility] Oncology clinic follow-up: Chris has an abscess in the suprapubic area, it has improved on antibiotic therapy but I am concerned if we treat him it will just flare back up unless it is drained.  I will get him to Dr. Miller who placed his port for I&D and culture.  He is on clindamycin and states he completes course of treatment tomorrow.  I will delay his treatment with Taxotere 1 more week.  We will give his Xgeva today.  I will see him back in 1 week for follow-up to assess whether or not we can resume his Taxotere.  His PSA has dropped nicely with treatment thus far, PSA yesterday was 34.3 which is down from a PSA of 259.0 when we started treatment, it has been as high as 911 in November.    -6/2/2022 [facility] Oncology clinic follow-up: Chris went to see Dr. Miller to have his abscess lanced however apparently there was a long wait and he left without being seen.  He did call his PCP and was given 5 more days of clindamycin and the abscess now seems to have resolved.  We discussed today that he is at risk for recurrence of infection due to immunocompromise state with chemotherapy.  He will notify us if he has any return of his abscess.  He does have intertrigo under his panniculus, I have advised him to keep this area dry, to apply topical drying/antifungal powders.  Also recommended looser clothing.  His counts are adequate to continue therapy, we will resume Taxotere today with cycle #3.  We will repeat restaging scans after 6 courses.  We are doing Xgeva every 6 weeks to coordinate with his Taxotere.  Once he finishes Taxotere we can then go back to every 4 weeks.  His next Xgeva is not due until 7/14/2022.  His  calcium is running a little low, he is going to  calcium supplement.    -6/23/2022 [facility] follow-up: Chris overall is doing well on treatment with Taxotere.  Labs reviewed from yesterday and are unremarkable.  We will continue treatment today unchanged.  His suprapubic abscess resolved on clindamycin.  He will continue to use drying powder/antifungal powder under his panniculus for intertrigo.  Today will be cycle #4 of a planned 6 courses of Taxotere.  He is also on Xgeva, next dose due 7/14/2022, we are doing this every 6 weeks for now to line up with his chemotherapy, can go back to every 4 weeks after he finishes Taxotere.  Calcium from CMP yesterday was normal.    -7/13/2022 [facility] clinic follow-up: Mr. Morfin has had issues around day 3 after each treatment ranging from chest pain after his first treatment for which he did not seek medical attention, fatigue after the next few treatments, but 3 days after his most recent treatment on 6/23/2022 he had a syncopal episode at home and once again did not seek medical attention.  I discussed with him that this was not acceptable, if he has occurrences like this someone needs to call 911, it is difficult to figure out what is going on after the fact, the best time to work this up would be during the occurrence.  For now we will get labs today with CBC, CMP, PSA.  I will refer him to cardiology for further evaluation.  We will hold Taxotere most likely, I will see him tomorrow to go over his lab results.  I will also repeat his restaging scans with CT chest, abdomen and pelvis and will include CT of the head in light of his syncopal episode.      -7/13/2022 PSA 18.6    -7/14/2022 [facility] clinic follow-up: Mr. Morfin returns today to review his labs from yesterday.  Labs are unremarkable.  PSA down to 18.6 from a high of 259.0 in April prior to starting treatment.  CBC and CMP unremarkable, magnesium is normal at 2.3,  phosphorus slightly low at 2.4.  We will hold therapy for now in light of his syncopal episode on day 3 after his last treatment and prior episodes with chest pain and severe fatigue that all occurred on day 3 after his Taxotere.  We will restage him with CT head, chest, abdomen and pelvis (I am including the head in light of his syncopal episode).  I have also referred him to cardiology, he states that he received a call from them about making an appointment however he wanted to talk to us today first.  I emphasized the importance to him of making and keeping that appointment and he states understanding.  I also once again emphasized the requirement to seek emergency medical attention if he has any episodes in the future such as chest pain or syncopal events.  Whether or not we try and complete Taxotere with 2 more courses or move to the next line of therapy will be decided when he returns to discuss with Dr. Goodson and review his restaging scans.    -7/15/2022 saw Dr. Diaz cardiologist.  For syncope he ordered echocardiogram and 48-hour Holter monitor.    -7/15/2022 Holter monitor relatively benign.    -7/22/2022 CT brain with and without contrast negative.  CT chest abdomen and pelvis shows some nonspecific cecal wall thickening.  No progression in the chest.  T1 and ribs stable.  Decrease in multiple retroperitoneal and pelvic nodes.  Slight increase in right acetabular sclerotic lesion.  Persistent but improved circumferential bladder wall thickening.  Total body bone scan shows stable left third rib and no evidence of new bone metastases.    -7/19/2022 ejection fraction 65% with grade 1 diastolic dysfunction.    -7/26/2022 [facility] clinic follow-up: Given presyncopal symptoms occurred 3 days after Taxotere and has not otherwise occurred any other time and his cardiology work-up is fairly unremarkable and his disease is under good control as witnessed by the report above of the CTs of head chest  abdomen pelvis and bone scan, I will hold cycle 5 and 6 of Taxotere and continue 1 now with Zytiga and prednisone.  With reference to the coincidental cecal wall thickening found on CT, we will get him to [facility] surgeons for colonoscopy.  He will see my nurse practitioner back in August for next cycle of Abiraterone prednisone.  He will continue his Lupron with Dr. Matute.  We will continue his Xgeva.  We will repeat his CT chest abdomen pelvis and bone scan again in 3 months.  He will see my nurse practitioner in the interim.    -8/23/2022 [facility] clinic follow-up: No further presyncopal symptoms.  Feeling great other than for hot flashes.  Did commend for now we will continue on with his Zytiga prednisone and he will get his Xgeva today based on labs from 8/10/2022.  He opted out of getting the colonoscopy that I recommended and he will not change his mind.  He will continue getting the Lupron with Dr. Matute and I asked him to give the date of this appointment to my nurse when he sees her back in a month.  We will give his Xgeva today.  We will get CT chest abdomen pelvis and total body bone scan towards the middle to end of October.  Presently other than for the hot flashes feels great.    -9/20/2022 PSA 8.320    -9/22/2022 [facility] follow-up: Chris is doing well on Zytiga, prednisone along with Xgeva.  Labs reviewed from 9/20/2022 are unremarkable, CBC was normal, CMP with creatinine of 1.34 otherwise normal, this is stable from his baseline.  PSA stable at 8.320.  He received Lupron recently with Dr. Matute, he thinks last week or so, states his next appointment is in February.  He will continue treatment unchanged.  We will repeat restaging CT chest, abdomen and pelvis and total body bone scan in October prior to return and I have ordered those today.  We will also repeat his PSA.    -9/22/2022 CT chest abdomen pelvisCompared to July 2022 [facility] showed no evidence of  disease progression in the chest with no substantial sclerotic bony lesions and decrease in size of retroperitoneal and pelvic nodes with persistent cecal wall thickening and suboptimal bladder distention.  Total body bone scan showed decrease in previously seen uptake in the left third rib with diffuse sclerosis representing treatment response with no new foci.    -10/18/2022 [facility] medical oncology follow-up: I reviewed bone scan and CT reports as above with no evidence of progression.  Tolerating Zytiga prednisone and Xgeva.  Getting Lupron regularly with Dr. Matute next appointment coming in February.  We will repeat his CT chest abdomen pelvis and total body bone scan in April.  He will follow with my nurse practitioner in the interim.    -1/10/2023 PSA 3.450  -1/11/2023 [facility] Oncology clinic follow-up: Mr. Morfin continues to do well on current therapy with Zytiga, prednisone and Xgeva with no unusual side effects.  He has no new worrisome symptoms.  He is due for his next Lupron injection in February with Dr. Matute.  His PSA continues to trend downward, current PSA from yesterday was 3.450.  We will continue therapy unchanged, he will receive Xgeva today.  Has had no hypocalcemia, his CMP, phosphorus and magnesium are all normal.  We will repeat restaging scans in April.  -3/7/2023 PSA 2.460  -3/8/2023 [facility] Oncology clinic follow-up:  Mr. Morfin is doing well.  He is tolerating therapy with Zytiga, prednisone and Xgeva with no significant side effects.  He has hot flashes but these are tolerable.  He had Lupron injection 2/24/2023 with Dr. Matute.  His PSA continues to decline, current PSA 2.460.  He will continue therapy unchanged.  We will repeat restaging scans late April prior to return in 2 months and I have ordered those today.  He is working with his PCP Rodrigo Ram on his hypertension.  His b/p today was 160/88.  He was to have increased his losartan but I do not think he has done that yet  "as he said \"I still have some of my old prescription left\".  -4/4/2023 PSA 2.55  - 4/26/2023 CT chest abdomen pelvis [place] comparison 10/10/2022 showed stable left third rib, T1, left fourth and eighth rib, right seventh rib.  Probable liver cyst.  Few diverticuli.  Mild further decrease in a few of the previously enlarged nodes.  Left external iliac 12 mm compared to 16 mm.  Right common iliac 7 mm stable.  Lower left periaortic 8 mm previously 13 mm.  No new lytic or blastic osseous lesions.  Total body bone scan comparison 10/10/2022, 7/22/2022, and CT 4/26/2023.  No new sites of increased uptake.  1 focal left third rib hotspot consistent with bone metastasis.    -5/2/2023 [facility] oncology follow-up: I reviewed interval notes since I last saw him from my nurse practitioner as outlined above in interval images and reports thereof from [facility] that shows no new sites of bone metastasis and no kizzy or visceral progression.  PSA stabilized around 2.5.  In the absence of symptomatic or radiographic progression, I would be unlikely to change therapy just on the basis of a PSA rise and for now the PSA is stable.  We will continue Zytiga prednisone and Xgeva and he will follow-up with my nurse practitioner 5/30/2023 with repeat CTs and bone scan in 6 months.  I asked him to check with his primary care today regarding his systolic in the 170s.    -5/31/2023 [facility] clinic follow-up: Chris continues to do well on current therapy with Zytiga, prednisone and Xgeva along with Lupron that he receives with Dr. Matute.  His PSA remains stable, it was lower this month compared to last month, current PSA 1.940.  Continues to deal with hypertension, on arrival today his blood pressure was 210/92 however this was using a wrist cuff, when I rechecked it manually his blood pressure was 160/90.  Still has room for improvement despite recent increase in his losartan to 100 mg daily.  He will follow-up " with Dr. Ram for further management.  We will continue therapy unchanged.  We will repeat restaging scans in October.    -7/26/2023 [facility] clinic follow-up: Chris overall continues to do well on current therapy with Zytiga, prednisone and Xgeva.  He receives Lupron every 6 months with Dr. Matute and states that is scheduled for August.  PSA from yesterday is pending, PSA on 6/27/2023 was 1.590 which was continuing to decline, in February it was 3.250.  His creatinine this past month has bumped up.  He feels he is staying hydrated but I encouraged him to increase his fluid intake.  I discussed with him that this could be from his hypertension, some of his medications.  His blood pressure today is 165/92.  He has an appointment with his primary care provider Dr. Teri Ram next week and can further discuss with her.  No adjustment needed for Xgeva.  I refilled his prednisone today.  I will see him back in 1 month for follow-up with continued monitoring of his labs.  He may end up needing to see nephrology.  We will repeat restaging scans in October.    -9/29/2023 PSA 1.810  -8/23/2023 [facility] clinic follow-up: Chris continues on therapy with Zytiga, prednisone and Xgeva, he also receives Lupron every 6 months with Dr. Matute with most recent given on 8/7/2023.  He is scheduled for a cystoscopy in a few weeks as his last few UAs per the patient's report had microscopic hematuria, he has had no steve hematuria.  His creatinine continues to remain elevated at 1.82, BUN is 40, GFR is 40.  I will get him to nephrology for further evaluation.  Blood pressure today was fairly good at 161/86, he continues to follow with Dr. Ram for management.  PSA on 7/25/2023 was up slightly from prior month, PSA in July was 2.140, in June it was 1.590, PSA from yesterday is pending.  I plan on repeating restaging CT scans and bone scans in October however if his PSA is up significantly then I will do  sooner.  I will see him back for follow-up in 1 month.  -8/23/2024 PSA 1.860    -9/20/2023 [facility] clinic follow-up: Chris is tolerating treatment with Zytiga, prednisone and Xgeva.  He is up-to-date with his Lupron injection, last received in August with Dr. Matute.  He had cystoscopy on 9/8/2023 that was normal. I referred him to nephrology when I saw him last in August as his creatinine has been increasing, creatinine yesterday was a little better 1.51, GFR is 50, creatinine clearance 63.8.  He is still awaiting an appointment with nephrology, per our  it will be early November.  We will repeat his restaging scans prior to return and I will do his CT scans without contrast in light of his new renal insufficiency.  We will also get total body bone scan and I have ordered those today.  His PSA yesterday was 1.810.    -10/9/2023 CT chest abdomen Pelvis without contrast compared to April 2023 shows slightly prominent pelvic lymph nodes left external iliac 8 mm compared to prior study.  Right internal iliac heterogenous 19 mm compared to 11 mm prior.  Stable sclerotic bone lesions and no new lesions.  Stable bone scan with no new sites of disease    -10/17/2023 [facility] clinic follow-up: With elevated creatinine, CTs done without contrast showed very slight increased prominence by few millimeters of some internal iliac and external iliac nodes for which PSMA PET could be done but for now I will check his PSA and have him see my nurse practitioner back in a week and unless the PSA is significantly rising I would continue the Zytiga, prednisone, Xgeva and February dose of Lupron with Dr. Matute and make sure he follows with nephrology.  If his PSA is significantly rising then my nurse practitioner will order PSMA PET.    -10/17/2023 PSA 1.5  -11/14/2023 PSA 1.510  -11/15/2023 [facility] clinic follow-up: Mr. Ferrara continues to do well on current therapy with Zytiga, prednisone, Xgeva  and Lupron that he receives with Dr. Matute.  His PSA is stable at 1.5.  Would not change therapy for now, we will plan on repeating restaging scans in February unless PSA starts to rise or he has new concerns.  Overall he feels good.  He is now established with nephrology and will continue to follow with them regarding his renal insufficiency, I reviewed note from consultation with Dr. Armstrong 11/6/2023.    -1/9/2024 PSA 1.050    -1/10/2024 [facility] Oncology clinic follow-up: Chris overall continues to tolerate current therapy with Zytiga, prednisone, Xgeva and Lupron.  He states his next Lupron injection with Dr. Matute is due late February.  His PSA is stable at 1.050. Creatinine stable at 1.64, he is following with nephrology, he has a an appointment with Dr. Armstrong in February for follow-up.  Hypertension being managed by his primary care provider, target systolic ideally 120s-140s, today it is running a little high at 172/90, yesterday when he was here for labs it was 138/80.  We will continue therapy unchanged with plans to repeat restaging scans in April.    -2/6/2024 PSA 0.753     Prostate cancer metastatic to multiple sites   3/15/2022 Initial Diagnosis    Prostate cancer metastatic to multiple sites (HCC)     3/15/2022 Cancer Staged    Staging form: Prostate, AJCC 8th Edition  - Clinical: Stage IVB (cT1c, cN1, cM1b, Grade Group: 5) - Signed by Fritz Goodson MD on 3/15/2022     4/7/2022 - 6/23/2022 Chemotherapy    Stopped after cycle 4 6/23/2022 due to syncope 3 days post infusions with negative cardiac work-up  OP PROSTATE DOCEtaxel     4/7/2022 -  Chemotherapy    OP SUPPORTIVE Denosumab (Xgeva) Q28D     7/26/2022 -  Chemotherapy    OP PROSTATE Abiraterone / PredniSONE           HISTORY OF PRESENT ILLNESS:  The patient is a 68 y.o. male, here for follow up on management of metastatic prostate cancer currently on treatment with Zytiga, prednisone, xgeva and lupron.  Chris continues to do well on  "current therapy with no new concerns.  He continues to follow closely with Dr. Ram for management of his hypertension, he had blood work last week.  He states that he went by his nephrologist office however the doors were locked, she curt his labs for his nephrology appointment and he is going to try and call and see if they are still open in [place].  Otherwise he has been feeling well.  His appetite is good, weight is stable, no change in his bowel or bladder habits.  He has no pain.      Past Medical History:   Diagnosis Date    Cancer     Prostate cancer      Past Surgical History:   Procedure Laterality Date    PROSTATE BIOPSY  02/2022    dr. sue       No Known Allergies    Family History and Social History reviewed and changed as necessary    REVIEW OF SYSTEM:   No new somatic complaints    PHYSICAL EXAM:  Well-developed, well-nourished healthy-appearing male in no distress  Respirations regular nonlabored    Vitals:    02/07/24 0853   BP: 155/92   Pulse: 75   Resp: 18   Temp: 97.6 °F (36.4 °C)   SpO2: 96%   Weight: 122 kg (269 lb)   Height: 180.3 cm (71\")     Vitals:    02/07/24 0853   PainSc: 0-No pain          ECOG score: 0           Vitals reviewed.  Labs reviewed    ECOG: (0) Fully Active - Able to Carry On All Pre-disease Performance Without Restriction    Lab Results   Component Value Date    HGB 12.9 (L) 02/06/2024    HCT 39.1 02/06/2024    MCV 86.5 02/06/2024     02/06/2024    WBC 6.57 02/06/2024    NEUTROABS 4.06 02/06/2024    LYMPHSABS 1.70 02/06/2024    MONOSABS 0.54 02/06/2024    EOSABS 0.19 02/06/2024    BASOSABS 0.06 02/06/2024       Lab Results   Component Value Date    GLUCOSE 130 (H) 02/06/2024    BUN 24 (H) 02/06/2024    CREATININE 1.21 02/06/2024     02/06/2024    K 4.2 02/06/2024     02/06/2024    CO2 23.8 02/06/2024    CALCIUM 9.2 02/06/2024    ALBUMIN 4.3 02/06/2024    BILITOT 0.4 02/06/2024    ALKPHOS 86 02/06/2024    AST 17 02/06/2024    ALT 19 02/06/2024 "     Lab Results   Component Value Date    PSA 0.753 02/06/2024    PSA 1.050 01/09/2024    PSA 1.510 11/14/2023    PSA 1.5 10/17/2023             ASSESSMENT & PLAN:  1.  Prostate cancer clinical T1c and 2M1B stage IVb treated with 4 cycles of Taxotere, stopped due to syncope with negative cardiac work-up, with marked drop in PSA of 18.6 from over 900 at baseline, continued on Zytiga and prednisone.  2.  Urinary retention with Goodwin in place 1/24/2022.  On finasteride 1/24/2022.  3.  Covid 19 December 2021  4.  Hypertension  5.  Chronic kidney disease stage 3a    Oncology history timeline:  -11/29/2021  with symptoms of urinary retention.  -2/15/2022 prostate biopsy adenocarcinoma grade group 5 and 3 out of 12 cores, grade group 4 in 1 out of 12 cores, grade group 3 in 8 out of 12 cores.  -2/24/2022 CT chest abdomen pelvis and total body bone scan shows left third rib, right acetabulum, periaortic and retroperitoneal kizzy metastases complaining of pain in the left chest and right hip.  Also possible sclerotic left 10th rib on CT.  Spleen mildly enlarged 13.8 cm.  Hepatic steatosis.  Small liver cyst.  Fat stranding in the periaortic and mesenteric region consistent with reactive/inflammatory stranding.  Multiple enlarged periaortic and retroperitoneal nodes and lymphadenopathy largest 4.9 cm.  CT chest describes tiny sclerotic foci in left eighth rib and right lateral seventh rib and T1.  Multiple small mediastinal and bilateral axillary nodes seen with small hypodense left thyroid nodule.  Creatinine 1.7.  -3/4/2022 office note Dr. Rolando Matute: Patient was seen after his elevated PSA by Dr. Vera and prostate biopsy scheduled.  However, he developed COVID-19 and this was canceled and the patient did not reschedule due to lack of insurance.  Saw Dr. Matute back on 1/24/2022 with plans to get staging CTs and bone scan with results as outlined above.  Started Casodex and to return on 3/11/2022 for Lupron.   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  TURP planned for urinary retention symptoms.    -3/15/2022 [facility], [place] operated by [facility] medical oncology initial consultation: I reviewed the above data with the patient.  With his fairly bulky retroperitoneal adenopathy and bone metastases including extra axial metastases, he would fit the data from the CHAARTED trial for which Taxotere x6 followed by Zytiga prednisone along with the planned Lupron already being planned by Dr. aMtute would be reasonable.  Equally reasonable in terms of comparisons with bicalutamide and Lupron would be Zytiga prednisone plus Lupron or Xtandi plus Lupron or apalutamide plus Lupron.  None of these have been compared head-to-head but all have beaten combined androgen deprivation therapy with bicalutamide and Lupron.  At the age of 67 and in order to have as many bullets as possible down the road and hence saving some of the hormone blockade options for later and using chemotherapy when he is younger and healthier for a more time-limited.,  He has opted for the Taxotere x6 followed by Zytiga and prednisone.  We will also give him Xgeva to improve bone health and given metastatic disease, as with all metastatic prostate cancer patients, he needs genetic counseling both for his sake as well as his family's.  If he were to have BRCA1 or other such  mutations, then that also opens up the options for PARP inhibitors etc. down the road.  He has his TURP scheduled for 2/24/2022 and we will start treatment a couple of weeks after that and he will get chemo preparation visit in the meantime and I will get capital surgeons to place a port.  We will check his PSA after his TURP when he sees my nurse practitioner back early April for the start of chemotherapy and repeat the PSA and CT chest abdomen pelvis and bone scan after the Taxotere is complete.    -3/31/2022 chemotherapy education and needs assessment  completed    -4/7/2022 began docetaxel and Xgeva.  .0.  We will do his Xgeva every 6 weeks to coordinate with his docetaxel infusions.    -4/19/2022 patient stopped Casodex    -5/17/2022 patient reported seeing his PCP for an abscess in his suprapubic area.  Placed on clindamycin, will delay treatment 1 week.    -5/25/2022 PSA 34.3  -5/26/2022 [facility] Oncology clinic follow-up: Chris has an abscess in the suprapubic area, it has improved on antibiotic therapy but I am concerned if we treat him it will just flare back up unless it is drained.  I will get him to Dr. Miller who placed his port for I&D and culture.  He is on clindamycin and states he completes course of treatment tomorrow.  I will delay his treatment with Taxotere 1 more week.  We will give his Xgeva today.  I will see him back in 1 week for follow-up to assess whether or not we can resume his Taxotere.  His PSA has dropped nicely with treatment thus far, PSA yesterday was 34.3 which is down from a PSA of 259.0 when we started treatment, it has been as high as 911 in November.    -6/2/2022 [facility] Oncology clinic follow-up: Chris went to see Dr. Miller to have his abscess lanced however apparently there was a long wait and he left without being seen.  He did call his PCP and was given 5 more days of clindamycin and the abscess now seems to have resolved.  We discussed today that he is at risk for recurrence of infection due to immunocompromise state with chemotherapy.  He will notify us if he has any return of his abscess.  He does have intertrigo under his panniculus, I have advised him to keep this area dry, to apply topical drying/antifungal powders.  Also recommended looser clothing.  His counts are adequate to continue therapy, we will resume Taxotere today with cycle #3.  We will repeat restaging scans after 6 courses.  We are doing Xgeva every 6 weeks to coordinate with his Taxotere.  Once he finishes Taxotere we can then go back to every  4 weeks.  His next Xgeva is not due until 7/14/2022.  His calcium is running a little low, he is going to  calcium supplement.    -6/23/2022 [facility] clinic follow-up: Chris overall is doing well on treatment with Taxotere.  Labs reviewed from yesterday and are unremarkable.  We will continue treatment today unchanged.  His suprapubic abscess resolved on clindamycin.  He will continue to use drying powder/antifungal powder under his panniculus for intertrigo.  Today will be cycle #4 of a planned 6 courses of Taxotere.  He is also on Xgeva, next dose due 7/14/2022, we are doing this every 6 weeks for now to line up with his chemotherapy, can go back to every 4 weeks after he finishes Taxotere.  Calcium from CMP yesterday was normal.    -7/13/2022 [facility] clinic follow-up: Mr. Morfin has had issues around day 3 after each treatment ranging from chest pain after his first treatment for which he did not seek medical attention, fatigue after the next few treatments, but 3 days after his most recent treatment on 6/23/2022 he had a syncopal episode at home and once again did not seek medical attention.  I discussed with him that this was not acceptable, if he has occurrences like this someone needs to call 911, it is difficult to figure out what is going on after the fact, the best time to work this up would be during the occurrence.  For now we will get labs today with CBC, CMP, PSA.  I will refer him to cardiology for further evaluation.  We will hold Taxotere most likely, I will see him tomorrow to go over his lab results.  I will also repeat his restaging scans with CT chest, abdomen and pelvis and will include CT of the head in light of his syncopal episode.      -7/13/2022 PSA 18.6    -7/14/2022 [facility] clinic follow-up: Mr. Morfin returns today to review his labs from yesterday.  Labs are unremarkable.  PSA down to 18.6 from a high of 259.0 in April prior to starting treatment.  CBC  and CMP unremarkable, magnesium is normal at 2.3, phosphorus slightly low at 2.4.  We will hold therapy for now in light of his syncopal episode on day 3 after his last treatment and prior episodes with chest pain and severe fatigue that all occurred on day 3 after his Taxotere.  We will restage him with CT head, chest, abdomen and pelvis (I am including the head in light of his syncopal episode).  I have also referred him to cardiology, he states that he received a call from them about making an appointment however he wanted to talk to us today first.  I emphasized the importance to him of making and keeping that appointment and he states understanding.  I also once again emphasized the requirement to seek emergency medical attention if he has any episodes in the future such as chest pain or syncopal events.  Whether or not we try and complete Taxotere with 2 more courses or move to the next line of therapy will be decided when he returns to discuss with Dr. Goodson and review his restaging scans.    -7/15/2022 saw Dr. Diaz cardiologist.  For syncope he ordered echocardiogram and 48-hour Holter monitor.    -7/15/2022 Holter monitor relatively benign.    -7/22/2022 CT brain with and without contrast negative.  CT chest abdomen and pelvis shows some nonspecific cecal wall thickening.  No progression in the chest.  T1 and ribs stable.  Decrease in multiple retroperitoneal and pelvic nodes.  Slight increase in right acetabular sclerotic lesion.  Persistent but improved circumferential bladder wall thickening.  Total body bone scan shows stable left third rib and no evidence of new bone metastases.    -7/19/2022 ejection fraction 65% with grade 1 diastolic dysfunction.    -7/26/2022 [facility] oncology clinic follow-up: Given presyncopal symptoms occurred 3 days after Taxotere and has not otherwise occurred any other time and his cardiology work-up is fairly unremarkable and his disease is under good control as witnessed  by the report above of the CTs of head chest abdomen pelvis and bone scan, I will hold cycle 5 and 6 of Taxotere and continue 1 now with Zytiga and prednisone.  With reference to the coincidental cecal wall thickening found on CT, we will get him to [facility] surgeons for colonoscopy.  He will see my nurse practitioner back in August for next cycle of Abiraterone prednisone.  He will continue his Lupron with Dr. Matute.  We will continue his Xgeva.  We will repeat his CT chest abdomen pelvis and bone scan again in 3 months.  He will see my nurse practitioner in the interim.    -8/23/2022 [facility] oncology clinic follow-up: No further presyncopal symptoms.  Feeling great other than for hot flashes.  Did commend for now we will continue on with his Zytiga prednisone and he will get his Xgeva today based on labs from 8/10/2022.  He opted out of getting the colonoscopy that I recommended and he will not change his mind.  He will continue getting the Lupron with Dr. Matute and I asked him to give the date of this appointment to my nurse when he sees her back in a month.  We will give his Xgeva today.  We will get CT chest abdomen pelvis and total body bone scan towards the middle to end of October.  Presently other than for the hot flashes feels great.    -9/20/2022 PSA 8.320    -9/22/2022 [facility] Oncology clinic follow-up: Chris is doing well on Zytiga, prednisone along with Xgeva.  Labs reviewed from 9/20/2022 are unremarkable, CBC was normal, CMP with creatinine of 1.34 otherwise normal, this is stable from his baseline.  PSA stable at 8.320.  He received Lupron recently with Dr. Matute, he thinks last week or so, states his next appointment is in February.  He will continue treatment unchanged.  We will repeat restaging CT chest, abdomen and pelvis and total body bone scan in October prior to return and I have ordered those today.  We will also repeat his PSA.    -9/22/2022 CT chest abdomen pelvisCompared to July  2022 [facility] showed no evidence of disease progression in the chest with no substantial sclerotic bony lesions and decrease in size of retroperitoneal and pelvic nodes with persistent cecal wall thickening and suboptimal bladder distention.  Total body bone scan showed decrease in previously seen uptake in the left third rib with diffuse sclerosis representing treatment response with no new foci.    -10/18/2022 [facility] medical oncology follow-up: I reviewed bone scan and CT reports as above with no evidence of progression.  Tolerating Zytiga prednisone and Xgeva.  Getting Lupron regularly with Dr. Matute next appointment coming in February.  We will repeat his CT chest abdomen pelvis and total body bone scan in April.  He will follow with my nurse practitioner in the interim.    -1/10/2023 PSA 3.450  -1/11/2023 [facility] Oncology clinic follow-up: Mr. Morfin continues to do well on current therapy with Zytiga, prednisone and Xgeva with no unusual side effects.  He has no new worrisome symptoms.  He is due for his next Lupron injection in February with Dr. Matute.  His PSA continues to trend downward, current PSA from yesterday was 3.450.  We will continue therapy unchanged, he will receive Xgeva today.  Has had no hypocalcemia, his CMP, phosphorus and magnesium are all normal.  We will repeat restaging scans in April.  -3/7/2023 PSA 2.460  -3/8/2023 [facility] Oncology clinic follow-up:  Mr. Morfin is doing well.  He is tolerating therapy with Zytiga, prednisone and Xgeva with no significant side effects.  He has hot flashes but these are tolerable.  He had Lupron injection 2/24/2023 with Dr. Matute.  His PSA continues to decline, current PSA 2.460.  He will continue therapy unchanged.  We will repeat restaging scans late April prior to return in 2 months and I have ordered those today.  He is working with his PCP Rodrigo Ram on his hypertension.  His b/p today was 160/88.  He was to have increased his  "losartan but I do not think he has done that yet as he said \"I still have some of my old prescription left\".  -4/4/2023 PSA 2.55  - 4/26/2023 CT chest abdomen pelvis [facility] comparison 10/10/2022 showed stable left third rib, T1, left fourth and eighth rib, right seventh rib.  Probable liver cyst.  Few diverticuli.  Mild further decrease in a few of the previously enlarged nodes.  Left external iliac 12 mm compared to 16 mm.  Right common iliac 7 mm stable.  Lower left periaortic 8 mm previously 13 mm.  No new lytic or blastic osseous lesions.  Total body bone scan comparison 10/10/2022, 7/22/2022, and CT 4/26/2023.  No new sites of increased uptake.  1 focal left third rib hotspot consistent with bone metastasis.    -5/2/2023 [facility] medical oncology follow-up: I reviewed interval notes since I last saw him from my nurse practitioner as outlined above in interval images and reports thereof from [facility] that shows no new sites of bone metastasis and no kizzy or visceral progression.  PSA stabilized around 2.5.  In the absence of symptomatic or radiographic progression, I would be unlikely to change therapy just on the basis of a PSA rise and for now the PSA is stable.  We will continue Zytiga prednisone and Xgeva and he will follow-up with my nurse practitioner 5/30/2023 with repeat CTs and bone scan in 6 months.  I asked him to check with his primary care today regarding his systolic in the 170s.    -5/31/2023 [facility] clinic follow-up: Chris continues to do well on current therapy with Zytiga, prednisone and Xgeva along with Lupron that he receives with Dr. Matute.  His PSA remains stable, it was lower this month compared to last month, current PSA 1.940.  Continues to deal with hypertension, on arrival today his blood pressure was 210/92 however this was using a wrist cuff, when I rechecked it manually his blood pressure was 160/90.  Still has room for improvement despite recent increase in " his losartan to 100 mg daily.  He will follow-up with Dr. Ram for further management.  We will continue therapy unchanged.  We will repeat restaging scans in October.    -7/26/2023 [facility] clinic follow-up: Chris overall continues to do well on current therapy with Zytiga, prednisone and Xgeva.  He receives Lupron every 6 months with Dr. Matute and states that is scheduled for August.  PSA from yesterday is pending, PSA on 6/27/2023 was 1.590 which was continuing to decline, in February it was 3.250.  His creatinine this past month has bumped up.  He feels he is staying hydrated but I encouraged him to increase his fluid intake.  I discussed with him that this could be from his hypertension, some of his medications.  His blood pressure today is 165/92.  He has an appointment with his primary care provider Dr. Teri Ram next week and can further discuss with her.  No adjustment needed for Xgeva.  I refilled his prednisone today.  I will see him back in 1 month for follow-up with continued monitoring of his labs.  He may end up needing to see nephrology.  We will repeat restaging scans in October.    -9/29/2023 PSA 1.810  -8/23/2023 [facility] clinic follow-up: Chris continues on therapy with Zytiga, prednisone and Xgeva, he also receives Lupron every 6 months with Dr. Matute with most recent given on 8/7/2023.  He is scheduled for a cystoscopy in a few weeks as his last few UAs per the patient's report had microscopic hematuria, he has had no steve hematuria.  His creatinine continues to remain elevated at 1.82, BUN is 40, GFR is 40.  I will get him to nephrology for further evaluation.  Blood pressure today was fairly good at 161/86, he continues to follow with Dr. Ram for management.  PSA on 7/25/2023 was up slightly from prior month, PSA in July was 2.140, in June it was 1.590, PSA from yesterday is pending.  I plan on repeating restaging CT scans and bone scans in October however  if his PSA is up significantly then I will do sooner.  I will see him back for follow-up in 1 month.  -8/23/2024 PSA 1.860    -9/20/2023 [facility] Oncology clinic follow-up: Chris is tolerating treatment with Zytiga, prednisone and Xgeva.  He is up-to-date with his Lupron injection, last received in August with Dr. Matute.  He had cystoscopy on 9/8/2023 that was normal. I referred him to nephrology when I saw him last in August as his creatinine has been increasing, creatinine yesterday was a little better 1.51, GFR is 50, creatinine clearance 63.8.  He is still awaiting an appointment with nephrology, per our  it will be early November.  We will repeat his restaging scans prior to return and I will do his CT scans without contrast in light of his new renal insufficiency.  We will also get total body bone scan and I have ordered those today.  His PSA yesterday was 1.810.    -10/9/2023 CT chest abdomen Pelvis without contrast compared to April 2023 shows slightly prominent pelvic lymph nodes left external iliac 8 mm compared to prior study.  Right internal iliac heterogenous 19 mm compared to 11 mm prior.  Stable sclerotic bone lesions and no new lesions.  Stable bone scan with no new sites of disease    -10/17/2023 [facility] oncology clinic follow-up: With elevated creatinine, CTs done without contrast showed very slight increased prominence by few millimeters of some internal iliac and external iliac nodes for which PSMA PET could be done but for now I will check his PSA and have him see my nurse practitioner back in a week and unless the PSA is significantly rising I would continue the Zytiga, prednisone, Xgeva and February dose of Lupron with Dr. Matute and make sure he follows with nephrology.  If his PSA is significantly rising then my nurse practitioner will order PSMA PET.    -11/15/2023 [facility] Oncology clinic follow-up: Mr. Ferrara continues to do well on current therapy with Zytiga, prednisone,  Xgeva and Lupron that he receives with Dr. Matute.  His PSA is stable at 1.5.  Would not change therapy for now, we will plan on repeating restaging scans in February unless PSA starts to rise or he has new concerns.  Overall he feels good.  He is now established with nephrology and will continue to follow with them regarding his renal insufficiency, I reviewed note from consultation with Dr. Armstrong 11/6/2023.    -12/13/2023 [facility] Oncology clinic follow-up: Chris continues to do well on current therapy with Zytiga, prednisone, Xgeva and Lupron.  He thinks his next Lupron with Dr. Matute is around February.  His CBC and CMP remain unremarkable.  He will continue treatment unchanged.  We will repeat his PSA next month.  Will plan on restaging scans in April in the absence of new symptoms and as long as his PSA remains stable.  Continues to deal with hypertension, blood pressure today 175/94, encouraged him to follow-up with PCP.    -1/10/2024 [facility] Oncology clinic follow-up: Chris overall continues to tolerate current therapy with Zytiga, prednisone, Xgeva and Lupron.  He states his next Lupron injection with Dr. Matute is due late February.  His PSA is stable at 1.050. Creatinine stable at 1.64, he is following with nephrology, he has a an appointment with Dr. Armstrong in February for follow-up.  Hypertension being managed by his primary care provider, target systolic ideally 120s-140s, today it is running a little high at 172/90, yesterday when he was here for labs it was 138/80.  We will continue therapy unchanged with plans to repeat restaging scans in April.    -2/6/2024 PSA 0.753    -2/7/2024 [facility] oncology clinic follow-up: Chris continues to do well on current therapy with Zytiga, prednisone, Xgeva and Lupron.  He is up-to-date on Lupron injection next due later this month with Dr. Matute.  His PSA continues to slowly decrease, current PSA is 0.753.  He has no new worrisome symptoms.  We will  continue therapy unchanged, I have ordered restaging scans for late March prior to his return in April. He is following with nephrology for his renal insufficiency, his creatinine yesterday was good at 1.21.  I will do his CT scans with contrast unless something changes in the interim. He follows closely with Dr. Ram for management of his hypertension. I reviewed notes from his last office visit with her earlier this month on 2/1/2024, also reviewed labs in detail with the patient today.  All questions were answered to his satisfaction.    Return to clinic in 2 months for follow-up    I spent 31 minutes caring for Chris on this date of service. This time includes time spent by me in the following activities: preparing for the visit, reviewing tests, obtaining and/or reviewing a separately obtained history, performing a medically appropriate examination and/or evaluation, ordering medications, tests, or procedures, documenting information in the medical record, and independently interpreting results and communicating that information with the patient/family/caregiver.     Susana Torres, APRN    02/07/2024

## 2024-02-08 ENCOUNTER — TELEPHONE (OUTPATIENT)
Dept: FAMILY MEDICINE CLINIC | Facility: CLINIC | Age: 69
End: 2024-02-08
Payer: MEDICARE

## 2024-02-08 NOTE — TELEPHONE ENCOUNTER
Attempted to call patient twice. Busy signal each time. Letter will be sent.     ----- Message from Teri Ram DO sent at 2/2/2024  4:37 PM EST -----  Please lt patient know that his labs are stable, and he can come by and  a copy to take to nephrology. Thanks.

## 2024-02-15 ENCOUNTER — SPECIALTY PHARMACY (OUTPATIENT)
Dept: ONCOLOGY | Facility: HOSPITAL | Age: 69
End: 2024-02-15
Payer: MEDICARE

## 2024-03-05 ENCOUNTER — LAB (OUTPATIENT)
Dept: ONCOLOGY | Facility: HOSPITAL | Age: 69
End: 2024-03-05
Payer: MEDICARE

## 2024-03-05 VITALS
TEMPERATURE: 98 F | WEIGHT: 264 LBS | HEART RATE: 68 BPM | BODY MASS INDEX: 36.82 KG/M2 | RESPIRATION RATE: 18 BRPM | DIASTOLIC BLOOD PRESSURE: 88 MMHG | SYSTOLIC BLOOD PRESSURE: 159 MMHG

## 2024-03-05 DIAGNOSIS — C61 PROSTATE CANCER METASTATIC TO MULTIPLE SITES: ICD-10-CM

## 2024-03-05 PROCEDURE — 36415 COLL VENOUS BLD VENIPUNCTURE: CPT

## 2024-03-06 ENCOUNTER — INFUSION (OUTPATIENT)
Dept: ONCOLOGY | Facility: HOSPITAL | Age: 69
End: 2024-03-06
Payer: MEDICARE

## 2024-03-06 VITALS
DIASTOLIC BLOOD PRESSURE: 87 MMHG | HEART RATE: 76 BPM | SYSTOLIC BLOOD PRESSURE: 147 MMHG | TEMPERATURE: 98.5 F | RESPIRATION RATE: 17 BRPM

## 2024-03-06 DIAGNOSIS — C61 PROSTATE CANCER METASTATIC TO MULTIPLE SITES: Primary | ICD-10-CM

## 2024-03-06 LAB
ALBUMIN SERPL-MCNC: 4.1 G/DL (ref 3.5–5.2)
ALBUMIN/GLOB SERPL: 1.7 G/DL
ALP SERPL-CCNC: 90 U/L (ref 39–117)
ALT SERPL-CCNC: 20 U/L (ref 1–41)
AST SERPL-CCNC: 18 U/L (ref 1–40)
BASOPHILS # BLD AUTO: 0.07 10*3/MM3 (ref 0–0.2)
BASOPHILS NFR BLD AUTO: 1.2 % (ref 0–1.5)
BILIRUB SERPL-MCNC: 0.4 MG/DL (ref 0–1.2)
BUN SERPL-MCNC: 23 MG/DL (ref 8–23)
BUN/CREAT SERPL: 13.6 (ref 7–25)
CALCIUM SERPL-MCNC: 8.3 MG/DL (ref 8.6–10.5)
CHLORIDE SERPL-SCNC: 106 MMOL/L (ref 98–107)
CO2 SERPL-SCNC: 22.9 MMOL/L (ref 22–29)
CREAT SERPL-MCNC: 1.69 MG/DL (ref 0.76–1.27)
EGFRCR SERPLBLD CKD-EPI 2021: 43.4 ML/MIN/1.73
EOSINOPHIL # BLD AUTO: 0.21 10*3/MM3 (ref 0–0.4)
EOSINOPHIL NFR BLD AUTO: 3.6 % (ref 0.3–6.2)
ERYTHROCYTE [DISTWIDTH] IN BLOOD BY AUTOMATED COUNT: 12.9 % (ref 12.3–15.4)
GLOBULIN SER CALC-MCNC: 2.4 GM/DL
GLUCOSE SERPL-MCNC: 138 MG/DL (ref 65–99)
HCT VFR BLD AUTO: 39.5 % (ref 37.5–51)
HGB BLD-MCNC: 12.5 G/DL (ref 13–17.7)
IMM GRANULOCYTES # BLD AUTO: 0.01 10*3/MM3 (ref 0–0.05)
IMM GRANULOCYTES NFR BLD AUTO: 0.2 % (ref 0–0.5)
LYMPHOCYTES # BLD AUTO: 1.86 10*3/MM3 (ref 0.7–3.1)
LYMPHOCYTES NFR BLD AUTO: 32 % (ref 19.6–45.3)
MAGNESIUM SERPL-MCNC: 2.2 MG/DL (ref 1.6–2.4)
MCH RBC QN AUTO: 27 PG (ref 26.6–33)
MCHC RBC AUTO-ENTMCNC: 31.6 G/DL (ref 31.5–35.7)
MCV RBC AUTO: 85.3 FL (ref 79–97)
MONOCYTES # BLD AUTO: 0.39 10*3/MM3 (ref 0.1–0.9)
MONOCYTES NFR BLD AUTO: 6.7 % (ref 5–12)
NEUTROPHILS # BLD AUTO: 3.28 10*3/MM3 (ref 1.7–7)
NEUTROPHILS NFR BLD AUTO: 56.3 % (ref 42.7–76)
NRBC BLD AUTO-RTO: 0 /100 WBC (ref 0–0.2)
PHOSPHATE SERPL-MCNC: 2.1 MG/DL (ref 2.5–4.5)
PLATELET # BLD AUTO: 241 10*3/MM3 (ref 140–450)
POTASSIUM SERPL-SCNC: 3.6 MMOL/L (ref 3.5–5.2)
PROT SERPL-MCNC: 6.5 G/DL (ref 6–8.5)
PSA SERPL-MCNC: 0.73 NG/ML (ref 0–4)
RBC # BLD AUTO: 4.63 10*6/MM3 (ref 4.14–5.8)
SODIUM SERPL-SCNC: 142 MMOL/L (ref 136–145)
WBC # BLD AUTO: 5.82 10*3/MM3 (ref 3.4–10.8)

## 2024-03-06 PROCEDURE — 96372 THER/PROPH/DIAG INJ SC/IM: CPT

## 2024-03-06 PROCEDURE — 25010000002 DENOSUMAB 120 MG/1.7ML SOLUTION: Performed by: NURSE PRACTITIONER

## 2024-03-06 RX ADMIN — DENOSUMAB 120 MG: 120 INJECTION SUBCUTANEOUS at 09:45

## 2024-03-11 ENCOUNTER — SPECIALTY PHARMACY (OUTPATIENT)
Dept: ONCOLOGY | Facility: HOSPITAL | Age: 69
End: 2024-03-11
Payer: MEDICARE

## 2024-03-11 NOTE — PROGRESS NOTES
03/11/24  Problem: non-adherence  Communication: patient: Patient states he missed about 2 doses per month.  He just opened a new bottle and was due to a refill today according to our records.    Recommendation: patient: Discussed adherence tools with patient including personalized calendars, apps on the phone, alarms.  Reviewed the importance of adherence with patient.    Follow-up: F/U in one month regarding adherence concerns.   Karen Pardo, CeasarD

## 2024-03-11 NOTE — PROGRESS NOTES
Specialty Pharmacy Refill Coordination Note     Chris is a 69 y.o. male contacted today regarding refills of  abiraterone 1000mg PO QD of  specialty medication(s).    Patient states that he just opened the last shipment that he received. According to his refill history he should be overdue for a refill. I have escalated to the pharmacist.     Specialty medication(s) and dose(s) confirmed: yes    Refill Questions      Flowsheet Row Most Recent Value   Changes to allergies? No   Changes to medications? No   New conditions or infections since last clinic visit No   Unplanned office visit, urgent care, ED, or hospital admission in the last 4 weeks  No   How does patient/caregiver feel medication is working? Good   Financial problems or insurance changes  No   Since the previous refill, were any specialty medication doses or scheduled injections missed or delayed?  Yes   If yes, please provide the amount several according to his refill history   Why were doses missed? patient states he takes medication like he should. But he is definitely overdue for a refill   Does this patient require a clinical escalation to a pharmacist? Yes                       Follow-up: 30 day(s)     Catalina Montes De Oca, Pharmacy Technician  Specialty Pharmacy Technician

## 2024-03-28 ENCOUNTER — SPECIALTY PHARMACY (OUTPATIENT)
Dept: ONCOLOGY | Facility: HOSPITAL | Age: 69
End: 2024-03-28
Payer: MEDICARE

## 2024-03-28 NOTE — PROGRESS NOTES
Specialty Pharmacy Refill Coordination Note     Chris is a 69 y.o. male contacted today regarding refills of  abiraterone 1000mg PO QD of  specialty medication(s).      Specialty medication(s) and dose(s) confirmed: yes    Refill Questions      Flowsheet Row Most Recent Value   Changes to allergies? No   Changes to medications? No   New conditions or infections since last clinic visit No   Unplanned office visit, urgent care, ED, or hospital admission in the last 4 weeks  No   How does patient/caregiver feel medication is working? Good   Financial problems or insurance changes  No   Since the previous refill, were any specialty medication doses or scheduled injections missed or delayed?  No   Does this patient require a clinical escalation to a pharmacist? No            Delivery Questions      Flowsheet Row Most Recent Value   Delivery method [employer]   Delivery address verified with patient/caregiver? Yes   Delivery address Home   Number of medications in delivery 1   Medication(s) being filled and delivered Abiraterone Acetate   Doses left of specialty medications 7 days   Copay verified? Yes   Copay amount $0   Copay form of payment No copayment ($0)                   Follow-up: 30 day(s)     Catalina Montes De Oca, Pharmacy Technician  Specialty Pharmacy Technician

## 2024-04-02 ENCOUNTER — LAB (OUTPATIENT)
Dept: ONCOLOGY | Facility: HOSPITAL | Age: 69
End: 2024-04-02
Payer: MEDICARE

## 2024-04-02 VITALS
DIASTOLIC BLOOD PRESSURE: 86 MMHG | WEIGHT: 255.2 LBS | HEART RATE: 72 BPM | RESPIRATION RATE: 18 BRPM | BODY MASS INDEX: 35.59 KG/M2 | TEMPERATURE: 98.1 F | SYSTOLIC BLOOD PRESSURE: 149 MMHG

## 2024-04-02 DIAGNOSIS — C61 PROSTATE CANCER METASTATIC TO MULTIPLE SITES: Primary | ICD-10-CM

## 2024-04-02 DIAGNOSIS — C61 PROSTATE CANCER METASTATIC TO MULTIPLE SITES: ICD-10-CM

## 2024-04-02 DIAGNOSIS — C79.9 SECONDARY MALIGNANT NEOPLASM OF UNSPECIFIED SITE (CODE): ICD-10-CM

## 2024-04-02 PROCEDURE — 36415 COLL VENOUS BLD VENIPUNCTURE: CPT

## 2024-04-03 ENCOUNTER — INFUSION (OUTPATIENT)
Dept: ONCOLOGY | Facility: HOSPITAL | Age: 69
End: 2024-04-03
Payer: MEDICARE

## 2024-04-03 VITALS
SYSTOLIC BLOOD PRESSURE: 153 MMHG | HEART RATE: 67 BPM | DIASTOLIC BLOOD PRESSURE: 90 MMHG | TEMPERATURE: 97.6 F | RESPIRATION RATE: 18 BRPM

## 2024-04-03 DIAGNOSIS — C61 PROSTATE CANCER METASTATIC TO MULTIPLE SITES: Primary | ICD-10-CM

## 2024-04-03 DIAGNOSIS — C61 PROSTATE CANCER METASTATIC TO MULTIPLE SITES: Primary | Chronic | ICD-10-CM

## 2024-04-03 LAB
ALBUMIN SERPL-MCNC: 4.3 G/DL (ref 3.5–5.2)
ALBUMIN/GLOB SERPL: 1.6 G/DL
ALP SERPL-CCNC: 80 U/L (ref 39–117)
ALT SERPL-CCNC: 17 U/L (ref 1–41)
AST SERPL-CCNC: 21 U/L (ref 1–40)
BASOPHILS # BLD AUTO: 0.05 10*3/MM3 (ref 0–0.2)
BASOPHILS NFR BLD AUTO: 1 % (ref 0–1.5)
BILIRUB SERPL-MCNC: 0.5 MG/DL (ref 0–1.2)
BUN SERPL-MCNC: 23 MG/DL (ref 8–23)
BUN/CREAT SERPL: 12.5 (ref 7–25)
CALCIUM SERPL-MCNC: 9.9 MG/DL (ref 8.6–10.5)
CHLORIDE SERPL-SCNC: 107 MMOL/L (ref 98–107)
CO2 SERPL-SCNC: 20.7 MMOL/L (ref 22–29)
CREAT SERPL-MCNC: 1.84 MG/DL (ref 0.76–1.27)
EGFRCR SERPLBLD CKD-EPI 2021: 39.2 ML/MIN/1.73
EOSINOPHIL # BLD AUTO: 0.17 10*3/MM3 (ref 0–0.4)
EOSINOPHIL NFR BLD AUTO: 3.3 % (ref 0.3–6.2)
ERYTHROCYTE [DISTWIDTH] IN BLOOD BY AUTOMATED COUNT: 13.3 % (ref 12.3–15.4)
GLOBULIN SER CALC-MCNC: 2.7 GM/DL
GLUCOSE SERPL-MCNC: 134 MG/DL (ref 65–99)
HCT VFR BLD AUTO: 41.2 % (ref 37.5–51)
HGB BLD-MCNC: 13.3 G/DL (ref 13–17.7)
IMM GRANULOCYTES # BLD AUTO: 0.01 10*3/MM3 (ref 0–0.05)
IMM GRANULOCYTES NFR BLD AUTO: 0.2 % (ref 0–0.5)
LYMPHOCYTES # BLD AUTO: 1.73 10*3/MM3 (ref 0.7–3.1)
LYMPHOCYTES NFR BLD AUTO: 33.8 % (ref 19.6–45.3)
MAGNESIUM SERPL-MCNC: 2.1 MG/DL (ref 1.6–2.4)
MCH RBC QN AUTO: 27.9 PG (ref 26.6–33)
MCHC RBC AUTO-ENTMCNC: 32.3 G/DL (ref 31.5–35.7)
MCV RBC AUTO: 86.6 FL (ref 79–97)
MONOCYTES # BLD AUTO: 0.53 10*3/MM3 (ref 0.1–0.9)
MONOCYTES NFR BLD AUTO: 10.4 % (ref 5–12)
NEUTROPHILS # BLD AUTO: 2.63 10*3/MM3 (ref 1.7–7)
NEUTROPHILS NFR BLD AUTO: 51.3 % (ref 42.7–76)
NRBC BLD AUTO-RTO: 0 /100 WBC (ref 0–0.2)
PHOSPHATE SERPL-MCNC: 2.6 MG/DL (ref 2.5–4.5)
PLATELET # BLD AUTO: 239 10*3/MM3 (ref 140–450)
POTASSIUM SERPL-SCNC: 4.3 MMOL/L (ref 3.5–5.2)
PROT SERPL-MCNC: 7 G/DL (ref 6–8.5)
PSA SERPL-MCNC: 0.7 NG/ML (ref 0–4)
RBC # BLD AUTO: 4.76 10*6/MM3 (ref 4.14–5.8)
SODIUM SERPL-SCNC: 140 MMOL/L (ref 136–145)
WBC # BLD AUTO: 5.12 10*3/MM3 (ref 3.4–10.8)

## 2024-04-03 PROCEDURE — 96372 THER/PROPH/DIAG INJ SC/IM: CPT

## 2024-04-03 PROCEDURE — 25010000002 DENOSUMAB 120 MG/1.7ML SOLUTION: Performed by: INTERNAL MEDICINE

## 2024-04-03 RX ADMIN — DENOSUMAB 120 MG: 120 INJECTION SUBCUTANEOUS at 09:15

## 2024-04-22 ENCOUNTER — SPECIALTY PHARMACY (OUTPATIENT)
Dept: ONCOLOGY | Facility: HOSPITAL | Age: 69
End: 2024-04-22
Payer: MEDICARE

## 2024-04-22 NOTE — PROGRESS NOTES
Specialty Pharmacy Refill Coordination Note     Chris is a 69 y.o. male contacted today regarding refills of  abiraterone 1000mg PO QD of specialty medication(s).    Reviewed and verified with patient: Yes     Specialty medication(s) and dose(s) confirmed: yes    Refill Questions      Flowsheet Row Most Recent Value   Changes to allergies? No   Changes to medications? No   New conditions or infections since last clinic visit No   Unplanned office visit, urgent care, ED, or hospital admission in the last 4 weeks  No   How does patient/caregiver feel medication is working? Good   Financial problems or insurance changes  No   Since the previous refill, were any specialty medication doses or scheduled injections missed or delayed?  No   Does this patient require a clinical escalation to a pharmacist? No            Delivery Questions      Flowsheet Row Most Recent Value   Delivery method [employer]   Delivery address verified with patient/caregiver? Yes   Delivery address Home   Number of medications in delivery 1   Medication(s) being filled and delivered Abiraterone Acetate   Doses left of specialty medications 6 days left   Copay verified? Yes   Copay amount $0   Copay form of payment No copayment ($0)                   Follow-up: 30 day(s)     Catalina Montes De Oca, Pharmacy Technician  Specialty Pharmacy Technician

## 2024-04-30 ENCOUNTER — SPECIALTY PHARMACY (OUTPATIENT)
Dept: ONCOLOGY | Facility: HOSPITAL | Age: 69
End: 2024-04-30
Payer: MEDICARE

## 2024-04-30 ENCOUNTER — LAB (OUTPATIENT)
Dept: ONCOLOGY | Facility: HOSPITAL | Age: 69
End: 2024-04-30
Payer: MEDICARE

## 2024-04-30 VITALS
TEMPERATURE: 98.5 F | SYSTOLIC BLOOD PRESSURE: 141 MMHG | WEIGHT: 255.6 LBS | DIASTOLIC BLOOD PRESSURE: 83 MMHG | HEART RATE: 69 BPM | BODY MASS INDEX: 35.65 KG/M2 | RESPIRATION RATE: 18 BRPM

## 2024-04-30 DIAGNOSIS — C61 PROSTATE CANCER METASTATIC TO MULTIPLE SITES: ICD-10-CM

## 2024-04-30 PROCEDURE — 36415 COLL VENOUS BLD VENIPUNCTURE: CPT

## 2024-05-01 ENCOUNTER — OFFICE VISIT (OUTPATIENT)
Dept: ONCOLOGY | Facility: CLINIC | Age: 69
End: 2024-05-01
Payer: MEDICARE

## 2024-05-01 ENCOUNTER — SPECIALTY PHARMACY (OUTPATIENT)
Dept: ONCOLOGY | Facility: HOSPITAL | Age: 69
End: 2024-05-01
Payer: MEDICARE

## 2024-05-01 ENCOUNTER — INFUSION (OUTPATIENT)
Dept: ONCOLOGY | Facility: HOSPITAL | Age: 69
End: 2024-05-01
Payer: MEDICARE

## 2024-05-01 ENCOUNTER — OFFICE VISIT (OUTPATIENT)
Dept: FAMILY MEDICINE CLINIC | Facility: CLINIC | Age: 69
End: 2024-05-01
Payer: MEDICARE

## 2024-05-01 VITALS
RESPIRATION RATE: 18 BRPM | DIASTOLIC BLOOD PRESSURE: 82 MMHG | WEIGHT: 256 LBS | HEART RATE: 67 BPM | HEIGHT: 71 IN | TEMPERATURE: 98.2 F | SYSTOLIC BLOOD PRESSURE: 148 MMHG | OXYGEN SATURATION: 97 % | BODY MASS INDEX: 35.84 KG/M2

## 2024-05-01 VITALS
SYSTOLIC BLOOD PRESSURE: 148 MMHG | OXYGEN SATURATION: 97 % | HEIGHT: 71 IN | HEART RATE: 67 BPM | DIASTOLIC BLOOD PRESSURE: 82 MMHG | BODY MASS INDEX: 35.84 KG/M2 | WEIGHT: 256 LBS

## 2024-05-01 DIAGNOSIS — I10 ESSENTIAL HYPERTENSION: Primary | ICD-10-CM

## 2024-05-01 DIAGNOSIS — C61 PROSTATE CANCER METASTATIC TO MULTIPLE SITES: Primary | ICD-10-CM

## 2024-05-01 LAB
ALBUMIN SERPL-MCNC: 4.2 G/DL (ref 3.5–5.2)
ALBUMIN/GLOB SERPL: 1.8 G/DL
ALP SERPL-CCNC: 78 U/L (ref 39–117)
ALT SERPL-CCNC: 22 U/L (ref 1–41)
AST SERPL-CCNC: 17 U/L (ref 1–40)
BASOPHILS # BLD AUTO: 0.06 10*3/MM3 (ref 0–0.2)
BASOPHILS NFR BLD AUTO: 0.8 % (ref 0–1.5)
BILIRUB SERPL-MCNC: 0.5 MG/DL (ref 0–1.2)
BUN SERPL-MCNC: 25 MG/DL (ref 8–23)
BUN/CREAT SERPL: 16.7 (ref 7–25)
CALCIUM SERPL-MCNC: 9.6 MG/DL (ref 8.6–10.5)
CHLORIDE SERPL-SCNC: 108 MMOL/L (ref 98–107)
CO2 SERPL-SCNC: 23.9 MMOL/L (ref 22–29)
CREAT SERPL-MCNC: 1.5 MG/DL (ref 0.76–1.27)
EGFRCR SERPLBLD CKD-EPI 2021: 50.1 ML/MIN/1.73
EOSINOPHIL # BLD AUTO: 0.23 10*3/MM3 (ref 0–0.4)
EOSINOPHIL NFR BLD AUTO: 3.2 % (ref 0.3–6.2)
ERYTHROCYTE [DISTWIDTH] IN BLOOD BY AUTOMATED COUNT: 13.8 % (ref 12.3–15.4)
GLOBULIN SER CALC-MCNC: 2.3 GM/DL
GLUCOSE SERPL-MCNC: 98 MG/DL (ref 65–99)
HCT VFR BLD AUTO: 40.8 % (ref 37.5–51)
HGB BLD-MCNC: 13.2 G/DL (ref 13–17.7)
IMM GRANULOCYTES # BLD AUTO: 0.02 10*3/MM3 (ref 0–0.05)
IMM GRANULOCYTES NFR BLD AUTO: 0.3 % (ref 0–0.5)
LYMPHOCYTES # BLD AUTO: 2.08 10*3/MM3 (ref 0.7–3.1)
LYMPHOCYTES NFR BLD AUTO: 28.8 % (ref 19.6–45.3)
MAGNESIUM SERPL-MCNC: 2.2 MG/DL (ref 1.6–2.4)
MCH RBC QN AUTO: 28.3 PG (ref 26.6–33)
MCHC RBC AUTO-ENTMCNC: 32.4 G/DL (ref 31.5–35.7)
MCV RBC AUTO: 87.6 FL (ref 79–97)
MONOCYTES # BLD AUTO: 0.61 10*3/MM3 (ref 0.1–0.9)
MONOCYTES NFR BLD AUTO: 8.4 % (ref 5–12)
NEUTROPHILS # BLD AUTO: 4.23 10*3/MM3 (ref 1.7–7)
NEUTROPHILS NFR BLD AUTO: 58.5 % (ref 42.7–76)
NRBC BLD AUTO-RTO: 0 /100 WBC (ref 0–0.2)
PHOSPHATE SERPL-MCNC: 3.2 MG/DL (ref 2.5–4.5)
PLATELET # BLD AUTO: 213 10*3/MM3 (ref 140–450)
POTASSIUM SERPL-SCNC: 3.8 MMOL/L (ref 3.5–5.2)
PROT SERPL-MCNC: 6.5 G/DL (ref 6–8.5)
PSA SERPL-MCNC: 0.71 NG/ML (ref 0–4)
RBC # BLD AUTO: 4.66 10*6/MM3 (ref 4.14–5.8)
SODIUM SERPL-SCNC: 142 MMOL/L (ref 136–145)
WBC # BLD AUTO: 7.23 10*3/MM3 (ref 3.4–10.8)

## 2024-05-01 PROCEDURE — 99214 OFFICE O/P EST MOD 30 MIN: CPT | Performed by: NURSE PRACTITIONER

## 2024-05-01 PROCEDURE — 1160F RVW MEDS BY RX/DR IN RCRD: CPT | Performed by: NURSE PRACTITIONER

## 2024-05-01 PROCEDURE — 96372 THER/PROPH/DIAG INJ SC/IM: CPT

## 2024-05-01 PROCEDURE — 25010000002 DENOSUMAB 120 MG/1.7ML SOLUTION: Performed by: NURSE PRACTITIONER

## 2024-05-01 PROCEDURE — 99213 OFFICE O/P EST LOW 20 MIN: CPT | Performed by: STUDENT IN AN ORGANIZED HEALTH CARE EDUCATION/TRAINING PROGRAM

## 2024-05-01 PROCEDURE — 1126F AMNT PAIN NOTED NONE PRSNT: CPT | Performed by: NURSE PRACTITIONER

## 2024-05-01 PROCEDURE — 1159F MED LIST DOCD IN RCRD: CPT | Performed by: NURSE PRACTITIONER

## 2024-05-01 RX ORDER — AMLODIPINE BESYLATE 5 MG/1
7.5 TABLET ORAL DAILY
Qty: 135 TABLET | Refills: 1 | Status: SHIPPED | OUTPATIENT
Start: 2024-05-01 | End: 2024-10-28

## 2024-05-01 RX ORDER — LOSARTAN POTASSIUM 100 MG/1
100 TABLET ORAL DAILY
Qty: 90 TABLET | Refills: 1 | Status: SHIPPED | OUTPATIENT
Start: 2024-05-01

## 2024-05-01 RX ADMIN — DENOSUMAB 120 MG: 120 INJECTION SUBCUTANEOUS at 11:41

## 2024-05-01 NOTE — PROGRESS NOTES
"Chief Complaint  Hypertension    Subjective          Chris Morfin presents to [facility]    Patient presents the office today for follow-up on high blood pressure.  He states that he is doing well with his current medications.  He denies any side effects from his medications and has not had any low blood pressures states that the adjustment medication.  He continues to follow with hematology/oncology and is doing well with his current treatment plan.      Objective   Vital Signs:   /82   Pulse 67   Ht 180.3 cm (71\")   Wt 116 kg (256 lb)   SpO2 97%   BMI 35.70 kg/m²     Body mass index is 35.7 kg/m².    Review of Systems    Past History:  Medical History: has a past medical history of Cancer and Prostate cancer.   Surgical History: has a past surgical history that includes Prostate biopsy (02/2022).   Family History: family history includes Cancer in his brother and sister.   Social History: reports that he has never smoked. He has never used smokeless tobacco. He reports that he does not currently use alcohol. He reports current drug use. Drug: Marijuana.      Current Outpatient Medications:     amLODIPine (NORVASC) 5 MG tablet, Take 1.5 tablets by mouth Daily for 180 days., Disp: 135 tablet, Rfl: 1    losartan (COZAAR) 100 MG tablet, Take 1 tablet by mouth Daily., Disp: 90 tablet, Rfl: 1    abiraterone acetate (ZYTIGA) 500 MG tablet, Take 2 tablets by mouth Daily. on an empty stomach., Disp: 60 tablet, Rfl: 11    denosumab (Xgeva) 120 MG/1.7ML solution injection, 1.7 ml Subcutaneous ONCE A MONTH, Disp: , Rfl:     leuprolide (Lupron Depot, 6-Month,) 45 MG kit injection, as directed Intramuscular Q 6 MONTHS, Disp: , Rfl:     ondansetron (ZOFRAN) 8 MG tablet, Take 1 tablet by mouth 3 (Three) Times a Day As Needed for Nausea or Vomiting., Disp: 30 tablet, Rfl: 5    predniSONE (DELTASONE) 5 MG tablet, Take 1 tablet by mouth 2 (Two) Times a Day., Disp: 60 tablet, Rfl: " 11  No current facility-administered medications for this visit.    Allergies: Patient has no known allergies.    Physical Exam  Constitutional:       General: He is not in acute distress.     Appearance: He is not ill-appearing or toxic-appearing.   HENT:      Head: Normocephalic and atraumatic.   Cardiovascular:      Rate and Rhythm: Normal rate and regular rhythm.      Heart sounds: No murmur heard.  Pulmonary:      Effort: Pulmonary effort is normal. No respiratory distress.   Neurological:      General: No focal deficit present.      Mental Status: He is alert and oriented to person, place, and time.   Psychiatric:         Mood and Affect: Mood normal.         Thought Content: Thought content normal.          Result Review :                   Assessment and Plan    Diagnoses and all orders for this visit:    1. Essential hypertension (Primary)    Other orders  -     amLODIPine (NORVASC) 5 MG tablet; Take 1.5 tablets by mouth Daily for 180 days.  Dispense: 135 tablet; Refill: 1  -     losartan (COZAAR) 100 MG tablet; Take 1 tablet by mouth Daily.  Dispense: 90 tablet; Refill: 1    Continue current medications at this time. Call with any new or worsening symptoms. Follow up in 3 months for recheck.     Follow Up   No follow-ups on file.  Patient was given instructions and counseling regarding his condition or for health maintenance advice. Please see specific information pulled into the AVS if appropriate.     Teri Ram, DO

## 2024-05-01 NOTE — PROGRESS NOTES
CHIEF COMPLAINT: Prostate cancer    Problem List:  Oncology/Hematology History Overview Note   1.  Prostate cancer clinical T1c and 2M1B stage IVb treated with 4 cycles of Taxotere, stopped due to syncope with negative cardiac work-up, with marked drop in PSA of 18.6 from over 900 at baseline, continued on Zytiga and prednisone.  2.  Urinary retention with Goodwin in place 1/24/2022.  On finasteride 1/24/2022.  3.  Covid 19 December 2021  4.  Hypertension  5.  Stage 3a chronic kidney disease    Oncology history timeline:  -11/29/2021  with symptoms of urinary retention.  -2/15/2022 prostate biopsy adenocarcinoma grade group 5 and 3 out of 12 cores, grade group 4 in 1 out of 12 cores, grade group 3 in 8 out of 12 cores.  -2/24/2022 CT chest abdomen pelvis and total body bone scan shows left third rib, right acetabulum, periaortic and retroperitoneal kizzy metastases complaining of pain in the left chest and right hip.  Also possible sclerotic left 10th rib on CT.  Spleen mildly enlarged 13.8 cm.  Hepatic steatosis.  Small liver cyst.  Fat stranding in the periaortic and mesenteric region consistent with reactive/inflammatory stranding.  Multiple enlarged periaortic and retroperitoneal nodes and lymphadenopathy largest 4.9 cm.  CT chest describes tiny sclerotic foci in left eighth rib and right lateral seventh rib and T1.  Multiple small mediastinal and bilateral axillary nodes seen with small hypodense left thyroid nodule.  Creatinine 1.7.  -3/4/2022 office note Dr. Rolando Matute: Patient was seen after his elevated PSA by Dr. Vera and prostate biopsy scheduled.  However, he developed COVID-19 and this was canceled and the patient did not reschedule due to lack of insurance.  Saw Dr. Matute back on 1/24/2022 with plans to get staging CTs and bone scan with results as outlined above.  Started Casodex and to return on 3/11/2022 for Lupron.  Referral made to medical oncology for other additional castrate  naïve prostate cancer therapies.  TURP planned for urinary retention symptoms.    -3/15/2022 [facility] medical oncology initial consultation: I reviewed the above data with the patient.  With his fairly bulky retroperitoneal adenopathy and bone metastases including extra axial metastases, he would fit the data from the CHAARTED trial for which Taxotere x6 followed by Zytiga prednisone along with the planned Lupron already being planned by Dr. Matute would be reasonable.  Equally reasonable in terms of comparisons with bicalutamide and Lupron would be Zytiga prednisone plus Lupron or Xtandi plus Lupron or apalutamide plus Lupron.  None of these have been compared head-to-head but all have beaten combined androgen deprivation therapy with bicalutamide and Lupron.  At the age of 67 and in order to have as many bullets as possible down the road and hence saving some of the hormone blockade options for later and using chemotherapy when he is younger and healthier for a more time-limited.,  He has opted for the Taxotere x6 followed by Zytiga and prednisone.  We will also give him Xgeva to improve bone health and given metastatic disease, as with all metastatic prostate cancer patients, he needs genetic counseling both for his sake as well as his family's.  If he were to have BRCA1 or other such  mutations, then that also opens up the options for PARP inhibitors etc. down the road.  He has his TURP scheduled for 2/24/2022 and we will start treatment a couple of weeks after that and he will get chemo preparation visit in the meantime and I will get capital surgeons to place a port.  We will check his PSA after his TURP when he sees my nurse practitioner back early April for the start of chemotherapy and repeat the PSA and CT chest abdomen pelvis and bone scan after the Taxotere is complete.    -3/31/2022 chemotherapy education and needs assessment completed    -4/7/2022 began docetaxel and Xgeva.  .0.  We will  do his Xgeva every 6 weeks to coordinate with his docetaxel infusions.    -4/19/2022 patient stopped Casodex    -5/17/2022 patient reported seeing his PCP for an abscess in his suprapubic area.  Placed on clindamycin, will delay treatment 1 week.    -5/25/2022 PSA 34.3  -5/26/2022 [facility] follow-up: Chris has an abscess in the suprapubic area, it has improved on antibiotic therapy but I am concerned if we treat him it will just flare back up unless it is drained.  I will get him to Dr. Miller who placed his port for I&D and culture.  He is on clindamycin and states he completes course of treatment tomorrow.  I will delay his treatment with Taxotere 1 more week.  We will give his Xgeva today.  I will see him back in 1 week for follow-up to assess whether or not we can resume his Taxotere.  His PSA has dropped nicely with treatment thus far, PSA yesterday was 34.3 which is down from a PSA of 259.0 when we started treatment, it has been as high as 911 in November.    -6/2/2022 [facility] follow-up: Chris went to see Dr. Miller to have his abscess lanced however apparently there was a long wait and he left without being seen.  He did call his PCP and was given 5 more days of clindamycin and the abscess now seems to have resolved.  We discussed today that he is at risk for recurrence of infection due to immunocompromise state with chemotherapy.  He will notify us if he has any return of his abscess.  He does have intertrigo under his panniculus, I have advised him to keep this area dry, to apply topical drying/antifungal powders.  Also recommended looser clothing.  His counts are adequate to continue therapy, we will resume Taxotere today with cycle #3.  We will repeat restaging scans after 6 courses.  We are doing Xgeva every 6 weeks to coordinate with his Taxotere.  Once he finishes Taxotere we can then go back to every 4 weeks.  His next Xgeva is not due until 7/14/2022.  His calcium is  running a little low, he is going to  calcium supplement.    -6/23/2022 [facility] follow-up: Chris overall is doing well on treatment with Taxotere.  Labs reviewed from yesterday and are unremarkable.  We will continue treatment today unchanged.  His suprapubic abscess resolved on clindamycin.  He will continue to use drying powder/antifungal powder under his panniculus for intertrigo.  Today will be cycle #4 of a planned 6 courses of Taxotere.  He is also on Xgeva, next dose due 7/14/2022, we are doing this every 6 weeks for now to line up with his chemotherapy, can go back to every 4 weeks after he finishes Taxotere.  Calcium from CMP yesterday was normal.    -7/13/2022 [facility] follow-up: Mr. Mrofin has had issues around day 3 after each treatment ranging from chest pain after his first treatment for which he did not seek medical attention, fatigue after the next few treatments, but 3 days after his most recent treatment on 6/23/2022 he had a syncopal episode at home and once again did not seek medical attention.  I discussed with him that this was not acceptable, if he has occurrences like this someone needs to call 911, it is difficult to figure out what is going on after the fact, the best time to work this up would be during the occurrence.  For now we will get labs today with CBC, CMP, PSA.  I will refer him to cardiology for further evaluation.  We will hold Taxotere most likely, I will see him tomorrow to go over his lab results.  I will also repeat his restaging scans with CT chest, abdomen and pelvis and will include CT of the head in light of his syncopal episode.      -7/13/2022 PSA 18.6    -7/14/2022 [facility] follow-up: Mr. Morfin returns today to review his labs from yesterday.  Labs are unremarkable.  PSA down to 18.6 from a high of 259.0 in April prior to starting treatment.  CBC and CMP unremarkable, magnesium is normal at 2.3, phosphorus slightly  low at 2.4.  We will hold therapy for now in light of his syncopal episode on day 3 after his last treatment and prior episodes with chest pain and severe fatigue that all occurred on day 3 after his Taxotere.  We will restage him with CT head, chest, abdomen and pelvis (I am including the head in light of his syncopal episode).  I have also referred him to cardiology, he states that he received a call from them about making an appointment however he wanted to talk to us today first.  I emphasized the importance to him of making and keeping that appointment and he states understanding.  I also once again emphasized the requirement to seek emergency medical attention if he has any episodes in the future such as chest pain or syncopal events.  Whether or not we try and complete Taxotere with 2 more courses or move to the next line of therapy will be decided when he returns to discuss with Dr. Goodson and review his restaging scans.    -7/15/2022 saw Dr. Diaz cardiologist.  For syncope he ordered echocardiogram and 48-hour Holter monitor.    -7/15/2022 Holter monitor relatively benign.    -7/22/2022 CT brain with and without contrast negative.  CT chest abdomen and pelvis shows some nonspecific cecal wall thickening.  No progression in the chest.  T1 and ribs stable.  Decrease in multiple retroperitoneal and pelvic nodes.  Slight increase in right acetabular sclerotic lesion.  Persistent but improved circumferential bladder wall thickening.  Total body bone scan shows stable left third rib and no evidence of new bone metastases.    -7/19/2022 ejection fraction 65% with grade 1 diastolic dysfunction.    -7/26/2022 [facility] clinic follow-up: Given presyncopal symptoms occurred 3 days after Taxotere and has not otherwise occurred any other time and his cardiology work-up is fairly unremarkable and his disease is under good control as witnessed by the report above of the CTs of head chest abdomen pelvis and bone  scan, I will hold cycle 5 and 6 of Taxotere and continue 1 now with Zytiga and prednisone.  With reference to the coincidental cecal wall thickening found on CT, we will get him to [facility] surgeons for colonoscopy.  He will see my nurse practitioner back in August for next cycle of Abiraterone prednisone.  He will continue his Lupron with Dr. Matute.  We will continue his Xgeva.  We will repeat his CT chest abdomen pelvis and bone scan again in 3 months.  He will see my nurse practitioner in the interim.    -8/23/2022 [facility] oncology clinic follow-up: No further presyncopal symptoms.  Feeling great other than for hot flashes.  Did commend for now we will continue on with his Zytiga prednisone and he will get his Xgeva today based on labs from 8/10/2022.  He opted out of getting the colonoscopy that I recommended and he will not change his mind.  He will continue getting the Lupron with Dr. Matute and I asked him to give the date of this appointment to my nurse when he sees her back in a month.  We will give his Xgeva today.  We will get CT chest abdomen pelvis and total body bone scan towards the middle to end of October.  Presently other than for the hot flashes feels great.    -9/20/2022 PSA 8.320    -9/22/2022 [facility] Oncology clinic follow-up: Chris is doing well on Zytiga, prednisone along with Xgeva.  Labs reviewed from 9/20/2022 are unremarkable, CBC was normal, CMP with creatinine of 1.34 otherwise normal, this is stable from his baseline.  PSA stable at 8.320.  He received Lupron recently with Dr. Matute, he thinks last week or so, states his next appointment is in February.  He will continue treatment unchanged.  We will repeat restaging CT chest, abdomen and pelvis and total body bone scan in October prior to return and I have ordered those today.  We will also repeat his PSA.    -9/22/2022 CT chest abdomen pelvisCompared to July 2022 [facility] showed no evidence of disease progression in  "the chest with no substantial sclerotic bony lesions and decrease in size of retroperitoneal and pelvic nodes with persistent cecal wall thickening and suboptimal bladder distention.  Total body bone scan showed decrease in previously seen uptake in the left third rib with diffuse sclerosis representing treatment response with no new foci.    -10/18/2022 [facility] medical oncology follow-up: I reviewed bone scan and CT reports as above with no evidence of progression.  Tolerating Zytiga prednisone and Xgeva.  Getting Lupron regularly with Dr. Matute next appointment coming in February.  We will repeat his CT chest abdomen pelvis and total body bone scan in April.  He will follow with my nurse practitioner in the interim.    -1/10/2023 PSA 3.450  -1/11/2023 [facility] Oncology clinic follow-up: Mr. Morfin continues to do well on current therapy with Zytiga, prednisone and Xgeva with no unusual side effects.  He has no new worrisome symptoms.  He is due for his next Lupron injection in February with Dr. Matute.  His PSA continues to trend downward, current PSA from yesterday was 3.450.  We will continue therapy unchanged, he will receive Xgeva today.  Has had no hypocalcemia, his CMP, phosphorus and magnesium are all normal.  We will repeat restaging scans in April.  -3/7/2023 PSA 2.460  -3/8/2023 [facility] Oncology clinic follow-up:  Mr. Morfin is doing well.  He is tolerating therapy with Zytiga, prednisone and Xgeva with no significant side effects.  He has hot flashes but these are tolerable.  He had Lupron injection 2/24/2023 with Dr. Matute.  His PSA continues to decline, current PSA 2.460.  He will continue therapy unchanged.  We will repeat restaging scans late April prior to return in 2 months and I have ordered those today.  He is working with his PCP Rodrigo Ram on his hypertension.  His b/p today was 160/88.  He was to have increased his losartan but I do not think he has done that yet as he said \"I still " "have some of my old prescription left\".  -4/4/2023 PSA 2.55  - 4/26/2023 CT chest abdomen pelvis [place] comparison 10/10/2022 showed stable left third rib, T1, left fourth and eighth rib, right seventh rib.  Probable liver cyst.  Few diverticuli.  Mild further decrease in a few of the previously enlarged nodes.  Left external iliac 12 mm compared to 16 mm.  Right common iliac 7 mm stable.  Lower left periaortic 8 mm previously 13 mm.  No new lytic or blastic osseous lesions.  Total body bone scan comparison 10/10/2022, 7/22/2022, and CT 4/26/2023.  No new sites of increased uptake.  1 focal left third rib hotspot consistent with bone metastasis.    -5/2/2023 [facility] follow-up: I reviewed interval notes since I last saw him from my nurse practitioner as outlined above in interval images and reports thereof from [facility] that shows no new sites of bone metastasis and no kizzy or visceral progression.  PSA stabilized around 2.5.  In the absence of symptomatic or radiographic progression, I would be unlikely to change therapy just on the basis of a PSA rise and for now the PSA is stable.  We will continue Zytiga prednisone and Xgeva and he will follow-up with my nurse practitioner 5/30/2023 with repeat CTs and bone scan in 6 months.  I asked him to check with his primary care today regarding his systolic in the 170s.    -5/31/2023 [facility] follow-up: Chris continues to do well on current therapy with Zytiga, prednisone and Xgeva along with Lupron that he receives with Dr. Matute.  His PSA remains stable, it was lower this month compared to last month, current PSA 1.940.  Continues to deal with hypertension, on arrival today his blood pressure was 210/92 however this was using a wrist cuff, when I rechecked it manually his blood pressure was 160/90.  Still has room for improvement despite recent increase in his losartan to 100 mg daily.  He will follow-up with Dr. Ram for " further management.  We will continue therapy unchanged.  We will repeat restaging scans in October.    -7/26/2023 [facility] Oncology clinic follow-up: Chris overall continues to do well on current therapy with Zytiga, prednisone and Xgeva.  He receives Lupron every 6 months with Dr. Matute and states that is scheduled for August.  PSA from yesterday is pending, PSA on 6/27/2023 was 1.590 which was continuing to decline, in February it was 3.250.  His creatinine this past month has bumped up.  He feels he is staying hydrated but I encouraged him to increase his fluid intake.  I discussed with him that this could be from his hypertension, some of his medications.  His blood pressure today is 165/92.  He has an appointment with his primary care provider Dr. Teri Ram next week and can further discuss with her.  No adjustment needed for Xgeva.  I refilled his prednisone today.  I will see him back in 1 month for follow-up with continued monitoring of his labs.  He may end up needing to see nephrology.  We will repeat restaging scans in October.    -9/29/2023 PSA 1.810  -8/23/2023 [facility] Oncology clinic follow-up: Chris continues on therapy with Zytiga, prednisone and Xgeva, he also receives Lupron every 6 months with Dr. Matute with most recent given on 8/7/2023.  He is scheduled for a cystoscopy in a few weeks as his last few UAs per the patient's report had microscopic hematuria, he has had no steve hematuria.  His creatinine continues to remain elevated at 1.82, BUN is 40, GFR is 40.  I will get him to nephrology for further evaluation.  Blood pressure today was fairly good at 161/86, he continues to follow with Dr. Ram for management.  PSA on 7/25/2023 was up slightly from prior month, PSA in July was 2.140, in June it was 1.590, PSA from yesterday is pending.  I plan on repeating restaging CT scans and bone scans in October however if his PSA is up significantly then I will do sooner.  I will see him  back for follow-up in 1 month.  -8/23/2024 PSA 1.860    -9/20/2023 [facility] follow-up: Chris is tolerating treatment with Zytiga, prednisone and Xgeva.  He is up-to-date with his Lupron injection, last received in August with Dr. Matute.  He had cystoscopy on 9/8/2023 that was normal. I referred him to nephrology when I saw him last in August as his creatinine has been increasing, creatinine yesterday was a little better 1.51, GFR is 50, creatinine clearance 63.8.  He is still awaiting an appointment with nephrology, per our  it will be early November.  We will repeat his restaging scans prior to return and I will do his CT scans without contrast in light of his new renal insufficiency.  We will also get total body bone scan and I have ordered those today.  His PSA yesterday was 1.810.    -10/9/2023 CT chest abdomen Pelvis without contrast compared to April 2023 shows slightly prominent pelvic lymph nodes left external iliac 8 mm compared to prior study.  Right internal iliac heterogenous 19 mm compared to 11 mm prior.  Stable sclerotic bone lesions and no new lesions.  Stable bone scan with no new sites of disease    -10/17/2023 [facility] follow-up: With elevated creatinine, CTs done without contrast showed very slight increased prominence by few millimeters of some internal iliac and external iliac nodes for which PSMA PET could be done but for now I will check his PSA and have him see my nurse practitioner back in a week and unless the PSA is significantly rising I would continue the Zytiga, prednisone, Xgeva and February dose of Lupron with Dr. Matute and make sure he follows with nephrology.  If his PSA is significantly rising then my nurse practitioner will order PSMA PET.    -10/17/2023 PSA 1.5  -11/14/2023 PSA 1.510  -11/15/2023 [facility] follow-up: Mr. Ferrara continues to do well on current therapy with Zytiga, prednisone, Xgeva and Lupron that he  receives with Dr. Matute.  His PSA is stable at 1.5.  Would not change therapy for now, we will plan on repeating restaging scans in February unless PSA starts to rise or he has new concerns.  Overall he feels good.  He is now established with nephrology and will continue to follow with them regarding his renal insufficiency, I reviewed note from consultation with Dr. Armstrong 11/6/2023.    -1/9/2024 PSA 1.050    -1/10/2024 [facility] Oncology clinic follow-up: Chris overall continues to tolerate current therapy with Zytiga, prednisone, Xgeva and Lupron.  He states his next Lupron injection with Dr. Matute is due late February.  His PSA is stable at 1.050. Creatinine stable at 1.64, he is following with nephrology, he has a an appointment with Dr. Armstrong in February for follow-up.  Hypertension being managed by his primary care provider, target systolic ideally 120s-140s, today it is running a little high at 172/90, yesterday when he was here for labs it was 138/80.  We will continue therapy unchanged with plans to repeat restaging scans in April.    -2/6/2024 PSA 0.753    -2/7/2024 [facility] oncology clinic follow-up: Chris continues to do well on current therapy with Zytiga, prednisone, Xgeva and Lupron.  He is up-to-date on Lupron injection next due later this month with Dr. Matute.  His PSA continues to slowly decrease, current PSA is 0.753.  He has no new worrisome symptoms.  We will continue therapy unchanged, I have ordered restaging scans for late March prior to his return in April. He is following with nephrology for his renal insufficiency, his creatinine yesterday was good at 1.21.  I will do his CT scans with contrast unless something changes in the interim. He follows closely with Dr. Ram for management of his hypertension. I reviewed notes from his last office visit with her earlier this month on 2/1/2024, also reviewed labs in detail with the patient today.  All questions were answered to his  satisfaction.    -3/5/2024 PSA slowly declining 0.73.  Creatinine fluctuating.  1.69 with GFR 43 otherwise unremarkable CMP.  Phosphorus low 2.1    -4/8/2024 CT chest, abdomen and pelvis shows stable sclerotic lesions in the thoracic spine and bilateral ribs and right acetabulum.  Stable appearance of pelvic lymph nodes.  No new sites concerning for progression of disease.  Total body bone scan shows stable diffuse increased uptake of the left third rib correlates with diffuse sclerosis and enlargement on CT, no new sites of active osseous lesions.  -4/30/2024 PSA 0.713       Prostate cancer metastatic to multiple sites   3/15/2022 Initial Diagnosis    Prostate cancer metastatic to multiple sites (HCC)     3/15/2022 Cancer Staged    Staging form: Prostate, AJCC 8th Edition  - Clinical: Stage IVB (cT1c, cN1, cM1b, Grade Group: 5) - Signed by Fritz Goodson MD on 3/15/2022     4/7/2022 - 6/23/2022 Chemotherapy    Stopped after cycle 4 6/23/2022 due to syncope 3 days post infusions with negative cardiac work-up  OP PROSTATE DOCEtaxel     4/7/2022 -  Chemotherapy    OP SUPPORTIVE Denosumab (Xgeva) Q28D     7/26/2022 -  Chemotherapy    OP PROSTATE Abiraterone / PredniSONE           HISTORY OF PRESENT ILLNESS:  The patient is a 69 y.o. male, here for follow up on management of metastatic prostate cancer currently on treatment with Zytiga, prednisone, xgeva and lupron.  Chris missed his appointment earlier this month due to a stomach virus.  He reports that otherwise he has been feeling well.  He had an appointment earlier today with Dr. Ram for management of his hypertension and healthcare maintenance.  He is eating well.  He has no new pain.  No change in his bowel or bladder habits.  Overall he states that he feels well.  He states he has a follow-up with his kidney doctor again in August.  He last received Lupron 2/5/2024.        Past Medical History:   Diagnosis Date    Cancer     Prostate cancer      Past Surgical  "History:   Procedure Laterality Date    PROSTATE BIOPSY  02/2022    dr. sue       No Known Allergies    Family History and Social History reviewed and changed as necessary    REVIEW OF SYSTEM:   No new somatic complaints    PHYSICAL EXAM:  Well-developed, well-nourished healthy-appearing male in no distress  Respirations regular and unlabored  Skin without rash    Vitals:    05/01/24 1056   BP: 148/82   Pulse: 67   Resp: 18   Temp: 98.2 °F (36.8 °C)   SpO2: 97%   Weight: 116 kg (256 lb)   Height: 180.3 cm (71\")     Vitals:    05/01/24 1056   PainSc: 0-No pain          ECOG score: 0           Vitals reviewed.  Labs reviewed    ECOG: (0) Fully Active - Able to Carry On All Pre-disease Performance Without Restriction    Lab Results   Component Value Date    HGB 13.2 04/30/2024    HCT 40.8 04/30/2024    MCV 87.6 04/30/2024     04/30/2024    WBC 7.23 04/30/2024    NEUTROABS 4.23 04/30/2024    LYMPHSABS 2.08 04/30/2024    MONOSABS 0.61 04/30/2024    EOSABS 0.23 04/30/2024    BASOSABS 0.06 04/30/2024       Lab Results   Component Value Date    GLUCOSE 98 04/30/2024    BUN 25 (H) 04/30/2024    CREATININE 1.50 (H) 04/30/2024     04/30/2024    K 3.8 04/30/2024     (H) 04/30/2024    CO2 23.9 04/30/2024    CALCIUM 9.6 04/30/2024    ALBUMIN 4.2 04/30/2024    BILITOT 0.5 04/30/2024    ALKPHOS 78 04/30/2024    AST 17 04/30/2024    ALT 22 04/30/2024     Lab Results   Component Value Date    PSA 0.713 04/30/2024    PSA 0.705 04/02/2024    PSA 0.730 03/05/2024    PSA 0.753 02/06/2024             ASSESSMENT & PLAN:  1.  Prostate cancer clinical T1c and 2M1B stage IVb treated with 4 cycles of Taxotere, stopped due to syncope with negative cardiac work-up, with marked drop in PSA of 18.6 from over 900 at baseline, continued on Zytiga and prednisone.  2.  Urinary retention with Goodwin in place 1/24/2022.  On finasteride 1/24/2022.  3.  Covid 19 December 2021  4.  Hypertension  5.  Chronic kidney disease stage " 3a    Oncology history timeline:  -11/29/2021  with symptoms of urinary retention.  -2/15/2022 prostate biopsy adenocarcinoma grade group 5 and 3 out of 12 cores, grade group 4 in 1 out of 12 cores, grade group 3 in 8 out of 12 cores.  -2/24/2022 CT chest abdomen pelvis and total body bone scan shows left third rib, right acetabulum, periaortic and retroperitoneal kizzy metastases complaining of pain in the left chest and right hip.  Also possible sclerotic left 10th rib on CT.  Spleen mildly enlarged 13.8 cm.  Hepatic steatosis.  Small liver cyst.  Fat stranding in the periaortic and mesenteric region consistent with reactive/inflammatory stranding.  Multiple enlarged periaortic and retroperitoneal nodes and lymphadenopathy largest 4.9 cm.  CT chest describes tiny sclerotic foci in left eighth rib and right lateral seventh rib and T1.  Multiple small mediastinal and bilateral axillary nodes seen with small hypodense left thyroid nodule.  Creatinine 1.7.  -3/4/2022 office note Dr. Rolando Matute: Patient was seen after his elevated PSA by Dr. Vera and prostate biopsy scheduled.  However, he developed COVID-19 and this was canceled and the patient did not reschedule due to lack of insurance.  Saw Dr. Matute back on 1/24/2022 with plans to get staging CTs and bone scan with results as outlined above.  Started Casodex and to return on 3/11/2022 for Lupron.  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  TURP planned for urinary retention symptoms.    -3/15/2022 [facility] initial consultation: I reviewed the above data with the patient.  With his fairly bulky retroperitoneal adenopathy and bone metastases including extra axial metastases, he would fit the data from the CHAARTED trial for which Taxotere x6 followed by Zytiga prednisone along with the planned Lupron already being planned by Dr. Matute would be reasonable.  Equally reasonable  in terms of comparisons with bicalutamide and Lupron would be Zytiga prednisone plus Lupron or Xtandi plus Lupron or apalutamide plus Lupron.  None of these have been compared head-to-head but all have beaten combined androgen deprivation therapy with bicalutamide and Lupron.  At the age of 67 and in order to have as many bullets as possible down the road and hence saving some of the hormone blockade options for later and using chemotherapy when he is younger and healthier for a more time-limited.,  He has opted for the Taxotere x6 followed by Zytiga and prednisone.  We will also give him Xgeva to improve bone health and given metastatic disease, as with all metastatic prostate cancer patients, he needs genetic counseling both for his sake as well as his family's.  If he were to have BRCA1 or other such  mutations, then that also opens up the options for PARP inhibitors etc. down the road.  He has his TURP scheduled for 2/24/2022 and we will start treatment a couple of weeks after that and he will get chemo preparation visit in the meantime and I will get capital surgeons to place a port.  We will check his PSA after his TURP when he sees my nurse practitioner back early April for the start of chemotherapy and repeat the PSA and CT chest abdomen pelvis and bone scan after the Taxotere is complete.    -3/31/2022 chemotherapy education and needs assessment completed    -4/7/2022 began docetaxel and Xgeva.  .0.  We will do his Xgeva every 6 weeks to coordinate with his docetaxel infusions.    -4/19/2022 patient stopped Casodex    -5/17/2022 patient reported seeing his PCP for an abscess in his suprapubic area.  Placed on clindamycin, will delay treatment 1 week.    -5/25/2022 PSA 34.3  -5/26/2022 [facility] follow-up: Chris has an abscess in the suprapubic area, it has improved on antibiotic therapy but I am concerned if we treat him it will just flare back up unless it is drained.  I will get  him to Dr. Miller who placed his port for I&D and culture.  He is on clindamycin and states he completes course of treatment tomorrow.  I will delay his treatment with Taxotere 1 more week.  We will give his Xgeva today.  I will see him back in 1 week for follow-up to assess whether or not we can resume his Taxotere.  His PSA has dropped nicely with treatment thus far, PSA yesterday was 34.3 which is down from a PSA of 259.0 when we started treatment, it has been as high as 911 in November.    -6/2/2022 [facility] clinic follow-up: Chris went to see Dr. Miller to have his abscess lanced however apparently there was a long wait and he left without being seen.  He did call his PCP and was given 5 more days of clindamycin and the abscess now seems to have resolved.  We discussed today that he is at risk for recurrence of infection due to immunocompromise state with chemotherapy.  He will notify us if he has any return of his abscess.  He does have intertrigo under his panniculus, I have advised him to keep this area dry, to apply topical drying/antifungal powders.  Also recommended looser clothing.  His counts are adequate to continue therapy, we will resume Taxotere today with cycle #3.  We will repeat restaging scans after 6 courses.  We are doing Xgeva every 6 weeks to coordinate with his Taxotere.  Once he finishes Taxotere we can then go back to every 4 weeks.  His next Xgeva is not due until 7/14/2022.  His calcium is running a little low, he is going to  calcium supplement.    -6/23/2022 [facility] clinic follow-up: Chris overall is doing well on treatment with Taxotere.  Labs reviewed from yesterday and are unremarkable.  We will continue treatment today unchanged.  His suprapubic abscess resolved on clindamycin.  He will continue to use drying powder/antifungal powder under his panniculus for intertrigo.  Today will be cycle #4 of a planned 6 courses of Taxotere.  He is also on Xgeva, next  dose due 7/14/2022, we are doing this every 6 weeks for now to line up with his chemotherapy, can go back to every 4 weeks after he finishes Taxotere.  Calcium from CMP yesterday was normal.    -7/13/2022 [facility] On[facility] clinic follow-up: Mr. Morfin has had issues around day 3 after each treatment ranging from chest pain after his first treatment for which he did not seek medical attention, fatigue after the next few treatments, but 3 days after his most recent treatment on 6/23/2022 he had a syncopal episode at home and once again did not seek medical attention.  I discussed with him that this was not acceptable, if he has occurrences like this someone needs to call 911, it is difficult to figure out what is going on after the fact, the best time to work this up would be during the occurrence.  For now we will get labs today with CBC, CMP, PSA.  I will refer him to cardiology for further evaluation.  We will hold Taxotere most likely, I will see him tomorrow to go over his lab results.  I will also repeat his restaging scans with CT chest, abdomen and pelvis and will include CT of the head in light of his syncopal episode.      -7/13/2022 PSA 18.6    -7/14/2022 [facility] On[facility] clinic follow-up: Mr. Morfin returns today to review his labs from yesterday.  Labs are unremarkable.  PSA down to 18.6 from a high of 259.0 in April prior to starting treatment.  CBC and CMP unremarkable, magnesium is normal at 2.3, phosphorus slightly low at 2.4.  We will hold therapy for now in light of his syncopal episode on day 3 after his last treatment and prior episodes with chest pain and severe fatigue that all occurred on day 3 after his Taxotere.  We will restage him with CT head, chest, abdomen and pelvis (I am including the head in light of his syncopal episode).  I have also referred him to cardiology, he states that he received a call from them about making an appointment however he wanted to talk to us today first.  I  emphasized the importance to him of making and keeping that appointment and he states understanding.  I also once again emphasized the requirement to seek emergency medical attention if he has any episodes in the future such as chest pain or syncopal events.  Whether or not we try and complete Taxotere with 2 more courses or move to the next line of therapy will be decided when he returns to discuss with Dr. Goodson and review his restaging scans.    -7/15/2022 saw Dr. Diaz cardiologist.  For syncope he ordered echocardiogram and 48-hour Holter monitor.    -7/15/2022 Holter monitor relatively benign.    -7/22/2022 CT brain with and without contrast negative.  CT chest abdomen and pelvis shows some nonspecific cecal wall thickening.  No progression in the chest.  T1 and ribs stable.  Decrease in multiple retroperitoneal and pelvic nodes.  Slight increase in right acetabular sclerotic lesion.  Persistent but improved circumferential bladder wall thickening.  Total body bone scan shows stable left third rib and no evidence of new bone metastases.    -7/19/2022 ejection fraction 65% with grade 1 diastolic dysfunction.    -7/26/2022 [facility] clinic follow-up: Given presyncopal symptoms occurred 3 days after Taxotere and has not otherwise occurred any other time and his cardiology work-up is fairly unremarkable and his disease is under good control as witnessed by the report above of the CTs of head chest abdomen pelvis and bone scan, I will hold cycle 5 and 6 of Taxotere and continue 1 now with Zytiga and prednisone.  With reference to the coincidental cecal wall thickening found on CT, we will get him to [facility] surgeons for colonoscopy.  He will see my nurse practitioner back in August for next cycle of Abiraterone prednisone.  He will continue his Lupron with Dr. Matute.  We will continue his Xgeva.  We will repeat his CT chest abdomen pelvis and bone scan again in 3 months.  He will see my nurse  practitioner in the interim.    -8/23/2022 [facility] follow-up: No further presyncopal symptoms.  Feeling great other than for hot flashes.  Did commend for now we will continue on with his Zytiga prednisone and he will get his Xgeva today based on labs from 8/10/2022.  He opted out of getting the colonoscopy that I recommended and he will not change his mind.  He will continue getting the Lupron with Dr. Matute and I asked him to give the date of this appointment to my nurse when he sees her back in a month.  We will give his Xgeva today.  We will get CT chest abdomen pelvis and total body bone scan towards the middle to end of October.  Presently other than for the hot flashes feels great.    -9/20/2022 PSA 8.320    -9/22/2022 [facility] follow[facility]up: Chris is doing well on Zytiga, prednisone along with Xgeva.  Labs reviewed from 9/20/2022 are unremarkable, CBC was normal, CMP with creatinine of 1.34 otherwise normal, this is stable from his baseline.  PSA stable at 8.320.  He received Lupron recently with Dr. Matute, he thinks last week or so, states his next appointment is in February.  He will continue treatment unchanged.  We will repeat restaging CT chest, abdomen and pelvis and total body bone scan in October prior to return and I have ordered those today.  We will also repeat his PSA.    -9/22/2022 CT chest abdomen pelvisCompared to July 2022 [facility] showed no evidence of disease progression in the chest with no substantial sclerotic bony lesions and decrease in size of retroperitoneal and pelvic nodes with persistent cecal wall thickening and suboptimal bladder distention.  Total body bone scan showed decrease in previously seen uptake in the left third rib with diffuse sclerosis representing treatment response with no new foci.    -10/18/2022 [facility] follow-up: I reviewed bone scan and CT reports as above with no evidence of progression.  Tolerating  "Zytiga prednisone and Xgeva.  Getting Lupron regularly with Dr. Matute next appointment coming in February.  We will repeat his CT chest abdomen pelvis and total body bone scan in April.  He will follow with my nurse practitioner in the interim.    -1/10/2023 PSA 3.450  -1/11/2023 [facility] clinic follow-up: Mr. Morfin continues to do well on current therapy with Zytiga, prednisone and Xgeva with no unusual side effects.  He has no new worrisome symptoms.  He is due for his next Lupron injection in February with Dr. Matute.  His PSA continues to trend downward, current PSA from yesterday was 3.450.  We will continue therapy unchanged, he will receive Xgeva today.  Has had no hypocalcemia, his CMP, phosphorus and magnesium are all normal.  We will repeat restaging scans in April.  -3/7/2023 PSA 2.460  -3/8/2023 [facility] clinic follow-up:  Mr. Morfin is doing well.  He is tolerating therapy with Zytiga, prednisone and Xgeva with no significant side effects.  He has hot flashes but these are tolerable.  He had Lupron injection 2/24/2023 with Dr. Matute.  His PSA continues to decline, current PSA 2.460.  He will continue therapy unchanged.  We will repeat restaging scans late April prior to return in 2 months and I have ordered those today.  He is working with his PCP Rodrigo Ram on his hypertension.  His b/p today was 160/88.  He was to have increased his losartan but I do not think he has done that yet as he said \"I still have some of my old prescription left\".  -4/4/2023 PSA 2.55  - 4/26/2023 CT chest abdomen pelvis [facility] comparison 10/10/2022 showed stable left third rib, T1, left fourth and eighth rib, right seventh rib.  Probable liver cyst.  Few diverticuli.  Mild further decrease in a few of the previously enlarged nodes.  Left external iliac 12 mm compared to 16 mm.  Right common iliac 7 mm stable.  Lower left periaortic 8 mm previously 13 mm.  No new lytic or blastic osseous " lesions.  Total body bone scan comparison 10/10/2022, 7/22/2022, and CT 4/26/2023.  No new sites of increased uptake.  1 focal left third rib hotspot consistent with bone metastasis.    -5/2/2023 [facility] follow-up: I reviewed interval notes since I last saw him from my nurse practitioner as outlined above in interval images and reports thereof from [facility] that shows no new sites of bone metastasis and no kizzy or visceral progression.  PSA stabilized around 2.5.  In the absence of symptomatic or radiographic progression, I would be unlikely to change therapy just on the basis of a PSA rise and for now the PSA is stable.  We will continue Zytiga prednisone and Xgeva and he will follow-up with my nurse practitioner 5/30/2023 with repeat CTs and bone scan in 6 months.  I asked him to check with his primary care today regarding his systolic in the 170s.    -5/31/2023 [facility] follow-up: Chris continues to do well on current therapy with Zytiga, prednisone and Xgeva along with Lupron that he receives with Dr. Matute.  His PSA remains stable, it was lower this month compared to last month, current PSA 1.940.  Continues to deal with hypertension, on arrival today his blood pressure was 210/92 however this was using a wrist cuff, when I rechecked it manually his blood pressure was 160/90.  Still has room for improvement despite recent increase in his losartan to 100 mg daily.  He will follow-up with Dr. Ram for further management.  We will continue therapy unchanged.  We will repeat restaging scans in October.    -7/26/2023 [facility] follow-up: Chris overall continues to do well on current therapy with Zytiga, prednisone and Xgeva.  He receives Lupron every 6 months with Dr. Matute and states that is scheduled for August.  PSA from yesterday is pending, PSA on 6/27/2023 was 1.590 which was continuing to decline, in February it was 3.250.  His creatinine this  past month has bumped up.  He feels he is staying hydrated but I encouraged him to increase his fluid intake.  I discussed with him that this could be from his hypertension, some of his medications.  His blood pressure today is 165/92.  He has an appointment with his primary care provider Dr. Teri Ram next week and can further discuss with her.  No adjustment needed for Xgeva.  I refilled his prednisone today.  I will see him back in 1 month for follow-up with continued monitoring of his labs.  He may end up needing to see nephrology.  We will repeat restaging scans in October.    -9/29/2023 PSA 1.810  -8/23/2023 [facility] Oncology clinic follow-up: Chris continues on therapy with Zytiga, prednisone and Xgeva, he also receives Lupron every 6 months with Dr. Matute with most recent given on 8/7/2023.  He is scheduled for a cystoscopy in a few weeks as his last few UAs per the patient's report had microscopic hematuria, he has had no steve hematuria.  His creatinine continues to remain elevated at 1.82, BUN is 40, GFR is 40.  I will get him to nephrology for further evaluation.  Blood pressure today was fairly good at 161/86, he continues to follow with Dr. Ram for management.  PSA on 7/25/2023 was up slightly from prior month, PSA in July was 2.140, in June it was 1.590, PSA from yesterday is pending.  I plan on repeating restaging CT scans and bone scans in October however if his PSA is up significantly then I will do sooner.  I will see him back for follow-up in 1 month.  -8/23/2024 PSA 1.860    -9/20/2023 [facility] Oncology clinic follow-up: Chris is tolerating treatment with Zytiga, prednisone and Xgeva.  He is up-to-date with his Lupron injection, last received in August with Dr. Matute.  He had cystoscopy on 9/8/2023 that was normal. I referred him to nephrology when I saw him last in August as his creatinine has been increasing, creatinine yesterday was a little better 1.51, GFR is 50, creatinine  clearance 63.8.  He is still awaiting an appointment with nephrology, per our  it will be early November.  We will repeat his restaging scans prior to return and I will do his CT scans without contrast in light of his new renal insufficiency.  We will also get total body bone scan and I have ordered those today.  His PSA yesterday was 1.810.    -10/9/2023 CT chest abdomen Pelvis without contrast compared to April 2023 shows slightly prominent pelvic lymph nodes left external iliac 8 mm compared to prior study.  Right internal iliac heterogenous 19 mm compared to 11 mm prior.  Stable sclerotic bone lesions and no new lesions.  Stable bone scan with no new sites of disease    -10/17/2023 [facility] clinic follow-up: With elevated creatinine, CTs done without contrast showed very slight increased prominence by few millimeters of some internal iliac and external iliac nodes for which PSMA PET could be done but for now I will check his PSA and have him see my nurse practitioner back in a week and unless the PSA is significantly rising I would continue the Zytiga, prednisone, Xgeva and February dose of Lupron with Dr. Matute and make sure he follows with nephrology.  If his PSA is significantly rising then my nurse practitioner will order PSMA PET.    -11/15/2023 [facility] follow-up: Mr. Ferrara continues to do well on current therapy with Zytiga, prednisone, Xgeva and Lupron that he receives with Dr. Matute.  His PSA is stable at 1.5.  Would not change therapy for now, we will plan on repeating restaging scans in February unless PSA starts to rise or he has new concerns.  Overall he feels good.  He is now established with nephrology and will continue to follow with them regarding his renal insufficiency, I reviewed note from consultation with Dr. Armstrong 11/6/2023.    -12/13/2023 [facility] follow-up: Chris continues to do well on current therapy with Zytiga, prednisone, Xgeva  and Lupron.  He thinks his next Lupron with Dr. Matute is around February.  His CBC and CMP remain unremarkable.  He will continue treatment unchanged.  We will repeat his PSA next month.  Will plan on restaging scans in April in the absence of new symptoms and as long as his PSA remains stable.  Continues to deal with hypertension, blood pressure today 175/94, encouraged him to follow-up with PCP.    -1/10/2024 [facility] clinic follow-up: Chris overall continues to tolerate current therapy with Zytiga, prednisone, Xgeva and Lupron.  He states his next Lupron injection with Dr. Matute is due late February.  His PSA is stable at 1.050. Creatinine stable at 1.64, he is following with nephrology, he has a an appointment with Dr. Armstrong in February for follow-up.  Hypertension being managed by his primary care provider, target systolic ideally 120s-140s, today it is running a little high at 172/90, yesterday when he was here for labs it was 138/80.  We will continue therapy unchanged with plans to repeat restaging scans in April.    -2/6/2024 PSA 0.753    -2/7/2024 [facility] oncology clinic follow-up: Chris continues to do well on current therapy with Zytiga, prednisone, Xgeva and Lupron.  He is up-to-date on Lupron injection next due later this month with Dr. Matute.  His PSA continues to slowly decrease, current PSA is 0.753.  He has no new worrisome symptoms.  We will continue therapy unchanged, I have ordered restaging scans for late March prior to his return in April. He is following with nephrology for his renal insufficiency, his creatinine yesterday was good at 1.21.  I will do his CT scans with contrast unless something changes in the interim. He follows closely with Dr. Ram for management of his hypertension. I reviewed notes from his last office visit with her earlier this month on 2/1/2024, also reviewed labs in detail with the patient today.  All questions were answered to his  satisfaction.    -4/8/2024 CT chest, abdomen and pelvis shows stable sclerotic lesions in the thoracic spine and bilateral ribs and right acetabulum.  Stable appearance of pelvic lymph nodes.  No new sites concerning for progression of disease.  Total body bone scan shows stable diffuse increased uptake of the left third rib correlates with diffuse sclerosis and enlargement on CT, no new sites of active osseous lesions.  -4/30/2024 PSA 0.713  -5/1/2024 [facility] oncology clinic follow-up: Chris continues to do well on current therapy with Zytiga, prednisone, Xgeva and Lupron.  He receives his Lupron every 6 months with Dr. Matute, last administered 2/5/2024.  Current CT scans and bone scan showed no progression of disease, PSA is stable at 0.713.  His CBC is unremarkable, CMP shows creatinine stable at 1.50 otherwise normal.  He does follow with nephrology.  We will continue treatment unchanged.  I will see him back in 2 months for follow-up.  We will repeat restaging scans in 6 months unless he has any new symptoms or significant change in his PSA.    I spent 30 minutes caring for Chris on this date of service. This time includes time spent by me in the following activities: preparing for the visit, reviewing tests, performing a medically appropriate examination and/or evaluation, ordering medications, tests, or procedures, documenting information in the medical record, and independently interpreting results and communicating that information with the patient/family/caregiver.     Susana Torres, APRN    05/01/2024

## 2024-05-17 ENCOUNTER — SPECIALTY PHARMACY (OUTPATIENT)
Dept: ONCOLOGY | Facility: HOSPITAL | Age: 69
End: 2024-05-17
Payer: MEDICARE

## 2024-05-17 NOTE — PROGRESS NOTES
Specialty Pharmacy Refill Coordination Note     Chris is a 69 y.o. male contacted today regarding refills of  Abiraterone 500mg PO 2QD specialty medication(s).    Reviewed and verified with patient: YES- Scheduled fill with [facility] for 5/23/24 to ship to patient on 5/24/24.      Specialty medication(s) and dose(s) confirmed: YES    Refill Questions      Flowsheet Row Most Recent Value   Changes to allergies? No   Changes to medications? No   New conditions or infections since last clinic visit No   Unplanned office visit, urgent care, ED, or hospital admission in the last 4 weeks  No   How does patient/caregiver feel medication is working? Good   Financial problems or insurance changes  No   Since the previous refill, were any specialty medication doses or scheduled injections missed or delayed?  No   If yes, please provide the amount n/a   Why were doses missed? n/a   Does this patient require a clinical escalation to a pharmacist? No            Delivery Questions      Flowsheet Row Most Recent Value   Delivery method [facility]   Delivery address verified with patient/caregiver? Yes   Delivery address Home   Number of medications in delivery 1   Medication(s) being filled and delivered Abiraterone Acetate   Doses left of specialty medications 7 days   Copay verified? Yes   Copay amount $0   Copay form of payment No copayment ($0)                   Follow-up: 30 day(s)     Jodie Nj, Pharmacy Technician  Specialty Pharmacy Technician

## 2024-05-28 ENCOUNTER — LAB (OUTPATIENT)
Dept: ONCOLOGY | Facility: HOSPITAL | Age: 69
End: 2024-05-28
Payer: MEDICARE

## 2024-05-28 VITALS
SYSTOLIC BLOOD PRESSURE: 140 MMHG | BODY MASS INDEX: 36.96 KG/M2 | TEMPERATURE: 97 F | DIASTOLIC BLOOD PRESSURE: 84 MMHG | WEIGHT: 265 LBS | RESPIRATION RATE: 18 BRPM | HEART RATE: 70 BPM

## 2024-05-28 DIAGNOSIS — C61 PROSTATE CANCER METASTATIC TO MULTIPLE SITES: ICD-10-CM

## 2024-05-28 PROCEDURE — 36415 COLL VENOUS BLD VENIPUNCTURE: CPT

## 2024-05-29 ENCOUNTER — INFUSION (OUTPATIENT)
Dept: ONCOLOGY | Facility: HOSPITAL | Age: 69
End: 2024-05-29
Payer: MEDICARE

## 2024-05-29 VITALS
HEART RATE: 74 BPM | DIASTOLIC BLOOD PRESSURE: 86 MMHG | SYSTOLIC BLOOD PRESSURE: 141 MMHG | RESPIRATION RATE: 18 BRPM | TEMPERATURE: 98.6 F

## 2024-05-29 DIAGNOSIS — C61 PROSTATE CANCER METASTATIC TO MULTIPLE SITES: Primary | ICD-10-CM

## 2024-05-29 LAB
ALBUMIN SERPL-MCNC: 4.1 G/DL (ref 3.5–5.2)
ALBUMIN/GLOB SERPL: 1.6 G/DL
ALP SERPL-CCNC: 72 U/L (ref 39–117)
ALT SERPL-CCNC: 16 U/L (ref 1–41)
AST SERPL-CCNC: 16 U/L (ref 1–40)
BASOPHILS # BLD AUTO: 0.06 10*3/MM3 (ref 0–0.2)
BASOPHILS NFR BLD AUTO: 0.9 % (ref 0–1.5)
BILIRUB SERPL-MCNC: 0.3 MG/DL (ref 0–1.2)
BUN SERPL-MCNC: 31 MG/DL (ref 8–23)
BUN/CREAT SERPL: 21.7 (ref 7–25)
CALCIUM SERPL-MCNC: 8.8 MG/DL (ref 8.6–10.5)
CHLORIDE SERPL-SCNC: 109 MMOL/L (ref 98–107)
CO2 SERPL-SCNC: 22.5 MMOL/L (ref 22–29)
CREAT SERPL-MCNC: 1.43 MG/DL (ref 0.76–1.27)
EGFRCR SERPLBLD CKD-EPI 2021: 53 ML/MIN/1.73
EOSINOPHIL # BLD AUTO: 0.2 10*3/MM3 (ref 0–0.4)
EOSINOPHIL NFR BLD AUTO: 2.9 % (ref 0.3–6.2)
ERYTHROCYTE [DISTWIDTH] IN BLOOD BY AUTOMATED COUNT: 13.9 % (ref 12.3–15.4)
GLOBULIN SER CALC-MCNC: 2.6 GM/DL
GLUCOSE SERPL-MCNC: 112 MG/DL (ref 65–99)
HCT VFR BLD AUTO: 40.1 % (ref 37.5–51)
HGB BLD-MCNC: 12.9 G/DL (ref 13–17.7)
IMM GRANULOCYTES # BLD AUTO: 0.02 10*3/MM3 (ref 0–0.05)
IMM GRANULOCYTES NFR BLD AUTO: 0.3 % (ref 0–0.5)
LYMPHOCYTES # BLD AUTO: 1.74 10*3/MM3 (ref 0.7–3.1)
LYMPHOCYTES NFR BLD AUTO: 25.4 % (ref 19.6–45.3)
MAGNESIUM SERPL-MCNC: 2.4 MG/DL (ref 1.6–2.4)
MCH RBC QN AUTO: 28.5 PG (ref 26.6–33)
MCHC RBC AUTO-ENTMCNC: 32.2 G/DL (ref 31.5–35.7)
MCV RBC AUTO: 88.7 FL (ref 79–97)
MONOCYTES # BLD AUTO: 0.54 10*3/MM3 (ref 0.1–0.9)
MONOCYTES NFR BLD AUTO: 7.9 % (ref 5–12)
NEUTROPHILS # BLD AUTO: 4.3 10*3/MM3 (ref 1.7–7)
NEUTROPHILS NFR BLD AUTO: 62.6 % (ref 42.7–76)
NRBC BLD AUTO-RTO: 0 /100 WBC (ref 0–0.2)
PHOSPHATE SERPL-MCNC: 3.1 MG/DL (ref 2.5–4.5)
PLATELET # BLD AUTO: 241 10*3/MM3 (ref 140–450)
POTASSIUM SERPL-SCNC: 4.7 MMOL/L (ref 3.5–5.2)
PROT SERPL-MCNC: 6.7 G/DL (ref 6–8.5)
PSA SERPL-MCNC: 0.61 NG/ML (ref 0–4)
RBC # BLD AUTO: 4.52 10*6/MM3 (ref 4.14–5.8)
SODIUM SERPL-SCNC: 144 MMOL/L (ref 136–145)
WBC # BLD AUTO: 6.86 10*3/MM3 (ref 3.4–10.8)

## 2024-05-29 PROCEDURE — 96372 THER/PROPH/DIAG INJ SC/IM: CPT

## 2024-05-29 PROCEDURE — 25010000002 DENOSUMAB 120 MG/1.7ML SOLUTION: Performed by: NURSE PRACTITIONER

## 2024-05-29 RX ADMIN — DENOSUMAB 120 MG: 120 INJECTION SUBCUTANEOUS at 09:06

## 2024-06-17 ENCOUNTER — SPECIALTY PHARMACY (OUTPATIENT)
Facility: HOSPITAL | Age: 69
End: 2024-06-17
Payer: MEDICARE

## 2024-06-17 NOTE — PROGRESS NOTES
Specialty Pharmacy Refill Coordination Note     Chris is a 69 y.o. male contacted today regarding refills of  abiraterone 1000mg PO QD of specialty medication(s).    Reviewed and verified with patient: YES; patient requested medication be mailed out on 6/24/27.    Specialty medication(s) and dose(s) confirmed: yes    Refill Questions      Flowsheet Row Most Recent Value   Changes to allergies? No   Changes to medications? No   New conditions or infections since last clinic visit No   Unplanned office visit, urgent care, ED, or hospital admission in the last 4 weeks  No   How does patient/caregiver feel medication is working? Very good   Financial problems or insurance changes  No   Since the previous refill, were any specialty medication doses or scheduled injections missed or delayed?  No   Does this patient require a clinical escalation to a pharmacist? No            Delivery Questions      Flowsheet Row Most Recent Value   Delivery method [facility]   Delivery address verified with patient/caregiver? Yes   Delivery address Home   Number of medications in delivery 1   Medication(s) being filled and delivered Abiraterone Acetate   Doses left of specialty medications 10 days left   Copay verified? Yes   Copay amount $0   Copay form of payment No copayment ($0)                   Follow-up: 30 day(s)     Catalina Montes De Oca, Pharmacy Technician  Specialty Pharmacy Technician

## 2024-06-18 ENCOUNTER — SPECIALTY PHARMACY (OUTPATIENT)
Dept: PHARMACY | Facility: HOSPITAL | Age: 69
End: 2024-06-18
Payer: MEDICARE

## 2024-06-18 NOTE — PROGRESS NOTES
Specialty Pharmacy Patient Management Program  Oncology 6-Month Clinical Assessment       Chris Morfin is a 69 y.o. male with prostate cancer seen today to assess adherence and side effects.    Reason for Outreach: Routine medication check-in .    Regimen: Abiraterone 1000mg PO daily + prednisone 5mg PO twice daily    Specialty Pharmacy Goal   Goals Addressed Today         Specialty Pharmacy General Goal (pt-stated)       Clinical goal/therapeutic target: stable or decreasing PSA and disease control  PSA          4/2/2024    08:15 4/30/2024    10:00 5/28/2024    09:30   PSA   PSA 0.705  0.713  0.608                      Problem List   Problem list reviewed by Karen Pardo, PharmD on 6/18/2024 at  9:22 AM  Patient Active Problem List   Diagnosis Code    Prostate cancer metastatic to multiple sites C61    Encounter for care related to vascular access port Z45.2    Stage 3a chronic kidney disease (CKD) N18.31    Lipid screening Z13.220    Chest pain at rest R07.9    Syncope and collapse R55    Coronary artery disease involving native coronary artery of native heart without angina pectoris I25.10       Medication Assessment for Specialty Medication(s):  Medication Assessment  Follow Up Clinical Assessment  Linked Medication(s) Assessed: Abiraterone Acetate  Therapeutic appropriateness: Appropriate  Medication tolerability: Tolerating with no to minimal ADRs  Medication plan: Continue therapy with normal follow-up  Quality of Life Improvement Scale: 10-Significantly better  Administration: Abiraterone once daily on an empty stomach.  Prednisone with food twice daily.  Patient can self administer medications: yes  Medication Follow-Up Plan: Next clinical assessment and Refill coordination  Lab Review: The labs listed below have been reviewed. No dose adjustments are needed for the oral specialty medication(s) based on the labs.    Lab Results   Component Value Date    GLUCOSE 112 (H) 05/28/2024    CALCIUM 8.8  05/28/2024     05/28/2024    K 4.7 05/28/2024    CO2 22.5 05/28/2024     (H) 05/28/2024    BUN 31 (H) 05/28/2024    CREATININE 1.43 (H) 05/28/2024    BCR 21.7 05/28/2024     Lab Results   Component Value Date    WBC 6.86 05/28/2024    RBC 4.52 05/28/2024    HGB 12.9 (L) 05/28/2024    HCT 40.1 05/28/2024    MCV 88.7 05/28/2024    MCH 28.5 05/28/2024    MCHC 32.2 05/28/2024    RDW 13.9 05/28/2024     05/28/2024    NEUTRORELPCT 62.6 05/28/2024    LYMPHORELPCT 25.4 05/28/2024    MONORELPCT 7.9 05/28/2024    EOSRELPCT 2.9 05/28/2024    BASORELPCT 0.9 05/28/2024    NEUTROABS 4.30 05/28/2024    LYMPHSABS 1.74 05/28/2024    MONOSABS 0.54 05/28/2024    EOSABS 0.20 05/28/2024    BASOSABS 0.06 05/28/2024    NRBC 0.0 05/28/2024     Drug-drug interactions  Completed medication reconciliation today to assess for drug interactions. Patient denies starting or stopping any medications.    Assessed medication list for interactions, no significant drug interactions noted.   Advised patient to call the clinic if any new medications are started so we can assess for drug-drug interactions.  Drug-food interactions discussed: eating grapefruit and drinking grapefruit juice  Vaccines are coordinated by the patient's oncologist and primary care provider.    Medications   Medicines reviewed by Karen Pardo, PharmD on 6/18/2024 at  9:22 AM  Prior to Admission medications    Medication Sig Start Date End Date Taking? Authorizing Provider   abiraterone acetate (ZYTIGA) 500 MG tablet Take 2 tablets by mouth Daily. on an empty stomach. 12/28/23   Fritz Goodson MD   amLODIPine (NORVASC) 5 MG tablet Take 1.5 tablets by mouth Daily for 180 days. 5/1/24 10/28/24  Teri Ram,    denosumab (Xgeva) 120 MG/1.7ML solution injection 1.7 ml Subcutaneous ONCE A MONTH 11/6/23   Provider, MD [place]   leuprolide (Lupron Dep[place], 6-Month,) 45 MG kit injection as directed Intramuscular Q 6 MONTHS 11/6/23   Provider, MD [place]    losartan (COZAAR) 100 MG tablet Take 1 tablet by mouth Daily. 5/1/24   Teri Ram DO   ondansetron (ZOFRAN) 8 MG tablet Take 1 tablet by mouth 3 (Three) Times a Day As Needed for Nausea or Vomiting. 7/26/22   Fritz Goodson MD   predniSONE (DELTASONE) 5 MG tablet Take 1 tablet by mouth 2 (Two) Times a Day. 7/26/23   Susana Torres APRN       Allergies  Known allergies and reactions were discussed with the patient. The patient's chart has been reviewed for allergy information and updated as necessary.   No Known Allergies      Allergies reviewed by Karen Pardo PharmD on 6/18/2024 at  9:22 AM    Hospitalizations and Urgent Care Visits Since Last Assessment:  Unplanned hospitalizations or inpatient admissions: 0  ED Visits: 0  Urgent Office Visits: 0    Adherence Assessment:  Adherence Questions  Linked Medication(s) Assessed: Abiraterone Acetate  On average, how many doses/injections does the patient miss per month?: 0  What are the identified reasons for non-adherence or missed doses? : no problems identified  What is the estimated medication adherence level?: %  Based on the patient/caregiver response and refill history, does this patient require an MTP to track adherence improvements?: no    Quality of Life Assessment:  Quality of Life Assessment  Quality of Life Improvement Scale: 10-Significantly better  -- Quality of Life: 10/10    Financial Assessment:  Medication availability/affordability: Patient has had no issues obtaining medication from pharmacy.    Attestation:  I attest the patient was actively involved in and has agreed to the above plan of care. If the prescribed therapy is at any point deemed not appropriate based on the current or future assessments, a consultation will be initiated with the patient's specialty care provider to determine the best course of action. The revised plan of therapy will be documented along with any required assessments and/or additional patient  education provided.       All questions addressed and patient had no additional concerns. Patient has pharmacy contact information.    Karen Pardo PharmD, [facility]  Clinical Oncology Pharmacy Specialist  Phone: [phone]      6/18/2024  09:22 EDT

## 2024-06-25 ENCOUNTER — LAB (OUTPATIENT)
Dept: ONCOLOGY | Facility: HOSPITAL | Age: 69
End: 2024-06-25
Payer: MEDICARE

## 2024-06-25 VITALS
SYSTOLIC BLOOD PRESSURE: 141 MMHG | TEMPERATURE: 97.6 F | HEART RATE: 80 BPM | RESPIRATION RATE: 18 BRPM | DIASTOLIC BLOOD PRESSURE: 90 MMHG | WEIGHT: 254.4 LBS | BODY MASS INDEX: 35.48 KG/M2

## 2024-06-25 DIAGNOSIS — C61 PROSTATE CANCER METASTATIC TO MULTIPLE SITES: ICD-10-CM

## 2024-06-25 PROCEDURE — 36415 COLL VENOUS BLD VENIPUNCTURE: CPT

## 2024-06-26 ENCOUNTER — SPECIALTY PHARMACY (OUTPATIENT)
Facility: HOSPITAL | Age: 69
End: 2024-06-26
Payer: MEDICARE

## 2024-06-26 ENCOUNTER — INFUSION (OUTPATIENT)
Dept: ONCOLOGY | Facility: HOSPITAL | Age: 69
End: 2024-06-26
Payer: MEDICARE

## 2024-06-26 ENCOUNTER — OFFICE VISIT (OUTPATIENT)
Dept: ONCOLOGY | Facility: CLINIC | Age: 69
End: 2024-06-26
Payer: MEDICARE

## 2024-06-26 VITALS
RESPIRATION RATE: 18 BRPM | DIASTOLIC BLOOD PRESSURE: 78 MMHG | HEART RATE: 75 BPM | OXYGEN SATURATION: 95 % | BODY MASS INDEX: 35.7 KG/M2 | TEMPERATURE: 98.6 F | SYSTOLIC BLOOD PRESSURE: 130 MMHG | WEIGHT: 255 LBS | HEIGHT: 71 IN

## 2024-06-26 DIAGNOSIS — C61 PROSTATE CANCER METASTATIC TO MULTIPLE SITES: Primary | ICD-10-CM

## 2024-06-26 LAB
ALBUMIN SERPL-MCNC: 4.6 G/DL (ref 3.5–5.2)
ALBUMIN/GLOB SERPL: 1.8 G/DL
ALP SERPL-CCNC: 86 U/L (ref 39–117)
ALT SERPL-CCNC: 19 U/L (ref 1–41)
AST SERPL-CCNC: 19 U/L (ref 1–40)
BASOPHILS # BLD AUTO: 0.08 10*3/MM3 (ref 0–0.2)
BASOPHILS NFR BLD AUTO: 1 % (ref 0–1.5)
BILIRUB SERPL-MCNC: 0.6 MG/DL (ref 0–1.2)
BUN SERPL-MCNC: 32 MG/DL (ref 8–23)
BUN/CREAT SERPL: 17.2 (ref 7–25)
CALCIUM SERPL-MCNC: 9.8 MG/DL (ref 8.6–10.5)
CHLORIDE SERPL-SCNC: 107 MMOL/L (ref 98–107)
CO2 SERPL-SCNC: 21.6 MMOL/L (ref 22–29)
CREAT SERPL-MCNC: 1.86 MG/DL (ref 0.76–1.27)
EGFRCR SERPLBLD CKD-EPI 2021: 38.7 ML/MIN/1.73
EOSINOPHIL # BLD AUTO: 0.15 10*3/MM3 (ref 0–0.4)
EOSINOPHIL NFR BLD AUTO: 2 % (ref 0.3–6.2)
ERYTHROCYTE [DISTWIDTH] IN BLOOD BY AUTOMATED COUNT: 13.7 % (ref 12.3–15.4)
GLOBULIN SER CALC-MCNC: 2.6 GM/DL
GLUCOSE SERPL-MCNC: 103 MG/DL (ref 65–99)
HCT VFR BLD AUTO: 43.5 % (ref 37.5–51)
HGB BLD-MCNC: 14.5 G/DL (ref 13–17.7)
IMM GRANULOCYTES # BLD AUTO: 0.03 10*3/MM3 (ref 0–0.05)
IMM GRANULOCYTES NFR BLD AUTO: 0.4 % (ref 0–0.5)
LYMPHOCYTES # BLD AUTO: 2.51 10*3/MM3 (ref 0.7–3.1)
LYMPHOCYTES NFR BLD AUTO: 32.9 % (ref 19.6–45.3)
MAGNESIUM SERPL-MCNC: 2.2 MG/DL (ref 1.6–2.4)
MCH RBC QN AUTO: 29.6 PG (ref 26.6–33)
MCHC RBC AUTO-ENTMCNC: 33.3 G/DL (ref 31.5–35.7)
MCV RBC AUTO: 88.8 FL (ref 79–97)
MONOCYTES # BLD AUTO: 0.65 10*3/MM3 (ref 0.1–0.9)
MONOCYTES NFR BLD AUTO: 8.5 % (ref 5–12)
NEUTROPHILS # BLD AUTO: 4.21 10*3/MM3 (ref 1.7–7)
NEUTROPHILS NFR BLD AUTO: 55.2 % (ref 42.7–76)
NRBC BLD AUTO-RTO: 0 /100 WBC (ref 0–0.2)
PHOSPHATE SERPL-MCNC: 3.2 MG/DL (ref 2.5–4.5)
PLATELET # BLD AUTO: 232 10*3/MM3 (ref 140–450)
POTASSIUM SERPL-SCNC: 4.2 MMOL/L (ref 3.5–5.2)
PROT SERPL-MCNC: 7.2 G/DL (ref 6–8.5)
PSA SERPL-MCNC: 0.62 NG/ML (ref 0–4)
RBC # BLD AUTO: 4.9 10*6/MM3 (ref 4.14–5.8)
SODIUM SERPL-SCNC: 141 MMOL/L (ref 136–145)
WBC # BLD AUTO: 7.63 10*3/MM3 (ref 3.4–10.8)

## 2024-06-26 PROCEDURE — 25010000002 DENOSUMAB 120 MG/1.7ML SOLUTION: Performed by: NURSE PRACTITIONER

## 2024-06-26 PROCEDURE — 1126F AMNT PAIN NOTED NONE PRSNT: CPT | Performed by: NURSE PRACTITIONER

## 2024-06-26 PROCEDURE — 96372 THER/PROPH/DIAG INJ SC/IM: CPT

## 2024-06-26 PROCEDURE — 1159F MED LIST DOCD IN RCRD: CPT | Performed by: NURSE PRACTITIONER

## 2024-06-26 PROCEDURE — 1160F RVW MEDS BY RX/DR IN RCRD: CPT | Performed by: NURSE PRACTITIONER

## 2024-06-26 PROCEDURE — 99214 OFFICE O/P EST MOD 30 MIN: CPT | Performed by: NURSE PRACTITIONER

## 2024-06-26 RX ADMIN — DENOSUMAB 120 MG: 120 INJECTION SUBCUTANEOUS at 10:35

## 2024-06-26 NOTE — PROGRESS NOTES
CHIEF COMPLAINT: Prostate cancer    Problem List:  Oncology/Hematology History Overview Note   1.  Prostate cancer clinical T1c and 2M1B stage IVb treated with 4 cycles of Taxotere, stopped due to syncope with negative cardiac work-up, with marked drop in PSA of 18.6 from over 900 at baseline, continued on Zytiga and prednisone.  2.  Urinary retention with Goodwin in place 1/24/2022.  On finasteride 1/24/2022.  3.  Covid 19 December 2021  4.  Hypertension  5.  Stage 3a chronic kidney disease    Oncology history timeline:  -11/29/2021  with symptoms of urinary retention.  -2/15/2022 prostate biopsy adenocarcinoma grade group 5 and 3 out of 12 cores, grade group 4 in 1 out of 12 cores, grade group 3 in 8 out of 12 cores.  -2/24/2022 CT chest abdomen pelvis and total body bone scan shows left third rib, right acetabulum, periaortic and retroperitoneal kizzy metastases complaining of pain in the left chest and right hip.  Also possible sclerotic left 10th rib on CT.  Spleen mildly enlarged 13.8 cm.  Hepatic steatosis.  Small liver cyst.  Fat stranding in the periaortic and mesenteric region consistent with reactive/inflammatory stranding.  Multiple enlarged periaortic and retroperitoneal nodes and lymphadenopathy largest 4.9 cm.  CT chest describes tiny sclerotic foci in left eighth rib and right lateral seventh rib and T1.  Multiple small mediastinal and bilateral axillary nodes seen with small hypodense left thyroid nodule.  Creatinine 1.7.  -3/4/2022 office note Dr. Rolando Matute: Patient was seen after his elevated PSA by Dr. Vera and prostate biopsy scheduled.  However, he developed COVID-19 and this was canceled and the patient did not reschedule due to lack of insurance.  Saw Dr. Matute back on 1/24/2022 with plans to get staging CTs and bone scan with results as outlined above.  Started Casodex and to return on 3/11/2022 for Lupron.  Referral made to medical oncology for other additional castrate  naïve prostate cancer therapies.  TURP planned for urinary retention symptoms.    -3/15/2022 [facility] [place] medical oncology initial consultation: I reviewed the above data with the patient.  With his fairly bulky retroperitoneal adenopathy and bone metastases including extra axial metastases, he would fit the data from the CHAARTED trial for which Taxotere x6 followed by Zytiga prednisone along with the planned Lupron already being planned by Dr. Matute would be reasonable.  Equally reasonable in terms of comparisons with bicalutamide and Lupron would be Zytiga prednisone plus Lupron or Xtandi plus Lupron or apalutamide plus Lupron.  None of these have been compared head-to-head but all have beaten combined androgen deprivation therapy with bicalutamide and Lupron.  At the age of 67 and in order to have as many bullets as possible down the road and hence saving some of the hormone blockade options for later and using chemotherapy when he is younger and healthier for a more time-limited.,  He has opted for the Taxotere x6 followed by Zytiga and prednisone.  We will also give him Xgeva to improve bone health and given metastatic disease, as with all metastatic prostate cancer patients, he needs genetic counseling both for his sake as well as his family's.  If he were to have BRCA1 or other such  mutations, then that also opens up the options for PARP inhibitors etc. down the road.  He has his TURP scheduled for 2/24/2022 and we will start treatment a couple of weeks after that and he will get chemo preparation visit in the meantime and I will get capital surgeons to place a port.  We will check his PSA after his TURP when he sees my nurse practitioner back early April for the start of chemotherapy and repeat the PSA and CT chest abdomen pelvis and bone scan after the Taxotere is complete.    -3/31/2022 chemotherapy education and needs assessment completed    -4/7/2022 began docetaxel and Xgeva.  .0.  We will  do his Xgeva every 6 weeks to coordinate with his docetaxel infusions.    -4/19/2022 patient stopped Casodex    -5/17/2022 patient reported seeing his PCP for an abscess in his suprapubic area.  Placed on clindamycin, will delay treatment 1 week.    -5/25/2022 PSA 34.3  -5/26/2022 [facility] On[facility] clinic follow-up: Chris has an abscess in the suprapubic area, it has improved on antibiotic therapy but I am concerned if we treat him it will just flare back up unless it is drained.  I will get him to Dr. Miller who placed his port for I&D and culture.  He is on clindamycin and states he completes course of treatment tomorrow.  I will delay his treatment with Taxotere 1 more week.  We will give his Xgeva today.  I will see him back in 1 week for follow-up to assess whether or not we can resume his Taxotere.  His PSA has dropped nicely with treatment thus far, PSA yesterday was 34.3 which is down from a PSA of 259.0 when we started treatment, it has been as high as 911 in November.    -6/2/2022 [facility] On[facility] clinic follow-up: Chris went to see Dr. Miller to have his abscess lanced however apparently there was a long wait and he left without being seen.  He did call his PCP and was given 5 more days of clindamycin and the abscess now seems to have resolved.  We discussed today that he is at risk for recurrence of infection due to immunocompromise state with chemotherapy.  He will notify us if he has any return of his abscess.  He does have intertrigo under his panniculus, I have advised him to keep this area dry, to apply topical drying/antifungal powders.  Also recommended looser clothing.  His counts are adequate to continue therapy, we will resume Taxotere today with cycle #3.  We will repeat restaging scans after 6 courses.  We are doing Xgeva every 6 weeks to coordinate with his Taxotere.  Once he finishes Taxotere we can then go back to every 4 weeks.  His next Xgeva is not due until 7/14/2022.  His calcium is  running a little low, he is going to  calcium supplement.    -6/23/2022 [facility] clinic follow-up: Chris overall is doing well on treatment with Taxotere.  Labs reviewed from yesterday and are unremarkable.  We will continue treatment today unchanged.  His suprapubic abscess resolved on clindamycin.  He will continue to use drying powder/antifungal powder under his panniculus for intertrigo.  Today will be cycle #4 of a planned 6 courses of Taxotere.  He is also on Xgeva, next dose due 7/14/2022, we are doing this every 6 weeks for now to line up with his chemotherapy, can go back to every 4 weeks after he finishes Taxotere.  Calcium from CMP yesterday was normal.    -7/13/2022 [facility] clinic follow-up: Mr. Morfin has had issues around day 3 after each treatment ranging from chest pain after his first treatment for which he did not seek medical attention, fatigue after the next few treatments, but 3 days after his most recent treatment on 6/23/2022 he had a syncopal episode at home and once again did not seek medical attention.  I discussed with him that this was not acceptable, if he has occurrences like this someone needs to call 911, it is difficult to figure out what is going on after the fact, the best time to work this up would be during the occurrence.  For now we will get labs today with CBC, CMP, PSA.  I will refer him to cardiology for further evaluation.  We will hold Taxotere most likely, I will see him tomorrow to go over his lab results.  I will also repeat his restaging scans with CT chest, abdomen and pelvis and will include CT of the head in light of his syncopal episode.      -7/13/2022 PSA 18.6    -7/14/2022 [facility] clinic follow-up: Mr. Morfin returns today to review his labs from yesterday.  Labs are unremarkable.  PSA down to 18.6 from a high of 259.0 in April prior to starting treatment.  CBC and CMP unremarkable, magnesium is normal at 2.3, phosphorus slightly  low at 2.4.  We will hold therapy for now in light of his syncopal episode on day 3 after his last treatment and prior episodes with chest pain and severe fatigue that all occurred on day 3 after his Taxotere.  We will restage him with CT head, chest, abdomen and pelvis (I am including the head in light of his syncopal episode).  I have also referred him to cardiology, he states that he received a call from them about making an appointment however he wanted to talk to us today first.  I emphasized the importance to him of making and keeping that appointment and he states understanding.  I also once again emphasized the requirement to seek emergency medical attention if he has any episodes in the future such as chest pain or syncopal events.  Whether or not we try and complete Taxotere with 2 more courses or move to the next line of therapy will be decided when he returns to discuss with Dr. Goodson and review his restaging scans.    -7/15/2022 saw Dr. Diaz cardiologist.  For syncope he ordered echocardiogram and 48-hour Holter monitor.    -7/15/2022 Holter monitor relatively benign.    -7/22/2022 CT brain with and without contrast negative.  CT chest abdomen and pelvis shows some nonspecific cecal wall thickening.  No progression in the chest.  T1 and ribs stable.  Decrease in multiple retroperitoneal and pelvic nodes.  Slight increase in right acetabular sclerotic lesion.  Persistent but improved circumferential bladder wall thickening.  Total body bone scan shows stable left third rib and no evidence of new bone metastases.    -7/19/2022 ejection fraction 65% with grade 1 diastolic dysfunction.    -7/26/2022 [facility] clinic follow-up: Given presyncopal symptoms occurred 3 days after Taxotere and has not otherwise occurred any other time and his cardiology work-up is fairly unremarkable and his disease is under good control as witnessed by the report above of the CTs of head chest abdomen pelvis and bone  scan, I will hold cycle 5 and 6 of Taxotere and continue 1 now with Zytiga and prednisone.  With reference to the coincidental cecal wall thickening found on CT, we will get him to [facility] surgeons for colonoscopy.  He will see my nurse practitioner back in August for next cycle of Abiraterone prednisone.  He will continue his Lupron with Dr. Matute.  We will continue his Xgeva.  We will repeat his CT chest abdomen pelvis and bone scan again in 3 months.  He will see my nurse practitioner in the interim.    -8/23/2022 [facility] oncology clinic follow-up: No further presyncopal symptoms.  Feeling great other than for hot flashes.  Did commend for now we will continue on with his Zytiga prednisone and he will get his Xgeva today based on labs from 8/10/2022.  He opted out of getting the colonoscopy that I recommended and he will not change his mind.  He will continue getting the Lupron with Dr. Matute and I asked him to give the date of this appointment to my nurse when he sees her back in a month.  We will give his Xgeva today.  We will get CT chest abdomen pelvis and total body bone scan towards the middle to end of October.  Presently other than for the hot flashes feels great.    -9/20/2022 PSA 8.320    -9/22/2022 [facility] Oncology clinic follow-up: Chris is doing well on Zytiga, prednisone along with Xgeva.  Labs reviewed from 9/20/2022 are unremarkable, CBC was normal, CMP with creatinine of 1.34 otherwise normal, this is stable from his baseline.  PSA stable at 8.320.  He received Lupron recently with Dr. Matute, he thinks last week or so, states his next appointment is in February.  He will continue treatment unchanged.  We will repeat restaging CT chest, abdomen and pelvis and total body bone scan in October prior to return and I have ordered those today.  We will also repeat his PSA.    -9/22/2022 CT chest abdomen pelvisCompared to July 2022 [facility] showed no evidence of disease progression in  "the chest with no substantial sclerotic bony lesions and decrease in size of retroperitoneal and pelvic nodes with persistent cecal wall thickening and suboptimal bladder distention.  Total body bone scan showed decrease in previously seen uptake in the left third rib with diffuse sclerosis representing treatment response with no new foci.    -10/18/2022 [facility] [place] medical oncology follow-up: I reviewed bone scan and CT reports as above with no evidence of progression.  Tolerating Zytiga prednisone and Xgeva.  Getting Lupron regularly with Dr. Mattue next appointment coming in February.  We will repeat his CT chest abdomen pelvis and total body bone scan in April.  He will follow with my nurse practitioner in the interim.    -1/10/2023 PSA 3.450  -1/11/2023 [facility] [place] Oncology clinic follow-up: Mr. Morfin continues to do well on current therapy with Zytiga, prednisone and Xgeva with no unusual side effects.  He has no new worrisome symptoms.  He is due for his next Lupron injection in February with Dr. Matute.  His PSA continues to trend downward, current PSA from yesterday was 3.450.  We will continue therapy unchanged, he will receive Xgeva today.  Has had no hypocalcemia, his CMP, phosphorus and magnesium are all normal.  We will repeat restaging scans in April.  -3/7/2023 PSA 2.460  -3/8/2023 [facility] [place] Oncology clinic follow-up:  Mr. Morfin is doing well.  He is tolerating therapy with Zytiga, prednisone and Xgeva with no significant side effects.  He has hot flashes but these are tolerable.  He had Lupron injection 2/24/2023 with Dr. Matute.  His PSA continues to decline, current PSA 2.460.  He will continue therapy unchanged.  We will repeat restaging scans late April prior to return in 2 months and I have ordered those today.  He is working with his PCP Rodrigo Ram on his hypertension.  His b/p today was 160/88.  He was to have increased his losartan but I do not think he has done that yet as he said \"I still " "have some of my old prescription left\".  -4/4/2023 PSA 2.55  - 4/26/2023 CT chest abdomen pelvis [place] comparison 10/10/2022 showed stable left third rib, T1, left fourth and eighth rib, right seventh rib.  Probable liver cyst.  Few diverticuli.  Mild further decrease in a few of the previously enlarged nodes.  Left external iliac 12 mm compared to 16 mm.  Right common iliac 7 mm stable.  Lower left periaortic 8 mm previously 13 mm.  No new lytic or blastic osseous lesions.  Total body bone scan comparison 10/10/2022, 7/22/2022, and CT 4/26/2023.  No new sites of increased uptake.  1 focal left third rib hotspot consistent with bone metastasis.    -5/2/2023 [facility] oncology follow-up: I reviewed interval notes since I last saw him from my nurse practitioner as outlined above in interval images and reports thereof from [facility] that shows no new sites of bone metastasis and no kizzy or visceral progression.  PSA stabilized around 2.5.  In the absence of symptomatic or radiographic progression, I would be unlikely to change therapy just on the basis of a PSA rise and for now the PSA is stable.  We will continue Zytiga prednisone and Xgeva and he will follow-up with my nurse practitioner 5/30/2023 with repeat CTs and bone scan in 6 months.  I asked him to check with his primary care today regarding his systolic in the 170s.    -5/31/2023 [facility] clinic follow-up: Chris continues to do well on current therapy with Zytiga, prednisone and Xgeva along with Lupron that he receives with Dr. Matute.  His PSA remains stable, it was lower this month compared to last month, current PSA 1.940.  Continues to deal with hypertension, on arrival today his blood pressure was 210/92 however this was using a wrist cuff, when I rechecked it manually his blood pressure was 160/90.  Still has room for improvement despite recent increase in his losartan to 100 mg daily.  He will follow-up with Dr. Ram for " further management.  We will continue therapy unchanged.  We will repeat restaging scans in October.    -7/26/2023 [facility] Oncology clinic follow-up: Chris overall continues to do well on current therapy with Zytiga, prednisone and Xgeva.  He receives Lupron every 6 months with Dr. Matute and states that is scheduled for August.  PSA from yesterday is pending, PSA on 6/27/2023 was 1.590 which was continuing to decline, in February it was 3.250.  His creatinine this past month has bumped up.  He feels he is staying hydrated but I encouraged him to increase his fluid intake.  I discussed with him that this could be from his hypertension, some of his medications.  His blood pressure today is 165/92.  He has an appointment with his primary care provider Dr. Teri Ram next week and can further discuss with her.  No adjustment needed for Xgeva.  I refilled his prednisone today.  I will see him back in 1 month for follow-up with continued monitoring of his labs.  He may end up needing to see nephrology.  We will repeat restaging scans in October.    -9/29/2023 PSA 1.810  -8/23/2023 [facility] Oncology clinic follow-up: Chris continues on therapy with Zytiga, prednisone and Xgeva, he also receives Lupron every 6 months with Dr. Matute with most recent given on 8/7/2023.  He is scheduled for a cystoscopy in a few weeks as his last few UAs per the patient's report had microscopic hematuria, he has had no steve hematuria.  His creatinine continues to remain elevated at 1.82, BUN is 40, GFR is 40.  I will get him to nephrology for further evaluation.  Blood pressure today was fairly good at 161/86, he continues to follow with Dr. Ram for management.  PSA on 7/25/2023 was up slightly from prior month, PSA in July was 2.140, in June it was 1.590, PSA from yesterday is pending.  I plan on repeating restaging CT scans and bone scans in October however if his PSA is up significantly then I will do sooner.  I will see him  back for follow-up in 1 month.  -8/23/2024 PSA 1.860    -9/20/2023 [facility] clinic follow-up: Chris is tolerating treatment with Zytiga, prednisone and Xgeva.  He is up-to-date with his Lupron injection, last received in August with Dr. Matute.  He had cystoscopy on 9/8/2023 that was normal. I referred him to nephrology when I saw him last in August as his creatinine has been increasing, creatinine yesterday was a little better 1.51, GFR is 50, creatinine clearance 63.8.  He is still awaiting an appointment with nephrology, per our  it will be early November.  We will repeat his restaging scans prior to return and I will do his CT scans without contrast in light of his new renal insufficiency.  We will also get total body bone scan and I have ordered those today.  His PSA yesterday was 1.810.    -10/9/2023 CT chest abdomen Pelvis without contrast compared to April 2023 shows slightly prominent pelvic lymph nodes left external iliac 8 mm compared to prior study.  Right internal iliac heterogenous 19 mm compared to 11 mm prior.  Stable sclerotic bone lesions and no new lesions.  Stable bone scan with no new sites of disease    -10/17/2023 [facility] clinic follow-up: With elevated creatinine, CTs done without contrast showed very slight increased prominence by few millimeters of some internal iliac and external iliac nodes for which PSMA PET could be done but for now I will check his PSA and have him see my nurse practitioner back in a week and unless the PSA is significantly rising I would continue the Zytiga, prednisone, Xgeva and February dose of Lupron with Dr. Matute and make sure he follows with nephrology.  If his PSA is significantly rising then my nurse practitioner will order PSMA PET.    -10/17/2023 PSA 1.5  -11/14/2023 PSA 1.510  -11/15/2023 [facility] clinic follow-up: Mr. Ferrara continues to do well on current therapy with Zytiga, prednisone, Xgeva and Lupron that he  receives with Dr. Matute.  His PSA is stable at 1.5.  Would not change therapy for now, we will plan on repeating restaging scans in February unless PSA starts to rise or he has new concerns.  Overall he feels good.  He is now established with nephrology and will continue to follow with them regarding his renal insufficiency, I reviewed note from consultation with Dr. Armstrong 11/6/2023.    -1/9/2024 PSA 1.050    -1/10/2024 [facility] Oncology clinic follow-up: Chris overall continues to tolerate current therapy with Zytiga, prednisone, Xgeva and Lupron.  He states his next Lupron injection with Dr. Matute is due late February.  His PSA is stable at 1.050. Creatinine stable at 1.64, he is following with nephrology, he has a an appointment with Dr. Armstrong in February for follow-up.  Hypertension being managed by his primary care provider, target systolic ideally 120s-140s, today it is running a little high at 172/90, yesterday when he was here for labs it was 138/80.  We will continue therapy unchanged with plans to repeat restaging scans in April.    -2/6/2024 PSA 0.753    -2/7/2024 [facility] oncology clinic follow-up: Chris continues to do well on current therapy with Zytiga, prednisone, Xgeva and Lupron.  He is up-to-date on Lupron injection next due later this month with Dr. Matute.  His PSA continues to slowly decrease, current PSA is 0.753.  He has no new worrisome symptoms.  We will continue therapy unchanged, I have ordered restaging scans for late March prior to his return in April. He is following with nephrology for his renal insufficiency, his creatinine yesterday was good at 1.21.  I will do his CT scans with contrast unless something changes in the interim. He follows closely with Dr. Ram for management of his hypertension. I reviewed notes from his last office visit with her earlier this month on 2/1/2024, also reviewed labs in detail with the patient today.  All questions were answered to his  satisfaction.    -3/5/2024 PSA slowly declining 0.73.  Creatinine fluctuating.  1.69 with GFR 43 otherwise unremarkable CMP.  Phosphorus low 2.1    -4/8/2024 CT chest, abdomen and pelvis shows stable sclerotic lesions in the thoracic spine and bilateral ribs and right acetabulum.  Stable appearance of pelvic lymph nodes.  No new sites concerning for progression of disease.  Total body bone scan shows stable diffuse increased uptake of the left third rib correlates with diffuse sclerosis and enlargement on CT, no new sites of active osseous lesions.  -4/30/2024 PSA 0.713  -5/1/2024 [facility] oncology clinic follow-up: Chris continues to do well on current therapy with Zytiga, prednisone, Xgeva and Lupron.  He receives his Lupron every 6 months with Dr. Matute, last administered 2/5/2024.  Current CT scans and bone scan showed no progression of disease, PSA is stable at 0.713.  His CBC is unremarkable, CMP shows creatinine stable at 1.50 otherwise normal.  He does follow with nephrology.  We will continue treatment unchanged.  I will see him back in 2 months for follow-up.  We will repeat restaging scans in 6 months unless he has any new symptoms or significant change in his PSA.       Prostate cancer metastatic to multiple sites   3/15/2022 Initial Diagnosis    Prostate cancer metastatic to multiple sites (HCC)     3/15/2022 Cancer Staged    Staging form: Prostate, AJCC 8th Edition  - Clinical: Stage IVB (cT1c, cN1, cM1b, Grade Group: 5) - Signed by Fritz Goodson MD on 3/15/2022     4/7/2022 - 6/23/2022 Chemotherapy    Stopped after cycle 4 6/23/2022 due to syncope 3 days post infusions with negative cardiac work-up  OP PROSTATE DOCEtaxel     4/7/2022 -  Chemotherapy    OP SUPPORTIVE Denosumab (Xgeva) Q28D     7/26/2022 -  Chemotherapy    OP PROSTATE Abiraterone / PredniSONE           HISTORY OF PRESENT ILLNESS:  The patient is a 69 y.o. male, here for follow up on management of metastatic prostate cancer currently  "on treatment with Zytiga, prednisone, xgeva and lupron.  Crhis reports that he has been doing well since we saw him last with no new concerns.  He states he is eating well, appetite is normal, weight is stable.  No change in his bowel or bladder habits.  He has no new pain.  He states he occasionally has some right lower back pain but nothing that lasts.      Past Medical History:   Diagnosis Date    Cancer     Prostate cancer      Past Surgical History:   Procedure Laterality Date    PROSTATE BIOPSY  02/2022    dr. sue       No Known Allergies    Family History and Social History reviewed and changed as necessary    REVIEW OF SYSTEM:   No new somatic complaints    PHYSICAL EXAM:  Well-developed, well-nourished healthy appearing male in no distress  Lungs clear to auscultation bilaterally, respirations regular and unlabored  Heart regular rate and rhythm  No lower extremity edema  Skin without rash    Vitals:    06/26/24 0927   BP: 130/78   Pulse: 75   Resp: 18   Temp: 98.6 °F (37 °C)   SpO2: 95%   Weight: 116 kg (255 lb)   Height: 180.3 cm (71\")     Vitals:    06/26/24 0927   PainSc: 0-No pain          ECOG score: 0           Vitals reviewed.  Labs reviewed    ECOG: (0) Fully Active - Able to Carry On All Pre-disease Performance Without Restriction    Lab Results   Component Value Date    HGB 14.5 06/25/2024    HCT 43.5 06/25/2024    MCV 88.8 06/25/2024     06/25/2024    WBC 7.63 06/25/2024    NEUTROABS 4.21 06/25/2024    LYMPHSABS 2.51 06/25/2024    MONOSABS 0.65 06/25/2024    EOSABS 0.15 06/25/2024    BASOSABS 0.08 06/25/2024       Lab Results   Component Value Date    GLUCOSE 103 (H) 06/25/2024    BUN 32 (H) 06/25/2024    CREATININE 1.86 (H) 06/25/2024     06/25/2024    K 4.2 06/25/2024     06/25/2024    CO2 21.6 (L) 06/25/2024    CALCIUM 9.8 06/25/2024    ALBUMIN 4.6 06/25/2024    BILITOT 0.6 06/25/2024    ALKPHOS 86 06/25/2024    AST 19 06/25/2024    ALT 19 06/25/2024     Lab Results "   Component Value Date    PSA 0.623 06/25/2024    PSA 0.608 05/28/2024    PSA 0.713 04/30/2024    PSA 0.705 04/02/2024             ASSESSMENT & PLAN:  1.  Prostate cancer clinical T1c and 2M1B stage IVb treated with 4 cycles of Taxotere, stopped due to syncope with negative cardiac work-up, with marked drop in PSA of 18.6 from over 900 at baseline, continued on Zytiga and prednisone.  2.  Urinary retention with Goodwin in place 1/24/2022.  On finasteride 1/24/2022.  3.  Covid 19 December 2021  4.  Hypertension  5.  Chronic kidney disease stage 3a    Oncology history timeline:  -11/29/2021  with symptoms of urinary retention.  -2/15/2022 prostate biopsy adenocarcinoma grade group 5 and 3 out of 12 cores, grade group 4 in 1 out of 12 cores, grade group 3 in 8 out of 12 cores.  -2/24/2022 CT chest abdomen pelvis and total body bone scan shows left third rib, right acetabulum, periaortic and retroperitoneal kizzy metastases complaining of pain in the left chest and right hip.  Also possible sclerotic left 10th rib on CT.  Spleen mildly enlarged 13.8 cm.  Hepatic steatosis.  Small liver cyst.  Fat stranding in the periaortic and mesenteric region consistent with reactive/inflammatory stranding.  Multiple enlarged periaortic and retroperitoneal nodes and lymphadenopathy largest 4.9 cm.  CT chest describes tiny sclerotic foci in left eighth rib and right lateral seventh rib and T1.  Multiple small mediastinal and bilateral axillary nodes seen with small hypodense left thyroid nodule.  Creatinine 1.7.  -3/4/2022 office note Dr. Rolando Matute: Patient was seen after his elevated PSA by Dr. Vera and prostate biopsy scheduled.  However, he developed COVID-19 and this was canceled and the patient did not reschedule due to lack of insurance.  Saw Dr. Matute back on 1/24/2022 with plans to get staging CTs and bone scan with results as outlined above.  Started Casodex and to return on 3/11/2022 for Lupron.  Referral  made to medical oncology for other additional castrate naïve prostate cancer therapies.  TURP planned for urinary retention symptoms.    -3/15/2022 [facility] medical oncology initial consultation: I reviewed the above data with the patient.  With his fairly bulky retroperitoneal adenopathy and bone metastases including extra axial metastases, he would fit the data from the CHAARTED trial for which Taxotere x6 followed by Zytiga prednisone along with the planned Lupron already being planned by Dr. Matute would be reasonable.  Equally reasonable in terms of comparisons with bicalutamide and Lupron would be Zytiga prednisone plus Lupron or Xtandi plus Lupron or apalutamide plus Lupron.  None of these have been compared head-to-head but all have beaten combined androgen deprivation therapy with bicalutamide and Lupron.  At the age of 67 and in order to have as many bullets as possible down the road and hence saving some of the hormone blockade options for later and using chemotherapy when he is younger and healthier for a more time-limited.,  He has opted for the Taxotere x6 followed by Zytiga and prednisone.  We will also give him Xgeva to improve bone health and given metastatic disease, as with all metastatic prostate cancer patients, he needs genetic counseling both for his sake as well as his family's.  If he were to have BRCA1 or other such  mutations, then that also opens up the options for PARP inhibitors etc. down the road.  He has his TURP scheduled for 2/24/2022 and we will start treatment a couple of weeks after that and he will get chemo preparation visit in the meantime and I will get capital surgeons to place a port.  We will check his PSA after his TURP when he sees my nurse practitioner back early April for the start of chemotherapy and repeat the PSA and CT chest abdomen pelvis and bone scan after the Taxotere is complete.    -3/31/2022 chemotherapy education and needs assessment  completed    -4/7/2022 began docetaxel and Xgeva.  .0.  We will do his Xgeva every 6 weeks to coordinate with his docetaxel infusions.    -4/19/2022 patient stopped Casodex    -5/17/2022 patient reported seeing his PCP for an abscess in his suprapubic area.  Placed on clindamycin, will delay treatment 1 week.    -5/25/2022 PSA 34.3  -5/26/2022 [facility] Oncology clinic follow-up: Chris has an abscess in the suprapubic area, it has improved on antibiotic therapy but I am concerned if we treat him it will just flare back up unless it is drained.  I will get him to Dr. Miller who placed his port for I&D and culture.  He is on clindamycin and states he completes course of treatment tomorrow.  I will delay his treatment with Taxotere 1 more week.  We will give his Xgeva today.  I will see him back in 1 week for follow-up to assess whether or not we can resume his Taxotere.  His PSA has dropped nicely with treatment thus far, PSA yesterday was 34.3 which is down from a PSA of 259.0 when we started treatment, it has been as high as 911 in November.    -6/2/2022 [facility] Oncology clinic follow-up: Chris went to see Dr. Miller to have his abscess lanced however apparently there was a long wait and he left without being seen.  He did call his PCP and was given 5 more days of clindamycin and the abscess now seems to have resolved.  We discussed today that he is at risk for recurrence of infection due to immunocompromise state with chemotherapy.  He will notify us if he has any return of his abscess.  He does have intertrigo under his panniculus, I have advised him to keep this area dry, to apply topical drying/antifungal powders.  Also recommended looser clothing.  His counts are adequate to continue therapy, we will resume Taxotere today with cycle #3.  We will repeat restaging scans after 6 courses.  We are doing Xgeva every 6 weeks to coordinate with his Taxotere.  Once he finishes Taxotere we can then go back to every  4 weeks.  His next Xgeva is not due until 7/14/2022.  His calcium is running a little low, he is going to  calcium supplement.    -6/23/2022 [facility] clinic follow-up: Chris overall is doing well on treatment with Taxotere.  Labs reviewed from yesterday and are unremarkable.  We will continue treatment today unchanged.  His suprapubic abscess resolved on clindamycin.  He will continue to use drying powder/antifungal powder under his panniculus for intertrigo.  Today will be cycle #4 of a planned 6 courses of Taxotere.  He is also on Xgeva, next dose due 7/14/2022, we are doing this every 6 weeks for now to line up with his chemotherapy, can go back to every 4 weeks after he finishes Taxotere.  Calcium from CMP yesterday was normal.    -7/13/2022 [facility] clinic follow-up: Mr. Morfin has had issues around day 3 after each treatment ranging from chest pain after his first treatment for which he did not seek medical attention, fatigue after the next few treatments, but 3 days after his most recent treatment on 6/23/2022 he had a syncopal episode at home and once again did not seek medical attention.  I discussed with him that this was not acceptable, if he has occurrences like this someone needs to call 911, it is difficult to figure out what is going on after the fact, the best time to work this up would be during the occurrence.  For now we will get labs today with CBC, CMP, PSA.  I will refer him to cardiology for further evaluation.  We will hold Taxotere most likely, I will see him tomorrow to go over his lab results.  I will also repeat his restaging scans with CT chest, abdomen and pelvis and will include CT of the head in light of his syncopal episode.      -7/13/2022 PSA 18.6    -7/14/2022 [facility] clinic follow-up: Mr. Morfin returns today to review his labs from yesterday.  Labs are unremarkable.  PSA down to 18.6 from a high of 259.0 in April prior to starting treatment.  CBC  and CMP unremarkable, magnesium is normal at 2.3, phosphorus slightly low at 2.4.  We will hold therapy for now in light of his syncopal episode on day 3 after his last treatment and prior episodes with chest pain and severe fatigue that all occurred on day 3 after his Taxotere.  We will restage him with CT head, chest, abdomen and pelvis (I am including the head in light of his syncopal episode).  I have also referred him to cardiology, he states that he received a call from them about making an appointment however he wanted to talk to us today first.  I emphasized the importance to him of making and keeping that appointment and he states understanding.  I also once again emphasized the requirement to seek emergency medical attention if he has any episodes in the future such as chest pain or syncopal events.  Whether or not we try and complete Taxotere with 2 more courses or move to the next line of therapy will be decided when he returns to discuss with Dr. Goodson and review his restaging scans.    -7/15/2022 saw Dr. Diaz cardiologist.  For syncope he ordered echocardiogram and 48-hour Holter monitor.    -7/15/2022 Holter monitor relatively benign.    -7/22/2022 CT brain with and without contrast negative.  CT chest abdomen and pelvis shows some nonspecific cecal wall thickening.  No progression in the chest.  T1 and ribs stable.  Decrease in multiple retroperitoneal and pelvic nodes.  Slight increase in right acetabular sclerotic lesion.  Persistent but improved circumferential bladder wall thickening.  Total body bone scan shows stable left third rib and no evidence of new bone metastases.    -7/19/2022 ejection fraction 65% with grade 1 diastolic dysfunction.    -7/26/2022 [facility] clinic follow-up: Given presyncopal symptoms occurred 3 days after Taxotere and has not otherwise occurred any other time and his cardiology work-up is fairly unremarkable and his disease is under good control as witnessed  by the report above of the CTs of head chest abdomen pelvis and bone scan, I will hold cycle 5 and 6 of Taxotere and continue 1 now with Zytiga and prednisone.  With reference to the coincidental cecal wall thickening found on CT, we will get him to [facility] surgeons for colonoscopy.  He will see my nurse practitioner back in August for next cycle of Abiraterone prednisone.  He will continue his Lupron with Dr. Matute.  We will continue his Xgeva.  We will repeat his CT chest abdomen pelvis and bone scan again in 3 months.  He will see my nurse practitioner in the interim.    -8/23/2022 [facility] [place] oncology clinic follow-up: No further presyncopal symptoms.  Feeling great other than for hot flashes.  Did commend for now we will continue on with his Zytiga prednisone and he will get his Xgeva today based on labs from 8/10/2022.  He opted out of getting the colonoscopy that I recommended and he will not change his mind.  He will continue getting the Lupron with Dr. Matute and I asked him to give the date of this appointment to my nurse when he sees her back in a month.  We will give his Xgeva today.  We will get CT chest abdomen pelvis and total body bone scan towards the middle to end of October.  Presently other than for the hot flashes feels great.    -9/20/2022 PSA 8.320    -9/22/2022 [facility] [place] Oncology clinic follow-up: Chris is doing well on Zytiga, prednisone along with Xgeva.  Labs reviewed from 9/20/2022 are unremarkable, CBC was normal, CMP with creatinine of 1.34 otherwise normal, this is stable from his baseline.  PSA stable at 8.320.  He received Lupron recently with Dr. Matute, he thinks last week or so, states his next appointment is in February.  He will continue treatment unchanged.  We will repeat restaging CT chest, abdomen and pelvis and total body bone scan in October prior to return and I have ordered those today.  We will also repeat his PSA.    -9/22/2022 CT chest abdomen pelvisCompared to July  2022 [place] [facility] showed no evidence of disease progression in the chest with no substantial sclerotic bony lesions and decrease in size of retroperitoneal and pelvic nodes with persistent cecal wall thickening and suboptimal bladder distention.  Total body bone scan showed decrease in previously seen uptake in the left third rib with diffuse sclerosis representing treatment response with no new foci.    -10/18/2022 [facility] medical oncology follow-up: I reviewed bone scan and CT reports as above with no evidence of progression.  Tolerating Zytiga prednisone and Xgeva.  Getting Lupron regularly with Dr. Matute next appointment coming in February.  We will repeat his CT chest abdomen pelvis and total body bone scan in April.  He will follow with my nurse practitioner in the interim.    -1/10/2023 PSA 3.450  -1/11/2023 [facility] Oncology clinic follow-up: Mr. Morfin continues to do well on current therapy with Zytiga, prednisone and Xgeva with no unusual side effects.  He has no new worrisome symptoms.  He is due for his next Lupron injection in February with Dr. Matute.  His PSA continues to trend downward, current PSA from yesterday was 3.450.  We will continue therapy unchanged, he will receive Xgeva today.  Has had no hypocalcemia, his CMP, phosphorus and magnesium are all normal.  We will repeat restaging scans in April.  -3/7/2023 PSA 2.460  -3/8/2023 [facility] Oncology clinic follow-up:  Mr. Mrofin is doing well.  He is tolerating therapy with Zytiga, prednisone and Xgeva with no significant side effects.  He has hot flashes but these are tolerable.  He had Lupron injection 2/24/2023 with Dr. Matute.  His PSA continues to decline, current PSA 2.460.  He will continue therapy unchanged.  We will repeat restaging scans late April prior to return in 2 months and I have ordered those today.  He is working with his PCP Rodrigo Ram on his hypertension.  His b/p today was 160/88.  He was to have increased his  "losartan but I do not think he has done that yet as he said \"I still have some of my old prescription left\".  -4/4/2023 PSA 2.55  - 4/26/2023 CT chest abdomen pelvis [place] comparison 10/10/2022 showed stable left third rib, T1, left fourth and eighth rib, right seventh rib.  Probable liver cyst.  Few diverticuli.  Mild further decrease in a few of the previously enlarged nodes.  Left external iliac 12 mm compared to 16 mm.  Right common iliac 7 mm stable.  Lower left periaortic 8 mm previously 13 mm.  No new lytic or blastic osseous lesions.  Total body bone scan comparison 10/10/2022, 7/22/2022, and CT 4/26/2023.  No new sites of increased uptake.  1 focal left third rib hotspot consistent with bone metastasis.    -5/2/2023 [facility] oncology follow-up: I reviewed interval notes since I last saw him from my nurse practitioner as outlined above in interval images and reports thereof from [facility] that shows no new sites of bone metastasis and no kizzy or visceral progression.  PSA stabilized around 2.5.  In the absence of symptomatic or radiographic progression, I would be unlikely to change therapy just on the basis of a PSA rise and for now the PSA is stable.  We will continue Zytiga prednisone and Xgeva and he will follow-up with my nurse practitioner 5/30/2023 with repeat CTs and bone scan in 6 months.  I asked him to check with his primary care today regarding his systolic in the 170s.    -5/31/2023 [facility] clinic follow-up: Chris continues to do well on current therapy with Zytiga, prednisone and Xgeva along with Lupron that he receives with Dr. Matute.  His PSA remains stable, it was lower this month compared to last month, current PSA 1.940.  Continues to deal with hypertension, on arrival today his blood pressure was 210/92 however this was using a wrist cuff, when I rechecked it manually his blood pressure was 160/90.  Still has room for improvement despite recent increase in " his losartan to 100 mg daily.  He will follow-up with Dr. Ram for further management.  We will continue therapy unchanged.  We will repeat restaging scans in October.    -7/26/2023 [facility] clinic follow-up: Chris overall continues to do well on current therapy with Zytiga, prednisone and Xgeva.  He receives Lupron every 6 months with Dr. Matute and states that is scheduled for August.  PSA from yesterday is pending, PSA on 6/27/2023 was 1.590 which was continuing to decline, in February it was 3.250.  His creatinine this past month has bumped up.  He feels he is staying hydrated but I encouraged him to increase his fluid intake.  I discussed with him that this could be from his hypertension, some of his medications.  His blood pressure today is 165/92.  He has an appointment with his primary care provider Dr. Teri Ram next week and can further discuss with her.  No adjustment needed for Xgeva.  I refilled his prednisone today.  I will see him back in 1 month for follow-up with continued monitoring of his labs.  He may end up needing to see nephrology.  We will repeat restaging scans in October.    -9/29/2023 PSA 1.810  -8/23/2023 [facility] clinic follow-up: Chris continues on therapy with Zytiga, prednisone and Xgeva, he also receives Lupron every 6 months with Dr. Matute with most recent given on 8/7/2023.  He is scheduled for a cystoscopy in a few weeks as his last few UAs per the patient's report had microscopic hematuria, he has had no steve hematuria.  His creatinine continues to remain elevated at 1.82, BUN is 40, GFR is 40.  I will get him to nephrology for further evaluation.  Blood pressure today was fairly good at 161/86, he continues to follow with Dr. Ram for management.  PSA on 7/25/2023 was up slightly from prior month, PSA in July was 2.140, in June it was 1.590, PSA from yesterday is pending.  I plan on repeating restaging CT scans and bone scans in October however  if his PSA is up significantly then I will do sooner.  I will see him back for follow-up in 1 month.  -8/23/2024 PSA 1.860    -9/20/2023 [facility] Oncology clinic follow-up: Chris is tolerating treatment with Zytiga, prednisone and Xgeva.  He is up-to-date with his Lupron injection, last received in August with Dr. Matute.  He had cystoscopy on 9/8/2023 that was normal. I referred him to nephrology when I saw him last in August as his creatinine has been increasing, creatinine yesterday was a little better 1.51, GFR is 50, creatinine clearance 63.8.  He is still awaiting an appointment with nephrology, per our  it will be early November.  We will repeat his restaging scans prior to return and I will do his CT scans without contrast in light of his new renal insufficiency.  We will also get total body bone scan and I have ordered those today.  His PSA yesterday was 1.810.    -10/9/2023 CT chest abdomen Pelvis without contrast compared to April 2023 shows slightly prominent pelvic lymph nodes left external iliac 8 mm compared to prior study.  Right internal iliac heterogenous 19 mm compared to 11 mm prior.  Stable sclerotic bone lesions and no new lesions.  Stable bone scan with no new sites of disease    -10/17/2023 [facility] oncology clinic follow-up: With elevated creatinine, CTs done without contrast showed very slight increased prominence by few millimeters of some internal iliac and external iliac nodes for which PSMA PET could be done but for now I will check his PSA and have him see my nurse practitioner back in a week and unless the PSA is significantly rising I would continue the Zytiga, prednisone, Xgeva and February dose of Lupron with Dr. Matute and make sure he follows with nephrology.  If his PSA is significantly rising then my nurse practitioner will order PSMA PET.    -11/15/2023 [facility] Oncology clinic follow-up: Mr. Ferrara continues to do well on current therapy with Zytiga, prednisone,  Xgeva and Lupron that he receives with Dr. Matute.  His PSA is stable at 1.5.  Would not change therapy for now, we will plan on repeating restaging scans in February unless PSA starts to rise or he has new concerns.  Overall he feels good.  He is now established with nephrology and will continue to follow with them regarding his renal insufficiency, I reviewed note from consultation with Dr. Armstrong 11/6/2023.    -12/13/2023 [facility] Oncology clinic follow-up: Chris continues to do well on current therapy with Zytiga, prednisone, Xgeva and Lupron.  He thinks his next Lupron with Dr. Matute is around February.  His CBC and CMP remain unremarkable.  He will continue treatment unchanged.  We will repeat his PSA next month.  Will plan on restaging scans in April in the absence of new symptoms and as long as his PSA remains stable.  Continues to deal with hypertension, blood pressure today 175/94, encouraged him to follow-up with PCP.    -1/10/2024 [facility] Oncology clinic follow-up: Chris overall continues to tolerate current therapy with Zytiga, prednisone, Xgeva and Lupron.  He states his next Lupron injection with Dr. Matute is due late February.  His PSA is stable at 1.050. Creatinine stable at 1.64, he is following with nephrology, he has a an appointment with Dr. Armstrong in February for follow-up.  Hypertension being managed by his primary care provider, target systolic ideally 120s-140s, today it is running a little high at 172/90, yesterday when he was here for labs it was 138/80.  We will continue therapy unchanged with plans to repeat restaging scans in April.    -2/6/2024 PSA 0.753    -2/7/2024 [facility] oncology clinic follow-up: Chris continues to do well on current therapy with Zytiga, prednisone, Xgeva and Lupron.  He is up-to-date on Lupron injection next due later this month with Dr. Matute.  His PSA continues to slowly decrease, current PSA is 0.753.  He has no new worrisome symptoms.  We will  continue therapy unchanged, I have ordered restaging scans for late March prior to his return in April. He is following with nephrology for his renal insufficiency, his creatinine yesterday was good at 1.21.  I will do his CT scans with contrast unless something changes in the interim. He follows closely with Dr. Ram for management of his hypertension. I reviewed notes from his last office visit with her earlier this month on 2/1/2024, also reviewed labs in detail with the patient today.  All questions were answered to his satisfaction.    -4/8/2024 CT chest, abdomen and pelvis shows stable sclerotic lesions in the thoracic spine and bilateral ribs and right acetabulum.  Stable appearance of pelvic lymph nodes.  No new sites concerning for progression of disease.  Total body bone scan shows stable diffuse increased uptake of the left third rib correlates with diffuse sclerosis and enlargement on CT, no new sites of active osseous lesions.  -4/30/2024 PSA 0.713  -5/1/2024 [facility] follow-up: Chris continues to do well on current therapy with Zytiga, prednisone, Xgeva and Lupron.  He receives his Lupron every 6 months with Dr. Matute, last administered 2/5/2024.  Current CT scans and bone scan showed no progression of disease, PSA is stable at 0.713.  His CBC is unremarkable, CMP shows creatinine stable at 1.50 otherwise normal.  He does follow with nephrology.  We will continue treatment unchanged.  I will see him back in 2 months for follow-up.  We will repeat restaging scans in 6 months unless he has any new symptoms or significant change in his PSA.    -6/25/2024 PSA 0.623  -6/26/2024 [facility] follow-up: Chris overall continues to tolerate current therapy with Zytiga, prednisone, Xgeva and Lupron.  We did call to confirm his next Lupron injection and that is scheduled for late July with Dr. Matute.  His PSA for the most part is stable at 0.623, we will continue to monitor.  He  has no new worrisome symptoms.  I did discuss with him today his creatinine which has increased to 1.86, BUN was 32.  Reminded him to be sure and stay hydrated.  He does follow with nephrology and his next appointment is in August.  He is on Xgeva however there is no indication for any adjustment, his creatinine clearance is greater than 30.  Blood pressure today was under good control at 130/78.  We will continue treatment unchanged.  I will see him back in 1 month for follow-up.  Plan to repeat restaging scans in October unless his PSA rises or he has worrisome symptoms.    This was a level 4, moderate MDM visit with management of side effects of therapy, review of labs, management of drug therapy requiring intensive monitoring for toxicity.    Susana Torres, APRN    06/26/2024

## 2024-07-22 ENCOUNTER — SPECIALTY PHARMACY (OUTPATIENT)
Facility: HOSPITAL | Age: 69
End: 2024-07-22
Payer: MEDICARE

## 2024-07-22 NOTE — PROGRESS NOTES
Specialty Pharmacy Refill Coordination Note     Chris is a 69 y.o. male contacted today regarding refills of  abiraterone 1000mg PO QD of  specialty medication(s).    Reviewed and verified with patient: YES    Specialty medication(s) and dose(s) confirmed: yes    Refill Questions      Flowsheet Row Most Recent Value   Changes to allergies? No   New conditions or infections since last clinic visit No   Unplanned office visit, urgent care, ED, or hospital admission in the last 4 weeks  No   How does patient/caregiver feel medication is working? Good   Financial problems or insurance changes  No   Since the previous refill, were any specialty medication doses or scheduled injections missed or delayed?  No   Does this patient require a clinical escalation to a pharmacist? No            Delivery Questions      Flowsheet Row Most Recent Value   Delivery method [employer]   Delivery address verified with patient/caregiver? Yes   Delivery address Home   Number of medications in delivery 1   Medication(s) being filled and delivered Abiraterone Acetate   Doses left of specialty medications 5 days left   Copay verified? Yes   Copay amount $0   Copay form of payment No copayment ($0)   Ship Date 7/22   Delivery Date 7/23   Signature Required No                   Follow-up: 30 day(s)     Catalina Montes De Oca, Pharmacy Technician  Specialty Pharmacy Technician

## 2024-07-23 ENCOUNTER — LAB (OUTPATIENT)
Dept: ONCOLOGY | Facility: HOSPITAL | Age: 69
End: 2024-07-23
Payer: MEDICARE

## 2024-07-23 VITALS
RESPIRATION RATE: 18 BRPM | SYSTOLIC BLOOD PRESSURE: 176 MMHG | HEART RATE: 79 BPM | TEMPERATURE: 98.1 F | WEIGHT: 256 LBS | DIASTOLIC BLOOD PRESSURE: 80 MMHG | BODY MASS INDEX: 35.7 KG/M2

## 2024-07-23 DIAGNOSIS — C61 PROSTATE CANCER METASTATIC TO MULTIPLE SITES: ICD-10-CM

## 2024-07-23 PROCEDURE — 36415 COLL VENOUS BLD VENIPUNCTURE: CPT

## 2024-07-24 ENCOUNTER — OFFICE VISIT (OUTPATIENT)
Dept: ONCOLOGY | Facility: CLINIC | Age: 69
End: 2024-07-24
Payer: MEDICARE

## 2024-07-24 ENCOUNTER — INFUSION (OUTPATIENT)
Dept: ONCOLOGY | Facility: HOSPITAL | Age: 69
End: 2024-07-24
Payer: MEDICARE

## 2024-07-24 VITALS
TEMPERATURE: 97.8 F | HEIGHT: 71 IN | SYSTOLIC BLOOD PRESSURE: 145 MMHG | DIASTOLIC BLOOD PRESSURE: 90 MMHG | HEART RATE: 67 BPM | BODY MASS INDEX: 35.84 KG/M2 | WEIGHT: 256 LBS | OXYGEN SATURATION: 96 % | RESPIRATION RATE: 18 BRPM

## 2024-07-24 DIAGNOSIS — C61 PROSTATE CANCER METASTATIC TO MULTIPLE SITES: Primary | ICD-10-CM

## 2024-07-24 LAB
ALBUMIN SERPL-MCNC: 4.4 G/DL (ref 3.5–5.2)
ALBUMIN/GLOB SERPL: 1.7 G/DL
ALP SERPL-CCNC: 84 U/L (ref 39–117)
ALT SERPL-CCNC: 15 U/L (ref 1–41)
AST SERPL-CCNC: 16 U/L (ref 1–40)
BASOPHILS # BLD AUTO: 0.06 10*3/MM3 (ref 0–0.2)
BASOPHILS NFR BLD AUTO: 0.8 % (ref 0–1.5)
BILIRUB SERPL-MCNC: 0.4 MG/DL (ref 0–1.2)
BUN SERPL-MCNC: 24 MG/DL (ref 8–23)
BUN/CREAT SERPL: 14.2 (ref 7–25)
CALCIUM SERPL-MCNC: 9.1 MG/DL (ref 8.6–10.5)
CHLORIDE SERPL-SCNC: 105 MMOL/L (ref 98–107)
CO2 SERPL-SCNC: 21.5 MMOL/L (ref 22–29)
CREAT SERPL-MCNC: 1.69 MG/DL (ref 0.76–1.27)
EGFRCR SERPLBLD CKD-EPI 2021: 43.4 ML/MIN/1.73
EOSINOPHIL # BLD AUTO: 0.14 10*3/MM3 (ref 0–0.4)
EOSINOPHIL NFR BLD AUTO: 1.9 % (ref 0.3–6.2)
ERYTHROCYTE [DISTWIDTH] IN BLOOD BY AUTOMATED COUNT: 13.3 % (ref 12.3–15.4)
GLOBULIN SER CALC-MCNC: 2.6 GM/DL
GLUCOSE SERPL-MCNC: 140 MG/DL (ref 65–99)
HCT VFR BLD AUTO: 41.2 % (ref 37.5–51)
HGB BLD-MCNC: 13.8 G/DL (ref 13–17.7)
IMM GRANULOCYTES # BLD AUTO: 0.03 10*3/MM3 (ref 0–0.05)
IMM GRANULOCYTES NFR BLD AUTO: 0.4 % (ref 0–0.5)
LYMPHOCYTES # BLD AUTO: 2.22 10*3/MM3 (ref 0.7–3.1)
LYMPHOCYTES NFR BLD AUTO: 29.6 % (ref 19.6–45.3)
MAGNESIUM SERPL-MCNC: 2.3 MG/DL (ref 1.6–2.4)
MCH RBC QN AUTO: 30.1 PG (ref 26.6–33)
MCHC RBC AUTO-ENTMCNC: 33.5 G/DL (ref 31.5–35.7)
MCV RBC AUTO: 90 FL (ref 79–97)
MONOCYTES # BLD AUTO: 0.59 10*3/MM3 (ref 0.1–0.9)
MONOCYTES NFR BLD AUTO: 7.9 % (ref 5–12)
NEUTROPHILS # BLD AUTO: 4.47 10*3/MM3 (ref 1.7–7)
NEUTROPHILS NFR BLD AUTO: 59.4 % (ref 42.7–76)
NRBC BLD AUTO-RTO: 0 /100 WBC (ref 0–0.2)
PHOSPHATE SERPL-MCNC: 2.7 MG/DL (ref 2.5–4.5)
PLATELET # BLD AUTO: 244 10*3/MM3 (ref 140–450)
POTASSIUM SERPL-SCNC: 4.4 MMOL/L (ref 3.5–5.2)
PROT SERPL-MCNC: 7 G/DL (ref 6–8.5)
PSA SERPL-MCNC: 0.61 NG/ML (ref 0–4)
RBC # BLD AUTO: 4.58 10*6/MM3 (ref 4.14–5.8)
SODIUM SERPL-SCNC: 141 MMOL/L (ref 136–145)
WBC # BLD AUTO: 7.51 10*3/MM3 (ref 3.4–10.8)

## 2024-07-24 PROCEDURE — 99214 OFFICE O/P EST MOD 30 MIN: CPT | Performed by: NURSE PRACTITIONER

## 2024-07-24 PROCEDURE — 1126F AMNT PAIN NOTED NONE PRSNT: CPT | Performed by: NURSE PRACTITIONER

## 2024-07-24 PROCEDURE — 1159F MED LIST DOCD IN RCRD: CPT | Performed by: NURSE PRACTITIONER

## 2024-07-24 PROCEDURE — 96372 THER/PROPH/DIAG INJ SC/IM: CPT

## 2024-07-24 PROCEDURE — 1160F RVW MEDS BY RX/DR IN RCRD: CPT | Performed by: NURSE PRACTITIONER

## 2024-07-24 PROCEDURE — 25010000002 DENOSUMAB 120 MG/1.7ML SOLUTION: Performed by: NURSE PRACTITIONER

## 2024-07-24 RX ADMIN — DENOSUMAB 120 MG: 120 INJECTION SUBCUTANEOUS at 10:21

## 2024-07-24 NOTE — PROGRESS NOTES
CHIEF COMPLAINT: Prostate cancer    Problem List:  Oncology/Hematology History Overview Note   1.  Prostate cancer clinical T1c and 2M1B stage IVb treated with 4 cycles of Taxotere, stopped due to syncope with negative cardiac work-up, with marked drop in PSA of 18.6 from over 900 at baseline, continued on Zytiga and prednisone.  2.  Urinary retention with Goodwin in place 1/24/2022.  On finasteride 1/24/2022.  3.  Covid 19 December 2021  4.  Hypertension  5.  Stage 3a chronic kidney disease    Oncology history timeline:  -11/29/2021  with symptoms of urinary retention.  -2/15/2022 prostate biopsy adenocarcinoma grade group 5 and 3 out of 12 cores, grade group 4 in 1 out of 12 cores, grade group 3 in 8 out of 12 cores.  -2/24/2022 CT chest abdomen pelvis and total body bone scan shows left third rib, right acetabulum, periaortic and retroperitoneal kizzy metastases complaining of pain in the left chest and right hip.  Also possible sclerotic left 10th rib on CT.  Spleen mildly enlarged 13.8 cm.  Hepatic steatosis.  Small liver cyst.  Fat stranding in the periaortic and mesenteric region consistent with reactive/inflammatory stranding.  Multiple enlarged periaortic and retroperitoneal nodes and lymphadenopathy largest 4.9 cm.  CT chest describes tiny sclerotic foci in left eighth rib and right lateral seventh rib and T1.  Multiple small mediastinal and bilateral axillary nodes seen with small hypodense left thyroid nodule.  Creatinine 1.7.  -3/4/2022 office note Dr. Rolando Matute: Patient was seen after his elevated PSA by Dr. Vera and prostate biopsy scheduled.  However, he developed COVID-19 and this was canceled and the patient did not reschedule due to lack of insurance.  Saw Dr. Matute back on 1/24/2022 with plans to get staging CTs and bone scan with results as outlined above.  Started Casodex and to return on 3/11/2022 for Lupron.  Referral made to medical oncology for other additional castrate  naïve prostate cancer therapies.  TURP planned for urinary retention symptoms.    -3/15/2022 [facility] medical oncology initial consultation: I reviewed the above data with the patient.  With his fairly bulky retroperitoneal adenopathy and bone metastases including extra axial metastases, he would fit the data from the CHAARTED trial for which Taxotere x6 followed by Zytiga prednisone along with the planned Lupron already being planned by Dr. Matute would be reasonable.  Equally reasonable in terms of comparisons with bicalutamide and Lupron would be Zytiga prednisone plus Lupron or Xtandi plus Lupron or apalutamide plus Lupron.  None of these have been compared head-to-head but all have beaten combined androgen deprivation therapy with bicalutamide and Lupron.  At the age of 67 and in order to have as many bullets as possible down the road and hence saving some of the hormone blockade options for later and using chemotherapy when he is younger and healthier for a more time-limited.,  He has opted for the Taxotere x6 followed by Zytiga and prednisone.  We will also give him Xgeva to improve bone health and given metastatic disease, as with all metastatic prostate cancer patients, he needs genetic counseling both for his sake as well as his family's.  If he were to have BRCA1 or other such  mutations, then that also opens up the options for PARP inhibitors etc. down the road.  He has his TURP scheduled for 2/24/2022 and we will start treatment a couple of weeks after that and he will get chemo preparation visit in the meantime and I will get capital surgeons to place a port.  We will check his PSA after his TURP when he sees my nurse practitioner back early April for the start of chemotherapy and repeat the PSA and CT chest abdomen pelvis and bone scan after the Taxotere is complete.    -3/31/2022 chemotherapy education and needs assessment completed    -4/7/2022 began docetaxel and Xgeva.  .0.  We will  do his Xgeva every 6 weeks to coordinate with his docetaxel infusions.    -4/19/2022 patient stopped Casodex    -5/17/2022 patient reported seeing his PCP for an abscess in his suprapubic area.  Placed on clindamycin, will delay treatment 1 week.    -5/25/2022 PSA 34.3  -5/26/2022 [facility] Oncology clinic follow-up: Chris has an abscess in the suprapubic area, it has improved on antibiotic therapy but I am concerned if we treat him it will just flare back up unless it is drained.  I will get him to Dr. Miller who placed his port for I&D and culture.  He is on clindamycin and states he completes course of treatment tomorrow.  I will delay his treatment with Taxotere 1 more week.  We will give his Xgeva today.  I will see him back in 1 week for follow-up to assess whether or not we can resume his Taxotere.  His PSA has dropped nicely with treatment thus far, PSA yesterday was 34.3 which is down from a PSA of 259.0 when we started treatment, it has been as high as 911 in November.    -6/2/2022 [facility] Oncology clinic follow-up: Chris went to see Dr. Miller to have his abscess lanced however apparently there was a long wait and he left without being seen.  He did call his PCP and was given 5 more days of clindamycin and the abscess now seems to have resolved.  We discussed today that he is at risk for recurrence of infection due to immunocompromise state with chemotherapy.  He will notify us if he has any return of his abscess.  He does have intertrigo under his panniculus, I have advised him to keep this area dry, to apply topical drying/antifungal powders.  Also recommended looser clothing.  His counts are adequate to continue therapy, we will resume Taxotere today with cycle #3.  We will repeat restaging scans after 6 courses.  We are doing Xgeva every 6 weeks to coordinate with his Taxotere.  Once he finishes Taxotere we can then go back to every 4 weeks.  His next Xgeva is not due until 7/14/2022.  His calcium is  running a little low, he is going to  calcium supplement.    -6/23/2022 [facility] clinic follow-up: Chrsi overall is doing well on treatment with Taxotere.  Labs reviewed from yesterday and are unremarkable.  We will continue treatment today unchanged.  His suprapubic abscess resolved on clindamycin.  He will continue to use drying powder/antifungal powder under his panniculus for intertrigo.  Today will be cycle #4 of a planned 6 courses of Taxotere.  He is also on Xgeva, next dose due 7/14/2022, we are doing this every 6 weeks for now to line up with his chemotherapy, can go back to every 4 weeks after he finishes Taxotere.  Calcium from CMP yesterday was normal.    -7/13/2022 [facility] clinic follow-up: Mr. Morfin has had issues around day 3 after each treatment ranging from chest pain after his first treatment for which he did not seek medical attention, fatigue after the next few treatments, but 3 days after his most recent treatment on 6/23/2022 he had a syncopal episode at home and once again did not seek medical attention.  I discussed with him that this was not acceptable, if he has occurrences like this someone needs to call 911, it is difficult to figure out what is going on after the fact, the best time to work this up would be during the occurrence.  For now we will get labs today with CBC, CMP, PSA.  I will refer him to cardiology for further evaluation.  We will hold Taxotere most likely, I will see him tomorrow to go over his lab results.  I will also repeat his restaging scans with CT chest, abdomen and pelvis and will include CT of the head in light of his syncopal episode.      -7/13/2022 PSA 18.6    -7/14/2022 [facility] clinic follow-up: Mr. Morfin returns today to review his labs from yesterday.  Labs are unremarkable.  PSA down to 18.6 from a high of 259.0 in April prior to starting treatment.  CBC and CMP unremarkable, magnesium is normal at 2.3, phosphorus slightly  low at 2.4.  We will hold therapy for now in light of his syncopal episode on day 3 after his last treatment and prior episodes with chest pain and severe fatigue that all occurred on day 3 after his Taxotere.  We will restage him with CT head, chest, abdomen and pelvis (I am including the head in light of his syncopal episode).  I have also referred him to cardiology, he states that he received a call from them about making an appointment however he wanted to talk to us today first.  I emphasized the importance to him of making and keeping that appointment and he states understanding.  I also once again emphasized the requirement to seek emergency medical attention if he has any episodes in the future such as chest pain or syncopal events.  Whether or not we try and complete Taxotere with 2 more courses or move to the next line of therapy will be decided when he returns to discuss with Dr. Goodson and review his restaging scans.    -7/15/2022 saw Dr. Diaz cardiologist.  For syncope he ordered echocardiogram and 48-hour Holter monitor.    -7/15/2022 Holter monitor relatively benign.    -7/22/2022 CT brain with and without contrast negative.  CT chest abdomen and pelvis shows some nonspecific cecal wall thickening.  No progression in the chest.  T1 and ribs stable.  Decrease in multiple retroperitoneal and pelvic nodes.  Slight increase in right acetabular sclerotic lesion.  Persistent but improved circumferential bladder wall thickening.  Total body bone scan shows stable left third rib and no evidence of new bone metastases.    -7/19/2022 ejection fraction 65% with grade 1 diastolic dysfunction.    -7/26/2022 [facility] follow-up: Given presyncopal symptoms occurred 3 days after Taxotere and has not otherwise occurred any other time and his cardiology work-up is fairly unremarkable and his disease is under good control as witnessed by the report above of the CTs of head chest abdomen pelvis and bone  scan, I will hold cycle 5 and 6 of Taxotere and continue 1 now with Zytiga and prednisone.  With reference to the coincidental cecal wall thickening found on CT, we will get him to [facility] surgeons for colonoscopy.  He will see my nurse practitioner back in August for next cycle of Abiraterone prednisone.  He will continue his Lupron with Dr. Matute.  We will continue his Xgeva.  We will repeat his CT chest abdomen pelvis and bone scan again in 3 months.  He will see my nurse practitioner in the interim.    -8/23/2022 [facility] oncology clinic follow-up: No further presyncopal symptoms.  Feeling great other than for hot flashes.  Did commend for now we will continue on with his Zytiga prednisone and he will get his Xgeva today based on labs from 8/10/2022.  He opted out of getting the colonoscopy that I recommended and he will not change his mind.  He will continue getting the Lupron with Dr. Matute and I asked him to give the date of this appointment to my nurse when he sees her back in a month.  We will give his Xgeva today.  We will get CT chest abdomen pelvis and total body bone scan towards the middle to end of October.  Presently other than for the hot flashes feels great.    -9/20/2022 PSA 8.320    -9/22/2022 [facility] Oncology clinic follow-up: Chris is doing well on Zytiga, prednisone along with Xgeva.  Labs reviewed from 9/20/2022 are unremarkable, CBC was normal, CMP with creatinine of 1.34 otherwise normal, this is stable from his baseline.  PSA stable at 8.320.  He received Lupron recently with Dr. Matute, he thinks last week or so, states his next appointment is in February.  He will continue treatment unchanged.  We will repeat restaging CT chest, abdomen and pelvis and total body bone scan in October prior to return and I have ordered those today.  We will also repeat his PSA.    -9/22/2022 CT chest abdomen pelvisCompared to July 2022 [facility] showed no evidence of disease progression in  "the chest with no substantial sclerotic bony lesions and decrease in size of retroperitoneal and pelvic nodes with persistent cecal wall thickening and suboptimal bladder distention.  Total body bone scan showed decrease in previously seen uptake in the left third rib with diffuse sclerosis representing treatment response with no new foci.    -10/18/2022 [facility] [place] medical oncology follow-up: I reviewed bone scan and CT reports as above with no evidence of progression.  Tolerating Zytiga prednisone and Xgeva.  Getting Lupron regularly with Dr. Matute next appointment coming in February.  We will repeat his CT chest abdomen pelvis and total body bone scan in April.  He will follow with my nurse practitioner in the interim.    -1/10/2023 PSA 3.450  -1/11/2023 [facility] [place] Oncology clinic follow-up: Mr. Morfin continues to do well on current therapy with Zytiga, prednisone and Xgeva with no unusual side effects.  He has no new worrisome symptoms.  He is due for his next Lupron injection in February with Dr. Matute.  His PSA continues to trend downward, current PSA from yesterday was 3.450.  We will continue therapy unchanged, he will receive Xgeva today.  Has had no hypocalcemia, his CMP, phosphorus and magnesium are all normal.  We will repeat restaging scans in April.  -3/7/2023 PSA 2.460  -3/8/2023 [facility] [place] Oncology clinic follow-up:  Mr. Morfin is doing well.  He is tolerating therapy with Zytiga, prednisone and Xgeva with no significant side effects.  He has hot flashes but these are tolerable.  He had Lupron injection 2/24/2023 with Dr. Matute.  His PSA continues to decline, current PSA 2.460.  He will continue therapy unchanged.  We will repeat restaging scans late April prior to return in 2 months and I have ordered those today.  He is working with his PCP Rodrigo Ram on his hypertension.  His b/p today was 160/88.  He was to have increased his losartan but I do not think he has done that yet as he said \"I still " "have some of my old prescription left\".  -4/4/2023 PSA 2.55  - 4/26/2023 CT chest abdomen pelvis [place] comparison 10/10/2022 showed stable left third rib, T1, left fourth and eighth rib, right seventh rib.  Probable liver cyst.  Few diverticuli.  Mild further decrease in a few of the previously enlarged nodes.  Left external iliac 12 mm compared to 16 mm.  Right common iliac 7 mm stable.  Lower left periaortic 8 mm previously 13 mm.  No new lytic or blastic osseous lesions.  Total body bone scan comparison 10/10/2022, 7/22/2022, and CT 4/26/2023.  No new sites of increased uptake.  1 focal left third rib hotspot consistent with bone metastasis.    -5/2/2023 [facility] oncology follow-up: I reviewed interval notes since I last saw him from my nurse practitioner as outlined above in interval images and reports thereof from [facility] that shows no new sites of bone metastasis and no kizzy or visceral progression.  PSA stabilized around 2.5.  In the absence of symptomatic or radiographic progression, I would be unlikely to change therapy just on the basis of a PSA rise and for now the PSA is stable.  We will continue Zytiga prednisone and Xgeva and he will follow-up with my nurse practitioner 5/30/2023 with repeat CTs and bone scan in 6 months.  I asked him to check with his primary care today regarding his systolic in the 170s.    -5/31/2023 [facility] clinic follow-up: Chris continues to do well on current therapy with Zytiga, prednisone and Xgeva along with Lupron that he receives with Dr. Matute.  His PSA remains stable, it was lower this month compared to last month, current PSA 1.940.  Continues to deal with hypertension, on arrival today his blood pressure was 210/92 however this was using a wrist cuff, when I rechecked it manually his blood pressure was 160/90.  Still has room for improvement despite recent increase in his losartan to 100 mg daily.  He will follow-up with Dr. Ram for " further management.  We will continue therapy unchanged.  We will repeat restaging scans in October.    -7/26/2023 [facility] Oncology clinic follow-up: Chris overall continues to do well on current therapy with Zytiga, prednisone and Xgeva.  He receives Lupron every 6 months with Dr. Matute and states that is scheduled for August.  PSA from yesterday is pending, PSA on 6/27/2023 was 1.590 which was continuing to decline, in February it was 3.250.  His creatinine this past month has bumped up.  He feels he is staying hydrated but I encouraged him to increase his fluid intake.  I discussed with him that this could be from his hypertension, some of his medications.  His blood pressure today is 165/92.  He has an appointment with his primary care provider Dr. Teri Ram next week and can further discuss with her.  No adjustment needed for Xgeva.  I refilled his prednisone today.  I will see him back in 1 month for follow-up with continued monitoring of his labs.  He may end up needing to see nephrology.  We will repeat restaging scans in October.    -9/29/2023 PSA 1.810  -8/23/2023 [facility] Oncology clinic follow-up: Chris continues on therapy with Zytiga, prednisone and Xgeva, he also receives Lupron every 6 months with Dr. Matuet with most recent given on 8/7/2023.  He is scheduled for a cystoscopy in a few weeks as his last few UAs per the patient's report had microscopic hematuria, he has had no steve hematuria.  His creatinine continues to remain elevated at 1.82, BUN is 40, GFR is 40.  I will get him to nephrology for further evaluation.  Blood pressure today was fairly good at 161/86, he continues to follow with Dr. Ram for management.  PSA on 7/25/2023 was up slightly from prior month, PSA in July was 2.140, in June it was 1.590, PSA from yesterday is pending.  I plan on repeating restaging CT scans and bone scans in October however if his PSA is up significantly then I will do sooner.  I will see him  back for follow-up in 1 month.  -8/23/2024 PSA 1.860    -9/20/2023 [facility] clinic follow-up: Chris is tolerating treatment with Zytiga, prednisone and Xgeva.  He is up-to-date with his Lupron injection, last received in August with Dr. Matute.  He had cystoscopy on 9/8/2023 that was normal. I referred him to nephrology when I saw him last in August as his creatinine has been increasing, creatinine yesterday was a little better 1.51, GFR is 50, creatinine clearance 63.8.  He is still awaiting an appointment with nephrology, per our  it will be early November.  We will repeat his restaging scans prior to return and I will do his CT scans without contrast in light of his new renal insufficiency.  We will also get total body bone scan and I have ordered those today.  His PSA yesterday was 1.810.    -10/9/2023 CT chest abdomen Pelvis without contrast compared to April 2023 shows slightly prominent pelvic lymph nodes left external iliac 8 mm compared to prior study.  Right internal iliac heterogenous 19 mm compared to 11 mm prior.  Stable sclerotic bone lesions and no new lesions.  Stable bone scan with no new sites of disease    -10/17/2023 [facility]ology clinic follow-up: With elevated creatinine, CTs done without contrast showed very slight increased prominence by few millimeters of some internal iliac and external iliac nodes for which PSMA PET could be done but for now I will check his PSA and have him see my nurse practitioner back in a week and unless the PSA is significantly rising I would continue the Zytiga, prednisone, Xgeva and February dose of Lupron with Dr. Matute and make sure he follows with nephrology.  If his PSA is significantly rising then my nurse practitioner will order PSMA PET.    -10/17/2023 PSA 1.5  -11/14/2023 PSA 1.510  -11/15/2023 [facility] clinic follow-up: Mr. Ferrara continues to do well on current therapy with Zytiga, prednisone, Xgeva and Lupron that he  receives with Dr. Matute.  His PSA is stable at 1.5.  Would not change therapy for now, we will plan on repeating restaging scans in February unless PSA starts to rise or he has new concerns.  Overall he feels good.  He is now established with nephrology and will continue to follow with them regarding his renal insufficiency, I reviewed note from consultation with Dr. Armstrong 11/6/2023.    -1/9/2024 PSA 1.050    -1/10/2024 [facility] Oncology clinic follow-up: Chris overall continues to tolerate current therapy with Zytiga, prednisone, Xgeva and Lupron.  He states his next Lupron injection with Dr. Matute is due late February.  His PSA is stable at 1.050. Creatinine stable at 1.64, he is following with nephrology, he has a an appointment with Dr. Armstrong in February for follow-up.  Hypertension being managed by his primary care provider, target systolic ideally 120s-140s, today it is running a little high at 172/90, yesterday when he was here for labs it was 138/80.  We will continue therapy unchanged with plans to repeat restaging scans in April.    -2/6/2024 PSA 0.753    -2/7/2024 [facility] oncology clinic follow-up: Chris continues to do well on current therapy with Zytiga, prednisone, Xgeva and Lupron.  He is up-to-date on Lupron injection next due later this month with Dr. Matute.  His PSA continues to slowly decrease, current PSA is 0.753.  He has no new worrisome symptoms.  We will continue therapy unchanged, I have ordered restaging scans for late March prior to his return in April. He is following with nephrology for his renal insufficiency, his creatinine yesterday was good at 1.21.  I will do his CT scans with contrast unless something changes in the interim. He follows closely with Dr. Ram for management of his hypertension. I reviewed notes from his last office visit with her earlier this month on 2/1/2024, also reviewed labs in detail with the patient today.  All questions were answered to his  satisfaction.    -3/5/2024 PSA slowly declining 0.73.  Creatinine fluctuating.  1.69 with GFR 43 otherwise unremarkable CMP.  Phosphorus low 2.1    -4/8/2024 CT chest, abdomen and pelvis shows stable sclerotic lesions in the thoracic spine and bilateral ribs and right acetabulum.  Stable appearance of pelvic lymph nodes.  No new sites concerning for progression of disease.  Total body bone scan shows stable diffuse increased uptake of the left third rib correlates with diffuse sclerosis and enlargement on CT, no new sites of active osseous lesions.  -4/30/2024 PSA 0.713  -5/1/2024 [facility]ology clinic follow-up: Chris continues to do well on current therapy with Zytiga, prednisone, Xgeva and Lupron.  He receives his Lupron every 6 months with Dr. Matute, last administered 2/5/2024.  Current CT scans and bone scan showed no progression of disease, PSA is stable at 0.713.  His CBC is unremarkable, CMP shows creatinine stable at 1.50 otherwise normal.  He does follow with nephrology.  We will continue treatment unchanged.  I will see him back in 2 months for follow-up.  We will repeat restaging scans in 6 months unless he has any new symptoms or significant change in his PSA.    -6/25/2024 PSA 0.623  -6/26/2024 [facility] oncology clinic follow-up: Chris overall continues to tolerate current therapy with Zytiga, prednisone, Xgeva and Lupron.  We did call to confirm his next Lupron injection and that is scheduled for late July with Dr. Matute.  His PSA for the most part is stable at 0.623, we will continue to monitor.  He has no new worrisome symptoms.  I did discuss with him today his creatinine which has increased to 1.86, BUN was 32.  Reminded him to be sure and stay hydrated.  He does follow with nephrology and his next appointment is in August.  He is on Xgeva however there is no indication for any adjustment, his creatinine clearance is greater than 30.  Blood pressure today was under good control at 130/78.   We will continue treatment unchanged.  I will see him back in 1 month for follow-up.  Plan to repeat restaging scans in October unless his PSA rises or he has worrisome symptoms.    -7/23/2024 PSA 0.606     Prostate cancer metastatic to multiple sites   3/15/2022 Initial Diagnosis    Prostate cancer metastatic to multiple sites (HCC)     3/15/2022 Cancer Staged    Staging form: Prostate, AJCC 8th Edition  - Clinical: Stage IVB (cT1c, cN1, cM1b, Grade Group: 5) - Signed by Fritz Goodson MD on 3/15/2022     4/7/2022 - 6/23/2022 Chemotherapy    Stopped after cycle 4 6/23/2022 due to syncope 3 days post infusions with negative cardiac work-up  OP PROSTATE DOCEtaxel     4/7/2022 -  Chemotherapy    OP SUPPORTIVE Denosumab (Xgeva) Q28D     7/26/2022 -  Chemotherapy    OP PROSTATE Abiraterone / PredniSONE           HISTORY OF PRESENT ILLNESS:  The patient is a 69 y.o. male, here for follow up on management of metastatic prostate cancer currently on treatment with Zytiga, prednisone, xgeva and lupron.  Chris overall has been doing well since we saw him last.  He denies any change in his health.  His appetite is not great but his weight is stable.  He states he just does not have the appetite that he used to.  He has no new pain.  He has occasional constipation but otherwise no change in his bowel or bladder habits.  He states he is scheduled for Lupron injection tomorrow.      Past Medical History:   Diagnosis Date    Cancer     Prostate cancer      Past Surgical History:   Procedure Laterality Date    PROSTATE BIOPSY  02/2022    dr. sue       No Known Allergies    Family History and Social History reviewed and changed as necessary    REVIEW OF SYSTEM:   Occasional constipation    PHYSICAL EXAM:  Well-developed, well-nourished appearing male in no distress  Respirations regular and unlabored, lungs clear to auscultation bilaterally  Skin without rash    Vitals:    07/24/24 0937   BP: 145/90   Pulse: 67   Resp: 18  "  Temp: 97.8 °F (36.6 °C)   SpO2: 96%   Weight: 116 kg (256 lb)   Height: 180.3 cm (71\")     Vitals:    07/24/24 0937   PainSc: 0-No pain          ECOG score: 0           Vitals reviewed.  Labs reviewed    ECOG: (0) Fully Active - Able to Carry On All Pre-disease Performance Without Restriction    Lab Results   Component Value Date    HGB 13.8 07/23/2024    HCT 41.2 07/23/2024    MCV 90.0 07/23/2024     07/23/2024    WBC 7.51 07/23/2024    NEUTROABS 4.47 07/23/2024    LYMPHSABS 2.22 07/23/2024    MONOSABS 0.59 07/23/2024    EOSABS 0.14 07/23/2024    BASOSABS 0.06 07/23/2024       Lab Results   Component Value Date    GLUCOSE 140 (H) 07/23/2024    BUN 24 (H) 07/23/2024    CREATININE 1.69 (H) 07/23/2024     07/23/2024    K 4.4 07/23/2024     07/23/2024    CO2 21.5 (L) 07/23/2024    CALCIUM 9.1 07/23/2024    ALBUMIN 4.4 07/23/2024    BILITOT 0.4 07/23/2024    ALKPHOS 84 07/23/2024    AST 16 07/23/2024    ALT 15 07/23/2024     Lab Results   Component Value Date    PSA 0.606 07/23/2024    PSA 0.623 06/25/2024    PSA 0.608 05/28/2024    PSA 0.713 04/30/2024             ASSESSMENT & PLAN:  1.  Prostate cancer clinical T1c and 2M1B stage IVb treated with 4 cycles of Taxotere, stopped due to syncope with negative cardiac work-up, with marked drop in PSA of 18.6 from over 900 at baseline, continued on Zytiga and prednisone.  2.  Urinary retention with Goodwin in place 1/24/2022.  On finasteride 1/24/2022.  3.  Covid 19 December 2021  4.  Hypertension  5.  Chronic kidney disease stage 3a    Oncology history timeline:  -11/29/2021  with symptoms of urinary retention.  -2/15/2022 prostate biopsy adenocarcinoma grade group 5 and 3 out of 12 cores, grade group 4 in 1 out of 12 cores, grade group 3 in 8 out of 12 cores.  -2/24/2022 CT chest abdomen pelvis and total body bone scan shows left third rib, right acetabulum, periaortic and retroperitoneal kizzy metastases complaining of pain in the left chest and " right hip.  Also possible sclerotic left 10th rib on CT.  Spleen mildly enlarged 13.8 cm.  Hepatic steatosis.  Small liver cyst.  Fat stranding in the periaortic and mesenteric region consistent with reactive/inflammatory stranding.  Multiple enlarged periaortic and retroperitoneal nodes and lymphadenopathy largest 4.9 cm.  CT chest describes tiny sclerotic foci in left eighth rib and right lateral seventh rib and T1.  Multiple small mediastinal and bilateral axillary nodes seen with small hypodense left thyroid nodule.  Creatinine 1.7.  -3/4/2022 office note Dr. Rolando Matute: Patient was seen after his elevated PSA by Dr. Vera and prostate biopsy scheduled.  However, he developed COVID-19 and this was canceled and the patient did not reschedule due to lack of insurance.  Saw Dr. Matute back on 1/24/2022 with plans to get staging CTs and bone scan with results as outlined above.  Started Casodex and to return on 3/11/2022 for Lupron.  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  TURP planned for urinary retention symptoms.    -3/15/2022 [facility] medical oncology initial consultation: I reviewed the above data with the patient.  With his fairly bulky retroperitoneal adenopathy and bone metastases including extra axial metastases, he would fit the data from the CHAARTED trial for which Taxotere x6 followed by Zytiga prednisone along with the planned Lupron already being planned by Dr. Matute would be reasonable.  Equally reasonable in terms of comparisons with bicalutamide and Lupron would be Zytiga prednisone plus Lupron or Xtandi plus Lupron or apalutamide plus Lupron.  None of these have been compared head-to-head but all have beaten combined androgen deprivation therapy with bicalutamide and Lupron.  At the age of 67 and in order to have as many bullets as possible down the road and hence saving some of the hormone blockade options for later and using  chemotherapy when he is younger and healthier for a more time-limited.,  He has opted for the Taxotere x6 followed by Zytiga and prednisone.  We will also give him Xgeva to improve bone health and given metastatic disease, as with all metastatic prostate cancer patients, he needs genetic counseling both for his sake as well as his family's.  If he were to have BRCA1 or other such  mutations, then that also opens up the options for PARP inhibitors etc. down the road.  He has his TURP scheduled for 2/24/2022 and we will start treatment a couple of weeks after that and he will get chemo preparation visit in the meantime and I will get capital surgeons to place a port.  We will check his PSA after his TURP when he sees my nurse practitioner back early April for the start of chemotherapy and repeat the PSA and CT chest abdomen pelvis and bone scan after the Taxotere is complete.    -3/31/2022 chemotherapy education and needs assessment completed    -4/7/2022 began docetaxel and Xgeva.  .0.  We will do his Xgeva every 6 weeks to coordinate with his docetaxel infusions.    -4/19/2022 patient stopped Casodex    -5/17/2022 patient reported seeing his PCP for an abscess in his suprapubic area.  Placed on clindamycin, will delay treatment 1 week.    -5/25/2022 PSA 34.3  -5/26/2022 [facility] follow-up: Chris has an abscess in the suprapubic area, it has improved on antibiotic therapy but I am concerned if we treat him it will just flare back up unless it is drained.  I will get him to Dr. Miller who placed his port for I&D and culture.  He is on clindamycin and states he completes course of treatment tomorrow.  I will delay his treatment with Taxotere 1 more week.  We will give his Xgeva today.  I will see him back in 1 week for follow-up to assess whether or not we can resume his Taxotere.  His PSA has dropped nicely with treatment thus far, PSA yesterday was 34.3 which is down from a PSA of 259.0  when we started treatment, it has been as high as 911 in November.    -6/2/2022 [facility] follow-up: Chris went to see Dr. Miller to have his abscess lanced however apparently there was a long wait and he left without being seen.  He did call his PCP and was given 5 more days of clindamycin and the abscess now seems to have resolved.  We discussed today that he is at risk for recurrence of infection due to immunocompromise state with chemotherapy.  He will notify us if he has any return of his abscess.  He does have intertrigo under his panniculus, I have advised him to keep this area dry, to apply topical drying/antifungal powders.  Also recommended looser clothing.  His counts are adequate to continue therapy, we will resume Taxotere today with cycle #3.  We will repeat restaging scans after 6 courses.  We are doing Xgeva every 6 weeks to coordinate with his Taxotere.  Once he finishes Taxotere we can then go back to every 4 weeks.  His next Xgeva is not due until 7/14/2022.  His calcium is running a little low, he is going to  calcium supplement.    -6/23/2022 [facility] follow-up: Chris overall is doing well on treatment with Taxotere.  Labs reviewed from yesterday and are unremarkable.  We will continue treatment today unchanged.  His suprapubic abscess resolved on clindamycin.  He will continue to use drying powder/antifungal powder under his panniculus for intertrigo.  Today will be cycle #4 of a planned 6 courses of Taxotere.  He is also on Xgeva, next dose due 7/14/2022, we are doing this every 6 weeks for now to line up with his chemotherapy, can go back to every 4 weeks after he finishes Taxotere.  Calcium from [facility] yesterday was normal.    -7/13/2022 [facility] follow-up: Mr. Morfin has had issues around day 3 after each treatment ranging from chest pain after his first treatment for which he did not seek medical attention, fatigue after the next few  treatments, but 3 days after his most recent treatment on 6/23/2022 he had a syncopal episode at home and once again did not seek medical attention.  I discussed with him that this was not acceptable, if he has occurrences like this someone needs to call 911, it is difficult to figure out what is going on after the fact, the best time to work this up would be during the occurrence.  For now we will get labs today with CBC, CMP, PSA.  I will refer him to cardiology for further evaluation.  We will hold Taxotere most likely, I will see him tomorrow to go over his lab results.  I will also repeat his restaging scans with CT chest, abdomen and pelvis and will include CT of the head in light of his syncopal episode.      -7/13/2022 PSA 18.6    -7/14/2022 [facility] [place] Oncology clinic follow-up: Mr. Morfin returns today to review his labs from yesterday.  Labs are unremarkable.  PSA down to 18.6 from a high of 259.0 in April prior to starting treatment.  CBC and CMP unremarkable, magnesium is normal at 2.3, phosphorus slightly low at 2.4.  We will hold therapy for now in light of his syncopal episode on day 3 after his last treatment and prior episodes with chest pain and severe fatigue that all occurred on day 3 after his Taxotere.  We will restage him with CT head, chest, abdomen and pelvis (I am including the head in light of his syncopal episode).  I have also referred him to cardiology, he states that he received a call from them about making an appointment however he wanted to talk to us today first.  I emphasized the importance to him of making and keeping that appointment and he states understanding.  I also once again emphasized the requirement to seek emergency medical attention if he has any episodes in the future such as chest pain or syncopal events.  Whether or not we try and complete Taxotere with 2 more courses or move to the next line of therapy will be decided when he returns to discuss with Dr. Goodson and  review his restaging scans.    -7/15/2022 saw Dr. Diaz cardiologist.  For syncope he ordered echocardiogram and 48-hour Holter monitor.    -7/15/2022 Holter monitor relatively benign.    -7/22/2022 CT brain with and without contrast negative.  CT chest abdomen and pelvis shows some nonspecific cecal wall thickening.  No progression in the chest.  T1 and ribs stable.  Decrease in multiple retroperitoneal and pelvic nodes.  Slight increase in right acetabular sclerotic lesion.  Persistent but improved circumferential bladder wall thickening.  Total body bone scan shows stable left third rib and no evidence of new bone metastases.    -7/19/2022 ejection fraction 65% with grade 1 diastolic dysfunction.    -7/26/2022 [facility] clinic follow-up: Given presyncopal symptoms occurred 3 days after Taxotere and has not otherwise occurred any other time and his cardiology work-up is fairly unremarkable and his disease is under good control as witnessed by the report above of the CTs of head chest abdomen pelvis and bone scan, I will hold cycle 5 and 6 of Taxotere and continue 1 now with Zytiga and prednisone.  With reference to the coincidental cecal wall thickening found on CT, we will get him to [facility] surgeons for colonoscopy.  He will see my nurse practitioner back in August for next cycle of Abiraterone prednisone.  He will continue his Lupron with Dr. Matute.  We will continue his Xgeva.  We will repeat his CT chest abdomen pelvis and bone scan again in 3 months.  He will see my nurse practitioner in the interim.    -8/23/2022 [facility] clinic follow-up: No further presyncopal symptoms.  Feeling great other than for hot flashes.  Did commend for now we will continue on with his Zytiga prednisone and he will get his Xgeva today based on labs from 8/10/2022.  He opted out of getting the colonoscopy that I recommended and he will not change his mind.  He will continue getting the Lupron with Dr. Matute  and I asked him to give the date of this appointment to my nurse when he sees her back in a month.  We will give his Xgeva today.  We will get CT chest abdomen pelvis and total body bone scan towards the middle to end of October.  Presently other than for the hot flashes feels great.    -9/20/2022 PSA 8.320    -9/22/2022 [facility] follow-up: Chris is doing well on Zytiga, prednisone along with Xgeva.  Labs reviewed from 9/20/2022 are unremarkable, CBC was normal, CMP with creatinine of 1.34 otherwise normal, this is stable from his baseline.  PSA stable at 8.320.  He received Lupron recently with Dr. Matute, he thinks last week or so, states his next appointment is in February.  He will continue treatment unchanged.  We will repeat restaging CT chest, abdomen and pelvis and total body bone scan in October prior to return and I have ordered those today.  We will also repeat his PSA.    -9/22/2022 CT chest abdomen pelvisCompared to July 2022 [facility] showed no evidence of disease progression in the chest with no substantial sclerotic bony lesions and decrease in size of retroperitoneal and pelvic nodes with persistent cecal wall thickening and suboptimal bladder distention.  Total body bone scan showed decrease in previously seen uptake in the left third rib with diffuse sclerosis representing treatment response with no new foci.    -10/18/2022 [facility]ology follow-up: I reviewed bone scan and CT reports as above with no evidence of progression.  Tolerating Zytiga prednisone and Xgeva.  Getting Lupron regularly with Dr. Matute next appointment coming in February.  We will repeat his CT chest abdomen pelvis and total body bone scan in April.  He will follow with my nurse practitioner in the interim.    -1/10/2023 PSA 3.450  -1/11/2023 [facility] follow-up: Mr. Morfin continues to do well on current therapy with Zytiga, prednisone and Xgeva with no unusual side  "effects.  He has no new worrisome symptoms.  He is due for his next Lupron injection in February with Dr. Matute.  His PSA continues to trend downward, current PSA from yesterday was 3.450.  We will continue therapy unchanged, he will receive Xgeva today.  Has had no hypocalcemia, his CMP, phosphorus and magnesium are all normal.  We will repeat restaging scans in April.  -3/7/2023 PSA 2.460  -3/8/2023 [facility] clinic follow-up:  Mr. Morfin is doing well.  He is tolerating therapy with Zytiga, prednisone and Xgeva with no significant side effects.  He has hot flashes but these are tolerable.  He had Lupron injection 2/24/2023 with Dr. Matute.  His PSA continues to decline, current PSA 2.460.  He will continue therapy unchanged.  We will repeat restaging scans late April prior to return in 2 months and I have ordered those today.  He is working with his PCP Rodrigo Ram on his hypertension.  His b/p today was 160/88.  He was to have increased his losartan but I do not think he has done that yet as he said \"I still have some of my old prescription left\".  -4/4/2023 PSA 2.55  - 4/26/2023 CT chest abdomen pelvis [place] comparison 10/10/2022 showed stable left third rib, T1, left fourth and eighth rib, right seventh rib.  Probable liver cyst.  Few diverticuli.  Mild further decrease in a few of the previously enlarged nodes.  Left external iliac 12 mm compared to 16 mm.  Right common iliac 7 mm stable.  Lower left periaortic 8 mm previously 13 mm.  No new lytic or blastic osseous lesions.  Total body bone scan comparison 10/10/2022, 7/22/2022, and CT 4/26/2023.  No new sites of increased uptake.  1 focal left third rib hotspot consistent with bone metastasis.    -5/2/2023 [facility]ology follow-up: I reviewed interval notes since I last saw him from my nurse practitioner as outlined above in interval images and reports thereof from [facility] that shows no new sites of bone metastasis and no " kizzy or visceral progression.  PSA stabilized around 2.5.  In the absence of symptomatic or radiographic progression, I would be unlikely to change therapy just on the basis of a PSA rise and for now the PSA is stable.  We will continue Zytiga prednisone and Xgeva and he will follow-up with my nurse practitioner 5/30/2023 with repeat CTs and bone scan in 6 months.  I asked him to check with his primary care today regarding his systolic in the 170s.    -5/31/2023 [facility] [place] Oncology clinic follow-up: Chris continues to do well on current therapy with Zytiga, prednisone and Xgeva along with Lupron that he receives with Dr. Matute.  His PSA remains stable, it was lower this month compared to last month, current PSA 1.940.  Continues to deal with hypertension, on arrival today his blood pressure was 210/92 however this was using a wrist cuff, when I rechecked it manually his blood pressure was 160/90.  Still has room for improvement despite recent increase in his losartan to 100 mg daily.  He will follow-up with Dr. Ram for further management.  We will continue therapy unchanged.  We will repeat restaging scans in October.    -7/26/2023 [facility] [place] Oncology clinic follow-up: Chris overall continues to do well on current therapy with Zytiga, prednisone and Xgeva.  He receives Lupron every 6 months with Dr. Matute and states that is scheduled for August.  PSA from yesterday is pending, PSA on 6/27/2023 was 1.590 which was continuing to decline, in February it was 3.250.  His creatinine this past month has bumped up.  He feels he is staying hydrated but I encouraged him to increase his fluid intake.  I discussed with him that this could be from his hypertension, some of his medications.  His blood pressure today is 165/92.  He has an appointment with his primary care provider Dr. Teri Ram next week and can further discuss with her.  No adjustment needed for Xgeva.  I refilled his prednisone today.  I will see him  back in 1 month for follow-up with continued monitoring of his labs.  He may end up needing to see nephrology.  We will repeat restaging scans in October.    -9/29/2023 PSA 1.810  -8/23/2023 [facility] Oncology clinic follow-up: Chris continues on therapy with Zytiga, prednisone and Xgeva, he also receives Lupron every 6 months with Dr. Matute with most recent given on 8/7/2023.  He is scheduled for a cystoscopy in a few weeks as his last few UAs per the patient's report had microscopic hematuria, he has had no steve hematuria.  His creatinine continues to remain elevated at 1.82, BUN is 40, GFR is 40.  I will get him to nephrology for further evaluation.  Blood pressure today was fairly good at 161/86, he continues to follow with Dr. Ram for management.  PSA on 7/25/2023 was up slightly from prior month, PSA in July was 2.140, in June it was 1.590, PSA from yesterday is pending.  I plan on repeating restaging CT scans and bone scans in October however if his PSA is up significantly then I will do sooner.  I will see him back for follow-up in 1 month.  -8/23/2024 PSA 1.860    -9/20/2023 [facility] Oncology clinic follow-up: Chris is tolerating treatment with Zytiga, prednisone and Xgeva.  He is up-to-date with his Lupron injection, last received in August with Dr. Matute.  He had cystoscopy on 9/8/2023 that was normal. I referred him to nephrology when I saw him last in August as his creatinine has been increasing, creatinine yesterday was a little better 1.51, GFR is 50, creatinine clearance 63.8.  He is still awaiting an appointment with nephrology, per our  it will be early November.  We will repeat his restaging scans prior to return and I will do his CT scans without contrast in light of his new renal insufficiency.  We will also get total body bone scan and I have ordered those today.  His PSA yesterday was 1.810.    -10/9/2023 CT chest abdomen Pelvis without contrast compared to April 2023 shows  slightly prominent pelvic lymph nodes left external iliac 8 mm compared to prior study.  Right internal iliac heterogenous 19 mm compared to 11 mm prior.  Stable sclerotic bone lesions and no new lesions.  Stable bone scan with no new sites of disease    -10/17/2023 [facility] clinic follow-up: With elevated creatinine, CTs done without contrast showed very slight increased prominence by few millimeters of some internal iliac and external iliac nodes for which PSMA PET could be done but for now I will check his PSA and have him see my nurse practitioner back in a week and unless the PSA is significantly rising I would continue the Zytiga, prednisone, Xgeva and February dose of Lupron with Dr. Matute and make sure he follows with nephrology.  If his PSA is significantly rising then my nurse practitioner will order PSMA PET.    -11/15/2023 [facility] clinic follow-up: Mr. Ferrara continues to do well on current therapy with Zytiga, prednisone, Xgeva and Lupron that he receives with Dr. Matute.  His PSA is stable at 1.5.  Would not change therapy for now, we will plan on repeating restaging scans in February unless PSA starts to rise or he has new concerns.  Overall he feels good.  He is now established with nephrology and will continue to follow with them regarding his renal insufficiency, I reviewed note from consultation with Dr. Armstrong 11/6/2023.    -12/13/2023 [facility] clinic follow-up: Chris continues to do well on current therapy with Zytiga, prednisone, Xgeva and Lupron.  He thinks his next Lupron with Dr. Matute is around February.  His CBC and CMP remain unremarkable.  He will continue treatment unchanged.  We will repeat his PSA next month.  Will plan on restaging scans in April in the absence of new symptoms and as long as his PSA remains stable.  Continues to deal with hypertension, blood pressure today 175/94, encouraged him to follow-up with PCP.    -1/10/2024 [facility]  clinic follow-up: Chris overall continues to tolerate current therapy with Zytiga, prednisone, Xgeva and Lupron.  He states his next Lupron injection with Dr. Matute is due late February.  His PSA is stable at 1.050. Creatinine stable at 1.64, he is following with nephrology, he has a an appointment with Dr. Armstrong in February for follow-up.  Hypertension being managed by his primary care provider, target systolic ideally 120s-140s, today it is running a little high at 172/90, yesterday when he was here for labs it was 138/80.  We will continue therapy unchanged with plans to repeat restaging scans in April.    -2/6/2024 PSA 0.753    -2/7/2024 [facility] oncology clinic follow-up: Chris continues to do well on current therapy with Zytiga, prednisone, Xgeva and Lupron.  He is up-to-date on Lupron injection next due later this month with Dr. Matute.  His PSA continues to slowly decrease, current PSA is 0.753.  He has no new worrisome symptoms.  We will continue therapy unchanged, I have ordered restaging scans for late March prior to his return in April. He is following with nephrology for his renal insufficiency, his creatinine yesterday was good at 1.21.  I will do his CT scans with contrast unless something changes in the interim. He follows closely with Dr. Ram for management of his hypertension. I reviewed notes from his last office visit with her earlier this month on 2/1/2024, also reviewed labs in detail with the patient today.  All questions were answered to his satisfaction.    -4/8/2024 CT chest, abdomen and pelvis shows stable sclerotic lesions in the thoracic spine and bilateral ribs and right acetabulum.  Stable appearance of pelvic lymph nodes.  No new sites concerning for progression of disease.  Total body bone scan shows stable diffuse increased uptake of the left third rib correlates with diffuse sclerosis and enlargement on CT, no new sites of active osseous lesions.  -4/30/2024 PSA  0.713  -5/1/2024 [facility] oncology clinic follow-up: Chris continues to do well on current therapy with Zytiga, prednisone, Xgeva and Lupron.  He receives his Lupron every 6 months with Dr. Matute, last administered 2/5/2024.  Current CT scans and bone scan showed no progression of disease, PSA is stable at 0.713.  His CBC is unremarkable, CMP shows creatinine stable at 1.50 otherwise normal.  He does follow with nephrology.  We will continue treatment unchanged.  I will see him back in 2 months for follow-up.  We will repeat restaging scans in 6 months unless he has any new symptoms or significant change in his PSA.    -6/25/2024 PSA 0.623  -6/26/2024 [facility] oncology clinic follow-up: Chris overall continues to tolerate current therapy with Zytiga, prednisone, Xgeva and Lupron.  We did call to confirm his next Lupron injection and that is scheduled for late July with Dr. Matute.  His PSA for the most part is stable at 0.623, we will continue to monitor.  He has no new worrisome symptoms.  I did discuss with him today his creatinine which has increased to 1.86, BUN was 32.  Reminded him to be sure and stay hydrated.  He does follow with nephrology and his next appointment is in August.  He is on Xgeva however there is no indication for any adjustment, his creatinine clearance is greater than 30.  Blood pressure today was under good control at 130/78.  We will continue treatment unchanged.  I will see him back in 1 month for follow-up.  Plan to repeat restaging scans in October unless his PSA rises or he has worrisome symptoms.    -7/23/2024 PSA 0.606  -7/24/2024 [facility] oncology clinic follow-up: Chris continues to tolerate current therapy with Zytiga, prednisone, Xgeva and Lupron.  His PSA is stable at 0.606.  He has no new symptoms worrisome for disease progression.  He is due for Lupron injection tomorrow per his report, we will print his PSA so he can take that with him.  His magnesium and phosphorus remain  normal, creatinine is a little better currently than it was last month, currently creatinine 1.69.  He has an appointment with his nephrologist he thinks it is on 8/8/2024.  Has follow-up with his primary care provider also early August for his hypertension.  We will continue treatment unchanged, I will see him back in 2 months for follow-up.  We will repeat restaging scans in October and I will order those when I see him back.    This was a level 4, moderate MDM visit with management of side effects of therapy, review of labs, management of drug therapy requiring intensive monitoring for toxicity.  Susana Torres, APRN  07/24/2024

## 2024-07-25 ENCOUNTER — SPECIALTY PHARMACY (OUTPATIENT)
Facility: HOSPITAL | Age: 69
End: 2024-07-25
Payer: MEDICARE

## 2024-08-02 DIAGNOSIS — C61 PROSTATE CANCER METASTATIC TO MULTIPLE SITES: Chronic | ICD-10-CM

## 2024-08-02 RX ORDER — PREDNISONE 5 MG/1
TABLET ORAL
Qty: 60 TABLET | Refills: 11 | Status: SHIPPED | OUTPATIENT
Start: 2024-08-02

## 2024-08-05 ENCOUNTER — OFFICE VISIT (OUTPATIENT)
Dept: FAMILY MEDICINE CLINIC | Facility: CLINIC | Age: 69
End: 2024-08-05
Payer: MEDICARE

## 2024-08-05 VITALS
OXYGEN SATURATION: 98 % | SYSTOLIC BLOOD PRESSURE: 138 MMHG | DIASTOLIC BLOOD PRESSURE: 86 MMHG | HEART RATE: 78 BPM | HEIGHT: 71 IN | WEIGHT: 258 LBS | BODY MASS INDEX: 36.12 KG/M2

## 2024-08-05 DIAGNOSIS — N18.31 STAGE 3A CHRONIC KIDNEY DISEASE (CKD): ICD-10-CM

## 2024-08-05 DIAGNOSIS — I10 ESSENTIAL HYPERTENSION: Primary | ICD-10-CM

## 2024-08-05 PROCEDURE — 99214 OFFICE O/P EST MOD 30 MIN: CPT | Performed by: STUDENT IN AN ORGANIZED HEALTH CARE EDUCATION/TRAINING PROGRAM

## 2024-08-05 PROCEDURE — 1126F AMNT PAIN NOTED NONE PRSNT: CPT | Performed by: STUDENT IN AN ORGANIZED HEALTH CARE EDUCATION/TRAINING PROGRAM

## 2024-08-05 NOTE — PROGRESS NOTES
"Chief Complaint  Med Refill (Pt is here for a medication recheck today. ) and Hypertension    Subjective          Chris Morfin presents to [facility]  History of Present Illness    Patient is here for follow up on hypertension.     He states that he has been doing well with his medications and he has not had any trouble with his medications.  He is here to follow-up on an his high blood pressure.  Denies any persistently elevated or low blood pressures with his current medications, and is overall doing very well.    Objective   Vital Signs:   /86   Pulse 78   Ht 180.3 cm (71\")   Wt 117 kg (258 lb)   SpO2 98%   BMI 35.98 kg/m²     Body mass index is 35.98 kg/m².    Review of Systems    Past History:  Medical History: has a past medical history of Cancer and Prostate cancer.   Surgical History: has a past surgical history that includes Prostate biopsy (02/2022).   Family History: family history includes Cancer in his brother and sister.   Social History: reports that he has never smoked. He has never used smokeless tobacco. He reports that he does not currently use alcohol. He reports current drug use. Drug: Marijuana.      Current Outpatient Medications:     abiraterone acetate (ZYTIGA) 500 MG tablet, Take 2 tablets by mouth Daily. on an empty stomach., Disp: 60 tablet, Rfl: 11    amLODIPine (NORVASC) 5 MG tablet, Take 1.5 tablets by mouth Daily for 180 days., Disp: 135 tablet, Rfl: 1    denosumab (Xgeva) 120 MG/1.7ML solution injection, 1.7 ml Subcutaneous ONCE A MONTH, Disp: , Rfl:     leuprolide (Lupron [place], 6-Month,) 45 MG kit injection, as directed Intramuscular Q 6 MONTHS, Disp: , Rfl:     losartan (COZAAR) 100 MG tablet, Take 1 tablet by mouth Daily., Disp: 90 tablet, Rfl: 1    predniSONE (DELTASONE) 5 MG tablet, TAKE ONE (1) TABLET BY MOUTH TWO (2) (TWO) TIMES A DAY., Disp: 60 tablet, Rfl: 11    ondansetron (ZOFRAN) 8 MG tablet, Take 1 tablet by mouth 3 " (Three) Times a Day As Needed for Nausea or Vomiting. (Patient not taking: Reported on 8/5/2024), Disp: 30 tablet, Rfl: 5    Allergies: Patient has no known allergies.    Physical Exam  Constitutional:       General: He is not in acute distress.     Appearance: He is not ill-appearing or toxic-appearing.   HENT:      Head: Normocephalic and atraumatic.   Cardiovascular:      Rate and Rhythm: Normal rate and regular rhythm.      Heart sounds: No murmur heard.  Pulmonary:      Effort: Pulmonary effort is normal. No respiratory distress.      Comments: Decreased breath sounds bilaterally with audible wheezing, rales or rhonchi.  Musculoskeletal:      Right lower leg: No edema.      Left lower leg: No edema.   Neurological:      General: No focal deficit present.      Mental Status: He is alert and oriented to person, place, and time.   Psychiatric:         Mood and Affect: Mood normal.         Thought Content: Thought content normal.          Result Review :                   Assessment and Plan    Diagnoses and all orders for this visit:    1. Essential hypertension (Primary)  -     Comprehensive Metabolic Panel  -     CBC & Differential    2. Stage 3a chronic kidney disease (CKD)  -     Comprehensive Metabolic Panel  -     CBC & Differential  -     POC Urinalysis Dipstick, Automated  -     Microalbumin / Creatinine Urine Ratio - Urine, Clean Catch    Blood work ordered and will contact with results when available. Will adjust medications based on abnormalities seen.     Continue current medications at current doses.  He will let us know when he is needing refills.    Follow-up in 6 months for annual well visit.    Follow Up   Return in about 6 months (around 2/5/2025) for AWV-Provider.  Patient was given instructions and counseling regarding his condition or for health maintenance advice. Please see specific information pulled into the AVS if appropriate.     Teri Ram, DO

## 2024-08-06 LAB
ALBUMIN SERPL-MCNC: 4.4 G/DL (ref 3.9–4.9)
ALP SERPL-CCNC: 81 IU/L (ref 44–121)
ALT SERPL-CCNC: 15 IU/L (ref 0–44)
AST SERPL-CCNC: 16 IU/L (ref 0–40)
BASOPHILS # BLD AUTO: 0.1 X10E3/UL (ref 0–0.2)
BASOPHILS NFR BLD AUTO: 1 %
BILIRUB SERPL-MCNC: 0.3 MG/DL (ref 0–1.2)
BUN SERPL-MCNC: 32 MG/DL (ref 8–27)
BUN/CREAT SERPL: 17 (ref 10–24)
CALCIUM SERPL-MCNC: 10 MG/DL (ref 8.6–10.2)
CHLORIDE SERPL-SCNC: 105 MMOL/L (ref 96–106)
CO2 SERPL-SCNC: 23 MMOL/L (ref 20–29)
CREAT SERPL-MCNC: 1.86 MG/DL (ref 0.76–1.27)
EGFRCR SERPLBLD CKD-EPI 2021: 39 ML/MIN/1.73
EOSINOPHIL # BLD AUTO: 0.2 X10E3/UL (ref 0–0.4)
EOSINOPHIL NFR BLD AUTO: 2 %
ERYTHROCYTE [DISTWIDTH] IN BLOOD BY AUTOMATED COUNT: 13 % (ref 11.6–15.4)
GLOBULIN SER CALC-MCNC: 2.4 G/DL (ref 1.5–4.5)
GLUCOSE SERPL-MCNC: 116 MG/DL (ref 70–99)
HCT VFR BLD AUTO: 42.8 % (ref 37.5–51)
HGB BLD-MCNC: 13.8 G/DL (ref 13–17.7)
IMM GRANULOCYTES # BLD AUTO: 0.1 X10E3/UL (ref 0–0.1)
IMM GRANULOCYTES NFR BLD AUTO: 1 %
LYMPHOCYTES # BLD AUTO: 2.3 X10E3/UL (ref 0.7–3.1)
LYMPHOCYTES NFR BLD AUTO: 30 %
MCH RBC QN AUTO: 29.4 PG (ref 26.6–33)
MCHC RBC AUTO-ENTMCNC: 32.2 G/DL (ref 31.5–35.7)
MCV RBC AUTO: 91 FL (ref 79–97)
MONOCYTES # BLD AUTO: 0.6 X10E3/UL (ref 0.1–0.9)
MONOCYTES NFR BLD AUTO: 8 %
NEUTROPHILS # BLD AUTO: 4.5 X10E3/UL (ref 1.4–7)
NEUTROPHILS NFR BLD AUTO: 58 %
PLATELET # BLD AUTO: 237 X10E3/UL (ref 150–450)
POTASSIUM SERPL-SCNC: 4.2 MMOL/L (ref 3.5–5.2)
PROT SERPL-MCNC: 6.8 G/DL (ref 6–8.5)
RBC # BLD AUTO: 4.69 X10E6/UL (ref 4.14–5.8)
SODIUM SERPL-SCNC: 142 MMOL/L (ref 134–144)
WBC # BLD AUTO: 7.6 X10E3/UL (ref 3.4–10.8)

## 2024-08-07 RX ORDER — AMLODIPINE BESYLATE 5 MG/1
TABLET ORAL
Qty: 135 TABLET | Refills: 1 | OUTPATIENT
Start: 2024-08-07

## 2024-08-07 RX ORDER — LOSARTAN POTASSIUM 100 MG/1
100 TABLET ORAL DAILY
Qty: 90 TABLET | Refills: 1 | OUTPATIENT
Start: 2024-08-07

## 2024-08-15 ENCOUNTER — SPECIALTY PHARMACY (OUTPATIENT)
Facility: HOSPITAL | Age: 69
End: 2024-08-15
Payer: MEDICARE

## 2024-08-20 ENCOUNTER — LAB (OUTPATIENT)
Dept: ONCOLOGY | Facility: HOSPITAL | Age: 69
End: 2024-08-20
Payer: MEDICARE

## 2024-08-20 VITALS
SYSTOLIC BLOOD PRESSURE: 168 MMHG | HEART RATE: 66 BPM | WEIGHT: 252 LBS | RESPIRATION RATE: 18 BRPM | TEMPERATURE: 98 F | BODY MASS INDEX: 35.15 KG/M2 | DIASTOLIC BLOOD PRESSURE: 93 MMHG

## 2024-08-20 DIAGNOSIS — C61 PROSTATE CANCER METASTATIC TO MULTIPLE SITES: ICD-10-CM

## 2024-08-20 PROCEDURE — 36415 COLL VENOUS BLD VENIPUNCTURE: CPT

## 2024-08-21 ENCOUNTER — INFUSION (OUTPATIENT)
Dept: ONCOLOGY | Facility: HOSPITAL | Age: 69
End: 2024-08-21
Payer: MEDICARE

## 2024-08-21 VITALS
HEART RATE: 67 BPM | DIASTOLIC BLOOD PRESSURE: 88 MMHG | WEIGHT: 253.8 LBS | TEMPERATURE: 97.1 F | SYSTOLIC BLOOD PRESSURE: 153 MMHG | BODY MASS INDEX: 35.4 KG/M2 | RESPIRATION RATE: 18 BRPM

## 2024-08-21 DIAGNOSIS — C61 PROSTATE CANCER METASTATIC TO MULTIPLE SITES: Primary | ICD-10-CM

## 2024-08-21 LAB
ALBUMIN SERPL-MCNC: 4.4 G/DL (ref 3.5–5.2)
ALBUMIN/GLOB SERPL: 1.6 G/DL
ALP SERPL-CCNC: 87 U/L (ref 39–117)
ALT SERPL-CCNC: 17 U/L (ref 1–41)
AST SERPL-CCNC: 18 U/L (ref 1–40)
BASOPHILS # BLD AUTO: 0.08 10*3/MM3 (ref 0–0.2)
BASOPHILS NFR BLD AUTO: 1 % (ref 0–1.5)
BILIRUB SERPL-MCNC: 0.4 MG/DL (ref 0–1.2)
BUN SERPL-MCNC: 34 MG/DL (ref 8–23)
BUN/CREAT SERPL: 18.6 (ref 7–25)
CALCIUM SERPL-MCNC: 10.4 MG/DL (ref 8.6–10.5)
CHLORIDE SERPL-SCNC: 109 MMOL/L (ref 98–107)
CO2 SERPL-SCNC: 21.1 MMOL/L (ref 22–29)
CREAT SERPL-MCNC: 1.83 MG/DL (ref 0.76–1.27)
EGFRCR SERPLBLD CKD-EPI 2021: 39.5 ML/MIN/1.73
EOSINOPHIL # BLD AUTO: 0.2 10*3/MM3 (ref 0–0.4)
EOSINOPHIL NFR BLD AUTO: 2.5 % (ref 0.3–6.2)
ERYTHROCYTE [DISTWIDTH] IN BLOOD BY AUTOMATED COUNT: 13.1 % (ref 12.3–15.4)
GLOBULIN SER CALC-MCNC: 2.7 GM/DL
GLUCOSE SERPL-MCNC: 106 MG/DL (ref 65–99)
HCT VFR BLD AUTO: 43.2 % (ref 37.5–51)
HGB BLD-MCNC: 14.1 G/DL (ref 13–17.7)
IMM GRANULOCYTES # BLD AUTO: 0.06 10*3/MM3 (ref 0–0.05)
IMM GRANULOCYTES NFR BLD AUTO: 0.7 % (ref 0–0.5)
LYMPHOCYTES # BLD AUTO: 2.35 10*3/MM3 (ref 0.7–3.1)
LYMPHOCYTES NFR BLD AUTO: 28.8 % (ref 19.6–45.3)
MAGNESIUM SERPL-MCNC: 2.4 MG/DL (ref 1.6–2.4)
MCH RBC QN AUTO: 29.5 PG (ref 26.6–33)
MCHC RBC AUTO-ENTMCNC: 32.6 G/DL (ref 31.5–35.7)
MCV RBC AUTO: 90.4 FL (ref 79–97)
MONOCYTES # BLD AUTO: 0.63 10*3/MM3 (ref 0.1–0.9)
MONOCYTES NFR BLD AUTO: 7.7 % (ref 5–12)
NEUTROPHILS # BLD AUTO: 4.83 10*3/MM3 (ref 1.7–7)
NEUTROPHILS NFR BLD AUTO: 59.3 % (ref 42.7–76)
NRBC BLD AUTO-RTO: 0 /100 WBC (ref 0–0.2)
PHOSPHATE SERPL-MCNC: 3.6 MG/DL (ref 2.5–4.5)
PLATELET # BLD AUTO: 241 10*3/MM3 (ref 140–450)
POTASSIUM SERPL-SCNC: 4.4 MMOL/L (ref 3.5–5.2)
PROT SERPL-MCNC: 7.1 G/DL (ref 6–8.5)
PSA SERPL-MCNC: 0.72 NG/ML (ref 0–4)
RBC # BLD AUTO: 4.78 10*6/MM3 (ref 4.14–5.8)
SODIUM SERPL-SCNC: 144 MMOL/L (ref 136–145)
WBC # BLD AUTO: 8.15 10*3/MM3 (ref 3.4–10.8)

## 2024-08-21 PROCEDURE — 25010000002 DENOSUMAB 120 MG/1.7ML SOLUTION: Performed by: NURSE PRACTITIONER

## 2024-08-21 PROCEDURE — 96372 THER/PROPH/DIAG INJ SC/IM: CPT

## 2024-08-21 RX ADMIN — DENOSUMAB 120 MG: 120 INJECTION SUBCUTANEOUS at 10:26

## 2024-09-16 ENCOUNTER — SPECIALTY PHARMACY (OUTPATIENT)
Facility: HOSPITAL | Age: 69
End: 2024-09-16
Payer: MEDICARE

## 2024-09-17 ENCOUNTER — LAB (OUTPATIENT)
Dept: ONCOLOGY | Facility: HOSPITAL | Age: 69
End: 2024-09-17
Payer: MEDICARE

## 2024-09-17 VITALS
TEMPERATURE: 97.7 F | BODY MASS INDEX: 35.73 KG/M2 | RESPIRATION RATE: 18 BRPM | SYSTOLIC BLOOD PRESSURE: 134 MMHG | DIASTOLIC BLOOD PRESSURE: 77 MMHG | WEIGHT: 256.2 LBS | HEART RATE: 67 BPM

## 2024-09-17 DIAGNOSIS — C61 PROSTATE CANCER METASTATIC TO MULTIPLE SITES: ICD-10-CM

## 2024-09-17 PROCEDURE — 36415 COLL VENOUS BLD VENIPUNCTURE: CPT

## 2024-09-18 ENCOUNTER — OFFICE VISIT (OUTPATIENT)
Dept: ONCOLOGY | Facility: CLINIC | Age: 69
End: 2024-09-18
Payer: MEDICARE

## 2024-09-18 ENCOUNTER — INFUSION (OUTPATIENT)
Dept: ONCOLOGY | Facility: HOSPITAL | Age: 69
End: 2024-09-18
Payer: MEDICARE

## 2024-09-18 ENCOUNTER — SPECIALTY PHARMACY (OUTPATIENT)
Dept: ONCOLOGY | Facility: HOSPITAL | Age: 69
End: 2024-09-18
Payer: MEDICARE

## 2024-09-18 VITALS
WEIGHT: 255 LBS | RESPIRATION RATE: 18 BRPM | OXYGEN SATURATION: 96 % | SYSTOLIC BLOOD PRESSURE: 179 MMHG | TEMPERATURE: 97.7 F | HEART RATE: 69 BPM | DIASTOLIC BLOOD PRESSURE: 98 MMHG | BODY MASS INDEX: 35.7 KG/M2 | HEIGHT: 71 IN

## 2024-09-18 DIAGNOSIS — C61 PROSTATE CANCER METASTATIC TO MULTIPLE SITES: Primary | Chronic | ICD-10-CM

## 2024-09-18 DIAGNOSIS — C61 PROSTATE CANCER METASTATIC TO MULTIPLE SITES: Primary | ICD-10-CM

## 2024-09-18 LAB
ALBUMIN SERPL-MCNC: 4.3 G/DL (ref 3.5–5.2)
ALBUMIN/GLOB SERPL: 1.8 G/DL
ALP SERPL-CCNC: 80 U/L (ref 39–117)
ALT SERPL-CCNC: 12 U/L (ref 1–41)
AST SERPL-CCNC: 13 U/L (ref 1–40)
BASOPHILS # BLD AUTO: 0.07 10*3/MM3 (ref 0–0.2)
BASOPHILS NFR BLD AUTO: 0.8 % (ref 0–1.5)
BILIRUB SERPL-MCNC: 0.4 MG/DL (ref 0–1.2)
BUN SERPL-MCNC: 23 MG/DL (ref 8–23)
BUN/CREAT SERPL: 15.3 (ref 7–25)
CALCIUM SERPL-MCNC: 9.4 MG/DL (ref 8.6–10.5)
CHLORIDE SERPL-SCNC: 107 MMOL/L (ref 98–107)
CO2 SERPL-SCNC: 24 MMOL/L (ref 22–29)
CREAT SERPL-MCNC: 1.5 MG/DL (ref 0.76–1.27)
EGFRCR SERPLBLD CKD-EPI 2021: 50.1 ML/MIN/1.73
EOSINOPHIL # BLD AUTO: 0.22 10*3/MM3 (ref 0–0.4)
EOSINOPHIL NFR BLD AUTO: 2.5 % (ref 0.3–6.2)
ERYTHROCYTE [DISTWIDTH] IN BLOOD BY AUTOMATED COUNT: 13.2 % (ref 12.3–15.4)
GLOBULIN SER CALC-MCNC: 2.4 GM/DL
GLUCOSE SERPL-MCNC: 92 MG/DL (ref 65–99)
HCT VFR BLD AUTO: 42.5 % (ref 37.5–51)
HGB BLD-MCNC: 13.5 G/DL (ref 13–17.7)
IMM GRANULOCYTES # BLD AUTO: 0.04 10*3/MM3 (ref 0–0.05)
IMM GRANULOCYTES NFR BLD AUTO: 0.5 % (ref 0–0.5)
LYMPHOCYTES # BLD AUTO: 2.74 10*3/MM3 (ref 0.7–3.1)
LYMPHOCYTES NFR BLD AUTO: 31.7 % (ref 19.6–45.3)
MAGNESIUM SERPL-MCNC: 2.3 MG/DL (ref 1.6–2.4)
MCH RBC QN AUTO: 29.2 PG (ref 26.6–33)
MCHC RBC AUTO-ENTMCNC: 31.8 G/DL (ref 31.5–35.7)
MCV RBC AUTO: 91.8 FL (ref 79–97)
MONOCYTES # BLD AUTO: 0.77 10*3/MM3 (ref 0.1–0.9)
MONOCYTES NFR BLD AUTO: 8.9 % (ref 5–12)
NEUTROPHILS # BLD AUTO: 4.81 10*3/MM3 (ref 1.7–7)
NEUTROPHILS NFR BLD AUTO: 55.6 % (ref 42.7–76)
NRBC BLD AUTO-RTO: 0 /100 WBC (ref 0–0.2)
PHOSPHATE SERPL-MCNC: 2.9 MG/DL (ref 2.5–4.5)
PLATELET # BLD AUTO: 253 10*3/MM3 (ref 140–450)
POTASSIUM SERPL-SCNC: 4.2 MMOL/L (ref 3.5–5.2)
PROT SERPL-MCNC: 6.7 G/DL (ref 6–8.5)
PSA SERPL-MCNC: 0.46 NG/ML (ref 0–4)
RBC # BLD AUTO: 4.63 10*6/MM3 (ref 4.14–5.8)
SODIUM SERPL-SCNC: 141 MMOL/L (ref 136–145)
WBC # BLD AUTO: 8.65 10*3/MM3 (ref 3.4–10.8)

## 2024-09-18 PROCEDURE — 1159F MED LIST DOCD IN RCRD: CPT | Performed by: NURSE PRACTITIONER

## 2024-09-18 PROCEDURE — 99214 OFFICE O/P EST MOD 30 MIN: CPT | Performed by: NURSE PRACTITIONER

## 2024-09-18 PROCEDURE — 96372 THER/PROPH/DIAG INJ SC/IM: CPT

## 2024-09-18 PROCEDURE — 1160F RVW MEDS BY RX/DR IN RCRD: CPT | Performed by: NURSE PRACTITIONER

## 2024-09-18 PROCEDURE — 1126F AMNT PAIN NOTED NONE PRSNT: CPT | Performed by: NURSE PRACTITIONER

## 2024-09-18 PROCEDURE — 25010000002 DENOSUMAB 120 MG/1.7ML SOLUTION: Performed by: NURSE PRACTITIONER

## 2024-09-18 RX ADMIN — DENOSUMAB 120 MG: 120 INJECTION SUBCUTANEOUS at 10:38

## 2024-10-18 ENCOUNTER — SPECIALTY PHARMACY (OUTPATIENT)
Facility: HOSPITAL | Age: 69
End: 2024-10-18
Payer: MEDICARE

## 2024-10-18 NOTE — PROGRESS NOTES
Specialty Pharmacy Refill Coordination Note     Chris is a 69 y.o. male contacted today regarding refills of  abiraterone 1000mg PO QD of specialty medication(s).    Reviewed and verified with patient: YES    Specialty medication(s) and dose(s) confirmed: yes    Refill Questions      Flowsheet Row Most Recent Value   Changes to allergies? No   Changes to medications? No   New conditions or infections since last clinic visit No   Unplanned office visit, urgent care, ED, or hospital admission in the last 4 weeks  No   How does patient/caregiver feel medication is working? Good   Financial problems or insurance changes  No   Since the previous refill, were any specialty medication doses or scheduled injections missed or delayed?  No   Does this patient require a clinical escalation to a pharmacist? No            Delivery Questions      Flowsheet Row Most Recent Value   Delivery method [employer]   Delivery address verified with patient/caregiver? Yes   Delivery address Home   Number of medications in delivery 1   Medication(s) being filled and delivered Abiraterone Acetate (ZYTIGA)   Doses left of specialty medications 6 days   Copay verified? Yes   Copay amount $0   Copay form of payment No copayment ($0)   Ship Date 10/21   Delivery Date 10/22   Signature Required No                   Follow-up: 60 day(s)     Catalina Montes De Oca, Pharmacy Technician  Specialty Pharmacy Technician

## 2024-10-22 ENCOUNTER — OFFICE VISIT (OUTPATIENT)
Dept: ONCOLOGY | Facility: CLINIC | Age: 69
End: 2024-10-22
Payer: MEDICARE

## 2024-10-22 ENCOUNTER — LAB (OUTPATIENT)
Dept: ONCOLOGY | Facility: HOSPITAL | Age: 69
End: 2024-10-22
Payer: MEDICARE

## 2024-10-22 VITALS
RESPIRATION RATE: 18 BRPM | BODY MASS INDEX: 35.7 KG/M2 | TEMPERATURE: 98.2 F | HEART RATE: 70 BPM | WEIGHT: 255 LBS | SYSTOLIC BLOOD PRESSURE: 153 MMHG | OXYGEN SATURATION: 98 % | HEIGHT: 71 IN | DIASTOLIC BLOOD PRESSURE: 94 MMHG

## 2024-10-22 DIAGNOSIS — C61 PROSTATE CANCER METASTATIC TO MULTIPLE SITES: Chronic | ICD-10-CM

## 2024-10-22 DIAGNOSIS — C61 PROSTATE CANCER METASTATIC TO MULTIPLE SITES: Primary | Chronic | ICD-10-CM

## 2024-10-22 PROCEDURE — 36415 COLL VENOUS BLD VENIPUNCTURE: CPT

## 2024-10-22 NOTE — LETTER
October 22, 2024     Teri Ram DO  [address] [facility] [address]    Patient: Chris Morfin   YOB: 1955   Date of Visit: 10/22/2024     Dear Teri Ram DO:       Thank you for referring Chris Morfin to me for evaluation. Below are the relevant portions of my assessment and plan of care.    If you have questions, please do not hesitate to call me. I look forward to following Chris along with you.         Sincerely,        Fritz Goodson MD        CC: MD Pro Evans MD Ayman A Geneidy, MD Hicks, Fritz DING MD  10/22/24 1027  Sign when Signing Visit  CHIEF COMPLAINT: No new somatic complaints    Problem List:  Oncology/Hematology History Overview Note   1.  Prostate cancer clinical T1c and 2M1B stage IVb treated with 4 cycles of Taxotere, stopped due to syncope with negative cardiac work-up, with marked drop in PSA of 18.6 from over 900 at baseline, continued on Zytiga and prednisone.  2.  Urinary retention with Goodwin in place 1/24/2022.  On finasteride 1/24/2022.  3.  Covid 19 December 2021  4.  Hypertension  5.  Stage 3a chronic kidney disease    Oncology history timeline:  -11/29/2021  with symptoms of urinary retention.  -2/15/2022 prostate biopsy adenocarcinoma grade group 5 and 3 out of 12 cores, grade group 4 in 1 out of 12 cores, grade group 3 in 8 out of 12 cores.  -2/24/2022 CT chest abdomen pelvis and total body bone scan shows left third rib, right acetabulum, periaortic and retroperitoneal kizzy metastases complaining of pain in the left chest and right hip.  Also possible sclerotic left 10th rib on CT.  Spleen mildly enlarged 13.8 cm.  Hepatic steatosis.  Small liver cyst.  Fat stranding in the periaortic and mesenteric region consistent with reactive/inflammatory stranding.  Multiple enlarged periaortic and retroperitoneal nodes and lymphadenopathy largest 4.9 cm.  CT chest describes tiny sclerotic foci in left  eighth rib and right lateral seventh rib and T1.  Multiple small mediastinal and bilateral axillary nodes seen with small hypodense left thyroid nodule.  Creatinine 1.7.  -3/4/2022 office note Dr. Rolando Matute: Patient was seen after his elevated PSA by Dr. Vera and prostate biopsy scheduled.  However, he developed COVID-19 and this was canceled and the patient did not reschedule due to lack of insurance.  Saw Dr. Matute back on 1/24/2022 with plans to get staging CTs and bone scan with results as outlined above.  Started Casodex and to return on 3/11/2022 for Lupron.  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  TURP planned for urinary retention symptoms.    -3/15/2022 [facility] initial consultation: I reviewed the above data with the patient.  With his fairly bulky retroperitoneal adenopathy and bone metastases including extra axial metastases, he would fit the data from the CHAARTED trial for which Taxotere x6 followed by Zytiga prednisone along with the planned Lupron already being planned by Dr. Matute would be reasonable.  Equally reasonable in terms of comparisons with bicalutamide and Lupron would be Zytiga prednisone plus Lupron or Xtandi plus Lupron or apalutamide plus Lupron.  None of these have been compared head-to-head but all have beaten combined androgen deprivation therapy with bicalutamide and Lupron.  At the age of 67 and in order to have as many bullets as possible down the road and hence saving some of the hormone blockade options for later and using chemotherapy when he is younger and healthier for a more time-limited.,  He has opted for the Taxotere x6 followed by Zytiga and prednisone.  We will also give him Xgeva to improve bone health and given metastatic disease, as with all metastatic prostate cancer patients, he needs genetic counseling both for his sake as well as his family's.  If he were to have BRCA1 or  other such  mutations, then that also opens up the options for PARP inhibitors etc. down the road.  He has his TURP scheduled for 2/24/2022 and we will start treatment a couple of weeks after that and he will get chemo preparation visit in the meantime and I will get capital surgeons to place a port.  We will check his PSA after his TURP when he sees my nurse practitioner back early April for the start of chemotherapy and repeat the PSA and CT chest abdomen pelvis and bone scan after the Taxotere is complete.    -3/31/2022 chemotherapy education and needs assessment completed    -4/7/2022 began docetaxel and Xgeva.  .0.  We will do his Xgeva every 6 weeks to coordinate with his docetaxel infusions.    -4/19/2022 patient stopped Casodex    -5/17/2022 patient reported seeing his PCP for an abscess in his suprapubic area.  Placed on clindamycin, will delay treatment 1 week.    -5/25/2022 PSA 34.3  -5/26/2022 [facility] follow-up: Chris has an abscess in the suprapubic area, it has improved on antibiotic therapy but I am concerned if we treat him it will just flare back up unless it is drained.  I will get him to Dr. Miller who placed his port for I&D and culture.  He is on clindamycin and states he completes course of treatment tomorrow.  I will delay his treatment with Taxotere 1 more week.  We will give his Xgeva today.  I will see him back in 1 week for follow-up to assess whether or not we can resume his Taxotere.  His PSA has dropped nicely with treatment thus far, PSA yesterday was 34.3 which is down from a PSA of 259.0 when we started treatment, it has been as high as 911 in November.    -6/2/2022 [facility] follow-up: Chris went to see Dr. Miller to have his abscess lanced however apparently there was a long wait and he left without being seen.  He did call his PCP and was given 5 more days of clindamycin and the abscess now seems to have resolved.  We discussed today that  he is at risk for recurrence of infection due to immunocompromise state with chemotherapy.  He will notify us if he has any return of his abscess.  He does have intertrigo under his panniculus, I have advised him to keep this area dry, to apply topical drying/antifungal powders.  Also recommended looser clothing.  His counts are adequate to continue therapy, we will resume Taxotere today with cycle #3.  We will repeat restaging scans after 6 courses.  We are doing Xgeva every 6 weeks to coordinate with his Taxotere.  Once he finishes Taxotere we can then go back to every 4 weeks.  His next Xgeva is not due until 7/14/2022.  His calcium is running a little low, he is going to  calcium supplement.    -6/23/2022 [facility] follow-up: Chris huerta is doing well on treatment with Taxotere.  Labs reviewed from yesterday and are unremarkable.  We will continue treatment today unchanged.  His suprapubic abscess resolved on clindamycin.  He will continue to use drying powder/antifungal powder under his panniculus for intertrigo.  Today will be cycle #4 of a planned 6 courses of Taxotere.  He is also on Xgeva, next dose due 7/14/2022, we are doing this every 6 weeks for now to line up with his chemotherapy, can go back to every 4 weeks after he finishes Taxotere.  Calcium from [facility] yesterday was normal.    -7/13/2022 [facility] follow-up: Mr. Morfin has had issues around day 3 after each treatment ranging from chest pain after his first treatment for which he did not seek medical attention, fatigue after the next few treatments, but 3 days after his most recent treatment on 6/23/2022 he had a syncopal episode at home and once again did not seek medical attention.  I discussed with him that this was not acceptable, if he has occurrences like this someone needs to call 911, it is difficult to figure out what is going on after the fact, the best time to work this up would be during the  occurrence.  For now we will get labs today with CBC, CMP, PSA.  I will refer him to cardiology for further evaluation.  We will hold Taxotere most likely, I will see him tomorrow to go over his lab results.  I will also repeat his restaging scans with CT chest, abdomen and pelvis and will include CT of the head in light of his syncopal episode.      -7/13/2022 PSA 18.6    -7/14/2022 [facility] clinic follow-up: Mr. Morfin returns today to review his labs from yesterday.  Labs are unremarkable.  PSA down to 18.6 from a high of 259.0 in April prior to starting treatment.  CBC and CMP unremarkable, magnesium is normal at 2.3, phosphorus slightly low at 2.4.  We will hold therapy for now in light of his syncopal episode on day 3 after his last treatment and prior episodes with chest pain and severe fatigue that all occurred on day 3 after his Taxotere.  We will restage him with CT head, chest, abdomen and pelvis (I am including the head in light of his syncopal episode).  I have also referred him to cardiology, he states that he received a call from them about making an appointment however he wanted to talk to us today first.  I emphasized the importance to him of making and keeping that appointment and he states understanding.  I also once again emphasized the requirement to seek emergency medical attention if he has any episodes in the future such as chest pain or syncopal events.  Whether or not we try and complete Taxotere with 2 more courses or move to the next line of therapy will be decided when he returns to discuss with Dr. Goodson and review his restaging scans.    -7/15/2022 saw Dr. Diaz cardiologist.  For syncope he ordered echocardiogram and 48-hour Holter monitor.    -7/15/2022 Holter monitor relatively benign.    -7/22/2022 CT brain with and without contrast negative.  CT chest abdomen and pelvis shows some nonspecific cecal wall thickening.  No progression in the chest.  T1 and ribs stable.   Decrease in multiple retroperitoneal and pelvic nodes.  Slight increase in right acetabular sclerotic lesion.  Persistent but improved circumferential bladder wall thickening.  Total body bone scan shows stable left third rib and no evidence of new bone metastases.    -7/19/2022 ejection fraction 65% with grade 1 diastolic dysfunction.    -7/26/2022 [facility] follow-up: Given presyncopal symptoms occurred 3 days after Taxotere and has not otherwise occurred any other time and his cardiology work-up is fairly unremarkable and his disease is under good control as witnessed by the report above of the CTs of head chest abdomen pelvis and bone scan, I will hold cycle 5 and 6 of Taxotere and continue 1 now with Zytiga and prednisone.  With reference to the coincidental cecal wall thickening found on CT, we will get him to [facility] surgeons for colonoscopy.  He will see my nurse practitioner back in August for next cycle of Abiraterone prednisone.  He will continue his Lupron with Dr. Matute.  We will continue his Xgeva.  We will repeat his CT chest abdomen pelvis and bone scan again in 3 months.  He will see my nurse practitioner in the interim.    -8/23/2022 [facility] follow-up: No further presyncopal symptoms.  Feeling great other than for hot flashes.  Did commend for now we will continue on with his Zytiga prednisone and he will get his Xgeva today based on labs from 8/10/2022.  He opted out of getting the colonoscopy that I recommended and he will not change his mind.  He will continue getting the Lupron with Dr. Matute and I asked him to give the date of this appointment to my nurse when he sees her back in a month.  We will give his Xgeva today.  We will get CT chest abdomen pelvis and total body bone scan towards the middle to end of October.  Presently other than for the hot flashes feels great.    -9/20/2022 PSA 8.320    -9/22/2022 [facility] follow-up: Chris is doing  well on Zytiga, prednisone along with Xgeva.  Labs reviewed from 9/20/2022 are unremarkable, CBC was normal, CMP with creatinine of 1.34 otherwise normal, this is stable from his baseline.  PSA stable at 8.320.  He received Lupron recently with Dr. Matute, he thinks last week or so, states his next appointment is in February.  He will continue treatment unchanged.  We will repeat restaging CT chest, abdomen and pelvis and total body bone scan in October prior to return and I have ordered those today.  We will also repeat his PSA.    -9/22/2022 CT chest abdomen pelvisCompared to July 2022 [facility] showed no evidence of disease progression in the chest with no substantial sclerotic bony lesions and decrease in size of retroperitoneal and pelvic nodes with persistent cecal wall thickening and suboptimal bladder distention.  Total body bone scan showed decrease in previously seen uptake in the left third rib with diffuse sclerosis representing treatment response with no new foci.    -10/18/2022 [facility] follow-up: I reviewed bone scan and CT reports as above with no evidence of progression.  Tolerating Zytiga prednisone and Xgeva.  Getting Lupron regularly with Dr. Matute next appointment coming in February.  We will repeat his CT chest abdomen pelvis and total body bone scan in April.  He will follow with my nurse practitioner in the interim.    -1/10/2023 PSA 3.450  -1/11/2023 [facility] follow-up: Mr. Morfin continues to do well on current therapy with Zytiga, prednisone and Xgeva with no unusual side effects.  He has no new worrisome symptoms.  He is due for his next Lupron injection in February with Dr. Matute.  His PSA continues to trend downward, current PSA from yesterday was 3.450.  We will continue therapy unchanged, he will receive Xgeva today.  Has had no hypocalcemia, his CMP, phosphorus and magnesium are all normal.  We will repeat restaging scans in  "April.  -3/7/2023 PSA 2.460  -3/8/2023 [facility] follow-up:  Mr. Morfin is doing well.  He is tolerating therapy with Zytiga, prednisone and Xgeva with no significant side effects.  He has hot flashes but these are tolerable.  He had Lupron injection 2/24/2023 with Dr. Matute.  His PSA continues to decline, current PSA 2.460.  He will continue therapy unchanged.  We will repeat restaging scans late April prior to return in 2 months and I have ordered those today.  He is working with his PCP Rodrigo Ram on his hypertension.  His b/p today was 160/88.  He was to have increased his losartan but I do not think he has done that yet as he said \"I still have some of my old prescription left\".  -4/4/2023 PSA 2.55  - 4/26/2023 CT chest abdomen pelvis [facility] comparison 10/10/2022 showed stable left third rib, T1, left fourth and eighth rib, right seventh rib.  Probable liver cyst.  Few diverticuli.  Mild further decrease in a few of the previously enlarged nodes.  Left external iliac 12 mm compared to 16 mm.  Right common iliac 7 mm stable.  Lower left periaortic 8 mm previously 13 mm.  No new lytic or blastic osseous lesions.  Total body bone scan comparison 10/10/2022, 7/22/2022, and CT 4/26/2023.  No new sites of increased uptake.  1 focal left third rib hotspot consistent with bone metastasis.    -5/2/2023 [facility] follow-up: I reviewed interval notes since I last saw him from my nurse practitioner as outlined above in interval images and reports thereof from [facility] that shows no new sites of bone metastasis and no kizzy or visceral progression.  PSA stabilized around 2.5.  In the absence of symptomatic or radiographic progression, I would be unlikely to change therapy just on the basis of a PSA rise and for now the PSA is stable.  We will continue Zytiga prednisone and Xgeva and he will follow-up with my nurse practitioner 5/30/2023 with repeat CTs and bone scan in 6 months.  " I asked him to check with his primary care today regarding his systolic in the 170s.    -5/31/2023 [facility] clinic follow-up: Chris continues to do well on current therapy with Zytiga, prednisone and Xgeva along with Lupron that he receives with Dr. Matute.  His PSA remains stable, it was lower this month compared to last month, current PSA 1.940.  Continues to deal with hypertension, on arrival today his blood pressure was 210/92 however this was using a wrist cuff, when I rechecked it manually his blood pressure was 160/90.  Still has room for improvement despite recent increase in his losartan to 100 mg daily.  He will follow-up with Dr. Ram for further management.  We will continue therapy unchanged.  We will repeat restaging scans in October.    -7/26/2023 [facility] Oncology clinic follow-up: Chris overall continues to do well on current therapy with Zytiga, prednisone and Xgeva.  He receives Lupron every 6 months with Dr. Matute and states that is scheduled for August.  PSA from yesterday is pending, PSA on 6/27/2023 was 1.590 which was continuing to decline, in February it was 3.250.  His creatinine this past month has bumped up.  He feels he is staying hydrated but I encouraged him to increase his fluid intake.  I discussed with him that this could be from his hypertension, some of his medications.  His blood pressure today is 165/92.  He has an appointment with his primary care provider Dr. Teri Ram next week and can further discuss with her.  No adjustment needed for Xgeva.  I refilled his prednisone today.  I will see him back in 1 month for follow-up with continued monitoring of his labs.  He may end up needing to see nephrology.  We will repeat restaging scans in October.    -9/29/2023 PSA 1.810  -8/23/2023 [facility] Oncology clinic follow-up: Chris continues on therapy with Zytiga, prednisone and Xgeva, he also receives Lupron every 6 months with Dr. Matute with most recent given  on 8/7/2023.  He is scheduled for a cystoscopy in a few weeks as his last few UAs per the patient's report had microscopic hematuria, he has had no steve hematuria.  His creatinine continues to remain elevated at 1.82, BUN is 40, GFR is 40.  I will get him to nephrology for further evaluation.  Blood pressure today was fairly good at 161/86, he continues to follow with Dr. Ram for management.  PSA on 7/25/2023 was up slightly from prior month, PSA in July was 2.140, in June it was 1.590, PSA from yesterday is pending.  I plan on repeating restaging CT scans and bone scans in October however if his PSA is up significantly then I will do sooner.  I will see him back for follow-up in 1 month.  -8/23/2024 PSA 1.860    -9/20/2023 [facility] follow-up: Chris is tolerating treatment with Zytiga, prednisone and Xgeva.  He is up-to-date with his Lupron injection, last received in August with Dr. Matute.  He had cystoscopy on 9/8/2023 that was normal. I referred him to nephrology when I saw him last in August as his creatinine has been increasing, creatinine yesterday was a little better 1.51, GFR is 50, creatinine clearance 63.8.  He is still awaiting an appointment with nephrology, per our  it will be early November.  We will repeat his restaging scans prior to return and I will do his CT scans without contrast in light of his new renal insufficiency.  We will also get total body bone scan and I have ordered those today.  His PSA yesterday was 1.810.    -10/9/2023 CT chest abdomen Pelvis without contrast compared to April 2023 shows slightly prominent pelvic lymph nodes left external iliac 8 mm compared to prior study.  Right internal iliac heterogenous 19 mm compared to 11 mm prior.  Stable sclerotic bone lesions and no new lesions.  Stable bone scan with no new sites of disease    -10/17/2023 [facility] clinic follow-up: With elevated creatinine, CTs done without contrast showed very  slight increased prominence by few millimeters of some internal iliac and external iliac nodes for which PSMA PET could be done but for now I will check his PSA and have him see my nurse practitioner back in a week and unless the PSA is significantly rising I would continue the Zytiga, prednisone, Xgeva and February dose of Lupron with Dr. Matute and make sure he follows with nephrology.  If his PSA is significantly rising then my nurse practitioner will order PSMA PET.    -10/17/2023 PSA 1.5  -11/14/2023 PSA 1.510  -11/15/2023 [facility] follow-up: Mr. Ferrara continues to do well on current therapy with Zytiga, prednisone, Xgeva and Lupron that he receives with Dr. Matute.  His PSA is stable at 1.5.  Would not change therapy for now, we will plan on repeating restaging scans in February unless PSA starts to rise or he has new concerns.  Overall he feels good.  He is now established with nephrology and will continue to follow with them regarding his renal insufficiency, I reviewed note from consultation with Dr. Armstrong 11/6/2023.    -1/9/2024 PSA 1.050    -1/10/2024 [facility] follow-up: Chris overall continues to tolerate current therapy with Zytiga, prednisone, Xgeva and Lupron.  He states his next Lupron injection with Dr. Matute is due late February.  His PSA is stable at 1.050. Creatinine stable at 1.64, he is following with nephrology, he has a an appointment with Dr. Armstrong in February for follow-up.  Hypertension being managed by his primary care provider, target systolic ideally 120s-140s, today it is running a little high at 172/90, yesterday when he was here for labs it was 138/80.  We will continue therapy unchanged with plans to repeat restaging scans in April.    -2/6/2024 PSA 0.753    -2/7/2024 [facility] follow-up: Chris continues to do well on current therapy with Zytiga, prednisone, Xgeva and Lupron.  He is up-to-date on Lupron injection next due later  this month with Dr. Matute.  His PSA continues to slowly decrease, current PSA is 0.753.  He has no new worrisome symptoms.  We will continue therapy unchanged, I have ordered restaging scans for late March prior to his return in April. He is following with nephrology for his renal insufficiency, his creatinine yesterday was good at 1.21.  I will do his CT scans with contrast unless something changes in the interim. He follows closely with Dr. Ram for management of his hypertension. I reviewed notes from his last office visit with her earlier this month on 2/1/2024, also reviewed labs in detail with the patient today.  All questions were answered to his satisfaction.    -3/5/2024 PSA slowly declining 0.73.  Creatinine fluctuating.  1.69 with GFR 43 otherwise unremarkable CMP.  Phosphorus low 2.1    -4/8/2024 CT chest, abdomen and pelvis shows stable sclerotic lesions in the thoracic spine and bilateral ribs and right acetabulum.  Stable appearance of pelvic lymph nodes.  No new sites concerning for progression of disease.  Total body bone scan shows stable diffuse increased uptake of the left third rib correlates with diffuse sclerosis and enlargement on CT, no new sites of active osseous lesions.  -4/30/2024 PSA 0.713  -5/1/2024 [facility] clinic follow-up: Chris continues to do well on current therapy with Zytiga, prednisone, Xgeva and Lupron.  He receives his Lupron every 6 months with Dr. Matute, last administered 2/5/2024.  Current CT scans and bone scan showed no progression of disease, PSA is stable at 0.713.  His CBC is unremarkable, CMP shows creatinine stable at 1.50 otherwise normal.  He does follow with nephrology.  We will continue treatment unchanged.  I will see him back in 2 months for follow-up.  We will repeat restaging scans in 6 months unless he has any new symptoms or significant change in his PSA.    -6/25/2024 PSA 0.623  -6/26/2024 [facility] clinic follow-up: Chris overall  continues to tolerate current therapy with Zytiga, prednisone, Xgeva and Lupron.  We did call to confirm his next Lupron injection and that is scheduled for late July with Dr. Matute.  His PSA for the most part is stable at 0.623, we will continue to monitor.  He has no new worrisome symptoms.  I did discuss with him today his creatinine which has increased to 1.86, BUN was 32.  Reminded him to be sure and stay hydrated.  He does follow with nephrology and his next appointment is in August.  He is on Xgeva however there is no indication for any adjustment, his creatinine clearance is greater than 30.  Blood pressure today was under good control at 130/78.  We will continue treatment unchanged.  I will see him back in 1 month for follow-up.  Plan to repeat restaging scans in October unless his PSA rises or he has worrisome symptoms.    -7/23/2024 PSA 0.606    -9/17/2024 PSA 0.455  -9/18/2024 [facility] oncology clinic follow-up: Chris is doing well overall.  Tolerates therapy with Zytiga, prednisone, Xgeva and Lupron.  He last received Lupron in August with Dr. Matute.  His PSA had been slowly creeping up however it has now went back down and is currently 0.455.  He will continue treatment unchanged.  We will repeat CT chest, abdomen and pelvis prior to return and I will do that without contrast in light of his renal insufficiency.  Current creatinine is stable at 1.5, he does follow with nephrology.  We will also repeat total body bone scan.  We will see him back in 1 months for follow-up and sooner if any concerns.  I did discuss with him that he can take Tylenol if needed or use topical over-the-counter pain relief ointment such as Biofreeze etc. for his back pain but thus far it has not required any intervention, he does not like to take pain medications.     Prostate cancer metastatic to multiple sites   3/15/2022 Initial Diagnosis    Prostate cancer metastatic to multiple sites (HCC)     3/15/2022 Cancer  "Staged    Staging form: Prostate, AJCC 8th Edition  - Clinical: Stage IVB (cT1c, cN1, cM1b, Grade Group: 5) - Signed by Fritz Goodson MD on 3/15/2022     4/7/2022 - 6/23/2022 Chemotherapy    Stopped after cycle 4 6/23/2022 due to syncope 3 days post infusions with negative cardiac work-up  OP PROSTATE DOCEtaxel     4/7/2022 -  Chemotherapy    OP SUPPORTIVE Denosumab (Xgeva) Q28D     7/26/2022 -  Chemotherapy    OP PROSTATE Abiraterone / PredniSONE           HISTORY OF PRESENT ILLNESS:  The patient is a 69 y.o. male, here for follow up on management of metastatic prostate cancer    Past Medical History:   Diagnosis Date   • Cancer    • Prostate cancer      Past Surgical History:   Procedure Laterality Date   • PROSTATE BIOPSY  02/2022    dr. seu       No Known Allergies    Family History and Social History reviewed and changed as necessary    REVIEW OF SYSTEM:   No new somatic complaints.  Chronic back pain not new.    PHYSICAL EXAM:  Jaundice icterus or pallor.  No respiratory distress.  No rashes.  No petechiae or ecchymoses.    Vitals:    10/22/24 0952   BP: 153/94   Pulse: 70   Resp: 18   Temp: 98.2 °F (36.8 °C)   SpO2: 98%   Weight: 116 kg (255 lb)   Height: 180.3 cm (71\")     Vitals:    10/22/24 0952   PainSc: 0-No pain          ECOG score: 0           Vitals reviewed.    ECOG: (0) Fully Active - Able to Carry On All Pre-disease Performance Without Restriction    Lab Results   Component Value Date    HGB 13.5 09/17/2024    HCT 42.5 09/17/2024    MCV 91.8 09/17/2024     09/17/2024    WBC 8.65 09/17/2024    NEUTROABS 4.81 09/17/2024    LYMPHSABS 2.74 09/17/2024    MONOSABS 0.77 09/17/2024    EOSABS 0.22 09/17/2024    BASOSABS 0.07 09/17/2024       Lab Results   Component Value Date    GLUCOSE 92 09/17/2024    BUN 23 09/17/2024    CREATININE 1.50 (H) 09/17/2024     09/17/2024    K 4.2 09/17/2024     09/17/2024    CO2 24.0 09/17/2024    CALCIUM 9.4 09/17/2024    ALBUMIN 4.3 09/17/2024    " BILITOT 0.4 09/17/2024    ALKPHOS 80 09/17/2024    AST 13 09/17/2024    ALT 12 09/17/2024             ASSESSMENT & PLAN:  1.  Prostate cancer clinical T1c and 2M1B stage IVb treated with 4 cycles of Taxotere, stopped due to syncope with negative cardiac work-up, with marked drop in PSA of 18.6 from over 900 at baseline, continued on Zytiga and prednisone.  2.  Urinary retention with Goodwin in place 1/24/2022.  On finasteride 1/24/2022.  3.  Covid 19 December 2021  4.  Hypertension  5.  Stage 3a chronic kidney disease    Oncology history timeline:  -11/29/2021  with symptoms of urinary retention.  -2/15/2022 prostate biopsy adenocarcinoma grade group 5 and 3 out of 12 cores, grade group 4 in 1 out of 12 cores, grade group 3 in 8 out of 12 cores.  -2/24/2022 CT chest abdomen pelvis and total body bone scan shows left third rib, right acetabulum, periaortic and retroperitoneal kizzy metastases complaining of pain in the left chest and right hip.  Also possible sclerotic left 10th rib on CT.  Spleen mildly enlarged 13.8 cm.  Hepatic steatosis.  Small liver cyst.  Fat stranding in the periaortic and mesenteric region consistent with reactive/inflammatory stranding.  Multiple enlarged periaortic and retroperitoneal nodes and lymphadenopathy largest 4.9 cm.  CT chest describes tiny sclerotic foci in left eighth rib and right lateral seventh rib and T1.  Multiple small mediastinal and bilateral axillary nodes seen with small hypodense left thyroid nodule.  Creatinine 1.7.  -3/4/2022 office note Dr. Rolando Matute: Patient was seen after his elevated PSA by Dr. Vera and prostate biopsy scheduled.  However, he developed COVID-19 and this was canceled and the patient did not reschedule due to lack of insurance.  Saw Dr. Matute back on 1/24/2022 with plans to get staging CTs and bone scan with results as outlined above.  Started Casodex and to return on 3/11/2022 for Lupron.  Referral made to medical oncology for  other additional castrate naïve prostate cancer therapies.  TURP planned for urinary retention symptoms.    -3/15/2022 [facility] medical oncology initial consultation: I reviewed the above data with the patient.  With his fairly bulky retroperitoneal adenopathy and bone metastases including extra axial metastases, he would fit the data from the CHAARTED trial for which Taxotere x6 followed by Zytiga prednisone along with the planned Lupron already being planned by Dr. Matute would be reasonable.  Equally reasonable in terms of comparisons with bicalutamide and Lupron would be Zytiga prednisone plus Lupron or Xtandi plus Lupron or apalutamide plus Lupron.  None of these have been compared head-to-head but all have beaten combined androgen deprivation therapy with bicalutamide and Lupron.  At the age of 67 and in order to have as many bullets as possible down the road and hence saving some of the hormone blockade options for later and using chemotherapy when he is younger and healthier for a more time-limited.,  He has opted for the Taxotere x6 followed by Zytiga and prednisone.  We will also give him Xgeva to improve bone health and given metastatic disease, as with all metastatic prostate cancer patients, he needs genetic counseling both for his sake as well as his family's.  If he were to have BRCA1 or other such  mutations, then that also opens up the options for PARP inhibitors etc. down the road.  He has his TURP scheduled for 2/24/2022 and we will start treatment a couple of weeks after that and he will get chemo preparation visit in the meantime and I will get capital surgeons to place a port.  We will check his PSA after his TURP when he sees my nurse practitioner back early April for the start of chemotherapy and repeat the PSA and CT chest abdomen pelvis and bone scan after the Taxotere is complete.    -3/31/2022 chemotherapy education and needs assessment completed    -4/7/2022 began docetaxel and  Xgeva.  .0.  We will do his Xgeva every 6 weeks to coordinate with his docetaxel infusions.    -4/19/2022 patient stopped Casodex    -5/17/2022 patient reported seeing his PCP for an abscess in his suprapubic area.  Placed on clindamycin, will delay treatment 1 week.    -5/25/2022 PSA 34.3  -5/26/2022 [facility] Oncology clinic follow-up: Chris has an abscess in the suprapubic area, it has improved on antibiotic therapy but I am concerned if we treat him it will just flare back up unless it is drained.  I will get him to Dr. Miller who placed his port for I&D and culture.  He is on clindamycin and states he completes course of treatment tomorrow.  I will delay his treatment with Taxotere 1 more week.  We will give his Xgeva today.  I will see him back in 1 week for follow-up to assess whether or not we can resume his Taxotere.  His PSA has dropped nicely with treatment thus far, PSA yesterday was 34.3 which is down from a PSA of 259.0 when we started treatment, it has been as high as 911 in November.    -6/2/2022 [facility] Oncology clinic follow-up: Chris went to see Dr. Miller to have his abscess lanced however apparently there was a long wait and he left without being seen.  He did call his PCP and was given 5 more days of clindamycin and the abscess now seems to have resolved.  We discussed today that he is at risk for recurrence of infection due to immunocompromise state with chemotherapy.  He will notify us if he has any return of his abscess.  He does have intertrigo under his panniculus, I have advised him to keep this area dry, to apply topical drying/antifungal powders.  Also recommended looser clothing.  His counts are adequate to continue therapy, we will resume Taxotere today with cycle #3.  We will repeat restaging scans after 6 courses.  We are doing Xgeva every 6 weeks to coordinate with his Taxotere.  Once he finishes Taxotere we can then go back to every 4 weeks.  His next Xgeva is not due until  7/14/2022.  His calcium is running a little low, he is going to  calcium supplement.    -6/23/2022 [facility] clinic follow-up: Chris overall is doing well on treatment with Taxotere.  Labs reviewed from yesterday and are unremarkable.  We will continue treatment today unchanged.  His suprapubic abscess resolved on clindamycin.  He will continue to use drying powder/antifungal powder under his panniculus for intertrigo.  Today will be cycle #4 of a planned 6 courses of Taxotere.  He is also on Xgeva, next dose due 7/14/2022, we are doing this every 6 weeks for now to line up with his chemotherapy, can go back to every 4 weeks after he finishes Taxotere.  Calcium from CMP yesterday was normal.    -7/13/2022 [facility] clinic follow-up: Mr. Morfin has had issues around day 3 after each treatment ranging from chest pain after his first treatment for which he did not seek medical attention, fatigue after the next few treatments, but 3 days after his most recent treatment on 6/23/2022 he had a syncopal episode at home and once again did not seek medical attention.  I discussed with him that this was not acceptable, if he has occurrences like this someone needs to call 911, it is difficult to figure out what is going on after the fact, the best time to work this up would be during the occurrence.  For now we will get labs today with CBC, CMP, PSA.  I will refer him to cardiology for further evaluation.  We will hold Taxotere most likely, I will see him tomorrow to go over his lab results.  I will also repeat his restaging scans with CT chest, abdomen and pelvis and will include CT of the head in light of his syncopal episode.      -7/13/2022 PSA 18.6    -7/14/2022 [facility] clinic follow-up: Mr. Morfin returns today to review his labs from yesterday.  Labs are unremarkable.  PSA down to 18.6 from a high of 259.0 in April prior to starting treatment.  CBC and CMP unremarkable, magnesium is normal  at 2.3, phosphorus slightly low at 2.4.  We will hold therapy for now in light of his syncopal episode on day 3 after his last treatment and prior episodes with chest pain and severe fatigue that all occurred on day 3 after his Taxotere.  We will restage him with CT head, chest, abdomen and pelvis (I am including the head in light of his syncopal episode).  I have also referred him to cardiology, he states that he received a call from them about making an appointment however he wanted to talk to us today first.  I emphasized the importance to him of making and keeping that appointment and he states understanding.  I also once again emphasized the requirement to seek emergency medical attention if he has any episodes in the future such as chest pain or syncopal events.  Whether or not we try and complete Taxotere with 2 more courses or move to the next line of therapy will be decided when he returns to discuss with Dr. Goodson and review his restaging scans.    -7/15/2022 saw Dr. Diaz cardiologist.  For syncope he ordered echocardiogram and 48-hour Holter monitor.    -7/15/2022 Holter monitor relatively benign.    -7/22/2022 CT brain with and without contrast negative.  CT chest abdomen and pelvis shows some nonspecific cecal wall thickening.  No progression in the chest.  T1 and ribs stable.  Decrease in multiple retroperitoneal and pelvic nodes.  Slight increase in right acetabular sclerotic lesion.  Persistent but improved circumferential bladder wall thickening.  Total body bone scan shows stable left third rib and no evidence of new bone metastases.    -7/19/2022 ejection fraction 65% with grade 1 diastolic dysfunction.    -7/26/2022 [facility] oncology clinic follow-up: Given presyncopal symptoms occurred 3 days after Taxotere and has not otherwise occurred any other time and his cardiology work-up is fairly unremarkable and his disease is under good control as witnessed by the report above of the CTs of head  chest abdomen pelvis and bone scan, I will hold cycle 5 and 6 of Taxotere and continue 1 now with Zytiga and prednisone.  With reference to the coincidental cecal wall thickening found on CT, we will get him to [facility] surgeons for colonoscopy.  He will see my nurse practitioner back in August for next cycle of Abiraterone prednisone.  He will continue his Lupron with Dr. Matute.  We will continue his Xgeva.  We will repeat his CT chest abdomen pelvis and bone scan again in 3 months.  He will see my nurse practitioner in the interim.    -8/23/2022 [facility] oncology clinic follow-up: No further presyncopal symptoms.  Feeling great other than for hot flashes.  Did commend for now we will continue on with his Zytiga prednisone and he will get his Xgeva today based on labs from 8/10/2022.  He opted out of getting the colonoscopy that I recommended and he will not change his mind.  He will continue getting the Lupron with Dr. Matute and I asked him to give the date of this appointment to my nurse when he sees her back in a month.  We will give his Xgeva today.  We will get CT chest abdomen pelvis and total body bone scan towards the middle to end of October.  Presently other than for the hot flashes feels great.    -9/20/2022 PSA 8.320    -9/22/2022 [facility] Oncology clinic follow-up: Chris is doing well on Zytiga, prednisone along with Xgeva.  Labs reviewed from 9/20/2022 are unremarkable, CBC was normal, CMP with creatinine of 1.34 otherwise normal, this is stable from his baseline.  PSA stable at 8.320.  He received Lupron recently with Dr. Matute, he thinks last week or so, states his next appointment is in February.  He will continue treatment unchanged.  We will repeat restaging CT chest, abdomen and pelvis and total body bone scan in October prior to return and I have ordered those today.  We will also repeat his PSA.    -9/22/2022 CT chest abdomen pelvisCompared to July 2022 [facility] showed no  evidence of disease progression in the chest with no substantial sclerotic bony lesions and decrease in size of retroperitoneal and pelvic nodes with persistent cecal wall thickening and suboptimal bladder distention.  Total body bone scan showed decrease in previously seen uptake in the left third rib with diffuse sclerosis representing treatment response with no new foci.    -10/18/2022 [facility] medical oncology follow-up: I reviewed bone scan and CT reports as above with no evidence of progression.  Tolerating Zytiga prednisone and Xgeva.  Getting Lupron regularly with Dr. Matute next appointment coming in February.  We will repeat his CT chest abdomen pelvis and total body bone scan in April.  He will follow with my nurse practitioner in the interim.    -1/10/2023 PSA 3.450  -1/11/2023 [facility] Oncology clinic follow-up: Mr. Morfin continues to do well on current therapy with Zytiga, prednisone and Xgeva with no unusual side effects.  He has no new worrisome symptoms.  He is due for his next Lupron injection in February with Dr. Matute.  His PSA continues to trend downward, current PSA from yesterday was 3.450.  We will continue therapy unchanged, he will receive Xgeva today.  Has had no hypocalcemia, his CMP, phosphorus and magnesium are all normal.  We will repeat restaging scans in April.  -3/7/2023 PSA 2.460  -3/8/2023 [facility] Oncology clinic follow-up:  Mr. Morfin is doing well.  He is tolerating therapy with Zytiga, prednisone and Xgeva with no significant side effects.  He has hot flashes but these are tolerable.  He had Lupron injection 2/24/2023 with Dr. Matute.  His PSA continues to decline, current PSA 2.460.  He will continue therapy unchanged.  We will repeat restaging scans late April prior to return in 2 months and I have ordered those today.  He is working with his PCP Rodrigo Ram on his hypertension.  His b/p today was 160/88.  He was to have increased his losartan but I do not think he has  "done that yet as he said \"I still have some of my old prescription left\".  -4/4/2023 PSA 2.55  - 4/26/2023 CT chest abdomen pelvis [facility] comparison 10/10/2022 showed stable left third rib, T1, left fourth and eighth rib, right seventh rib.  Probable liver cyst.  Few diverticuli.  Mild further decrease in a few of the previously enlarged nodes.  Left external iliac 12 mm compared to 16 mm.  Right common iliac 7 mm stable.  Lower left periaortic 8 mm previously 13 mm.  No new lytic or blastic osseous lesions.  Total body bone scan comparison 10/10/2022, 7/22/2022, and CT 4/26/2023.  No new sites of increased uptake.  1 focal left third rib hotspot consistent with bone metastasis.    -5/2/2023 [facility] medical oncology follow-up: I reviewed interval notes since I last saw him from my nurse practitioner as outlined above in interval images and reports thereof from [facility] that shows no new sites of bone metastasis and no kizzy or visceral progression.  PSA stabilized around 2.5.  In the absence of symptomatic or radiographic progression, I would be unlikely to change therapy just on the basis of a PSA rise and for now the PSA is stable.  We will continue Zytiga prednisone and Xgeva and he will follow-up with my nurse practitioner 5/30/2023 with repeat CTs and bone scan in 6 months.  I asked him to check with his primary care today regarding his systolic in the 170s.    -5/31/2023 [facility] clinic follow-up: Chris continues to do well on current therapy with Zytiga, prednisone and Xgeva along with Lupron that he receives with Dr. Matute.  His PSA remains stable, it was lower this month compared to last month, current PSA 1.940.  Continues to deal with hypertension, on arrival today his blood pressure was 210/92 however this was using a wrist cuff, when I rechecked it manually his blood pressure was 160/90.  Still has room for improvement despite recent increase in his losartan to 100 mg daily.  He " will follow-up with Dr. Ram for further management.  We will continue therapy unchanged.  We will repeat restaging scans in October.    -7/26/2023 [facility] clinic follow-up: Chris overall continues to do well on current therapy with Zytiga, prednisone and Xgeva.  He receives Lupron every 6 months with Dr. Matute and states that is scheduled for August.  PSA from yesterday is pending, PSA on 6/27/2023 was 1.590 which was continuing to decline, in February it was 3.250.  His creatinine this past month has bumped up.  He feels he is staying hydrated but I encouraged him to increase his fluid intake.  I discussed with him that this could be from his hypertension, some of his medications.  His blood pressure today is 165/92.  He has an appointment with his primary care provider Dr. Teri Ram next week and can further discuss with her.  No adjustment needed for Xgeva.  I refilled his prednisone today.  I will see him back in 1 month for follow-up with continued monitoring of his labs.  He may end up needing to see nephrology.  We will repeat restaging scans in October.    -9/29/2023 PSA 1.810  -8/23/2023 [facility] clinic follow-up: Chris continues on therapy with Zytiga, prednisone and Xgeva, he also receives Lupron every 6 months with Dr. Matute with most recent given on 8/7/2023.  He is scheduled for a cystoscopy in a few weeks as his last few UAs per the patient's report had microscopic hematuria, he has had no steve hematuria.  His creatinine continues to remain elevated at 1.82, BUN is 40, GFR is 40.  I will get him to nephrology for further evaluation.  Blood pressure today was fairly good at 161/86, he continues to follow with Dr. Ram for management.  PSA on 7/25/2023 was up slightly from prior month, PSA in July was 2.140, in June it was 1.590, PSA from yesterday is pending.  I plan on repeating restaging CT scans and bone scans in October however if his PSA is up significantly  then I will do sooner.  I will see him back for follow-up in 1 month.  -8/23/2024 PSA 1.860    -9/20/2023 [facility] clinic follow-up: Chris is tolerating treatment with Zytiga, prednisone and Xgeva.  He is up-to-date with his Lupron injection, last received in August with Dr. Matute.  He had cystoscopy on 9/8/2023 that was normal. I referred him to nephrology when I saw him last in August as his creatinine has been increasing, creatinine yesterday was a little better 1.51, GFR is 50, creatinine clearance 63.8.  He is still awaiting an appointment with nephrology, per our  it will be early November.  We will repeat his restaging scans prior to return and I will do his CT scans without contrast in light of his new renal insufficiency.  We will also get total body bone scan and I have ordered those today.  His PSA yesterday was 1.810.    -10/9/2023 CT chest abdomen Pelvis without contrast compared to April 2023 shows slightly prominent pelvic lymph nodes left external iliac 8 mm compared to prior study.  Right internal iliac heterogenous 19 mm compared to 11 mm prior.  Stable sclerotic bone lesions and no new lesions.  Stable bone scan with no new sites of disease    -10/17/2023 [facility] clinic follow-up: With elevated creatinine, CTs done without contrast showed very slight increased prominence by few millimeters of some internal iliac and external iliac nodes for which PSMA PET could be done but for now I will check his PSA and have him see my nurse practitioner back in a week and unless the PSA is significantly rising I would continue the Zytiga, prednisone, Xgeva and February dose of Lupron with Dr. Matute and make sure he follows with nephrology.  If his PSA is significantly rising then my nurse practitioner will order PSMA PET.    -10/17/2023 PSA 1.5  -11/14/2023 PSA 1.510  -11/15/2023 [facility] clinic follow-up: Mr. Ferrara continues to do well on current therapy with Zytiga,  prednisone, Xgeva and Lupron that he receives with Dr. Matute.  His PSA is stable at 1.5.  Would not change therapy for now, we will plan on repeating restaging scans in February unless PSA starts to rise or he has new concerns.  Overall he feels good.  He is now established with nephrology and will continue to follow with them regarding his renal insufficiency, I reviewed note from consultation with Dr. Armstrong 11/6/2023.    -1/9/2024 PSA 1.050    -1/10/2024 [facility] [place] Oncology clinic follow-up: Chris overall continues to tolerate current therapy with Zytiga, prednisone, Xgeva and Lupron.  He states his next Lupron injection with Dr. Matute is due late February.  His PSA is stable at 1.050. Creatinine stable at 1.64, he is following with nephrology, he has a an appointment with Dr. Armstrong in February for follow-up.  Hypertension being managed by his primary care provider, target systolic ideally 120s-140s, today it is running a little high at 172/90, yesterday when he was here for labs it was 138/80.  We will continue therapy unchanged with plans to repeat restaging scans in April.    -2/6/2024 PSA 0.753    -2/7/2024 [facility], [place], operated by [facility] oncology clinic follow-up: Chris continues to do well on current therapy with Zytiga, prednisone, Xgeva and Lupron.  He is up-to-date on Lupron injection next due later this month with Dr. Matute.  His PSA continues to slowly decrease, current PSA is 0.753.  He has no new worrisome symptoms.  We will continue therapy unchanged, I have ordered restaging scans for late March prior to his return in April. He is following with nephrology for his renal insufficiency, his creatinine yesterday was good at 1.21.  I will do his CT scans with contrast unless something changes in the interim. He follows closely with Dr. Ram for management of his hypertension. I reviewed notes from his last office visit with her earlier this month on 2/1/2024, also reviewed labs in detail with the patient today.   All questions were answered to his satisfaction.    -3/5/2024 PSA slowly declining 0.73.  Creatinine fluctuating.  1.69 with GFR 43 otherwise unremarkable CMP.  Phosphorus low 2.1    -4/8/2024 CT chest, abdomen and pelvis shows stable sclerotic lesions in the thoracic spine and bilateral ribs and right acetabulum.  Stable appearance of pelvic lymph nodes.  No new sites concerning for progression of disease.  Total body bone scan shows stable diffuse increased uptake of the left third rib correlates with diffuse sclerosis and enlargement on CT, no new sites of active osseous lesions.  -4/30/2024 PSA 0.713  -5/1/2024 [facility] clinic follow-up: Chris continues to do well on current therapy with Zytiga, prednisone, Xgeva and Lupron.  He receives his Lupron every 6 months with Dr. Matute, last administered 2/5/2024.  Current CT scans and bone scan showed no progression of disease, PSA is stable at 0.713.  His CBC is unremarkable, CMP shows creatinine stable at 1.50 otherwise normal.  He does follow with nephrology.  We will continue treatment unchanged.  I will see him back in 2 months for follow-up.  We will repeat restaging scans in 6 months unless he has any new symptoms or significant change in his PSA.    -6/25/2024 PSA 0.623  -6/26/2024 [facility] clinic follow-up: Chris overall continues to tolerate current therapy with Zytiga, prednisone, Xgeva and Lupron.  We did call to confirm his next Lupron injection and that is scheduled for late July with Dr. Matute.  His PSA for the most part is stable at 0.623, we will continue to monitor.  He has no new worrisome symptoms.  I did discuss with him today his creatinine which has increased to 1.86, BUN was 32.  Reminded him to be sure and stay hydrated.  He does follow with nephrology and his next appointment is in August.  He is on Xgeva however there is no indication for any adjustment, his creatinine clearance is greater than 30.  Blood pressure today  was under good control at 130/78.  We will continue treatment unchanged.  I will see him back in 1 month for follow-up.  Plan to repeat restaging scans in October unless his PSA rises or he has worrisome symptoms.    -7/23/2024 PSA 0.606    -9/17/2024 PSA 0.455  -9/18/2024 [facility] follow-up: Chris is doing well overall.  Tolerates therapy with Zytiga, prednisone, Xgeva and Lupron.  He last received Lupron in August with Dr. Matute.  His PSA had been slowly creeping up however it has now went back down and is currently 0.455.  He will continue treatment unchanged.  We will repeat CT chest, abdomen and pelvis prior to return and I will do that without contrast in light of his renal insufficiency.  Current creatinine is stable at 1.5, he does follow with nephrology.  We will also repeat total body bone scan.  We will see him back in 1 months for follow-up and sooner if any concerns.  I did discuss with him that he can take Tylenol if needed or use topical over-the-counter pain relief ointment such as Biofreeze etc. for his back pain but thus far it has not required any intervention, he does not like to take pain medications.    -10/14/2024 CT chest abdomen pelvis without contrast [place] [facility] compared to August 2024 shows unchanged sclerotic T1 vertebral body with expansile sclerotic left third rib without fracture and other small sclerotic nodules all unchanged.  Right external iliac node 20 mm increased from 7 mm on prior exam with internal iliac node 15 mm unchanged.  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      -10/22/2024 [facility] follow-up: Feeling great.  While he has 1 slightly larger node, given that his PSA has not been rapidly rising and all other disease is stable I will have him continue Zytiga prednisone Xgeva and he gets his next Lupron in February with Dr. Matute.  He will see my nurse  practitioner and she will order repeat CT chest abdomen pelvis and bone scan for January and will follow-up with her in January to go over those results.  If either his PSA or the scans show progression beyond just the single slightly larger iliac node, she will order a PSMA PET and then get him back to me.  If the PSA is stable and the subsequent scans are stable then we will just continue his current regimen with quarterly imaging or sooner as symptoms or PSA dictate.    Total time of care today inclusive of time spent today prior to patient's arrival reviewing interval data and images and reports thereof and during visit interviewing and [place] signs or symptoms of his disease and management thereof and after visit instituting this plan took 50 minutes patient care time throughout the day today.  Fritz Goodson MD    10/22/2024

## 2024-10-22 NOTE — PROGRESS NOTES
CHIEF COMPLAINT: No new somatic complaints    Problem List:  Oncology/Hematology History Overview Note   1.  Prostate cancer clinical T1c and 2M1B stage IVb treated with 4 cycles of Taxotere, stopped due to syncope with negative cardiac work-up, with marked drop in PSA of 18.6 from over 900 at baseline, continued on Zytiga and prednisone.  2.  Urinary retention with Goodwin in place 1/24/2022.  On finasteride 1/24/2022.  3.  Covid 19 December 2021  4.  Hypertension  5.  Stage 3a chronic kidney disease    Oncology history timeline:  -11/29/2021  with symptoms of urinary retention.  -2/15/2022 prostate biopsy adenocarcinoma grade group 5 and 3 out of 12 cores, grade group 4 in 1 out of 12 cores, grade group 3 in 8 out of 12 cores.  -2/24/2022 CT chest abdomen pelvis and total body bone scan shows left third rib, right acetabulum, periaortic and retroperitoneal kizzy metastases complaining of pain in the left chest and right hip.  Also possible sclerotic left 10th rib on CT.  Spleen mildly enlarged 13.8 cm.  Hepatic steatosis.  Small liver cyst.  Fat stranding in the periaortic and mesenteric region consistent with reactive/inflammatory stranding.  Multiple enlarged periaortic and retroperitoneal nodes and lymphadenopathy largest 4.9 cm.  CT chest describes tiny sclerotic foci in left eighth rib and right lateral seventh rib and T1.  Multiple small mediastinal and bilateral axillary nodes seen with small hypodense left thyroid nodule.  Creatinine 1.7.  -3/4/2022 office note Dr. Rolando Matute: Patient was seen after his elevated PSA by Dr. Vera and prostate biopsy scheduled.  However, he developed COVID-19 and this was canceled and the patient did not reschedule due to lack of insurance.  Saw Dr. Matute back on 1/24/2022 with plans to get staging CTs and bone scan with results as outlined above.  Started Casodex and to return on 3/11/2022 for Lupron.  Referral made to medical oncology for other additional  castrate naïve prostate cancer therapies.  TURP planned for urinary retention symptoms.    -3/15/2022 [facility], [place], operated by [facility] medical oncology initial consultation: I reviewed the above data with the patient.  With his fairly bulky retroperitoneal adenopathy and bone metastases including extra axial metastases, he would fit the data from the CHAARTED trial for which Taxotere x6 followed by Zytiga prednisone along with the planned Lupron already being planned by Dr. aMtute would be reasonable.  Equally reasonable in terms of comparisons with bicalutamide and Lupron would be Zytiga prednisone plus Lupron or Xtandi plus Lupron or apalutamide plus Lupron.  None of these have been compared head-to-head but all have beaten combined androgen deprivation therapy with bicalutamide and Lupron.  At the age of 67 and in order to have as many bullets as possible down the road and hence saving some of the hormone blockade options for later and using chemotherapy when he is younger and healthier for a more time-limited.,  He has opted for the Taxotere x6 followed by Zytiga and prednisone.  We will also give him Xgeva to improve bone health and given metastatic disease, as with all metastatic prostate cancer patients, he needs genetic counseling both for his sake as well as his family's.  If he were to have BRCA1 or other such  mutations, then that also opens up the options for PARP inhibitors etc. down the road.  He has his TURP scheduled for 2/24/2022 and we will start treatment a couple of weeks after that and he will get chemo preparation visit in the meantime and I will get capital surgeons to place a port.  We will check his PSA after his TURP when he sees my nurse practitioner back early April for the start of chemotherapy and repeat the PSA and CT chest abdomen pelvis and bone scan after the Taxotere is complete.    -3/31/2022 chemotherapy education and needs assessment completed    -4/7/2022 began docetaxel and Xgeva.  .0.   We will do his Xgeva every 6 weeks to coordinate with his docetaxel infusions.    -4/19/2022 patient stopped Casodex    -5/17/2022 patient reported seeing his PCP for an abscess in his suprapubic area.  Placed on clindamycin, will delay treatment 1 week.    -5/25/2022 PSA 34.3  -5/26/2022 [facility] Oncology clinic follow-up: Chris has an abscess in the suprapubic area, it has improved on antibiotic therapy but I am concerned if we treat him it will just flare back up unless it is drained.  I will get him to Dr. Miller who placed his port for I&D and culture.  He is on clindamycin and states he completes course of treatment tomorrow.  I will delay his treatment with Taxotere 1 more week.  We will give his Xgeva today.  I will see him back in 1 week for follow-up to assess whether or not we can resume his Taxotere.  His PSA has dropped nicely with treatment thus far, PSA yesterday was 34.3 which is down from a PSA of 259.0 when we started treatment, it has been as high as 911 in November.    -6/2/2022 [facility] Oncology clinic follow-up: Chris went to see Dr. Miller to have his abscess lanced however apparently there was a long wait and he left without being seen.  He did call his PCP and was given 5 more days of clindamycin and the abscess now seems to have resolved.  We discussed today that he is at risk for recurrence of infection due to immunocompromise state with chemotherapy.  He will notify us if he has any return of his abscess.  He does have intertrigo under his panniculus, I have advised him to keep this area dry, to apply topical drying/antifungal powders.  Also recommended looser clothing.  His counts are adequate to continue therapy, we will resume Taxotere today with cycle #3.  We will repeat restaging scans after 6 courses.  We are doing Xgeva every 6 weeks to coordinate with his Taxotere.  Once he finishes Taxotere we can then go back to every 4 weeks.  His next Xgeva is not due until 7/14/2022.  His  calcium is running a little low, he is going to  calcium supplement.    -6/23/2022 [facility] follow-up: Chris overall is doing well on treatment with Taxotere.  Labs reviewed from yesterday and are unremarkable.  We will continue treatment today unchanged.  His suprapubic abscess resolved on clindamycin.  He will continue to use drying powder/antifungal powder under his panniculus for intertrigo.  Today will be cycle #4 of a planned 6 courses of Taxotere.  He is also on Xgeva, next dose due 7/14/2022, we are doing this every 6 weeks for now to line up with his chemotherapy, can go back to every 4 weeks after he finishes Taxotere.  Calcium from CMP yesterday was normal.    -7/13/2022 [facility] follow-up: Mr. Morfin has had issues around day 3 after each treatment ranging from chest pain after his first treatment for which he did not seek medical attention, fatigue after the next few treatments, but 3 days after his most recent treatment on 6/23/2022 he had a syncopal episode at home and once again did not seek medical attention.  I discussed with him that this was not acceptable, if he has occurrences like this someone needs to call 911, it is difficult to figure out what is going on after the fact, the best time to work this up would be during the occurrence.  For now we will get labs today with CBC, CMP, PSA.  I will refer him to cardiology for further evaluation.  We will hold Taxotere most likely, I will see him tomorrow to go over his lab results.  I will also repeat his restaging scans with CT chest, abdomen and pelvis and will include CT of the head in light of his syncopal episode.      -7/13/2022 PSA 18.6    -7/14/2022 [facility] follow-up: Mr. Morfin returns today to review his labs from yesterday.  Labs are unremarkable.  PSA down to 18.6 from a high of 259.0 in April prior to starting treatment.  CBC and CMP unremarkable, magnesium is normal at 2.3,  phosphorus slightly low at 2.4.  We will hold therapy for now in light of his syncopal episode on day 3 after his last treatment and prior episodes with chest pain and severe fatigue that all occurred on day 3 after his Taxotere.  We will restage him with CT head, chest, abdomen and pelvis (I am including the head in light of his syncopal episode).  I have also referred him to cardiology, he states that he received a call from them about making an appointment however he wanted to talk to us today first.  I emphasized the importance to him of making and keeping that appointment and he states understanding.  I also once again emphasized the requirement to seek emergency medical attention if he has any episodes in the future such as chest pain or syncopal events.  Whether or not we try and complete Taxotere with 2 more courses or move to the next line of therapy will be decided when he returns to discuss with Dr. Goodson and review his restaging scans.    -7/15/2022 saw Dr. Diaz cardiologist.  For syncope he ordered echocardiogram and 48-hour Holter monitor.    -7/15/2022 Holter monitor relatively benign.    -7/22/2022 CT brain with and without contrast negative.  CT chest abdomen and pelvis shows some nonspecific cecal wall thickening.  No progression in the chest.  T1 and ribs stable.  Decrease in multiple retroperitoneal and pelvic nodes.  Slight increase in right acetabular sclerotic lesion.  Persistent but improved circumferential bladder wall thickening.  Total body bone scan shows stable left third rib and no evidence of new bone metastases.    -7/19/2022 ejection fraction 65% with grade 1 diastolic dysfunction.    -7/26/2022 [facility] clinic follow-up: Given presyncopal symptoms occurred 3 days after Taxotere and has not otherwise occurred any other time and his cardiology work-up is fairly unremarkable and his disease is under good control as witnessed by the report above of the CTs of head chest  abdomen pelvis and bone scan, I will hold cycle 5 and 6 of Taxotere and continue 1 now with Zytiga and prednisone.  With reference to the coincidental cecal wall thickening found on CT, we will get him to [facility] surgeons for colonoscopy.  He will see my nurse practitioner back in August for next cycle of Abiraterone prednisone.  He will continue his Lupron with Dr. Matute.  We will continue his Xgeva.  We will repeat his CT chest abdomen pelvis and bone scan again in 3 months.  He will see my nurse practitioner in the interim.    -8/23/2022 [facility] follow-up: No further presyncopal symptoms.  Feeling great other than for hot flashes.  Did commend for now we will continue on with his Zytiga prednisone and he will get his Xgeva today based on labs from 8/10/2022.  He opted out of getting the colonoscopy that I recommended and he will not change his mind.  He will continue getting the Lupron with Dr. Matute and I asked him to give the date of this appointment to my nurse when he sees her back in a month.  We will give his Xgeva today.  We will get CT chest abdomen pelvis and total body bone scan towards the middle to end of October.  Presently other than for the hot flashes feels great.    -9/20/2022 PSA 8.320    -9/22/2022 [facility] follow-up: Chris is doing well on Zytiga, prednisone along with Xgeva.  Labs reviewed from 9/20/2022 are unremarkable, CBC was normal, CMP with creatinine of 1.34 otherwise normal, this is stable from his baseline.  PSA stable at 8.320.  He received Lupron recently with Dr. Matute, he thinks last week or so, states his next appointment is in February.  He will continue treatment unchanged.  We will repeat restaging CT chest, abdomen and pelvis and total body bone scan in October prior to return and I have ordered those today.  We will also repeat his PSA.    -9/22/2022 CT chest abdomen pelvisCompared to July 2022 [facility] showed no evidence of  disease progression in the chest with no substantial sclerotic bony lesions and decrease in size of retroperitoneal and pelvic nodes with persistent cecal wall thickening and suboptimal bladder distention.  Total body bone scan showed decrease in previously seen uptake in the left third rib with diffuse sclerosis representing treatment response with no new foci.    -10/18/2022 [facility] medical oncology follow-up: I reviewed bone scan and CT reports as above with no evidence of progression.  Tolerating Zytiga prednisone and Xgeva.  Getting Lupron regularly with Dr. Matute next appointment coming in February.  We will repeat his CT chest abdomen pelvis and total body bone scan in April.  He will follow with my nurse practitioner in the interim.    -1/10/2023 PSA 3.450  -1/11/2023 [facility] Oncology clinic follow-up: Mr. Morfin continues to do well on current therapy with Zytiga, prednisone and Xgeva with no unusual side effects.  He has no new worrisome symptoms.  He is due for his next Lupron injection in February with Dr. Matute.  His PSA continues to trend downward, current PSA from yesterday was 3.450.  We will continue therapy unchanged, he will receive Xgeva today.  Has had no hypocalcemia, his CMP, phosphorus and magnesium are all normal.  We will repeat restaging scans in April.  -3/7/2023 PSA 2.460  -3/8/2023 [facility] Oncology clinic follow-up:  Mr. Morfin is doing well.  He is tolerating therapy with Zytiga, prednisone and Xgeva with no significant side effects.  He has hot flashes but these are tolerable.  He had Lupron injection 2/24/2023 with Dr. Matute.  His PSA continues to decline, current PSA 2.460.  He will continue therapy unchanged.  We will repeat restaging scans late April prior to return in 2 months and I have ordered those today.  He is working with his PCP Rodrigo Ram on his hypertension.  His b/p today was 160/88.  He was to have increased his losartan but I do not think he has done that yet  "as he said \"I still have some of my old prescription left\".  -4/4/2023 PSA 2.55  - 4/26/2023 CT chest abdomen pelvis [place] comparison 10/10/2022 showed stable left third rib, T1, left fourth and eighth rib, right seventh rib.  Probable liver cyst.  Few diverticuli.  Mild further decrease in a few of the previously enlarged nodes.  Left external iliac 12 mm compared to 16 mm.  Right common iliac 7 mm stable.  Lower left periaortic 8 mm previously 13 mm.  No new lytic or blastic osseous lesions.  Total body bone scan comparison 10/10/2022, 7/22/2022, and CT 4/26/2023.  No new sites of increased uptake.  1 focal left third rib hotspot consistent with bone metastasis.    -5/2/2023 [facility] oncology follow-up: I reviewed interval notes since I last saw him from my nurse practitioner as outlined above in interval images and reports thereof from [facility] that shows no new sites of bone metastasis and no kizzy or visceral progression.  PSA stabilized around 2.5.  In the absence of symptomatic or radiographic progression, I would be unlikely to change therapy just on the basis of a PSA rise and for now the PSA is stable.  We will continue Zytiga prednisone and Xgeva and he will follow-up with my nurse practitioner 5/30/2023 with repeat CTs and bone scan in 6 months.  I asked him to check with his primary care today regarding his systolic in the 170s.    -5/31/2023 [facility] clinic follow-up: Chris continues to do well on current therapy with Zytiga, prednisone and Xgeva along with Lupron that he receives with Dr. Matute.  His PSA remains stable, it was lower this month compared to last month, current PSA 1.940.  Continues to deal with hypertension, on arrival today his blood pressure was 210/92 however this was using a wrist cuff, when I rechecked it manually his blood pressure was 160/90.  Still has room for improvement despite recent increase in his losartan to 100 mg daily.  He will follow-up " with Dr. Ram for further management.  We will continue therapy unchanged.  We will repeat restaging scans in October.    -7/26/2023 [facility] clinic follow-up: Chris overall continues to do well on current therapy with Zytiga, prednisone and Xgeva.  He receives Lupron every 6 months with Dr. Matute and states that is scheduled for August.  PSA from yesterday is pending, PSA on 6/27/2023 was 1.590 which was continuing to decline, in February it was 3.250.  His creatinine this past month has bumped up.  He feels he is staying hydrated but I encouraged him to increase his fluid intake.  I discussed with him that this could be from his hypertension, some of his medications.  His blood pressure today is 165/92.  He has an appointment with his primary care provider Dr. Teri Ram next week and can further discuss with her.  No adjustment needed for Xgeva.  I refilled his prednisone today.  I will see him back in 1 month for follow-up with continued monitoring of his labs.  He may end up needing to see nephrology.  We will repeat restaging scans in October.    -9/29/2023 PSA 1.810  -8/23/2023 [facility] clinic follow-up: Chris continues on therapy with Zytiga, prednisone and Xgeva, he also receives Lupron every 6 months with Dr. Matute with most recent given on 8/7/2023.  He is scheduled for a cystoscopy in a few weeks as his last few UAs per the patient's report had microscopic hematuria, he has had no steve hematuria.  His creatinine continues to remain elevated at 1.82, BUN is 40, GFR is 40.  I will get him to nephrology for further evaluation.  Blood pressure today was fairly good at 161/86, he continues to follow with Dr. Ram for management.  PSA on 7/25/2023 was up slightly from prior month, PSA in July was 2.140, in June it was 1.590, PSA from yesterday is pending.  I plan on repeating restaging CT scans and bone scans in October however if his PSA is up significantly then I will do  sooner.  I will see him back for follow-up in 1 month.  -8/23/2024 PSA 1.860    -9/20/2023 [facility] follow-up: Chris is tolerating treatment with Zytiga, prednisone and Xgeva.  He is up-to-date with his Lupron injection, last received in August with Dr. Matute.  He had cystoscopy on 9/8/2023 that was normal. I referred him to nephrology when I saw him last in August as his creatinine has been increasing, creatinine yesterday was a little better 1.51, GFR is 50, creatinine clearance 63.8.  He is still awaiting an appointment with nephrology, per our  it will be early November.  We will repeat his restaging scans prior to return and I will do his CT scans without contrast in light of his new renal insufficiency.  We will also get total body bone scan and I have ordered those today.  His PSA yesterday was 1.810.    -10/9/2023 CT chest abdomen Pelvis without contrast compared to April 2023 shows slightly prominent pelvic lymph nodes left external iliac 8 mm compared to prior study.  Right internal iliac heterogenous 19 mm compared to 11 mm prior.  Stable sclerotic bone lesions and no new lesions.  Stable bone scan with no new sites of disease    -10/17/2023 [facility] clinic follow-up: With elevated creatinine, CTs done without contrast showed very slight increased prominence by few millimeters of some internal iliac and external iliac nodes for which PSMA PET could be done but for now I will check his PSA and have him see my nurse practitioner back in a week and unless the PSA is significantly rising I would continue the Zytiga, prednisone, Xgeva and February dose of Lupron with Dr. Matute and make sure he follows with nephrology.  If his PSA is significantly rising then my nurse practitioner will order PSMA PET.    -10/17/2023 PSA 1.5  -11/14/2023 PSA 1.510  -11/15/2023 [facility] clinic follow-up: Mr. Ferrara continues to do well on current therapy with Zytiga, prednisone, Xgeva  and Lupron that he receives with Dr. Matute.  His PSA is stable at 1.5.  Would not change therapy for now, we will plan on repeating restaging scans in February unless PSA starts to rise or he has new concerns.  Overall he feels good.  He is now established with nephrology and will continue to follow with them regarding his renal insufficiency, I reviewed note from consultation with Dr. Armstrong 11/6/2023.    -1/9/2024 PSA 1.050    -1/10/2024 [facility] Oncology clinic follow-up: Chris overall continues to tolerate current therapy with Zytiga, prednisone, Xgeva and Lupron.  He states his next Lupron injection with Dr. Matute is due late February.  His PSA is stable at 1.050. Creatinine stable at 1.64, he is following with nephrology, he has a an appointment with Dr. Armstrong in February for follow-up.  Hypertension being managed by his primary care provider, target systolic ideally 120s-140s, today it is running a little high at 172/90, yesterday when he was here for labs it was 138/80.  We will continue therapy unchanged with plans to repeat restaging scans in April.    -2/6/2024 PSA 0.753    -2/7/2024 [facility] oncology clinic follow-up: Chris continues to do well on current therapy with Zytiga, prednisone, Xgeva and Lupron.  He is up-to-date on Lupron injection next due later this month with Dr. Matute.  His PSA continues to slowly decrease, current PSA is 0.753.  He has no new worrisome symptoms.  We will continue therapy unchanged, I have ordered restaging scans for late March prior to his return in April. He is following with nephrology for his renal insufficiency, his creatinine yesterday was good at 1.21.  I will do his CT scans with contrast unless something changes in the interim. He follows closely with Dr. Ram for management of his hypertension. I reviewed notes from his last office visit with her earlier this month on 2/1/2024, also reviewed labs in detail with the patient today.  All questions were  answered to his satisfaction.    -3/5/2024 PSA slowly declining 0.73.  Creatinine fluctuating.  1.69 with GFR 43 otherwise unremarkable CMP.  Phosphorus low 2.1    -4/8/2024 CT chest, abdomen and pelvis shows stable sclerotic lesions in the thoracic spine and bilateral ribs and right acetabulum.  Stable appearance of pelvic lymph nodes.  No new sites concerning for progression of disease.  Total body bone scan shows stable diffuse increased uptake of the left third rib correlates with diffuse sclerosis and enlargement on CT, no new sites of active osseous lesions.  -4/30/2024 PSA 0.713  -5/1/2024 [facility] clinic follow-up: Chris continues to do well on current therapy with Zytiga, prednisone, Xgeva and Lupron.  He receives his Lupron every 6 months with Dr. Matute, last administered 2/5/2024.  Current CT scans and bone scan showed no progression of disease, PSA is stable at 0.713.  His CBC is unremarkable, CMP shows creatinine stable at 1.50 otherwise normal.  He does follow with nephrology.  We will continue treatment unchanged.  I will see him back in 2 months for follow-up.  We will repeat restaging scans in 6 months unless he has any new symptoms or significant change in his PSA.    -6/25/2024 PSA 0.623  -6/26/2024 [facility] clinic follow-up: Chris overall continues to tolerate current therapy with Zytiga, prednisone, Xgeva and Lupron.  We did call to confirm his next Lupron injection and that is scheduled for late July with Dr. Matute.  His PSA for the most part is stable at 0.623, we will continue to monitor.  He has no new worrisome symptoms.  I did discuss with him today his creatinine which has increased to 1.86, BUN was 32.  Reminded him to be sure and stay hydrated.  He does follow with nephrology and his next appointment is in August.  He is on Xgeva however there is no indication for any adjustment, his creatinine clearance is greater than 30.  Blood pressure today was under good  control at 130/78.  We will continue treatment unchanged.  I will see him back in 1 month for follow-up.  Plan to repeat restaging scans in October unless his PSA rises or he has worrisome symptoms.    -7/23/2024 PSA 0.606    -9/17/2024 PSA 0.455  -9/18/2024 [facility] oncology clinic follow-up: Chris is doing well overall.  Tolerates therapy with Zytiga, prednisone, Xgeva and Lupron.  He last received Lupron in August with Dr. Matute.  His PSA had been slowly creeping up however it has now went back down and is currently 0.455.  He will continue treatment unchanged.  We will repeat CT chest, abdomen and pelvis prior to return and I will do that without contrast in light of his renal insufficiency.  Current creatinine is stable at 1.5, he does follow with nephrology.  We will also repeat total body bone scan.  We will see him back in 1 months for follow-up and sooner if any concerns.  I did discuss with him that he can take Tylenol if needed or use topical over-the-counter pain relief ointment such as Biofreeze etc. for his back pain but thus far it has not required any intervention, he does not like to take pain medications.     Prostate cancer metastatic to multiple sites   3/15/2022 Initial Diagnosis    Prostate cancer metastatic to multiple sites (HCC)     3/15/2022 Cancer Staged    Staging form: Prostate, AJCC 8th Edition  - Clinical: Stage IVB (cT1c, cN1, cM1b, Grade Group: 5) - Signed by Fritz Goodson MD on 3/15/2022     4/7/2022 - 6/23/2022 Chemotherapy    Stopped after cycle 4 6/23/2022 due to syncope 3 days post infusions with negative cardiac work-up  OP PROSTATE DOCEtaxel     4/7/2022 -  Chemotherapy    OP SUPPORTIVE Denosumab (Xgeva) Q28D     7/26/2022 -  Chemotherapy    OP PROSTATE Abiraterone / PredniSONE           HISTORY OF PRESENT ILLNESS:  The patient is a 69 y.o. male, here for follow up on management of metastatic prostate cancer    Past Medical History:   Diagnosis Date    Cancer     Prostate  "cancer      Past Surgical History:   Procedure Laterality Date    PROSTATE BIOPSY  02/2022    dr. sue       No Known Allergies    Family History and Social History reviewed and changed as necessary    REVIEW OF SYSTEM:   No new somatic complaints.  Chronic back pain not new.    PHYSICAL EXAM:  Jaundice icterus or pallor.  No respiratory distress.  No rashes.  No petechiae or ecchymoses.    Vitals:    10/22/24 0952   BP: 153/94   Pulse: 70   Resp: 18   Temp: 98.2 °F (36.8 °C)   SpO2: 98%   Weight: 116 kg (255 lb)   Height: 180.3 cm (71\")     Vitals:    10/22/24 0952   PainSc: 0-No pain          ECOG score: 0           Vitals reviewed.    ECOG: (0) Fully Active - Able to Carry On All Pre-disease Performance Without Restriction    Lab Results   Component Value Date    HGB 13.5 09/17/2024    HCT 42.5 09/17/2024    MCV 91.8 09/17/2024     09/17/2024    WBC 8.65 09/17/2024    NEUTROABS 4.81 09/17/2024    LYMPHSABS 2.74 09/17/2024    MONOSABS 0.77 09/17/2024    EOSABS 0.22 09/17/2024    BASOSABS 0.07 09/17/2024       Lab Results   Component Value Date    GLUCOSE 92 09/17/2024    BUN 23 09/17/2024    CREATININE 1.50 (H) 09/17/2024     09/17/2024    K 4.2 09/17/2024     09/17/2024    CO2 24.0 09/17/2024    CALCIUM 9.4 09/17/2024    ALBUMIN 4.3 09/17/2024    BILITOT 0.4 09/17/2024    ALKPHOS 80 09/17/2024    AST 13 09/17/2024    ALT 12 09/17/2024             ASSESSMENT & PLAN:  1.  Prostate cancer clinical T1c and 2M1B stage IVb treated with 4 cycles of Taxotere, stopped due to syncope with negative cardiac work-up, with marked drop in PSA of 18.6 from over 900 at baseline, continued on Zytiga and prednisone.  2.  Urinary retention with Goodwin in place 1/24/2022.  On finasteride 1/24/2022.  3.  Covid 19 December 2021  4.  Hypertension  5.  Stage 3a chronic kidney disease    Oncology history timeline:  -11/29/2021  with symptoms of urinary retention.  -2/15/2022 prostate biopsy adenocarcinoma " grade group 5 and 3 out of 12 cores, grade group 4 in 1 out of 12 cores, grade group 3 in 8 out of 12 cores.  -2/24/2022 CT chest abdomen pelvis and total body bone scan shows left third rib, right acetabulum, periaortic and retroperitoneal kizzy metastases complaining of pain in the left chest and right hip.  Also possible sclerotic left 10th rib on CT.  Spleen mildly enlarged 13.8 cm.  Hepatic steatosis.  Small liver cyst.  Fat stranding in the periaortic and mesenteric region consistent with reactive/inflammatory stranding.  Multiple enlarged periaortic and retroperitoneal nodes and lymphadenopathy largest 4.9 cm.  CT chest describes tiny sclerotic foci in left eighth rib and right lateral seventh rib and T1.  Multiple small mediastinal and bilateral axillary nodes seen with small hypodense left thyroid nodule.  Creatinine 1.7.  -3/4/2022 office note Dr. Rolando Matute: Patient was seen after his elevated PSA by Dr. Vear and prostate biopsy scheduled.  However, he developed COVID-19 and this was canceled and the patient did not reschedule due to lack of insurance.  Saw Dr. Matute back on 1/24/2022 with plans to get staging CTs and bone scan with results as outlined above.  Started Casodex and to return on 3/11/2022 for Lupron.  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  TURP planned for urinary retention symptoms.    -3/15/2022 [facility] medical oncology initial consultation: I reviewed the above data with the patient.  With his fairly bulky retroperitoneal adenopathy and bone metastases including extra axial metastases, he would fit the data from the CHAARTED trial for which Taxotere x6 followed by Zytiga prednisone along with the planned Lupron already being planned by Dr. Matute would be reasonable.  Equally reasonable in terms of comparisons with bicalutamide and Lupron would be Zytiga prednisone plus Lupron or Xtandi plus Lupron or apalutamide plus  Lupron.  None of these have been compared head-to-head but all have beaten combined androgen deprivation therapy with bicalutamide and Lupron.  At the age of 67 and in order to have as many bullets as possible down the road and hence saving some of the hormone blockade options for later and using chemotherapy when he is younger and healthier for a more time-limited.,  He has opted for the Taxotere x6 followed by Zytiga and prednisone.  We will also give him Xgeva to improve bone health and given metastatic disease, as with all metastatic prostate cancer patients, he needs genetic counseling both for his sake as well as his family's.  If he were to have BRCA1 or other such  mutations, then that also opens up the options for PARP inhibitors etc. down the road.  He has his TURP scheduled for 2/24/2022 and we will start treatment a couple of weeks after that and he will get chemo preparation visit in the meantime and I will get capital surgeons to place a port.  We will check his PSA after his TURP when he sees my nurse practitioner back early April for the start of chemotherapy and repeat the PSA and CT chest abdomen pelvis and bone scan after the Taxotere is complete.    -3/31/2022 chemotherapy education and needs assessment completed    -4/7/2022 began docetaxel and Xgeva.  .0.  We will do his Xgeva every 6 weeks to coordinate with his docetaxel infusions.    -4/19/2022 patient stopped Casodex    -5/17/2022 patient reported seeing his PCP for an abscess in his suprapubic area.  Placed on clindamycin, will delay treatment 1 week.    -5/25/2022 PSA 34.3  -5/26/2022 [facility] clinic follow-up: Chris has an abscess in the suprapubic area, it has improved on antibiotic therapy but I am concerned if we treat him it will just flare back up unless it is drained.  I will get him to Dr. Miller who placed his port for I&D and culture.  He is on clindamycin and states he completes course of treatment  tomorrow.  I will delay his treatment with Taxotere 1 more week.  We will give his Xgeva today.  I will see him back in 1 week for follow-up to assess whether or not we can resume his Taxotere.  His PSA has dropped nicely with treatment thus far, PSA yesterday was 34.3 which is down from a PSA of 259.0 when we started treatment, it has been as high as 911 in November.    -6/2/2022 [facility] Oncology clinic follow-up: Chris went to see Dr. Miller to have his abscess lanced however apparently there was a long wait and he left without being seen.  He did call his PCP and was given 5 more days of clindamycin and the abscess now seems to have resolved.  We discussed today that he is at risk for recurrence of infection due to immunocompromise state with chemotherapy.  He will notify us if he has any return of his abscess.  He does have intertrigo under his panniculus, I have advised him to keep this area dry, to apply topical drying/antifungal powders.  Also recommended looser clothing.  His counts are adequate to continue therapy, we will resume Taxotere today with cycle #3.  We will repeat restaging scans after 6 courses.  We are doing Xgeva every 6 weeks to coordinate with his Taxotere.  Once he finishes Taxotere we can then go back to every 4 weeks.  His next Xgeva is not due until 7/14/2022.  His calcium is running a little low, he is going to  calcium supplement.    -6/23/2022 [facility] Oncology clinic follow-up: Chris overall is doing well on treatment with Taxotere.  Labs reviewed from yesterday and are unremarkable.  We will continue treatment today unchanged.  His suprapubic abscess resolved on clindamycin.  He will continue to use drying powder/antifungal powder under his panniculus for intertrigo.  Today will be cycle #4 of a planned 6 courses of Taxotere.  He is also on Xgeva, next dose due 7/14/2022, we are doing this every 6 weeks for now to line up with his chemotherapy, can go back to every 4 weeks  after he finishes Taxotere.  Calcium from CMP yesterday was normal.    -7/13/2022 [facility] [place] Oncology clinic follow-up: Mr. Morfin has had issues around day 3 after each treatment ranging from chest pain after his first treatment for which he did not seek medical attention, fatigue after the next few treatments, but 3 days after his most recent treatment on 6/23/2022 he had a syncopal episode at home and once again did not seek medical attention.  I discussed with him that this was not acceptable, if he has occurrences like this someone needs to call 911, it is difficult to figure out what is going on after the fact, the best time to work this up would be during the occurrence.  For now we will get labs today with CBC, CMP, PSA.  I will refer him to cardiology for further evaluation.  We will hold Taxotere most likely, I will see him tomorrow to go over his lab results.  I will also repeat his restaging scans with CT chest, abdomen and pelvis and will include CT of the head in light of his syncopal episode.      -7/13/2022 PSA 18.6    -7/14/2022 [facility] [place] Oncology clinic follow-up: Mr. Morfin returns today to review his labs from yesterday.  Labs are unremarkable.  PSA down to 18.6 from a high of 259.0 in April prior to starting treatment.  CBC and CMP unremarkable, magnesium is normal at 2.3, phosphorus slightly low at 2.4.  We will hold therapy for now in light of his syncopal episode on day 3 after his last treatment and prior episodes with chest pain and severe fatigue that all occurred on day 3 after his Taxotere.  We will restage him with CT head, chest, abdomen and pelvis (I am including the head in light of his syncopal episode).  I have also referred him to cardiology, he states that he received a call from them about making an appointment however he wanted to talk to us today first.  I emphasized the importance to him of making and keeping that appointment and he states understanding.  I also once again  emphasized the requirement to seek emergency medical attention if he has any episodes in the future such as chest pain or syncopal events.  Whether or not we try and complete Taxotere with 2 more courses or move to the next line of therapy will be decided when he returns to discuss with Dr. Goodson and review his restaging scans.    -7/15/2022 saw Dr. Diaz cardiologist.  For syncope he ordered echocardiogram and 48-hour Holter monitor.    -7/15/2022 Holter monitor relatively benign.    -7/22/2022 CT brain with and without contrast negative.  CT chest abdomen and pelvis shows some nonspecific cecal wall thickening.  No progression in the chest.  T1 and ribs stable.  Decrease in multiple retroperitoneal and pelvic nodes.  Slight increase in right acetabular sclerotic lesion.  Persistent but improved circumferential bladder wall thickening.  Total body bone scan shows stable left third rib and no evidence of new bone metastases.    -7/19/2022 ejection fraction 65% with grade 1 diastolic dysfunction.    -7/26/2022 [facility] clinic follow-up: Given presyncopal symptoms occurred 3 days after Taxotere and has not otherwise occurred any other time and his cardiology work-up is fairly unremarkable and his disease is under good control as witnessed by the report above of the CTs of head chest abdomen pelvis and bone scan, I will hold cycle 5 and 6 of Taxotere and continue 1 now with Zytiga and prednisone.  With reference to the coincidental cecal wall thickening found on CT, we will get him to [facility] surgeons for colonoscopy.  He will see my nurse practitioner back in August for next cycle of Abiraterone prednisone.  He will continue his Lupron with Dr. Matute.  We will continue his Xgeva.  We will repeat his CT chest abdomen pelvis and bone scan again in 3 months.  He will see my nurse practitioner in the interim.    -8/23/2022 [facility] clinic follow-up: No further presyncopal symptoms.  Feeling great  other than for hot flashes.  Did commend for now we will continue on with his Zytiga prednisone and he will get his Xgeva today based on labs from 8/10/2022.  He opted out of getting the colonoscopy that I recommended and he will not change his mind.  He will continue getting the Lupron with Dr. Matute and I asked him to give the date of this appointment to my nurse when he sees her back in a month.  We will give his Xgeva today.  We will get CT chest abdomen pelvis and total body bone scan towards the middle to end of October.  Presently other than for the hot flashes feels great.    -9/20/2022 PSA 8.320    -9/22/2022 [facility] clinic follow-up: Chris is doing well on Zytiga, prednisone along with Xgeva.  Labs reviewed from 9/20/2022 are unremarkable, CBC was normal, CMP with creatinine of 1.34 otherwise normal, this is stable from his baseline.  PSA stable at 8.320.  He received Lupron recently with Dr. Matute, he thinks last week or so, states his next appointment is in February.  He will continue treatment unchanged.  We will repeat restaging CT chest, abdomen and pelvis and total body bone scan in October prior to return and I have ordered those today.  We will also repeat his PSA.    -9/22/2022 CT chest abdomen pelvisCompared to July 2022 [facility] showed no evidence of disease progression in the chest with no substantial sclerotic bony lesions and decrease in size of retroperitoneal and pelvic nodes with persistent cecal wall thickening and suboptimal bladder distention.  Total body bone scan showed decrease in previously seen uptake in the left third rib with diffuse sclerosis representing treatment response with no new foci.    -10/18/2022 [facility] medical oncology follow-up: I reviewed bone scan and CT reports as above with no evidence of progression.  Tolerating Zytiga prednisone and Xgeva.  Getting Lupron regularly with Dr. Matute next appointment coming in February.  We will repeat  "his CT chest abdomen pelvis and total body bone scan in April.  He will follow with my nurse practitioner in the interim.    -1/10/2023 PSA 3.450  -1/11/2023 [facility] Oncology clinic follow-up: Mr. Morfin continues to do well on current therapy with Zytiga, prednisone and Xgeva with no unusual side effects.  He has no new worrisome symptoms.  He is due for his next Lupron injection in February with Dr. Matute.  His PSA continues to trend downward, current PSA from yesterday was 3.450.  We will continue therapy unchanged, he will receive Xgeva today.  Has had no hypocalcemia, his CMP, phosphorus and magnesium are all normal.  We will repeat restaging scans in April.  -3/7/2023 PSA 2.460  -3/8/2023 [facility] Oncology clinic follow-up:  Mr. Morfin is doing well.  He is tolerating therapy with Zytiga, prednisone and Xgeva with no significant side effects.  He has hot flashes but these are tolerable.  He had Lupron injection 2/24/2023 with Dr. Matute.  His PSA continues to decline, current PSA 2.460.  He will continue therapy unchanged.  We will repeat restaging scans late April prior to return in 2 months and I have ordered those today.  He is working with his PCP Rodrigo Ram on his hypertension.  His b/p today was 160/88.  He was to have increased his losartan but I do not think he has done that yet as he said \"I still have some of my old prescription left\".  -4/4/2023 PSA 2.55  - 4/26/2023 CT chest abdomen pelvis [place] comparison 10/10/2022 showed stable left third rib, T1, left fourth and eighth rib, right seventh rib.  Probable liver cyst.  Few diverticuli.  Mild further decrease in a few of the previously enlarged nodes.  Left external iliac 12 mm compared to 16 mm.  Right common iliac 7 mm stable.  Lower left periaortic 8 mm previously 13 mm.  No new lytic or blastic osseous lesions.  Total body bone scan comparison 10/10/2022, 7/22/2022, and CT 4/26/2023.  No new sites of increased uptake.  1 focal left third " rib hotspot consistent with bone metastasis.    -5/2/2023 [facility] follow-up: I reviewed interval notes since I last saw him from my nurse practitioner as outlined above in interval images and reports thereof from [facility] that shows no new sites of bone metastasis and no kizzy or visceral progression.  PSA stabilized around 2.5.  In the absence of symptomatic or radiographic progression, I would be unlikely to change therapy just on the basis of a PSA rise and for now the PSA is stable.  We will continue Zytiga prednisone and Xgeva and he will follow-up with my nurse practitioner 5/30/2023 with repeat CTs and bone scan in 6 months.  I asked him to check with his primary care today regarding his systolic in the 170s.    -5/31/2023 [facility] follow-up: Chris continues to do well on current therapy with Zytiga, prednisone and Xgeva along with Lupron that he receives with Dr. Matute.  His PSA remains stable, it was lower this month compared to last month, current PSA 1.940.  Continues to deal with hypertension, on arrival today his blood pressure was 210/92 however this was using a wrist cuff, when I rechecked it manually his blood pressure was 160/90.  Still has room for improvement despite recent increase in his losartan to 100 mg daily.  He will follow-up with Dr. Ram for further management.  We will continue therapy unchanged.  We will repeat restaging scans in October.    -7/26/2023 [facility] follow-up: Chris overall continues to do well on current therapy with Zytiga, prednisone and Xgeva.  He receives Lupron every 6 months with Dr. Matute and states that is scheduled for August.  PSA from yesterday is pending, PSA on 6/27/2023 was 1.590 which was continuing to decline, in February it was 3.250.  His creatinine this past month has bumped up.  He feels he is staying hydrated but I encouraged him to increase his fluid intake.  I discussed with him that  this could be from his hypertension, some of his medications.  His blood pressure today is 165/92.  He has an appointment with his primary care provider Dr. Teri Ram next week and can further discuss with her.  No adjustment needed for Xgeva.  I refilled his prednisone today.  I will see him back in 1 month for follow-up with continued monitoring of his labs.  He may end up needing to see nephrology.  We will repeat restaging scans in October.    -9/29/2023 PSA 1.810  -8/23/2023 [facility] Oncology clinic follow-up: Chris continues on therapy with Zytiga, prednisone and Xgeva, he also receives Lupron every 6 months with Dr. Matute with most recent given on 8/7/2023.  He is scheduled for a cystoscopy in a few weeks as his last few UAs per the patient's report had microscopic hematuria, he has had no steve hematuria.  His creatinine continues to remain elevated at 1.82, BUN is 40, GFR is 40.  I will get him to nephrology for further evaluation.  Blood pressure today was fairly good at 161/86, he continues to follow with Dr. Ram for management.  PSA on 7/25/2023 was up slightly from prior month, PSA in July was 2.140, in June it was 1.590, PSA from yesterday is pending.  I plan on repeating restaging CT scans and bone scans in October however if his PSA is up significantly then I will do sooner.  I will see him back for follow-up in 1 month.  -8/23/2024 PSA 1.860    -9/20/2023 [facility] Oncology clinic follow-up: Chris is tolerating treatment with Zytiga, prednisone and Xgeva.  He is up-to-date with his Lupron injection, last received in August with Dr. Matute.  He had cystoscopy on 9/8/2023 that was normal. I referred him to nephrology when I saw him last in August as his creatinine has been increasing, creatinine yesterday was a little better 1.51, GFR is 50, creatinine clearance 63.8.  He is still awaiting an appointment with nephrology, per our  it will be early November.  We will repeat his  restaging scans prior to return and I will do his CT scans without contrast in light of his new renal insufficiency.  We will also get total body bone scan and I have ordered those today.  His PSA yesterday was 1.810.    -10/9/2023 CT chest abdomen Pelvis without contrast compared to April 2023 shows slightly prominent pelvic lymph nodes left external iliac 8 mm compared to prior study.  Right internal iliac heterogenous 19 mm compared to 11 mm prior.  Stable sclerotic bone lesions and no new lesions.  Stable bone scan with no new sites of disease    -10/17/2023 [facility] clinic follow-up: With elevated creatinine, CTs done without contrast showed very slight increased prominence by few millimeters of some internal iliac and external iliac nodes for which PSMA PET could be done but for now I will check his PSA and have him see my nurse practitioner back in a week and unless the PSA is significantly rising I would continue the Zytiga, prednisone, Xgeva and February dose of Lupron with Dr. Matute and make sure he follows with nephrology.  If his PSA is significantly rising then my nurse practitioner will order PSMA PET.    -10/17/2023 PSA 1.5  -11/14/2023 PSA 1.510  -11/15/2023 [facility] clinic follow-up: Mr. Ferrara continues to do well on current therapy with Zytiga, prednisone, Xgeva and Lupron that he receives with Dr. Matute.  His PSA is stable at 1.5.  Would not change therapy for now, we will plan on repeating restaging scans in February unless PSA starts to rise or he has new concerns.  Overall he feels good.  He is now established with nephrology and will continue to follow with them regarding his renal insufficiency, I reviewed note from consultation with Dr. Armstrong 11/6/2023.    -1/9/2024 PSA 1.050    -1/10/2024 [facility] clinic follow-up: Chris huerta continues to tolerate current therapy with Zytiga, prednisone, Xgeva and Lupron.  He states his next Lupron injection with   Giovani is due late February.  His PSA is stable at 1.050. Creatinine stable at 1.64, he is following with nephrology, he has a an appointment with Dr. Armstrong in February for follow-up.  Hypertension being managed by his primary care provider, target systolic ideally 120s-140s, today it is running a little high at 172/90, yesterday when he was here for labs it was 138/80.  We will continue therapy unchanged with plans to repeat restaging scans in April.    -2/6/2024 PSA 0.753    -2/7/2024 [facility] oncology clinic follow-up: Chris continues to do well on current therapy with Zytiga, prednisone, Xgeva and Lupron.  He is up-to-date on Lupron injection next due later this month with Dr. Matute.  His PSA continues to slowly decrease, current PSA is 0.753.  He has no new worrisome symptoms.  We will continue therapy unchanged, I have ordered restaging scans for late March prior to his return in April. He is following with nephrology for his renal insufficiency, his creatinine yesterday was good at 1.21.  I will do his CT scans with contrast unless something changes in the interim. He follows closely with Dr. Ram for management of his hypertension. I reviewed notes from his last office visit with her earlier this month on 2/1/2024, also reviewed labs in detail with the patient today.  All questions were answered to his satisfaction.    -3/5/2024 PSA slowly declining 0.73.  Creatinine fluctuating.  1.69 with GFR 43 otherwise unremarkable CMP.  Phosphorus low 2.1    -4/8/2024 CT chest, abdomen and pelvis shows stable sclerotic lesions in the thoracic spine and bilateral ribs and right acetabulum.  Stable appearance of pelvic lymph nodes.  No new sites concerning for progression of disease.  Total body bone scan shows stable diffuse increased uptake of the left third rib correlates with diffuse sclerosis and enlargement on CT, no new sites of active osseous lesions.  -4/30/2024 PSA 0.713  -5/1/2024 [facility] follow-up: Chris continues to do well on current therapy with Zytiga, prednisone, Xgeva and Lupron.  He receives his Lupron every 6 months with Dr. Matute, last administered 2/5/2024.  Current CT scans and bone scan showed no progression of disease, PSA is stable at 0.713.  His CBC is unremarkable, CMP shows creatinine stable at 1.50 otherwise normal.  He does follow with nephrology.  We will continue treatment unchanged.  I will see him back in 2 months for follow-up.  We will repeat restaging scans in 6 months unless he has any new symptoms or significant change in his PSA.    -6/25/2024 PSA 0.623  -6/26/2024 [facility] follow-up: Chris overall continues to tolerate current therapy with Zytiga, prednisone, Xgeva and Lupron.  We did call to confirm his next Lupron injection and that is scheduled for late July with Dr. Matute.  His PSA for the most part is stable at 0.623, we will continue to monitor.  He has no new worrisome symptoms.  I did discuss with him today his creatinine which has increased to 1.86, BUN was 32.  Reminded him to be sure and stay hydrated.  He does follow with nephrology and his next appointment is in August.  He is on Xgeva however there is no indication for any adjustment, his creatinine clearance is greater than 30.  Blood pressure today was under good control at 130/78.  We will continue treatment unchanged.  I will see him back in 1 month for follow-up.  Plan to repeat restaging scans in October unless his PSA rises or he has worrisome symptoms.    -7/23/2024 PSA 0.606    -9/17/2024 PSA 0.455  -9/18/2024 [facility] follow-up: Chris is doing well overall.  Tolerates therapy with Zytiga, prednisone, Xgeva and Lupron.  He last received Lupron in August with Dr. Matute.  His PSA had been slowly creeping up however it has now went back down and is currently 0.455.  He will continue treatment unchanged.  We will repeat CT chest, abdomen and pelvis prior  to return and I will do that without contrast in light of his renal insufficiency.  Current creatinine is stable at 1.5, he does follow with nephrology.  We will also repeat total body bone scan.  We will see him back in 1 months for follow-up and sooner if any concerns.  I did discuss with him that he can take Tylenol if needed or use topical over-the-counter pain relief ointment such as Biofreeze etc. for his back pain but thus far it has not required any intervention, he does not like to take pain medications.    -10/14/2024 CT chest abdomen pelvis without contrast [facility] compared to August 2024 shows unchanged sclerotic T1 vertebral body with expansile sclerotic left third rib without fracture and other small sclerotic nodules all unchanged.  Right external iliac node 20 mm increased from 7 mm on prior exam with internal iliac node 15 mm unchanged.  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      -10/22/2024 [facility] follow-up: Feeling great.  While he has 1 slightly larger node, given that his PSA has not been rapidly rising and all other disease is stable I will have him continue Zytiga prednisone Xgeva and he gets his next Lupron in February with Dr. Matute.  He will see my nurse practitioner and she will order repeat CT chest abdomen pelvis and bone scan for January and will follow-up with her in January to go over those results.  If either his PSA or the scans show progression beyond just the single slightly larger iliac node, she will order a PSMA PET and then get him back to me.  If the PSA is stable and the subsequent scans are stable then we will just continue his current regimen with quarterly imaging or sooner as symptoms or PSA dictate.    Total time of care today inclusive of time spent today prior to patient's arrival reviewing interval data and images and reports thereof and during visit interviewing  and [place] signs or symptoms of his disease and management thereof and after visit instituting this plan took 50 minutes patient care time throughout the day today.  Fritz Goodson MD    10/22/2024

## 2024-10-23 ENCOUNTER — INFUSION (OUTPATIENT)
Dept: ONCOLOGY | Facility: HOSPITAL | Age: 69
End: 2024-10-23
Payer: MEDICARE

## 2024-10-23 DIAGNOSIS — C61 PROSTATE CANCER METASTATIC TO MULTIPLE SITES: Primary | ICD-10-CM

## 2024-10-23 LAB
ALBUMIN SERPL-MCNC: 4.2 G/DL (ref 3.9–4.9)
ALP SERPL-CCNC: 72 IU/L (ref 44–121)
ALT SERPL-CCNC: 12 IU/L (ref 0–44)
AST SERPL-CCNC: 12 IU/L (ref 0–40)
BASOPHILS # BLD AUTO: 0.1 X10E3/UL (ref 0–0.2)
BASOPHILS NFR BLD AUTO: 1 %
BILIRUB SERPL-MCNC: 0.3 MG/DL (ref 0–1.2)
BUN SERPL-MCNC: 31 MG/DL (ref 8–27)
BUN/CREAT SERPL: 19 (ref 10–24)
CALCIUM SERPL-MCNC: 9.2 MG/DL (ref 8.6–10.2)
CHLORIDE SERPL-SCNC: 108 MMOL/L (ref 96–106)
CO2 SERPL-SCNC: 20 MMOL/L (ref 20–29)
CREAT SERPL-MCNC: 1.62 MG/DL (ref 0.76–1.27)
EGFRCR SERPLBLD CKD-EPI 2021: 46 ML/MIN/1.73
EOSINOPHIL # BLD AUTO: 0.2 X10E3/UL (ref 0–0.4)
EOSINOPHIL NFR BLD AUTO: 3 %
ERYTHROCYTE [DISTWIDTH] IN BLOOD BY AUTOMATED COUNT: 12.9 % (ref 11.6–15.4)
GLOBULIN SER CALC-MCNC: 2.6 G/DL (ref 1.5–4.5)
GLUCOSE SERPL-MCNC: ABNORMAL MG/DL
HCT VFR BLD AUTO: 40.9 % (ref 37.5–51)
HGB BLD-MCNC: 13.3 G/DL (ref 13–17.7)
IMM GRANULOCYTES # BLD AUTO: 0 X10E3/UL (ref 0–0.1)
IMM GRANULOCYTES NFR BLD AUTO: 1 %
LYMPHOCYTES # BLD AUTO: 1.8 X10E3/UL (ref 0.7–3.1)
LYMPHOCYTES NFR BLD AUTO: 21 %
MAGNESIUM SERPL-MCNC: 2.4 MG/DL (ref 1.6–2.3)
MCH RBC QN AUTO: 29.4 PG (ref 26.6–33)
MCHC RBC AUTO-ENTMCNC: 32.5 G/DL (ref 31.5–35.7)
MCV RBC AUTO: 90 FL (ref 79–97)
MONOCYTES # BLD AUTO: 0.7 X10E3/UL (ref 0.1–0.9)
MONOCYTES NFR BLD AUTO: 7 %
NEUTROPHILS # BLD AUTO: 6 X10E3/UL (ref 1.4–7)
NEUTROPHILS NFR BLD AUTO: 67 %
PHOSPHATE SERPL-MCNC: NORMAL MG/DL
PLATELET # BLD AUTO: 249 X10E3/UL (ref 150–450)
POTASSIUM SERPL-SCNC: ABNORMAL MMOL/L
PROT SERPL-MCNC: 6.8 G/DL (ref 6–8.5)
PSA SERPL-MCNC: 0.5 NG/ML (ref 0–4)
RBC # BLD AUTO: 4.53 X10E6/UL (ref 4.14–5.8)
SODIUM SERPL-SCNC: 143 MMOL/L (ref 134–144)
WBC # BLD AUTO: 8.8 X10E3/UL (ref 3.4–10.8)

## 2024-10-23 PROCEDURE — 25010000002 DENOSUMAB 120 MG/1.7ML SOLUTION: Performed by: INTERNAL MEDICINE

## 2024-10-23 PROCEDURE — 96372 THER/PROPH/DIAG INJ SC/IM: CPT

## 2024-10-23 RX ADMIN — DENOSUMAB 120 MG: 120 INJECTION SUBCUTANEOUS at 09:12

## 2024-10-24 ENCOUNTER — SPECIALTY PHARMACY (OUTPATIENT)
Facility: HOSPITAL | Age: 69
End: 2024-10-24
Payer: MEDICARE

## 2024-11-15 ENCOUNTER — SPECIALTY PHARMACY (OUTPATIENT)
Facility: HOSPITAL | Age: 69
End: 2024-11-15
Payer: MEDICARE

## 2024-11-15 NOTE — PROGRESS NOTES
Specialty Pharmacy Refill Coordination Note     Chris is a 69 y.o. male contacted today regarding refills of  Abiraterone 1000mg PO QD of  specialty medication(s).    Reviewed and verified with patient: YES; Will ship out on 11/18/24 because of the weekend.     Specialty medication(s) and dose(s) confirmed: yes    Refill Questions      Flowsheet Row Most Recent Value   Changes to allergies? No   Changes to medications? No   New conditions or infections since last clinic visit No   Unplanned office visit, urgent care, ED, or hospital admission in the last 4 weeks  No   How does patient/caregiver feel medication is working? Very good   Financial problems or insurance changes  No   Since the previous refill, were any specialty medication doses or scheduled injections missed or delayed?  No   Does this patient require a clinical escalation to a pharmacist? No            Delivery Questions      Flowsheet Row Most Recent Value   Delivery method [employer]   Delivery address verified with patient/caregiver? Yes   Delivery address Home   Number of medications in delivery 1   Medication(s) being filled and delivered Abiraterone Acetate (ZYTIGA)   Doses left of specialty medications 6 days   Copay verified? Yes   Copay amount $0   Copay form of payment No copayment ($0)   Ship Date 11/18   Delivery Date 11/19   Signature Required No                   Follow-up: 30 day(s)     Catalina Montes De Oca, Pharmacy Technician  Specialty Pharmacy Technician

## 2024-11-19 ENCOUNTER — LAB (OUTPATIENT)
Dept: ONCOLOGY | Facility: HOSPITAL | Age: 69
End: 2024-11-19
Payer: MEDICARE

## 2024-11-19 VITALS
TEMPERATURE: 98 F | HEART RATE: 76 BPM | WEIGHT: 252.8 LBS | SYSTOLIC BLOOD PRESSURE: 164 MMHG | RESPIRATION RATE: 18 BRPM | DIASTOLIC BLOOD PRESSURE: 96 MMHG | BODY MASS INDEX: 35.26 KG/M2

## 2024-11-19 DIAGNOSIS — C61 PROSTATE CANCER METASTATIC TO MULTIPLE SITES: Chronic | ICD-10-CM

## 2024-11-19 PROCEDURE — 36415 COLL VENOUS BLD VENIPUNCTURE: CPT

## 2024-11-20 ENCOUNTER — INFUSION (OUTPATIENT)
Dept: ONCOLOGY | Facility: HOSPITAL | Age: 69
End: 2024-11-20
Payer: MEDICARE

## 2024-11-20 ENCOUNTER — OFFICE VISIT (OUTPATIENT)
Dept: ONCOLOGY | Facility: CLINIC | Age: 69
End: 2024-11-20
Payer: MEDICARE

## 2024-11-20 VITALS
RESPIRATION RATE: 18 BRPM | OXYGEN SATURATION: 94 % | WEIGHT: 253 LBS | HEART RATE: 57 BPM | SYSTOLIC BLOOD PRESSURE: 127 MMHG | BODY MASS INDEX: 35.42 KG/M2 | DIASTOLIC BLOOD PRESSURE: 78 MMHG | HEIGHT: 71 IN | TEMPERATURE: 98.2 F

## 2024-11-20 DIAGNOSIS — C61 PROSTATE CANCER METASTATIC TO MULTIPLE SITES: Primary | ICD-10-CM

## 2024-11-20 LAB
ALBUMIN SERPL-MCNC: 4 G/DL (ref 3.5–5.2)
ALBUMIN/GLOB SERPL: 1.5 G/DL
ALP SERPL-CCNC: 75 U/L (ref 39–117)
ALT SERPL-CCNC: 11 U/L (ref 1–41)
AST SERPL-CCNC: 14 U/L (ref 1–40)
BASOPHILS # BLD AUTO: 0.09 10*3/MM3 (ref 0–0.2)
BASOPHILS NFR BLD AUTO: 1.1 % (ref 0–1.5)
BILIRUB SERPL-MCNC: 0.3 MG/DL (ref 0–1.2)
BUN SERPL-MCNC: 27 MG/DL (ref 8–23)
BUN/CREAT SERPL: 17.1 (ref 7–25)
CALCIUM SERPL-MCNC: 9.4 MG/DL (ref 8.6–10.5)
CHLORIDE SERPL-SCNC: 106 MMOL/L (ref 98–107)
CO2 SERPL-SCNC: 25.6 MMOL/L (ref 22–29)
CREAT SERPL-MCNC: 1.58 MG/DL (ref 0.76–1.27)
EGFRCR SERPLBLD CKD-EPI 2021: 47.1 ML/MIN/1.73
EOSINOPHIL # BLD AUTO: 0.25 10*3/MM3 (ref 0–0.4)
EOSINOPHIL NFR BLD AUTO: 3.2 % (ref 0.3–6.2)
ERYTHROCYTE [DISTWIDTH] IN BLOOD BY AUTOMATED COUNT: 12.9 % (ref 12.3–15.4)
GLOBULIN SER CALC-MCNC: 2.6 GM/DL
GLUCOSE SERPL-MCNC: 115 MG/DL (ref 65–99)
HCT VFR BLD AUTO: 40.6 % (ref 37.5–51)
HGB BLD-MCNC: 12.8 G/DL (ref 13–17.7)
IMM GRANULOCYTES # BLD AUTO: 0.03 10*3/MM3 (ref 0–0.05)
IMM GRANULOCYTES NFR BLD AUTO: 0.4 % (ref 0–0.5)
LYMPHOCYTES # BLD AUTO: 2.46 10*3/MM3 (ref 0.7–3.1)
LYMPHOCYTES NFR BLD AUTO: 31.2 % (ref 19.6–45.3)
MAGNESIUM SERPL-MCNC: 2.2 MG/DL (ref 1.6–2.4)
MCH RBC QN AUTO: 28.8 PG (ref 26.6–33)
MCHC RBC AUTO-ENTMCNC: 31.5 G/DL (ref 31.5–35.7)
MCV RBC AUTO: 91.4 FL (ref 79–97)
MONOCYTES # BLD AUTO: 0.54 10*3/MM3 (ref 0.1–0.9)
MONOCYTES NFR BLD AUTO: 6.8 % (ref 5–12)
NEUTROPHILS # BLD AUTO: 4.52 10*3/MM3 (ref 1.7–7)
NEUTROPHILS NFR BLD AUTO: 57.3 % (ref 42.7–76)
NRBC BLD AUTO-RTO: 0 /100 WBC (ref 0–0.2)
PHOSPHATE SERPL-MCNC: 3.4 MG/DL (ref 2.5–4.5)
PLATELET # BLD AUTO: 226 10*3/MM3 (ref 140–450)
POTASSIUM SERPL-SCNC: 4.3 MMOL/L (ref 3.5–5.2)
PROT SERPL-MCNC: 6.6 G/DL (ref 6–8.5)
PSA SERPL-MCNC: 0.45 NG/ML (ref 0–4)
RBC # BLD AUTO: 4.44 10*6/MM3 (ref 4.14–5.8)
SODIUM SERPL-SCNC: 141 MMOL/L (ref 136–145)
WBC # BLD AUTO: 7.89 10*3/MM3 (ref 3.4–10.8)

## 2024-11-20 PROCEDURE — 99214 OFFICE O/P EST MOD 30 MIN: CPT | Performed by: NURSE PRACTITIONER

## 2024-11-20 PROCEDURE — 96372 THER/PROPH/DIAG INJ SC/IM: CPT

## 2024-11-20 PROCEDURE — 1126F AMNT PAIN NOTED NONE PRSNT: CPT | Performed by: NURSE PRACTITIONER

## 2024-11-20 PROCEDURE — 1160F RVW MEDS BY RX/DR IN RCRD: CPT | Performed by: NURSE PRACTITIONER

## 2024-11-20 PROCEDURE — 25010000002 DENOSUMAB 120 MG/1.7ML SOLUTION: Performed by: INTERNAL MEDICINE

## 2024-11-20 PROCEDURE — 1159F MED LIST DOCD IN RCRD: CPT | Performed by: NURSE PRACTITIONER

## 2024-11-20 RX ADMIN — DENOSUMAB 120 MG: 120 INJECTION SUBCUTANEOUS at 09:44

## 2024-11-20 NOTE — PROGRESS NOTES
CHIEF COMPLAINT: Intermittent right lower back/flank pain    Problem List:  Oncology/Hematology History Overview Note   1.  Prostate cancer clinical T1c and 2M1B stage IVb treated with 4 cycles of Taxotere, stopped due to syncope with negative cardiac work-up, with marked drop in PSA of 18.6 from over 900 at baseline, continued on Zytiga and prednisone.  2.  Urinary retention with Goodwin in place 1/24/2022.  On finasteride 1/24/2022.  3.  Covid 19 December 2021  4.  Hypertension  5.  Stage 3a chronic kidney disease    Oncology history timeline:  -11/29/2021  with symptoms of urinary retention.  -2/15/2022 prostate biopsy adenocarcinoma grade group 5 and 3 out of 12 cores, grade group 4 in 1 out of 12 cores, grade group 3 in 8 out of 12 cores.  -2/24/2022 CT chest abdomen pelvis and total body bone scan shows left third rib, right acetabulum, periaortic and retroperitoneal kizzy metastases complaining of pain in the left chest and right hip.  Also possible sclerotic left 10th rib on CT.  Spleen mildly enlarged 13.8 cm.  Hepatic steatosis.  Small liver cyst.  Fat stranding in the periaortic and mesenteric region consistent with reactive/inflammatory stranding.  Multiple enlarged periaortic and retroperitoneal nodes and lymphadenopathy largest 4.9 cm.  CT chest describes tiny sclerotic foci in left eighth rib and right lateral seventh rib and T1.  Multiple small mediastinal and bilateral axillary nodes seen with small hypodense left thyroid nodule.  Creatinine 1.7.  -3/4/2022 office note Dr. Rolando Matute: Patient was seen after his elevated PSA by Dr. Vera and prostate biopsy scheduled.  However, he developed COVID-19 and this was canceled and the patient did not reschedule due to lack of insurance.  Saw Dr. Matute back on 1/24/2022 with plans to get staging CTs and bone scan with results as outlined above.  Started Casodex and to return on 3/11/2022 for Lupron.  Referral made to medical oncology for  other additional castrate naïve prostate cancer therapies.  TURP planned for urinary retention symptoms.    -3/15/2022 [facility] medical oncology initial consultation: I reviewed the above data with the patient.  With his fairly bulky retroperitoneal adenopathy and bone metastases including extra axial metastases, he would fit the data from the CHAARTED trial for which Taxotere x6 followed by Zytiga prednisone along with the planned Lupron already being planned by Dr. Matute would be reasonable.  Equally reasonable in terms of comparisons with bicalutamide and Lupron would be Zytiga prednisone plus Lupron or Xtandi plus Lupron or apalutamide plus Lupron.  None of these have been compared head-to-head but all have beaten combined androgen deprivation therapy with bicalutamide and Lupron.  At the age of 67 and in order to have as many bullets as possible down the road and hence saving some of the hormone blockade options for later and using chemotherapy when he is younger and healthier for a more time-limited.,  He has opted for the Taxotere x6 followed by Zytiga and prednisone.  We will also give him Xgeva to improve bone health and given metastatic disease, as with all metastatic prostate cancer patients, he needs genetic counseling both for his sake as well as his family's.  If he were to have BRCA1 or other such  mutations, then that also opens up the options for PARP inhibitors etc. down the road.  He has his TURP scheduled for 2/24/2022 and we will start treatment a couple of weeks after that and he will get chemo preparation visit in the meantime and I will get capital surgeons to place a port.  We will check his PSA after his TURP when he sees my nurse practitioner back early April for the start of chemotherapy and repeat the PSA and CT chest abdomen pelvis and bone scan after the Taxotere is complete.    -3/31/2022 chemotherapy education and needs assessment completed    -4/7/2022 began docetaxel and  Xgeva.  .0.  We will do his Xgeva every 6 weeks to coordinate with his docetaxel infusions.    -4/19/2022 patient stopped Casodex    -5/17/2022 patient reported seeing his PCP for an abscess in his suprapubic area.  Placed on clindamycin, will delay treatment 1 week.    -5/25/2022 PSA 34.3  -5/26/2022 [facility] Oncology clinic follow-up: Chris has an abscess in the suprapubic area, it has improved on antibiotic therapy but I am concerned if we treat him it will just flare back up unless it is drained.  I will get him to Dr. Miller who placed his port for I&D and culture.  He is on clindamycin and states he completes course of treatment tomorrow.  I will delay his treatment with Taxotere 1 more week.  We will give his Xgeva today.  I will see him back in 1 week for follow-up to assess whether or not we can resume his Taxotere.  His PSA has dropped nicely with treatment thus far, PSA yesterday was 34.3 which is down from a PSA of 259.0 when we started treatment, it has been as high as 911 in November.    -6/2/2022 [facility] Oncology clinic follow-up: Chris went to see Dr. Miller to have his abscess lanced however apparently there was a long wait and he left without being seen.  He did call his PCP and was given 5 more days of clindamycin and the abscess now seems to have resolved.  We discussed today that he is at risk for recurrence of infection due to immunocompromise state with chemotherapy.  He will notify us if he has any return of his abscess.  He does have intertrigo under his panniculus, I have advised him to keep this area dry, to apply topical drying/antifungal powders.  Also recommended looser clothing.  His counts are adequate to continue therapy, we will resume Taxotere today with cycle #3.  We will repeat restaging scans after 6 courses.  We are doing Xgeva every 6 weeks to coordinate with his Taxotere.  Once he finishes Taxotere we can then go back to every 4 weeks.  His next Xgeva is not due until  7/14/2022.  His calcium is running a little low, he is going to  calcium supplement.    -6/23/2022 [facility] clinic follow-up: Chris overall is doing well on treatment with Taxotere.  Labs reviewed from yesterday and are unremarkable.  We will continue treatment today unchanged.  His suprapubic abscess resolved on clindamycin.  He will continue to use drying powder/antifungal powder under his panniculus for intertrigo.  Today will be cycle #4 of a planned 6 courses of Taxotere.  He is also on Xgeva, next dose due 7/14/2022, we are doing this every 6 weeks for now to line up with his chemotherapy, can go back to every 4 weeks after he finishes Taxotere.  Calcium from CMP yesterday was normal.    -7/13/2022 [facility] clinic follow-up: Mr. Morfin has had issues around day 3 after each treatment ranging from chest pain after his first treatment for which he did not seek medical attention, fatigue after the next few treatments, but 3 days after his most recent treatment on 6/23/2022 he had a syncopal episode at home and once again did not seek medical attention.  I discussed with him that this was not acceptable, if he has occurrences like this someone needs to call 911, it is difficult to figure out what is going on after the fact, the best time to work this up would be during the occurrence.  For now we will get labs today with CBC, CMP, PSA.  I will refer him to cardiology for further evaluation.  We will hold Taxotere most likely, I will see him tomorrow to go over his lab results.  I will also repeat his restaging scans with CT chest, abdomen and pelvis and will include CT of the head in light of his syncopal episode.      -7/13/2022 PSA 18.6    -7/14/2022 [facility] clinic follow-up: Mr. Morfin returns today to review his labs from yesterday.  Labs are unremarkable.  PSA down to 18.6 from a high of 259.0 in April prior to starting treatment.  CBC and CMP unremarkable, magnesium is normal  at 2.3, phosphorus slightly low at 2.4.  We will hold therapy for now in light of his syncopal episode on day 3 after his last treatment and prior episodes with chest pain and severe fatigue that all occurred on day 3 after his Taxotere.  We will restage him with CT head, chest, abdomen and pelvis (I am including the head in light of his syncopal episode).  I have also referred him to cardiology, he states that he received a call from them about making an appointment however he wanted to talk to us today first.  I emphasized the importance to him of making and keeping that appointment and he states understanding.  I also once again emphasized the requirement to seek emergency medical attention if he has any episodes in the future such as chest pain or syncopal events.  Whether or not we try and complete Taxotere with 2 more courses or move to the next line of therapy will be decided when he returns to discuss with Dr. Goodson and review his restaging scans.    -7/15/2022 saw Dr. Diaz cardiologist.  For syncope he ordered echocardiogram and 48-hour Holter monitor.    -7/15/2022 Holter monitor relatively benign.    -7/22/2022 CT brain with and without contrast negative.  CT chest abdomen and pelvis shows some nonspecific cecal wall thickening.  No progression in the chest.  T1 and ribs stable.  Decrease in multiple retroperitoneal and pelvic nodes.  Slight increase in right acetabular sclerotic lesion.  Persistent but improved circumferential bladder wall thickening.  Total body bone scan shows stable left third rib and no evidence of new bone metastases.    -7/19/2022 ejection fraction 65% with grade 1 diastolic dysfunction.    -7/26/2022 [facility] oncology clinic follow-up: Given presyncopal symptoms occurred 3 days after Taxotere and has not otherwise occurred any other time and his cardiology work-up is fairly unremarkable and his disease is under good control as witnessed by the report above of the CTs of head  chest abdomen pelvis and bone scan, I will hold cycle 5 and 6 of Taxotere and continue 1 now with Zytiga and prednisone.  With reference to the coincidental cecal wall thickening found on CT, we will get him to [facility] surgeons for colonoscopy.  He will see my nurse practitioner back in August for next cycle of Abiraterone prednisone.  He will continue his Lupron with Dr. Matute.  We will continue his Xgeva.  We will repeat his CT chest abdomen pelvis and bone scan again in 3 months.  He will see my nurse practitioner in the interim.    -8/23/2022 [facility] oncology clinic follow-up: No further presyncopal symptoms.  Feeling great other than for hot flashes.  Did commend for now we will continue on with his Zytiga prednisone and he will get his Xgeva today based on labs from 8/10/2022.  He opted out of getting the colonoscopy that I recommended and he will not change his mind.  He will continue getting the Lupron with Dr. Matute and I asked him to give the date of this appointment to my nurse when he sees her back in a month.  We will give his Xgeva today.  We will get CT chest abdomen pelvis and total body bone scan towards the middle to end of October.  Presently other than for the hot flashes feels great.    -9/20/2022 PSA 8.320    -9/22/2022 [facility] Oncology clinic follow-up: Chris is doing well on Zytiga, prednisone along with Xgeva.  Labs reviewed from 9/20/2022 are unremarkable, CBC was normal, CMP with creatinine of 1.34 otherwise normal, this is stable from his baseline.  PSA stable at 8.320.  He received Lupron recently with Dr. Matute, he thinks last week or so, states his next appointment is in February.  He will continue treatment unchanged.  We will repeat restaging CT chest, abdomen and pelvis and total body bone scan in October prior to return and I have ordered those today.  We will also repeat his PSA.    -9/22/2022 CT chest abdomen pelvisCompared to July 2022 [facility] showed no  evidence of disease progression in the chest with no substantial sclerotic bony lesions and decrease in size of retroperitoneal and pelvic nodes with persistent cecal wall thickening and suboptimal bladder distention.  Total body bone scan showed decrease in previously seen uptake in the left third rib with diffuse sclerosis representing treatment response with no new foci.    -10/18/2022 [facility] medical oncology follow-up: I reviewed bone scan and CT reports as above with no evidence of progression.  Tolerating Zytiga prednisone and Xgeva.  Getting Lupron regularly with Dr. Matute next appointment coming in February.  We will repeat his CT chest abdomen pelvis and total body bone scan in April.  He will follow with my nurse practitioner in the interim.    -1/10/2023 PSA 3.450  -1/11/2023 [facility] Oncology clinic follow-up: Mr. Morfin continues to do well on current therapy with Zytiga, prednisone and Xgeva with no unusual side effects.  He has no new worrisome symptoms.  He is due for his next Lupron injection in February with Dr. Mattue.  His PSA continues to trend downward, current PSA from yesterday was 3.450.  We will continue therapy unchanged, he will receive Xgeva today.  Has had no hypocalcemia, his CMP, phosphorus and magnesium are all normal.  We will repeat restaging scans in April.  -3/7/2023 PSA 2.460  -3/8/2023 [facility] Oncology clinic follow-up:  Mr. Morfin is doing well.  He is tolerating therapy with Zytiga, prednisone and Xgeva with no significant side effects.  He has hot flashes but these are tolerable.  He had Lupron injection 2/24/2023 with Dr. Matute.  His PSA continues to decline, current PSA 2.460.  He will continue therapy unchanged.  We will repeat restaging scans late April prior to return in 2 months and I have ordered those today.  He is working with his PCP Rodrigo Ram on his hypertension.  His b/p today was 160/88.  He was to have increased his losartan but I do not think he has  "done that yet as he said \"I still have some of my old prescription left\".  -4/4/2023 PSA 2.55  - 4/26/2023 CT chest abdomen pelvis [place] comparison 10/10/2022 showed stable left third rib, T1, left fourth and eighth rib, right seventh rib.  Probable liver cyst.  Few diverticuli.  Mild further decrease in a few of the previously enlarged nodes.  Left external iliac 12 mm compared to 16 mm.  Right common iliac 7 mm stable.  Lower left periaortic 8 mm previously 13 mm.  No new lytic or blastic osseous lesions.  Total body bone scan comparison 10/10/2022, 7/22/2022, and CT 4/26/2023.  No new sites of increased uptake.  1 focal left third rib hotspot consistent with bone metastasis.    -5/2/2023 [facility] medical oncology follow-up: I reviewed interval notes since I last saw him from my nurse practitioner as outlined above in interval images and reports thereof from [facility] that shows no new sites of bone metastasis and no kizzy or visceral progression.  PSA stabilized around 2.5.  In the absence of symptomatic or radiographic progression, I would be unlikely to change therapy just on the basis of a PSA rise and for now the PSA is stable.  We will continue Zytiga prednisone and Xgeva and he will follow-up with my nurse practitioner 5/30/2023 with repeat CTs and bone scan in 6 months.  I asked him to check with his primary care today regarding his systolic in the 170s.    -5/31/2023 [facility] clinic follow-up: Chris continues to do well on current therapy with Zytiga, prednisone and Xgeva along with Lupron that he receives with Dr. Matute.  His PSA remains stable, it was lower this month compared to last month, current PSA 1.940.  Continues to deal with hypertension, on arrival today his blood pressure was 210/92 however this was using a wrist cuff, when I rechecked it manually his blood pressure was 160/90.  Still has room for improvement despite recent increase in his losartan to 100 mg daily.  He " will follow-up with Dr. Ram for further management.  We will continue therapy unchanged.  We will repeat restaging scans in October.    -7/26/2023 [facility] clinic follow-up: Chris overall continues to do well on current therapy with Zytiga, prednisone and Xgeva.  He receives Lupron every 6 months with Dr. Matute and states that is scheduled for August.  PSA from yesterday is pending, PSA on 6/27/2023 was 1.590 which was continuing to decline, in February it was 3.250.  His creatinine this past month has bumped up.  He feels he is staying hydrated but I encouraged him to increase his fluid intake.  I discussed with him that this could be from his hypertension, some of his medications.  His blood pressure today is 165/92.  He has an appointment with his primary care provider Dr. Teri Ram next week and can further discuss with her.  No adjustment needed for Xgeva.  I refilled his prednisone today.  I will see him back in 1 month for follow-up with continued monitoring of his labs.  He may end up needing to see nephrology.  We will repeat restaging scans in October.    -9/29/2023 PSA 1.810  -8/23/2023 [facility] clinic follow-up: Chris continues on therapy with Zytiga, prednisone and Xgeva, he also receives Lupron every 6 months with Dr. Matute with most recent given on 8/7/2023.  He is scheduled for a cystoscopy in a few weeks as his last few UAs per the patient's report had microscopic hematuria, he has had no steve hematuria.  His creatinine continues to remain elevated at 1.82, BUN is 40, GFR is 40.  I will get him to nephrology for further evaluation.  Blood pressure today was fairly good at 161/86, he continues to follow with Dr. Ram for management.  PSA on 7/25/2023 was up slightly from prior month, PSA in July was 2.140, in June it was 1.590, PSA from yesterday is pending.  I plan on repeating restaging CT scans and bone scans in October however if his PSA is up significantly  then I will do sooner.  I will see him back for follow-up in 1 month.  -8/23/2024 PSA 1.860    -9/20/2023 [facility] On[facility] clinic follow-up: Chris is tolerating treatment with Zytiga, prednisone and Xgeva.  He is up-to-date with his Lupron injection, last received in August with Dr. Matute.  He had cystoscopy on 9/8/2023 that was normal. I referred him to nephrology when I saw him last in August as his creatinine has been increasing, creatinine yesterday was a little better 1.51, GFR is 50, creatinine clearance 63.8.  He is still awaiting an appointment with nephrology, per our  it will be early November.  We will repeat his restaging scans prior to return and I will do his CT scans without contrast in light of his new renal insufficiency.  We will also get total body bone scan and I have ordered those today.  His PSA yesterday was 1.810.    -10/9/2023 CT chest abdomen Pelvis without contrast compared to April 2023 shows slightly prominent pelvic lymph nodes left external iliac 8 mm compared to prior study.  Right internal iliac heterogenous 19 mm compared to 11 mm prior.  Stable sclerotic bone lesions and no new lesions.  Stable bone scan with no new sites of disease    -10/17/2023 [facility] oncology clinic follow-up: With elevated creatinine, CTs done without contrast showed very slight increased prominence by few millimeters of some internal iliac and external iliac nodes for which PSMA PET could be done but for now I will check his PSA and have him see my nurse practitioner back in a week and unless the PSA is significantly rising I would continue the Zytiga, prednisone, Xgeva and February dose of Lupron with Dr. Matute and make sure he follows with nephrology.  If his PSA is significantly rising then my nurse practitioner will order PSMA PET.    -10/17/2023 PSA 1.5  -11/14/2023 PSA 1.510  -11/15/2023 [facility] On[facility] clinic follow-up: Mr. Ferrara continues to do well on current therapy with Zytiga,  prednisone, Xgeva and Lupron that he receives with Dr. Matute.  His PSA is stable at 1.5.  Would not change therapy for now, we will plan on repeating restaging scans in February unless PSA starts to rise or he has new concerns.  Overall he feels good.  He is now established with nephrology and will continue to follow with them regarding his renal insufficiency, I reviewed note from consultation with Dr. Armstrong 11/6/2023.    -1/9/2024 PSA 1.050    -1/10/2024 [facility] Oncology clinic follow-up: Chris overall continues to tolerate current therapy with Zytiga, prednisone, Xgeva and Lupron.  He states his next Lupron injection with Dr. Matute is due late February.  His PSA is stable at 1.050. Creatinine stable at 1.64, he is following with nephrology, he has a an appointment with Dr. Armstrong in February for follow-up.  Hypertension being managed by his primary care provider, target systolic ideally 120s-140s, today it is running a little high at 172/90, yesterday when he was here for labs it was 138/80.  We will continue therapy unchanged with plans to repeat restaging scans in April.    -2/6/2024 PSA 0.753    -2/7/2024 [facility] oncology clinic follow-up: Chris continues to do well on current therapy with Zytiga, prednisone, Xgeva and Lupron.  He is up-to-date on Lupron injection next due later this month with Dr. Matute.  His PSA continues to slowly decrease, current PSA is 0.753.  He has no new worrisome symptoms.  We will continue therapy unchanged, I have ordered restaging scans for late March prior to his return in April. He is following with nephrology for his renal insufficiency, his creatinine yesterday was good at 1.21.  I will do his CT scans with contrast unless something changes in the interim. He follows closely with Dr. Ram for management of his hypertension. I reviewed notes from his last office visit with her earlier this month on 2/1/2024, also reviewed labs in detail with the patient today.   All questions were answered to his satisfaction.    -3/5/2024 PSA slowly declining 0.73.  Creatinine fluctuating.  1.69 with GFR 43 otherwise unremarkable CMP.  Phosphorus low 2.1    -4/8/2024 CT chest, abdomen and pelvis shows stable sclerotic lesions in the thoracic spine and bilateral ribs and right acetabulum.  Stable appearance of pelvic lymph nodes.  No new sites concerning for progression of disease.  Total body bone scan shows stable diffuse increased uptake of the left third rib correlates with diffuse sclerosis and enlargement on CT, no new sites of active osseous lesions.  -4/30/2024 PSA 0.713  -5/1/2024 [facility] oncology clinic follow-up: Chris continues to do well on current therapy with Zytiga, prednisone, Xgeva and Lupron.  He receives his Lupron every 6 months with Dr. Matute, last administered 2/5/2024.  Current CT scans and bone scan showed no progression of disease, PSA is stable at 0.713.  His CBC is unremarkable, CMP shows creatinine stable at 1.50 otherwise normal.  He does follow with nephrology.  We will continue treatment unchanged.  I will see him back in 2 months for follow-up.  We will repeat restaging scans in 6 months unless he has any new symptoms or significant change in his PSA.    -6/25/2024 PSA 0.623  -6/26/2024 [facility] oncology clinic follow-up: Chris overall continues to tolerate current therapy with Zytiga, prednisone, Xgeva and Lupron.  We did call to confirm his next Lupron injection and that is scheduled for late July with Dr. Matute.  His PSA for the most part is stable at 0.623, we will continue to monitor.  He has no new worrisome symptoms.  I did discuss with him today his creatinine which has increased to 1.86, BUN was 32.  Reminded him to be sure and stay hydrated.  He does follow with nephrology and his next appointment is in August.  He is on Xgeva however there is no indication for any adjustment, his creatinine clearance is greater than 30.  Blood pressure today  was under good control at 130/78.  We will continue treatment unchanged.  I will see him back in 1 month for follow-up.  Plan to repeat restaging scans in October unless his PSA rises or he has worrisome symptoms.    -7/23/2024 PSA 0.606    -9/17/2024 PSA 0.455  -9/18/2024 [facility] oncology clinic follow-up: Chris is doing well overall.  Tolerates therapy with Zytiga, prednisone, Xgeva and Lupron.  He last received Lupron in August with Dr. Matute.  His PSA had been slowly creeping up however it has now went back down and is currently 0.455.  He will continue treatment unchanged.  We will repeat CT chest, abdomen and pelvis prior to return and I will do that without contrast in light of his renal insufficiency.  Current creatinine is stable at 1.5, he does follow with nephrology.  We will also repeat total body bone scan.  We will see him back in 1 months for follow-up and sooner if any concerns.  I did discuss with him that he can take Tylenol if needed or use topical over-the-counter pain relief ointment such as Biofreeze etc. for his back pain but thus far it has not required any intervention, he does not like to take pain medications.    -10/14/2024 CT chest abdomen pelvis without contrast [facility] compared to August 2024 shows unchanged sclerotic T1 vertebral body with expansile sclerotic left third rib without fracture and other small sclerotic nodules all unchanged.  Right external iliac node 20 mm increased from 7 mm on prior exam with internal iliac node 15 mm unchanged.  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    -10/22/2024 [facility] follow-up: Feeling great.  While he has 1 slightly larger node, given that his PSA has not been rapidly rising and all other disease is stable I will have him continue Zytiga prednisone Xgeva and he gets his next Lupron in February with Dr. Matute.  He will see my nurse  practitioner and she will order repeat CT chest abdomen pelvis and bone scan for January and will follow-up with her in January to go over those results.  If either his PSA or the scans show progression beyond just the single slightly larger iliac node, she will order a PSMA PET and then get him back to me.  If the PSA is stable and the subsequent scans are stable then we will just continue his current regimen with quarterly imaging or sooner as symptoms or PSA dictate.     Prostate cancer metastatic to multiple sites   3/15/2022 Initial Diagnosis    Prostate cancer metastatic to multiple sites (HCC)     3/15/2022 Cancer Staged    Staging form: Prostate, AJCC 8th Edition  - Clinical: Stage IVB (cT1c, cN1, cM1b, Grade Group: 5) - Signed by Fritz Goodson MD on 3/15/2022     4/7/2022 - 6/23/2022 Chemotherapy    Stopped after cycle 4 6/23/2022 due to syncope 3 days post infusions with negative cardiac work-up  OP PROSTATE DOCEtaxel     4/7/2022 -  Chemotherapy    OP SUPPORTIVE Denosumab (Xgeva) Q28D     7/26/2022 -  Chemotherapy    OP PROSTATE Abiraterone / PredniSONE           HISTORY OF PRESENT ILLNESS:  The patient is a 69 y.o. male, here for follow up on management of metastatic prostate cancer currently on therapy with Zytiga, prednisone, Lupron and Xgeva.  Chris reports overall he is doing well.  He states there is no change in his health since we saw him last.  He continues to have pain in his right lower back/flank area.  This is not new and not worsening over time but is persistent.  He states it is worse if he is walking any long distance or sometimes at night when he lies on his right side.  He has had no fevers or chills.  Appetite is good, no change in his bowel or bladder habits.    Past Medical History:   Diagnosis Date    Cancer     Prostate cancer      Past Surgical History:   Procedure Laterality Date    PROSTATE BIOPSY  02/2022    dr. sue       No Known Allergies    Family History and Social  "History reviewed and changed as necessary    REVIEW OF SYSTEM:   Intermittent right lower back/flank pain    PHYSICAL EXAM:  Well-developed, well-nourished healthy appearing male in no distress  Respirations regular and unlabored, lungs clear to auscultation bilaterally  Heart regular rate and rhythm    Vitals:    11/20/24 0902   BP: 127/78   Pulse: 57   Resp: 18   Temp: 98.2 °F (36.8 °C)   SpO2: 94%   Weight: 115 kg (253 lb)   Height: 180.3 cm (71\")     Vitals:    11/20/24 0902   PainSc: 0-No pain          ECOG score: 0           Vitals reviewed.  Labs reviewed    ECOG: (0) Fully Active - Able to Carry On All Pre-disease Performance Without Restriction    Lab Results   Component Value Date    HGB 12.8 (L) 11/19/2024    HCT 40.6 11/19/2024    MCV 91.4 11/19/2024     11/19/2024    WBC 7.89 11/19/2024    NEUTROABS 4.52 11/19/2024    LYMPHSABS 2.46 11/19/2024    MONOSABS 0.54 11/19/2024    EOSABS 0.25 11/19/2024    BASOSABS 0.09 11/19/2024       Lab Results   Component Value Date    GLUCOSE 115 (H) 11/19/2024    BUN 27 (H) 11/19/2024    CREATININE 1.58 (H) 11/19/2024     11/19/2024    K 4.3 11/19/2024     11/19/2024    CO2 25.6 11/19/2024    CALCIUM 9.4 11/19/2024    ALBUMIN 4.0 11/19/2024    BILITOT 0.3 11/19/2024    ALKPHOS 75 11/19/2024    AST 14 11/19/2024    ALT 11 11/19/2024     Lab Results   Component Value Date    PSA 0.453 11/19/2024    PSA 0.5 10/22/2024    PSA 0.455 09/17/2024    PSA 0.720 08/20/2024    PSA 0.606 07/23/2024    PSA 0.623 06/25/2024             ASSESSMENT & PLAN:    1. Prostate cancer clinical T1c and 2M1B stage IVb treated with 4 cycles of Taxotere, stopped due to syncope with negative cardiac work-up, with marked drop in PSA of 18.6 from over 900 at baseline, continued on Zytiga and prednisone.   2.  Hypertension  3.  Stage 3a chronic kidney disease    Discussion: Chris overall continues to do well on current therapy with Zytiga, prednisone, Xgeva and Lupron.  He reports " that his next Lupron injection with Dr. Matute is due in January.  His PSA continues to be stable on current therapy, PSA currently 0.453.  He has no new pain but persistent right lower back/flank pain.  We discussed today that this is possibly related to the right external iliac node, we plan on repeating CT scans and bone scan in January.  He will continue therapy unchanged.  I will plan on seeing him back in 2 months for follow-up to go over his scans.  He will continue to follow with nephrology regarding stage IIIa chronic kidney disease, current creatinine 1.58 which is stable.  We are doing his CT scans without IV contrast.    This was a level 4, moderate MDM visit with management of side effects of therapy, review of labs, management of drug therapy requiring intensive monitoring for toxicity.  Susana Torres, APRN    11/20/2024

## 2024-11-21 ENCOUNTER — SPECIALTY PHARMACY (OUTPATIENT)
Dept: ONCOLOGY | Facility: HOSPITAL | Age: 69
End: 2024-11-21
Payer: MEDICARE

## 2024-11-21 RX ORDER — LOSARTAN POTASSIUM 100 MG/1
100 TABLET ORAL DAILY
Qty: 90 TABLET | Refills: 1 | Status: SHIPPED | OUTPATIENT
Start: 2024-11-21

## 2024-11-21 NOTE — TELEPHONE ENCOUNTER
Caller: Chris Morfin    Relationship: Self    Best call back number: [phone]    Requested Prescriptions:   Requested Prescriptions     Pending Prescriptions Disp Refills    losartan (COZAAR) 100 MG tablet 90 tablet 1     Sig: Take 1 tablet by mouth Daily.        Pharmacy where request should be sent: [address] - [phone] [facility] [phone] FX     Last office visit with prescribing clinician: 8/5/2024   Last telemedicine visit with prescribing clinician: Visit date not found   Next office visit with prescribing clinician: 2/5/2025     Additional details provided by patient:     Does the patient have less than a 3 day supply:  [] Yes  [x] No    Would you like a call back once the refill request has been completed: [] Yes [x] No    If the office needs to give you a call back, can they leave a voicemail: [] Yes [x] No    Reza Gutierrez Rep   11/21/24 14:45 EST

## 2024-12-05 ENCOUNTER — SPECIALTY PHARMACY (OUTPATIENT)
Dept: GENERAL RADIOLOGY | Facility: HOSPITAL | Age: 69
End: 2024-12-05
Payer: MEDICARE

## 2024-12-05 NOTE — PROGRESS NOTES
Specialty Pharmacy Patient Management Program  Oncology 6-Month Clinical Assessment       Chris Morfin is a 69 y.o. male with prostate cancer seen today to assess adherence and side effects.    Reason for Outreach: Routine medication check-in .    Regimen: Abiraterone 1000mg PO daily + prednisone     Specialty Pharmacy Goal   Goals Addressed Today         Specialty Pharmacy General Goal (pt-stated)       Clinical goal/therapeutic target: stable or decreasing PSA and disease control  PSA          4/2/2024    08:15 4/30/2024    10:00 5/28/2024    09:30   PSA   PSA 0.705  0.713  0.608      12/5/24: I have reviewed the most recent clinic note and labs. Dr. Goodson recommends continuing abiraterone at this time. The patient is tolerating the medication and is currently on track for his goal.   Latest Reference Range & Units 06/25/24 10:00 07/23/24 09:30 08/20/24 09/17/24 09:00 10/22/24 11/19/24 09:55   PSA 0.000 - 4.000 ng/mL 0.623 [1] 0.606 [1] 0.720 [1] 0.455 [1] 0.5 [2] 0.453 [1]               Problem List   Problem list reviewed by Karen Pardo, PharmD on 12/5/2024 at  1:53 PM  Patient Active Problem List   Diagnosis Code    Prostate cancer metastatic to multiple sites C61    Encounter for care related to vascular access port Z45.2    Stage 3a chronic kidney disease (CKD) N18.31    Lipid screening Z13.220    Chest pain at rest R07.9    Syncope and collapse R55    Coronary artery disease involving native coronary artery of native heart without angina pectoris I25.10       Medication Assessment for Specialty Medication(s):  Medication Assessment  Follow Up Clinical Assessment  Linked Medication(s) Assessed: Abiraterone Acetate (ZYTIGA)  Therapeutic appropriateness: Appropriate  Medication tolerability: Patient reported common adverse drug reaction  Common ADR(s) experienced: He is having persistent right lower back/flank pain  Medication plan: Continue therapy with normal follow-up  Quality of Life Improvement  Scale: 10-Significantly better  Administration: Patient is taking every day at the same time  and on an empty stomach .   Patient can self administer medications: yes  Medication Follow-Up Plan: Next clinical assessment and Refill coordination  Lab Review: The labs listed below have been reviewed. No dose adjustments are needed for the oral specialty medication(s) based on the labs.    Lab Results   Component Value Date    GLUCOSE 115 (H) 11/19/2024    CALCIUM 9.4 11/19/2024     11/19/2024    K 4.3 11/19/2024    CO2 25.6 11/19/2024     11/19/2024    BUN 27 (H) 11/19/2024    CREATININE 1.58 (H) 11/19/2024    BCR 17.1 11/19/2024     Lab Results   Component Value Date    WBC 7.89 11/19/2024    RBC 4.44 11/19/2024    HGB 12.8 (L) 11/19/2024    HCT 40.6 11/19/2024    MCV 91.4 11/19/2024    MCH 28.8 11/19/2024    MCHC 31.5 11/19/2024    RDW 12.9 11/19/2024     11/19/2024    NEUTRORELPCT 57.3 11/19/2024    LYMPHORELPCT 31.2 11/19/2024    MONORELPCT 6.8 11/19/2024    EOSRELPCT 3.2 11/19/2024    BASORELPCT 1.1 11/19/2024    NEUTROABS 4.52 11/19/2024    LYMPHSABS 2.46 11/19/2024    MONOSABS 0.54 11/19/2024    EOSABS 0.25 11/19/2024    BASOSABS 0.09 11/19/2024    NRBC 0.0 11/19/2024     Drug-drug interactions  Completed medication reconciliation today to assess for drug interactions. Patient denies starting or stopping any medications.    Assessed medication list for interactions, no significant drug interactions noted.   Advised patient to call the clinic if any new medications are started so we can assess for drug-drug interactions.  Drug-food interactions discussed: eating grapefruit and drinking grapefruit juice  Vaccines are coordinated by the patient's oncologist and primary care provider.    Medications   Medicines reviewed by Karen Pardo, PharmD on 12/5/2024 at  1:53 PM  Prior to Admission medications    Medication Sig Start Date End Date Taking? Authorizing Provider   abiraterone acetate (ZYTIGA)  500 MG tablet Take 2 tablets by mouth Daily on an empty stomach. 12/28/23   Fritz Goodson MD   denosumab (Xgeva) 120 MG/1.7ML solution injection 1.7 ml Subcutaneous ONCE A MONTH 11/6/23   Provider, MD [place]   leuprolide (Lupron [place], 6-Month,) 45 MG kit injection as directed Intramuscular Q 6 MONTHS 11/6/23   [place] Enciso MD   losartan (COZAAR) 100 MG tablet Take 1 tablet by mouth Daily. 11/21/24   Teri Ram DO   ondansetron (ZOFRAN) 8 MG tablet Take 1 tablet by mouth 3 (Three) Times a Day As Needed for Nausea or Vomiting. 7/26/22   Fritz Goodson MD   predniSONE (DELTASONE) 5 MG tablet TAKE ONE (1) TABLET BY MOUTH TWO (2) (TWO) TIMES A DAY. 8/2/24   Fritz Goodson MD   losartan (COZAAR) 100 MG tablet Take 1 tablet by mouth Daily. 5/1/24 11/21/24  Teri Ram DO       Allergies  Known allergies and reactions were discussed with the patient. The patient's chart has been reviewed for allergy information and updated as necessary.   No Known Allergies      Allergies reviewed by Karen Pardo, PharmD on 12/5/2024 at  1:53 PM    Hospitalizations and Urgent Care Visits Since Last Assessment:  Unplanned hospitalizations or inpatient admissions: 0  ED Visits: 0  Urgent Office Visits: 0    Adherence Assessment:  Adherence Questions  Linked Medication(s) Assessed: Abiraterone Acetate (ZYTIGA)  On average, how many doses/injections does the patient miss per month?: 0  What are the identified reasons for non-adherence or missed doses? : no problems identified  What is the estimated medication adherence level?: %  Based on the patient/caregiver response and refill history, does this patient require an MTP to track adherence improvements?: no    Quality of Life Assessment:  Quality of Life Assessment  Quality of Life Improvement Scale: 10-Significantly better  -- Quality of Life: 10/10    Financial Assessment:  Medication availability/affordability: Patient has had no issues obtaining  medication from pharmacy.    Attestation:  I attest the patient was actively involved in and has agreed to the above plan of care. If the prescribed therapy is at any point deemed not appropriate based on the current or future assessments, a consultation will be initiated with the patient's specialty care provider to determine the best course of action. The revised plan of therapy will be documented along with any required assessments and/or additional patient education provided.       All questions addressed and patient had no additional concerns. Patient has pharmacy contact information.    Karen Pardo PharmD, [facility]  Clinical Oncology Pharmacy Specialist  Phone: [phone]      12/5/2024  13:55 EST

## 2024-12-13 ENCOUNTER — SPECIALTY PHARMACY (OUTPATIENT)
Facility: HOSPITAL | Age: 69
End: 2024-12-13
Payer: MEDICARE

## 2024-12-13 NOTE — PROGRESS NOTES
Specialty Pharmacy Refill Coordination Note     Chris is a 69 y.o. male contacted today regarding refills of  Abiraterone 1000mg PO QD of specialty medication(s).    Reviewed and verified with patient: YES; Shipping out on 12/16/24 because of the weekend.     Specialty medication(s) and dose(s) confirmed: yes    Refill Questions      Flowsheet Row Most Recent Value   Changes to allergies? No   Changes to medications? No   New conditions or infections since last clinic visit No   Unplanned office visit, urgent care, ED, or hospital admission in the last 4 weeks  No   How does patient/caregiver feel medication is working? Good   Financial problems or insurance changes  No   Since the previous refill, were any specialty medication doses or scheduled injections missed or delayed?  No   Does this patient require a clinical escalation to a pharmacist? No            Delivery Questions      Flowsheet Row Most Recent Value   Delivery method UPS   Delivery address verified with patient/caregiver? Yes   Delivery address Home   Number of medications in delivery 1   Medication(s) being filled and delivered Abiraterone Acetate (ZYTIGA)   Doses left of specialty medications 8 days   Copay verified? Yes   Copay amount $0   Copay form of payment No copayment ($0)   Ship Date 12/16   Delivery Date 12/17   Signature Required No                   Follow-up: 30 day(s)     Catalina Montes De Oca, Pharmacy Technician  Specialty Pharmacy Technician

## 2024-12-17 ENCOUNTER — LAB (OUTPATIENT)
Dept: ONCOLOGY | Facility: HOSPITAL | Age: 69
End: 2024-12-17
Payer: MEDICARE

## 2024-12-17 DIAGNOSIS — C61 PROSTATE CANCER METASTATIC TO MULTIPLE SITES: ICD-10-CM

## 2024-12-17 PROCEDURE — 36415 COLL VENOUS BLD VENIPUNCTURE: CPT

## 2024-12-17 PROCEDURE — 96372 THER/PROPH/DIAG INJ SC/IM: CPT

## 2024-12-18 ENCOUNTER — INFUSION (OUTPATIENT)
Dept: ONCOLOGY | Facility: HOSPITAL | Age: 69
End: 2024-12-18
Payer: MEDICARE

## 2024-12-18 DIAGNOSIS — C61 PROSTATE CANCER METASTATIC TO MULTIPLE SITES: Primary | ICD-10-CM

## 2024-12-18 LAB
ALBUMIN SERPL-MCNC: 4 G/DL (ref 3.5–5.2)
ALBUMIN/GLOB SERPL: 1.7 G/DL
ALP SERPL-CCNC: 75 U/L (ref 39–117)
ALT SERPL-CCNC: 15 U/L (ref 1–41)
AST SERPL-CCNC: 12 U/L (ref 1–40)
BASOPHILS # BLD AUTO: 0.05 10*3/MM3 (ref 0–0.2)
BASOPHILS NFR BLD AUTO: 0.6 % (ref 0–1.5)
BILIRUB SERPL-MCNC: 0.2 MG/DL (ref 0–1.2)
BUN SERPL-MCNC: 31 MG/DL (ref 8–23)
BUN/CREAT SERPL: 21.8 (ref 7–25)
CALCIUM SERPL-MCNC: 9.1 MG/DL (ref 8.6–10.5)
CHLORIDE SERPL-SCNC: 108 MMOL/L (ref 98–107)
CO2 SERPL-SCNC: 25.2 MMOL/L (ref 22–29)
CREAT SERPL-MCNC: 1.42 MG/DL (ref 0.76–1.27)
EGFRCR SERPLBLD CKD-EPI 2021: 53.5 ML/MIN/1.73
EOSINOPHIL # BLD AUTO: 0.22 10*3/MM3 (ref 0–0.4)
EOSINOPHIL NFR BLD AUTO: 2.8 % (ref 0.3–6.2)
ERYTHROCYTE [DISTWIDTH] IN BLOOD BY AUTOMATED COUNT: 12.8 % (ref 12.3–15.4)
GLOBULIN SER CALC-MCNC: 2.4 GM/DL
GLUCOSE SERPL-MCNC: 114 MG/DL (ref 65–99)
HCT VFR BLD AUTO: 39.6 % (ref 37.5–51)
HGB BLD-MCNC: 12.6 G/DL (ref 13–17.7)
IMM GRANULOCYTES # BLD AUTO: 0.04 10*3/MM3 (ref 0–0.05)
IMM GRANULOCYTES NFR BLD AUTO: 0.5 % (ref 0–0.5)
LYMPHOCYTES # BLD AUTO: 2.4 10*3/MM3 (ref 0.7–3.1)
LYMPHOCYTES NFR BLD AUTO: 30.8 % (ref 19.6–45.3)
MAGNESIUM SERPL-MCNC: 2.4 MG/DL (ref 1.6–2.4)
MCH RBC QN AUTO: 28.8 PG (ref 26.6–33)
MCHC RBC AUTO-ENTMCNC: 31.8 G/DL (ref 31.5–35.7)
MCV RBC AUTO: 90.6 FL (ref 79–97)
MONOCYTES # BLD AUTO: 0.63 10*3/MM3 (ref 0.1–0.9)
MONOCYTES NFR BLD AUTO: 8.1 % (ref 5–12)
NEUTROPHILS # BLD AUTO: 4.46 10*3/MM3 (ref 1.7–7)
NEUTROPHILS NFR BLD AUTO: 57.2 % (ref 42.7–76)
NRBC BLD AUTO-RTO: 0 /100 WBC (ref 0–0.2)
PHOSPHATE SERPL-MCNC: 3.2 MG/DL (ref 2.5–4.5)
PLATELET # BLD AUTO: 252 10*3/MM3 (ref 140–450)
POTASSIUM SERPL-SCNC: 4.2 MMOL/L (ref 3.5–5.2)
PROT SERPL-MCNC: 6.4 G/DL (ref 6–8.5)
PSA SERPL-MCNC: 0.42 NG/ML (ref 0–4)
RBC # BLD AUTO: 4.37 10*6/MM3 (ref 4.14–5.8)
SODIUM SERPL-SCNC: 141 MMOL/L (ref 136–145)
WBC # BLD AUTO: 7.8 10*3/MM3 (ref 3.4–10.8)

## 2024-12-18 PROCEDURE — 25010000002 DENOSUMAB 120 MG/1.7ML SOLUTION: Performed by: NURSE PRACTITIONER

## 2024-12-18 PROCEDURE — 96372 THER/PROPH/DIAG INJ SC/IM: CPT

## 2024-12-18 RX ADMIN — DENOSUMAB 120 MG: 120 INJECTION SUBCUTANEOUS at 10:05

## 2025-01-10 ENCOUNTER — SPECIALTY PHARMACY (OUTPATIENT)
Facility: HOSPITAL | Age: 70
End: 2025-01-10
Payer: MEDICARE

## 2025-01-10 DIAGNOSIS — C61 PROSTATE CANCER METASTATIC TO MULTIPLE SITES: ICD-10-CM

## 2025-01-10 RX ORDER — ABIRATERONE 500 MG/1
1000 TABLET ORAL DAILY
Qty: 60 TABLET | Refills: 11 | Status: SHIPPED | OUTPATIENT
Start: 2025-01-10

## 2025-01-10 NOTE — PROGRESS NOTES
Specialty Pharmacy Refill Coordination Note     Chris is a 69 y.o. male contacted today regarding refills of  Abiraterone 1000mg PO QD Of specialty medication(s).    Reviewed and verified with patient: YES: Has a month supply on hand because of dose stacking from refill history.     Specialty medication(s) and dose(s) confirmed: yes    Refill Questions      Flowsheet Row Most Recent Value   Changes to allergies? No   Changes to medications? No   New conditions or infections since last clinic visit No   Unplanned office visit, urgent care, ED, or hospital admission in the last 4 weeks  No   How does patient/caregiver feel medication is working? Good   Financial problems or insurance changes  No   Since the previous refill, were any specialty medication doses or scheduled injections missed or delayed?  No   Does this patient require a clinical escalation to a pharmacist? Yes  [patient has been dose stacking based off of refills. Has a month supply on hand.]                       Follow-up: 30 day(s)     Catalina Montes De Oca, Pharmacy Technician  Specialty Pharmacy Technician

## 2025-01-10 NOTE — PROGRESS NOTES
Re: Refills of Oral Specialty Medication - Zytiga (abiraterone acetate)    Drug-Drug Interactions: The current medication list was reviewed and there are no relevant drug-drug interactions with the specialty medication.  Medication Allergies: The patient has NKDA  Review of Labs/Dose Adjustments: NO DOSE CHANGE - I reviewed the most recent note and labs and the patient will continue without any dose changes.  I sent refills as described below.    Drug: Zytiga (abiraterone acetate)  Strength: 500 mg  Directions: Take 2 tablets by mouth daily  Quantity: 60  Refills: 11  Pharmacy prescription sent to: [facility]    Ceasar SanchezD, BCOP  Clinical Oncology Pharmacy Specialist  Phone: [phone]      1/10/2025  10:36 EST

## 2025-01-14 ENCOUNTER — LAB (OUTPATIENT)
Dept: ONCOLOGY | Facility: HOSPITAL | Age: 70
End: 2025-01-14
Payer: MEDICARE

## 2025-01-14 VITALS
HEART RATE: 56 BPM | RESPIRATION RATE: 18 BRPM | DIASTOLIC BLOOD PRESSURE: 73 MMHG | BODY MASS INDEX: 35.98 KG/M2 | WEIGHT: 258 LBS | SYSTOLIC BLOOD PRESSURE: 146 MMHG

## 2025-01-14 DIAGNOSIS — C61 PROSTATE CANCER METASTATIC TO MULTIPLE SITES: ICD-10-CM

## 2025-01-14 PROCEDURE — 36415 COLL VENOUS BLD VENIPUNCTURE: CPT

## 2025-01-15 ENCOUNTER — INFUSION (OUTPATIENT)
Dept: ONCOLOGY | Facility: HOSPITAL | Age: 70
End: 2025-01-15
Payer: MEDICARE

## 2025-01-15 ENCOUNTER — SPECIALTY PHARMACY (OUTPATIENT)
Facility: HOSPITAL | Age: 70
End: 2025-01-15
Payer: MEDICARE

## 2025-01-15 ENCOUNTER — OFFICE VISIT (OUTPATIENT)
Dept: ONCOLOGY | Facility: CLINIC | Age: 70
End: 2025-01-15
Payer: MEDICARE

## 2025-01-15 VITALS
RESPIRATION RATE: 18 BRPM | BODY MASS INDEX: 36.12 KG/M2 | HEIGHT: 71 IN | OXYGEN SATURATION: 96 % | DIASTOLIC BLOOD PRESSURE: 95 MMHG | HEART RATE: 75 BPM | SYSTOLIC BLOOD PRESSURE: 157 MMHG | WEIGHT: 258 LBS | TEMPERATURE: 98.6 F

## 2025-01-15 DIAGNOSIS — C61 PROSTATE CANCER METASTATIC TO MULTIPLE SITES: Primary | ICD-10-CM

## 2025-01-15 LAB
ALBUMIN SERPL-MCNC: 4 G/DL (ref 3.5–5.2)
ALBUMIN/GLOB SERPL: 1.4 G/DL
ALP SERPL-CCNC: 84 U/L (ref 39–117)
ALT SERPL-CCNC: 12 U/L (ref 1–41)
AST SERPL-CCNC: 17 U/L (ref 1–40)
BASOPHILS # BLD AUTO: 0.1 10*3/MM3 (ref 0–0.2)
BASOPHILS NFR BLD AUTO: 1.1 % (ref 0–1.5)
BILIRUB SERPL-MCNC: 0.4 MG/DL (ref 0–1.2)
BUN SERPL-MCNC: 22 MG/DL (ref 8–23)
BUN/CREAT SERPL: 15.8 (ref 7–25)
CALCIUM SERPL-MCNC: 9 MG/DL (ref 8.6–10.5)
CHLORIDE SERPL-SCNC: 107 MMOL/L (ref 98–107)
CO2 SERPL-SCNC: 21.7 MMOL/L (ref 22–29)
CREAT SERPL-MCNC: 1.39 MG/DL (ref 0.76–1.27)
EGFRCR SERPLBLD CKD-EPI 2021: 54.9 ML/MIN/1.73
EOSINOPHIL # BLD AUTO: 0.27 10*3/MM3 (ref 0–0.4)
EOSINOPHIL NFR BLD AUTO: 2.9 % (ref 0.3–6.2)
ERYTHROCYTE [DISTWIDTH] IN BLOOD BY AUTOMATED COUNT: 12.5 % (ref 12.3–15.4)
GLOBULIN SER CALC-MCNC: 2.9 GM/DL
GLUCOSE SERPL-MCNC: 130 MG/DL (ref 65–99)
HCT VFR BLD AUTO: 40.1 % (ref 37.5–51)
HGB BLD-MCNC: 12.9 G/DL (ref 13–17.7)
IMM GRANULOCYTES # BLD AUTO: 0.04 10*3/MM3 (ref 0–0.05)
IMM GRANULOCYTES NFR BLD AUTO: 0.4 % (ref 0–0.5)
LYMPHOCYTES # BLD AUTO: 2.16 10*3/MM3 (ref 0.7–3.1)
LYMPHOCYTES NFR BLD AUTO: 22.8 % (ref 19.6–45.3)
MAGNESIUM SERPL-MCNC: 2.1 MG/DL (ref 1.6–2.4)
MCH RBC QN AUTO: 28.4 PG (ref 26.6–33)
MCHC RBC AUTO-ENTMCNC: 32.2 G/DL (ref 31.5–35.7)
MCV RBC AUTO: 88.3 FL (ref 79–97)
MONOCYTES # BLD AUTO: 0.78 10*3/MM3 (ref 0.1–0.9)
MONOCYTES NFR BLD AUTO: 8.2 % (ref 5–12)
NEUTROPHILS # BLD AUTO: 6.12 10*3/MM3 (ref 1.7–7)
NEUTROPHILS NFR BLD AUTO: 64.6 % (ref 42.7–76)
NRBC BLD AUTO-RTO: 0 /100 WBC (ref 0–0.2)
PHOSPHATE SERPL-MCNC: 2.9 MG/DL (ref 2.5–4.5)
PLATELET # BLD AUTO: 269 10*3/MM3 (ref 140–450)
POTASSIUM SERPL-SCNC: 3.9 MMOL/L (ref 3.5–5.2)
PROT SERPL-MCNC: 6.9 G/DL (ref 6–8.5)
PSA SERPL-MCNC: 0.35 NG/ML (ref 0–4)
RBC # BLD AUTO: 4.54 10*6/MM3 (ref 4.14–5.8)
SODIUM SERPL-SCNC: 143 MMOL/L (ref 136–145)
WBC # BLD AUTO: 9.47 10*3/MM3 (ref 3.4–10.8)

## 2025-01-15 PROCEDURE — 99213 OFFICE O/P EST LOW 20 MIN: CPT | Performed by: NURSE PRACTITIONER

## 2025-01-15 PROCEDURE — 1126F AMNT PAIN NOTED NONE PRSNT: CPT | Performed by: NURSE PRACTITIONER

## 2025-01-15 PROCEDURE — 96372 THER/PROPH/DIAG INJ SC/IM: CPT

## 2025-01-15 PROCEDURE — 1160F RVW MEDS BY RX/DR IN RCRD: CPT | Performed by: NURSE PRACTITIONER

## 2025-01-15 PROCEDURE — 1159F MED LIST DOCD IN RCRD: CPT | Performed by: NURSE PRACTITIONER

## 2025-01-15 PROCEDURE — 25010000002 DENOSUMAB 120 MG/1.7ML SOLUTION: Performed by: NURSE PRACTITIONER

## 2025-01-15 RX ADMIN — DENOSUMAB 120 MG: 120 INJECTION SUBCUTANEOUS at 10:35

## 2025-01-15 NOTE — PROGRESS NOTES
CHIEF COMPLAINT: Intermittent right lower back/flank pain    Problem List:  Oncology/Hematology History Overview Note   1.  Prostate cancer clinical T1c and 2M1B stage IVb treated with 4 cycles of Taxotere, stopped due to syncope with negative cardiac work-up, with marked drop in PSA of 18.6 from over 900 at baseline, continued on Zytiga and prednisone.  2.  Urinary retention with Goodwin in place 1/24/2022.  On finasteride 1/24/2022.  3.  Covid 19 December 2021  4.  Hypertension  5.  Stage 3a chronic kidney disease    Oncology history timeline:  -11/29/2021  with symptoms of urinary retention.  -2/15/2022 prostate biopsy adenocarcinoma grade group 5 and 3 out of 12 cores, grade group 4 in 1 out of 12 cores, grade group 3 in 8 out of 12 cores.  -2/24/2022 CT chest abdomen pelvis and total body bone scan shows left third rib, right acetabulum, periaortic and retroperitoneal kizzy metastases complaining of pain in the left chest and right hip.  Also possible sclerotic left 10th rib on CT.  Spleen mildly enlarged 13.8 cm.  Hepatic steatosis.  Small liver cyst.  Fat stranding in the periaortic and mesenteric region consistent with reactive/inflammatory stranding.  Multiple enlarged periaortic and retroperitoneal nodes and lymphadenopathy largest 4.9 cm.  CT chest describes tiny sclerotic foci in left eighth rib and right lateral seventh rib and T1.  Multiple small mediastinal and bilateral axillary nodes seen with small hypodense left thyroid nodule.  Creatinine 1.7.  -3/4/2022 office note Dr. Rolando Matute: Patient was seen after his elevated PSA by Dr. Vera and prostate biopsy scheduled.  However, he developed COVID-19 and this was canceled and the patient did not reschedule due to lack of insurance.  Saw Dr. Matute back on 1/24/2022 with plans to get staging CTs and bone scan with results as outlined above.  Started Casodex and to return on 3/11/2022 for Lupron.  Referral made to medical oncology for  other additional castrate naïve prostate cancer therapies.  TURP planned for urinary retention symptoms.    -3/15/2022 [facility] medical oncology initial consultation: I reviewed the above data with the patient.  With his fairly bulky retroperitoneal adenopathy and bone metastases including extra axial metastases, he would fit the data from the CHAARTED trial for which Taxotere x6 followed by Zytiga prednisone along with the planned Lupron already being planned by Dr. Matute would be reasonable.  Equally reasonable in terms of comparisons with bicalutamide and Lupron would be Zytiga prednisone plus Lupron or Xtandi plus Lupron or apalutamide plus Lupron.  None of these have been compared head-to-head but all have beaten combined androgen deprivation therapy with bicalutamide and Lupron.  At the age of 67 and in order to have as many bullets as possible down the road and hence saving some of the hormone blockade options for later and using chemotherapy when he is younger and healthier for a more time-limited.,  He has opted for the Taxotere x6 followed by Zytiga and prednisone.  We will also give him Xgeva to improve bone health and given metastatic disease, as with all metastatic prostate cancer patients, he needs genetic counseling both for his sake as well as his family's.  If he were to have BRCA1 or other such  mutations, then that also opens up the options for PARP inhibitors etc. down the road.  He has his TURP scheduled for 2/24/2022 and we will start treatment a couple of weeks after that and he will get chemo preparation visit in the meantime and I will get capital surgeons to place a port.  We will check his PSA after his TURP when he sees my nurse practitioner back early April for the start of chemotherapy and repeat the PSA and CT chest abdomen pelvis and bone scan after the Taxotere is complete.    -3/31/2022 chemotherapy education and needs assessment completed    -4/7/2022 began docetaxel and  Xgeva.  .0.  We will do his Xgeva every 6 weeks to coordinate with his docetaxel infusions.    -4/19/2022 patient stopped Casodex    -5/17/2022 patient reported seeing his PCP for an abscess in his suprapubic area.  Placed on clindamycin, will delay treatment 1 week.    -5/25/2022 PSA 34.3  -5/26/2022 [facility] [place], operated by [facility] clinic follow-up: Chris has an abscess in the suprapubic area, it has improved on antibiotic therapy but I am concerned if we treat him it will just flare back up unless it is drained.  I will get him to Dr. Miller who placed his port for I&D and culture.  He is on clindamycin and states he completes course of treatment tomorrow.  I will delay his treatment with Taxotere 1 more week.  We will give his Xgeva today.  I will see him back in 1 week for follow-up to assess whether or not we can resume his Taxotere.  His PSA has dropped nicely with treatment thus far, PSA yesterday was 34.3 which is down from a PSA of 259.0 when we started treatment, it has been as high as 911 in November.    -6/2/2022 [facility] [place] operated by [facility] clinic follow-up: Chris went to see Dr. Miller to have his abscess lanced however apparently there was a long wait and he left without being seen.  He did call his PCP and was given 5 more days of clindamycin and the abscess now seems to have resolved.  We discussed today that he is at risk for recurrence of infection due to immunocompromise state with chemotherapy.  He will notify us if he has any return of his abscess.  He does have intertrigo under his panniculus, I have advised him to keep this area dry, to apply topical drying/antifungal powders.  Also recommended looser clothing.  His counts are adequate to continue therapy, we will resume Taxotere today with cycle #3.  We will repeat restaging scans after 6 courses.  We are doing Xgeva every 6 weeks to coordinate with his Taxotere.  Once he finishes Taxotere we can then go back to every 4 weeks.  His next Xgeva is not due until  7/14/2022.  His calcium is running a little low, he is going to  calcium supplement.    -6/23/2022 [facility] clinic follow-up: Chris overall is doing well on treatment with Taxotere.  Labs reviewed from yesterday and are unremarkable.  We will continue treatment today unchanged.  His suprapubic abscess resolved on clindamycin.  He will continue to use drying powder/antifungal powder under his panniculus for intertrigo.  Today will be cycle #4 of a planned 6 courses of Taxotere.  He is also on Xgeva, next dose due 7/14/2022, we are doing this every 6 weeks for now to line up with his chemotherapy, can go back to every 4 weeks after he finishes Taxotere.  Calcium from CMP yesterday was normal.    -7/13/2022 [facility] clinic follow-up: Mr. Morfin has had issues around day 3 after each treatment ranging from chest pain after his first treatment for which he did not seek medical attention, fatigue after the next few treatments, but 3 days after his most recent treatment on 6/23/2022 he had a syncopal episode at home and once again did not seek medical attention.  I discussed with him that this was not acceptable, if he has occurrences like this someone needs to call 911, it is difficult to figure out what is going on after the fact, the best time to work this up would be during the occurrence.  For now we will get labs today with CBC, CMP, PSA.  I will refer him to cardiology for further evaluation.  We will hold Taxotere most likely, I will see him tomorrow to go over his lab results.  I will also repeat his restaging scans with CT chest, abdomen and pelvis and will include CT of the head in light of his syncopal episode.      -7/13/2022 PSA 18.6    -7/14/2022 [facility] clinic follow-up: Mr. Morfin returns today to review his labs from yesterday.  Labs are unremarkable.  PSA down to 18.6 from a high of 259.0 in April prior to starting treatment.  CBC and CMP unremarkable, magnesium is normal  at 2.3, phosphorus slightly low at 2.4.  We will hold therapy for now in light of his syncopal episode on day 3 after his last treatment and prior episodes with chest pain and severe fatigue that all occurred on day 3 after his Taxotere.  We will restage him with CT head, chest, abdomen and pelvis (I am including the head in light of his syncopal episode).  I have also referred him to cardiology, he states that he received a call from them about making an appointment however he wanted to talk to us today first.  I emphasized the importance to him of making and keeping that appointment and he states understanding.  I also once again emphasized the requirement to seek emergency medical attention if he has any episodes in the future such as chest pain or syncopal events.  Whether or not we try and complete Taxotere with 2 more courses or move to the next line of therapy will be decided when he returns to discuss with Dr. Goodson and review his restaging scans.    -7/15/2022 saw Dr. Diaz cardiologist.  For syncope he ordered echocardiogram and 48-hour Holter monitor.    -7/15/2022 Holter monitor relatively benign.    -7/22/2022 CT brain with and without contrast negative.  CT chest abdomen and pelvis shows some nonspecific cecal wall thickening.  No progression in the chest.  T1 and ribs stable.  Decrease in multiple retroperitoneal and pelvic nodes.  Slight increase in right acetabular sclerotic lesion.  Persistent but improved circumferential bladder wall thickening.  Total body bone scan shows stable left third rib and no evidence of new bone metastases.    -7/19/2022 ejection fraction 65% with grade 1 diastolic dysfunction.    -7/26/2022 [facility] clinic follow-up: Given presyncopal symptoms occurred 3 days after Taxotere and has not otherwise occurred any other time and his cardiology work-up is fairly unremarkable and his disease is under good control as witnessed by the report above of the CTs of head  chest abdomen pelvis and bone scan, I will hold cycle 5 and 6 of Taxotere and continue 1 now with Zytiga and prednisone.  With reference to the coincidental cecal wall thickening found on CT, we will get him to [facility] surgeons for colonoscopy.  He will see my nurse practitioner back in August for next cycle of Abiraterone prednisone.  He will continue his Lupron with Dr. Matute.  We will continue his Xgeva.  We will repeat his CT chest abdomen pelvis and bone scan again in 3 months.  He will see my nurse practitioner in the interim.    -8/23/2022 [facility] oncology clinic follow-up: No further presyncopal symptoms.  Feeling great other than for hot flashes.  Did commend for now we will continue on with his Zytiga prednisone and he will get his Xgeva today based on labs from 8/10/2022.  He opted out of getting the colonoscopy that I recommended and he will not change his mind.  He will continue getting the Lupron with Dr. Matute and I asked him to give the date of this appointment to my nurse when he sees her back in a month.  We will give his Xgeva today.  We will get CT chest abdomen pelvis and total body bone scan towards the middle to end of October.  Presently other than for the hot flashes feels great.    -9/20/2022 PSA 8.320    -9/22/2022 [facility] clinic follow-up: Chris is doing well on Zytiga, prednisone along with Xgeva.  Labs reviewed from 9/20/2022 are unremarkable, CBC was normal, CMP with creatinine of 1.34 otherwise normal, this is stable from his baseline.  PSA stable at 8.320.  He received Lupron recently with Dr. Matute, he thinks last week or so, states his next appointment is in February.  He will continue treatment unchanged.  We will repeat restaging CT chest, abdomen and pelvis and total body bone scan in October prior to return and I have ordered those today.  We will also repeat his PSA.    -9/22/2022 CT chest abdomen pelvisCompared to July 2022 [facility] showed no  evidence of disease progression in the chest with no substantial sclerotic bony lesions and decrease in size of retroperitoneal and pelvic nodes with persistent cecal wall thickening and suboptimal bladder distention.  Total body bone scan showed decrease in previously seen uptake in the left third rib with diffuse sclerosis representing treatment response with no new foci.    -10/18/2022 [facility] medical oncology follow-up: I reviewed bone scan and CT reports as above with no evidence of progression.  Tolerating Zytiga prednisone and Xgeva.  Getting Lupron regularly with Dr. Matute next appointment coming in February.  We will repeat his CT chest abdomen pelvis and total body bone scan in April.  He will follow with my nurse practitioner in the interim.    -1/10/2023 PSA 3.450  -1/11/2023 [facility] clinic follow-up: Mr. Morfin continues to do well on current therapy with Zytiga, prednisone and Xgeva with no unusual side effects.  He has no new worrisome symptoms.  He is due for his next Lupron injection in February with Dr. Matute.  His PSA continues to trend downward, current PSA from yesterday was 3.450.  We will continue therapy unchanged, he will receive Xgeva today.  Has had no hypocalcemia, his CMP, phosphorus and magnesium are all normal.  We will repeat restaging scans in April.  -3/7/2023 PSA 2.460  -3/8/2023 [facility] clinic follow-up:  Mr. Morfin is doing well.  He is tolerating therapy with Zytiga, prednisone and Xgeva with no significant side effects.  He has hot flashes but these are tolerable.  He had Lupron injection 2/24/2023 with Dr. Matute.  His PSA continues to decline, current PSA 2.460.  He will continue therapy unchanged.  We will repeat restaging scans late April prior to return in 2 months and I have ordered those today.  He is working with his PCP Rodrigo Ram on his hypertension.  His b/p today was 160/88.  He was to have increased his losartan but I do not think he has  "done that yet as he said \"I still have some of my old prescription left\".  -4/4/2023 PSA 2.55  - 4/26/2023 CT chest abdomen pelvis [place] comparison 10/10/2022 showed stable left third rib, T1, left fourth and eighth rib, right seventh rib.  Probable liver cyst.  Few diverticuli.  Mild further decrease in a few of the previously enlarged nodes.  Left external iliac 12 mm compared to 16 mm.  Right common iliac 7 mm stable.  Lower left periaortic 8 mm previously 13 mm.  No new lytic or blastic osseous lesions.  Total body bone scan comparison 10/10/2022, 7/22/2022, and CT 4/26/2023.  No new sites of increased uptake.  1 focal left third rib hotspot consistent with bone metastasis.    -5/2/2023 [facility]ology follow-up: I reviewed interval notes since I last saw him from my nurse practitioner as outlined above in interval images and reports thereof from [facility] that shows no new sites of bone metastasis and no kizzy or visceral progression.  PSA stabilized around 2.5.  In the absence of symptomatic or radiographic progression, I would be unlikely to change therapy just on the basis of a PSA rise and for now the PSA is stable.  We will continue Zytiga prednisone and Xgeva and he will follow-up with my nurse practitioner 5/30/2023 with repeat CTs and bone scan in 6 months.  I asked him to check with his primary care today regarding his systolic in the 170s.    -5/31/2023 [facility] follow-up: Chris continues to do well on current therapy with Zytiga, prednisone and Xgeva along with Lupron that he receives with Dr. Matute.  His PSA remains stable, it was lower this month compared to last month, current PSA 1.940.  Continues to deal with hypertension, on arrival today his blood pressure was 210/92 however this was using a wrist cuff, when I rechecked it manually his blood pressure was 160/90.  Still has room for improvement despite recent increase in his losartan to 100 mg daily.  He " will follow-up with Dr. Ram for further management.  We will continue therapy unchanged.  We will repeat restaging scans in October.    -7/26/2023 [facility] follow-up: Chris overall continues to do well on current therapy with Zytiga, prednisone and Xgeva.  He receives Lupron every 6 months with Dr. Matute and states that is scheduled for August.  PSA from yesterday is pending, PSA on 6/27/2023 was 1.590 which was continuing to decline, in February it was 3.250.  His creatinine this past month has bumped up.  He feels he is staying hydrated but I encouraged him to increase his fluid intake.  I discussed with him that this could be from his hypertension, some of his medications.  His blood pressure today is 165/92.  He has an appointment with his primary care provider Dr. Teri Ram next week and can further discuss with her.  No adjustment needed for Xgeva.  I refilled his prednisone today.  I will see him back in 1 month for follow-up with continued monitoring of his labs.  He may end up needing to see nephrology.  We will repeat restaging scans in October.    -9/29/2023 PSA 1.810  -8/23/2023 [facility] clinic follow-up: Chris continues on therapy with Zytiga, prednisone and Xgeva, he also receives Lupron every 6 months with Dr. Matute with most recent given on 8/7/2023.  He is scheduled for a cystoscopy in a few weeks as his last few UAs per the patient's report had microscopic hematuria, he has had no steve hematuria.  His creatinine continues to remain elevated at 1.82, BUN is 40, GFR is 40.  I will get him to nephrology for further evaluation.  Blood pressure today was fairly good at 161/86, he continues to follow with Dr. Ram for management.  PSA on 7/25/2023 was up slightly from prior month, PSA in July was 2.140, in June it was 1.590, PSA from yesterday is pending.  I plan on repeating restaging CT scans and bone scans in October however if his PSA is up significantly  then I will do sooner.  I will see him back for follow-up in 1 month.  -8/23/2024 PSA 1.860    -9/20/2023 [facility] clinic follow-up: Chris is tolerating treatment with Zytiga, prednisone and Xgeva.  He is up-to-date with his Lupron injection, last received in August with Dr. Matute.  He had cystoscopy on 9/8/2023 that was normal. I referred him to nephrology when I saw him last in August as his creatinine has been increasing, creatinine yesterday was a little better 1.51, GFR is 50, creatinine clearance 63.8.  He is still awaiting an appointment with nephrology, per our  it will be early November.  We will repeat his restaging scans prior to return and I will do his CT scans without contrast in light of his new renal insufficiency.  We will also get total body bone scan and I have ordered those today.  His PSA yesterday was 1.810.    -10/9/2023 CT chest abdomen Pelvis without contrast compared to April 2023 shows slightly prominent pelvic lymph nodes left external iliac 8 mm compared to prior study.  Right internal iliac heterogenous 19 mm compared to 11 mm prior.  Stable sclerotic bone lesions and no new lesions.  Stable bone scan with no new sites of disease    -10/17/2023 [facility] clinic follow-up: With elevated creatinine, CTs done without contrast showed very slight increased prominence by few millimeters of some internal iliac and external iliac nodes for which PSMA PET could be done but for now I will check his PSA and have him see my nurse practitioner back in a week and unless the PSA is significantly rising I would continue the Zytiga, prednisone, Xgeva and February dose of Lupron with Dr. Matute and make sure he follows with nephrology.  If his PSA is significantly rising then my nurse practitioner will order PSMA PET.    -10/17/2023 PSA 1.5  -11/14/2023 PSA 1.510  -11/15/2023 [facility] clinic follow-up: Mr. Ferrara continues to do well on current therapy with Zytiga,  prednisone, Xgeva and Lupron that he receives with Dr. Matute.  His PSA is stable at 1.5.  Would not change therapy for now, we will plan on repeating restaging scans in February unless PSA starts to rise or he has new concerns.  Overall he feels good.  He is now established with nephrology and will continue to follow with them regarding his renal insufficiency, I reviewed note from consultation with Dr. Armstrong 11/6/2023.    -1/9/2024 PSA 1.050    -1/10/2024 [facility] clinic follow-up: Chris overall continues to tolerate current therapy with Zytiga, prednisone, Xgeva and Lupron.  He states his next Lupron injection with Dr. Matute is due late February.  His PSA is stable at 1.050. Creatinine stable at 1.64, he is following with nephrology, he has a an appointment with Dr. Armstrong in February for follow-up.  Hypertension being managed by his primary care provider, target systolic ideally 120s-140s, today it is running a little high at 172/90, yesterday when he was here for labs it was 138/80.  We will continue therapy unchanged with plans to repeat restaging scans in April.    -2/6/2024 PSA 0.753    -2/7/2024 [facility] oncology clinic follow-up: Chris continues to do well on current therapy with Zytiga, prednisone, Xgeva and Lupron.  He is up-to-date on Lupron injection next due later this month with Dr. Matute.  His PSA continues to slowly decrease, current PSA is 0.753.  He has no new worrisome symptoms.  We will continue therapy unchanged, I have ordered restaging scans for late March prior to his return in April. He is following with nephrology for his renal insufficiency, his creatinine yesterday was good at 1.21.  I will do his CT scans with contrast unless something changes in the interim. He follows closely with Dr. Ram for management of his hypertension. I reviewed notes from his last office visit with her earlier this month on 2/1/2024, also reviewed labs in detail with the patient today.   All questions were answered to his satisfaction.    -3/5/2024 PSA slowly declining 0.73.  Creatinine fluctuating.  1.69 with GFR 43 otherwise unremarkable CMP.  Phosphorus low 2.1    -4/8/2024 CT chest, abdomen and pelvis shows stable sclerotic lesions in the thoracic spine and bilateral ribs and right acetabulum.  Stable appearance of pelvic lymph nodes.  No new sites concerning for progression of disease.  Total body bone scan shows stable diffuse increased uptake of the left third rib correlates with diffuse sclerosis and enlargement on CT, no new sites of active osseous lesions.  -4/30/2024 PSA 0.713  -5/1/2024 [facility] clinic follow-up: Chris continues to do well on current therapy with Zytiga, prednisone, Xgeva and Lupron.  He receives his Lupron every 6 months with Dr. Matute, last administered 2/5/2024.  Current CT scans and bone scan showed no progression of disease, PSA is stable at 0.713.  His CBC is unremarkable, CMP shows creatinine stable at 1.50 otherwise normal.  He does follow with nephrology.  We will continue treatment unchanged.  I will see him back in 2 months for follow-up.  We will repeat restaging scans in 6 months unless he has any new symptoms or significant change in his PSA.    -6/25/2024 PSA 0.623  -6/26/2024 [facility] clinic follow-up: Chris overall continues to tolerate current therapy with Zytiga, prednisone, Xgeva and Lupron.  We did call to confirm his next Lupron injection and that is scheduled for late July with Dr. Matute.  His PSA for the most part is stable at 0.623, we will continue to monitor.  He has no new worrisome symptoms.  I did discuss with him today his creatinine which has increased to 1.86, BUN was 32.  Reminded him to be sure and stay hydrated.  He does follow with nephrology and his next appointment is in August.  He is on Xgeva however there is no indication for any adjustment, his creatinine clearance is greater than 30.  Blood pressure today  was under good control at 130/78.  We will continue treatment unchanged.  I will see him back in 1 month for follow-up.  Plan to repeat restaging scans in October unless his PSA rises or he has worrisome symptoms.    -7/23/2024 PSA 0.606    -9/17/2024 PSA 0.455  -9/18/2024 [facility] clinic follow-up: Chris is doing well overall.  Tolerates therapy with Zytiga, prednisone, Xgeva and Lupron.  He last received Lupron in August with Dr. Matute.  His PSA had been slowly creeping up however it has now went back down and is currently 0.455.  He will continue treatment unchanged.  We will repeat CT chest, abdomen and pelvis prior to return and I will do that without contrast in light of his renal insufficiency.  Current creatinine is stable at 1.5, he does follow with nephrology.  We will also repeat total body bone scan.  We will see him back in 1 months for follow-up and sooner if any concerns.  I did discuss with him that he can take Tylenol if needed or use topical over-the-counter pain relief ointment such as Biofreeze etc. for his back pain but thus far it has not required any intervention, he does not like to take pain medications.    -10/14/2024 CT chest abdomen pelvis without contrast [facility] compared to August 2024 shows unchanged sclerotic T1 vertebral body with expansile sclerotic left third rib without fracture and other small sclerotic nodules all unchanged.  Right external iliac node 20 mm increased from 7 mm on prior exam with internal iliac node 15 mm unchanged.  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    -10/22/2024 [facility] follow-up: Feeling great.  While he has 1 slightly larger node, given that his PSA has not been rapidly rising and all other disease is stable I will have him continue Zytiga prednisone Xgeva and he gets his next Lupron in February with Dr. Matute.  He will see my nurse  practitioner and she will order repeat CT chest abdomen pelvis and bone scan for January and will follow-up with her in January to go over those results.  If either his PSA or the scans show progression beyond just the single slightly larger iliac node, she will order a PSMA PET and then get him back to me.  If the PSA is stable and the subsequent scans are stable then we will just continue his current regimen with quarterly imaging or sooner as symptoms or PSA dictate.    -11/19/2024 PSA 0.453  -1/14/2025 PSA 0.351     Prostate cancer metastatic to multiple sites   3/15/2022 Initial Diagnosis    Prostate cancer metastatic to multiple sites (HCC)     3/15/2022 Cancer Staged    Staging form: Prostate, AJCC 8th Edition  - Clinical: Stage IVB (cT1c, cN1, cM1b, Grade Group: 5) - Signed by Fritz Goodson MD on 3/15/2022     4/7/2022 - 6/23/2022 Chemotherapy    Stopped after cycle 4 6/23/2022 due to syncope 3 days post infusions with negative cardiac work-up  OP PROSTATE DOCEtaxel     4/7/2022 -  Chemotherapy    OP SUPPORTIVE Denosumab (Xgeva) Q28D     7/26/2022 -  Chemotherapy    OP PROSTATE Abiraterone / PredniSONE           HISTORY OF PRESENT ILLNESS:  The patient is a 69 y.o. male, here for follow up on management of metastatic prostate cancer.  Chris reports overall he has been doing well.  He was unable to do his scans last week due to the weather and they have been rescheduled in February.  No change in his health since we saw him last.  Continues to have intermittent pain in his right lower back/flank area, this is not new and not worsening over time.  He has had no fevers or chills.  The pain he notices more in bed when he is lying down on his right side.  It is a sharp pain, it happens quickly and goes away without intervention.  He had Lupron earlier this week with Dr. Matute.    Past Medical History:   Diagnosis Date    Cancer     Prostate cancer      Past Surgical History:   Procedure Laterality Date     "PROSTATE BIOPSY  02/2022    dr. sue       No Known Allergies    Family History and Social History reviewed and changed as necessary    REVIEW OF SYSTEM:   Continues to have occasional right lower back/flank pain but no new somatic concerns    PHYSICAL EXAM:  Well-developed, well-nourished appearing male in no distress  Respirations regular and unlabored    Vitals:    01/15/25 1001   BP: 157/95   Pulse: 75   Resp: 18   Temp: 98.6 °F (37 °C)   SpO2: 96%   Weight: 117 kg (258 lb)   Height: 180.3 cm (71\")     Vitals:    01/15/25 1001   PainSc: 0-No pain          ECOG score: 0           Vitals reviewed.  Labs reviewed    ECOG: (0) Fully Active - Able to Carry On All Pre-disease Performance Without Restriction    Lab Results   Component Value Date    HGB 12.9 (L) 01/14/2025    HCT 40.1 01/14/2025    MCV 88.3 01/14/2025     01/14/2025    WBC 9.47 01/14/2025    NEUTROABS 6.12 01/14/2025    LYMPHSABS 2.16 01/14/2025    MONOSABS 0.78 01/14/2025    EOSABS 0.27 01/14/2025    BASOSABS 0.10 01/14/2025       Lab Results   Component Value Date    GLUCOSE 130 (H) 01/14/2025    BUN 22 01/14/2025    CREATININE 1.39 (H) 01/14/2025     01/14/2025    K 3.9 01/14/2025     01/14/2025    CO2 21.7 (L) 01/14/2025    CALCIUM 9.0 01/14/2025    ALBUMIN 4.0 01/14/2025    BILITOT 0.4 01/14/2025    ALKPHOS 84 01/14/2025    AST 17 01/14/2025    ALT 12 01/14/2025     Lab Results   Component Value Date    PSA 0.351 01/14/2025    PSA 0.418 12/17/2024    PSA 0.453 11/19/2024    PSA 0.5 10/22/2024    PSA 0.455 09/17/2024    PSA 0.720 08/20/2024             ASSESSMENT & PLAN:    1.  Metastatic prostate cancer  2.  Stage IIIa chronic kidney disease  3.  Hypertension    Discussion: Chris continues to do well overall on Zytiga and prednisone along with Xgeva monthly.  His PSA currently looks good at 0.351.  He received Lupron Monday with Dr. Bennett and will be due again in 6 months.  He was scheduled for restaging CT scans last week " but these had to be rescheduled due to the weather, they are now scheduled for early February.  We will continue treatment unchanged, I will see him back in 4 weeks for follow-up to go over his scans.  As outlined previously if scans show progression then we will get PSMA PET scan for further evaluation but I am encouraged with his PSA remaining low.  He continues to work with Dr. Ram regarding his hypertension and follows with nephrology for chronic kidney disease.  We are doing his CT scans without IV contrast.    This was a level 3, limited [facility] visit with stable chronic illness, review of labs and prescription drug management.  Susana Torres, APRN    01/15/2025

## 2025-02-03 ENCOUNTER — SPECIALTY PHARMACY (OUTPATIENT)
Facility: HOSPITAL | Age: 70
End: 2025-02-03
Payer: MEDICARE

## 2025-02-03 NOTE — PROGRESS NOTES
Specialty Pharmacy Refill Coordination Note     Chris is a 69 y.o. male contacted today regarding refills of  abiraterone 1000mg PO QD of specialty medication(s).    Reviewed and verified with patient: YES    Specialty medication(s) and dose(s) confirmed: yes    Refill Questions      Flowsheet Row Most Recent Value   Changes to allergies? No   Changes to medications? No   New conditions or infections since last clinic visit No   Unplanned office visit, urgent care, ED, or hospital admission in the last 4 weeks  No   How does patient/caregiver feel medication is working? Good   Financial problems or insurance changes  No   Since the previous refill, were any specialty medication doses or scheduled injections missed or delayed?  No   Does this patient require a clinical escalation to a pharmacist? No            Delivery Questions      Flowsheet Row Most Recent Value   Delivery method [employer]   Delivery address verified with patient/caregiver? Yes   Delivery address Home   Number of medications in delivery 1   Medication(s) being filled and delivered Abiraterone Acetate (ZYTIGA)   Doses left of specialty medications 10 days   Copay verified? Yes   Copay amount $1.60   Copay form of payment Credit/debit on file   Ship Date 2/5/25   Delivery Date Selection 02/06/25   Signature Required No                   Follow-up: 30 day(s)     Catalina Montes De Oca, Pharmacy Technician  Specialty Pharmacy Technician

## 2025-02-05 ENCOUNTER — OFFICE VISIT (OUTPATIENT)
Dept: FAMILY MEDICINE CLINIC | Facility: CLINIC | Age: 70
End: 2025-02-05
Payer: MEDICARE

## 2025-02-05 VITALS
OXYGEN SATURATION: 99 % | SYSTOLIC BLOOD PRESSURE: 162 MMHG | WEIGHT: 273 LBS | BODY MASS INDEX: 38.22 KG/M2 | DIASTOLIC BLOOD PRESSURE: 86 MMHG | HEIGHT: 71 IN | HEART RATE: 67 BPM

## 2025-02-05 DIAGNOSIS — C61 PROSTATE CANCER METASTATIC TO MULTIPLE SITES: Chronic | ICD-10-CM

## 2025-02-05 DIAGNOSIS — N18.31 STAGE 3A CHRONIC KIDNEY DISEASE (CKD): ICD-10-CM

## 2025-02-05 DIAGNOSIS — C79.51 SECONDARY MALIGNANT NEOPLASM OF BONE: ICD-10-CM

## 2025-02-05 DIAGNOSIS — Z00.00 WELLNESS EXAMINATION: Primary | ICD-10-CM

## 2025-02-05 DIAGNOSIS — I10 ESSENTIAL HYPERTENSION: ICD-10-CM

## 2025-02-05 PROCEDURE — 1170F FXNL STATUS ASSESSED: CPT | Performed by: STUDENT IN AN ORGANIZED HEALTH CARE EDUCATION/TRAINING PROGRAM

## 2025-02-05 PROCEDURE — 1126F AMNT PAIN NOTED NONE PRSNT: CPT | Performed by: STUDENT IN AN ORGANIZED HEALTH CARE EDUCATION/TRAINING PROGRAM

## 2025-02-05 PROCEDURE — G0439 PPPS, SUBSEQ VISIT: HCPCS | Performed by: STUDENT IN AN ORGANIZED HEALTH CARE EDUCATION/TRAINING PROGRAM

## 2025-02-05 RX ORDER — AMLODIPINE BESYLATE 5 MG/1
7.5 TABLET ORAL DAILY
Qty: 135 TABLET | Refills: 1 | Status: SHIPPED | OUTPATIENT
Start: 2025-02-05 | End: 2025-08-04

## 2025-02-05 NOTE — PROGRESS NOTES
Subjective   The ABCs of the Annual Wellness Visit  Medicare Wellness Visit      Chris Morfin is a 69 y.o. patient who presents for a Medicare Wellness Visit.    The following portions of the patient's history were reviewed and   updated as appropriate: allergies, current medications, past family history, past medical history, past social history, past surgical history, and problem list.    Compared to one year ago, the patient's physical   health is the same.  Compared to one year ago, the patient's mental   health is the same.    Recent Hospitalizations:  He was not admitted to the hospital during the last year.     Current Medical Providers:  Patient Care Team:  Teri Ram DO as PCP - General (Family Medicine)  Sunny Matute MD (Urology)  Pro Farr MD as Consulting Physician (Vascular Surgery)  Ilir Armstrong MD as Consulting Physician (Nephrology)    Outpatient Medications Prior to Visit   Medication Sig Dispense Refill    abiraterone acetate (ZYTIGA) 500 MG tablet Take 2 tablets by mouth Daily on an empty stomach. 60 tablet 11    denosumab (Xgeva) 120 MG/1.7ML solution injection 1.7 ml Subcutaneous ONCE A MONTH      leuprolide (Lupron [place], 6-Month,) 45 MG kit injection as directed Intramuscular Q 6 MONTHS      losartan (COZAAR) 100 MG tablet Take 1 tablet by mouth Daily. 90 tablet 1    ondansetron (ZOFRAN) 8 MG tablet Take 1 tablet by mouth 3 (Three) Times a Day As Needed for Nausea or Vomiting. 30 tablet 5    predniSONE (DELTASONE) 5 MG tablet TAKE ONE (1) TABLET BY MOUTH TWO (2) (TWO) TIMES A DAY. 60 tablet 11     No facility-administered medications prior to visit.     No opioid medication identified on active medication list. I have reviewed chart for other potential  high risk medication/s and harmful drug interactions in the elderly.      Aspirin is not on active medication list.  Aspirin use is not indicated based on review of current medical condition/s. Risk  "of harm outweighs potential benefits.  .    Patient Active Problem List   Diagnosis    Prostate cancer metastatic to multiple sites    Encounter for care related to vascular access port    Stage 3a chronic kidney disease (CKD)    Lipid screening    Chest pain at rest    Syncope and collapse    Coronary artery disease involving native coronary artery of native heart without angina pectoris     Advance Care Planning Advance Directive is not on file.  ACP discussion was declined by the patient. Patient does not have an advance directive, declines further assistance.            Objective   Vitals:    25 0849   BP: 162/86   Pulse: 67   SpO2: 99%   Weight: 124 kg (273 lb)   Height: 180.3 cm (71\")   PainSc: 0-No pain       Estimated body mass index is 38.08 kg/m² as calculated from the following:    Height as of this encounter: 180.3 cm (71\").    Weight as of this encounter: 124 kg (273 lb).    Class 2 Severe Obesity (BMI >=35 and <=39.9). Obesity-related health conditions include the following: none. Obesity is unchanged. BMI is is above average; no BMI management plan is appropriate. We discussed portion control and increasing exercise.           Does the patient have evidence of cognitive impairment? No                                                                                               Health  Risk Assessment    Smoking Status:  Social History     Tobacco Use   Smoking Status Never   Smokeless Tobacco Never     Alcohol Consumption:  Social History     Substance and Sexual Activity   Alcohol Use Not Currently    Comment: no drinking x's 20 years       Fall Risk Screen  STEADI Fall Risk Assessment was completed, and patient is at LOW risk for falls.Assessment completed on:2025    Depression Screening   Little interest or pleasure in doing things? Not at all   Feeling down, depressed, or hopeless? Not at all   PHQ-2 Total Score 0      Health Habits and Functional and Cognitive Screenin/5/2025 "     8:51 AM   Functional & Cognitive Status   Do you have difficulty preparing food and eating? No   Do you have difficulty bathing yourself, getting dressed or grooming yourself? No   Do you have difficulty using the toilet? No   Do you have difficulty moving around from place to place? No   Do you have trouble with steps or getting out of a bed or a chair? No   Current Diet Well Balanced Diet   Dental Exam Not up to date   Eye Exam Not up to date   Exercise (times per week) 7 times per week   Current Exercises Include Yard Work;Walking;Gardening   Do you need help using the phone?  No   Are you deaf or do you have serious difficulty hearing?  No   Do you need help to go to places out of walking distance? No   Do you need help shopping? No   Do you need help preparing meals?  No   Do you need help with housework?  No   Do you need help with laundry? No   Do you need help taking your medications? No   Do you need help managing money? No   Do you ever drive or ride in a car without wearing a seat belt? No   Have you felt unusual stress, anger or loneliness in the last month? No   Who do you live with? Child   If you need help, do you have trouble finding someone available to you? No   Have you been bothered in the last four weeks by sexual problems? No   Do you have difficulty concentrating, remembering or making decisions? Yes           Age-appropriate Screening Schedule:  Refer to the list below for future screening recommendations based on patient's age, sex and/or medical conditions. Orders for these recommended tests are listed in the plan section. The patient has been provided with a written plan.    Health Maintenance List  Health Maintenance   Topic Date Due    ZOSTER VACCINE (1 of 2) 02/05/2025 (Originally 2/18/2005)    COVID-19 Vaccine (1 - 2024-25 season) 02/07/2025 (Originally 9/1/2024)    HEPATITIS C SCREENING  03/05/2025 (Originally 3/15/2022)    INFLUENZA VACCINE  03/31/2025 (Originally 7/1/2024)     Pneumococcal Vaccine 65+ (1 of 1 - PCV) 02/05/2026 (Originally 2/18/2005)    TDAP/TD VACCINES (1 - Tdap) 02/05/2026 (Originally 2/18/1974)    ANNUAL WELLNESS VISIT  02/05/2026    BMI FOLLOWUP  02/05/2026    COLORECTAL CANCER SCREENING  Discontinued                                                                                                                                                CMS Preventative Services Quick Reference  Risk Factors Identified During Encounter  Vision Screening Recommended     The above risks/problems have been discussed with the patient.  Pertinent information has been shared with the patient in the After Visit Summary.  An After Visit Summary and PPPS were made available to the patient.    Follow Up:   Next Medicare Wellness visit to be scheduled in 1 year.     Assessment & Plan  Wellness examination       Stage 3a chronic kidney disease (CKD)       Essential hypertension       Secondary malignant neoplasm of bone       Prostate cancer metastatic to multiple sites        He is not due for blood work at this time.     Medications refilled at current doses they are doing well at this time. No dose adjustments in the office today.     Past medical and surgical history as well as allergies, family history and social history were reviewed, and discussed with patient.  Chronic conditions were reviewed as well as medications.   Anticipatory guidance handouts including healthy diet, health maintenance, as well as regular exercise and general instructions were given via [employer], and patient was able to ask questions and discuss any concerns.        Follow Up:   No follow-ups on file.

## 2025-02-11 ENCOUNTER — LAB (OUTPATIENT)
Dept: ONCOLOGY | Facility: HOSPITAL | Age: 70
End: 2025-02-11
Payer: MEDICARE

## 2025-02-11 VITALS
BODY MASS INDEX: 37.85 KG/M2 | RESPIRATION RATE: 18 BRPM | WEIGHT: 271.4 LBS | SYSTOLIC BLOOD PRESSURE: 161 MMHG | DIASTOLIC BLOOD PRESSURE: 99 MMHG | TEMPERATURE: 97.6 F | HEART RATE: 65 BPM

## 2025-02-11 DIAGNOSIS — C61 PROSTATE CANCER METASTATIC TO MULTIPLE SITES: ICD-10-CM

## 2025-02-11 PROCEDURE — 36415 COLL VENOUS BLD VENIPUNCTURE: CPT

## 2025-02-12 ENCOUNTER — INFUSION (OUTPATIENT)
Dept: ONCOLOGY | Facility: HOSPITAL | Age: 70
End: 2025-02-12
Payer: MEDICARE

## 2025-02-12 ENCOUNTER — OFFICE VISIT (OUTPATIENT)
Dept: ONCOLOGY | Facility: CLINIC | Age: 70
End: 2025-02-12
Payer: MEDICARE

## 2025-02-12 ENCOUNTER — SPECIALTY PHARMACY (OUTPATIENT)
Facility: HOSPITAL | Age: 70
End: 2025-02-12
Payer: MEDICARE

## 2025-02-12 VITALS
TEMPERATURE: 99.1 F | DIASTOLIC BLOOD PRESSURE: 89 MMHG | BODY MASS INDEX: 37.94 KG/M2 | SYSTOLIC BLOOD PRESSURE: 157 MMHG | HEART RATE: 69 BPM | WEIGHT: 271 LBS | HEIGHT: 71 IN | OXYGEN SATURATION: 96 % | RESPIRATION RATE: 18 BRPM

## 2025-02-12 DIAGNOSIS — C61 PROSTATE CANCER METASTATIC TO MULTIPLE SITES: Primary | Chronic | ICD-10-CM

## 2025-02-12 DIAGNOSIS — M54.9 MID BACK PAIN ON RIGHT SIDE: ICD-10-CM

## 2025-02-12 DIAGNOSIS — C61 PROSTATE CANCER METASTATIC TO MULTIPLE SITES: Primary | ICD-10-CM

## 2025-02-12 LAB
ALBUMIN SERPL-MCNC: 4 G/DL (ref 3.5–5.2)
ALBUMIN/GLOB SERPL: 1.4 G/DL
ALP SERPL-CCNC: 71 U/L (ref 39–117)
ALT SERPL-CCNC: 16 U/L (ref 1–41)
AST SERPL-CCNC: 16 U/L (ref 1–40)
BASOPHILS # BLD AUTO: 0.06 10*3/MM3 (ref 0–0.2)
BASOPHILS NFR BLD AUTO: 0.8 % (ref 0–1.5)
BILIRUB SERPL-MCNC: 0.4 MG/DL (ref 0–1.2)
BUN SERPL-MCNC: 23 MG/DL (ref 8–23)
BUN/CREAT SERPL: 16.4 (ref 7–25)
CALCIUM SERPL-MCNC: 9.4 MG/DL (ref 8.6–10.5)
CHLORIDE SERPL-SCNC: 107 MMOL/L (ref 98–107)
CO2 SERPL-SCNC: 23 MMOL/L (ref 22–29)
CREAT SERPL-MCNC: 1.4 MG/DL (ref 0.76–1.27)
EGFRCR SERPLBLD CKD-EPI 2021: 54.4 ML/MIN/1.73
EOSINOPHIL # BLD AUTO: 0.27 10*3/MM3 (ref 0–0.4)
EOSINOPHIL NFR BLD AUTO: 3.6 % (ref 0.3–6.2)
ERYTHROCYTE [DISTWIDTH] IN BLOOD BY AUTOMATED COUNT: 12.8 % (ref 12.3–15.4)
GLOBULIN SER CALC-MCNC: 2.8 GM/DL
GLUCOSE SERPL-MCNC: 114 MG/DL (ref 65–99)
HCT VFR BLD AUTO: 39.5 % (ref 37.5–51)
HGB BLD-MCNC: 13.2 G/DL (ref 13–17.7)
IMM GRANULOCYTES # BLD AUTO: 0.04 10*3/MM3 (ref 0–0.05)
IMM GRANULOCYTES NFR BLD AUTO: 0.5 % (ref 0–0.5)
LYMPHOCYTES # BLD AUTO: 2.32 10*3/MM3 (ref 0.7–3.1)
LYMPHOCYTES NFR BLD AUTO: 31 % (ref 19.6–45.3)
MAGNESIUM SERPL-MCNC: 2.2 MG/DL (ref 1.6–2.4)
MCH RBC QN AUTO: 29.7 PG (ref 26.6–33)
MCHC RBC AUTO-ENTMCNC: 33.4 G/DL (ref 31.5–35.7)
MCV RBC AUTO: 88.8 FL (ref 79–97)
MONOCYTES # BLD AUTO: 0.65 10*3/MM3 (ref 0.1–0.9)
MONOCYTES NFR BLD AUTO: 8.7 % (ref 5–12)
NEUTROPHILS # BLD AUTO: 4.14 10*3/MM3 (ref 1.7–7)
NEUTROPHILS NFR BLD AUTO: 55.4 % (ref 42.7–76)
NRBC BLD AUTO-RTO: 0 /100 WBC (ref 0–0.2)
PHOSPHATE SERPL-MCNC: 3.6 MG/DL (ref 2.5–4.5)
PLATELET # BLD AUTO: 250 10*3/MM3 (ref 140–450)
POTASSIUM SERPL-SCNC: 4.1 MMOL/L (ref 3.5–5.2)
PROT SERPL-MCNC: 6.8 G/DL (ref 6–8.5)
PSA SERPL-MCNC: 0.36 NG/ML (ref 0–4)
RBC # BLD AUTO: 4.45 10*6/MM3 (ref 4.14–5.8)
SODIUM SERPL-SCNC: 140 MMOL/L (ref 136–145)
WBC # BLD AUTO: 7.48 10*3/MM3 (ref 3.4–10.8)

## 2025-02-12 PROCEDURE — 1159F MED LIST DOCD IN RCRD: CPT | Performed by: NURSE PRACTITIONER

## 2025-02-12 PROCEDURE — 1126F AMNT PAIN NOTED NONE PRSNT: CPT | Performed by: NURSE PRACTITIONER

## 2025-02-12 PROCEDURE — 96372 THER/PROPH/DIAG INJ SC/IM: CPT

## 2025-02-12 PROCEDURE — 25010000002 DENOSUMAB 120 MG/1.7ML SOLUTION: Performed by: NURSE PRACTITIONER

## 2025-02-12 PROCEDURE — 1160F RVW MEDS BY RX/DR IN RCRD: CPT | Performed by: NURSE PRACTITIONER

## 2025-02-12 PROCEDURE — 99214 OFFICE O/P EST MOD 30 MIN: CPT | Performed by: NURSE PRACTITIONER

## 2025-02-12 RX ADMIN — DENOSUMAB 120 MG: 120 INJECTION SUBCUTANEOUS at 10:18

## 2025-02-12 NOTE — PROGRESS NOTES
CHIEF COMPLAINT: Intermittent right lower back pain    Problem List:  Oncology/Hematology History Overview Note   1.  Prostate cancer clinical T1c and 2M1B stage IVb treated with 4 cycles of Taxotere, stopped due to syncope with negative cardiac work-up, with marked drop in PSA of 18.6 from over 900 at baseline, continued on Zytiga and prednisone.  2.  Urinary retention with Goodwin in place 1/24/2022.  On finasteride 1/24/2022.  3.  Covid 19 December 2021  4.  Hypertension  5.  Stage 3a chronic kidney disease    Oncology history timeline:  -11/29/2021  with symptoms of urinary retention.  -2/15/2022 prostate biopsy adenocarcinoma grade group 5 and 3 out of 12 cores, grade group 4 in 1 out of 12 cores, grade group 3 in 8 out of 12 cores.  -2/24/2022 CT chest abdomen pelvis and total body bone scan shows left third rib, right acetabulum, periaortic and retroperitoneal kizzy metastases complaining of pain in the left chest and right hip.  Also possible sclerotic left 10th rib on CT.  Spleen mildly enlarged 13.8 cm.  Hepatic steatosis.  Small liver cyst.  Fat stranding in the periaortic and mesenteric region consistent with reactive/inflammatory stranding.  Multiple enlarged periaortic and retroperitoneal nodes and lymphadenopathy largest 4.9 cm.  CT chest describes tiny sclerotic foci in left eighth rib and right lateral seventh rib and T1.  Multiple small mediastinal and bilateral axillary nodes seen with small hypodense left thyroid nodule.  Creatinine 1.7.  -3/4/2022 office note Dr. Rolando Matute: Patient was seen after his elevated PSA by Dr. Vera and prostate biopsy scheduled.  However, he developed COVID-19 and this was canceled and the patient did not reschedule due to lack of insurance.  Saw Dr. Matute back on 1/24/2022 with plans to get staging CTs and bone scan with results as outlined above.  Started Casodex and to return on 3/11/2022 for Lupron.  Referral made to medical oncology for other  additional castrate naïve prostate cancer therapies.  TURP planned for urinary retention symptoms.    -3/15/2022 [facility], [place], operated by [facility] medical oncology initial consultation: I reviewed the above data with the patient.  With his fairly bulky retroperitoneal adenopathy and bone metastases including extra axial metastases, he would fit the data from the CHAARTED trial for which Taxotere x6 followed by Zytiga prednisone along with the planned Lupron already being planned by Dr. Matute would be reasonable.  Equally reasonable in terms of comparisons with bicalutamide and Lupron would be Zytiga prednisone plus Lupron or Xtandi plus Lupron or apalutamide plus Lupron.  None of these have been compared head-to-head but all have beaten combined androgen deprivation therapy with bicalutamide and Lupron.  At the age of 67 and in order to have as many bullets as possible down the road and hence saving some of the hormone blockade options for later and using chemotherapy when he is younger and healthier for a more time-limited.,  He has opted for the Taxotere x6 followed by Zytiga and prednisone.  We will also give him Xgeva to improve bone health and given metastatic disease, as with all metastatic prostate cancer patients, he needs genetic counseling both for his sake as well as his family's.  If he were to have BRCA1 or other such  mutations, then that also opens up the options for PARP inhibitors etc. down the road.  He has his TURP scheduled for 2/24/2022 and we will start treatment a couple of weeks after that and he will get chemo preparation visit in the meantime and I will get capital surgeons to place a port.  We will check his PSA after his TURP when he sees my nurse practitioner back early April for the start of chemotherapy and repeat the PSA and CT chest abdomen pelvis and bone scan after the Taxotere is complete.    -3/31/2022 chemotherapy education and needs assessment completed    -4/7/2022 began docetaxel and Xgeva.   .0.  We will do his Xgeva every 6 weeks to coordinate with his docetaxel infusions.    -4/19/2022 patient stopped Casodex    -5/17/2022 patient reported seeing his PCP for an abscess in his suprapubic area.  Placed on clindamycin, will delay treatment 1 week.    -5/25/2022 PSA 34.3  -5/26/2022 [facility] Oncology clinic follow-up: Chris has an abscess in the suprapubic area, it has improved on antibiotic therapy but I am concerned if we treat him it will just flare back up unless it is drained.  I will get him to Dr. Miller who placed his port for I&D and culture.  He is on clindamycin and states he completes course of treatment tomorrow.  I will delay his treatment with Taxotere 1 more week.  We will give his Xgeva today.  I will see him back in 1 week for follow-up to assess whether or not we can resume his Taxotere.  His PSA has dropped nicely with treatment thus far, PSA yesterday was 34.3 which is down from a PSA of 259.0 when we started treatment, it has been as high as 911 in November.    -6/2/2022 [facility] Oncology clinic follow-up: Chris went to see Dr. Miller to have his abscess lanced however apparently there was a long wait and he left without being seen.  He did call his PCP and was given 5 more days of clindamycin and the abscess now seems to have resolved.  We discussed today that he is at risk for recurrence of infection due to immunocompromise state with chemotherapy.  He will notify us if he has any return of his abscess.  He does have intertrigo under his panniculus, I have advised him to keep this area dry, to apply topical drying/antifungal powders.  Also recommended looser clothing.  His counts are adequate to continue therapy, we will resume Taxotere today with cycle #3.  We will repeat restaging scans after 6 courses.  We are doing Xgeva every 6 weeks to coordinate with his Taxotere.  Once he finishes Taxotere we can then go back to every 4 weeks.  His next Xgeva is not due until  7/14/2022.  His calcium is running a little low, he is going to  calcium supplement.    -6/23/2022 [facility] follow-up: Chris overall is doing well on treatment with Taxotere.  Labs reviewed from yesterday and are unremarkable.  We will continue treatment today unchanged.  His suprapubic abscess resolved on clindamycin.  He will continue to use drying powder/antifungal powder under his panniculus for intertrigo.  Today will be cycle #4 of a planned 6 courses of Taxotere.  He is also on Xgeva, next dose due 7/14/2022, we are doing this every 6 weeks for now to line up with his chemotherapy, can go back to every 4 weeks after he finishes Taxotere.  Calcium from CMP yesterday was normal.    -7/13/2022 [facility] clinic follow-up: Mr. Morfin has had issues around day 3 after each treatment ranging from chest pain after his first treatment for which he did not seek medical attention, fatigue after the next few treatments, but 3 days after his most recent treatment on 6/23/2022 he had a syncopal episode at home and once again did not seek medical attention.  I discussed with him that this was not acceptable, if he has occurrences like this someone needs to call 911, it is difficult to figure out what is going on after the fact, the best time to work this up would be during the occurrence.  For now we will get labs today with CBC, CMP, PSA.  I will refer him to cardiology for further evaluation.  We will hold Taxotere most likely, I will see him tomorrow to go over his lab results.  I will also repeat his restaging scans with CT chest, abdomen and pelvis and will include CT of the head in light of his syncopal episode.      -7/13/2022 PSA 18.6    -7/14/2022 [facility] clinic follow-up: Mr. Morfin returns today to review his labs from yesterday.  Labs are unremarkable.  PSA down to 18.6 from a high of 259.0 in April prior to starting treatment.  CBC and CMP unremarkable, magnesium is normal  at 2.3, phosphorus slightly low at 2.4.  We will hold therapy for now in light of his syncopal episode on day 3 after his last treatment and prior episodes with chest pain and severe fatigue that all occurred on day 3 after his Taxotere.  We will restage him with CT head, chest, abdomen and pelvis (I am including the head in light of his syncopal episode).  I have also referred him to cardiology, he states that he received a call from them about making an appointment however he wanted to talk to us today first.  I emphasized the importance to him of making and keeping that appointment and he states understanding.  I also once again emphasized the requirement to seek emergency medical attention if he has any episodes in the future such as chest pain or syncopal events.  Whether or not we try and complete Taxotere with 2 more courses or move to the next line of therapy will be decided when he returns to discuss with Dr. Goodson and review his restaging scans.    -7/15/2022 saw Dr. Diaz cardiologist.  For syncope he ordered echocardiogram and 48-hour Holter monitor.    -7/15/2022 Holter monitor relatively benign.    -7/22/2022 CT brain with and without contrast negative.  CT chest abdomen and pelvis shows some nonspecific cecal wall thickening.  No progression in the chest.  T1 and ribs stable.  Decrease in multiple retroperitoneal and pelvic nodes.  Slight increase in right acetabular sclerotic lesion.  Persistent but improved circumferential bladder wall thickening.  Total body bone scan shows stable left third rib and no evidence of new bone metastases.    -7/19/2022 ejection fraction 65% with grade 1 diastolic dysfunction.    -7/26/2022 [facility] clinic follow-up: Given presyncopal symptoms occurred 3 days after Taxotere and has not otherwise occurred any other time and his cardiology work-up is fairly unremarkable and his disease is under good control as witnessed by the report above of the CTs of head  chest abdomen pelvis and bone scan, I will hold cycle 5 and 6 of Taxotere and continue 1 now with Zytiga and prednisone.  With reference to the coincidental cecal wall thickening found on CT, we will get him to [facility] surgeons for colonoscopy.  He will see my nurse practitioner back in August for next cycle of Abiraterone prednisone.  He will continue his Lupron with Dr. Matute.  We will continue his Xgeva.  We will repeat his CT chest abdomen pelvis and bone scan again in 3 months.  He will see my nurse practitioner in the interim.    -8/23/2022 [facility] oncology clinic follow-up: No further presyncopal symptoms.  Feeling great other than for hot flashes.  Did commend for now we will continue on with his Zytiga prednisone and he will get his Xgeva today based on labs from 8/10/2022.  He opted out of getting the colonoscopy that I recommended and he will not change his mind.  He will continue getting the Lupron with Dr. Matute and I asked him to give the date of this appointment to my nurse when he sees her back in a month.  We will give his Xgeva today.  We will get CT chest abdomen pelvis and total body bone scan towards the middle to end of October.  Presently other than for the hot flashes feels great.    -9/20/2022 PSA 8.320    -9/22/2022 [facility] Oncology clinic follow-up: Chris is doing well on Zytiga, prednisone along with Xgeva.  Labs reviewed from 9/20/2022 are unremarkable, CBC was normal, CMP with creatinine of 1.34 otherwise normal, this is stable from his baseline.  PSA stable at 8.320.  He received Lupron recently with Dr. Matute, he thinks last week or so, states his next appointment is in February.  He will continue treatment unchanged.  We will repeat restaging CT chest, abdomen and pelvis and total body bone scan in October prior to return and I have ordered those today.  We will also repeat his PSA.    -9/22/2022 CT chest abdomen pelvisCompared to July 2022 [facility] showed no  evidence of disease progression in the chest with no substantial sclerotic bony lesions and decrease in size of retroperitoneal and pelvic nodes with persistent cecal wall thickening and suboptimal bladder distention.  Total body bone scan showed decrease in previously seen uptake in the left third rib with diffuse sclerosis representing treatment response with no new foci.    -10/18/2022 [facility] medical oncology follow-up: I reviewed bone scan and CT reports as above with no evidence of progression.  Tolerating Zytiga prednisone and Xgeva.  Getting Lupron regularly with Dr. Matute next appointment coming in February.  We will repeat his CT chest abdomen pelvis and total body bone scan in April.  He will follow with my nurse practitioner in the interim.    -1/10/2023 PSA 3.450  -1/11/2023 [facility] Oncology clinic follow-up: Mr. Morfin continues to do well on current therapy with Zytiga, prednisone and Xgeva with no unusual side effects.  He has no new worrisome symptoms.  He is due for his next Lupron injection in February with Dr. Matute.  His PSA continues to trend downward, current PSA from yesterday was 3.450.  We will continue therapy unchanged, he will receive Xgeva today.  Has had no hypocalcemia, his CMP, phosphorus and magnesium are all normal.  We will repeat restaging scans in April.  -3/7/2023 PSA 2.460  -3/8/2023 [facility] Oncology clinic follow-up:  Mr. Morfin is doing well.  He is tolerating therapy with Zytiga, prednisone and Xgeva with no significant side effects.  He has hot flashes but these are tolerable.  He had Lupron injection 2/24/2023 with Dr. Matute.  His PSA continues to decline, current PSA 2.460.  He will continue therapy unchanged.  We will repeat restaging scans late April prior to return in 2 months and I have ordered those today.  He is working with his PCP Rodrigo Ram on his hypertension.  His b/p today was 160/88.  He was to have increased his losartan but I do not think he has  "done that yet as he said \"I still have some of my old prescription left\".  -4/4/2023 PSA 2.55  - 4/26/2023 CT chest abdomen pelvis Lipan comparison 10/10/2022 showed stable left third rib, T1, left fourth and eighth rib, right seventh rib.  Probable liver cyst.  Few diverticuli.  Mild further decrease in a few of the previously enlarged nodes.  Left external iliac 12 mm compared to 16 mm.  Right common iliac 7 mm stable.  Lower left periaortic 8 mm previously 13 mm.  No new lytic or blastic osseous lesions.  Total body bone scan comparison 10/10/2022, 7/22/2022, and CT 4/26/2023.  No new sites of increased uptake.  1 focal left third rib hotspot consistent with bone metastasis.    -5/2/2023 [facility] oncology follow-up: I reviewed interval notes since I last saw him from my nurse practitioner as outlined above in interval images and reports thereof from [facility] that shows no new sites of bone metastasis and no kizzy or visceral progression.  PSA stabilized around 2.5.  In the absence of symptomatic or radiographic progression, I would be unlikely to change therapy just on the basis of a PSA rise and for now the PSA is stable.  We will continue Zytiga prednisone and Xgeva and he will follow-up with my nurse practitioner 5/30/2023 with repeat CTs and bone scan in 6 months.  I asked him to check with his primary care today regarding his systolic in the 170s.    -5/31/2023 [facility] clinic follow-up: Chris continues to do well on current therapy with Zytiga, prednisone and Xgeva along with Lupron that he receives with Dr. Matute.  His PSA remains stable, it was lower this month compared to last month, current PSA 1.940.  Continues to deal with hypertension, on arrival today his blood pressure was 210/92 however this was using a wrist cuff, when I rechecked it manually his blood pressure was 160/90.  Still has room for improvement despite recent increase in his losartan to 100 mg daily.  He " will follow-up with Dr. Ram for further management.  We will continue therapy unchanged.  We will repeat restaging scans in October.    -7/26/2023 [facility] follow-up: Chris overall continues to do well on current therapy with Zytiga, prednisone and Xgeva.  He receives Lupron every 6 months with Dr. Matute and states that is scheduled for August.  PSA from yesterday is pending, PSA on 6/27/2023 was 1.590 which was continuing to decline, in February it was 3.250.  His creatinine this past month has bumped up.  He feels he is staying hydrated but I encouraged him to increase his fluid intake.  I discussed with him that this could be from his hypertension, some of his medications.  His blood pressure today is 165/92.  He has an appointment with his primary care provider Dr. Teri Ram next week and can further discuss with her.  No adjustment needed for Xgeva.  I refilled his prednisone today.  I will see him back in 1 month for follow-up with continued monitoring of his labs.  He may end up needing to see nephrology.  We will repeat restaging scans in October.    -9/29/2023 PSA 1.810  -8/23/2023 [facility] follow-up: Chris continues on therapy with Zytiga, prednisone and Xgeva, he also receives Lupron every 6 months with Dr. Matute with most recent given on 8/7/2023.  He is scheduled for a cystoscopy in a few weeks as his last few UAs per the patient's report had microscopic hematuria, he has had no steve hematuria.  His creatinine continues to remain elevated at 1.82, BUN is 40, GFR is 40.  I will get him to nephrology for further evaluation.  Blood pressure today was fairly good at 161/86, he continues to follow with Dr. Ram for management.  PSA on 7/25/2023 was up slightly from prior month, PSA in July was 2.140, in June it was 1.590, PSA from yesterday is pending.  I plan on repeating restaging CT scans and bone scans in October however if his PSA is up significantly  then I will do sooner.  I will see him back for follow-up in 1 month.  -8/23/2024 PSA 1.860    -9/20/2023 [facility] follow-up: Chris is tolerating treatment with Zytiga, prednisone and Xgeva.  He is up-to-date with his Lupron injection, last received in August with Dr. Matute.  He had cystoscopy on 9/8/2023 that was normal. I referred him to nephrology when I saw him last in August as his creatinine has been increasing, creatinine yesterday was a little better 1.51, GFR is 50, creatinine clearance 63.8.  He is still awaiting an appointment with nephrology, per our  it will be early November.  We will repeat his restaging scans prior to return and I will do his CT scans without contrast in light of his new renal insufficiency.  We will also get total body bone scan and I have ordered those today.  His PSA yesterday was 1.810.    -10/9/2023 CT chest abdomen Pelvis without contrast compared to April 2023 shows slightly prominent pelvic lymph nodes left external iliac 8 mm compared to prior study.  Right internal iliac heterogenous 19 mm compared to 11 mm prior.  Stable sclerotic bone lesions and no new lesions.  Stable bone scan with no new sites of disease    -10/17/2023 [facility] clinic follow-up: With elevated creatinine, CTs done without contrast showed very slight increased prominence by few millimeters of some internal iliac and external iliac nodes for which PSMA PET could be done but for now I will check his PSA and have him see my nurse practitioner back in a week and unless the PSA is significantly rising I would continue the Zytiga, prednisone, Xgeva and February dose of Lupron with Dr. Matute and make sure he follows with nephrology.  If his PSA is significantly rising then my nurse practitioner will order PSMA PET.    -10/17/2023 PSA 1.5  -11/14/2023 PSA 1.510  -11/15/2023 [facility] follow-up: Mr. Ferrara continues to do well on current therapy with Zytiga,  prednisone, Xgeva and Lupron that he receives with Dr. Matute.  His PSA is stable at 1.5.  Would not change therapy for now, we will plan on repeating restaging scans in February unless PSA starts to rise or he has new concerns.  Overall he feels good.  He is now established with nephrology and will continue to follow with them regarding his renal insufficiency, I reviewed note from consultation with Dr. Armstrong 11/6/2023.    -1/9/2024 PSA 1.050    -1/10/2024 [facility] Oncology clinic follow-up: Chris overall continues to tolerate current therapy with Zytiga, prednisone, Xgeva and Lupron.  He states his next Lupron injection with Dr. Matute is due late February.  His PSA is stable at 1.050. Creatinine stable at 1.64, he is following with nephrology, he has a an appointment with Dr. Armstrong in February for follow-up.  Hypertension being managed by his primary care provider, target systolic ideally 120s-140s, today it is running a little high at 172/90, yesterday when he was here for labs it was 138/80.  We will continue therapy unchanged with plans to repeat restaging scans in April.    -2/6/2024 PSA 0.753    -2/7/2024 [facility] oncology clinic follow-up: Chris continues to do well on current therapy with Zytiga, prednisone, Xgeva and Lupron.  He is up-to-date on Lupron injection next due later this month with Dr. Matute.  His PSA continues to slowly decrease, current PSA is 0.753.  He has no new worrisome symptoms.  We will continue therapy unchanged, I have ordered restaging scans for late March prior to his return in April. He is following with nephrology for his renal insufficiency, his creatinine yesterday was good at 1.21.  I will do his CT scans with contrast unless something changes in the interim. He follows closely with Dr. Ram for management of his hypertension. I reviewed notes from his last office visit with her earlier this month on 2/1/2024, also reviewed labs in detail with the patient today.   All questions were answered to his satisfaction.    -3/5/2024 PSA slowly declining 0.73.  Creatinine fluctuating.  1.69 with GFR 43 otherwise unremarkable CMP.  Phosphorus low 2.1    -4/8/2024 CT chest, abdomen and pelvis shows stable sclerotic lesions in the thoracic spine and bilateral ribs and right acetabulum.  Stable appearance of pelvic lymph nodes.  No new sites concerning for progression of disease.  Total body bone scan shows stable diffuse increased uptake of the left third rib correlates with diffuse sclerosis and enlargement on CT, no new sites of active osseous lesions.  -4/30/2024 PSA 0.713  -5/1/2024 [facility]ology clinic follow-up: Chris continues to do well on current therapy with Zytiga, prednisone, Xgeva and Lupron.  He receives his Lupron every 6 months with Dr. Matute, last administered 2/5/2024.  Current CT scans and bone scan showed no progression of disease, PSA is stable at 0.713.  His CBC is unremarkable, CMP shows creatinine stable at 1.50 otherwise normal.  He does follow with nephrology.  We will continue treatment unchanged.  I will see him back in 2 months for follow-up.  We will repeat restaging scans in 6 months unless he has any new symptoms or significant change in his PSA.    -6/25/2024 PSA 0.623  -6/26/2024 [facility]ology clinic follow-up: Chris overall continues to tolerate current therapy with Zytiga, prednisone, Xgeva and Lupron.  We did call to confirm his next Lupron injection and that is scheduled for late July with Dr. Matute.  His PSA for the most part is stable at 0.623, we will continue to monitor.  He has no new worrisome symptoms.  I did discuss with him today his creatinine which has increased to 1.86, BUN was 32.  Reminded him to be sure and stay hydrated.  He does follow with nephrology and his next appointment is in August.  He is on Xgeva however there is no indication for any adjustment, his creatinine clearance is greater than 30.  Blood pressure today  was under good control at 130/78.  We will continue treatment unchanged.  I will see him back in 1 month for follow-up.  Plan to repeat restaging scans in October unless his PSA rises or he has worrisome symptoms.    -7/23/2024 PSA 0.606    -9/17/2024 PSA 0.455  -9/18/2024 [facility] clinic follow-up: Crhis is doing well overall.  Tolerates therapy with Zytiga, prednisone, Xgeva and Lupron.  He last received Lupron in August with Dr. Matute.  His PSA had been slowly creeping up however it has now went back down and is currently 0.455.  He will continue treatment unchanged.  We will repeat CT chest, abdomen and pelvis prior to return and I will do that without contrast in light of his renal insufficiency.  Current creatinine is stable at 1.5, he does follow with nephrology.  We will also repeat total body bone scan.  We will see him back in 1 months for follow-up and sooner if any concerns.  I did discuss with him that he can take Tylenol if needed or use topical over-the-counter pain relief ointment such as Biofreeze etc. for his back pain but thus far it has not required any intervention, he does not like to take pain medications.    -10/14/2024 CT chest abdomen pelvis without contrast [place] compared to August 2024 shows unchanged sclerotic T1 vertebral body with expansile sclerotic left third rib without fracture and other small sclerotic nodules all unchanged.  Right external iliac node 20 mm increased from 7 mm on prior exam with internal iliac node 15 mm unchanged.  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    -10/22/2024 [facility] follow-up: Feeling great.  While he has 1 slightly larger node, given that his PSA has not been rapidly rising and all other disease is stable I will have him continue Zytiga prednisone Xgeva and he gets his next Lupron in February with Dr. Matute.  He will see my nurse  practitioner and she will order repeat CT chest abdomen pelvis and bone scan for January and will follow-up with her in January to go over those results.  If either his PSA or the scans show progression beyond just the single slightly larger iliac node, she will order a PSMA PET and then get him back to me.  If the PSA is stable and the subsequent scans are stable then we will just continue his current regimen with quarterly imaging or sooner as symptoms or PSA dictate.    -11/19/2024 PSA 0.453  -1/14/2025 PSA 0.351  -1/13/2025 received Lupron with Dr. Matute  -2/3/2025 total body bone scan shows overall stable, 1 new site involving the left costovertebral junction of the left sixth rib with no definite CT correlate and favored to represent degenerative/traumatic changes.  CT chest, abdomen and pelvis with no evidence of disease progression in the chest, unchanged expansile sclerotic left third rib mass and sclerotic T1 vertebral body focus.  Abdomen and pelvis with mild interval increase in bilateral external iliac lymph nodes, unchanged right internal iliac lymph node, for reference right external iliac lymph node now measuring up to 28 mm compared to 20 mm on prior, left external iliac lymph node now measuring up to 20 mm compared to 10 mm on prior.  Right internal iliac 15 mm stable.  -2/11/2025 PSA 0.364       Prostate cancer metastatic to multiple sites   3/15/2022 Initial Diagnosis    Prostate cancer metastatic to multiple sites (HCC)     3/15/2022 Cancer Staged    Staging form: Prostate, AJCC 8th Edition  - Clinical: Stage IVB (cT1c, cN1, cM1b, Grade Group: 5) - Signed by Fritz Goodson MD on 3/15/2022     4/7/2022 - 6/23/2022 Chemotherapy    Stopped after cycle 4 6/23/2022 due to syncope 3 days post infusions with negative cardiac work-up  OP PROSTATE DOCEtaxel     4/7/2022 -  Chemotherapy    OP SUPPORTIVE Denosumab (Xgeva) Q28D     7/26/2022 -  Chemotherapy    OP PROSTATE Abiraterone / PredniSONE      "      HISTORY OF PRESENT ILLNESS:  The patient is a 69 y.o. male, here for follow up on management of metastatic prostate cancer.  Chris overall continues to do well on current therapy with Zytiga, prednisone, Xgeva and Lupron.  He still has intermittent pain in his right mid back/flank area it is worse when he is lying down particularly on his right side.  It is a sharp pain when it happens and resolves when he repositions typically and has not required any pain medication or other intervention.  His appetite is good.  He has no new pain.  He has had no trauma to his left chest or ribs that he is aware of.  He has no new respiratory concerns.  His next Lupron is due in June.      Past Medical History:   Diagnosis Date    Cancer     Prostate cancer      Past Surgical History:   Procedure Laterality Date    PROSTATE BIOPSY  02/2022    dr. sue       No Known Allergies    Family History and Social History reviewed and changed as necessary    REVIEW OF SYSTEM:   Continues to have remittent right mid back pain    PHYSICAL EXAM:  Well-developed, well-nourished appearing male in no distress  Respirations regular and unlabored, lungs clear to auscultation bilaterally    Vitals:    02/12/25 0926   BP: 157/89   Pulse: 69   Resp: 18   Temp: 99.1 °F (37.3 °C)   SpO2: 96%   Weight: 123 kg (271 lb)   Height: 180.3 cm (71\")     Vitals:    02/12/25 0926   PainSc: 0-No pain          ECOG score: 0           Vitals reviewed.  Labs reviewed    ECOG: (0) Fully Active - Able to Carry On All Pre-disease Performance Without Restriction    Lab Results   Component Value Date    HGB 13.2 02/11/2025    HCT 39.5 02/11/2025    MCV 88.8 02/11/2025     02/11/2025    WBC 7.48 02/11/2025    NEUTROABS 4.14 02/11/2025    LYMPHSABS 2.32 02/11/2025    MONOSABS 0.65 02/11/2025    EOSABS 0.27 02/11/2025    BASOSABS 0.06 02/11/2025       Lab Results   Component Value Date    GLUCOSE 114 (H) 02/11/2025    BUN 23 02/11/2025    CREATININE 1.40 (H) " 02/11/2025     02/11/2025    K 4.1 02/11/2025     02/11/2025    CO2 23.0 02/11/2025    CALCIUM 9.4 02/11/2025    PROTEINTOT 6.8 02/11/2025    ALBUMIN 4.0 02/11/2025    BILITOT 0.4 02/11/2025    ALKPHOS 71 02/11/2025    AST 16 02/11/2025    ALT 16 02/11/2025     Lab Results   Component Value Date    PSA 0.364 02/11/2025    PSA 0.351 01/14/2025    PSA 0.418 12/17/2024    PSA 0.453 11/19/2024    PSA 0.5 10/22/2024    PSA 0.455 09/17/2024     -2/3/2025 total body bone scan shows overall stable, 1 new site involving the left costovertebral junction of the left sixth rib with no definite CT correlate and favored to represent degenerative/traumatic changes. CT chest, abdomen and pelvis with no evidence of disease progression in the chest, unchanged expansile sclerotic left third rib mass and sclerotic T1 vertebral body focus. Abdomen and pelvis with mild interval increase in bilateral external iliac lymph nodes, unchanged right internal iliac lymph node, for reference right external iliac lymph node now measuring up to 28 mm compared to 20 mm on prior, left external iliac lymph node now measuring up to 20 mm compared to 10 mm on prior. Right internal iliac 15 mm stable.         ASSESSMENT & PLAN:    1.  Metastatic prostate cancer  2.  Stage IIIa chronic kidney disease  3.  Hypertension  4.  Persistent mid right back pain    Discussion: Chris overall is tolerating current therapy with Zytiga, prednisone and Xgeva monthly, he receives Lupron with Dr. Matute.  He last received Lupron 1/13/2025, next Lupron due in June.  His PSA is stable at 0.364.  Current restaging scans on 2/3/2025 show 1 new site in the left costovertebral junction of the left sixth rib, this is favored to represent degenerative/traumatic change, he does not recall any trauma to this area.  He is having no pain in this area.  He still has intermittent pain in his right mid back that is worse typically when he is lying down.  He does have a  sclerotic lesion at T1, I will go ahead and get an MRI of his thoracic spine just to make sure this is stable.  There has been a mild increase in the bilateral external iliac nodes and the right internal iliac node is stable.  Overall due to the stability of his PSA, his tolerability of current treatment and his desire not to pursue chemotherapy we will continue treatment unchanged.  We will repeat restaging scans in 3 months for follow-up.  If he has progressive new disease or worsening symptoms we would then want to get PSMA PET scan for evaluation, I think he would consider possibly [facility] down the road.  We will see him back in 1 month for follow-up to go over his MRI and continue current therapy.    I spent 35 minutes caring for Chris on this date of service. This time includes time spent by me in the following activities: preparing for the visit, reviewing tests, performing a medically appropriate examination and/or evaluation, ordering medications, tests, or procedures, referring and communicating with other health care professionals, documenting information in the medical record, independently interpreting results and communicating that information with the patient/family/caregiver, care coordination, and plan of care also discussed today with Dr. Fritz Goodson at time of visit .     Susana Torres, APRN    02/12/2025

## 2025-02-27 ENCOUNTER — SPECIALTY PHARMACY (OUTPATIENT)
Facility: HOSPITAL | Age: 70
End: 2025-02-27
Payer: MEDICARE

## 2025-02-27 NOTE — PROGRESS NOTES
Specialty Pharmacy Patient Management Program  Refill Outreach     Chris was contacted today regarding refills of their medication(s).    Refill Questions      Flowsheet Row Most Recent Value   Changes to allergies? No   Changes to medications? No   New conditions or infections since last clinic visit No   Unplanned office visit, urgent care, ED, or hospital admission in the last 4 weeks  No   How does patient/caregiver feel medication is working? Good   Financial problems or insurance changes  No   Since the previous refill, were any specialty medication doses or scheduled injections missed or delayed?  No   Does this patient require a clinical escalation to a pharmacist? No            Delivery Questions      Flowsheet Row Most Recent Value   Delivery method [employer]   Delivery address verified with patient/caregiver? Yes   Delivery address Home   Number of medications in delivery 1   Medication(s) being filled and delivered Abiraterone Acetate (ZYTIGA)   Doses left of specialty medications 10 days   Copay verified? Yes   Copay amount $0   Copay form of payment No copayment ($0)   Delivery Date Selection 02/28/25   Signature Required No                 Follow-up: 30 day(s)     Catalina Montes De Oca, Pharmacy Technician  2/27/2025  09:46 EST

## 2025-03-11 ENCOUNTER — OFFICE VISIT (OUTPATIENT)
Dept: ONCOLOGY | Facility: CLINIC | Age: 70
End: 2025-03-11
Payer: MEDICARE

## 2025-03-11 ENCOUNTER — LAB (OUTPATIENT)
Dept: ONCOLOGY | Facility: HOSPITAL | Age: 70
End: 2025-03-11
Payer: MEDICARE

## 2025-03-11 VITALS
OXYGEN SATURATION: 97 % | TEMPERATURE: 98.2 F | BODY MASS INDEX: 38.22 KG/M2 | RESPIRATION RATE: 18 BRPM | SYSTOLIC BLOOD PRESSURE: 143 MMHG | WEIGHT: 273 LBS | DIASTOLIC BLOOD PRESSURE: 88 MMHG | HEIGHT: 71 IN | HEART RATE: 70 BPM

## 2025-03-11 DIAGNOSIS — C61 PROSTATE CANCER METASTATIC TO MULTIPLE SITES: Chronic | ICD-10-CM

## 2025-03-11 DIAGNOSIS — C61 PROSTATE CANCER METASTATIC TO MULTIPLE SITES: Primary | Chronic | ICD-10-CM

## 2025-03-11 DIAGNOSIS — C61 PROSTATE CANCER METASTATIC TO MULTIPLE SITES: Primary | ICD-10-CM

## 2025-03-11 PROCEDURE — 1126F AMNT PAIN NOTED NONE PRSNT: CPT | Performed by: INTERNAL MEDICINE

## 2025-03-11 PROCEDURE — 1159F MED LIST DOCD IN RCRD: CPT | Performed by: INTERNAL MEDICINE

## 2025-03-11 PROCEDURE — 36415 COLL VENOUS BLD VENIPUNCTURE: CPT

## 2025-03-11 PROCEDURE — 99214 OFFICE O/P EST MOD 30 MIN: CPT | Performed by: INTERNAL MEDICINE

## 2025-03-11 PROCEDURE — 1160F RVW MEDS BY RX/DR IN RCRD: CPT | Performed by: INTERNAL MEDICINE

## 2025-03-11 NOTE — LETTER
March 11, 2025     Teri Ram DO  [address] [facility] [address]    Patient: Chris Morfin   YOB: 1955   Date of Visit: 3/11/2025     Dear Teri Ram DO:       Thank you for referring Chris Morfin to me for evaluation. Below are the relevant portions of my assessment and plan of care.    If you have questions, please do not hesitate to call me. I look forward to following Chris along with you.         Sincerely,        Fritz Goodson MD        CC: MD Pro Evans MD Ayman A Geneidy, MD Hicks, Fritz DING MD  03/11/25 0932  Sign when Signing Visit  CHIEF COMPLAINT: No new somatic complaints    Problem List:  Oncology/Hematology History Overview Note   1.  Prostate cancer clinical T1c and 2M1B stage IVb treated with 4 cycles of Taxotere, stopped due to syncope with negative cardiac work-up, with marked drop in PSA of 18.6 from over 900 at baseline, continued on Zytiga and prednisone.  2.  Urinary retention with Goodwin in place 1/24/2022.  On finasteride 1/24/2022.  3.  Covid 19 December 2021  4.  Hypertension  5.  Stage 3a chronic kidney disease    Oncology history timeline:  -11/29/2021  with symptoms of urinary retention.  -2/15/2022 prostate biopsy adenocarcinoma grade group 5 and 3 out of 12 cores, grade group 4 in 1 out of 12 cores, grade group 3 in 8 out of 12 cores.  -2/24/2022 CT chest abdomen pelvis and total body bone scan shows left third rib, right acetabulum, periaortic and retroperitoneal kizzy metastases complaining of pain in the left chest and right hip.  Also possible sclerotic left 10th rib on CT.  Spleen mildly enlarged 13.8 cm.  Hepatic steatosis.  Small liver cyst.  Fat stranding in the periaortic and mesenteric region consistent with reactive/inflammatory stranding.  Multiple enlarged periaortic and retroperitoneal nodes and lymphadenopathy largest 4.9 cm.  CT chest describes tiny sclerotic foci in left eighth  rib and right lateral seventh rib and T1.  Multiple small mediastinal and bilateral axillary nodes seen with small hypodense left thyroid nodule.  Creatinine 1.7.  -3/4/2022 office note Dr. Rolando Matute: Patient was seen after his elevated PSA by Dr. Vera and prostate biopsy scheduled.  However, he developed COVID-19 and this was canceled and the patient did not reschedule due to lack of insurance.  Saw Dr. Matute back on 1/24/2022 with plans to get staging CTs and bone scan with results as outlined above.  Started Casodex and to return on 3/11/2022 for Lupron.  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  TURP planned for urinary retention symptoms.    -3/15/2022 [facility] initial consultation: I reviewed the above data with the patient.  With his fairly bulky retroperitoneal adenopathy and bone metastases including extra axial metastases, he would fit the data from the CHAARTED trial for which Taxotere x6 followed by Zytiga prednisone along with the planned Lupron already being planned by Dr. Matute would be reasonable.  Equally reasonable in terms of comparisons with bicalutamide and Lupron would be Zytiga prednisone plus Lupron or Xtandi plus Lupron or apalutamide plus Lupron.  None of these have been compared head-to-head but all have beaten combined androgen deprivation therapy with bicalutamide and Lupron.  At the age of 67 and in order to have as many bullets as possible down the road and hence saving some of the hormone blockade options for later and using chemotherapy when he is younger and healthier for a more time-limited.,  He has opted for the Taxotere x6 followed by Zytiga and prednisone.  We will also give him Xgeva to improve bone health and given metastatic disease, as with all metastatic prostate cancer patients, he needs genetic counseling both for his sake as well as his family's.  If he were to have BRCA1 or other such   mutations, then that also opens up the options for PARP inhibitors etc. down the road.  He has his TURP scheduled for 2/24/2022 and we will start treatment a couple of weeks after that and he will get chemo preparation visit in the meantime and I will get capital surgeons to place a port.  We will check his PSA after his TURP when he sees my nurse practitioner back early April for the start of chemotherapy and repeat the PSA and CT chest abdomen pelvis and bone scan after the Taxotere is complete.    -3/31/2022 chemotherapy education and needs assessment completed    -4/7/2022 began docetaxel and Xgeva.  .0.  We will do his Xgeva every 6 weeks to coordinate with his docetaxel infusions.    -4/19/2022 patient stopped Casodex    -5/17/2022 patient reported seeing his PCP for an abscess in his suprapubic area.  Placed on clindamycin, will delay treatment 1 week.    -5/25/2022 PSA 34.3  -5/26/2022 [facility] follow-up: Chris has an abscess in the suprapubic area, it has improved on antibiotic therapy but I am concerned if we treat him it will just flare back up unless it is drained.  I will get him to Dr. Miller who placed his port for I&D and culture.  He is on clindamycin and states he completes course of treatment tomorrow.  I will delay his treatment with Taxotere 1 more week.  We will give his Xgeva today.  I will see him back in 1 week for follow-up to assess whether or not we can resume his Taxotere.  His PSA has dropped nicely with treatment thus far, PSA yesterday was 34.3 which is down from a PSA of 259.0 when we started treatment, it has been as high as 911 in November.    -6/2/2022 [facility] follow-up: Chris went to see Dr. Miller to have his abscess lanced however apparently there was a long wait and he left without being seen.  He did call his PCP and was given 5 more days of clindamycin and the abscess now seems to have resolved.  We discussed today that he is at  risk for recurrence of infection due to immunocompromise state with chemotherapy.  He will notify us if he has any return of his abscess.  He does have intertrigo under his panniculus, I have advised him to keep this area dry, to apply topical drying/antifungal powders.  Also recommended looser clothing.  His counts are adequate to continue therapy, we will resume Taxotere today with cycle #3.  We will repeat restaging scans after 6 courses.  We are doing Xgeva every 6 weeks to coordinate with his Taxotere.  Once he finishes Taxotere we can then go back to every 4 weeks.  His next Xgeva is not due until 7/14/2022.  His calcium is running a little low, he is going to  calcium supplement.    -6/23/2022 [facility] follow-up: Chris huerta is doing well on treatment with Taxotere.  Labs reviewed from yesterday and are unremarkable.  We will continue treatment today unchanged.  His suprapubic abscess resolved on clindamycin.  He will continue to use drying powder/antifungal powder under his panniculus for intertrigo.  Today will be cycle #4 of a planned 6 courses of Taxotere.  He is also on Xgeva, next dose due 7/14/2022, we are doing this every 6 weeks for now to line up with his chemotherapy, can go back to every 4 weeks after he finishes Taxotere.  Calcium from [facility] yesterday was normal.    -7/13/2022 [facility] follow-up: Mr. Morfin has had issues around day 3 after each treatment ranging from chest pain after his first treatment for which he did not seek medical attention, fatigue after the next few treatments, but 3 days after his most recent treatment on 6/23/2022 he had a syncopal episode at home and once again did not seek medical attention.  I discussed with him that this was not acceptable, if he has occurrences like this someone needs to call 911, it is difficult to figure out what is going on after the fact, the best time to work this up would be during the occurrence.  For  now we will get labs today with CBC, CMP, PSA.  I will refer him to cardiology for further evaluation.  We will hold Taxotere most likely, I will see him tomorrow to go over his lab results.  I will also repeat his restaging scans with CT chest, abdomen and pelvis and will include CT of the head in light of his syncopal episode.      -7/13/2022 PSA 18.6    -7/14/2022 [facility] follow-up: Mr. Morfin returns today to review his labs from yesterday.  Labs are unremarkable.  PSA down to 18.6 from a high of 259.0 in April prior to starting treatment.  CBC and CMP unremarkable, magnesium is normal at 2.3, phosphorus slightly low at 2.4.  We will hold therapy for now in light of his syncopal episode on day 3 after his last treatment and prior episodes with chest pain and severe fatigue that all occurred on day 3 after his Taxotere.  We will restage him with CT head, chest, abdomen and pelvis (I am including the head in light of his syncopal episode).  I have also referred him to cardiology, he states that he received a call from them about making an appointment however he wanted to talk to us today first.  I emphasized the importance to him of making and keeping that appointment and he states understanding.  I also once again emphasized the requirement to seek emergency medical attention if he has any episodes in the future such as chest pain or syncopal events.  Whether or not we try and complete Taxotere with 2 more courses or move to the next line of therapy will be decided when he returns to discuss with Dr. Goodson and review his restaging scans.    -7/15/2022 saw Dr. Diaz cardiologist.  For syncope he ordered echocardiogram and 48-hour Holter monitor.    -7/15/2022 Holter monitor relatively benign.    -7/22/2022 CT brain with and without contrast negative.  CT chest abdomen and pelvis shows some nonspecific cecal wall thickening.  No progression in the chest.  T1 and ribs stable.  Decrease in multiple  retroperitoneal and pelvic nodes.  Slight increase in right acetabular sclerotic lesion.  Persistent but improved circumferential bladder wall thickening.  Total body bone scan shows stable left third rib and no evidence of new bone metastases.    -7/19/2022 ejection fraction 65% with grade 1 diastolic dysfunction.    -7/26/2022 [facility] clinic follow-up: Given presyncopal symptoms occurred 3 days after Taxotere and has not otherwise occurred any other time and his cardiology work-up is fairly unremarkable and his disease is under good control as witnessed by the report above of the CTs of head chest abdomen pelvis and bone scan, I will hold cycle 5 and 6 of Taxotere and continue 1 now with Zytiga and prednisone.  With reference to the coincidental cecal wall thickening found on CT, we will get him to [facility] surgeons for colonoscopy.  He will see my nurse practitioner back in August for next cycle of Abiraterone prednisone.  He will continue his Lupron with Dr. Matute.  We will continue his Xgeva.  We will repeat his CT chest abdomen pelvis and bone scan again in 3 months.  He will see my nurse practitioner in the interim.    -8/23/2022 [facility] clinic follow-up: No further presyncopal symptoms.  Feeling great other than for hot flashes.  Did commend for now we will continue on with his Zytiga prednisone and he will get his Xgeva today based on labs from 8/10/2022.  He opted out of getting the colonoscopy that I recommended and he will not change his mind.  He will continue getting the Lupron with Dr. Matute and I asked him to give the date of this appointment to my nurse when he sees her back in a month.  We will give his Xgeva today.  We will get CT chest abdomen pelvis and total body bone scan towards the middle to end of October.  Presently other than for the hot flashes feels great.    -9/20/2022 PSA 8.320    -9/22/2022 [facility] follow-up: Chris is doing well on Zytiga,  prednisone along with Xgeva.  Labs reviewed from 9/20/2022 are unremarkable, CBC was normal, CMP with creatinine of 1.34 otherwise normal, this is stable from his baseline.  PSA stable at 8.320.  He received Lupron recently with Dr. Matute, he thinks last week or so, states his next appointment is in February.  He will continue treatment unchanged.  We will repeat restaging CT chest, abdomen and pelvis and total body bone scan in October prior to return and I have ordered those today.  We will also repeat his PSA.    -9/22/2022 CT chest abdomen pelvisCompared to July 2022 [facility] showed no evidence of disease progression in the chest with no substantial sclerotic bony lesions and decrease in size of retroperitoneal and pelvic nodes with persistent cecal wall thickening and suboptimal bladder distention.  Total body bone scan showed decrease in previously seen uptake in the left third rib with diffuse sclerosis representing treatment response with no new foci.    -10/18/2022 [facility] follow-up: I reviewed bone scan and CT reports as above with no evidence of progression.  Tolerating Zytiga prednisone and Xgeva.  Getting Lupron regularly with Dr. Matute next appointment coming in February.  We will repeat his CT chest abdomen pelvis and total body bone scan in April.  He will follow with my nurse practitioner in the interim.    -1/10/2023 PSA 3.450  -1/11/2023 [facility] follow-up: Mr. Morfin continues to do well on current therapy with Zytiga, prednisone and Xgeva with no unusual side effects.  He has no new worrisome symptoms.  He is due for his next Lupron injection in February with Dr. Matute.  His PSA continues to trend downward, current PSA from yesterday was 3.450.  We will continue therapy unchanged, he will receive Xgeva today.  Has had no hypocalcemia, his CMP, phosphorus and magnesium are all normal.  We will repeat restaging scans in April.  -3/7/2023 PSA  "2.460  -3/8/2023 [facility] follow-up:  Mr. Morfin is doing well.  He is tolerating therapy with Zytiga, prednisone and Xgeva with no significant side effects.  He has hot flashes but these are tolerable.  He had Lupron injection 2/24/2023 with Dr. Matute.  His PSA continues to decline, current PSA 2.460.  He will continue therapy unchanged.  We will repeat restaging scans late April prior to return in 2 months and I have ordered those today.  He is working with his PCP Rodrigo Ram on his hypertension.  His b/p today was 160/88.  He was to have increased his losartan but I do not think he has done that yet as he said \"I still have some of my old prescription left\".  -4/4/2023 PSA 2.55  - 4/26/2023 CT chest abdomen pelvis [place] comparison 10/10/2022 showed stable left third rib, T1, left fourth and eighth rib, right seventh rib.  Probable liver cyst.  Few diverticuli.  Mild further decrease in a few of the previously enlarged nodes.  Left external iliac 12 mm compared to 16 mm.  Right common iliac 7 mm stable.  Lower left periaortic 8 mm previously 13 mm.  No new lytic or blastic osseous lesions.  Total body bone scan comparison 10/10/2022, 7/22/2022, and CT 4/26/2023.  No new sites of increased uptake.  1 focal left third rib hotspot consistent with bone metastasis.    -5/2/2023 [facility] follow-up: I reviewed interval notes since I last saw him from my nurse practitioner as outlined above in interval images and reports thereof from [facility] that shows no new sites of bone metastasis and no kizzy or visceral progression.  PSA stabilized around 2.5.  In the absence of symptomatic or radiographic progression, I would be unlikely to change therapy just on the basis of a PSA rise and for now the PSA is stable.  We will continue Zytiga prednisone and Xgeva and he will follow-up with my nurse practitioner 5/30/2023 with repeat CTs and bone scan in 6 months.  I asked him to check " with his primary care today regarding his systolic in the 170s.    -5/31/2023 [facility] clinic follow-up: Chris continues to do well on current therapy with Zytiga, prednisone and Xgeva along with Lupron that he receives with Dr. Matute.  His PSA remains stable, it was lower this month compared to last month, current PSA 1.940.  Continues to deal with hypertension, on arrival today his blood pressure was 210/92 however this was using a wrist cuff, when I rechecked it manually his blood pressure was 160/90.  Still has room for improvement despite recent increase in his losartan to 100 mg daily.  He will follow-up with Dr. Ram for further management.  We will continue therapy unchanged.  We will repeat restaging scans in October.    -7/26/2023 [facility] clinic follow-up: Chris overall continues to do well on current therapy with Zytiga, prednisone and Xgeva.  He receives Lupron every 6 months with Dr. Matute and states that is scheduled for August.  PSA from yesterday is pending, PSA on 6/27/2023 was 1.590 which was continuing to decline, in February it was 3.250.  His creatinine this past month has bumped up.  He feels he is staying hydrated but I encouraged him to increase his fluid intake.  I discussed with him that this could be from his hypertension, some of his medications.  His blood pressure today is 165/92.  He has an appointment with his primary care provider Dr. Teri Ram next week and can further discuss with her.  No adjustment needed for Xgeva.  I refilled his prednisone today.  I will see him back in 1 month for follow-up with continued monitoring of his labs.  He may end up needing to see nephrology.  We will repeat restaging scans in October.    -9/29/2023 PSA 1.810  -8/23/2023 [facility] clinic follow-up: Chris continues on therapy with Zytiga, prednisone and Xgeva, he also receives Lupron every 6 months with Dr. Matute with most recent given on 8/7/2023.  He is  scheduled for a cystoscopy in a few weeks as his last few UAs per the patient's report had microscopic hematuria, he has had no steve hematuria.  His creatinine continues to remain elevated at 1.82, BUN is 40, GFR is 40.  I will get him to nephrology for further evaluation.  Blood pressure today was fairly good at 161/86, he continues to follow with Dr. Ram for management.  PSA on 7/25/2023 was up slightly from prior month, PSA in July was 2.140, in June it was 1.590, PSA from yesterday is pending.  I plan on repeating restaging CT scans and bone scans in October however if his PSA is up significantly then I will do sooner.  I will see him back for follow-up in 1 month.  -8/23/2024 PSA 1.860    -9/20/2023 [facility] follow-up: Chris is tolerating treatment with Zytiga, prednisone and Xgeva.  He is up-to-date with his Lupron injection, last received in August with Dr. Matute.  He had cystoscopy on 9/8/2023 that was normal. I referred him to nephrology when I saw him last in August as his creatinine has been increasing, creatinine yesterday was a little better 1.51, GFR is 50, creatinine clearance 63.8.  He is still awaiting an appointment with nephrology, per our  it will be early November.  We will repeat his restaging scans prior to return and I will do his CT scans without contrast in light of his new renal insufficiency.  We will also get total body bone scan and I have ordered those today.  His PSA yesterday was 1.810.    -10/9/2023 CT chest abdomen Pelvis without contrast compared to April 2023 shows slightly prominent pelvic lymph nodes left external iliac 8 mm compared to prior study.  Right internal iliac heterogenous 19 mm compared to 11 mm prior.  Stable sclerotic bone lesions and no new lesions.  Stable bone scan with no new sites of disease    -10/17/2023 [facility] clinic follow-up: With elevated creatinine, CTs done without contrast showed very slight increased  prominence by few millimeters of some internal iliac and external iliac nodes for which PSMA PET could be done but for now I will check his PSA and have him see my nurse practitioner back in a week and unless the PSA is significantly rising I would continue the Zytiga, prednisone, Xgeva and February dose of Lupron with Dr. Matute and make sure he follows with nephrology.  If his PSA is significantly rising then my nurse practitioner will order PSMA PET.    -10/17/2023 PSA 1.5  -11/14/2023 PSA 1.510  -11/15/2023 [facility] follow-up: Mr. Ferrara continues to do well on current therapy with Zytiga, prednisone, Xgeva and Lupron that he receives with Dr. Matute.  His PSA is stable at 1.5.  Would not change therapy for now, we will plan on repeating restaging scans in February unless PSA starts to rise or he has new concerns.  Overall he feels good.  He is now established with nephrology and will continue to follow with them regarding his renal insufficiency, I reviewed note from consultation with Dr. Armstrong 11/6/2023.    -1/9/2024 PSA 1.050    -1/10/2024 [facility] follow-up: Chris overall continues to tolerate current therapy with Zytiga, prednisone, Xgeva and Lupron.  He states his next Lupron injection with Dr. Matute is due late February.  His PSA is stable at 1.050. Creatinine stable at 1.64, he is following with nephrology, he has a an appointment with Dr. Armstrong in February for follow-up.  Hypertension being managed by his primary care provider, target systolic ideally 120s-140s, today it is running a little high at 172/90, yesterday when he was here for labs it was 138/80.  We will continue therapy unchanged with plans to repeat restaging scans in April.    -2/6/2024 PSA 0.753    -2/7/2024 [facility] follow-up: Chris continues to do well on current therapy with Zytiga, prednisone, Xgeva and Lupron.  He is up-to-date on Lupron injection next due later this month with  Dr. Matute.  His PSA continues to slowly decrease, current PSA is 0.753.  He has no new worrisome symptoms.  We will continue therapy unchanged, I have ordered restaging scans for late March prior to his return in April. He is following with nephrology for his renal insufficiency, his creatinine yesterday was good at 1.21.  I will do his CT scans with contrast unless something changes in the interim. He follows closely with Dr. Ram for management of his hypertension. I reviewed notes from his last office visit with her earlier this month on 2/1/2024, also reviewed labs in detail with the patient today.  All questions were answered to his satisfaction.    -3/5/2024 PSA slowly declining 0.73.  Creatinine fluctuating.  1.69 with GFR 43 otherwise unremarkable CMP.  Phosphorus low 2.1    -4/8/2024 CT chest, abdomen and pelvis shows stable sclerotic lesions in the thoracic spine and bilateral ribs and right acetabulum.  Stable appearance of pelvic lymph nodes.  No new sites concerning for progression of disease.  Total body bone scan shows stable diffuse increased uptake of the left third rib correlates with diffuse sclerosis and enlargement on CT, no new sites of active osseous lesions.  -4/30/2024 PSA 0.713  -5/1/2024 [facility] clinic follow-up: Chris continues to do well on current therapy with Zytiga, prednisone, Xgeva and Lupron.  He receives his Lupron every 6 months with Dr. Matute, last administered 2/5/2024.  Current CT scans and bone scan showed no progression of disease, PSA is stable at 0.713.  His CBC is unremarkable, CMP shows creatinine stable at 1.50 otherwise normal.  He does follow with nephrology.  We will continue treatment unchanged.  I will see him back in 2 months for follow-up.  We will repeat restaging scans in 6 months unless he has any new symptoms or significant change in his PSA.    -6/25/2024 PSA 0.623  -6/26/2024 [facility] clinic follow-up: Chris overall continues to  tolerate current therapy with Zytiga, prednisone, Xgeva and Lupron.  We did call to confirm his next Lupron injection and that is scheduled for late July with Dr. Matute.  His PSA for the most part is stable at 0.623, we will continue to monitor.  He has no new worrisome symptoms.  I did discuss with him today his creatinine which has increased to 1.86, BUN was 32.  Reminded him to be sure and stay hydrated.  He does follow with nephrology and his next appointment is in August.  He is on Xgeva however there is no indication for any adjustment, his creatinine clearance is greater than 30.  Blood pressure today was under good control at 130/78.  We will continue treatment unchanged.  I will see him back in 1 month for follow-up.  Plan to repeat restaging scans in October unless his PSA rises or he has worrisome symptoms.    -7/23/2024 PSA 0.606    -9/17/2024 PSA 0.455  -9/18/2024 [facility] oncology clinic follow-up: Chris is doing well overall.  Tolerates therapy with Zytiga, prednisone, Xgeva and Lupron.  He last received Lupron in August with Dr. Matute.  His PSA had been slowly creeping up however it has now went back down and is currently 0.455.  He will continue treatment unchanged.  We will repeat CT chest, abdomen and pelvis prior to return and I will do that without contrast in light of his renal insufficiency.  Current creatinine is stable at 1.5, he does follow with nephrology.  We will also repeat total body bone scan.  We will see him back in 1 months for follow-up and sooner if any concerns.  I did discuss with him that he can take Tylenol if needed or use topical over-the-counter pain relief ointment such as Biofreeze etc. for his back pain but thus far it has not required any intervention, he does not like to take pain medications.    -10/14/2024 CT chest abdomen pelvis without contrast [facility] compared to August 2024 shows unchanged sclerotic T1 vertebral body with expansile sclerotic left  third rib without fracture and other small sclerotic nodules all unchanged.  Right external iliac node 20 mm increased from 7 mm on prior exam with internal iliac node 15 mm unchanged.  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    -10/22/2024 [facility]ology clinic follow-up: Feeling great.  While he has 1 slightly larger node, given that his PSA has not been rapidly rising and all other disease is stable I will have him continue Zytiga prednisone Xgeva and he gets his next Lupron in February with Dr. Matute.  He will see my nurse practitioner and she will order repeat CT chest abdomen pelvis and bone scan for January and will follow-up with her in January to go over those results.  If either his PSA or the scans show progression beyond just the single slightly larger iliac node, she will order a PSMA PET and then get him back to me.  If the PSA is stable and the subsequent scans are stable then we will just continue his current regimen with quarterly imaging or sooner as symptoms or PSA dictate.    -11/19/2024 PSA 0.453  -1/14/2025 PSA 0.351  -1/13/2025 received Lupron with Dr. Matute  -2/3/2025 total body bone scan shows overall stable, 1 new site involving the left costovertebral junction of the left sixth rib with no definite CT correlate and favored to represent degenerative/traumatic changes.  CT chest, abdomen and pelvis with no evidence of disease progression in the chest, unchanged expansile sclerotic left third rib mass and sclerotic T1 vertebral body focus.  Abdomen and pelvis with mild interval increase in bilateral external iliac lymph nodes, unchanged right internal iliac lymph node, for reference right external iliac lymph node now measuring up to 28 mm compared to 20 mm on prior, left external iliac lymph node now measuring up to 20 mm compared to 10 mm on prior.  Right internal iliac 15 mm stable.  -2/11/2025 PSA  0.364  -2/12/2025 [facility] oncology clinic follow-up: Chris huerta is tolerating current therapy with Zytiga, prednisone and Xgeva monthly, he receives Lupron with Dr. Matute.  He last received Lupron 1/13/2025, next Lupron due in June.  His PSA is stable at 0.364.  Current restaging scans on 2/3/2025 show 1 new site in the left costovertebral junction of the left sixth rib, this is favored to represent degenerative/traumatic change, he does not recall any trauma to this area.  He is having no pain in this area.  He still has intermittent pain in his right mid back that is worse typically when he is lying down.  He does have a sclerotic lesion at T1, I will go ahead and get an MRI of his thoracic spine just to make sure this is stable.  There has been a mild increase in the bilateral external iliac nodes and the right internal iliac node is stable.  Overall due to the stability of his PSA, his tolerability of current treatment and his desire not to pursue chemotherapy we will continue treatment unchanged.  We will repeat restaging scans in 3 months for follow-up.  If he has progressive new disease or worsening symptoms we would then want to get PSMA PET scan for evaluation, I think he would consider possibly Pluvicto down the road.  We will see him back in 1 month for follow-up to go over his MRI and continue current therapy.          Prostate cancer metastatic to multiple sites   3/15/2022 Initial Diagnosis    Prostate cancer metastatic to multiple sites (HCC)     3/15/2022 Cancer Staged    Staging form: Prostate, AJCC 8th Edition  - Clinical: Stage IVB (cT1c, cN1, cM1b, Grade Group: 5) - Signed by Fritz Goodson MD on 3/15/2022     4/7/2022 - 6/23/2022 Chemotherapy    Stopped after cycle 4 6/23/2022 due to syncope 3 days post infusions with negative cardiac work-up  OP PROSTATE DOCEtaxel     4/7/2022 -  Chemotherapy    OP SUPPORTIVE Denosumab (Xgeva) Q28D     7/26/2022 -  Chemotherapy    OP PROSTATE Abiraterone /  "PredniSONE           HISTORY OF PRESENT ILLNESS:  The patient is a 70 y.o. male, here for follow up on management of metastatic prostate cancer.  No new somatic complaints  Past Medical History:   Diagnosis Date   • Cancer    • Prostate cancer      Past Surgical History:   Procedure Laterality Date   • PROSTATE BIOPSY  02/2022    dr. sue       No Known Allergies    Family History and Social History reviewed and changed as necessary    REVIEW OF SYSTEM:   No new somatic complaints    PHYSICAL EXAM:  Jaundice icterus or pallor.  No respiratory distress.  No rashes.    Vitals:    03/11/25 0920   BP: 143/88   Pulse: 70   Resp: 18   Temp: 98.2 °F (36.8 °C)   SpO2: 97%   Weight: 124 kg (273 lb)   Height: 180.3 cm (71\")     Vitals:    03/11/25 0920   PainSc: 0-No pain          ECOG score: 0           Vitals reviewed.    ECOG: (0) Fully Active - Able to Carry On All Pre-disease Performance Without Restriction    Lab Results   Component Value Date    HGB 13.2 02/11/2025    HCT 39.5 02/11/2025    MCV 88.8 02/11/2025     02/11/2025    WBC 7.48 02/11/2025    NEUTROABS 4.14 02/11/2025    LYMPHSABS 2.32 02/11/2025    MONOSABS 0.65 02/11/2025    EOSABS 0.27 02/11/2025    BASOSABS 0.06 02/11/2025       Lab Results   Component Value Date    GLUCOSE 114 (H) 02/11/2025    BUN 23 02/11/2025    CREATININE 1.40 (H) 02/11/2025     02/11/2025    K 4.1 02/11/2025     02/11/2025    CO2 23.0 02/11/2025    CALCIUM 9.4 02/11/2025    PROTEINTOT 6.8 02/11/2025    ALBUMIN 4.0 02/11/2025    BILITOT 0.4 02/11/2025    ALKPHOS 71 02/11/2025    AST 16 02/11/2025    ALT 16 02/11/2025             ASSESSMENT & PLAN:  1.  Prostate cancer clinical T1c and 2M1B stage IVb treated with 4 cycles of Taxotere, stopped due to syncope with negative cardiac work-up, with marked drop in PSA of 18.6 from over 900 at baseline, continued on Zytiga and prednisone.  2.  Urinary retention with Goodwin in place 1/24/2022.  On finasteride 1/24/2022.  3. "  Covid 19 December 2021  4.  Hypertension  5.  Stage 3a chronic kidney disease    Oncology history timeline:  -11/29/2021  with symptoms of urinary retention.  -2/15/2022 prostate biopsy adenocarcinoma grade group 5 and 3 out of 12 cores, grade group 4 in 1 out of 12 cores, grade group 3 in 8 out of 12 cores.  -2/24/2022 CT chest abdomen pelvis and total body bone scan shows left third rib, right acetabulum, periaortic and retroperitoneal kizzy metastases complaining of pain in the left chest and right hip.  Also possible sclerotic left 10th rib on CT.  Spleen mildly enlarged 13.8 cm.  Hepatic steatosis.  Small liver cyst.  Fat stranding in the periaortic and mesenteric region consistent with reactive/inflammatory stranding.  Multiple enlarged periaortic and retroperitoneal nodes and lymphadenopathy largest 4.9 cm.  CT chest describes tiny sclerotic foci in left eighth rib and right lateral seventh rib and T1.  Multiple small mediastinal and bilateral axillary nodes seen with small hypodense left thyroid nodule.  Creatinine 1.7.  -3/4/2022 office note Dr. Rolando Matute: Patient was seen after his elevated PSA by Dr. Vera and prostate biopsy scheduled.  However, he developed COVID-19 and this was canceled and the patient did not reschedule due to lack of insurance.  Saw Dr. Matute back on 1/24/2022 with plans to get staging CTs and bone scan with results as outlined above.  Started Casodex and to return on 3/11/2022 for Lupron.  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  TURP planned for urinary retention symptoms.    -3/15/2022 [facility] medical oncology initial consultation: I reviewed the above data with the patient.  With his fairly bulky retroperitoneal adenopathy and bone metastases including extra axial metastases, he would fit the data from the CHAARTED trial for which Taxotere x6 followed by Zytiga prednisone along with the planned Lupron  already being planned by Dr. Matute would be reasonable.  Equally reasonable in terms of comparisons with bicalutamide and Lupron would be Zytiga prednisone plus Lupron or Xtandi plus Lupron or apalutamide plus Lupron.  None of these have been compared head-to-head but all have beaten combined androgen deprivation therapy with bicalutamide and Lupron.  At the age of 67 and in order to have as many bullets as possible down the road and hence saving some of the hormone blockade options for later and using chemotherapy when he is younger and healthier for a more time-limited.,  He has opted for the Taxotere x6 followed by Zytiga and prednisone.  We will also give him Xgeva to improve bone health and given metastatic disease, as with all metastatic prostate cancer patients, he needs genetic counseling both for his sake as well as his family's.  If he were to have BRCA1 or other such  mutations, then that also opens up the options for PARP inhibitors etc. down the road.  He has his TURP scheduled for 2/24/2022 and we will start treatment a couple of weeks after that and he will get chemo preparation visit in the meantime and I will get capital surgeons to place a port.  We will check his PSA after his TURP when he sees my nurse practitioner back early April for the start of chemotherapy and repeat the PSA and CT chest abdomen pelvis and bone scan after the Taxotere is complete.    -3/31/2022 chemotherapy education and needs assessment completed    -4/7/2022 began docetaxel and Xgeva.  .0.  We will do his Xgeva every 6 weeks to coordinate with his docetaxel infusions.    -4/19/2022 patient stopped Casodex    -5/17/2022 patient reported seeing his PCP for an abscess in his suprapubic area.  Placed on clindamycin, will delay treatment 1 week.    -5/25/2022 PSA 34.3  -5/26/2022 [facility] follow-up: Chris has an abscess in the suprapubic area, it has improved on antibiotic therapy but I am  concerned if we treat him it will just flare back up unless it is drained.  I will get him to Dr. Miller who placed his port for I&D and culture.  He is on clindamycin and states he completes course of treatment tomorrow.  I will delay his treatment with Taxotere 1 more week.  We will give his Xgeva today.  I will see him back in 1 week for follow-up to assess whether or not we can resume his Taxotere.  His PSA has dropped nicely with treatment thus far, PSA yesterday was 34.3 which is down from a PSA of 259.0 when we started treatment, it has been as high as 911 in November.    -6/2/2022 [facility] On[facility] clinic follow-up: Chris went to see Dr. Miller to have his abscess lanced however apparently there was a long wait and he left without being seen.  He did call his PCP and was given 5 more days of clindamycin and the abscess now seems to have resolved.  We discussed today that he is at risk for recurrence of infection due to immunocompromise state with chemotherapy.  He will notify us if he has any return of his abscess.  He does have intertrigo under his panniculus, I have advised him to keep this area dry, to apply topical drying/antifungal powders.  Also recommended looser clothing.  His counts are adequate to continue therapy, we will resume Taxotere today with cycle #3.  We will repeat restaging scans after 6 courses.  We are doing Xgeva every 6 weeks to coordinate with his Taxotere.  Once he finishes Taxotere we can then go back to every 4 weeks.  His next Xgeva is not due until 7/14/2022.  His calcium is running a little low, he is going to  calcium supplement.    -6/23/2022 [facility] Oncology clinic follow-up: Chris overall is doing well on treatment with Taxotere.  Labs reviewed from yesterday and are unremarkable.  We will continue treatment today unchanged.  His suprapubic abscess resolved on clindamycin.  He will continue to use drying powder/antifungal powder under his panniculus for intertrigo.   Today will be cycle #4 of a planned 6 courses of Taxotere.  He is also on Xgeva, next dose due 7/14/2022, we are doing this every 6 weeks for now to line up with his chemotherapy, can go back to every 4 weeks after he finishes Taxotere.  Calcium from CMP yesterday was normal.    -7/13/2022 [facility] Oncology clinic follow-up: Mr. Morfin has had issues around day 3 after each treatment ranging from chest pain after his first treatment for which he did not seek medical attention, fatigue after the next few treatments, but 3 days after his most recent treatment on 6/23/2022 he had a syncopal episode at home and once again did not seek medical attention.  I discussed with him that this was not acceptable, if he has occurrences like this someone needs to call 911, it is difficult to figure out what is going on after the fact, the best time to work this up would be during the occurrence.  For now we will get labs today with CBC, CMP, PSA.  I will refer him to cardiology for further evaluation.  We will hold Taxotere most likely, I will see him tomorrow to go over his lab results.  I will also repeat his restaging scans with CT chest, abdomen and pelvis and will include CT of the head in light of his syncopal episode.      -7/13/2022 PSA 18.6    -7/14/2022 [facility] Oncology clinic follow-up: Mr. Morfin returns today to review his labs from yesterday.  Labs are unremarkable.  PSA down to 18.6 from a high of 259.0 in April prior to starting treatment.  CBC and CMP unremarkable, magnesium is normal at 2.3, phosphorus slightly low at 2.4.  We will hold therapy for now in light of his syncopal episode on day 3 after his last treatment and prior episodes with chest pain and severe fatigue that all occurred on day 3 after his Taxotere.  We will restage him with CT head, chest, abdomen and pelvis (I am including the head in light of his syncopal episode).  I have also referred him to cardiology, he states that he received a call from  them about making an appointment however he wanted to talk to us today first.  I emphasized the importance to him of making and keeping that appointment and he states understanding.  I also once again emphasized the requirement to seek emergency medical attention if he has any episodes in the future such as chest pain or syncopal events.  Whether or not we try and complete Taxotere with 2 more courses or move to the next line of therapy will be decided when he returns to discuss with Dr. Goodson and review his restaging scans.    -7/15/2022 saw Dr. Diaz cardiologist.  For syncope he ordered echocardiogram and 48-hour Holter monitor.    -7/15/2022 Holter monitor relatively benign.    -7/22/2022 CT brain with and without contrast negative.  CT chest abdomen and pelvis shows some nonspecific cecal wall thickening.  No progression in the chest.  T1 and ribs stable.  Decrease in multiple retroperitoneal and pelvic nodes.  Slight increase in right acetabular sclerotic lesion.  Persistent but improved circumferential bladder wall thickening.  Total body bone scan shows stable left third rib and no evidence of new bone metastases.    -7/19/2022 ejection fraction 65% with grade 1 diastolic dysfunction.    -7/26/2022 [facility] clinic follow-up: Given presyncopal symptoms occurred 3 days after Taxotere and has not otherwise occurred any other time and his cardiology work-up is fairly unremarkable and his disease is under good control as witnessed by the report above of the CTs of head chest abdomen pelvis and bone scan, I will hold cycle 5 and 6 of Taxotere and continue 1 now with Zytiga and prednisone.  With reference to the coincidental cecal wall thickening found on CT, we will get him to [facility] surgeons for colonoscopy.  He will see my nurse practitioner back in August for next cycle of Abiraterone prednisone.  He will continue his Lupron with Dr. Matute.  We will continue his Xgeva.  We will repeat his CT chest  abdomen pelvis and bone scan again in 3 months.  He will see my nurse practitioner in the interim.    -8/23/2022 [facility] clinic follow-up: No further presyncopal symptoms.  Feeling great other than for hot flashes.  Did commend for now we will continue on with his Zytiga prednisone and he will get his Xgeva today based on labs from 8/10/2022.  He opted out of getting the colonoscopy that I recommended and he will not change his mind.  He will continue getting the Lupron with Dr. Matute and I asked him to give the date of this appointment to my nurse when he sees her back in a month.  We will give his Xgeva today.  We will get CT chest abdomen pelvis and total body bone scan towards the middle to end of October.  Presently other than for the hot flashes feels great.    -9/20/2022 PSA 8.320    -9/22/2022 [facility] follow-up: Chris is doing well on Zytiga, prednisone along with Xgeva.  Labs reviewed from 9/20/2022 are unremarkable, CBC was normal, CMP with creatinine of 1.34 otherwise normal, this is stable from his baseline.  PSA stable at 8.320.  He received Lupron recently with Dr. Matute, he thinks last week or so, states his next appointment is in February.  He will continue treatment unchanged.  We will repeat restaging CT chest, abdomen and pelvis and total body bone scan in October prior to return and I have ordered those today.  We will also repeat his PSA.    -9/22/2022 CT chest abdomen pelvisCompared to July 2022 [facility] showed no evidence of disease progression in the chest with no substantial sclerotic bony lesions and decrease in size of retroperitoneal and pelvic nodes with persistent cecal wall thickening and suboptimal bladder distention.  Total body bone scan showed decrease in previously seen uptake in the left third rib with diffuse sclerosis representing treatment response with no new foci.    -10/18/2022 [facility] follow-up: I reviewed bone scan  "and CT reports as above with no evidence of progression.  Tolerating Zytiga prednisone and Xgeva.  Getting Lupron regularly with Dr. Matute next appointment coming in February.  We will repeat his CT chest abdomen pelvis and total body bone scan in April.  He will follow with my nurse practitioner in the interim.    -1/10/2023 PSA 3.450  -1/11/2023 [facility] Oncology clinic follow-up: Mr. Morfin continues to do well on current therapy with Zytiga, prednisone and Xgeva with no unusual side effects.  He has no new worrisome symptoms.  He is due for his next Lupron injection in February with Dr. Matute.  His PSA continues to trend downward, current PSA from yesterday was 3.450.  We will continue therapy unchanged, he will receive Xgeva today.  Has had no hypocalcemia, his CMP, phosphorus and magnesium are all normal.  We will repeat restaging scans in April.  -3/7/2023 PSA 2.460  -3/8/2023 [facility] Oncology clinic follow-up:  Mr. Morfin is doing well.  He is tolerating therapy with Zytiga, prednisone and Xgeva with no significant side effects.  He has hot flashes but these are tolerable.  He had Lupron injection 2/24/2023 with Dr. Matute.  His PSA continues to decline, current PSA 2.460.  He will continue therapy unchanged.  We will repeat restaging scans late April prior to return in 2 months and I have ordered those today.  He is working with his PCP Rodrigo Ram on his hypertension.  His b/p today was 160/88.  He was to have increased his losartan but I do not think he has done that yet as he said \"I still have some of my old prescription left\".  -4/4/2023 PSA 2.55  - 4/26/2023 CT chest abdomen pelvis [place] comparison 10/10/2022 showed stable left third rib, T1, left fourth and eighth rib, right seventh rib.  Probable liver cyst.  Few diverticuli.  Mild further decrease in a few of the previously enlarged nodes.  Left external iliac 12 mm compared to 16 mm.  Right common iliac 7 mm stable.  Lower left " periaortic 8 mm previously 13 mm.  No new lytic or blastic osseous lesions.  Total body bone scan comparison 10/10/2022, 7/22/2022, and CT 4/26/2023.  No new sites of increased uptake.  1 focal left third rib hotspot consistent with bone metastasis.    -5/2/2023 [facility] oncology follow-up: I reviewed interval notes since I last saw him from my nurse practitioner as outlined above in interval images and reports thereof from [facility] that shows no new sites of bone metastasis and no kizzy or visceral progression.  PSA stabilized around 2.5.  In the absence of symptomatic or radiographic progression, I would be unlikely to change therapy just on the basis of a PSA rise and for now the PSA is stable.  We will continue Zytiga prednisone and Xgeva and he will follow-up with my nurse practitioner 5/30/2023 with repeat CTs and bone scan in 6 months.  I asked him to check with his primary care today regarding his systolic in the 170s.    -5/31/2023 [facility] clinic follow-up: Chris continues to do well on current therapy with Zytiga, prednisone and Xgeva along with Lupron that he receives with Dr. Matute.  His PSA remains stable, it was lower this month compared to last month, current PSA 1.940.  Continues to deal with hypertension, on arrival today his blood pressure was 210/92 however this was using a wrist cuff, when I rechecked it manually his blood pressure was 160/90.  Still has room for improvement despite recent increase in his losartan to 100 mg daily.  He will follow-up with Dr. Ram for further management.  We will continue therapy unchanged.  We will repeat restaging scans in October.    -7/26/2023 [facility] clinic follow-up: Chris overall continues to do well on current therapy with Zytiga, prednisone and Xgeva.  He receives Lupron every 6 months with Dr. Matute and states that is scheduled for August.  PSA from yesterday is pending, PSA on 6/27/2023 was 1.590 which was  continuing to decline, in February it was 3.250.  His creatinine this past month has bumped up.  He feels he is staying hydrated but I encouraged him to increase his fluid intake.  I discussed with him that this could be from his hypertension, some of his medications.  His blood pressure today is 165/92.  He has an appointment with his primary care provider Dr. Teri Ram next week and can further discuss with her.  No adjustment needed for Xgeva.  I refilled his prednisone today.  I will see him back in 1 month for follow-up with continued monitoring of his labs.  He may end up needing to see nephrology.  We will repeat restaging scans in October.    -9/29/2023 PSA 1.810  -8/23/2023 [facility] Oncology clinic follow-up: Chris continues on therapy with Zytiga, prednisone and Xgeva, he also receives Lupron every 6 months with Dr. Matute with most recent given on 8/7/2023.  He is scheduled for a cystoscopy in a few weeks as his last few UAs per the patient's report had microscopic hematuria, he has had no steve hematuria.  His creatinine continues to remain elevated at 1.82, BUN is 40, GFR is 40.  I will get him to nephrology for further evaluation.  Blood pressure today was fairly good at 161/86, he continues to follow with Dr. Ram for management.  PSA on 7/25/2023 was up slightly from prior month, PSA in July was 2.140, in June it was 1.590, PSA from yesterday is pending.  I plan on repeating restaging CT scans and bone scans in October however if his PSA is up significantly then I will do sooner.  I will see him back for follow-up in 1 month.  -8/23/2024 PSA 1.860    -9/20/2023 [facility] Oncology clinic follow-up: Chris is tolerating treatment with Zytiga, prednisone and Xgeva.  He is up-to-date with his Lupron injection, last received in August with Dr. Matute.  He had cystoscopy on 9/8/2023 that was normal. I referred him to nephrology when I saw him last in August as his creatinine has been increasing,  creatinine yesterday was a little better 1.51, GFR is 50, creatinine clearance 63.8.  He is still awaiting an appointment with nephrology, per our  it will be early November.  We will repeat his restaging scans prior to return and I will do his CT scans without contrast in light of his new renal insufficiency.  We will also get total body bone scan and I have ordered those today.  His PSA yesterday was 1.810.    -10/9/2023 CT chest abdomen Pelvis without contrast compared to April 2023 shows slightly prominent pelvic lymph nodes left external iliac 8 mm compared to prior study.  Right internal iliac heterogenous 19 mm compared to 11 mm prior.  Stable sclerotic bone lesions and no new lesions.  Stable bone scan with no new sites of disease    -10/17/2023 [facility] oncology clinic follow-up: With elevated creatinine, CTs done without contrast showed very slight increased prominence by few millimeters of some internal iliac and external iliac nodes for which PSMA PET could be done but for now I will check his PSA and have him see my nurse practitioner back in a week and unless the PSA is significantly rising I would continue the Zytiga, prednisone, Xgeva and February dose of Lupron with Dr. Matute and make sure he follows with nephrology.  If his PSA is significantly rising then my nurse practitioner will order PSMA PET.    -10/17/2023 PSA 1.5  -11/14/2023 PSA 1.510  -11/15/2023 [facility] Oncology clinic follow-up: Mr. Ferrara continues to do well on current therapy with Zytiga, prednisone, Xgeva and Lupron that he receives with Dr. Matute.  His PSA is stable at 1.5.  Would not change therapy for now, we will plan on repeating restaging scans in February unless PSA starts to rise or he has new concerns.  Overall he feels good.  He is now established with nephrology and will continue to follow with them regarding his renal insufficiency, I reviewed note from consultation with Dr. Armstrong 11/6/2023.    -1/9/2024  PSA 1.050    -1/10/2024 [facility] Oncology clinic follow-up: Chris overall continues to tolerate current therapy with Zytiga, prednisone, Xgeva and Lupron.  He states his next Lupron injection with Dr. Matute is due late February.  His PSA is stable at 1.050. Creatinine stable at 1.64, he is following with nephrology, he has a an appointment with Dr. Armstrong in February for follow-up.  Hypertension being managed by his primary care provider, target systolic ideally 120s-140s, today it is running a little high at 172/90, yesterday when he was here for labs it was 138/80.  We will continue therapy unchanged with plans to repeat restaging scans in April.    -2/6/2024 PSA 0.753    -2/7/2024 [facility] oncology clinic follow-up: Chris continues to do well on current therapy with Zytiga, prednisone, Xgeva and Lupron.  He is up-to-date on Lupron injection next due later this month with Dr. Matute.  His PSA continues to slowly decrease, current PSA is 0.753.  He has no new worrisome symptoms.  We will continue therapy unchanged, I have ordered restaging scans for late March prior to his return in April. He is following with nephrology for his renal insufficiency, his creatinine yesterday was good at 1.21.  I will do his CT scans with contrast unless something changes in the interim. He follows closely with Dr. Ram for management of his hypertension. I reviewed notes from his last office visit with her earlier this month on 2/1/2024, also reviewed labs in detail with the patient today.  All questions were answered to his satisfaction.    -3/5/2024 PSA slowly declining 0.73.  Creatinine fluctuating.  1.69 with GFR 43 otherwise unremarkable CMP.  Phosphorus low 2.1    -4/8/2024 CT chest, abdomen and pelvis shows stable sclerotic lesions in the thoracic spine and bilateral ribs and right acetabulum.  Stable appearance of pelvic lymph nodes.  No new sites concerning for progression of disease.  Total body bone scan shows  stable diffuse increased uptake of the left third rib correlates with diffuse sclerosis and enlargement on CT, no new sites of active osseous lesions.  -4/30/2024 PSA 0.713  -5/1/2024 [facility] oncology clinic follow-up: Chris continues to do well on current therapy with Zytiga, prednisone, Xgeva and Lupron.  He receives his Lupron every 6 months with Dr. Matute, last administered 2/5/2024.  Current CT scans and bone scan showed no progression of disease, PSA is stable at 0.713.  His CBC is unremarkable, CMP shows creatinine stable at 1.50 otherwise normal.  He does follow with nephrology.  We will continue treatment unchanged.  I will see him back in 2 months for follow-up.  We will repeat restaging scans in 6 months unless he has any new symptoms or significant change in his PSA.    -6/25/2024 PSA 0.623  -6/26/2024 [facility] oncology clinic follow-up: Chris overall continues to tolerate current therapy with Zytiga, prednisone, Xgeva and Lupron.  We did call to confirm his next Lupron injection and that is scheduled for late July with Dr. Matute.  His PSA for the most part is stable at 0.623, we will continue to monitor.  He has no new worrisome symptoms.  I did discuss with him today his creatinine which has increased to 1.86, BUN was 32.  Reminded him to be sure and stay hydrated.  He does follow with nephrology and his next appointment is in August.  He is on Xgeva however there is no indication for any adjustment, his creatinine clearance is greater than 30.  Blood pressure today was under good control at 130/78.  We will continue treatment unchanged.  I will see him back in 1 month for follow-up.  Plan to repeat restaging scans in October unless his PSA rises or he has worrisome symptoms.    -7/23/2024 PSA 0.606    -9/17/2024 PSA 0.455  -9/18/2024 [facility] oncology clinic follow-up: Chris is doing well overall.  Tolerates therapy with Zytiga, prednisone, Xgeva and Lupron.  He last received Lupron in August with  Dr. Matute.  His PSA had been slowly creeping up however it has now went back down and is currently 0.455.  He will continue treatment unchanged.  We will repeat CT chest, abdomen and pelvis prior to return and I will do that without contrast in light of his renal insufficiency.  Current creatinine is stable at 1.5, he does follow with nephrology.  We will also repeat total body bone scan.  We will see him back in 1 months for follow-up and sooner if any concerns.  I did discuss with him that he can take Tylenol if needed or use topical over-the-counter pain relief ointment such as Biofreeze etc. for his back pain but thus far it has not required any intervention, he does not like to take pain medications.    -10/14/2024 CT chest abdomen pelvis without contrast [facility] compared to August 2024 shows unchanged sclerotic T1 vertebral body with expansile sclerotic left third rib without fracture and other small sclerotic nodules all unchanged.  Right external iliac node 20 mm increased from 7 mm on prior exam with internal iliac node 15 mm unchanged.  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    -10/22/2024 [facility] oncology clinic follow-up: Feeling great.  While he has 1 slightly larger node, given that his PSA has not been rapidly rising and all other disease is stable I will have him continue Zytiga prednisone Xgeva and he gets his next Lupron in February with Dr. Matute.  He will see my nurse practitioner and she will order repeat CT chest abdomen pelvis and bone scan for January and will follow-up with her in January to go over those results.  If either his PSA or the scans show progression beyond just the single slightly larger iliac node, she will order a PSMA PET and then get him back to me.  If the PSA is stable and the subsequent scans are stable then we will just continue his current regimen with quarterly imaging or sooner as  symptoms or PSA dictate.    -11/19/2024 PSA 0.453  -1/14/2025 PSA 0.351  -1/13/2025 received Lupron with Dr. Matute  -2/3/2025 total body bone scan shows overall stable, 1 new site involving the left costovertebral junction of the left sixth rib with no definite CT correlate and favored to represent degenerative/traumatic changes.  CT chest, abdomen and pelvis with no evidence of disease progression in the chest, unchanged expansile sclerotic left third rib mass and sclerotic T1 vertebral body focus.  Abdomen and pelvis with mild interval increase in bilateral external iliac lymph nodes, unchanged right internal iliac lymph node, for reference right external iliac lymph node now measuring up to 28 mm compared to 20 mm on prior, left external iliac lymph node now measuring up to 20 mm compared to 10 mm on prior.  Right internal iliac 15 mm stable.  -2/11/2025 PSA 0.364  -2/12/2025 [facility] oncology clinic follow-up: Chris huerta is tolerating current therapy with Zytiga, prednisone and Xgeva monthly, he receives Lupron with Dr. Matute.  He last received Lupron 1/13/2025, next Lupron due in June.  His PSA is stable at 0.364.  Current restaging scans on 2/3/2025 show 1 new site in the left costovertebral junction of the left sixth rib, this is favored to represent degenerative/traumatic change, he does not recall any trauma to this area.  He is having no pain in this area.  He still has intermittent pain in his right mid back that is worse typically when he is lying down.  He does have a sclerotic lesion at T1, I will go ahead and get an MRI of his thoracic spine just to make sure this is stable.  There has been a mild increase in the bilateral external iliac nodes and the right internal iliac node is stable.  Overall due to the stability of his PSA, his tolerability of current treatment and his desire not to pursue chemotherapy we will continue treatment unchanged.  We will repeat restaging scans in 3 months for  follow-up.  If he has progressive new disease or worsening symptoms we would then want to get PSMA PET scan for evaluation, I think he would consider possibly [place] down the road.  We will see him back in 1 month for follow-up to go over his MRI and continue current therapy.     -3/11/2025 [facility] oncology clinic follow-up: Tolerating Zytiga prednisone and Xgeva.  Next Lupron in June with Dr. Matute.  PSA staying low.  Patient could not tolerate MRI relative to his back pain due to claustrophobia.  Minimal back pain.  No leg weakness.  If his PSA rises we will get PSMA PET otherwise we will repeat his bone scan and CAT scans mid-May.      Total time of care today inclusive of time spent today prior to patient's arrival reviewing interval data and during visit translating to patient putting forth a plan as outlined above and after visit instituting this plan took 35 minutes patient care time throughout the day today.    Fritz Goodosn MD    03/11/2025

## 2025-03-11 NOTE — PROGRESS NOTES
CHIEF COMPLAINT: No new somatic complaints    Problem List:  Oncology/Hematology History Overview Note   1.  Prostate cancer clinical T1c and 2M1B stage IVb treated with 4 cycles of Taxotere, stopped due to syncope with negative cardiac work-up, with marked drop in PSA of 18.6 from over 900 at baseline, continued on Zytiga and prednisone.  2.  Urinary retention with Goodwin in place 1/24/2022.  On finasteride 1/24/2022.  3.  Covid 19 December 2021  4.  Hypertension  5.  Stage 3a chronic kidney disease    Oncology history timeline:  -11/29/2021  with symptoms of urinary retention.  -2/15/2022 prostate biopsy adenocarcinoma grade group 5 and 3 out of 12 cores, grade group 4 in 1 out of 12 cores, grade group 3 in 8 out of 12 cores.  -2/24/2022 CT chest abdomen pelvis and total body bone scan shows left third rib, right acetabulum, periaortic and retroperitoneal kizzy metastases complaining of pain in the left chest and right hip.  Also possible sclerotic left 10th rib on CT.  Spleen mildly enlarged 13.8 cm.  Hepatic steatosis.  Small liver cyst.  Fat stranding in the periaortic and mesenteric region consistent with reactive/inflammatory stranding.  Multiple enlarged periaortic and retroperitoneal nodes and lymphadenopathy largest 4.9 cm.  CT chest describes tiny sclerotic foci in left eighth rib and right lateral seventh rib and T1.  Multiple small mediastinal and bilateral axillary nodes seen with small hypodense left thyroid nodule.  Creatinine 1.7.  -3/4/2022 office note Dr. Rolando Matute: Patient was seen after his elevated PSA by Dr. Vera and prostate biopsy scheduled.  However, he developed COVID-19 and this was canceled and the patient did not reschedule due to lack of insurance.  Saw Dr. Matute back on 1/24/2022 with plans to get staging CTs and bone scan with results as outlined above.  Started Casodex and to return on 3/11/2022 for Lupron.  Referral made to medical oncology for other additional  castrate naïve prostate cancer therapies.  TURP planned for urinary retention symptoms.    -3/15/2022 [facility] medical oncology initial consultation: I reviewed the above data with the patient.  With his fairly bulky retroperitoneal adenopathy and bone metastases including extra axial metastases, he would fit the data from the CHAARTED trial for which Taxotere x6 followed by Zytiga prednisone along with the planned Lupron already being planned by Dr. Matute would be reasonable.  Equally reasonable in terms of comparisons with bicalutamide and Lupron would be Zytiga prednisone plus Lupron or Xtandi plus Lupron or apalutamide plus Lupron.  None of these have been compared head-to-head but all have beaten combined androgen deprivation therapy with bicalutamide and Lupron.  At the age of 67 and in order to have as many bullets as possible down the road and hence saving some of the hormone blockade options for later and using chemotherapy when he is younger and healthier for a more time-limited.,  He has opted for the Taxotere x6 followed by Zytiga and prednisone.  We will also give him Xgeva to improve bone health and given metastatic disease, as with all metastatic prostate cancer patients, he needs genetic counseling both for his sake as well as his family's.  If he were to have BRCA1 or other such  mutations, then that also opens up the options for PARP inhibitors etc. down the road.  He has his TURP scheduled for 2/24/2022 and we will start treatment a couple of weeks after that and he will get chemo preparation visit in the meantime and I will get capital surgeons to place a port.  We will check his PSA after his TURP when he sees my nurse practitioner back early April for the start of chemotherapy and repeat the PSA and CT chest abdomen pelvis and bone scan after the Taxotere is complete.    -3/31/2022 chemotherapy education and needs assessment completed    -4/7/2022 began docetaxel and Xgeva.  .0.   We will do his Xgeva every 6 weeks to coordinate with his docetaxel infusions.    -4/19/2022 patient stopped Casodex    -5/17/2022 patient reported seeing his PCP for an abscess in his suprapubic area.  Placed on clindamycin, will delay treatment 1 week.    -5/25/2022 PSA 34.3  -5/26/2022 [facility] clinic follow-up: Chris has an abscess in the suprapubic area, it has improved on antibiotic therapy but I am concerned if we treat him it will just flare back up unless it is drained.  I will get him to Dr. Miller who placed his port for I&D and culture.  He is on clindamycin and states he completes course of treatment tomorrow.  I will delay his treatment with Taxotere 1 more week.  We will give his Xgeva today.  I will see him back in 1 week for follow-up to assess whether or not we can resume his Taxotere.  His PSA has dropped nicely with treatment thus far, PSA yesterday was 34.3 which is down from a PSA of 259.0 when we started treatment, it has been as high as 911 in November.    -6/2/2022 [facility] Oncology clinic follow-up: Chris went to see Dr. Miller to have his abscess lanced however apparently there was a long wait and he left without being seen.  He did call his PCP and was given 5 more days of clindamycin and the abscess now seems to have resolved.  We discussed today that he is at risk for recurrence of infection due to immunocompromise state with chemotherapy.  He will notify us if he has any return of his abscess.  He does have intertrigo under his panniculus, I have advised him to keep this area dry, to apply topical drying/antifungal powders.  Also recommended looser clothing.  His counts are adequate to continue therapy, we will resume Taxotere today with cycle #3.  We will repeat restaging scans after 6 courses.  We are doing Xgeva every 6 weeks to coordinate with his Taxotere.  Once he finishes Taxotere we can then go back to every 4 weeks.  His next Xgeva is not due until 7/14/2022.  His  calcium is running a little low, he is going to  calcium supplement.    -6/23/2022 [facility] follow-up: Chris overall is doing well on treatment with Taxotere.  Labs reviewed from yesterday and are unremarkable.  We will continue treatment today unchanged.  His suprapubic abscess resolved on clindamycin.  He will continue to use drying powder/antifungal powder under his panniculus for intertrigo.  Today will be cycle #4 of a planned 6 courses of Taxotere.  He is also on Xgeva, next dose due 7/14/2022, we are doing this every 6 weeks for now to line up with his chemotherapy, can go back to every 4 weeks after he finishes Taxotere.  Calcium from CMP yesterday was normal.    -7/13/2022 [facility] clinic follow-up: Mr. Morfin has had issues around day 3 after each treatment ranging from chest pain after his first treatment for which he did not seek medical attention, fatigue after the next few treatments, but 3 days after his most recent treatment on 6/23/2022 he had a syncopal episode at home and once again did not seek medical attention.  I discussed with him that this was not acceptable, if he has occurrences like this someone needs to call 911, it is difficult to figure out what is going on after the fact, the best time to work this up would be during the occurrence.  For now we will get labs today with CBC, CMP, PSA.  I will refer him to cardiology for further evaluation.  We will hold Taxotere most likely, I will see him tomorrow to go over his lab results.  I will also repeat his restaging scans with CT chest, abdomen and pelvis and will include CT of the head in light of his syncopal episode.      -7/13/2022 PSA 18.6    -7/14/2022 [facility] clinic follow-up: Mr. Morfin returns today to review his labs from yesterday.  Labs are unremarkable.  PSA down to 18.6 from a high of 259.0 in April prior to starting treatment.  CBC and CMP unremarkable, magnesium is normal at 2.3,  phosphorus slightly low at 2.4.  We will hold therapy for now in light of his syncopal episode on day 3 after his last treatment and prior episodes with chest pain and severe fatigue that all occurred on day 3 after his Taxotere.  We will restage him with CT head, chest, abdomen and pelvis (I am including the head in light of his syncopal episode).  I have also referred him to cardiology, he states that he received a call from them about making an appointment however he wanted to talk to us today first.  I emphasized the importance to him of making and keeping that appointment and he states understanding.  I also once again emphasized the requirement to seek emergency medical attention if he has any episodes in the future such as chest pain or syncopal events.  Whether or not we try and complete Taxotere with 2 more courses or move to the next line of therapy will be decided when he returns to discuss with Dr. Goodson and review his restaging scans.    -7/15/2022 saw Dr. Diaz cardiologist.  For syncope he ordered echocardiogram and 48-hour Holter monitor.    -7/15/2022 Holter monitor relatively benign.    -7/22/2022 CT brain with and without contrast negative.  CT chest abdomen and pelvis shows some nonspecific cecal wall thickening.  No progression in the chest.  T1 and ribs stable.  Decrease in multiple retroperitoneal and pelvic nodes.  Slight increase in right acetabular sclerotic lesion.  Persistent but improved circumferential bladder wall thickening.  Total body bone scan shows stable left third rib and no evidence of new bone metastases.    -7/19/2022 ejection fraction 65% with grade 1 diastolic dysfunction.    -7/26/2022 [facility] clinic follow-up: Given presyncopal symptoms occurred 3 days after Taxotere and has not otherwise occurred any other time and his cardiology work-up is fairly unremarkable and his disease is under good control as witnessed by the report above of the CTs of head chest  abdomen pelvis and bone scan, I will hold cycle 5 and 6 of Taxotere and continue 1 now with Zytiga and prednisone.  With reference to the coincidental cecal wall thickening found on CT, we will get him to [facility] surgeons for colonoscopy.  He will see my nurse practitioner back in August for next cycle of Abiraterone prednisone.  He will continue his Lupron with Dr. Matute.  We will continue his Xgeva.  We will repeat his CT chest abdomen pelvis and bone scan again in 3 months.  He will see my nurse practitioner in the interim.    -8/23/2022 [facility] clinic follow-up: No further presyncopal symptoms.  Feeling great other than for hot flashes.  Did commend for now we will continue on with his Zytiga prednisone and he will get his Xgeva today based on labs from 8/10/2022.  He opted out of getting the colonoscopy that I recommended and he will not change his mind.  He will continue getting the Lupron with Dr. Matute and I asked him to give the date of this appointment to my nurse when he sees her back in a month.  We will give his Xgeva today.  We will get CT chest abdomen pelvis and total body bone scan towards the middle to end of October.  Presently other than for the hot flashes feels great.    -9/20/2022 PSA 8.320    -9/22/2022 [facility] follow-up: Chris is doing well on Zytiga, prednisone along with Xgeva.  Labs reviewed from 9/20/2022 are unremarkable, CBC was normal, CMP with creatinine of 1.34 otherwise normal, this is stable from his baseline.  PSA stable at 8.320.  He received Lupron recently with Dr. Matute, he thinks last week or so, states his next appointment is in February.  He will continue treatment unchanged.  We will repeat restaging CT chest, abdomen and pelvis and total body bone scan in October prior to return and I have ordered those today.  We will also repeat his PSA.    -9/22/2022 CT chest abdomen pelvisCompared to July 2022 [facility] showed no evidence of  disease progression in the chest with no substantial sclerotic bony lesions and decrease in size of retroperitoneal and pelvic nodes with persistent cecal wall thickening and suboptimal bladder distention.  Total body bone scan showed decrease in previously seen uptake in the left third rib with diffuse sclerosis representing treatment response with no new foci.    -10/18/2022 [facility] medical oncology follow-up: I reviewed bone scan and CT reports as above with no evidence of progression.  Tolerating Zytiga prednisone and Xgeva.  Getting Lupron regularly with Dr. Matute next appointment coming in February.  We will repeat his CT chest abdomen pelvis and total body bone scan in April.  He will follow with my nurse practitioner in the interim.    -1/10/2023 PSA 3.450  -1/11/2023 [facility] Oncology clinic follow-up: Mr. Morfin continues to do well on current therapy with Zytiga, prednisone and Xgeva with no unusual side effects.  He has no new worrisome symptoms.  He is due for his next Lupron injection in February with Dr. Matute.  His PSA continues to trend downward, current PSA from yesterday was 3.450.  We will continue therapy unchanged, he will receive Xgeva today.  Has had no hypocalcemia, his CMP, phosphorus and magnesium are all normal.  We will repeat restaging scans in April.  -3/7/2023 PSA 2.460  -3/8/2023 [facility] Oncology clinic follow-up:  Mr. Morfin is doing well.  He is tolerating therapy with Zytiga, prednisone and Xgeva with no significant side effects.  He has hot flashes but these are tolerable.  He had Lupron injection 2/24/2023 with Dr. Matute.  His PSA continues to decline, current PSA 2.460.  He will continue therapy unchanged.  We will repeat restaging scans late April prior to return in 2 months and I have ordered those today.  He is working with his PCP Rodrigo Ram on his hypertension.  His b/p today was 160/88.  He was to have increased his losartan but I do not think he has done that yet  "as he said \"I still have some of my old prescription left\".  -4/4/2023 PSA 2.55  - 4/26/2023 CT chest abdomen pelvis [place] comparison 10/10/2022 showed stable left third rib, T1, left fourth and eighth rib, right seventh rib.  Probable liver cyst.  Few diverticuli.  Mild further decrease in a few of the previously enlarged nodes.  Left external iliac 12 mm compared to 16 mm.  Right common iliac 7 mm stable.  Lower left periaortic 8 mm previously 13 mm.  No new lytic or blastic osseous lesions.  Total body bone scan comparison 10/10/2022, 7/22/2022, and CT 4/26/2023.  No new sites of increased uptake.  1 focal left third rib hotspot consistent with bone metastasis.    -5/2/2023 [facility] medical oncology follow-up: I reviewed interval notes since I last saw him from my nurse practitioner as outlined above in interval images and reports thereof from [facility] that shows no new sites of bone metastasis and no kizzy or visceral progression.  PSA stabilized around 2.5.  In the absence of symptomatic or radiographic progression, I would be unlikely to change therapy just on the basis of a PSA rise and for now the PSA is stable.  We will continue Zytiga prednisone and Xgeva and he will follow-up with my nurse practitioner 5/30/2023 with repeat CTs and bone scan in 6 months.  I asked him to check with his primary care today regarding his systolic in the 170s.    -5/31/2023 [facility] clinic follow-up: Chris continues to do well on current therapy with Zytiga, prednisone and Xgeva along with Lupron that he receives with Dr. Matute.  His PSA remains stable, it was lower this month compared to last month, current PSA 1.940.  Continues to deal with hypertension, on arrival today his blood pressure was 210/92 however this was using a wrist cuff, when I rechecked it manually his blood pressure was 160/90.  Still has room for improvement despite recent increase in his losartan to 100 mg daily.  He will follow-up " with Dr. Ram for further management.  We will continue therapy unchanged.  We will repeat restaging scans in October.    -7/26/2023 [facility] follow-up: Chris overall continues to do well on current therapy with Zytiga, prednisone and Xgeva.  He receives Lupron every 6 months with Dr. Matute and states that is scheduled for August.  PSA from yesterday is pending, PSA on 6/27/2023 was 1.590 which was continuing to decline, in February it was 3.250.  His creatinine this past month has bumped up.  He feels he is staying hydrated but I encouraged him to increase his fluid intake.  I discussed with him that this could be from his hypertension, some of his medications.  His blood pressure today is 165/92.  He has an appointment with his primary care provider Dr. Teri Ram next week and can further discuss with her.  No adjustment needed for Xgeva.  I refilled his prednisone today.  I will see him back in 1 month for follow-up with continued monitoring of his labs.  He may end up needing to see nephrology.  We will repeat restaging scans in October.    -9/29/2023 PSA 1.810  -8/23/2023 [facility] clinic follow-up: Chris continues on therapy with Zytiga, prednisone and Xgeva, he also receives Lupron every 6 months with Dr. Matute with most recent given on 8/7/2023.  He is scheduled for a cystoscopy in a few weeks as his last few UAs per the patient's report had microscopic hematuria, he has had no steve hematuria.  His creatinine continues to remain elevated at 1.82, BUN is 40, GFR is 40.  I will get him to nephrology for further evaluation.  Blood pressure today was fairly good at 161/86, he continues to follow with Dr. Ram for management.  PSA on 7/25/2023 was up slightly from prior month, PSA in July was 2.140, in June it was 1.590, PSA from yesterday is pending.  I plan on repeating restaging CT scans and bone scans in October however if his PSA is up significantly then I will do  sooner.  I will see him back for follow-up in 1 month.  -8/23/2024 PSA 1.860    -9/20/2023 [facility] clinic follow-up: Chris is tolerating treatment with Zytiga, prednisone and Xgeva.  He is up-to-date with his Lupron injection, last received in August with Dr. Matute.  He had cystoscopy on 9/8/2023 that was normal. I referred him to nephrology when I saw him last in August as his creatinine has been increasing, creatinine yesterday was a little better 1.51, GFR is 50, creatinine clearance 63.8.  He is still awaiting an appointment with nephrology, per our  it will be early November.  We will repeat his restaging scans prior to return and I will do his CT scans without contrast in light of his new renal insufficiency.  We will also get total body bone scan and I have ordered those today.  His PSA yesterday was 1.810.    -10/9/2023 CT chest abdomen Pelvis without contrast compared to April 2023 shows slightly prominent pelvic lymph nodes left external iliac 8 mm compared to prior study.  Right internal iliac heterogenous 19 mm compared to 11 mm prior.  Stable sclerotic bone lesions and no new lesions.  Stable bone scan with no new sites of disease    -10/17/2023 [facility]ology clinic follow-up: With elevated creatinine, CTs done without contrast showed very slight increased prominence by few millimeters of some internal iliac and external iliac nodes for which PSMA PET could be done but for now I will check his PSA and have him see my nurse practitioner back in a week and unless the PSA is significantly rising I would continue the Zytiga, prednisone, Xgeva and February dose of Lupron with Dr. Matute and make sure he follows with nephrology.  If his PSA is significantly rising then my nurse practitioner will order PSMA PET.    -10/17/2023 PSA 1.5  -11/14/2023 PSA 1.510  -11/15/2023 [facility] clinic follow-up: Mr. Ferrara continues to do well on current therapy with Zytiga, prednisone, Xgeva  and Lupron that he receives with Dr. Matute.  His PSA is stable at 1.5.  Would not change therapy for now, we will plan on repeating restaging scans in February unless PSA starts to rise or he has new concerns.  Overall he feels good.  He is now established with nephrology and will continue to follow with them regarding his renal insufficiency, I reviewed note from consultation with Dr. Armstrong 11/6/2023.    -1/9/2024 PSA 1.050    -1/10/2024 [facility] On[facility] clinic follow-up: Chris overall continues to tolerate current therapy with Zytiga, prednisone, Xgeva and Lupron.  He states his next Lupron injection with Dr. Matute is due late February.  His PSA is stable at 1.050. Creatinine stable at 1.64, he is following with nephrology, he has a an appointment with Dr. Armstrong in February for follow-up.  Hypertension being managed by his primary care provider, target systolic ideally 120s-140s, today it is running a little high at 172/90, yesterday when he was here for labs it was 138/80.  We will continue therapy unchanged with plans to repeat restaging scans in April.    -2/6/2024 PSA 0.753    -2/7/2024 [facility] oncology clinic follow-up: Chris continues to do well on current therapy with Zytiga, prednisone, Xgeva and Lupron.  He is up-to-date on Lupron injection next due later this month with Dr. Matute.  His PSA continues to slowly decrease, current PSA is 0.753.  He has no new worrisome symptoms.  We will continue therapy unchanged, I have ordered restaging scans for late March prior to his return in April. He is following with nephrology for his renal insufficiency, his creatinine yesterday was good at 1.21.  I will do his CT scans with contrast unless something changes in the interim. He follows closely with Dr. Ram for management of his hypertension. I reviewed notes from his last office visit with her earlier this month on 2/1/2024, also reviewed labs in detail with the patient today.  All questions were  answered to his satisfaction.    -3/5/2024 PSA slowly declining 0.73.  Creatinine fluctuating.  1.69 with GFR 43 otherwise unremarkable CMP.  Phosphorus low 2.1    -4/8/2024 CT chest, abdomen and pelvis shows stable sclerotic lesions in the thoracic spine and bilateral ribs and right acetabulum.  Stable appearance of pelvic lymph nodes.  No new sites concerning for progression of disease.  Total body bone scan shows stable diffuse increased uptake of the left third rib correlates with diffuse sclerosis and enlargement on CT, no new sites of active osseous lesions.  -4/30/2024 PSA 0.713  -5/1/2024 [facility] clinic follow-up: Chris continues to do well on current therapy with Zytiga, prednisone, Xgeva and Lupron.  He receives his Lupron every 6 months with Dr. Matute, last administered 2/5/2024.  Current CT scans and bone scan showed no progression of disease, PSA is stable at 0.713.  His CBC is unremarkable, CMP shows creatinine stable at 1.50 otherwise normal.  He does follow with nephrology.  We will continue treatment unchanged.  I will see him back in 2 months for follow-up.  We will repeat restaging scans in 6 months unless he has any new symptoms or significant change in his PSA.    -6/25/2024 PSA 0.623  -6/26/2024 [facility] clinic follow-up: Chris overall continues to tolerate current therapy with Zytiga, prednisone, Xgeva and Lupron.  We did call to confirm his next Lupron injection and that is scheduled for late July with Dr. Matute.  His PSA for the most part is stable at 0.623, we will continue to monitor.  He has no new worrisome symptoms.  I did discuss with him today his creatinine which has increased to 1.86, BUN was 32.  Reminded him to be sure and stay hydrated.  He does follow with nephrology and his next appointment is in August.  He is on Xgeva however there is no indication for any adjustment, his creatinine clearance is greater than 30.  Blood pressure today was under good  control at 130/78.  We will continue treatment unchanged.  I will see him back in 1 month for follow-up.  Plan to repeat restaging scans in October unless his PSA rises or he has worrisome symptoms.    -7/23/2024 PSA 0.606    -9/17/2024 PSA 0.455  -9/18/2024 [facility] clinic follow-up: Chris is doing well overall.  Tolerates therapy with Zytiga, prednisone, Xgeva and Lupron.  He last received Lupron in August with Dr. Matute.  His PSA had been slowly creeping up however it has now went back down and is currently 0.455.  He will continue treatment unchanged.  We will repeat CT chest, abdomen and pelvis prior to return and I will do that without contrast in light of his renal insufficiency.  Current creatinine is stable at 1.5, he does follow with nephrology.  We will also repeat total body bone scan.  We will see him back in 1 months for follow-up and sooner if any concerns.  I did discuss with him that he can take Tylenol if needed or use topical over-the-counter pain relief ointment such as Biofreeze etc. for his back pain but thus far it has not required any intervention, he does not like to take pain medications.    -10/14/2024 CT chest abdomen pelvis without contrast [place] [facility] compared to August 2024 shows unchanged sclerotic T1 vertebral body with expansile sclerotic left third rib without fracture and other small sclerotic nodules all unchanged.  Right external iliac node 20 mm increased from 7 mm on prior exam with internal iliac node 15 mm unchanged.  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    -10/22/2024 [facility] follow-up: Feeling great.  While he has 1 slightly larger node, given that his PSA has not been rapidly rising and all other disease is stable I will have him continue Zytiga prednisone Xgeva and he gets his next Lupron in February with Dr. Matute.  He will see my nurse practitioner and she  will order repeat CT chest abdomen pelvis and bone scan for January and will follow-up with her in January to go over those results.  If either his PSA or the scans show progression beyond just the single slightly larger iliac node, she will order a PSMA PET and then get him back to me.  If the PSA is stable and the subsequent scans are stable then we will just continue his current regimen with quarterly imaging or sooner as symptoms or PSA dictate.    -11/19/2024 PSA 0.453  -1/14/2025 PSA 0.351  -1/13/2025 received Lupron with Dr. Matute  -2/3/2025 total body bone scan shows overall stable, 1 new site involving the left costovertebral junction of the left sixth rib with no definite CT correlate and favored to represent degenerative/traumatic changes.  CT chest, abdomen and pelvis with no evidence of disease progression in the chest, unchanged expansile sclerotic left third rib mass and sclerotic T1 vertebral body focus.  Abdomen and pelvis with mild interval increase in bilateral external iliac lymph nodes, unchanged right internal iliac lymph node, for reference right external iliac lymph node now measuring up to 28 mm compared to 20 mm on prior, left external iliac lymph node now measuring up to 20 mm compared to 10 mm on prior.  Right internal iliac 15 mm stable.  -2/11/2025 PSA 0.364  -2/12/2025 [facility] clinic follow-up: Chris overall is tolerating current therapy with Zytiga, prednisone and Xgeva monthly, he receives Lupron with Dr. Matute.  He last received Lupron 1/13/2025, next Lupron due in June.  His PSA is stable at 0.364.  Current restaging scans on 2/3/2025 show 1 new site in the left costovertebral junction of the left sixth rib, this is favored to represent degenerative/traumatic change, he does not recall any trauma to this area.  He is having no pain in this area.  He still has intermittent pain in his right mid back that is worse typically when he is lying down.  He does have a  sclerotic lesion at T1, I will go ahead and get an MRI of his thoracic spine just to make sure this is stable.  There has been a mild increase in the bilateral external iliac nodes and the right internal iliac node is stable.  Overall due to the stability of his PSA, his tolerability of current treatment and his desire not to pursue chemotherapy we will continue treatment unchanged.  We will repeat restaging scans in 3 months for follow-up.  If he has progressive new disease or worsening symptoms we would then want to get PSMA PET scan for evaluation, I think he would consider possibly Pluvicto down the road.  We will see him back in 1 month for follow-up to go over his MRI and continue current therapy.          Prostate cancer metastatic to multiple sites   3/15/2022 Initial Diagnosis    Prostate cancer metastatic to multiple sites (HCC)     3/15/2022 Cancer Staged    Staging form: Prostate, AJCC 8th Edition  - Clinical: Stage IVB (cT1c, cN1, cM1b, Grade Group: 5) - Signed by Fritz Goodson MD on 3/15/2022     4/7/2022 - 6/23/2022 Chemotherapy    Stopped after cycle 4 6/23/2022 due to syncope 3 days post infusions with negative cardiac work-up  OP PROSTATE DOCEtaxel     4/7/2022 -  Chemotherapy    OP SUPPORTIVE Denosumab (Xgeva) Q28D     7/26/2022 -  Chemotherapy    OP PROSTATE Abiraterone / PredniSONE           HISTORY OF PRESENT ILLNESS:  The patient is a 70 y.o. male, here for follow up on management of metastatic prostate cancer.  No new somatic complaints  Past Medical History:   Diagnosis Date    Cancer     Prostate cancer      Past Surgical History:   Procedure Laterality Date    PROSTATE BIOPSY  02/2022    dr. sue       No Known Allergies    Family History and Social History reviewed and changed as necessary    REVIEW OF SYSTEM:   No new somatic complaints    PHYSICAL EXAM:  Jaundice icterus or pallor.  No respiratory distress.  No rashes.    Vitals:    03/11/25 0920   BP: 143/88   Pulse: 70   Resp: 18  "  Temp: 98.2 °F (36.8 °C)   SpO2: 97%   Weight: 124 kg (273 lb)   Height: 180.3 cm (71\")     Vitals:    03/11/25 0920   PainSc: 0-No pain          ECOG score: 0           Vitals reviewed.    ECOG: (0) Fully Active - Able to Carry On All Pre-disease Performance Without Restriction    Lab Results   Component Value Date    HGB 13.2 02/11/2025    HCT 39.5 02/11/2025    MCV 88.8 02/11/2025     02/11/2025    WBC 7.48 02/11/2025    NEUTROABS 4.14 02/11/2025    LYMPHSABS 2.32 02/11/2025    MONOSABS 0.65 02/11/2025    EOSABS 0.27 02/11/2025    BASOSABS 0.06 02/11/2025       Lab Results   Component Value Date    GLUCOSE 114 (H) 02/11/2025    BUN 23 02/11/2025    CREATININE 1.40 (H) 02/11/2025     02/11/2025    K 4.1 02/11/2025     02/11/2025    CO2 23.0 02/11/2025    CALCIUM 9.4 02/11/2025    PROTEINTOT 6.8 02/11/2025    ALBUMIN 4.0 02/11/2025    BILITOT 0.4 02/11/2025    ALKPHOS 71 02/11/2025    AST 16 02/11/2025    ALT 16 02/11/2025             ASSESSMENT & PLAN:  1.  Prostate cancer clinical T1c and 2M1B stage IVb treated with 4 cycles of Taxotere, stopped due to syncope with negative cardiac work-up, with marked drop in PSA of 18.6 from over 900 at baseline, continued on Zytiga and prednisone.  2.  Urinary retention with Goodwin in place 1/24/2022.  On finasteride 1/24/2022.  3.  Covid 19 December 2021  4.  Hypertension  5.  Stage 3a chronic kidney disease    Oncology history timeline:  -11/29/2021  with symptoms of urinary retention.  -2/15/2022 prostate biopsy adenocarcinoma grade group 5 and 3 out of 12 cores, grade group 4 in 1 out of 12 cores, grade group 3 in 8 out of 12 cores.  -2/24/2022 CT chest abdomen pelvis and total body bone scan shows left third rib, right acetabulum, periaortic and retroperitoneal kizzy metastases complaining of pain in the left chest and right hip.  Also possible sclerotic left 10th rib on CT.  Spleen mildly enlarged 13.8 cm.  Hepatic steatosis.  Small liver cyst.  " Fat stranding in the periaortic and mesenteric region consistent with reactive/inflammatory stranding.  Multiple enlarged periaortic and retroperitoneal nodes and lymphadenopathy largest 4.9 cm.  CT chest describes tiny sclerotic foci in left eighth rib and right lateral seventh rib and T1.  Multiple small mediastinal and bilateral axillary nodes seen with small hypodense left thyroid nodule.  Creatinine 1.7.  -3/4/2022 office note Dr. Rolando Matute: Patient was seen after his elevated PSA by Dr. Vera and prostate biopsy scheduled.  However, he developed COVID-19 and this was canceled and the patient did not reschedule due to lack of insurance.  Saw Dr. Matute back on 1/24/2022 with plans to get staging CTs and bone scan with results as outlined above.  Started Casodex and to return on 3/11/2022 for Lupron.  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  TURP planned for urinary retention symptoms.    -3/15/2022 [facility] medical oncology initial consultation: I reviewed the above data with the patient.  With his fairly bulky retroperitoneal adenopathy and bone metastases including extra axial metastases, he would fit the data from the CHAARTED trial for which Taxotere x6 followed by Zytiga prednisone along with the planned Lupron already being planned by Dr. Matute would be reasonable.  Equally reasonable in terms of comparisons with bicalutamide and Lupron would be Zytiga prednisone plus Lupron or Xtandi plus Lupron or apalutamide plus Lupron.  None of these have been compared head-to-head but all have beaten combined androgen deprivation therapy with bicalutamide and Lupron.  At the age of 67 and in order to have as many bullets as possible down the road and hence saving some of the hormone blockade options for later and using chemotherapy when he is younger and healthier for a more time-limited.,  He has opted for the Taxotere x6 followed by Zytiga and  prednisone.  We will also give him Xgeva to improve bone health and given metastatic disease, as with all metastatic prostate cancer patients, he needs genetic counseling both for his sake as well as his family's.  If he were to have BRCA1 or other such  mutations, then that also opens up the options for PARP inhibitors etc. down the road.  He has his TURP scheduled for 2/24/2022 and we will start treatment a couple of weeks after that and he will get chemo preparation visit in the meantime and I will get capital surgeons to place a port.  We will check his PSA after his TURP when he sees my nurse practitioner back early April for the start of chemotherapy and repeat the PSA and CT chest abdomen pelvis and bone scan after the Taxotere is complete.    -3/31/2022 chemotherapy education and needs assessment completed    -4/7/2022 began docetaxel and Xgeva.  .0.  We will do his Xgeva every 6 weeks to coordinate with his docetaxel infusions.    -4/19/2022 patient stopped Casodex    -5/17/2022 patient reported seeing his PCP for an abscess in his suprapubic area.  Placed on clindamycin, will delay treatment 1 week.    -5/25/2022 PSA 34.3  -5/26/2022 [facility] follow-up: Chris has an abscess in the suprapubic area, it has improved on antibiotic therapy but I am concerned if we treat him it will just flare back up unless it is drained.  I will get him to Dr. Miller who placed his port for I&D and culture.  He is on clindamycin and states he completes course of treatment tomorrow.  I will delay his treatment with Taxotere 1 more week.  We will give his Xgeva today.  I will see him back in 1 week for follow-up to assess whether or not we can resume his Taxotere.  His PSA has dropped nicely with treatment thus far, PSA yesterday was 34.3 which is down from a PSA of 259.0 when we started treatment, it has been as high as 911 in November.    -6/2/2022 [facility] follow-up: Chris went to  see Dr. Miller to have his abscess lanced however apparently there was a long wait and he left without being seen.  He did call his PCP and was given 5 more days of clindamycin and the abscess now seems to have resolved.  We discussed today that he is at risk for recurrence of infection due to immunocompromise state with chemotherapy.  He will notify us if he has any return of his abscess.  He does have intertrigo under his panniculus, I have advised him to keep this area dry, to apply topical drying/antifungal powders.  Also recommended looser clothing.  His counts are adequate to continue therapy, we will resume Taxotere today with cycle #3.  We will repeat restaging scans after 6 courses.  We are doing Xgeva every 6 weeks to coordinate with his Taxotere.  Once he finishes Taxotere we can then go back to every 4 weeks.  His next Xgeva is not due until 7/14/2022.  His calcium is running a little low, he is going to  calcium supplement.    -6/23/2022 [facility] follow-up: Chris overall is doing well on treatment with Taxotere.  Labs reviewed from yesterday and are unremarkable.  We will continue treatment today unchanged.  His suprapubic abscess resolved on clindamycin.  He will continue to use drying powder/antifungal powder under his panniculus for intertrigo.  Today will be cycle #4 of a planned 6 courses of Taxotere.  He is also on Xgeva, next dose due 7/14/2022, we are doing this every 6 weeks for now to line up with his chemotherapy, can go back to every 4 weeks after he finishes Taxotere.  Calcium from [facility] yesterday was normal.    -7/13/2022 [facility] follow-up: Mr. Morfin has had issues around day 3 after each treatment ranging from chest pain after his first treatment for which he did not seek medical attention, fatigue after the next few treatments, but 3 days after his most recent treatment on 6/23/2022 he had a syncopal episode at home and once again did not seek medical  attention.  I discussed with him that this was not acceptable, if he has occurrences like this someone needs to call 911, it is difficult to figure out what is going on after the fact, the best time to work this up would be during the occurrence.  For now we will get labs today with CBC, CMP, PSA.  I will refer him to cardiology for further evaluation.  We will hold Taxotere most likely, I will see him tomorrow to go over his lab results.  I will also repeat his restaging scans with CT chest, abdomen and pelvis and will include CT of the head in light of his syncopal episode.      -7/13/2022 PSA 18.6    -7/14/2022 [facility] clinic follow-up: Mr. Morfin returns today to review his labs from yesterday.  Labs are unremarkable.  PSA down to 18.6 from a high of 259.0 in April prior to starting treatment.  CBC and CMP unremarkable, magnesium is normal at 2.3, phosphorus slightly low at 2.4.  We will hold therapy for now in light of his syncopal episode on day 3 after his last treatment and prior episodes with chest pain and severe fatigue that all occurred on day 3 after his Taxotere.  We will restage him with CT head, chest, abdomen and pelvis (I am including the head in light of his syncopal episode).  I have also referred him to cardiology, he states that he received a call from them about making an appointment however he wanted to talk to us today first.  I emphasized the importance to him of making and keeping that appointment and he states understanding.  I also once again emphasized the requirement to seek emergency medical attention if he has any episodes in the future such as chest pain or syncopal events.  Whether or not we try and complete Taxotere with 2 more courses or move to the next line of therapy will be decided when he returns to discuss with Dr. Goodson and review his restaging scans.    -7/15/2022 saw Dr. Diaz cardiologist.  For syncope he ordered echocardiogram and 48-hour Holter  monitor.    -7/15/2022 Holter monitor relatively benign.    -7/22/2022 CT brain with and without contrast negative.  CT chest abdomen and pelvis shows some nonspecific cecal wall thickening.  No progression in the chest.  T1 and ribs stable.  Decrease in multiple retroperitoneal and pelvic nodes.  Slight increase in right acetabular sclerotic lesion.  Persistent but improved circumferential bladder wall thickening.  Total body bone scan shows stable left third rib and no evidence of new bone metastases.    -7/19/2022 ejection fraction 65% with grade 1 diastolic dysfunction.    -7/26/2022 [facility] follow-up: Given presyncopal symptoms occurred 3 days after Taxotere and has not otherwise occurred any other time and his cardiology work-up is fairly unremarkable and his disease is under good control as witnessed by the report above of the CTs of head chest abdomen pelvis and bone scan, I will hold cycle 5 and 6 of Taxotere and continue 1 now with Zytiga and prednisone.  With reference to the coincidental cecal wall thickening found on CT, we will get him to [facility] surgeons for colonoscopy.  He will see my nurse practitioner back in August for next cycle of Abiraterone prednisone.  He will continue his Lupron with Dr. Matute.  We will continue his Xgeva.  We will repeat his CT chest abdomen pelvis and bone scan again in 3 months.  He will see my nurse practitioner in the interim.    -8/23/2022 [facility] follow-up: No further presyncopal symptoms.  Feeling great other than for hot flashes.  Did commend for now we will continue on with his Zytiga prednisone and he will get his Xgeva today based on labs from 8/10/2022.  He opted out of getting the colonoscopy that I recommended and he will not change his mind.  He will continue getting the Lupron with Dr. Matute and I asked him to give the date of this appointment to my nurse when he sees her back in a month.  We will give his Xgeva today.   We will get CT chest abdomen pelvis and total body bone scan towards the middle to end of October.  Presently other than for the hot flashes feels great.    -9/20/2022 PSA 8.320    -9/22/2022 [facility] follow-up: Chris is doing well on Zytiga, prednisone along with Xgeva.  Labs reviewed from 9/20/2022 are unremarkable, CBC was normal, CMP with creatinine of 1.34 otherwise normal, this is stable from his baseline.  PSA stable at 8.320.  He received Lupron recently with Dr. Matute, he thinks last week or so, states his next appointment is in February.  He will continue treatment unchanged.  We will repeat restaging CT chest, abdomen and pelvis and total body bone scan in October prior to return and I have ordered those today.  We will also repeat his PSA.    -9/22/2022 CT chest abdomen pelvisCompared to July 2022 [place] [facility] showed no evidence of disease progression in the chest with no substantial sclerotic bony lesions and decrease in size of retroperitoneal and pelvic nodes with persistent cecal wall thickening and suboptimal bladder distention.  Total body bone scan showed decrease in previously seen uptake in the left third rib with diffuse sclerosis representing treatment response with no new foci.    -10/18/2022 [facility] follow-up: I reviewed bone scan and CT reports as above with no evidence of progression.  Tolerating Zytiga prednisone and Xgeva.  Getting Lupron regularly with Dr. Matute next appointment coming in February.  We will repeat his CT chest abdomen pelvis and total body bone scan in April.  He will follow with my nurse practitioner in the interim.    -1/10/2023 PSA 3.450  -1/11/2023 [facility] follow-up: Mr. Morfin continues to do well on current therapy with Zytiga, prednisone and Xgeva with no unusual side effects.  He has no new worrisome symptoms.  He is due for his next Lupron injection in February with Dr. Matute.  His PSA continues to  "trend downward, current PSA from yesterday was 3.450.  We will continue therapy unchanged, he will receive Xgeva today.  Has had no hypocalcemia, his CMP, phosphorus and magnesium are all normal.  We will repeat restaging scans in April.  -3/7/2023 PSA 2.460  -3/8/2023 [facility] Oncology clinic follow-up:  Mr. Morfin is doing well.  He is tolerating therapy with Zytiga, prednisone and Xgeva with no significant side effects.  He has hot flashes but these are tolerable.  He had Lupron injection 2/24/2023 with Dr. Matute.  His PSA continues to decline, current PSA 2.460.  He will continue therapy unchanged.  We will repeat restaging scans late April prior to return in 2 months and I have ordered those today.  He is working with his PCP Rodrigo Ram on his hypertension.  His b/p today was 160/88.  He was to have increased his losartan but I do not think he has done that yet as he said \"I still have some of my old prescription left\".  -4/4/2023 PSA 2.55  - 4/26/2023 CT chest abdomen pelvis [place] comparison 10/10/2022 showed stable left third rib, T1, left fourth and eighth rib, right seventh rib.  Probable liver cyst.  Few diverticuli.  Mild further decrease in a few of the previously enlarged nodes.  Left external iliac 12 mm compared to 16 mm.  Right common iliac 7 mm stable.  Lower left periaortic 8 mm previously 13 mm.  No new lytic or blastic osseous lesions.  Total body bone scan comparison 10/10/2022, 7/22/2022, and CT 4/26/2023.  No new sites of increased uptake.  1 focal left third rib hotspot consistent with bone metastasis.    -5/2/2023 [facility] medical oncology follow-up: I reviewed interval notes since I last saw him from my nurse practitioner as outlined above in interval images and reports thereof from [facility] that shows no new sites of bone metastasis and no kizzy or visceral progression.  PSA stabilized around 2.5.  In the absence of symptomatic or radiographic progression, I would be " unlikely to change therapy just on the basis of a PSA rise and for now the PSA is stable.  We will continue Zytiga prednisone and Xgeva and he will follow-up with my nurse practitioner 5/30/2023 with repeat CTs and bone scan in 6 months.  I asked him to check with his primary care today regarding his systolic in the 170s.    -5/31/2023 [facility] Oncology clinic follow-up: Chris continues to do well on current therapy with Zytiga, prednisone and Xgeva along with Lupron that he receives with Dr. Matute.  His PSA remains stable, it was lower this month compared to last month, current PSA 1.940.  Continues to deal with hypertension, on arrival today his blood pressure was 210/92 however this was using a wrist cuff, when I rechecked it manually his blood pressure was 160/90.  Still has room for improvement despite recent increase in his losartan to 100 mg daily.  He will follow-up with Dr. Ram for further management.  We will continue therapy unchanged.  We will repeat restaging scans in October.    -7/26/2023 [facility] Oncology clinic follow-up: Chris overall continues to do well on current therapy with Zytiga, prednisone and Xgeva.  He receives Lupron every 6 months with Dr. Matute and states that is scheduled for August.  PSA from yesterday is pending, PSA on 6/27/2023 was 1.590 which was continuing to decline, in February it was 3.250.  His creatinine this past month has bumped up.  He feels he is staying hydrated but I encouraged him to increase his fluid intake.  I discussed with him that this could be from his hypertension, some of his medications.  His blood pressure today is 165/92.  He has an appointment with his primary care provider Dr. Teri Ram next week and can further discuss with her.  No adjustment needed for Xgeva.  I refilled his prednisone today.  I will see him back in 1 month for follow-up with continued monitoring of his labs.  He may end up needing to see nephrology.  We will repeat  restaging scans in October.    -9/29/2023 PSA 1.810  -8/23/2023 [facility] Oncology clinic follow-up: Chris continues on therapy with Zytiga, prednisone and Xgeva, he also receives Lupron every 6 months with Dr. Matute with most recent given on 8/7/2023.  He is scheduled for a cystoscopy in a few weeks as his last few UAs per the patient's report had microscopic hematuria, he has had no steve hematuria.  His creatinine continues to remain elevated at 1.82, BUN is 40, GFR is 40.  I will get him to nephrology for further evaluation.  Blood pressure today was fairly good at 161/86, he continues to follow with Dr. Ram for management.  PSA on 7/25/2023 was up slightly from prior month, PSA in July was 2.140, in June it was 1.590, PSA from yesterday is pending.  I plan on repeating restaging CT scans and bone scans in October however if his PSA is up significantly then I will do sooner.  I will see him back for follow-up in 1 month.  -8/23/2024 PSA 1.860    -9/20/2023 [facility] Oncology clinic follow-up: Chris is tolerating treatment with Zytiga, prednisone and Xgeva.  He is up-to-date with his Lupron injection, last received in August with Dr. Matute.  He had cystoscopy on 9/8/2023 that was normal. I referred him to nephrology when I saw him last in August as his creatinine has been increasing, creatinine yesterday was a little better 1.51, GFR is 50, creatinine clearance 63.8.  He is still awaiting an appointment with nephrology, per our  it will be early November.  We will repeat his restaging scans prior to return and I will do his CT scans without contrast in light of his new renal insufficiency.  We will also get total body bone scan and I have ordered those today.  His PSA yesterday was 1.810.    -10/9/2023 CT chest abdomen Pelvis without contrast compared to April 2023 shows slightly prominent pelvic lymph nodes left external iliac 8 mm compared to prior study.  Right internal iliac heterogenous 19 mm  compared to 11 mm prior.  Stable sclerotic bone lesions and no new lesions.  Stable bone scan with no new sites of disease    -10/17/2023 [facility] clinic follow-up: With elevated creatinine, CTs done without contrast showed very slight increased prominence by few millimeters of some internal iliac and external iliac nodes for which PSMA PET could be done but for now I will check his PSA and have him see my nurse practitioner back in a week and unless the PSA is significantly rising I would continue the Zytiga, prednisone, Xgeva and February dose of Lupron with Dr. Matute and make sure he follows with nephrology.  If his PSA is significantly rising then my nurse practitioner will order PSMA PET.    -10/17/2023 PSA 1.5  -11/14/2023 PSA 1.510  -11/15/2023 [facility] clinic follow-up: Mr. Ferrara continues to do well on current therapy with Zytiga, prednisone, Xgeva and Lupron that he receives with Dr. Matute.  His PSA is stable at 1.5.  Would not change therapy for now, we will plan on repeating restaging scans in February unless PSA starts to rise or he has new concerns.  Overall he feels good.  He is now established with nephrology and will continue to follow with them regarding his renal insufficiency, I reviewed note from consultation with Dr. Armstrong 11/6/2023.    -1/9/2024 PSA 1.050    -1/10/2024 [facility] clinic follow-up: Chris huerta continues to tolerate current therapy with Zytiga, prednisone, Xgeva and Lupron.  He states his next Lupron injection with Dr. Matute is due late February.  His PSA is stable at 1.050. Creatinine stable at 1.64, he is following with nephrology, he has a an appointment with Dr. Armstrong in February for follow-up.  Hypertension being managed by his primary care provider, target systolic ideally 120s-140s, today it is running a little high at 172/90, yesterday when he was here for labs it was 138/80.  We will continue therapy unchanged with plans to repeat  restaging scans in April.    -2/6/2024 PSA 0.753    -2/7/2024 [facility] clinic follow-up: Chris continues to do well on current therapy with Zytiga, prednisone, Xgeva and Lupron.  He is up-to-date on Lupron injection next due later this month with Dr. Matute.  His PSA continues to slowly decrease, current PSA is 0.753.  He has no new worrisome symptoms.  We will continue therapy unchanged, I have ordered restaging scans for late March prior to his return in April. He is following with nephrology for his renal insufficiency, his creatinine yesterday was good at 1.21.  I will do his CT scans with contrast unless something changes in the interim. He follows closely with Dr. Ram for management of his hypertension. I reviewed notes from his last office visit with her earlier this month on 2/1/2024, also reviewed labs in detail with the patient today.  All questions were answered to his satisfaction.    -3/5/2024 PSA slowly declining 0.73.  Creatinine fluctuating.  1.69 with GFR 43 otherwise unremarkable CMP.  Phosphorus low 2.1    -4/8/2024 CT chest, abdomen and pelvis shows stable sclerotic lesions in the thoracic spine and bilateral ribs and right acetabulum.  Stable appearance of pelvic lymph nodes.  No new sites concerning for progression of disease.  Total body bone scan shows stable diffuse increased uptake of the left third rib correlates with diffuse sclerosis and enlargement on CT, no new sites of active osseous lesions.  -4/30/2024 PSA 0.713  -5/1/2024 [facility] clinic follow-up: Chris continues to do well on current therapy with Zytiga, prednisone, Xgeva and Lupron.  He receives his Lupron every 6 months with Dr. Matute, last administered 2/5/2024.  Current CT scans and bone scan showed no progression of disease, PSA is stable at 0.713.  His CBC is unremarkable, CMP shows creatinine stable at 1.50 otherwise normal.  He does follow with nephrology.  We will continue treatment unchanged.  I  will see him back in 2 months for follow-up.  We will repeat restaging scans in 6 months unless he has any new symptoms or significant change in his PSA.    -6/25/2024 PSA 0.623  -6/26/2024 [facility] oncology clinic follow-up: Chris overall continues to tolerate current therapy with Zytiga, prednisone, Xgeva and Lupron.  We did call to confirm his next Lupron injection and that is scheduled for late July with Dr. Matute.  His PSA for the most part is stable at 0.623, we will continue to monitor.  He has no new worrisome symptoms.  I did discuss with him today his creatinine which has increased to 1.86, BUN was 32.  Reminded him to be sure and stay hydrated.  He does follow with nephrology and his next appointment is in August.  He is on Xgeva however there is no indication for any adjustment, his creatinine clearance is greater than 30.  Blood pressure today was under good control at 130/78.  We will continue treatment unchanged.  I will see him back in 1 month for follow-up.  Plan to repeat restaging scans in October unless his PSA rises or he has worrisome symptoms.    -7/23/2024 PSA 0.606    -9/17/2024 PSA 0.455  -9/18/2024 [facility] oncology clinic follow-up: Chris is doing well overall.  Tolerates therapy with Zytiga, prednisone, Xgeva and Lupron.  He last received Lupron in August with Dr. Matute.  His PSA had been slowly creeping up however it has now went back down and is currently 0.455.  He will continue treatment unchanged.  We will repeat CT chest, abdomen and pelvis prior to return and I will do that without contrast in light of his renal insufficiency.  Current creatinine is stable at 1.5, he does follow with nephrology.  We will also repeat total body bone scan.  We will see him back in 1 months for follow-up and sooner if any concerns.  I did discuss with him that he can take Tylenol if needed or use topical over-the-counter pain relief ointment such as Biofreeze etc. for his back pain but thus far  it has not required any intervention, he does not like to take pain medications.    -10/14/2024 CT chest abdomen pelvis without contrast [facility] compared to August 2024 shows unchanged sclerotic T1 vertebral body with expansile sclerotic left third rib without fracture and other small sclerotic nodules all unchanged.  Right external iliac node 20 mm increased from 7 mm on prior exam with internal iliac node 15 mm unchanged.  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    -10/22/2024 [facility] clinic follow-up: Feeling great.  While he has 1 slightly larger node, given that his PSA has not been rapidly rising and all other disease is stable I will have him continue Zytiga prednisone Xgeva and he gets his next Lupron in February with Dr. Matute.  He will see my nurse practitioner and she will order repeat CT chest abdomen pelvis and bone scan for January and will follow-up with her in January to go over those results.  If either his PSA or the scans show progression beyond just the single slightly larger iliac node, she will order a PSMA PET and then get him back to me.  If the PSA is stable and the subsequent scans are stable then we will just continue his current regimen with quarterly imaging or sooner as symptoms or PSA dictate.    -11/19/2024 PSA 0.453  -1/14/2025 PSA 0.351  -1/13/2025 received Lupron with Dr. Matute  -2/3/2025 total body bone scan shows overall stable, 1 new site involving the left costovertebral junction of the left sixth rib with no definite CT correlate and favored to represent degenerative/traumatic changes.  CT chest, abdomen and pelvis with no evidence of disease progression in the chest, unchanged expansile sclerotic left third rib mass and sclerotic T1 vertebral body focus.  Abdomen and pelvis with mild interval increase in bilateral external iliac lymph nodes, unchanged right internal iliac lymph  node, for reference right external iliac lymph node now measuring up to 28 mm compared to 20 mm on prior, left external iliac lymph node now measuring up to 20 mm compared to 10 mm on prior.  Right internal iliac 15 mm stable.  -2/11/2025 PSA 0.364  -2/12/2025 [facility] oncology clinic follow-up: Chris huerta is tolerating current therapy with Zytiga, prednisone and Xgeva monthly, he receives Lupron with Dr. Matute.  He last received Lupron 1/13/2025, next Lupron due in June.  His PSA is stable at 0.364.  Current restaging scans on 2/3/2025 show 1 new site in the left costovertebral junction of the left sixth rib, this is favored to represent degenerative/traumatic change, he does not recall any trauma to this area.  He is having no pain in this area.  He still has intermittent pain in his right mid back that is worse typically when he is lying down.  He does have a sclerotic lesion at T1, I will go ahead and get an MRI of his thoracic spine just to make sure this is stable.  There has been a mild increase in the bilateral external iliac nodes and the right internal iliac node is stable.  Overall due to the stability of his PSA, his tolerability of current treatment and his desire not to pursue chemotherapy we will continue treatment unchanged.  We will repeat restaging scans in 3 months for follow-up.  If he has progressive new disease or worsening symptoms we would then want to get PSMA PET scan for evaluation, I think he would consider possibly [place] down the road.  We will see him back in 1 month for follow-up to go over his MRI and continue current therapy.     -3/11/2025 [facility] oncology clinic follow-up: Tolerating Zytiga prednisone and Xgeva.  Next Lupron in June with Dr. Matute.  PSA staying low.  Patient could not tolerate MRI relative to his back pain due to claustrophobia.  Minimal back pain.  No leg weakness.  If his PSA rises we will get PSMA PET otherwise we will repeat his bone scan and CAT  scans mid-May.      Total time of care today inclusive of time spent today prior to patient's arrival reviewing interval data and during visit translating to patient putting forth a plan as outlined above and after visit instituting this plan took 35 minutes patient care time throughout the day today.    Fritz Goodson MD    03/11/2025

## 2025-03-12 ENCOUNTER — INFUSION (OUTPATIENT)
Dept: ONCOLOGY | Facility: HOSPITAL | Age: 70
End: 2025-03-12
Payer: MEDICARE

## 2025-03-12 ENCOUNTER — SPECIALTY PHARMACY (OUTPATIENT)
Dept: ONCOLOGY | Facility: HOSPITAL | Age: 70
End: 2025-03-12
Payer: MEDICARE

## 2025-03-12 VITALS
RESPIRATION RATE: 18 BRPM | TEMPERATURE: 98.8 F | SYSTOLIC BLOOD PRESSURE: 154 MMHG | HEART RATE: 65 BPM | DIASTOLIC BLOOD PRESSURE: 97 MMHG

## 2025-03-12 DIAGNOSIS — C61 PROSTATE CANCER METASTATIC TO MULTIPLE SITES: Primary | ICD-10-CM

## 2025-03-12 LAB
ALBUMIN SERPL-MCNC: 4.1 G/DL (ref 3.5–5.2)
ALBUMIN/GLOB SERPL: 1.5 G/DL
ALP SERPL-CCNC: 72 U/L (ref 39–117)
ALT SERPL-CCNC: 11 U/L (ref 1–41)
AST SERPL-CCNC: 13 U/L (ref 1–40)
BASOPHILS # BLD AUTO: 0.08 10*3/MM3 (ref 0–0.2)
BASOPHILS NFR BLD AUTO: 1.1 % (ref 0–1.5)
BILIRUB SERPL-MCNC: 0.3 MG/DL (ref 0–1.2)
BUN SERPL-MCNC: 25 MG/DL (ref 8–23)
BUN/CREAT SERPL: 15.2 (ref 7–25)
CALCIUM SERPL-MCNC: 9.2 MG/DL (ref 8.6–10.5)
CHLORIDE SERPL-SCNC: 106 MMOL/L (ref 98–107)
CO2 SERPL-SCNC: 23.4 MMOL/L (ref 22–29)
CREAT SERPL-MCNC: 1.64 MG/DL (ref 0.76–1.27)
EGFRCR SERPLBLD CKD-EPI 2021: 44.7 ML/MIN/1.73
EOSINOPHIL # BLD AUTO: 0.22 10*3/MM3 (ref 0–0.4)
EOSINOPHIL NFR BLD AUTO: 3 % (ref 0.3–6.2)
ERYTHROCYTE [DISTWIDTH] IN BLOOD BY AUTOMATED COUNT: 13 % (ref 12.3–15.4)
GLOBULIN SER CALC-MCNC: 2.8 GM/DL
GLUCOSE SERPL-MCNC: 97 MG/DL (ref 65–99)
HCT VFR BLD AUTO: 40.9 % (ref 37.5–51)
HGB BLD-MCNC: 13.6 G/DL (ref 13–17.7)
IMM GRANULOCYTES # BLD AUTO: 0.02 10*3/MM3 (ref 0–0.05)
IMM GRANULOCYTES NFR BLD AUTO: 0.3 % (ref 0–0.5)
LYMPHOCYTES # BLD AUTO: 2.37 10*3/MM3 (ref 0.7–3.1)
LYMPHOCYTES NFR BLD AUTO: 32.2 % (ref 19.6–45.3)
MAGNESIUM SERPL-MCNC: 2.1 MG/DL (ref 1.6–2.4)
MCH RBC QN AUTO: 29.2 PG (ref 26.6–33)
MCHC RBC AUTO-ENTMCNC: 33.3 G/DL (ref 31.5–35.7)
MCV RBC AUTO: 88 FL (ref 79–97)
MONOCYTES # BLD AUTO: 0.6 10*3/MM3 (ref 0.1–0.9)
MONOCYTES NFR BLD AUTO: 8.1 % (ref 5–12)
NEUTROPHILS # BLD AUTO: 4.08 10*3/MM3 (ref 1.7–7)
NEUTROPHILS NFR BLD AUTO: 55.3 % (ref 42.7–76)
NRBC BLD AUTO-RTO: 0 /100 WBC (ref 0–0.2)
PHOSPHATE SERPL-MCNC: 3 MG/DL (ref 2.5–4.5)
PLATELET # BLD AUTO: 234 10*3/MM3 (ref 140–450)
POTASSIUM SERPL-SCNC: 4.2 MMOL/L (ref 3.5–5.2)
PROT SERPL-MCNC: 6.9 G/DL (ref 6–8.5)
PSA SERPL-MCNC: 0.35 NG/ML (ref 0–4)
RBC # BLD AUTO: 4.65 10*6/MM3 (ref 4.14–5.8)
SODIUM SERPL-SCNC: 145 MMOL/L (ref 136–145)
WBC # BLD AUTO: 7.37 10*3/MM3 (ref 3.4–10.8)

## 2025-03-12 PROCEDURE — 25010000002 DENOSUMAB 120 MG/1.7ML SOLUTION: Performed by: NURSE PRACTITIONER

## 2025-03-12 PROCEDURE — 96372 THER/PROPH/DIAG INJ SC/IM: CPT

## 2025-03-12 RX ADMIN — DENOSUMAB 120 MG: 120 INJECTION SUBCUTANEOUS at 10:01

## 2025-03-28 ENCOUNTER — SPECIALTY PHARMACY (OUTPATIENT)
Dept: ONCOLOGY | Facility: HOSPITAL | Age: 70
End: 2025-03-28
Payer: MEDICARE

## 2025-03-28 NOTE — PROGRESS NOTES
Specialty Pharmacy Patient Management Program  Refill Outreach     Chris was contacted today regarding refills of their medication(s).    Refill Questions      Flowsheet Row Most Recent Value   Changes to allergies? No   Changes to medications? No   New conditions or infections since last clinic visit No   Unplanned office visit, urgent care, ED, or hospital admission in the last 4 weeks  No   How does patient/caregiver feel medication is working? Good   Financial problems or insurance changes  No   Since the previous refill, were any specialty medication doses or scheduled injections missed or delayed?  Yes   If yes, please provide the amount 2 missed doses   Why were doses missed? sick   Does this patient require a clinical escalation to a pharmacist? No            Delivery Questions      Flowsheet Row Most Recent Value   Delivery method [employer]   Delivery address verified with patient/caregiver? Yes   Delivery address Home   Other address preferred n/a   Number of medications in delivery 1   Medication(s) being filled and delivered Abiraterone Acetate (ZYTIGA)   Doses left of specialty medications 1 week   Copay verified? Yes   Copay amount $0   Copay form of payment No copayment ($0)   Delivery Date Selection 04/01/25   Signature Required No   Do you consent to receive electronic handouts?  --  [did not ask]                 Follow-up: 30 day(s)     Jodie Nj, Pharmacy Technician  3/28/2025  15:00 EDT

## 2025-04-08 ENCOUNTER — LAB (OUTPATIENT)
Dept: ONCOLOGY | Facility: HOSPITAL | Age: 70
End: 2025-04-08
Payer: MEDICARE

## 2025-04-08 DIAGNOSIS — C61 PROSTATE CANCER METASTATIC TO MULTIPLE SITES: ICD-10-CM

## 2025-04-15 ENCOUNTER — LAB (OUTPATIENT)
Dept: ONCOLOGY | Facility: HOSPITAL | Age: 70
End: 2025-04-15
Payer: MEDICARE

## 2025-04-15 VITALS
RESPIRATION RATE: 18 BRPM | BODY MASS INDEX: 37.32 KG/M2 | SYSTOLIC BLOOD PRESSURE: 143 MMHG | DIASTOLIC BLOOD PRESSURE: 86 MMHG | TEMPERATURE: 97.6 F | WEIGHT: 267.6 LBS | HEART RATE: 63 BPM

## 2025-04-15 DIAGNOSIS — C61 PROSTATE CANCER METASTATIC TO MULTIPLE SITES: Primary | Chronic | ICD-10-CM

## 2025-04-15 PROCEDURE — 36415 COLL VENOUS BLD VENIPUNCTURE: CPT

## 2025-04-16 ENCOUNTER — OFFICE VISIT (OUTPATIENT)
Dept: ONCOLOGY | Facility: CLINIC | Age: 70
End: 2025-04-16
Payer: MEDICARE

## 2025-04-16 ENCOUNTER — INFUSION (OUTPATIENT)
Dept: ONCOLOGY | Facility: HOSPITAL | Age: 70
End: 2025-04-16
Payer: MEDICARE

## 2025-04-16 VITALS
OXYGEN SATURATION: 95 % | DIASTOLIC BLOOD PRESSURE: 94 MMHG | RESPIRATION RATE: 18 BRPM | HEIGHT: 71 IN | WEIGHT: 267 LBS | TEMPERATURE: 98.4 F | BODY MASS INDEX: 37.38 KG/M2 | HEART RATE: 67 BPM | SYSTOLIC BLOOD PRESSURE: 157 MMHG

## 2025-04-16 DIAGNOSIS — C61 PROSTATE CANCER METASTATIC TO MULTIPLE SITES: Primary | ICD-10-CM

## 2025-04-16 LAB
ALBUMIN SERPL-MCNC: 4 G/DL (ref 3.5–5.2)
ALBUMIN/GLOB SERPL: 1.5 G/DL
ALP SERPL-CCNC: 68 U/L (ref 39–117)
ALT SERPL-CCNC: 11 U/L (ref 1–41)
AST SERPL-CCNC: 17 U/L (ref 1–40)
BASOPHILS # BLD AUTO: 0.07 10*3/MM3 (ref 0–0.2)
BASOPHILS NFR BLD AUTO: 1 % (ref 0–1.5)
BILIRUB SERPL-MCNC: 0.2 MG/DL (ref 0–1.2)
BUN SERPL-MCNC: 20 MG/DL (ref 8–23)
BUN/CREAT SERPL: 12.7 (ref 7–25)
CALCIUM SERPL-MCNC: 9.1 MG/DL (ref 8.6–10.5)
CHLORIDE SERPL-SCNC: 108 MMOL/L (ref 98–107)
CO2 SERPL-SCNC: 21.5 MMOL/L (ref 22–29)
CREAT SERPL-MCNC: 1.57 MG/DL (ref 0.76–1.27)
EGFRCR SERPLBLD CKD-EPI 2021: 47.1 ML/MIN/1.73
EOSINOPHIL # BLD AUTO: 0.32 10*3/MM3 (ref 0–0.4)
EOSINOPHIL NFR BLD AUTO: 4.7 % (ref 0.3–6.2)
ERYTHROCYTE [DISTWIDTH] IN BLOOD BY AUTOMATED COUNT: 13.4 % (ref 12.3–15.4)
GLOBULIN SER CALC-MCNC: 2.7 GM/DL
GLUCOSE SERPL-MCNC: 120 MG/DL (ref 65–99)
HCT VFR BLD AUTO: 39.6 % (ref 37.5–51)
HGB BLD-MCNC: 12.9 G/DL (ref 13–17.7)
IMM GRANULOCYTES # BLD AUTO: 0.03 10*3/MM3 (ref 0–0.05)
IMM GRANULOCYTES NFR BLD AUTO: 0.4 % (ref 0–0.5)
LYMPHOCYTES # BLD AUTO: 2.09 10*3/MM3 (ref 0.7–3.1)
LYMPHOCYTES NFR BLD AUTO: 30.6 % (ref 19.6–45.3)
MAGNESIUM SERPL-MCNC: 2.3 MG/DL (ref 1.6–2.4)
MCH RBC QN AUTO: 28.6 PG (ref 26.6–33)
MCHC RBC AUTO-ENTMCNC: 32.6 G/DL (ref 31.5–35.7)
MCV RBC AUTO: 87.8 FL (ref 79–97)
MONOCYTES # BLD AUTO: 0.55 10*3/MM3 (ref 0.1–0.9)
MONOCYTES NFR BLD AUTO: 8.1 % (ref 5–12)
NEUTROPHILS # BLD AUTO: 3.77 10*3/MM3 (ref 1.7–7)
NEUTROPHILS NFR BLD AUTO: 55.2 % (ref 42.7–76)
NRBC BLD AUTO-RTO: 0 /100 WBC (ref 0–0.2)
PHOSPHATE SERPL-MCNC: 2.9 MG/DL (ref 2.5–4.5)
PLATELET # BLD AUTO: 177 10*3/MM3 (ref 140–450)
POTASSIUM SERPL-SCNC: 4.2 MMOL/L (ref 3.5–5.2)
PROT SERPL-MCNC: 6.7 G/DL (ref 6–8.5)
PSA SERPL-MCNC: 0.3 NG/ML (ref 0–4)
RBC # BLD AUTO: 4.51 10*6/MM3 (ref 4.14–5.8)
SODIUM SERPL-SCNC: 141 MMOL/L (ref 136–145)
WBC # BLD AUTO: 6.83 10*3/MM3 (ref 3.4–10.8)
WRITTEN AUTHORIZATION: NORMAL

## 2025-04-16 PROCEDURE — 25010000002 DENOSUMAB 120 MG/1.7ML SOLUTION: Performed by: INTERNAL MEDICINE

## 2025-04-16 PROCEDURE — 1159F MED LIST DOCD IN RCRD: CPT | Performed by: NURSE PRACTITIONER

## 2025-04-16 PROCEDURE — 96372 THER/PROPH/DIAG INJ SC/IM: CPT

## 2025-04-16 PROCEDURE — 1160F RVW MEDS BY RX/DR IN RCRD: CPT | Performed by: NURSE PRACTITIONER

## 2025-04-16 PROCEDURE — 1126F AMNT PAIN NOTED NONE PRSNT: CPT | Performed by: NURSE PRACTITIONER

## 2025-04-16 PROCEDURE — 99214 OFFICE O/P EST MOD 30 MIN: CPT | Performed by: NURSE PRACTITIONER

## 2025-04-16 RX ADMIN — DENOSUMAB 120 MG: 120 INJECTION SUBCUTANEOUS at 11:37

## 2025-04-16 NOTE — PROGRESS NOTES
CHIEF COMPLAINT: Unreported acute illness several weeks ago    Problem List:  Oncology/Hematology History Overview Note   1.  Prostate cancer clinical T1c and 2M1B stage IVb treated with 4 cycles of Taxotere, stopped due to syncope with negative cardiac work-up, with marked drop in PSA of 18.6 from over 900 at baseline, continued on Zytiga and prednisone.  2.  Urinary retention with Goodwin in place 1/24/2022.  On finasteride 1/24/2022.  3.  Covid 19 December 2021  4.  Hypertension  5.  Stage 3a chronic kidney disease    Oncology history timeline:  -11/29/2021  with symptoms of urinary retention.  -2/15/2022 prostate biopsy adenocarcinoma grade group 5 and 3 out of 12 cores, grade group 4 in 1 out of 12 cores, grade group 3 in 8 out of 12 cores.  -2/24/2022 CT chest abdomen pelvis and total body bone scan shows left third rib, right acetabulum, periaortic and retroperitoneal kizzy metastases complaining of pain in the left chest and right hip.  Also possible sclerotic left 10th rib on CT.  Spleen mildly enlarged 13.8 cm.  Hepatic steatosis.  Small liver cyst.  Fat stranding in the periaortic and mesenteric region consistent with reactive/inflammatory stranding.  Multiple enlarged periaortic and retroperitoneal nodes and lymphadenopathy largest 4.9 cm.  CT chest describes tiny sclerotic foci in left eighth rib and right lateral seventh rib and T1.  Multiple small mediastinal and bilateral axillary nodes seen with small hypodense left thyroid nodule.  Creatinine 1.7.  -3/4/2022 office note Dr. Rolando Matute: Patient was seen after his elevated PSA by Dr. Vera and prostate biopsy scheduled.  However, he developed COVID-19 and this was canceled and the patient did not reschedule due to lack of insurance.  Saw Dr. Matute back on 1/24/2022 with plans to get staging CTs and bone scan with results as outlined above.  Started Casodex and to return on 3/11/2022 for Lupron.  Referral made to medical oncology for  other additional castrate naïve prostate cancer therapies.  TURP planned for urinary retention symptoms.    -3/15/2022 [facility] medical oncology initial consultation: I reviewed the above data with the patient.  With his fairly bulky retroperitoneal adenopathy and bone metastases including extra axial metastases, he would fit the data from the CHAARTED trial for which Taxotere x6 followed by Zytiga prednisone along with the planned Lupron already being planned by Dr. Matute would be reasonable.  Equally reasonable in terms of comparisons with bicalutamide and Lupron would be Zytiga prednisone plus Lupron or Xtandi plus Lupron or apalutamide plus Lupron.  None of these have been compared head-to-head but all have beaten combined androgen deprivation therapy with bicalutamide and Lupron.  At the age of 67 and in order to have as many bullets as possible down the road and hence saving some of the hormone blockade options for later and using chemotherapy when he is younger and healthier for a more time-limited.,  He has opted for the Taxotere x6 followed by Zytiga and prednisone.  We will also give him Xgeva to improve bone health and given metastatic disease, as with all metastatic prostate cancer patients, he needs genetic counseling both for his sake as well as his family's.  If he were to have BRCA1 or other such  mutations, then that also opens up the options for PARP inhibitors etc. down the road.  He has his TURP scheduled for 2/24/2022 and we will start treatment a couple of weeks after that and he will get chemo preparation visit in the meantime and I will get capital surgeons to place a port.  We will check his PSA after his TURP when he sees my nurse practitioner back early April for the start of chemotherapy and repeat the PSA and CT chest abdomen pelvis and bone scan after the Taxotere is complete.    -3/31/2022 chemotherapy education and needs assessment completed    -4/7/2022 began docetaxel and  Xgeva.  .0.  We will do his Xgeva every 6 weeks to coordinate with his docetaxel infusions.    -4/19/2022 patient stopped Casodex    -5/17/2022 patient reported seeing his PCP for an abscess in his suprapubic area.  Placed on clindamycin, will delay treatment 1 week.    -5/25/2022 PSA 34.3  -5/26/2022 [facility] Oncology clinic follow-up: Chris has an abscess in the suprapubic area, it has improved on antibiotic therapy but I am concerned if we treat him it will just flare back up unless it is drained.  I will get him to Dr. Miller who placed his port for I&D and culture.  He is on clindamycin and states he completes course of treatment tomorrow.  I will delay his treatment with Taxotere 1 more week.  We will give his Xgeva today.  I will see him back in 1 week for follow-up to assess whether or not we can resume his Taxotere.  His PSA has dropped nicely with treatment thus far, PSA yesterday was 34.3 which is down from a PSA of 259.0 when we started treatment, it has been as high as 911 in November.    -6/2/2022 [facility] Oncology clinic follow-up: Chris went to see Dr. Miller to have his abscess lanced however apparently there was a long wait and he left without being seen.  He did call his PCP and was given 5 more days of clindamycin and the abscess now seems to have resolved.  We discussed today that he is at risk for recurrence of infection due to immunocompromise state with chemotherapy.  He will notify us if he has any return of his abscess.  He does have intertrigo under his panniculus, I have advised him to keep this area dry, to apply topical drying/antifungal powders.  Also recommended looser clothing.  His counts are adequate to continue therapy, we will resume Taxotere today with cycle #3.  We will repeat restaging scans after 6 courses.  We are doing Xgeva every 6 weeks to coordinate with his Taxotere.  Once he finishes Taxotere we can then go back to every 4 weeks.  His next Xgeva is not due until  7/14/2022.  His calcium is running a little low, he is going to  calcium supplement.    -6/23/2022 [facility] clinic follow-up: Chris overall is doing well on treatment with Taxotere.  Labs reviewed from yesterday and are unremarkable.  We will continue treatment today unchanged.  His suprapubic abscess resolved on clindamycin.  He will continue to use drying powder/antifungal powder under his panniculus for intertrigo.  Today will be cycle #4 of a planned 6 courses of Taxotere.  He is also on Xgeva, next dose due 7/14/2022, we are doing this every 6 weeks for now to line up with his chemotherapy, can go back to every 4 weeks after he finishes Taxotere.  Calcium from CMP yesterday was normal.    -7/13/2022 [facility] clinic follow-up: Mr. Morfin has had issues around day 3 after each treatment ranging from chest pain after his first treatment for which he did not seek medical attention, fatigue after the next few treatments, but 3 days after his most recent treatment on 6/23/2022 he had a syncopal episode at home and once again did not seek medical attention.  I discussed with him that this was not acceptable, if he has occurrences like this someone needs to call 911, it is difficult to figure out what is going on after the fact, the best time to work this up would be during the occurrence.  For now we will get labs today with CBC, CMP, PSA.  I will refer him to cardiology for further evaluation.  We will hold Taxotere most likely, I will see him tomorrow to go over his lab results.  I will also repeat his restaging scans with CT chest, abdomen and pelvis and will include CT of the head in light of his syncopal episode.      -7/13/2022 PSA 18.6    -7/14/2022 [facility] clinic follow-up: Mr. Morfin returns today to review his labs from yesterday.  Labs are unremarkable.  PSA down to 18.6 from a high of 259.0 in April prior to starting treatment.  CBC and CMP unremarkable, magnesium is normal  at 2.3, phosphorus slightly low at 2.4.  We will hold therapy for now in light of his syncopal episode on day 3 after his last treatment and prior episodes with chest pain and severe fatigue that all occurred on day 3 after his Taxotere.  We will restage him with CT head, chest, abdomen and pelvis (I am including the head in light of his syncopal episode).  I have also referred him to cardiology, he states that he received a call from them about making an appointment however he wanted to talk to us today first.  I emphasized the importance to him of making and keeping that appointment and he states understanding.  I also once again emphasized the requirement to seek emergency medical attention if he has any episodes in the future such as chest pain or syncopal events.  Whether or not we try and complete Taxotere with 2 more courses or move to the next line of therapy will be decided when he returns to discuss with Dr. Goodson and review his restaging scans.    -7/15/2022 saw Dr. Diaz cardiologist.  For syncope he ordered echocardiogram and 48-hour Holter monitor.    -7/15/2022 Holter monitor relatively benign.    -7/22/2022 CT brain with and without contrast negative.  CT chest abdomen and pelvis shows some nonspecific cecal wall thickening.  No progression in the chest.  T1 and ribs stable.  Decrease in multiple retroperitoneal and pelvic nodes.  Slight increase in right acetabular sclerotic lesion.  Persistent but improved circumferential bladder wall thickening.  Total body bone scan shows stable left third rib and no evidence of new bone metastases.    -7/19/2022 ejection fraction 65% with grade 1 diastolic dysfunction.    -7/26/2022 [facility] clinic follow-up: Given presyncopal symptoms occurred 3 days after Taxotere and has not otherwise occurred any other time and his cardiology work-up is fairly unremarkable and his disease is under good control as witnessed by the report above of the CTs of head  chest abdomen pelvis and bone scan, I will hold cycle 5 and 6 of Taxotere and continue 1 now with Zytiga and prednisone.  With reference to the coincidental cecal wall thickening found on CT, we will get him to [facility] surgeons for colonoscopy.  He will see my nurse practitioner back in August for next cycle of Abiraterone prednisone.  He will continue his Lupron with Dr. Matute.  We will continue his Xgeva.  We will repeat his CT chest abdomen pelvis and bone scan again in 3 months.  He will see my nurse practitioner in the interim.    -8/23/2022 [facility] oncology clinic follow-up: No further presyncopal symptoms.  Feeling great other than for hot flashes.  Did commend for now we will continue on with his Zytiga prednisone and he will get his Xgeva today based on labs from 8/10/2022.  He opted out of getting the colonoscopy that I recommended and he will not change his mind.  He will continue getting the Lupron with Dr. Matute and I asked him to give the date of this appointment to my nurse when he sees her back in a month.  We will give his Xgeva today.  We will get CT chest abdomen pelvis and total body bone scan towards the middle to end of October.  Presently other than for the hot flashes feels great.    -9/20/2022 PSA 8.320    -9/22/2022 [facility] Oncology clinic follow-up: Chris is doing well on Zytiga, prednisone along with Xgeva.  Labs reviewed from 9/20/2022 are unremarkable, CBC was normal, CMP with creatinine of 1.34 otherwise normal, this is stable from his baseline.  PSA stable at 8.320.  He received Lupron recently with Dr. Matute, he thinks last week or so, states his next appointment is in February.  He will continue treatment unchanged.  We will repeat restaging CT chest, abdomen and pelvis and total body bone scan in October prior to return and I have ordered those today.  We will also repeat his PSA.    -9/22/2022 CT chest abdomen pelvisCompared to July 2022 [facility] showed no  evidence of disease progression in the chest with no substantial sclerotic bony lesions and decrease in size of retroperitoneal and pelvic nodes with persistent cecal wall thickening and suboptimal bladder distention.  Total body bone scan showed decrease in previously seen uptake in the left third rib with diffuse sclerosis representing treatment response with no new foci.    -10/18/2022 [facility] medical oncology follow-up: I reviewed bone scan and CT reports as above with no evidence of progression.  Tolerating Zytiga prednisone and Xgeva.  Getting Lupron regularly with Dr. Matute next appointment coming in February.  We will repeat his CT chest abdomen pelvis and total body bone scan in April.  He will follow with my nurse practitioner in the interim.    -1/10/2023 PSA 3.450  -1/11/2023 [facility] Oncology clinic follow-up: Mr. Morfin continues to do well on current therapy with Zytiga, prednisone and Xgeva with no unusual side effects.  He has no new worrisome symptoms.  He is due for his next Lupron injection in February with Dr. Matute.  His PSA continues to trend downward, current PSA from yesterday was 3.450.  We will continue therapy unchanged, he will receive Xgeva today.  Has had no hypocalcemia, his CMP, phosphorus and magnesium are all normal.  We will repeat restaging scans in April.  -3/7/2023 PSA 2.460  -3/8/2023 [facility] Oncology clinic follow-up:  Mr. Morfin is doing well.  He is tolerating therapy with Zytiga, prednisone and Xgeva with no significant side effects.  He has hot flashes but these are tolerable.  He had Lupron injection 2/24/2023 with Dr. Matute.  His PSA continues to decline, current PSA 2.460.  He will continue therapy unchanged.  We will repeat restaging scans late April prior to return in 2 months and I have ordered those today.  He is working with his PCP Rodrigo Ram on his hypertension.  His b/p today was 160/88.  He was to have increased his losartan but I do not think he has  "done that yet as he said \"I still have some of my old prescription left\".  -4/4/2023 PSA 2.55  - 4/26/2023 CT chest abdomen pelvis [place] comparison 10/10/2022 showed stable left third rib, T1, left fourth and eighth rib, right seventh rib.  Probable liver cyst.  Few diverticuli.  Mild further decrease in a few of the previously enlarged nodes.  Left external iliac 12 mm compared to 16 mm.  Right common iliac 7 mm stable.  Lower left periaortic 8 mm previously 13 mm.  No new lytic or blastic osseous lesions.  Total body bone scan comparison 10/10/2022, 7/22/2022, and CT 4/26/2023.  No new sites of increased uptake.  1 focal left third rib hotspot consistent with bone metastasis.    -5/2/2023 [facility] oncology follow-up: I reviewed interval notes since I last saw him from my nurse practitioner as outlined above in interval images and reports thereof from [facility] that shows no new sites of bone metastasis and no kizzy or visceral progression.  PSA stabilized around 2.5.  In the absence of symptomatic or radiographic progression, I would be unlikely to change therapy just on the basis of a PSA rise and for now the PSA is stable.  We will continue Zytiga prednisone and Xgeva and he will follow-up with my nurse practitioner 5/30/2023 with repeat CTs and bone scan in 6 months.  I asked him to check with his primary care today regarding his systolic in the 170s.    -5/31/2023 [facility] clinic follow-up: Chris continues to do well on current therapy with Zytiga, prednisone and Xgeva along with Lupron that he receives with Dr. Matute.  His PSA remains stable, it was lower this month compared to last month, current PSA 1.940.  Continues to deal with hypertension, on arrival today his blood pressure was 210/92 however this was using a wrist cuff, when I rechecked it manually his blood pressure was 160/90.  Still has room for improvement despite recent increase in his losartan to 100 mg daily.  He " will follow-up with Dr. Ram for further management.  We will continue therapy unchanged.  We will repeat restaging scans in October.    -7/26/2023 [facility] clinic follow-up: Chris overall continues to do well on current therapy with Zytiga, prednisone and Xgeva.  He receives Lupron every 6 months with Dr. Matute and states that is scheduled for August.  PSA from yesterday is pending, PSA on 6/27/2023 was 1.590 which was continuing to decline, in February it was 3.250.  His creatinine this past month has bumped up.  He feels he is staying hydrated but I encouraged him to increase his fluid intake.  I discussed with him that this could be from his hypertension, some of his medications.  His blood pressure today is 165/92.  He has an appointment with his primary care provider Dr. Teri Ram next week and can further discuss with her.  No adjustment needed for Xgeva.  I refilled his prednisone today.  I will see him back in 1 month for follow-up with continued monitoring of his labs.  He may end up needing to see nephrology.  We will repeat restaging scans in October.    -9/29/2023 PSA 1.810  -8/23/2023 [facility] clinic follow-up: Chris continues on therapy with Zytiga, prednisone and Xgeva, he also receives Lupron every 6 months with Dr. Matute with most recent given on 8/7/2023.  He is scheduled for a cystoscopy in a few weeks as his last few UAs per the patient's report had microscopic hematuria, he has had no steve hematuria.  His creatinine continues to remain elevated at 1.82, BUN is 40, GFR is 40.  I will get him to nephrology for further evaluation.  Blood pressure today was fairly good at 161/86, he continues to follow with Dr. Ram for management.  PSA on 7/25/2023 was up slightly from prior month, PSA in July was 2.140, in June it was 1.590, PSA from yesterday is pending.  I plan on repeating restaging CT scans and bone scans in October however if his PSA is up significantly  then I will do sooner.  I will see him back for follow-up in 1 month.  -8/23/2024 PSA 1.860    -9/20/2023 [facility] clinic follow-up: Chris is tolerating treatment with Zytiga, prednisone and Xgeva.  He is up-to-date with his Lupron injection, last received in August with Dr. Matute.  He had cystoscopy on 9/8/2023 that was normal. I referred him to nephrology when I saw him last in August as his creatinine has been increasing, creatinine yesterday was a little better 1.51, GFR is 50, creatinine clearance 63.8.  He is still awaiting an appointment with nephrology, per our  it will be early November.  We will repeat his restaging scans prior to return and I will do his CT scans without contrast in light of his new renal insufficiency.  We will also get total body bone scan and I have ordered those today.  His PSA yesterday was 1.810.    -10/9/2023 CT chest abdomen Pelvis without contrast compared to April 2023 shows slightly prominent pelvic lymph nodes left external iliac 8 mm compared to prior study.  Right internal iliac heterogenous 19 mm compared to 11 mm prior.  Stable sclerotic bone lesions and no new lesions.  Stable bone scan with no new sites of disease    -10/17/2023 [facility] oncology clinic follow-up: With elevated creatinine, CTs done without contrast showed very slight increased prominence by few millimeters of some internal iliac and external iliac nodes for which PSMA PET could be done but for now I will check his PSA and have him see my nurse practitioner back in a week and unless the PSA is significantly rising I would continue the Zytiga, prednisone, Xgeva and February dose of Lupron with Dr. Matute and make sure he follows with nephrology.  If his PSA is significantly rising then my nurse practitioner will order PSMA PET.    -10/17/2023 PSA 1.5  -11/14/2023 PSA 1.510  -11/15/2023 [facility] clinic follow-up: Mr. Ferrara continues to do well on current therapy with Zytiga,  prednisone, Xgeva and Lupron that he receives with Dr. Matute.  His PSA is stable at 1.5.  Would not change therapy for now, we will plan on repeating restaging scans in February unless PSA starts to rise or he has new concerns.  Overall he feels good.  He is now established with nephrology and will continue to follow with them regarding his renal insufficiency, I reviewed note from consultation with Dr. Armstrong 11/6/2023.    -1/9/2024 PSA 1.050    -1/10/2024 [facility] Oncology clinic follow-up: Chris overall continues to tolerate current therapy with Zytiga, prednisone, Xgeva and Lupron.  He states his next Lupron injection with Dr. Matute is due late February.  His PSA is stable at 1.050. Creatinine stable at 1.64, he is following with nephrology, he has a an appointment with Dr. Armstrong in February for follow-up.  Hypertension being managed by his primary care provider, target systolic ideally 120s-140s, today it is running a little high at 172/90, yesterday when he was here for labs it was 138/80.  We will continue therapy unchanged with plans to repeat restaging scans in April.    -2/6/2024 PSA 0.753    -2/7/2024 [facility] oncology clinic follow-up: Chris continues to do well on current therapy with Zytiga, prednisone, Xgeva and Lupron.  He is up-to-date on Lupron injection next due later this month with Dr. Matute.  His PSA continues to slowly decrease, current PSA is 0.753.  He has no new worrisome symptoms.  We will continue therapy unchanged, I have ordered restaging scans for late March prior to his return in April. He is following with nephrology for his renal insufficiency, his creatinine yesterday was good at 1.21.  I will do his CT scans with contrast unless something changes in the interim. He follows closely with Dr. Ram for management of his hypertension. I reviewed notes from his last office visit with her earlier this month on 2/1/2024, also reviewed labs in detail with the patient today.   All questions were answered to his satisfaction.    -3/5/2024 PSA slowly declining 0.73.  Creatinine fluctuating.  1.69 with GFR 43 otherwise unremarkable CMP.  Phosphorus low 2.1    -4/8/2024 CT chest, abdomen and pelvis shows stable sclerotic lesions in the thoracic spine and bilateral ribs and right acetabulum.  Stable appearance of pelvic lymph nodes.  No new sites concerning for progression of disease.  Total body bone scan shows stable diffuse increased uptake of the left third rib correlates with diffuse sclerosis and enlargement on CT, no new sites of active osseous lesions.  -4/30/2024 PSA 0.713  -5/1/2024 [facility] oncology clinic follow-up: Chris continues to do well on current therapy with Zytiga, prednisone, Xgeva and Lupron.  He receives his Lupron every 6 months with Dr. Matute, last administered 2/5/2024.  Current CT scans and bone scan showed no progression of disease, PSA is stable at 0.713.  His CBC is unremarkable, CMP shows creatinine stable at 1.50 otherwise normal.  He does follow with nephrology.  We will continue treatment unchanged.  I will see him back in 2 months for follow-up.  We will repeat restaging scans in 6 months unless he has any new symptoms or significant change in his PSA.    -6/25/2024 PSA 0.623  -6/26/2024 [facility] oncology clinic follow-up: Chris overall continues to tolerate current therapy with Zytiga, prednisone, Xgeva and Lupron.  We did call to confirm his next Lupron injection and that is scheduled for late July with Dr. Matute.  His PSA for the most part is stable at 0.623, we will continue to monitor.  He has no new worrisome symptoms.  I did discuss with him today his creatinine which has increased to 1.86, BUN was 32.  Reminded him to be sure and stay hydrated.  He does follow with nephrology and his next appointment is in August.  He is on Xgeva however there is no indication for any adjustment, his creatinine clearance is greater than 30.  Blood pressure today  was under good control at 130/78.  We will continue treatment unchanged.  I will see him back in 1 month for follow-up.  Plan to repeat restaging scans in October unless his PSA rises or he has worrisome symptoms.    -7/23/2024 PSA 0.606    -9/17/2024 PSA 0.455  -9/18/2024 [facility] clinic follow-up: Chris is doing well overall.  Tolerates therapy with Zytiga, prednisone, Xgeva and Lupron.  He last received Lupron in August with Dr. Matute.  His PSA had been slowly creeping up however it has now went back down and is currently 0.455.  He will continue treatment unchanged.  We will repeat CT chest, abdomen and pelvis prior to return and I will do that without contrast in light of his renal insufficiency.  Current creatinine is stable at 1.5, he does follow with nephrology.  We will also repeat total body bone scan.  We will see him back in 1 months for follow-up and sooner if any concerns.  I did discuss with him that he can take Tylenol if needed or use topical over-the-counter pain relief ointment such as Biofreeze etc. for his back pain but thus far it has not required any intervention, he does not like to take pain medications.    -10/14/2024 CT chest abdomen pelvis without contrast [facility] compared to August 2024 shows unchanged sclerotic T1 vertebral body with expansile sclerotic left third rib without fracture and other small sclerotic nodules all unchanged.  Right external iliac node 20 mm increased from 7 mm on prior exam with internal iliac node 15 mm unchanged.  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    -10/22/2024 [facility] follow-up: Feeling great.  While he has 1 slightly larger node, given that his PSA has not been rapidly rising and all other disease is stable I will have him continue Zytiga prednisone Xgeva and he gets his next Lupron in February with Dr. Matute.  He will see my nurse  practitioner and she will order repeat CT chest abdomen pelvis and bone scan for January and will follow-up with her in January to go over those results.  If either his PSA or the scans show progression beyond just the single slightly larger iliac node, she will order a PSMA PET and then get him back to me.  If the PSA is stable and the subsequent scans are stable then we will just continue his current regimen with quarterly imaging or sooner as symptoms or PSA dictate.    -11/19/2024 PSA 0.453  -1/14/2025 PSA 0.351  -1/13/2025 received Lupron with Dr. Matute  -2/3/2025 total body bone scan shows overall stable, 1 new site involving the left costovertebral junction of the left sixth rib with no definite CT correlate and favored to represent degenerative/traumatic changes.  CT chest, abdomen and pelvis with no evidence of disease progression in the chest, unchanged expansile sclerotic left third rib mass and sclerotic T1 vertebral body focus.  Abdomen and pelvis with mild interval increase in bilateral external iliac lymph nodes, unchanged right internal iliac lymph node, for reference right external iliac lymph node now measuring up to 28 mm compared to 20 mm on prior, left external iliac lymph node now measuring up to 20 mm compared to 10 mm on prior.  Right internal iliac 15 mm stable.  -2/11/2025 PSA 0.364  -2/12/2025 [facility] follow-up: Chris overall is tolerating current therapy with Zytiga, prednisone and Xgeva monthly, he receives Lupron with Dr. Matute.  He last received Lupron 1/13/2025, next Lupron due in June.  His PSA is stable at 0.364.  Current restaging scans on 2/3/2025 show 1 new site in the left costovertebral junction of the left sixth rib, this is favored to represent degenerative/traumatic change, he does not recall any trauma to this area.  He is having no pain in this area.  He still has intermittent pain in his right mid back that is worse typically when he is lying down.   He does have a sclerotic lesion at T1, I will go ahead and get an MRI of his thoracic spine just to make sure this is stable.  There has been a mild increase in the bilateral external iliac nodes and the right internal iliac node is stable.  Overall due to the stability of his PSA, his tolerability of current treatment and his desire not to pursue chemotherapy we will continue treatment unchanged.  We will repeat restaging scans in 3 months for follow-up.  If he has progressive new disease or worsening symptoms we would then want to get PSMA PET scan for evaluation, I think he would consider possibly Pluvicto down the road.  We will see him back in 1 month for follow-up to go over his MRI and continue current therapy.     -3/11/2025 [facility] follow-up: Tolerating Zytiga prednisone and Xgeva.  Next Lupron in June with Dr. Matute.  PSA staying low.  Patient could not tolerate MRI relative to his back pain due to claustrophobia.  Minimal back pain.  No leg weakness.  If his PSA rises we will get PSMA PET otherwise we will repeat his bone scan and CAT scans mid-May.    - 4/15/2025 PSA 0.302     Prostate cancer metastatic to multiple sites   3/15/2022 Initial Diagnosis    Prostate cancer metastatic to multiple sites (HCC)     3/15/2022 Cancer Staged    Staging form: Prostate, AJCC 8th Edition  - Clinical: Stage IVB (cT1c, cN1, cM1b, Grade Group: 5) - Signed by Fritz Goodson MD on 3/15/2022     4/7/2022 - 6/23/2022 Chemotherapy    Stopped after cycle 4 6/23/2022 due to syncope 3 days post infusions with negative cardiac work-up  OP PROSTATE DOCEtaxel     4/7/2022 -  Chemotherapy    OP SUPPORTIVE Denosumab (Xgeva) Q28D     7/26/2022 -  Chemotherapy    OP PROSTATE Abiraterone / PredniSONE           HISTORY OF PRESENT ILLNESS:  The patient is a 70 y.o. male, here for follow up on management of metastatic prostate cancer currently on therapy with Zytiga, prednisone, Xgeva and Lupron.  He receives Lupron with  "Dr. Sue, next due in July.  Chris reports that currently he is feeling well, he does mention that a few weeks ago he woke up and had been incontinent of urine and stool, he reports that he felt fatigued and dizzy.  He states that he felt poorly for about 3 days and then it resolved.  He did not seek emergency attention, he did not follow-up with his PCP, he did not report to any healthcare provider.  He denies any headaches, he denies vision changes.  He did not note any weakness.  He had no difficulty swallowing.  He states that his appetite was poor for those several days but now is back to normal.  He had no fevers or chills that he is aware of.  No respiratory concerns.  States that he did not take his blood pressure medicine during this time.    Past Medical History:   Diagnosis Date    Cancer     Prostate cancer      Past Surgical History:   Procedure Laterality Date    PROSTATE BIOPSY  02/2022    dr. sue       No Known Allergies    Family History and Social History reviewed and changed as necessary    REVIEW OF SYSTEM:   No new somatic complaints, had an unreported acute illness a few weeks ago as outlined in the HPI    PHYSICAL EXAM:  Chris appears in his usual state of health today.  He is a well-developed, well-nourished appearing male in no distress  Respirations regular and unlabored, lungs clear to auscultation bilaterally, oxygen saturation 95% on room air  Heart regular rate and rhythm  No focal neurological deficits  Normal gait and ambulation, normal upper and lower extremity strength 5/5    Vitals:    04/16/25 1053   BP: 157/94   Pulse: 67   Resp: 18   Temp: 98.4 °F (36.9 °C)   SpO2: 95%   Weight: 121 kg (267 lb)   Height: 180.3 cm (71\")     Vitals:    04/16/25 1053   PainSc: 0-No pain          ECOG score: 0           Vitals reviewed.  Labs reviewed    ECOG: (0) Fully Active - Able to Carry On All Pre-disease Performance Without Restriction    Lab Results   Component Value Date    HGB " 12.9 (L) 04/15/2025    HCT 39.6 04/15/2025    MCV 87.8 04/15/2025     04/15/2025    WBC 6.83 04/15/2025    NEUTROABS 3.77 04/15/2025    LYMPHSABS 2.09 04/15/2025    MONOSABS 0.55 04/15/2025    EOSABS 0.32 04/15/2025    BASOSABS 0.07 04/15/2025       Lab Results   Component Value Date    GLUCOSE 120 (H) 04/15/2025    BUN 20 04/15/2025    CREATININE 1.57 (H) 04/15/2025     04/15/2025    K 4.2 04/15/2025     (H) 04/15/2025    CO2 21.5 (L) 04/15/2025    CALCIUM 9.1 04/15/2025    PROTEINTOT 6.7 04/15/2025    ALBUMIN 4.0 04/15/2025    BILITOT 0.2 04/15/2025    ALKPHOS 68 04/15/2025    AST 17 04/15/2025    ALT 11 04/15/2025     Lab Results   Component Value Date    PSA 0.302 04/15/2025    PSA 0.349 03/11/2025    PSA 0.364 02/11/2025    PSA 0.351 01/14/2025    PSA 0.418 12/17/2024    PSA 0.453 11/19/2024             ASSESSMENT & PLAN:  1.  Prostate cancer clinical T1c and 2M1B stage IVb treated with 4 cycles of Taxotere, stopped due to syncope with negative cardiac work-up, with marked drop in PSA of 18.6 from over 900 at baseline, continued on Zytiga and prednisone.  2.  Urinary retention with Goodwin in place 1/24/2022.  On finasteride 1/24/2022.  3.  Covid 19 December 2021  4.  Hypertension  5.  Stage 3a chronic kidney disease  5.  Unreported acute illness several weeks ago with incontinence, fatigue and weakness for 3 days    Oncology history timeline:  -11/29/2021  with symptoms of urinary retention.  -2/15/2022 prostate biopsy adenocarcinoma grade group 5 and 3 out of 12 cores, grade group 4 in 1 out of 12 cores, grade group 3 in 8 out of 12 cores.  -2/24/2022 CT chest abdomen pelvis and total body bone scan shows left third rib, right acetabulum, periaortic and retroperitoneal kizzy metastases complaining of pain in the left chest and right hip.  Also possible sclerotic left 10th rib on CT.  Spleen mildly enlarged 13.8 cm.  Hepatic steatosis.  Small liver cyst.  Fat stranding in the periaortic  and mesenteric region consistent with reactive/inflammatory stranding.  Multiple enlarged periaortic and retroperitoneal nodes and lymphadenopathy largest 4.9 cm.  CT chest describes tiny sclerotic foci in left eighth rib and right lateral seventh rib and T1.  Multiple small mediastinal and bilateral axillary nodes seen with small hypodense left thyroid nodule.  Creatinine 1.7.  -3/4/2022 office note Dr. Rolando Matute: Patient was seen after his elevated PSA by Dr. Vera and prostate biopsy scheduled.  However, he developed COVID-19 and this was canceled and the patient did not reschedule due to lack of insurance.  Saw Dr. Matute back on 1/24/2022 with plans to get staging CTs and bone scan with results as outlined above.  Started Casodex and to return on 3/11/2022 for Lupron.  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  TURP planned for urinary retention symptoms.    -3/15/2022 [facility], [place], operated by [facility] medical oncology initial consultation: I reviewed the above data with the patient.  With his fairly bulky retroperitoneal adenopathy and bone metastases including extra axial metastases, he would fit the data from the CHAARTED trial for which Taxotere x6 followed by Zytiga prednisone along with the planned Lupron already being planned by Dr. Matute would be reasonable.  Equally reasonable in terms of comparisons with bicalutamide and Lupron would be Zytiga prednisone plus Lupron or Xtandi plus Lupron or apalutamide plus Lupron.  None of these have been compared head-to-head but all have beaten combined androgen deprivation therapy with bicalutamide and Lupron.  At the age of 67 and in order to have as many bullets as possible down the road and hence saving some of the hormone blockade options for later and using chemotherapy when he is younger and healthier for a more time-limited.,  He has opted for the Taxotere x6 followed by Zytiga and prednisone.  We will also give him  Xgeva to improve bone health and given metastatic disease, as with all metastatic prostate cancer patients, he needs genetic counseling both for his sake as well as his family's.  If he were to have BRCA1 or other such  mutations, then that also opens up the options for PARP inhibitors etc. down the road.  He has his TURP scheduled for 2/24/2022 and we will start treatment a couple of weeks after that and he will get chemo preparation visit in the meantime and I will get capital surgeons to place a port.  We will check his PSA after his TURP when he sees my nurse practitioner back early April for the start of chemotherapy and repeat the PSA and CT chest abdomen pelvis and bone scan after the Taxotere is complete.    -3/31/2022 chemotherapy education and needs assessment completed    -4/7/2022 began docetaxel and Xgeva.  .0.  We will do his Xgeva every 6 weeks to coordinate with his docetaxel infusions.    -4/19/2022 patient stopped Casodex    -5/17/2022 patient reported seeing his PCP for an abscess in his suprapubic area.  Placed on clindamycin, will delay treatment 1 week.    -5/25/2022 PSA 34.3  -5/26/2022 [facility] follow-up: Chris has an abscess in the suprapubic area, it has improved on antibiotic therapy but I am concerned if we treat him it will just flare back up unless it is drained.  I will get him to Dr. Miller who placed his port for I&D and culture.  He is on clindamycin and states he completes course of treatment tomorrow.  I will delay his treatment with Taxotere 1 more week.  We will give his Xgeva today.  I will see him back in 1 week for follow-up to assess whether or not we can resume his Taxotere.  His PSA has dropped nicely with treatment thus far, PSA yesterday was 34.3 which is down from a PSA of 259.0 when we started treatment, it has been as high as 911 in November.    -6/2/2022 [facility] follow-up: Chris went to see Dr. Miller to have his abscess  lanced however apparently there was a long wait and he left without being seen.  He did call his PCP and was given 5 more days of clindamycin and the abscess now seems to have resolved.  We discussed today that he is at risk for recurrence of infection due to immunocompromise state with chemotherapy.  He will notify us if he has any return of his abscess.  He does have intertrigo under his panniculus, I have advised him to keep this area dry, to apply topical drying/antifungal powders.  Also recommended looser clothing.  His counts are adequate to continue therapy, we will resume Taxotere today with cycle #3.  We will repeat restaging scans after 6 courses.  We are doing Xgeva every 6 weeks to coordinate with his Taxotere.  Once he finishes Taxotere we can then go back to every 4 weeks.  His next Xgeva is not due until 7/14/2022.  His calcium is running a little low, he is going to  calcium supplement.    -6/23/2022 [facility] follow-up: Chris huerta is doing well on treatment with Taxotere.  Labs reviewed from yesterday and are unremarkable.  We will continue treatment today unchanged.  His suprapubic abscess resolved on clindamycin.  He will continue to use drying powder/antifungal powder under his panniculus for intertrigo.  Today will be cycle #4 of a planned 6 courses of Taxotere.  He is also on Xgeva, next dose due 7/14/2022, we are doing this every 6 weeks for now to line up with his chemotherapy, can go back to every 4 weeks after he finishes Taxotere.  Calcium from [facility] yesterday was normal.    -7/13/2022 [facility] follow-up: Mr. Morfin has had issues around day 3 after each treatment ranging from chest pain after his first treatment for which he did not seek medical attention, fatigue after the next few treatments, but 3 days after his most recent treatment on 6/23/2022 he had a syncopal episode at home and once again did not seek medical attention.  I discussed with him  that this was not acceptable, if he has occurrences like this someone needs to call 911, it is difficult to figure out what is going on after the fact, the best time to work this up would be during the occurrence.  For now we will get labs today with CBC, CMP, PSA.  I will refer him to cardiology for further evaluation.  We will hold Taxotere most likely, I will see him tomorrow to go over his lab results.  I will also repeat his restaging scans with CT chest, abdomen and pelvis and will include CT of the head in light of his syncopal episode.      -7/13/2022 PSA 18.6    -7/14/2022 [facility] clinic follow-up: Mr. Morfin returns today to review his labs from yesterday.  Labs are unremarkable.  PSA down to 18.6 from a high of 259.0 in April prior to starting treatment.  CBC and CMP unremarkable, magnesium is normal at 2.3, phosphorus slightly low at 2.4.  We will hold therapy for now in light of his syncopal episode on day 3 after his last treatment and prior episodes with chest pain and severe fatigue that all occurred on day 3 after his Taxotere.  We will restage him with CT head, chest, abdomen and pelvis (I am including the head in light of his syncopal episode).  I have also referred him to cardiology, he states that he received a call from them about making an appointment however he wanted to talk to us today first.  I emphasized the importance to him of making and keeping that appointment and he states understanding.  I also once again emphasized the requirement to seek emergency medical attention if he has any episodes in the future such as chest pain or syncopal events.  Whether or not we try and complete Taxotere with 2 more courses or move to the next line of therapy will be decided when he returns to discuss with Dr. Goodson and review his restaging scans.    -7/15/2022 saw Dr. Diaz cardiologist.  For syncope he ordered echocardiogram and 48-hour Holter monitor.    -7/15/2022 Holter monitor  relatively benign.    -7/22/2022 CT brain with and without contrast negative.  CT chest abdomen and pelvis shows some nonspecific cecal wall thickening.  No progression in the chest.  T1 and ribs stable.  Decrease in multiple retroperitoneal and pelvic nodes.  Slight increase in right acetabular sclerotic lesion.  Persistent but improved circumferential bladder wall thickening.  Total body bone scan shows stable left third rib and no evidence of new bone metastases.    -7/19/2022 ejection fraction 65% with grade 1 diastolic dysfunction.    -7/26/2022 [facility] follow-up: Given presyncopal symptoms occurred 3 days after Taxotere and has not otherwise occurred any other time and his cardiology work-up is fairly unremarkable and his disease is under good control as witnessed by the report above of the CTs of head chest abdomen pelvis and bone scan, I will hold cycle 5 and 6 of Taxotere and continue 1 now with Zytiga and prednisone.  With reference to the coincidental cecal wall thickening found on CT, we will get him to [facility] surgeons for colonoscopy.  He will see my nurse practitioner back in August for next cycle of Abiraterone prednisone.  He will continue his Lupron with Dr. Matute.  We will continue his Xgeva.  We will repeat his CT chest abdomen pelvis and bone scan again in 3 months.  He will see my nurse practitioner in the interim.    -8/23/2022 [facility] follow-up: No further presyncopal symptoms.  Feeling great other than for hot flashes.  Did commend for now we will continue on with his Zytiga prednisone and he will get his Xgeva today based on labs from 8/10/2022.  He opted out of getting the colonoscopy that I recommended and he will not change his mind.  He will continue getting the Lupron with Dr. Matute and I asked him to give the date of this appointment to my nurse when he sees her back in a month.  We will give his Xgeva today.  We will get CT chest abdomen pelvis  and total body bone scan towards the middle to end of October.  Presently other than for the hot flashes feels great.    -9/20/2022 PSA 8.320    -9/22/2022 [facility] follow-up: Chris is doing well on Zytiga, prednisone along with Xgeva.  Labs reviewed from 9/20/2022 are unremarkable, CBC was normal, CMP with creatinine of 1.34 otherwise normal, this is stable from his baseline.  PSA stable at 8.320.  He received Lupron recently with Dr. Matute, he thinks last week or so, states his next appointment is in February.  He will continue treatment unchanged.  We will repeat restaging CT chest, abdomen and pelvis and total body bone scan in October prior to return and I have ordered those today.  We will also repeat his PSA.    -9/22/2022 CT chest abdomen pelvisCompared to July 2022 [facility] showed no evidence of disease progression in the chest with no substantial sclerotic bony lesions and decrease in size of retroperitoneal and pelvic nodes with persistent cecal wall thickening and suboptimal bladder distention.  Total body bone scan showed decrease in previously seen uptake in the left third rib with diffuse sclerosis representing treatment response with no new foci.    -10/18/2022 [facility] follow-up: I reviewed bone scan and CT reports as above with no evidence of progression.  Tolerating Zytiga prednisone and Xgeva.  Getting Lupron regularly with Dr. Matute next appointment coming in February.  We will repeat his CT chest abdomen pelvis and total body bone scan in April.  He will follow with my nurse practitioner in the interim.    -1/10/2023 PSA 3.450  -1/11/2023 [facility] follow-up: Mr. Morfin continues to do well on current therapy with Zytiga, prednisone and Xgeva with no unusual side effects.  He has no new worrisome symptoms.  He is due for his next Lupron injection in February with Dr. Matute.  His PSA continues to trend downward, current PSA from  "yesterday was 3.450.  We will continue therapy unchanged, he will receive Xgeva today.  Has had no hypocalcemia, his CMP, phosphorus and magnesium are all normal.  We will repeat restaging scans in April.  -3/7/2023 PSA 2.460  -3/8/2023 [facility] follow[facility]:  Mr. Morfin is doing well.  He is tolerating therapy with Zytiga, prednisone and Xgeva with no significant side effects.  He has hot flashes but these are tolerable.  He had Lupron injection 2/24/2023 with Dr. Matute.  His PSA continues to decline, current PSA 2.460.  He will continue therapy unchanged.  We will repeat restaging scans late April prior to return in 2 months and I have ordered those today.  He is working with his PCP Rodrigo Ram on his hypertension.  His b/p today was 160/88.  He was to have increased his losartan but I do not think he has done that yet as he said \"I still have some of my old prescription left\".  -4/4/2023 PSA 2.55  - 4/26/2023 CT chest abdomen pelvis [place] comparison 10/10/2022 showed stable left third rib, T1, left fourth and eighth rib, right seventh rib.  Probable liver cyst.  Few diverticuli.  Mild further decrease in a few of the previously enlarged nodes.  Left external iliac 12 mm compared to 16 mm.  Right common iliac 7 mm stable.  Lower left periaortic 8 mm previously 13 mm.  No new lytic or blastic osseous lesions.  Total body bone scan comparison 10/10/2022, 7/22/2022, and CT 4/26/2023.  No new sites of increased uptake.  1 focal left third rib hotspot consistent with bone metastasis.    -5/2/2023 [facility] follow-up: I reviewed interval notes since I last saw him from my nurse practitioner as outlined above in interval images and reports thereof from [facility] that shows no new sites of bone metastasis and no kizzy or visceral progression.  PSA stabilized around 2.5.  In the absence of symptomatic or radiographic progression, I would be unlikely to change therapy just on the " basis of a PSA rise and for now the PSA is stable.  We will continue Zytiga prednisone and Xgeva and he will follow-up with my nurse practitioner 5/30/2023 with repeat CTs and bone scan in 6 months.  I asked him to check with his primary care today regarding his systolic in the 170s.    -5/31/2023 [facility] clinic follow-up: Chris continues to do well on current therapy with Zytiga, prednisone and Xgeva along with Lupron that he receives with Dr. Matute.  His PSA remains stable, it was lower this month compared to last month, current PSA 1.940.  Continues to deal with hypertension, on arrival today his blood pressure was 210/92 however this was using a wrist cuff, when I rechecked it manually his blood pressure was 160/90.  Still has room for improvement despite recent increase in his losartan to 100 mg daily.  He will follow-up with Dr. Ram for further management.  We will continue therapy unchanged.  We will repeat restaging scans in October.    -7/26/2023 [facility] Oncology clinic follow-up: Chris overall continues to do well on current therapy with Zytiga, prednisone and Xgeva.  He receives Lupron every 6 months with Dr. Matute and states that is scheduled for August.  PSA from yesterday is pending, PSA on 6/27/2023 was 1.590 which was continuing to decline, in February it was 3.250.  His creatinine this past month has bumped up.  He feels he is staying hydrated but I encouraged him to increase his fluid intake.  I discussed with him that this could be from his hypertension, some of his medications.  His blood pressure today is 165/92.  He has an appointment with his primary care provider Dr. Teri aRm next week and can further discuss with her.  No adjustment needed for Xgeva.  I refilled his prednisone today.  I will see him back in 1 month for follow-up with continued monitoring of his labs.  He may end up needing to see nephrology.  We will repeat restaging scans in October.    -9/29/2023  PSA 1.810  -8/23/2023 [facility] Oncology clinic follow-up: Chris continues on therapy with Zytiga, prednisone and Xgeva, he also receives Lupron every 6 months with Dr. Matute with most recent given on 8/7/2023.  He is scheduled for a cystoscopy in a few weeks as his last few UAs per the patient's report had microscopic hematuria, he has had no steve hematuria.  His creatinine continues to remain elevated at 1.82, BUN is 40, GFR is 40.  I will get him to nephrology for further evaluation.  Blood pressure today was fairly good at 161/86, he continues to follow with Dr. Ram for management.  PSA on 7/25/2023 was up slightly from prior month, PSA in July was 2.140, in June it was 1.590, PSA from yesterday is pending.  I plan on repeating restaging CT scans and bone scans in October however if his PSA is up significantly then I will do sooner.  I will see him back for follow-up in 1 month.  -8/23/2024 PSA 1.860    -9/20/2023 [facility] Oncology clinic follow-up: Chris is tolerating treatment with Zytiga, prednisone and Xgeva.  He is up-to-date with his Lupron injection, last received in August with Dr. Matute.  He had cystoscopy on 9/8/2023 that was normal. I referred him to nephrology when I saw him last in August as his creatinine has been increasing, creatinine yesterday was a little better 1.51, GFR is 50, creatinine clearance 63.8.  He is still awaiting an appointment with nephrology, per our  it will be early November.  We will repeat his restaging scans prior to return and I will do his CT scans without contrast in light of his new renal insufficiency.  We will also get total body bone scan and I have ordered those today.  His PSA yesterday was 1.810.    -10/9/2023 CT chest abdomen Pelvis without contrast compared to April 2023 shows slightly prominent pelvic lymph nodes left external iliac 8 mm compared to prior study.  Right internal iliac heterogenous 19 mm compared to 11 mm prior.  Stable sclerotic  bone lesions and no new lesions.  Stable bone scan with no new sites of disease    -10/17/2023 [facility] oncology clinic follow-up: With elevated creatinine, CTs done without contrast showed very slight increased prominence by few millimeters of some internal iliac and external iliac nodes for which PSMA PET could be done but for now I will check his PSA and have him see my nurse practitioner back in a week and unless the PSA is significantly rising I would continue the Zytiga, prednisone, Xgeva and February dose of Lupron with Dr. Matute and make sure he follows with nephrology.  If his PSA is significantly rising then my nurse practitioner will order PSMA PET.    -10/17/2023 PSA 1.5  -11/14/2023 PSA 1.510  -11/15/2023 [facility] Oncology clinic follow-up: Mr. Ferrara continues to do well on current therapy with Zytiga, prednisone, Xgeva and Lupron that he receives with Dr. Matute.  His PSA is stable at 1.5.  Would not change therapy for now, we will plan on repeating restaging scans in February unless PSA starts to rise or he has new concerns.  Overall he feels good.  He is now established with nephrology and will continue to follow with them regarding his renal insufficiency, I reviewed note from consultation with Dr. Armstrong 11/6/2023.    -1/9/2024 PSA 1.050    -1/10/2024 [facility] Oncology clinic follow-up: Chris huerta continues to tolerate current therapy with Zytiga, prednisone, Xgeva and Lupron.  He states his next Lupron injection with Dr. Matute is due late February.  His PSA is stable at 1.050. Creatinine stable at 1.64, he is following with nephrology, he has a an appointment with Dr. Armstrong in February for follow-up.  Hypertension being managed by his primary care provider, target systolic ideally 120s-140s, today it is running a little high at 172/90, yesterday when he was here for labs it was 138/80.  We will continue therapy unchanged with plans to repeat restaging scans in April.    -2/6/2024 PSA  0.753    -2/7/2024 [facility] oncology clinic follow-up: Chris continues to do well on current therapy with Zytiga, prednisone, Xgeva and Lupron.  He is up-to-date on Lupron injection next due later this month with Dr. Matute.  His PSA continues to slowly decrease, current PSA is 0.753.  He has no new worrisome symptoms.  We will continue therapy unchanged, I have ordered restaging scans for late March prior to his return in April. He is following with nephrology for his renal insufficiency, his creatinine yesterday was good at 1.21.  I will do his CT scans with contrast unless something changes in the interim. He follows closely with Dr. Ram for management of his hypertension. I reviewed notes from his last office visit with her earlier this month on 2/1/2024, also reviewed labs in detail with the patient today.  All questions were answered to his satisfaction.    -3/5/2024 PSA slowly declining 0.73.  Creatinine fluctuating.  1.69 with GFR 43 otherwise unremarkable CMP.  Phosphorus low 2.1    -4/8/2024 CT chest, abdomen and pelvis shows stable sclerotic lesions in the thoracic spine and bilateral ribs and right acetabulum.  Stable appearance of pelvic lymph nodes.  No new sites concerning for progression of disease.  Total body bone scan shows stable diffuse increased uptake of the left third rib correlates with diffuse sclerosis and enlargement on CT, no new sites of active osseous lesions.  -4/30/2024 PSA 0.713  -5/1/2024 [facility] oncology clinic follow-up: Chris continues to do well on current therapy with Zytiga, prednisone, Xgeva and Lupron.  He receives his Lupron every 6 months with Dr. Matute, last administered 2/5/2024.  Current CT scans and bone scan showed no progression of disease, PSA is stable at 0.713.  His CBC is unremarkable, CMP shows creatinine stable at 1.50 otherwise normal.  He does follow with nephrology.  We will continue treatment unchanged.  I will see him back in 2 months for  follow-up.  We will repeat restaging scans in 6 months unless he has any new symptoms or significant change in his PSA.    -6/25/2024 PSA 0.623  -6/26/2024 [facility] oncology clinic follow-up: Chris overall continues to tolerate current therapy with Zytiga, prednisone, Xgeva and Lupron.  We did call to confirm his next Lupron injection and that is scheduled for late July with Dr. Matute.  His PSA for the most part is stable at 0.623, we will continue to monitor.  He has no new worrisome symptoms.  I did discuss with him today his creatinine which has increased to 1.86, BUN was 32.  Reminded him to be sure and stay hydrated.  He does follow with nephrology and his next appointment is in August.  He is on Xgeva however there is no indication for any adjustment, his creatinine clearance is greater than 30.  Blood pressure today was under good control at 130/78.  We will continue treatment unchanged.  I will see him back in 1 month for follow-up.  Plan to repeat restaging scans in October unless his PSA rises or he has worrisome symptoms.    -7/23/2024 PSA 0.606    -9/17/2024 PSA 0.455  -9/18/2024 [facility] oncology clinic follow-up: Chris is doing well overall.  Tolerates therapy with Zytiga, prednisone, Xgeva and Lupron.  He last received Lupron in August with Dr. Matute.  His PSA had been slowly creeping up however it has now went back down and is currently 0.455.  He will continue treatment unchanged.  We will repeat CT chest, abdomen and pelvis prior to return and I will do that without contrast in light of his renal insufficiency.  Current creatinine is stable at 1.5, he does follow with nephrology.  We will also repeat total body bone scan.  We will see him back in 1 months for follow-up and sooner if any concerns.  I did discuss with him that he can take Tylenol if needed or use topical over-the-counter pain relief ointment such as Biofreeze etc. for his back pain but thus far it has not required any  intervention, he does not like to take pain medications.    -10/14/2024 CT chest abdomen pelvis without contrast [facility] compared to August 2024 shows unchanged sclerotic T1 vertebral body with expansile sclerotic left third rib without fracture and other small sclerotic nodules all unchanged.  Right external iliac node 20 mm increased from 7 mm on prior exam with internal iliac node 15 mm unchanged.  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    -10/22/2024 [facility] oncology clinic follow-up: Feeling great.  While he has 1 slightly larger node, given that his PSA has not been rapidly rising and all other disease is stable I will have him continue Zytiga prednisone Xgeva and he gets his next Lupron in February with Dr. Matute.  He will see my nurse practitioner and she will order repeat CT chest abdomen pelvis and bone scan for January and will follow-up with her in January to go over those results.  If either his PSA or the scans show progression beyond just the single slightly larger iliac node, she will order a PSMA PET and then get him back to me.  If the PSA is stable and the subsequent scans are stable then we will just continue his current regimen with quarterly imaging or sooner as symptoms or PSA dictate.    -11/19/2024 PSA 0.453  -1/14/2025 PSA 0.351  -1/13/2025 received Lupron with Dr. Matute  -2/3/2025 total body bone scan shows overall stable, 1 new site involving the left costovertebral junction of the left sixth rib with no definite CT correlate and favored to represent degenerative/traumatic changes.  CT chest, abdomen and pelvis with no evidence of disease progression in the chest, unchanged expansile sclerotic left third rib mass and sclerotic T1 vertebral body focus.  Abdomen and pelvis with mild interval increase in bilateral external iliac lymph nodes, unchanged right internal iliac lymph node, for reference right  external iliac lymph node now measuring up to 28 mm compared to 20 mm on prior, left external iliac lymph node now measuring up to 20 mm compared to 10 mm on prior.  Right internal iliac 15 mm stable.  -2/11/2025 PSA 0.364  -2/12/2025 [facility] oncology clinic follow-up: Chris huerta is tolerating current therapy with Zytiga, prednisone and Xgeva monthly, he receives Lupron with Dr. Matute.  He last received Lupron 1/13/2025, next Lupron due in June.  His PSA is stable at 0.364.  Current restaging scans on 2/3/2025 show 1 new site in the left costovertebral junction of the left sixth rib, this is favored to represent degenerative/traumatic change, he does not recall any trauma to this area.  He is having no pain in this area.  He still has intermittent pain in his right mid back that is worse typically when he is lying down.  He does have a sclerotic lesion at T1, I will go ahead and get an MRI of his thoracic spine just to make sure this is stable.  There has been a mild increase in the bilateral external iliac nodes and the right internal iliac node is stable.  Overall due to the stability of his PSA, his tolerability of current treatment and his desire not to pursue chemotherapy we will continue treatment unchanged.  We will repeat restaging scans in 3 months for follow-up.  If he has progressive new disease or worsening symptoms we would then want to get PSMA PET scan for evaluation, I think he would consider possibly [place] down the road.  We will see him back in 1 month for follow-up to go over his MRI and continue current therapy.     -3/11/2025 [facility] oncology clinic follow-up: Tolerating Zytiga prednisone and Xgeva.  Next Lupron in June with Dr. Matute.  PSA staying low.  Patient could not tolerate MRI relative to his back pain due to claustrophobia.  Minimal back pain.  No leg weakness.  If his PSA rises we will get PSMA PET otherwise we will repeat his bone scan and CAT scans mid-May.    -4/16/2025  [facility] oncology clinic follow-up: Chris currently is feeling well however he did have a concerning acute illness several weeks ago when he woke up and reports that he was incontinent of urine and stool and felt dizzy, he was then fatigued for about 3 days.  He did not seek emergency attention or any follow-up with PCP, he did not notify any healthcare provider.  I am not sure if he had a viral illness, we discussed that he could have had a seizure.  I stressed the importance of seeking medical attention if this happened again but he honestly admits that he most likely would not. His labs from yesterday look good, CBC is unremarkable, CMP with stable renal insufficiency, creatinine 1.57.  His PSA looks good at 0.302.  Blood pressure is running a little high at 157/94, he will follow-up with Dr. Ram, also has seen nephrology Dr. Armstrong.  He last received Lupron with Dr. Matute in January.  We will continue treatment unchanged.  We will repeat restaging CT chest, abdomen and pelvis without contrast and total body bone scan late May and I have ordered those today.  We will see him back for follow-up after his scans to go over those results and further plan of care.    I spent 31 minutes caring for Chris on this date of service. This time includes time spent by me in the following activities: preparing for the visit, reviewing tests, performing a medically appropriate examination and/or evaluation, counseling and educating the patient/family/caregiver, ordering medications, tests, or procedures, documenting information in the medical record, independently interpreting results and communicating that information with the patient/family/caregiver, and care coordination.     Susana Torres, APRN    04/16/2025

## 2025-04-17 ENCOUNTER — SPECIALTY PHARMACY (OUTPATIENT)
Dept: ONCOLOGY | Facility: HOSPITAL | Age: 70
End: 2025-04-17
Payer: MEDICARE

## 2025-05-02 ENCOUNTER — SPECIALTY PHARMACY (OUTPATIENT)
Dept: ONCOLOGY | Facility: HOSPITAL | Age: 70
End: 2025-05-02
Payer: MEDICARE

## 2025-05-02 NOTE — PROGRESS NOTES
Specialty Pharmacy Patient Management Program  Refill Outreach     Chris was contacted today regarding refills of their medication(s).    Refill Questions      Flowsheet Row Most Recent Value   Changes to allergies? No   Changes to medications? No   New conditions or infections since last clinic visit Yes   If yes, please describe  Patient stated he was sick and missed a few doses(about 6 doses)   Unplanned office visit, urgent care, ED, or hospital admission in the last 4 weeks  No   How does patient/caregiver feel medication is working? Good   Since the previous refill, were any specialty medication doses or scheduled injections missed or delayed?  No   If yes, please provide the amount 6 misssed doses   Why were doses missed? sick   Does this patient require a clinical escalation to a pharmacist? Yes            Delivery Questions      Flowsheet Row Most Recent Value   Delivery method [employer]   Delivery address verified with patient/caregiver? Yes   Delivery address Home   Other address preferred n/a   Number of medications in delivery 1   Medication(s) being filled and delivered Abiraterone Acetate (ZYTIGA)   Doses left of specialty medications 6 tablets   Copay verified? Yes   Copay amount $0   Copay form of payment No copayment ($0)   Delivery Date Selection 05/06/25   Signature Required No                 Follow-up: 30 day(s)     Dianna Zepeda, Pharmacy Technician  5/2/2025  09:19 EDT

## 2025-05-13 ENCOUNTER — LAB (OUTPATIENT)
Dept: ONCOLOGY | Facility: HOSPITAL | Age: 70
End: 2025-05-13
Payer: MEDICARE

## 2025-05-13 VITALS
SYSTOLIC BLOOD PRESSURE: 151 MMHG | WEIGHT: 268.4 LBS | BODY MASS INDEX: 37.43 KG/M2 | TEMPERATURE: 97.4 F | HEART RATE: 76 BPM | RESPIRATION RATE: 18 BRPM | DIASTOLIC BLOOD PRESSURE: 91 MMHG

## 2025-05-13 DIAGNOSIS — C61 PROSTATE CANCER METASTATIC TO MULTIPLE SITES: ICD-10-CM

## 2025-05-13 PROCEDURE — 36415 COLL VENOUS BLD VENIPUNCTURE: CPT

## 2025-05-14 ENCOUNTER — INFUSION (OUTPATIENT)
Dept: ONCOLOGY | Facility: HOSPITAL | Age: 70
End: 2025-05-14
Payer: MEDICARE

## 2025-05-14 VITALS
RESPIRATION RATE: 17 BRPM | DIASTOLIC BLOOD PRESSURE: 90 MMHG | HEART RATE: 70 BPM | TEMPERATURE: 97 F | SYSTOLIC BLOOD PRESSURE: 148 MMHG

## 2025-05-14 DIAGNOSIS — C61 PROSTATE CANCER METASTATIC TO MULTIPLE SITES: Primary | ICD-10-CM

## 2025-05-14 LAB
ALBUMIN SERPL-MCNC: 4.4 G/DL (ref 3.5–5.2)
ALBUMIN/GLOB SERPL: 1.5 G/DL
ALP SERPL-CCNC: 80 U/L (ref 39–117)
ALT SERPL-CCNC: 10 U/L (ref 1–41)
AST SERPL-CCNC: 16 U/L (ref 1–40)
BASOPHILS # BLD AUTO: 0.06 10*3/MM3 (ref 0–0.2)
BASOPHILS NFR BLD AUTO: 0.7 % (ref 0–1.5)
BILIRUB SERPL-MCNC: 0.4 MG/DL (ref 0–1.2)
BUN SERPL-MCNC: 31 MG/DL (ref 8–23)
BUN/CREAT SERPL: 20.4 (ref 7–25)
CALCIUM SERPL-MCNC: 9.8 MG/DL (ref 8.6–10.5)
CHLORIDE SERPL-SCNC: 102 MMOL/L (ref 98–107)
CO2 SERPL-SCNC: 23.2 MMOL/L (ref 22–29)
CREAT SERPL-MCNC: 1.52 MG/DL (ref 0.76–1.27)
EGFRCR SERPLBLD CKD-EPI 2021: 49 ML/MIN/1.73
EOSINOPHIL # BLD AUTO: 0.19 10*3/MM3 (ref 0–0.4)
EOSINOPHIL NFR BLD AUTO: 2.4 % (ref 0.3–6.2)
ERYTHROCYTE [DISTWIDTH] IN BLOOD BY AUTOMATED COUNT: 13.6 % (ref 12.3–15.4)
GLOBULIN SER CALC-MCNC: 2.9 GM/DL
GLUCOSE SERPL-MCNC: 140 MG/DL (ref 65–99)
HCT VFR BLD AUTO: 41 % (ref 37.5–51)
HGB BLD-MCNC: 13.3 G/DL (ref 13–17.7)
IMM GRANULOCYTES # BLD AUTO: 0.04 10*3/MM3 (ref 0–0.05)
IMM GRANULOCYTES NFR BLD AUTO: 0.5 % (ref 0–0.5)
LYMPHOCYTES # BLD AUTO: 1.95 10*3/MM3 (ref 0.7–3.1)
LYMPHOCYTES NFR BLD AUTO: 24.1 % (ref 19.6–45.3)
MAGNESIUM SERPL-MCNC: 2.3 MG/DL (ref 1.6–2.4)
MCH RBC QN AUTO: 28.5 PG (ref 26.6–33)
MCHC RBC AUTO-ENTMCNC: 32.4 G/DL (ref 31.5–35.7)
MCV RBC AUTO: 87.8 FL (ref 79–97)
MONOCYTES # BLD AUTO: 0.62 10*3/MM3 (ref 0.1–0.9)
MONOCYTES NFR BLD AUTO: 7.7 % (ref 5–12)
NEUTROPHILS # BLD AUTO: 5.22 10*3/MM3 (ref 1.7–7)
NEUTROPHILS NFR BLD AUTO: 64.6 % (ref 42.7–76)
NRBC BLD AUTO-RTO: 0 /100 WBC (ref 0–0.2)
PHOSPHATE SERPL-MCNC: 3.8 MG/DL (ref 2.5–4.5)
PLATELET # BLD AUTO: 204 10*3/MM3 (ref 140–450)
POTASSIUM SERPL-SCNC: 4.3 MMOL/L (ref 3.5–5.2)
PROT SERPL-MCNC: 7.3 G/DL (ref 6–8.5)
PSA SERPL-MCNC: 0.31 NG/ML (ref 0–4)
RBC # BLD AUTO: 4.67 10*6/MM3 (ref 4.14–5.8)
SODIUM SERPL-SCNC: 140 MMOL/L (ref 136–145)
WBC # BLD AUTO: 8.08 10*3/MM3 (ref 3.4–10.8)

## 2025-05-14 PROCEDURE — 96372 THER/PROPH/DIAG INJ SC/IM: CPT

## 2025-05-14 PROCEDURE — 25010000002 DENOSUMAB 120 MG/1.7ML SOLUTION: Performed by: NURSE PRACTITIONER

## 2025-05-14 RX ADMIN — DENOSUMAB 120 MG: 120 INJECTION SUBCUTANEOUS at 09:46

## 2025-05-28 ENCOUNTER — SPECIALTY PHARMACY (OUTPATIENT)
Dept: ONCOLOGY | Facility: HOSPITAL | Age: 70
End: 2025-05-28
Payer: MEDICARE

## 2025-05-28 NOTE — PROGRESS NOTES
Specialty Pharmacy Patient Management Program  Refill Outreach     Chris was contacted today regarding refills of their medication(s).    Refill Questions      Flowsheet Row Most Recent Value   Changes to allergies? No   Changes to medications? No   New conditions or infections since last clinic visit No   Unplanned office visit, urgent care, ED, or hospital admission in the last 4 weeks  No   How does patient/caregiver feel medication is working? Good   Financial problems or insurance changes  No   Since the previous refill, were any specialty medication doses or scheduled injections missed or delayed?  Yes   If yes, please provide the amount 4 or 5 doses missed   Why were doses missed? felt bad   Does this patient require a clinical escalation to a pharmacist? Yes  [The past couple of times refills were done patient states he's missed several doses due to feeling bad.]            Delivery Questions      Flowsheet Row Most Recent Value   Delivery method UPS   Delivery address verified with patient/caregiver? Yes   Delivery address Home   Other address preferred n/a   Number of medications in delivery 1   Medication(s) being filled and delivered Abiraterone Acetate (ZYTIGA)   Doses left of specialty medications 1 week   Copay verified? Yes   Copay amount $0   Copay form of payment No copayment ($0)   Delivery Date Selection 05/29/25   Signature Required No                 Follow-up: 30 day(s)     Dianna Zepeda, Pharmacy Technician  5/28/2025  10:17 EDT

## 2025-05-29 ENCOUNTER — SPECIALTY PHARMACY (OUTPATIENT)
Dept: ONCOLOGY | Facility: HOSPITAL | Age: 70
End: 2025-05-29
Payer: MEDICARE

## 2025-05-29 NOTE — PROGRESS NOTES
Specialty Pharmacy Patient Management Program  Oncology 6-Month Clinical Assessment       Chris Morfin is a 70 y.o. male with metastatic prostate cancer seen today to assess adherence and side effects.    Reason for Outreach: Routine medication check-in  and pharmacist escalation for recent missed doses .    Regimen:   Abiraterone (Zytiga) 500 mg tablets  Take 2 tablets by mouth daily on an empty stomach.    Prednisone 5 mg tablets  Take 1 tablet by mouth twice daily.    Specialty Pharmacy Goal   Goals Addressed Today         Specialty Pharmacy General Goal (pt-stated)       Clinical goal/therapeutic target: stable or decreasing PSA and disease control  PSA          4/2/2024    08:15 4/30/2024    10:00 5/28/2024    09:30   PSA   PSA 0.705  0.713  0.608      12/5/24: I have reviewed the most recent clinic note and labs. Dr. Goodson recommends continuing abiraterone at this time. The patient is tolerating the medication and is currently on track for his goal.   Latest Reference Range & Units 06/25/24 10:00 07/23/24 09:30 08/20/24 09/17/24 09:00 10/22/24 11/19/24 09:55   PSA 0.000 - 4.000 ng/mL 0.623 [1] 0.606 [1] 0.720 [1] 0.455 [1] 0.5 [2] 0.453 [1]       5/29/25: I have reviewed the most recent clinic note and labs. Dr. Goodson recommends continuing abiraterone at this time. The patient is tolerating the medication and is currently on track for goal.  Medication is preventing disease progression as evidenced by imaging or provider/clinic documentation.     Latest Reference Range & Units Most Recent 12/17/24 10:15 01/14/25 09:50 02/11/25 09:50 03/11/25 00:00 04/15/25 10:05 05/13/25 09:00   PSA 0.000 - 4.000 ng/mL 0.306  5/13/25 09:00 0.418 0.351 0.364 0.349 0.302 0.306               Problem List   Problem list reviewed by Tianna Delatorre, PharmD on 5/29/2025 at  1:23 PM  Patient Active Problem List   Diagnosis Code    Prostate cancer metastatic to multiple sites C61    Encounter for care related to vascular access  port Z45.2    Stage 3a chronic kidney disease (CKD) N18.31    Lipid screening Z13.220    Chest pain at rest R07.9    Syncope and collapse R55    Coronary artery disease involving native coronary artery of native heart without angina pectoris I25.10       Medication Assessment for Specialty Medication(s):  Medication Assessment  Follow Up Clinical Assessment  Linked Medication(s) Assessed: Abiraterone Acetate (ZYTIGA)  Therapeutic appropriateness: Appropriate  Medication tolerability: Tolerating with no to minimal ADRs  Medication plan: Continue therapy with normal follow-up  Quality of Life Improvement Scale: 10-Significantly better  Administration: Patient is taking every day at the same time  and on an empty stomach .   Patient can self administer medications: Yes  Medication Follow-Up Plan: Next clinical assessment  Lab Review: The labs listed below have been reviewed. No dose adjustments are needed for the oral specialty medication(s) based on the labs.    Lab Results   Component Value Date    GLUCOSE 140 (H) 05/13/2025    CALCIUM 9.8 05/13/2025     05/13/2025    K 4.3 05/13/2025    CO2 23.2 05/13/2025     05/13/2025    BUN 31 (H) 05/13/2025    CREATININE 1.52 (H) 05/13/2025    BCR 20.4 05/13/2025     Lab Results   Component Value Date    WBC 8.08 05/13/2025    RBC 4.67 05/13/2025    HGB 13.3 05/13/2025    HCT 41.0 05/13/2025    MCV 87.8 05/13/2025    MCH 28.5 05/13/2025    MCHC 32.4 05/13/2025    RDW 13.6 05/13/2025     05/13/2025    NEUTRORELPCT 64.6 05/13/2025    LYMPHORELPCT 24.1 05/13/2025    MONORELPCT 7.7 05/13/2025    EOSRELPCT 2.4 05/13/2025    BASORELPCT 0.7 05/13/2025    NEUTROABS 5.22 05/13/2025    LYMPHSABS 1.95 05/13/2025    MONOSABS 0.62 05/13/2025    EOSABS 0.19 05/13/2025    BASOSABS 0.06 05/13/2025    NRBC 0.0 05/13/2025     Drug-drug interactions  Completed medication reconciliation today to assess for drug interactions. Patient denies starting or stopping any medications.     Assessed medication list for interactions, no significant drug interactions noted.   Advised patient to call the clinic if any new medications are started so we can assess for drug-drug interactions.  Drug-food interactions discussed: eating grapefruit and drinking grapefruit juice  Vaccines are coordinated by the patient's oncologist and primary care provider.    Medications   Medicines reviewed by Tianna Delatorre, PharmD on 5/29/2025 at  1:23 PM  Prior to Admission medications    Medication Sig Start Date End Date Taking? Authorizing Provider   abiraterone acetate (ZYTIGA) 500 MG tablet Take 2 tablets by mouth Daily on an empty stomach. 1/10/25   Fritz Goodson MD   amLODIPine (NORVASC) 5 MG tablet Take 1.5 tablets by mouth Daily for 180 days. 2/5/25 8/4/25  Teri Ram DO   denosumab (Xgeva) 120 MG/1.7ML solution injection 1.7 ml Subcutaneous ONCE A MONTH 11/6/23   Camden Enciso MD   leuprolide (Lupron Depot, 6-Month,) 45 MG kit injection as directed Intramuscular Q 6 MONTHS 11/6/23   ProviderCamden MD   losartan (COZAAR) 100 MG tablet Take 1 tablet by mouth Daily. 11/21/24   Teri Ram DO   ondansetron (ZOFRAN) 8 MG tablet Take 1 tablet by mouth 3 (Three) Times a Day As Needed for Nausea or Vomiting. 7/26/22   Fritz Goodson MD   predniSONE (DELTASONE) 5 MG tablet TAKE ONE (1) TABLET BY MOUTH TWO (2) (TWO) TIMES A DAY. 8/2/24   Fritz Goodson MD       Allergies  Known allergies and reactions were discussed with the patient. The patient's chart has been reviewed for allergy information and updated as necessary.   No Known Allergies      Allergies reviewed by Tianna Delatorre, PharmD on 5/29/2025 at  1:23 PM    Hospitalizations and Urgent Care Visits Since Last Assessment:  Unplanned hospitalizations or inpatient admissions: No  ED Visits: No  Urgent Office Visits: No    Adherence Assessment:  Adherence Questions  Linked Medication(s) Assessed: Abiraterone Acetate (ZYTIGA)  On  average, how many doses/injections does the patient miss per month?: 4 (4-5 missed doses recently. He states just doesn't feel good some days but cannot point to anything in particular. His PDC is 96%. He has a follow up with Dr Goodson in about 2 weeks.) He says he does not currently feel bad and was able to take his medicine this morning.  What are the identified reasons for non-adherence or missed doses? : no problems identified  What is the estimated medication adherence level?: %  Based on the patient/caregiver response and refill history, does this patient require an MTP to track adherence improvements?: no    Quality of Life Assessment:  Quality of Life Assessment  Quality of Life Improvement Scale: 10-Significantly better    Financial Assessment:  Medication availability/affordability: Patient has had no issues obtaining medication from pharmacy.    Attestation:  I attest the patient was actively involved in and has agreed to the above plan of care. If the prescribed therapy is at any point deemed not appropriate based on the current or future assessments, a consultation will be initiated with the patient's specialty care provider to determine the best course of action. The revised plan of therapy will be documented along with any required assessments and/or additional patient education provided.       All questions addressed and patient had no additional concerns. Patient has pharmacy contact information.    Tianna Delatorre, PharmD, [facility]  Clinic Specialty Oncology Pharmacist  Phone [phone]      5/29/2025  13:29 EDT

## 2025-06-10 ENCOUNTER — SPECIALTY PHARMACY (OUTPATIENT)
Dept: ONCOLOGY | Facility: HOSPITAL | Age: 70
End: 2025-06-10
Payer: MEDICARE

## 2025-06-10 ENCOUNTER — OFFICE VISIT (OUTPATIENT)
Dept: ONCOLOGY | Facility: CLINIC | Age: 70
End: 2025-06-10
Payer: MEDICARE

## 2025-06-10 ENCOUNTER — LAB (OUTPATIENT)
Dept: ONCOLOGY | Facility: HOSPITAL | Age: 70
End: 2025-06-10
Payer: MEDICARE

## 2025-06-10 VITALS
WEIGHT: 268 LBS | BODY MASS INDEX: 37.52 KG/M2 | RESPIRATION RATE: 18 BRPM | SYSTOLIC BLOOD PRESSURE: 169 MMHG | HEART RATE: 68 BPM | HEIGHT: 71 IN | DIASTOLIC BLOOD PRESSURE: 98 MMHG | TEMPERATURE: 97.1 F | OXYGEN SATURATION: 98 %

## 2025-06-10 DIAGNOSIS — C61 PROSTATE CANCER METASTATIC TO MULTIPLE SITES: Primary | ICD-10-CM

## 2025-06-10 DIAGNOSIS — C61 PROSTATE CANCER METASTATIC TO MULTIPLE SITES: ICD-10-CM

## 2025-06-10 DIAGNOSIS — C61 PROSTATE CANCER METASTATIC TO MULTIPLE SITES: Primary | Chronic | ICD-10-CM

## 2025-06-10 PROCEDURE — 36415 COLL VENOUS BLD VENIPUNCTURE: CPT

## 2025-06-10 NOTE — PROGRESS NOTES
CHIEF COMPLAINT: Metastatic prostate cancer on Zytiga prednisone Lupron Xgeva without complications.    Problem List:  Oncology/Hematology History Overview Note   1.  Prostate cancer clinical T1c and 2M1B stage IVb treated with 4 cycles of Taxotere, stopped due to syncope with negative cardiac work-up, with marked drop in PSA of 18.6 from over 900 at baseline, continued on Zytiga and prednisone.  2.  Urinary retention with Goodwin in place 1/24/2022.  On finasteride 1/24/2022.  3.  Covid 19 December 2021  4.  Hypertension  5.  Stage 3a chronic kidney disease    Oncology history timeline:  -11/29/2021  with symptoms of urinary retention.  -2/15/2022 prostate biopsy adenocarcinoma grade group 5 and 3 out of 12 cores, grade group 4 in 1 out of 12 cores, grade group 3 in 8 out of 12 cores.  -2/24/2022 CT chest abdomen pelvis and total body bone scan shows left third rib, right acetabulum, periaortic and retroperitoneal kizzy metastases complaining of pain in the left chest and right hip.  Also possible sclerotic left 10th rib on CT.  Spleen mildly enlarged 13.8 cm.  Hepatic steatosis.  Small liver cyst.  Fat stranding in the periaortic and mesenteric region consistent with reactive/inflammatory stranding.  Multiple enlarged periaortic and retroperitoneal nodes and lymphadenopathy largest 4.9 cm.  CT chest describes tiny sclerotic foci in left eighth rib and right lateral seventh rib and T1.  Multiple small mediastinal and bilateral axillary nodes seen with small hypodense left thyroid nodule.  Creatinine 1.7.  -3/4/2022 office note Dr. Rolando Matute: Patient was seen after his elevated PSA by Dr. Vera and prostate biopsy scheduled.  However, he developed COVID-19 and this was canceled and the patient did not reschedule due to lack of insurance.  Saw Dr. Matute back on 1/24/2022 with plans to get staging CTs and bone scan with results as outlined above.  Started Casodex and to return on 3/11/2022 for Lupron.   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  TURP planned for urinary retention symptoms.    -3/15/2022 [facility] medical oncology initial consultation: I reviewed the above data with the patient.  With his fairly bulky retroperitoneal adenopathy and bone metastases including extra axial metastases, he would fit the data from the CHAARTED trial for which Taxotere x6 followed by Zytiga prednisone along with the planned Lupron already being planned by Dr. Matute would be reasonable.  Equally reasonable in terms of comparisons with bicalutamide and Lupron would be Zytiga prednisone plus Lupron or Xtandi plus Lupron or apalutamide plus Lupron.  None of these have been compared head-to-head but all have beaten combined androgen deprivation therapy with bicalutamide and Lupron.  At the age of 67 and in order to have as many bullets as possible down the road and hence saving some of the hormone blockade options for later and using chemotherapy when he is younger and healthier for a more time-limited.,  He has opted for the Taxotere x6 followed by Zytiga and prednisone.  We will also give him Xgeva to improve bone health and given metastatic disease, as with all metastatic prostate cancer patients, he needs genetic counseling both for his sake as well as his family's.  If he were to have BRCA1 or other such  mutations, then that also opens up the options for PARP inhibitors etc. down the road.  He has his TURP scheduled for 2/24/2022 and we will start treatment a couple of weeks after that and he will get chemo preparation visit in the meantime and I will get capital surgeons to place a port.  We will check his PSA after his TURP when he sees my nurse practitioner back early April for the start of chemotherapy and repeat the PSA and CT chest abdomen pelvis and bone scan after the Taxotere is complete.    -3/31/2022 chemotherapy education and needs assessment  completed    -4/7/2022 began docetaxel and Xgeva.  .0.  We will do his Xgeva every 6 weeks to coordinate with his docetaxel infusions.    -4/19/2022 patient stopped Casodex    -5/17/2022 patient reported seeing his PCP for an abscess in his suprapubic area.  Placed on clindamycin, will delay treatment 1 week.    -5/25/2022 PSA 34.3  -5/26/2022 [facility] Oncology clinic follow-up: Chris has an abscess in the suprapubic area, it has improved on antibiotic therapy but I am concerned if we treat him it will just flare back up unless it is drained.  I will get him to Dr. Miller who placed his port for I&D and culture.  He is on clindamycin and states he completes course of treatment tomorrow.  I will delay his treatment with Taxotere 1 more week.  We will give his Xgeva today.  I will see him back in 1 week for follow-up to assess whether or not we can resume his Taxotere.  His PSA has dropped nicely with treatment thus far, PSA yesterday was 34.3 which is down from a PSA of 259.0 when we started treatment, it has been as high as 911 in November.    -6/2/2022 [facility] Oncology clinic follow-up: Chris went to see Dr. Miller to have his abscess lanced however apparently there was a long wait and he left without being seen.  He did call his PCP and was given 5 more days of clindamycin and the abscess now seems to have resolved.  We discussed today that he is at risk for recurrence of infection due to immunocompromise state with chemotherapy.  He will notify us if he has any return of his abscess.  He does have intertrigo under his panniculus, I have advised him to keep this area dry, to apply topical drying/antifungal powders.  Also recommended looser clothing.  His counts are adequate to continue therapy, we will resume Taxotere today with cycle #3.  We will repeat restaging scans after 6 courses.  We are doing Xgeva every 6 weeks to coordinate with his Taxotere.  Once he finishes Taxotere we can then go back to every  4 weeks.  His next Xgeva is not due until 7/14/2022.  His calcium is running a little low, he is going to  calcium supplement.    -6/23/2022 [facility] clinic follow-up: Chris overall is doing well on treatment with Taxotere.  Labs reviewed from yesterday and are unremarkable.  We will continue treatment today unchanged.  His suprapubic abscess resolved on clindamycin.  He will continue to use drying powder/antifungal powder under his panniculus for intertrigo.  Today will be cycle #4 of a planned 6 courses of Taxotere.  He is also on Xgeva, next dose due 7/14/2022, we are doing this every 6 weeks for now to line up with his chemotherapy, can go back to every 4 weeks after he finishes Taxotere.  Calcium from CMP yesterday was normal.    -7/13/2022 [facility] clinic follow-up: Mr. Morfin has had issues around day 3 after each treatment ranging from chest pain after his first treatment for which he did not seek medical attention, fatigue after the next few treatments, but 3 days after his most recent treatment on 6/23/2022 he had a syncopal episode at home and once again did not seek medical attention.  I discussed with him that this was not acceptable, if he has occurrences like this someone needs to call 911, it is difficult to figure out what is going on after the fact, the best time to work this up would be during the occurrence.  For now we will get labs today with CBC, CMP, PSA.  I will refer him to cardiology for further evaluation.  We will hold Taxotere most likely, I will see him tomorrow to go over his lab results.  I will also repeat his restaging scans with CT chest, abdomen and pelvis and will include CT of the head in light of his syncopal episode.      -7/13/2022 PSA 18.6    -7/14/2022 [facility] clinic follow-up: Mr. Morfin returns today to review his labs from yesterday.  Labs are unremarkable.  PSA down to 18.6 from a high of 259.0 in April prior to starting treatment.  CBC  and CMP unremarkable, magnesium is normal at 2.3, phosphorus slightly low at 2.4.  We will hold therapy for now in light of his syncopal episode on day 3 after his last treatment and prior episodes with chest pain and severe fatigue that all occurred on day 3 after his Taxotere.  We will restage him with CT head, chest, abdomen and pelvis (I am including the head in light of his syncopal episode).  I have also referred him to cardiology, he states that he received a call from them about making an appointment however he wanted to talk to us today first.  I emphasized the importance to him of making and keeping that appointment and he states understanding.  I also once again emphasized the requirement to seek emergency medical attention if he has any episodes in the future such as chest pain or syncopal events.  Whether or not we try and complete Taxotere with 2 more courses or move to the next line of therapy will be decided when he returns to discuss with Dr. Goodson and review his restaging scans.    -7/15/2022 saw Dr. Diaz cardiologist.  For syncope he ordered echocardiogram and 48-hour Holter monitor.    -7/15/2022 Holter monitor relatively benign.    -7/22/2022 CT brain with and without contrast negative.  CT chest abdomen and pelvis shows some nonspecific cecal wall thickening.  No progression in the chest.  T1 and ribs stable.  Decrease in multiple retroperitoneal and pelvic nodes.  Slight increase in right acetabular sclerotic lesion.  Persistent but improved circumferential bladder wall thickening.  Total body bone scan shows stable left third rib and no evidence of new bone metastases.    -7/19/2022 ejection fraction 65% with grade 1 diastolic dysfunction.    -7/26/2022 [facility] oncology clinic follow-up: Given presyncopal symptoms occurred 3 days after Taxotere and has not otherwise occurred any other time and his cardiology work-up is fairly unremarkable and his disease is under good control as witnessed  by the report above of the CTs of head chest abdomen pelvis and bone scan, I will hold cycle 5 and 6 of Taxotere and continue 1 now with Zytiga and prednisone.  With reference to the coincidental cecal wall thickening found on CT, we will get him to [facility] surgeons for colonoscopy.  He will see my nurse practitioner back in August for next cycle of Abiraterone prednisone.  He will continue his Lupron with Dr. Matute.  We will continue his Xgeva.  We will repeat his CT chest abdomen pelvis and bone scan again in 3 months.  He will see my nurse practitioner in the interim.    -8/23/2022 [facility] oncology clinic follow-up: No further presyncopal symptoms.  Feeling great other than for hot flashes.  Did commend for now we will continue on with his Zytiga prednisone and he will get his Xgeva today based on labs from 8/10/2022.  He opted out of getting the colonoscopy that I recommended and he will not change his mind.  He will continue getting the Lupron with Dr. Matute and I asked him to give the date of this appointment to my nurse when he sees her back in a month.  We will give his Xgeva today.  We will get CT chest abdomen pelvis and total body bone scan towards the middle to end of October.  Presently other than for the hot flashes feels great.    -9/20/2022 PSA 8.320    -9/22/2022 [facility] Oncology clinic follow-up: Chris is doing well on Zytiga, prednisone along with Xgeva.  Labs reviewed from 9/20/2022 are unremarkable, CBC was normal, CMP with creatinine of 1.34 otherwise normal, this is stable from his baseline.  PSA stable at 8.320.  He received Lupron recently with Dr. Matute, he thinks last week or so, states his next appointment is in February.  He will continue treatment unchanged.  We will repeat restaging CT chest, abdomen and pelvis and total body bone scan in October prior to return and I have ordered those today.  We will also repeat his PSA.    -9/22/2022 CT chest abdomen pelvisCompared to July  2022 [place] [facility] showed no evidence of disease progression in the chest with no substantial sclerotic bony lesions and decrease in size of retroperitoneal and pelvic nodes with persistent cecal wall thickening and suboptimal bladder distention.  Total body bone scan showed decrease in previously seen uptake in the left third rib with diffuse sclerosis representing treatment response with no new foci.    -10/18/2022 [facility] medical oncology follow-up: I reviewed bone scan and CT reports as above with no evidence of progression.  Tolerating Zytiga prednisone and Xgeva.  Getting Lupron regularly with Dr. Matute next appointment coming in February.  We will repeat his CT chest abdomen pelvis and total body bone scan in April.  He will follow with my nurse practitioner in the interim.    -1/10/2023 PSA 3.450  -1/11/2023 [facility] Oncology clinic follow-up: Mr. Morfin continues to do well on current therapy with Zytiga, prednisone and Xgeva with no unusual side effects.  He has no new worrisome symptoms.  He is due for his next Lupron injection in February with Dr. Matute.  His PSA continues to trend downward, current PSA from yesterday was 3.450.  We will continue therapy unchanged, he will receive Xgeva today.  Has had no hypocalcemia, his CMP, phosphorus and magnesium are all normal.  We will repeat restaging scans in April.  -3/7/2023 PSA 2.460  -3/8/2023 [facility] Oncology clinic follow-up:  Mr. Morfin is doing well.  He is tolerating therapy with Zytiga, prednisone and Xgeva with no significant side effects.  He has hot flashes but these are tolerable.  He had Lupron injection 2/24/2023 with Dr. Matute.  His PSA continues to decline, current PSA 2.460.  He will continue therapy unchanged.  We will repeat restaging scans late April prior to return in 2 months and I have ordered those today.  He is working with his PCP Rodrigo Ram on his hypertension.  His b/p today was 160/88.  He was to have increased his  "losartan but I do not think he has done that yet as he said \"I still have some of my old prescription left\".  -4/4/2023 PSA 2.55  - 4/26/2023 CT chest abdomen pelvis [place] comparison 10/10/2022 showed stable left third rib, T1, left fourth and eighth rib, right seventh rib.  Probable liver cyst.  Few diverticuli.  Mild further decrease in a few of the previously enlarged nodes.  Left external iliac 12 mm compared to 16 mm.  Right common iliac 7 mm stable.  Lower left periaortic 8 mm previously 13 mm.  No new lytic or blastic osseous lesions.  Total body bone scan comparison 10/10/2022, 7/22/2022, and CT 4/26/2023.  No new sites of increased uptake.  1 focal left third rib hotspot consistent with bone metastasis.    -5/2/2023 [facility] medical oncology follow-up: I reviewed interval notes since I last saw him from my nurse practitioner as outlined above in interval images and reports thereof from [facility] that shows no new sites of bone metastasis and no kizzy or visceral progression.  PSA stabilized around 2.5.  In the absence of symptomatic or radiographic progression, I would be unlikely to change therapy just on the basis of a PSA rise and for now the PSA is stable.  We will continue Zytiga prednisone and Xgeva and he will follow-up with my nurse practitioner 5/30/2023 with repeat CTs and bone scan in 6 months.  I asked him to check with his primary care today regarding his systolic in the 170s.    -5/31/2023 [facility] Oncology clinic follow-up: Chris continues to do well on current therapy with Zytiga, prednisone and Xgeva along with Lupron that he receives with Dr. Matute.  His PSA remains stable, it was lower this month compared to last month, current PSA 1.940.  Continues to deal with hypertension, on arrival today his blood pressure was 210/92 however this was using a wrist cuff, when I rechecked it manually his blood pressure was 160/90.  Still has room for improvement despite recent increase in " his losartan to 100 mg daily.  He will follow-up with Dr. Ram for further management.  We will continue therapy unchanged.  We will repeat restaging scans in October.    -7/26/2023 [facility] clinic follow-up: Chris overall continues to do well on current therapy with Zytiga, prednisone and Xgeva.  He receives Lupron every 6 months with Dr. Mautte and states that is scheduled for August.  PSA from yesterday is pending, PSA on 6/27/2023 was 1.590 which was continuing to decline, in February it was 3.250.  His creatinine this past month has bumped up.  He feels he is staying hydrated but I encouraged him to increase his fluid intake.  I discussed with him that this could be from his hypertension, some of his medications.  His blood pressure today is 165/92.  He has an appointment with his primary care provider Dr. Teri Ram next week and can further discuss with her.  No adjustment needed for Xgeva.  I refilled his prednisone today.  I will see him back in 1 month for follow-up with continued monitoring of his labs.  He may end up needing to see nephrology.  We will repeat restaging scans in October.    -9/29/2023 PSA 1.810  -8/23/2023 [facility] clinic follow-up: Chris continues on therapy with Zytiga, prednisone and Xgeva, he also receives Lupron every 6 months with Dr. Matute with most recent given on 8/7/2023.  He is scheduled for a cystoscopy in a few weeks as his last few UAs per the patient's report had microscopic hematuria, he has had no steve hematuria.  His creatinine continues to remain elevated at 1.82, BUN is 40, GFR is 40.  I will get him to nephrology for further evaluation.  Blood pressure today was fairly good at 161/86, he continues to follow with Dr. Ram for management.  PSA on 7/25/2023 was up slightly from prior month, PSA in July was 2.140, in June it was 1.590, PSA from yesterday is pending.  I plan on repeating restaging CT scans and bone scans in October however  if his PSA is up significantly then I will do sooner.  I will see him back for follow-up in 1 month.  -8/23/2024 PSA 1.860    -9/20/2023 [facility] clinic follow-up: Chris is tolerating treatment with Zytiga, prednisone and Xgeva.  He is up-to-date with his Lupron injection, last received in August with Dr. Matute.  He had cystoscopy on 9/8/2023 that was normal. I referred him to nephrology when I saw him last in August as his creatinine has been increasing, creatinine yesterday was a little better 1.51, GFR is 50, creatinine clearance 63.8.  He is still awaiting an appointment with nephrology, per our  it will be early November.  We will repeat his restaging scans prior to return and I will do his CT scans without contrast in light of his new renal insufficiency.  We will also get total body bone scan and I have ordered those today.  His PSA yesterday was 1.810.    -10/9/2023 CT chest abdomen Pelvis without contrast compared to April 2023 shows slightly prominent pelvic lymph nodes left external iliac 8 mm compared to prior study.  Right internal iliac heterogenous 19 mm compared to 11 mm prior.  Stable sclerotic bone lesions and no new lesions.  Stable bone scan with no new sites of disease    -10/17/2023 [facility] oncology clinic follow-up: With elevated creatinine, CTs done without contrast showed very slight increased prominence by few millimeters of some internal iliac and external iliac nodes for which PSMA PET could be done but for now I will check his PSA and have him see my nurse practitioner back in a week and unless the PSA is significantly rising I would continue the Zytiga, prednisone, Xgeva and February dose of Lupron with Dr. Matute and make sure he follows with nephrology.  If his PSA is significantly rising then my nurse practitioner will order PSMA PET.    -10/17/2023 PSA 1.5  -11/14/2023 PSA 1.510  -11/15/2023 [facility] clinic follow-up: Mr. Ferrara continues to do well on  current therapy with Zytiga, prednisone, Xgeva and Lupron that he receives with Dr. Matute.  His PSA is stable at 1.5.  Would not change therapy for now, we will plan on repeating restaging scans in February unless PSA starts to rise or he has new concerns.  Overall he feels good.  He is now established with nephrology and will continue to follow with them regarding his renal insufficiency, I reviewed note from consultation with Dr. Armstrong 11/6/2023.    -1/9/2024 PSA 1.050    -1/10/2024 [facility] clinic follow-up: Chris overall continues to tolerate current therapy with Zytiga, prednisone, Xgeva and Lupron.  He states his next Lupron injection with Dr. Matute is due late February.  His PSA is stable at 1.050. Creatinine stable at 1.64, he is following with nephrology, he has a an appointment with Dr. Armstrong in February for follow-up.  Hypertension being managed by his primary care provider, target systolic ideally 120s-140s, today it is running a little high at 172/90, yesterday when he was here for labs it was 138/80.  We will continue therapy unchanged with plans to repeat restaging scans in April.    -2/6/2024 PSA 0.753    -2/7/2024 [facility]ology clinic follow-up: Chris continues to do well on current therapy with Zytiga, prednisone, Xgeva and Lupron.  He is up-to-date on Lupron injection next due later this month with Dr. Matute.  His PSA continues to slowly decrease, current PSA is 0.753.  He has no new worrisome symptoms.  We will continue therapy unchanged, I have ordered restaging scans for late March prior to his return in April. He is following with nephrology for his renal insufficiency, his creatinine yesterday was good at 1.21.  I will do his CT scans with contrast unless something changes in the interim. He follows closely with Dr. Ram for management of his hypertension. I reviewed notes from his last office visit with her earlier this month on 2/1/2024, also reviewed labs in  detail with the patient today.  All questions were answered to his satisfaction.    -3/5/2024 PSA slowly declining 0.73.  Creatinine fluctuating.  1.69 with GFR 43 otherwise unremarkable CMP.  Phosphorus low 2.1    -4/8/2024 CT chest, abdomen and pelvis shows stable sclerotic lesions in the thoracic spine and bilateral ribs and right acetabulum.  Stable appearance of pelvic lymph nodes.  No new sites concerning for progression of disease.  Total body bone scan shows stable diffuse increased uptake of the left third rib correlates with diffuse sclerosis and enlargement on CT, no new sites of active osseous lesions.  -4/30/2024 PSA 0.713  -5/1/2024 [facility] oncology clinic follow-up: Chris continues to do well on current therapy with Zytiga, prednisone, Xgeva and Lupron.  He receives his Lupron every 6 months with Dr. Matute, last administered 2/5/2024.  Current CT scans and bone scan showed no progression of disease, PSA is stable at 0.713.  His CBC is unremarkable, CMP shows creatinine stable at 1.50 otherwise normal.  He does follow with nephrology.  We will continue treatment unchanged.  I will see him back in 2 months for follow-up.  We will repeat restaging scans in 6 months unless he has any new symptoms or significant change in his PSA.    -6/25/2024 PSA 0.623  -6/26/2024 [facility] oncology clinic follow-up: Chris overall continues to tolerate current therapy with Zytiga, prednisone, Xgeva and Lupron.  We did call to confirm his next Lupron injection and that is scheduled for late July with Dr. Matute.  His PSA for the most part is stable at 0.623, we will continue to monitor.  He has no new worrisome symptoms.  I did discuss with him today his creatinine which has increased to 1.86, BUN was 32.  Reminded him to be sure and stay hydrated.  He does follow with nephrology and his next appointment is in August.  He is on Xgeva however there is no indication for any adjustment, his creatinine clearance is greater  than 30.  Blood pressure today was under good control at 130/78.  We will continue treatment unchanged.  I will see him back in 1 month for follow-up.  Plan to repeat restaging scans in October unless his PSA rises or he has worrisome symptoms.    -7/23/2024 PSA 0.606    -9/17/2024 PSA 0.455  -9/18/2024 [facility]ology clinic follow-up: Chris is doing well overall.  Tolerates therapy with Zytiga, prednisone, Xgeva and Lupron.  He last received Lupron in August with Dr. Matute.  His PSA had been slowly creeping up however it has now went back down and is currently 0.455.  He will continue treatment unchanged.  We will repeat CT chest, abdomen and pelvis prior to return and I will do that without contrast in light of his renal insufficiency.  Current creatinine is stable at 1.5, he does follow with nephrology.  We will also repeat total body bone scan.  We will see him back in 1 months for follow-up and sooner if any concerns.  I did discuss with him that he can take Tylenol if needed or use topical over-the-counter pain relief ointment such as Biofreeze etc. for his back pain but thus far it has not required any intervention, he does not like to take pain medications.    -10/14/2024 CT chest abdomen pelvis without contrast [facility] compared to August 2024 shows unchanged sclerotic T1 vertebral body with expansile sclerotic left third rib without fracture and other small sclerotic nodules all unchanged.  Right external iliac node 20 mm increased from 7 mm on prior exam with internal iliac node 15 mm unchanged.  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    -10/22/2024 [facility] follow-up: Feeling great.  While he has 1 slightly larger node, given that his PSA has not been rapidly rising and all other disease is stable I will have him continue Zytiga prednisone Xgeva and he gets his next Lupron in February with Dr. Matute.   He will see my nurse practitioner and she will order repeat CT chest abdomen pelvis and bone scan for January and will follow-up with her in January to go over those results.  If either his PSA or the scans show progression beyond just the single slightly larger iliac node, she will order a PSMA PET and then get him back to me.  If the PSA is stable and the subsequent scans are stable then we will just continue his current regimen with quarterly imaging or sooner as symptoms or PSA dictate.    -11/19/2024 PSA 0.453  -1/14/2025 PSA 0.351  -1/13/2025 received Lupron with Dr. Matute  -2/3/2025 total body bone scan shows overall stable, 1 new site involving the left costovertebral junction of the left sixth rib with no definite CT correlate and favored to represent degenerative/traumatic changes.  CT chest, abdomen and pelvis with no evidence of disease progression in the chest, unchanged expansile sclerotic left third rib mass and sclerotic T1 vertebral body focus.  Abdomen and pelvis with mild interval increase in bilateral external iliac lymph nodes, unchanged right internal iliac lymph node, for reference right external iliac lymph node now measuring up to 28 mm compared to 20 mm on prior, left external iliac lymph node now measuring up to 20 mm compared to 10 mm on prior.  Right internal iliac 15 mm stable.  -2/11/2025 PSA 0.364  -2/12/2025 [facility] follow[facility]: Chris huerta is tolerating current therapy with Zytiga, prednisone and Xgeva monthly, he receives Lupron with Dr. Matute.  He last received Lupron 1/13/2025, next Lupron due in June.  His PSA is stable at 0.364.  Current restaging scans on 2/3/2025 show 1 new site in the left costovertebral junction of the left sixth rib, this is favored to represent degenerative/traumatic change, he does not recall any trauma to this area.  He is having no pain in this area.  He still has intermittent pain in his right mid back that is worse typically  when he is lying down.  He does have a sclerotic lesion at T1, I will go ahead and get an MRI of his thoracic spine just to make sure this is stable.  There has been a mild increase in the bilateral external iliac nodes and the right internal iliac node is stable.  Overall due to the stability of his PSA, his tolerability of current treatment and his desire not to pursue chemotherapy we will continue treatment unchanged.  We will repeat restaging scans in 3 months for follow-up.  If he has progressive new disease or worsening symptoms we would then want to get PSMA PET scan for evaluation, I think he would consider possibly Pluvicto down the road.  We will see him back in 1 month for follow-up to go over his MRI and continue current therapy.     -3/11/2025 [facility] follow-up: Tolerating Zytiga prednisone and Xgeva.  Next Lupron in June with Dr. Matute.  PSA staying low.  Patient could not tolerate MRI relative to his back pain due to claustrophobia.  Minimal back pain.  No leg weakness.  If his PSA rises we will get PSMA PET otherwise we will repeat his bone scan and CAT scans mid-May.    - 4/15/2025 PSA 0.302  -4/16/2025 [facility] follow-up: Chris currently is feeling well however he did have a concerning acute illness several weeks ago when he woke up and reports that he was incontinent of urine and stool and felt dizzy, he was then fatigued for about 3 days.  He did not seek emergency attention or any follow-up with PCP, he did not notify any healthcare provider.  I am not sure if he had a viral illness, we discussed that he could have had a seizure.  I stressed the importance of seeking medical attention if this happened again but he honestly admits that he most likely would not. His labs from yesterday look good, CBC is unremarkable, CMP with stable renal insufficiency, creatinine 1.57.  His PSA looks good at 0.302.  Blood pressure is running a little high at 157/94, he will  follow-up with Dr. Ram, also has seen nephrology Dr. Armstrong.  He last received Lupron with Dr. Matute in January.  We will continue treatment unchanged.  We will repeat restaging CT chest, abdomen and pelvis without contrast and total body bone scan late May and I have ordered those today.  We will see him back for follow-up after his scans to go over those results and further plan of care.    - 5/13/2025 PSA 0.306 CBC and CMP unremarkable save for creatinine stable 1.52 glucose 140.    - 5/27/2025 total body bone scan compared to February 2025 shows single left third rib osseous metastasis exhibiting treatment response.  CT chest abdomen pelvis without contrast compared to February 2025 shows no change in the bony lesions within T1 or left third rib and improvement in the adenopathy along the left pelvic sidewall with stable lymph nodes right pelvic sidewall.  Abdomen pelvis otherwise unchanged.    - 6/10/2025 [facility] follow-up: PSA staying down.  Tolerating Zytiga prednisone and Xgeva.  Next Lupron July with Dr. Matute.  Next Xgeva June.  Practitioner June 9.  Check PSA quarterly check total body bone scan and noncontrast CT chest abdomen pelvis in December 25.  Follow-up with Dr. Armstrong on his renal insufficiency.     Prostate cancer metastatic to multiple sites   3/15/2022 Initial Diagnosis    Prostate cancer metastatic to multiple sites (HCC)     3/15/2022 Cancer Staged    Staging form: Prostate, AJCC 8th Edition  - Clinical: Stage IVB (cT1c, cN1, cM1b, Grade Group: 5) - Signed by Fritz Goodson MD on 3/15/2022     4/7/2022 - 6/23/2022 Chemotherapy    Stopped after cycle 4 6/23/2022 due to syncope 3 days post infusions with negative cardiac work-up  OP PROSTATE DOCEtaxel     4/7/2022 -  Chemotherapy    OP SUPPORTIVE Denosumab (Xgeva) Q28D     7/26/2022 -  Chemotherapy    OP PROSTATE Abiraterone / PredniSONE           HISTORY OF PRESENT ILLNESS:  The patient is a 70 y.o. male, here for  "follow up on management of metastatic prostate cancer responded well to chemotherapy and now on Zytiga prednisone and Lupron Xgeva    Past Medical History:   Diagnosis Date    Cancer     Prostate cancer      Past Surgical History:   Procedure Laterality Date    PROSTATE BIOPSY  02/2022    dr. sue       No Known Allergies    Family History and Social History reviewed and changed as necessary    REVIEW OF SYSTEM:   No new somatic complaints    PHYSICAL EXAM:  No jaundice icterus or pallor.  No respiratory distress.    Vitals:    06/10/25 0917   BP: 169/98   Pulse: 68   Resp: 18   Temp: 97.1 °F (36.2 °C)   SpO2: 98%   Weight: 122 kg (268 lb)   Height: 180.3 cm (71\")     Vitals:    06/10/25 0917   PainSc: 0-No pain          ECOG score: 0           Vitals reviewed.    ECOG: (0) Fully Active - Able to Carry On All Pre-disease Performance Without Restriction    Lab Results   Component Value Date    HGB 13.3 05/13/2025    HCT 41.0 05/13/2025    MCV 87.8 05/13/2025     05/13/2025    WBC 8.08 05/13/2025    NEUTROABS 5.22 05/13/2025    LYMPHSABS 1.95 05/13/2025    MONOSABS 0.62 05/13/2025    EOSABS 0.19 05/13/2025    BASOSABS 0.06 05/13/2025       Lab Results   Component Value Date    GLUCOSE 140 (H) 05/13/2025    BUN 31 (H) 05/13/2025    CREATININE 1.52 (H) 05/13/2025     05/13/2025    K 4.3 05/13/2025     05/13/2025    CO2 23.2 05/13/2025    CALCIUM 9.8 05/13/2025    PROTEINTOT 7.3 05/13/2025    ALBUMIN 4.4 05/13/2025    BILITOT 0.4 05/13/2025    ALKPHOS 80 05/13/2025    AST 16 05/13/2025    ALT 10 05/13/2025             ASSESSMENT & PLAN:  1.  Prostate cancer clinical T1c and 2M1B stage IVb treated with 4 cycles of Taxotere, stopped due to syncope with negative cardiac work-up, with marked drop in PSA of 18.6 from over 900 at baseline, continued on Zytiga and prednisone.  2.  Urinary retention with Goodwin in place 1/24/2022.  On finasteride 1/24/2022.  3.  Covid 19 December 2021  4.  Hypertension  5.  " Stage 3a chronic kidney disease    Oncology history timeline:  -11/29/2021  with symptoms of urinary retention.  -2/15/2022 prostate biopsy adenocarcinoma grade group 5 and 3 out of 12 cores, grade group 4 in 1 out of 12 cores, grade group 3 in 8 out of 12 cores.  -2/24/2022 CT chest abdomen pelvis and total body bone scan shows left third rib, right acetabulum, periaortic and retroperitoneal kizzy metastases complaining of pain in the left chest and right hip.  Also possible sclerotic left 10th rib on CT.  Spleen mildly enlarged 13.8 cm.  Hepatic steatosis.  Small liver cyst.  Fat stranding in the periaortic and mesenteric region consistent with reactive/inflammatory stranding.  Multiple enlarged periaortic and retroperitoneal nodes and lymphadenopathy largest 4.9 cm.  CT chest describes tiny sclerotic foci in left eighth rib and right lateral seventh rib and T1.  Multiple small mediastinal and bilateral axillary nodes seen with small hypodense left thyroid nodule.  Creatinine 1.7.  -3/4/2022 office note Dr. Rolando Matute: Patient was seen after his elevated PSA by Dr. Vera and prostate biopsy scheduled.  However, he developed COVID-19 and this was canceled and the patient did not reschedule due to lack of insurance.  Saw Dr. Matute back on 1/24/2022 with plans to get staging CTs and bone scan with results as outlined above.  Started Casodex and to return on 3/11/2022 for Lupron.  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  TURP planned for urinary retention symptoms.    -3/15/2022 [facility] medical oncology initial consultation: I reviewed the above data with the patient.  With his fairly bulky retroperitoneal adenopathy and bone metastases including extra axial metastases, he would fit the data from the CHAARTED trial for which Taxotere x6 followed by Zytiga prednisone along with the planned Lupron already being planned by Dr. Matute would be  reasonable.  Equally reasonable in terms of comparisons with bicalutamide and Lupron would be Zytiga prednisone plus Lupron or Xtandi plus Lupron or apalutamide plus Lupron.  None of these have been compared head-to-head but all have beaten combined androgen deprivation therapy with bicalutamide and Lupron.  At the age of 67 and in order to have as many bullets as possible down the road and hence saving some of the hormone blockade options for later and using chemotherapy when he is younger and healthier for a more time-limited.,  He has opted for the Taxotere x6 followed by Zytiga and prednisone.  We will also give him Xgeva to improve bone health and given metastatic disease, as with all metastatic prostate cancer patients, he needs genetic counseling both for his sake as well as his family's.  If he were to have BRCA1 or other such  mutations, then that also opens up the options for PARP inhibitors etc. down the road.  He has his TURP scheduled for 2/24/2022 and we will start treatment a couple of weeks after that and he will get chemo preparation visit in the meantime and I will get capital surgeons to place a port.  We will check his PSA after his TURP when he sees my nurse practitioner back early April for the start of chemotherapy and repeat the PSA and CT chest abdomen pelvis and bone scan after the Taxotere is complete.    -3/31/2022 chemotherapy education and needs assessment completed    -4/7/2022 began docetaxel and Xgeva.  .0.  We will do his Xgeva every 6 weeks to coordinate with his docetaxel infusions.    -4/19/2022 patient stopped Casodex    -5/17/2022 patient reported seeing his PCP for an abscess in his suprapubic area.  Placed on clindamycin, will delay treatment 1 week.    -5/25/2022 PSA 34.3  -5/26/2022 [facility] follow-up: Chris has an abscess in the suprapubic area, it has improved on antibiotic therapy but I am concerned if we treat him it will just flare back up  unless it is drained.  I will get him to Dr. Miller who placed his port for I&D and culture.  He is on clindamycin and states he completes course of treatment tomorrow.  I will delay his treatment with Taxotere 1 more week.  We will give his Xgeva today.  I will see him back in 1 week for follow-up to assess whether or not we can resume his Taxotere.  His PSA has dropped nicely with treatment thus far, PSA yesterday was 34.3 which is down from a PSA of 259.0 when we started treatment, it has been as high as 911 in November.    -6/2/2022 [facility] clinic follow-up: Chris went to see Dr. Miller to have his abscess lanced however apparently there was a long wait and he left without being seen.  He did call his PCP and was given 5 more days of clindamycin and the abscess now seems to have resolved.  We discussed today that he is at risk for recurrence of infection due to immunocompromise state with chemotherapy.  He will notify us if he has any return of his abscess.  He does have intertrigo under his panniculus, I have advised him to keep this area dry, to apply topical drying/antifungal powders.  Also recommended looser clothing.  His counts are adequate to continue therapy, we will resume Taxotere today with cycle #3.  We will repeat restaging scans after 6 courses.  We are doing Xgeva every 6 weeks to coordinate with his Taxotere.  Once he finishes Taxotere we can then go back to every 4 weeks.  His next Xgeva is not due until 7/14/2022.  His calcium is running a little low, he is going to  calcium supplement.    -6/23/2022 [facility] clinic follow-up: Chris overall is doing well on treatment with Taxotere.  Labs reviewed from yesterday and are unremarkable.  We will continue treatment today unchanged.  His suprapubic abscess resolved on clindamycin.  He will continue to use drying powder/antifungal powder under his panniculus for intertrigo.  Today will be cycle #4 of a planned 6 courses of  Taxotere.  He is also on Xgeva, next dose due 7/14/2022, we are doing this every 6 weeks for now to line up with his chemotherapy, can go back to every 4 weeks after he finishes Taxotere.  Calcium from CMP yesterday was normal.    -7/13/2022 [facility] Oncology clinic follow-up: Mr. Morfin has had issues around day 3 after each treatment ranging from chest pain after his first treatment for which he did not seek medical attention, fatigue after the next few treatments, but 3 days after his most recent treatment on 6/23/2022 he had a syncopal episode at home and once again did not seek medical attention.  I discussed with him that this was not acceptable, if he has occurrences like this someone needs to call 911, it is difficult to figure out what is going on after the fact, the best time to work this up would be during the occurrence.  For now we will get labs today with CBC, CMP, PSA.  I will refer him to cardiology for further evaluation.  We will hold Taxotere most likely, I will see him tomorrow to go over his lab results.  I will also repeat his restaging scans with CT chest, abdomen and pelvis and will include CT of the head in light of his syncopal episode.      -7/13/2022 PSA 18.6    -7/14/2022 [facility] On[facility] clinic follow-up: Mr. Morfin returns today to review his labs from yesterday.  Labs are unremarkable.  PSA down to 18.6 from a high of 259.0 in April prior to starting treatment.  CBC and CMP unremarkable, magnesium is normal at 2.3, phosphorus slightly low at 2.4.  We will hold therapy for now in light of his syncopal episode on day 3 after his last treatment and prior episodes with chest pain and severe fatigue that all occurred on day 3 after his Taxotere.  We will restage him with CT head, chest, abdomen and pelvis (I am including the head in light of his syncopal episode).  I have also referred him to cardiology, he states that he received a call from them about making an appointment however he  wanted to talk to us today first.  I emphasized the importance to him of making and keeping that appointment and he states understanding.  I also once again emphasized the requirement to seek emergency medical attention if he has any episodes in the future such as chest pain or syncopal events.  Whether or not we try and complete Taxotere with 2 more courses or move to the next line of therapy will be decided when he returns to discuss with Dr. Goodson and review his restaging scans.    -7/15/2022 saw Dr. Diaz cardiologist.  For syncope he ordered echocardiogram and 48-hour Holter monitor.    -7/15/2022 Holter monitor relatively benign.    -7/22/2022 CT brain with and without contrast negative.  CT chest abdomen and pelvis shows some nonspecific cecal wall thickening.  No progression in the chest.  T1 and ribs stable.  Decrease in multiple retroperitoneal and pelvic nodes.  Slight increase in right acetabular sclerotic lesion.  Persistent but improved circumferential bladder wall thickening.  Total body bone scan shows stable left third rib and no evidence of new bone metastases.    -7/19/2022 ejection fraction 65% with grade 1 diastolic dysfunction.    -7/26/2022 [facility] follow-up: Given presyncopal symptoms occurred 3 days after Taxotere and has not otherwise occurred any other time and his cardiology work-up is fairly unremarkable and his disease is under good control as witnessed by the report above of the CTs of head chest abdomen pelvis and bone scan, I will hold cycle 5 and 6 of Taxotere and continue 1 now with Zytiga and prednisone.  With reference to the coincidental cecal wall thickening found on CT, we will get him to [facility] surgeons for colonoscopy.  He will see my nurse practitioner back in August for next cycle of Abiraterone prednisone.  He will continue his Lupron with Dr. Matute.  We will continue his Xgeva.  We will repeat his CT chest abdomen pelvis and bone scan again in 3  months.  He will see my nurse practitioner in the interim.    -8/23/2022 [facility] follow-up: No further presyncopal symptoms.  Feeling great other than for hot flashes.  Did commend for now we will continue on with his Zytiga prednisone and he will get his Xgeva today based on labs from 8/10/2022.  He opted out of getting the colonoscopy that I recommended and he will not change his mind.  He will continue getting the Lupron with Dr. Matute and I asked him to give the date of this appointment to my nurse when he sees her back in a month.  We will give his Xgeva today.  We will get CT chest abdomen pelvis and total body bone scan towards the middle to end of October.  Presently other than for the hot flashes feels great.    -9/20/2022 PSA 8.320    -9/22/2022 [facility] follow-up: Chris is doing well on Zytiga, prednisone along with Xgeva.  Labs reviewed from 9/20/2022 are unremarkable, CBC was normal, CMP with creatinine of 1.34 otherwise normal, this is stable from his baseline.  PSA stable at 8.320.  He received Lupron recently with Dr. Matute, he thinks last week or so, states his next appointment is in February.  He will continue treatment unchanged.  We will repeat restaging CT chest, abdomen and pelvis and total body bone scan in October prior to return and I have ordered those today.  We will also repeat his PSA.    -9/22/2022 CT chest abdomen pelvisCompared to July 2022 [facility] showed no evidence of disease progression in the chest with no substantial sclerotic bony lesions and decrease in size of retroperitoneal and pelvic nodes with persistent cecal wall thickening and suboptimal bladder distention.  Total body bone scan showed decrease in previously seen uptake in the left third rib with diffuse sclerosis representing treatment response with no new foci.    -10/18/2022 [facility] medical oncology follow-up: I reviewed bone scan and CT reports as above with no  "evidence of progression.  Tolerating Zytiga prednisone and Xgeva.  Getting Lupron regularly with Dr. Matute next appointment coming in February.  We will repeat his CT chest abdomen pelvis and total body bone scan in April.  He will follow with my nurse practitioner in the interim.    -1/10/2023 PSA 3.450  -1/11/2023 [facility] [place] On[facility] clinic follow-up: Mr. Morfin continues to do well on current therapy with Zytiga, prednisone and Xgeva with no unusual side effects.  He has no new worrisome symptoms.  He is due for his next Lupron injection in February with Dr. Matute.  His PSA continues to trend downward, current PSA from yesterday was 3.450.  We will continue therapy unchanged, he will receive Xgeva today.  Has had no hypocalcemia, his CMP, phosphorus and magnesium are all normal.  We will repeat restaging scans in April.  -3/7/2023 PSA 2.460  -3/8/2023 [facility] [place] Oncology clinic follow-up:  Mr. Morfin is doing well.  He is tolerating therapy with Zytiga, prednisone and Xgeva with no significant side effects.  He has hot flashes but these are tolerable.  He had Lupron injection 2/24/2023 with Dr. Matute.  His PSA continues to decline, current PSA 2.460.  He will continue therapy unchanged.  We will repeat restaging scans late April prior to return in 2 months and I have ordered those today.  He is working with his PCP Rodrigo Ram on his hypertension.  His b/p today was 160/88.  He was to have increased his losartan but I do not think he has done that yet as he said \"I still have some of my old prescription left\".  -4/4/2023 PSA 2.55  - 4/26/2023 CT chest abdomen pelvis [place] comparison 10/10/2022 showed stable left third rib, T1, left fourth and eighth rib, right seventh rib.  Probable liver cyst.  Few diverticuli.  Mild further decrease in a few of the previously enlarged nodes.  Left external iliac 12 mm compared to 16 mm.  Right common iliac 7 mm stable.  Lower left periaortic 8 mm previously 13 mm.  No new " lytic or blastic osseous lesions.  Total body bone scan comparison 10/10/2022, 7/22/2022, and CT 4/26/2023.  No new sites of increased uptake.  1 focal left third rib hotspot consistent with bone metastasis.    -5/2/2023 [facility] medical oncology follow-up: I reviewed interval notes since I last saw him from my nurse practitioner as outlined above in interval images and reports thereof from [facility] that shows no new sites of bone metastasis and no kizzy or visceral progression.  PSA stabilized around 2.5.  In the absence of symptomatic or radiographic progression, I would be unlikely to change therapy just on the basis of a PSA rise and for now the PSA is stable.  We will continue Zytiga prednisone and Xgeva and he will follow-up with my nurse practitioner 5/30/2023 with repeat CTs and bone scan in 6 months.  I asked him to check with his primary care today regarding his systolic in the 170s.    -5/31/2023 [facility] follow-up: Chris continues to do well on current therapy with Zytiga, prednisone and Xgeva along with Lupron that he receives with Dr. Matute.  His PSA remains stable, it was lower this month compared to last month, current PSA 1.940.  Continues to deal with hypertension, on arrival today his blood pressure was 210/92 however this was using a wrist cuff, when I rechecked it manually his blood pressure was 160/90.  Still has room for improvement despite recent increase in his losartan to 100 mg daily.  He will follow-up with Dr. Ram for further management.  We will continue therapy unchanged.  We will repeat restaging scans in October.    -7/26/2023 [facility] follow-up: Chris overall continues to do well on current therapy with Zytiga, prednisone and Xgeva.  He receives Lupron every 6 months with Dr. Matute and states that is scheduled for August.  PSA from yesterday is pending, PSA on 6/27/2023 was 1.590 which was continuing to decline, in February it was  3.250.  His creatinine this past month has bumped up.  He feels he is staying hydrated but I encouraged him to increase his fluid intake.  I discussed with him that this could be from his hypertension, some of his medications.  His blood pressure today is 165/92.  He has an appointment with his primary care provider Dr. Teri Ram next week and can further discuss with her.  No adjustment needed for Xgeva.  I refilled his prednisone today.  I will see him back in 1 month for follow-up with continued monitoring of his labs.  He may end up needing to see nephrology.  We will repeat restaging scans in October.    -9/29/2023 PSA 1.810  -8/23/2023 [facility] clinic follow-up: Chris continues on therapy with Zytiga, prednisone and Xgeva, he also receives Lupron every 6 months with Dr. Matute with most recent given on 8/7/2023.  He is scheduled for a cystoscopy in a few weeks as his last few UAs per the patient's report had microscopic hematuria, he has had no steve hematuria.  His creatinine continues to remain elevated at 1.82, BUN is 40, GFR is 40.  I will get him to nephrology for further evaluation.  Blood pressure today was fairly good at 161/86, he continues to follow with Dr. Ram for management.  PSA on 7/25/2023 was up slightly from prior month, PSA in July was 2.140, in June it was 1.590, PSA from yesterday is pending.  I plan on repeating restaging CT scans and bone scans in October however if his PSA is up significantly then I will do sooner.  I will see him back for follow-up in 1 month.  -8/23/2024 PSA 1.860    -9/20/2023 [facility] clinic follow-up: Chris is tolerating treatment with Zytiga, prednisone and Xgeva.  He is up-to-date with his Lupron injection, last received in August with Dr. Matute.  He had cystoscopy on 9/8/2023 that was normal. I referred him to nephrology when I saw him last in August as his creatinine has been increasing, creatinine yesterday was a little better  1.51, GFR is 50, creatinine clearance 63.8.  He is still awaiting an appointment with nephrology, per our  it will be early November.  We will repeat his restaging scans prior to return and I will do his CT scans without contrast in light of his new renal insufficiency.  We will also get total body bone scan and I have ordered those today.  His PSA yesterday was 1.810.    -10/9/2023 CT chest abdomen Pelvis without contrast compared to April 2023 shows slightly prominent pelvic lymph nodes left external iliac 8 mm compared to prior study.  Right internal iliac heterogenous 19 mm compared to 11 mm prior.  Stable sclerotic bone lesions and no new lesions.  Stable bone scan with no new sites of disease    -10/17/2023 [facility] follow-up: With elevated creatinine, CTs done without contrast showed very slight increased prominence by few millimeters of some internal iliac and external iliac nodes for which PSMA PET could be done but for now I will check his PSA and have him see my nurse practitioner back in a week and unless the PSA is significantly rising I would continue the Zytiga, prednisone, Xgeva and February dose of Lupron with Dr. Matute and make sure he follows with nephrology.  If his PSA is significantly rising then my nurse practitioner will order PSMA PET.    -10/17/2023 PSA 1.5  -11/14/2023 PSA 1.510  -11/15/2023 [facility] follow-up: Mr. Ferrara continues to do well on current therapy with Zytiga, prednisone, Xgeva and Lupron that he receives with Dr. Matute.  His PSA is stable at 1.5.  Would not change therapy for now, we will plan on repeating restaging scans in February unless PSA starts to rise or he has new concerns.  Overall he feels good.  He is now established with nephrology and will continue to follow with them regarding his renal insufficiency, I reviewed note from consultation with Dr. Armstrong 11/6/2023.    -1/9/2024 PSA 1.050    -1/10/2024 [facility]  cl[facility] follow-up: Chris overall continues to tolerate current therapy with Zytiga, prednisone, Xgeva and Lupron.  He states his next Lupron injection with Dr. Matute is due late February.  His PSA is stable at 1.050. Creatinine stable at 1.64, he is following with nephrology, he has a an appointment with Dr. Armstrong in February for follow-up.  Hypertension being managed by his primary care provider, target systolic ideally 120s-140s, today it is running a little high at 172/90, yesterday when he was here for labs it was 138/80.  We will continue therapy unchanged with plans to repeat restaging scans in April.    -2/6/2024 PSA 0.753    -2/7/2024 [facility] oncology clinic follow-up: Chris continues to do well on current therapy with Zytiga, prednisone, Xgeva and Lupron.  He is up-to-date on Lupron injection next due later this month with Dr. Matute.  His PSA continues to slowly decrease, current PSA is 0.753.  He has no new worrisome symptoms.  We will continue therapy unchanged, I have ordered restaging scans for late March prior to his return in April. He is following with nephrology for his renal insufficiency, his creatinine yesterday was good at 1.21.  I will do his CT scans with contrast unless something changes in the interim. He follows closely with Dr. Ram for management of his hypertension. I reviewed notes from his last office visit with her earlier this month on 2/1/2024, also reviewed labs in detail with the patient today.  All questions were answered to his satisfaction.    -3/5/2024 PSA slowly declining 0.73.  Creatinine fluctuating.  1.69 with GFR 43 otherwise unremarkable CMP.  Phosphorus low 2.1    -4/8/2024 CT chest, abdomen and pelvis shows stable sclerotic lesions in the thoracic spine and bilateral ribs and right acetabulum.  Stable appearance of pelvic lymph nodes.  No new sites concerning for progression of disease.  Total body bone scan shows stable diffuse increased uptake of the left  third rib correlates with diffuse sclerosis and enlargement on CT, no new sites of active osseous lesions.  -4/30/2024 PSA 0.713  -5/1/2024 [facility] follow-up: Chris continues to do well on current therapy with Zytiga, prednisone, Xgeva and Lupron.  He receives his Lupron every 6 months with Dr. Matute, last administered 2/5/2024.  Current CT scans and bone scan showed no progression of disease, PSA is stable at 0.713.  His CBC is unremarkable, CMP shows creatinine stable at 1.50 otherwise normal.  He does follow with nephrology.  We will continue treatment unchanged.  I will see him back in 2 months for follow-up.  We will repeat restaging scans in 6 months unless he has any new symptoms or significant change in his PSA.    -6/25/2024 PSA 0.623  -6/26/2024 [facility] follow-up: Chris overall continues to tolerate current therapy with Zytiga, prednisone, Xgeva and Lupron.  We did call to confirm his next Lupron injection and that is scheduled for late July with Dr. Matute.  His PSA for the most part is stable at 0.623, we will continue to monitor.  He has no new worrisome symptoms.  I did discuss with him today his creatinine which has increased to 1.86, BUN was 32.  Reminded him to be sure and stay hydrated.  He does follow with nephrology and his next appointment is in August.  He is on Xgeva however there is no indication for any adjustment, his creatinine clearance is greater than 30.  Blood pressure today was under good control at 130/78.  We will continue treatment unchanged.  I will see him back in 1 month for follow-up.  Plan to repeat restaging scans in October unless his PSA rises or he has worrisome symptoms.    -7/23/2024 PSA 0.606    -9/17/2024 PSA 0.455  -9/18/2024 [facility] follow-up: Chris is doing well overall.  Tolerates therapy with Zytiga, prednisone, Xgeva and Lupron.  He last received Lupron in August with Dr. Matute.  His PSA had been slowly  creeping up however it has now went back down and is currently 0.455.  He will continue treatment unchanged.  We will repeat CT chest, abdomen and pelvis prior to return and I will do that without contrast in light of his renal insufficiency.  Current creatinine is stable at 1.5, he does follow with nephrology.  We will also repeat total body bone scan.  We will see him back in 1 months for follow-up and sooner if any concerns.  I did discuss with him that he can take Tylenol if needed or use topical over-the-counter pain relief ointment such as Biofreeze etc. for his back pain but thus far it has not required any intervention, he does not like to take pain medications.    -10/14/2024 CT chest abdomen pelvis without contrast [facility] compared to August 2024 shows unchanged sclerotic T1 vertebral body with expansile sclerotic left third rib without fracture and other small sclerotic nodules all unchanged.  Right external iliac node 20 mm increased from 7 mm on prior exam with internal iliac node 15 mm unchanged.  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    -10/22/2024 [facility] follow-up: Feeling great.  While he has 1 slightly larger node, given that his PSA has not been rapidly rising and all other disease is stable I will have him continue Zytiga prednisone Xgeva and he gets his next Lupron in February with Dr. Matute.  He will see my nurse practitioner and she will order repeat CT chest abdomen pelvis and bone scan for January and will follow-up with her in January to go over those results.  If either his PSA or the scans show progression beyond just the single slightly larger iliac node, she will order a PSMA PET and then get him back to me.  If the PSA is stable and the subsequent scans are stable then we will just continue his current regimen with quarterly imaging or sooner as symptoms or PSA  abel.    -11/19/2024 PSA 0.453  -1/14/2025 PSA 0.351  -1/13/2025 received Lupron with Dr. Matute  -2/3/2025 total body bone scan shows overall stable, 1 new site involving the left costovertebral junction of the left sixth rib with no definite CT correlate and favored to represent degenerative/traumatic changes.  CT chest, abdomen and pelvis with no evidence of disease progression in the chest, unchanged expansile sclerotic left third rib mass and sclerotic T1 vertebral body focus.  Abdomen and pelvis with mild interval increase in bilateral external iliac lymph nodes, unchanged right internal iliac lymph node, for reference right external iliac lymph node now measuring up to 28 mm compared to 20 mm on prior, left external iliac lymph node now measuring up to 20 mm compared to 10 mm on prior.  Right internal iliac 15 mm stable.  -2/11/2025 PSA 0.364  -2/12/2025 [facility] oncology clinic follow-up: Chris huerta is tolerating current therapy with Zytiga, prednisone and Xgeva monthly, he receives Lupron with Dr. Matute.  He last received Lupron 1/13/2025, next Lupron due in June.  His PSA is stable at 0.364.  Current restaging scans on 2/3/2025 show 1 new site in the left costovertebral junction of the left sixth rib, this is favored to represent degenerative/traumatic change, he does not recall any trauma to this area.  He is having no pain in this area.  He still has intermittent pain in his right mid back that is worse typically when he is lying down.  He does have a sclerotic lesion at T1, I will go ahead and get an MRI of his thoracic spine just to make sure this is stable.  There has been a mild increase in the bilateral external iliac nodes and the right internal iliac node is stable.  Overall due to the stability of his PSA, his tolerability of current treatment and his desire not to pursue chemotherapy we will continue treatment unchanged.  We will repeat restaging scans in 3 months for follow-up.  If he  has progressive new disease or worsening symptoms we would then want to get PSMA PET scan for evaluation, I think he would consider possibly [place] down the road.  We will see him back in 1 month for follow-up to go over his MRI and continue current therapy.     -3/11/2025 [facility] follow-up: Tolerating Zytiga prednisone and Xgeva.  Next Lupron in June with Dr. Matute.  PSA staying low.  Patient could not tolerate MRI relative to his back pain due to claustrophobia.  Minimal back pain.  No leg weakness.  If his PSA rises we will get PSMA PET otherwise we will repeat his bone scan and CAT scans mid-May.    - 4/15/2025 PSA 0.302  -4/16/2025 [facility] follow-up: Chris currently is feeling well however he did have a concerning acute illness several weeks ago when he woke up and reports that he was incontinent of urine and stool and felt dizzy, he was then fatigued for about 3 days.  He did not seek emergency attention or any follow-up with PCP, he did not notify any healthcare provider.  I am not sure if he had a viral illness, we discussed that he could have had a seizure.  I stressed the importance of seeking medical attention if this happened again but he honestly admits that he most likely would not. His labs from yesterday look good, CBC is unremarkable, CMP with stable renal insufficiency, creatinine 1.57.  His PSA looks good at 0.302.  Blood pressure is running a little high at 157/94, he will follow-up with Dr. Ram, also has seen nephrology Dr. Armstrong.  He last received Lupron with Dr. Matute in January.  We will continue treatment unchanged.  We will repeat restaging CT chest, abdomen and pelvis without contrast and total body bone scan late May and I have ordered those today.  We will see him back for follow-up after his scans to go over those results and further plan of care.    - 5/13/2025 PSA 0.306 CBC and CMP unremarkable save for creatinine stable 1.52 glucose  140.    - 5/27/2025 total body bone scan compared to February 2025 shows single left third rib osseous metastasis exhibiting treatment response.  CT chest abdomen pelvis without contrast compared to February 2025 shows no change in the bony lesions within T1 or left third rib and improvement in the adenopathy along the left pelvic sidewall with stable lymph nodes right pelvic sidewall.  Abdomen pelvis otherwise unchanged.    - 6/10/2025 [facility] oncology clinic follow-up: PSA staying down.  Tolerating Zytiga prednisone and Xgeva.  Next Lupron July with Dr. Matute.  Next Xgeva June.  Practitioner June 9.  Check PSA quarterly check total body bone scan and noncontrast CT chest abdomen pelvis in December 25.  Follow-up with Dr. Armstrong on his renal insufficiency.    Total time of care today inclusive of time spent today prior to patient's arrival reviewing interval data and during visit translating to patient putting forth as outlined above and after visit instituting this plan took 45 minutes patient care time throughout the day today.  Fritz Goodson MD    06/10/2025

## 2025-06-10 NOTE — LETTER
Jennifer 10, 2025     Teri Ram DO  [address] [facility] [address]    Patient: Chris Morfin   YOB: 1955   Date of Visit: 6/10/2025     Dear Teri Ram DO:       Thank you for referring Chris Morfin to me for evaluation. Below are the relevant portions of my assessment and plan of care.    If you have questions, please do not hesitate to call me. I look forward to following Chris along with you.         Sincerely,        Fritz Goodson MD        CC: MD Pro Evans MD Ayman A Geneidy, MD Hicks, Fritz DING MD  06/10/25 0933  Sign when Signing Visit  CHIEF COMPLAINT: Metastatic prostate cancer on Zytiga prednisone Lupron Xgeva without complications.    Problem List:  Oncology/Hematology History Overview Note   1.  Prostate cancer clinical T1c and 2M1B stage IVb treated with 4 cycles of Taxotere, stopped due to syncope with negative cardiac work-up, with marked drop in PSA of 18.6 from over 900 at baseline, continued on Zytiga and prednisone.  2.  Urinary retention with Goodwin in place 1/24/2022.  On finasteride 1/24/2022.  3.  Covid 19 December 2021  4.  Hypertension  5.  Stage 3a chronic kidney disease    Oncology history timeline:  -11/29/2021  with symptoms of urinary retention.  -2/15/2022 prostate biopsy adenocarcinoma grade group 5 and 3 out of 12 cores, grade group 4 in 1 out of 12 cores, grade group 3 in 8 out of 12 cores.  -2/24/2022 CT chest abdomen pelvis and total body bone scan shows left third rib, right acetabulum, periaortic and retroperitoneal kizzy metastases complaining of pain in the left chest and right hip.  Also possible sclerotic left 10th rib on CT.  Spleen mildly enlarged 13.8 cm.  Hepatic steatosis.  Small liver cyst.  Fat stranding in the periaortic and mesenteric region consistent with reactive/inflammatory stranding.  Multiple enlarged periaortic and retroperitoneal nodes and lymphadenopathy largest 4.9 cm.   CT chest describes tiny sclerotic foci in left eighth rib and right lateral seventh rib and T1.  Multiple small mediastinal and bilateral axillary nodes seen with small hypodense left thyroid nodule.  Creatinine 1.7.  -3/4/2022 office note Dr. Rolando Matute: Patient was seen after his elevated PSA by Dr. Vera and prostate biopsy scheduled.  However, he developed COVID-19 and this was canceled and the patient did not reschedule due to lack of insurance.  Saw Dr. Matute back on 1/24/2022 with plans to get staging CTs and bone scan with results as outlined above.  Started Casodex and to return on 3/11/2022 for Lupron.  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  TURP planned for urinary retention symptoms.    -3/15/2022 [facility] medical oncology initial consultation: I reviewed the above data with the patient.  With his fairly bulky retroperitoneal adenopathy and bone metastases including extra axial metastases, he would fit the data from the CHAARTED trial for which Taxotere x6 followed by Zytiga prednisone along with the planned Lupron already being planned by Dr. Matute would be reasonable.  Equally reasonable in terms of comparisons with bicalutamide and Lupron would be Zytiga prednisone plus Lupron or Xtandi plus Lupron or apalutamide plus Lupron.  None of these have been compared head-to-head but all have beaten combined androgen deprivation therapy with bicalutamide and Lupron.  At the age of 67 and in order to have as many bullets as possible down the road and hence saving some of the hormone blockade options for later and using chemotherapy when he is younger and healthier for a more time-limited.,  He has opted for the Taxotere x6 followed by Zytiga and prednisone.  We will also give him Xgeva to improve bone health and given metastatic disease, as with all metastatic prostate cancer patients, he needs genetic counseling both for his sake as well  as his family's.  If he were to have BRCA1 or other such  mutations, then that also opens up the options for PARP inhibitors etc. down the road.  He has his TURP scheduled for 2/24/2022 and we will start treatment a couple of weeks after that and he will get chemo preparation visit in the meantime and I will get capital surgeons to place a port.  We will check his PSA after his TURP when he sees my nurse practitioner back early April for the start of chemotherapy and repeat the PSA and CT chest abdomen pelvis and bone scan after the Taxotere is complete.    -3/31/2022 chemotherapy education and needs assessment completed    -4/7/2022 began docetaxel and Xgeva.  .0.  We will do his Xgeva every 6 weeks to coordinate with his docetaxel infusions.    -4/19/2022 patient stopped Casodex    -5/17/2022 patient reported seeing his PCP for an abscess in his suprapubic area.  Placed on clindamycin, will delay treatment 1 week.    -5/25/2022 PSA 34.3  -5/26/2022 [facility] follow-up: Chris has an abscess in the suprapubic area, it has improved on antibiotic therapy but I am concerned if we treat him it will just flare back up unless it is drained.  I will get him to Dr. Miller who placed his port for I&D and culture.  He is on clindamycin and states he completes course of treatment tomorrow.  I will delay his treatment with Taxotere 1 more week.  We will give his Xgeva today.  I will see him back in 1 week for follow-up to assess whether or not we can resume his Taxotere.  His PSA has dropped nicely with treatment thus far, PSA yesterday was 34.3 which is down from a PSA of 259.0 when we started treatment, it has been as high as 911 in November.    -6/2/2022 [facility] follow-up: Chris went to see Dr. Miller to have his abscess lanced however apparently there was a long wait and he left without being seen.  He did call his PCP and was given 5 more days of clindamycin and the abscess now  seems to have resolved.  We discussed today that he is at risk for recurrence of infection due to immunocompromise state with chemotherapy.  He will notify us if he has any return of his abscess.  He does have intertrigo under his panniculus, I have advised him to keep this area dry, to apply topical drying/antifungal powders.  Also recommended looser clothing.  His counts are adequate to continue therapy, we will resume Taxotere today with cycle #3.  We will repeat restaging scans after 6 courses.  We are doing Xgeva every 6 weeks to coordinate with his Taxotere.  Once he finishes Taxotere we can then go back to every 4 weeks.  His next Xgeva is not due until 7/14/2022.  His calcium is running a little low, he is going to  calcium supplement.    -6/23/2022 [facility] follow-up: Chris huerta is doing well on treatment with Taxotere.  Labs reviewed from yesterday and are unremarkable.  We will continue treatment today unchanged.  His suprapubic abscess resolved on clindamycin.  He will continue to use drying powder/antifungal powder under his panniculus for intertrigo.  Today will be cycle #4 of a planned 6 courses of Taxotere.  He is also on Xgeva, next dose due 7/14/2022, we are doing this every 6 weeks for now to line up with his chemotherapy, can go back to every 4 weeks after he finishes Taxotere.  Calcium from [facility] yesterday was normal.    -7/13/2022 [facility] follow-up: Mr. Morfin has had issues around day 3 after each treatment ranging from chest pain after his first treatment for which he did not seek medical attention, fatigue after the next few treatments, but 3 days after his most recent treatment on 6/23/2022 he had a syncopal episode at home and once again did not seek medical attention.  I discussed with him that this was not acceptable, if he has occurrences like this someone needs to call 911, it is difficult to figure out what is going on after the fact, the best  time to work this up would be during the occurrence.  For now we will get labs today with CBC, CMP, PSA.  I will refer him to cardiology for further evaluation.  We will hold Taxotere most likely, I will see him tomorrow to go over his lab results.  I will also repeat his restaging scans with CT chest, abdomen and pelvis and will include CT of the head in light of his syncopal episode.      -7/13/2022 PSA 18.6    -7/14/2022 [facility] clinic follow-up: Mr. Morfin returns today to review his labs from yesterday.  Labs are unremarkable.  PSA down to 18.6 from a high of 259.0 in April prior to starting treatment.  CBC and CMP unremarkable, magnesium is normal at 2.3, phosphorus slightly low at 2.4.  We will hold therapy for now in light of his syncopal episode on day 3 after his last treatment and prior episodes with chest pain and severe fatigue that all occurred on day 3 after his Taxotere.  We will restage him with CT head, chest, abdomen and pelvis (I am including the head in light of his syncopal episode).  I have also referred him to cardiology, he states that he received a call from them about making an appointment however he wanted to talk to us today first.  I emphasized the importance to him of making and keeping that appointment and he states understanding.  I also once again emphasized the requirement to seek emergency medical attention if he has any episodes in the future such as chest pain or syncopal events.  Whether or not we try and complete Taxotere with 2 more courses or move to the next line of therapy will be decided when he returns to discuss with Dr. Goodson and review his restaging scans.    -7/15/2022 saw Dr. Diaz cardiologist.  For syncope he ordered echocardiogram and 48-hour Holter monitor.    -7/15/2022 Holter monitor relatively benign.    -7/22/2022 CT brain with and without contrast negative.  CT chest abdomen and pelvis shows some nonspecific cecal wall thickening.  No progression  in the chest.  T1 and ribs stable.  Decrease in multiple retroperitoneal and pelvic nodes.  Slight increase in right acetabular sclerotic lesion.  Persistent but improved circumferential bladder wall thickening.  Total body bone scan shows stable left third rib and no evidence of new bone metastases.    -7/19/2022 ejection fraction 65% with grade 1 diastolic dysfunction.    -7/26/2022 [facility] oncology clinic follow-up: Given presyncopal symptoms occurred 3 days after Taxotere and has not otherwise occurred any other time and his cardiology work-up is fairly unremarkable and his disease is under good control as witnessed by the report above of the CTs of head chest abdomen pelvis and bone scan, I will hold cycle 5 and 6 of Taxotere and continue 1 now with Zytiga and prednisone.  With reference to the coincidental cecal wall thickening found on CT, we will get him to [facility] surgeons for colonoscopy.  He will see my nurse practitioner back in August for next cycle of Abiraterone prednisone.  He will continue his Lupron with Dr. Matute.  We will continue his Xgeva.  We will repeat his CT chest abdomen pelvis and bone scan again in 3 months.  He will see my nurse practitioner in the interim.    -8/23/2022 [facility] oncology clinic follow-up: No further presyncopal symptoms.  Feeling great other than for hot flashes.  Did commend for now we will continue on with his Zytiga prednisone and he will get his Xgeva today based on labs from 8/10/2022.  He opted out of getting the colonoscopy that I recommended and he will not change his mind.  He will continue getting the Lupron with Dr. Matute and I asked him to give the date of this appointment to my nurse when he sees her back in a month.  We will give his Xgeva today.  We will get CT chest abdomen pelvis and total body bone scan towards the middle to end of October.  Presently other than for the hot flashes feels great.    -9/20/2022 PSA 8.320    -9/22/2022 [facility]  Oncology clinic follow-up: Chris is doing well on Zytiga, prednisone along with Xgeva.  Labs reviewed from 9/20/2022 are unremarkable, CBC was normal, CMP with creatinine of 1.34 otherwise normal, this is stable from his baseline.  PSA stable at 8.320.  He received Lupron recently with Dr. Matute, he thinks last week or so, states his next appointment is in February.  He will continue treatment unchanged.  We will repeat restaging CT chest, abdomen and pelvis and total body bone scan in October prior to return and I have ordered those today.  We will also repeat his PSA.    -9/22/2022 CT chest abdomen pelvisCompared to July 2022 [facility] showed no evidence of disease progression in the chest with no substantial sclerotic bony lesions and decrease in size of retroperitoneal and pelvic nodes with persistent cecal wall thickening and suboptimal bladder distention.  Total body bone scan showed decrease in previously seen uptake in the left third rib with diffuse sclerosis representing treatment response with no new foci.    -10/18/2022 [facility] medical oncology follow-up: I reviewed bone scan and CT reports as above with no evidence of progression.  Tolerating Zytiga prednisone and Xgeva.  Getting Lupron regularly with Dr. Matute next appointment coming in February.  We will repeat his CT chest abdomen pelvis and total body bone scan in April.  He will follow with my nurse practitioner in the interim.    -1/10/2023 PSA 3.450  -1/11/2023 [facility] Oncology clinic follow-up: Mr. Morfin continues to do well on current therapy with Zytiga, prednisone and Xgeva with no unusual side effects.  He has no new worrisome symptoms.  He is due for his next Lupron injection in February with Dr. Matute.  His PSA continues to trend downward, current PSA from yesterday was 3.450.  We will continue therapy unchanged, he will receive Xgeva today.  Has had no hypocalcemia, his CMP, phosphorus and magnesium are all normal.  We  "will repeat restaging scans in April.  -3/7/2023 PSA 2.460  -3/8/2023 [facility] follow-up:  Mr. Morfin is doing well.  He is tolerating therapy with Zytiga, prednisone and Xgeva with no significant side effects.  He has hot flashes but these are tolerable.  He had Lupron injection 2/24/2023 with Dr. Matute.  His PSA continues to decline, current PSA 2.460.  He will continue therapy unchanged.  We will repeat restaging scans late April prior to return in 2 months and I have ordered those today.  He is working with his PCP Rodrigo Ram on his hypertension.  His b/p today was 160/88.  He was to have increased his losartan but I do not think he has done that yet as he said \"I still have some of my old prescription left\".  -4/4/2023 PSA 2.55  - 4/26/2023 CT chest abdomen pelvis [place] comparison 10/10/2022 showed stable left third rib, T1, left fourth and eighth rib, right seventh rib.  Probable liver cyst.  Few diverticuli.  Mild further decrease in a few of the previously enlarged nodes.  Left external iliac 12 mm compared to 16 mm.  Right common iliac 7 mm stable.  Lower left periaortic 8 mm previously 13 mm.  No new lytic or blastic osseous lesions.  Total body bone scan comparison 10/10/2022, 7/22/2022, and CT 4/26/2023.  No new sites of increased uptake.  1 focal left third rib hotspot consistent with bone metastasis.    -5/2/2023 [facility] follow-up: I reviewed interval notes since I last saw him from my nurse practitioner as outlined above in interval images and reports thereof from [facility] that shows no new sites of bone metastasis and no kizzy or visceral progression.  PSA stabilized around 2.5.  In the absence of symptomatic or radiographic progression, I would be unlikely to change therapy just on the basis of a PSA rise and for now the PSA is stable.  We will continue Zytiga prednisone and Xgeva and he will follow-up with my nurse practitioner 5/30/2023 with repeat " CTs and bone scan in 6 months.  I asked him to check with his primary care today regarding his systolic in the 170s.    -5/31/2023 [facility] Oncology clinic follow-up: Chris continues to do well on current therapy with Zytiga, prednisone and Xgeva along with Lupron that he receives with Dr. Matute.  His PSA remains stable, it was lower this month compared to last month, current PSA 1.940.  Continues to deal with hypertension, on arrival today his blood pressure was 210/92 however this was using a wrist cuff, when I rechecked it manually his blood pressure was 160/90.  Still has room for improvement despite recent increase in his losartan to 100 mg daily.  He will follow-up with Dr. Ram for further management.  We will continue therapy unchanged.  We will repeat restaging scans in October.    -7/26/2023 [facility] Oncology clinic follow-up: Chris overall continues to do well on current therapy with Zytiga, prednisone and Xgeva.  He receives Lupron every 6 months with Dr. Matute and states that is scheduled for August.  PSA from yesterday is pending, PSA on 6/27/2023 was 1.590 which was continuing to decline, in February it was 3.250.  His creatinine this past month has bumped up.  He feels he is staying hydrated but I encouraged him to increase his fluid intake.  I discussed with him that this could be from his hypertension, some of his medications.  His blood pressure today is 165/92.  He has an appointment with his primary care provider Dr. Teri Ram next week and can further discuss with her.  No adjustment needed for Xgeva.  I refilled his prednisone today.  I will see him back in 1 month for follow-up with continued monitoring of his labs.  He may end up needing to see nephrology.  We will repeat restaging scans in October.    -9/29/2023 PSA 1.810  -8/23/2023 [facility] Oncology clinic follow-up: Chris continues on therapy with Zytiga, prednisone and Xgeva, he also receives Lupron every 6 months with   Giovani with most recent given on 8/7/2023.  He is scheduled for a cystoscopy in a few weeks as his last few UAs per the patient's report had microscopic hematuria, he has had no steve hematuria.  His creatinine continues to remain elevated at 1.82, BUN is 40, GFR is 40.  I will get him to nephrology for further evaluation.  Blood pressure today was fairly good at 161/86, he continues to follow with Dr. Ram for management.  PSA on 7/25/2023 was up slightly from prior month, PSA in July was 2.140, in June it was 1.590, PSA from yesterday is pending.  I plan on repeating restaging CT scans and bone scans in October however if his PSA is up significantly then I will do sooner.  I will see him back for follow-up in 1 month.  -8/23/2024 PSA 1.860    -9/20/2023 [facility] follow-up: Chris is tolerating treatment with Zytiga, prednisone and Xgeva.  He is up-to-date with his Lupron injection, last received in August with Dr. Matute.  He had cystoscopy on 9/8/2023 that was normal. I referred him to nephrology when I saw him last in August as his creatinine has been increasing, creatinine yesterday was a little better 1.51, GFR is 50, creatinine clearance 63.8.  He is still awaiting an appointment with nephrology, per our  it will be early November.  We will repeat his restaging scans prior to return and I will do his CT scans without contrast in light of his new renal insufficiency.  We will also get total body bone scan and I have ordered those today.  His PSA yesterday was 1.810.    -10/9/2023 CT chest abdomen Pelvis without contrast compared to April 2023 shows slightly prominent pelvic lymph nodes left external iliac 8 mm compared to prior study.  Right internal iliac heterogenous 19 mm compared to 11 mm prior.  Stable sclerotic bone lesions and no new lesions.  Stable bone scan with no new sites of disease    -10/17/2023 [facility] clinic follow-up: With elevated creatinine, CTs done  without contrast showed very slight increased prominence by few millimeters of some internal iliac and external iliac nodes for which PSMA PET could be done but for now I will check his PSA and have him see my nurse practitioner back in a week and unless the PSA is significantly rising I would continue the Zytiga, prednisone, Xgeva and February dose of Lupron with Dr. Matute and make sure he follows with nephrology.  If his PSA is significantly rising then my nurse practitioner will order PSMA PET.    -10/17/2023 PSA 1.5  -11/14/2023 PSA 1.510  -11/15/2023 [facility] follow-up: Mr. Ferrara continues to do well on current therapy with Zytiga, prednisone, Xgeva and Lupron that he receives with Dr. Matute.  His PSA is stable at 1.5.  Would not change therapy for now, we will plan on repeating restaging scans in February unless PSA starts to rise or he has new concerns.  Overall he feels good.  He is now established with nephrology and will continue to follow with them regarding his renal insufficiency, I reviewed note from consultation with Dr. Armstrong 11/6/2023.    -1/9/2024 PSA 1.050    -1/10/2024 [facility] follow-up: Chris overall continues to tolerate current therapy with Zytiga, prednisone, Xgeva and Lupron.  He states his next Lupron injection with Dr. Matute is due late February.  His PSA is stable at 1.050. Creatinine stable at 1.64, he is following with nephrology, he has a an appointment with Dr. Armstrong in February for follow-up.  Hypertension being managed by his primary care provider, target systolic ideally 120s-140s, today it is running a little high at 172/90, yesterday when he was here for labs it was 138/80.  We will continue therapy unchanged with plans to repeat restaging scans in April.    -2/6/2024 PSA 0.753    -2/7/2024 [facility] clinic follow-up: Chris continues to do well on current therapy with Zytiga, prednisone, Xgeva and Lupron.  He is up-to-date on  Lupron injection next due later this month with Dr. Matute.  His PSA continues to slowly decrease, current PSA is 0.753.  He has no new worrisome symptoms.  We will continue therapy unchanged, I have ordered restaging scans for late March prior to his return in April. He is following with nephrology for his renal insufficiency, his creatinine yesterday was good at 1.21.  I will do his CT scans with contrast unless something changes in the interim. He follows closely with Dr. Ram for management of his hypertension. I reviewed notes from his last office visit with her earlier this month on 2/1/2024, also reviewed labs in detail with the patient today.  All questions were answered to his satisfaction.    -3/5/2024 PSA slowly declining 0.73.  Creatinine fluctuating.  1.69 with GFR 43 otherwise unremarkable CMP.  Phosphorus low 2.1    -4/8/2024 CT chest, abdomen and pelvis shows stable sclerotic lesions in the thoracic spine and bilateral ribs and right acetabulum.  Stable appearance of pelvic lymph nodes.  No new sites concerning for progression of disease.  Total body bone scan shows stable diffuse increased uptake of the left third rib correlates with diffuse sclerosis and enlargement on CT, no new sites of active osseous lesions.  -4/30/2024 PSA 0.713  -5/1/2024 [facility] clinic follow-up: Chris continues to do well on current therapy with Zytiga, prednisone, Xgeva and Lupron.  He receives his Lupron every 6 months with Dr. Matute, last administered 2/5/2024.  Current CT scans and bone scan showed no progression of disease, PSA is stable at 0.713.  His CBC is unremarkable, CMP shows creatinine stable at 1.50 otherwise normal.  He does follow with nephrology.  We will continue treatment unchanged.  I will see him back in 2 months for follow-up.  We will repeat restaging scans in 6 months unless he has any new symptoms or significant change in his PSA.    -6/25/2024 PSA 0.623  -6/26/2024 [facility]  clinic follow-up: Chris overall continues to tolerate current therapy with Zytiga, prednisone, Xgeva and Lupron.  We did call to confirm his next Lupron injection and that is scheduled for late July with Dr. Matute.  His PSA for the most part is stable at 0.623, we will continue to monitor.  He has no new worrisome symptoms.  I did discuss with him today his creatinine which has increased to 1.86, BUN was 32.  Reminded him to be sure and stay hydrated.  He does follow with nephrology and his next appointment is in August.  He is on Xgeva however there is no indication for any adjustment, his creatinine clearance is greater than 30.  Blood pressure today was under good control at 130/78.  We will continue treatment unchanged.  I will see him back in 1 month for follow-up.  Plan to repeat restaging scans in October unless his PSA rises or he has worrisome symptoms.    -7/23/2024 PSA 0.606    -9/17/2024 PSA 0.455  -9/18/2024 [facility] clinic follow-up: Chris is doing well overall.  Tolerates therapy with Zytiga, prednisone, Xgeva and Lupron.  He last received Lupron in August with Dr. Matute.  His PSA had been slowly creeping up however it has now went back down and is currently 0.455.  He will continue treatment unchanged.  We will repeat CT chest, abdomen and pelvis prior to return and I will do that without contrast in light of his renal insufficiency.  Current creatinine is stable at 1.5, he does follow with nephrology.  We will also repeat total body bone scan.  We will see him back in 1 months for follow-up and sooner if any concerns.  I did discuss with him that he can take Tylenol if needed or use topical over-the-counter pain relief ointment such as Biofreeze etc. for his back pain but thus far it has not required any intervention, he does not like to take pain medications.    -10/14/2024 CT chest abdomen pelvis without contrast [place] [facility] compared to August 2024 shows unchanged sclerotic T1  vertebral body with expansile sclerotic left third rib without fracture and other small sclerotic nodules all unchanged.  Right external iliac node 20 mm increased from 7 mm on prior exam with internal iliac node 15 mm unchanged.  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    -10/22/2024 [facility] follow-up: Feeling great.  While he has 1 slightly larger node, given that his PSA has not been rapidly rising and all other disease is stable I will have him continue Zytiga prednisone Xgeva and he gets his next Lupron in February with Dr. Matute.  He will see my nurse practitioner and she will order repeat CT chest abdomen pelvis and bone scan for January and will follow-up with her in January to go over those results.  If either his PSA or the scans show progression beyond just the single slightly larger iliac node, she will order a PSMA PET and then get him back to me.  If the PSA is stable and the subsequent scans are stable then we will just continue his current regimen with quarterly imaging or sooner as symptoms or PSA dictate.    -11/19/2024 PSA 0.453  -1/14/2025 PSA 0.351  -1/13/2025 received Lupron with Dr. Matute  -2/3/2025 total body bone scan shows overall stable, 1 new site involving the left costovertebral junction of the left sixth rib with no definite CT correlate and favored to represent degenerative/traumatic changes.  CT chest, abdomen and pelvis with no evidence of disease progression in the chest, unchanged expansile sclerotic left third rib mass and sclerotic T1 vertebral body focus.  Abdomen and pelvis with mild interval increase in bilateral external iliac lymph nodes, unchanged right internal iliac lymph node, for reference right external iliac lymph node now measuring up to 28 mm compared to 20 mm on prior, left external iliac lymph node now measuring up to 20 mm compared to 10 mm on prior.  Right internal  iliac 15 mm stable.  -2/11/2025 PSA 0.364  -2/12/2025 [facility] follow-up: Chris overall is tolerating current therapy with Zytiga, prednisone and Xgeva monthly, he receives Lupron with Dr. Matute.  He last received Lupron 1/13/2025, next Lupron due in June.  His PSA is stable at 0.364.  Current restaging scans on 2/3/2025 show 1 new site in the left costovertebral junction of the left sixth rib, this is favored to represent degenerative/traumatic change, he does not recall any trauma to this area.  He is having no pain in this area.  He still has intermittent pain in his right mid back that is worse typically when he is lying down.  He does have a sclerotic lesion at T1, I will go ahead and get an MRI of his thoracic spine just to make sure this is stable.  There has been a mild increase in the bilateral external iliac nodes and the right internal iliac node is stable.  Overall due to the stability of his PSA, his tolerability of current treatment and his desire not to pursue chemotherapy we will continue treatment unchanged.  We will repeat restaging scans in 3 months for follow-up.  If he has progressive new disease or worsening symptoms we would then want to get PSMA PET scan for evaluation, I think he would consider possibly Pluvicto down the road.  We will see him back in 1 month for follow-up to go over his MRI and continue current therapy.     -3/11/2025 [facility] follow-up: Tolerating Zytiga prednisone and Xgeva.  Next Lupron in June with Dr. Matute.  PSA staying low.  Patient could not tolerate MRI relative to his back pain due to claustrophobia.  Minimal back pain.  No leg weakness.  If his PSA rises we will get PSMA PET otherwise we will repeat his bone scan and CAT scans mid-May.    - 4/15/2025 PSA 0.302  -4/16/2025 [facility] follow-up: Chris currently is feeling well however he did have a concerning acute illness several weeks ago when he woke up and  reports that he was incontinent of urine and stool and felt dizzy, he was then fatigued for about 3 days.  He did not seek emergency attention or any follow-up with PCP, he did not notify any healthcare provider.  I am not sure if he had a viral illness, we discussed that he could have had a seizure.  I stressed the importance of seeking medical attention if this happened again but he honestly admits that he most likely would not. His labs from yesterday look good, CBC is unremarkable, CMP with stable renal insufficiency, creatinine 1.57.  His PSA looks good at 0.302.  Blood pressure is running a little high at 157/94, he will follow-up with Dr. Ram, also has seen nephrology Dr. Armstrong.  He last received Lupron with Dr. Matute in January.  We will continue treatment unchanged.  We will repeat restaging CT chest, abdomen and pelvis without contrast and total body bone scan late May and I have ordered those today.  We will see him back for follow-up after his scans to go over those results and further plan of care.    - 5/13/2025 PSA 0.306 CBC and CMP unremarkable save for creatinine stable 1.52 glucose 140.    - 5/27/2025 total body bone scan compared to February 2025 shows single left third rib osseous metastasis exhibiting treatment response.  CT chest abdomen pelvis without contrast compared to February 2025 shows no change in the bony lesions within T1 or left third rib and improvement in the adenopathy along the left pelvic sidewall with stable lymph nodes right pelvic sidewall.  Abdomen pelvis otherwise unchanged.    - 6/10/2025 [facility] clinic follow-up: PSA staying down.  Tolerating Zytiga prednisone and Xgeva.  Next Lupron July with Dr. Matute.  Next Xgeva June.  Practitioner June 9.  Check PSA quarterly check total body bone scan and noncontrast CT chest abdomen pelvis in December 25.  Follow-up with Dr. Armstrong on his renal insufficiency.     Prostate cancer metastatic to multiple sites  "  3/15/2022 Initial Diagnosis    Prostate cancer metastatic to multiple sites (HCC)     3/15/2022 Cancer Staged    Staging form: Prostate, AJCC 8th Edition  - Clinical: Stage IVB (cT1c, cN1, cM1b, Grade Group: 5) - Signed by Fritz Goodson MD on 3/15/2022     4/7/2022 - 6/23/2022 Chemotherapy    Stopped after cycle 4 6/23/2022 due to syncope 3 days post infusions with negative cardiac work-up  OP PROSTATE DOCEtaxel     4/7/2022 -  Chemotherapy    OP SUPPORTIVE Denosumab (Xgeva) Q28D     7/26/2022 -  Chemotherapy    OP PROSTATE Abiraterone / PredniSONE           HISTORY OF PRESENT ILLNESS:  The patient is a 70 y.o. male, here for follow up on management of metastatic prostate cancer responded well to chemotherapy and now on Zytiga prednisone and Lupron Xgeva    Past Medical History:   Diagnosis Date   • Cancer    • Prostate cancer      Past Surgical History:   Procedure Laterality Date   • PROSTATE BIOPSY  02/2022    dr. sue       No Known Allergies    Family History and Social History reviewed and changed as necessary    REVIEW OF SYSTEM:   No new somatic complaints    PHYSICAL EXAM:  No jaundice icterus or pallor.  No respiratory distress.    Vitals:    06/10/25 0917   BP: 169/98   Pulse: 68   Resp: 18   Temp: 97.1 °F (36.2 °C)   SpO2: 98%   Weight: 122 kg (268 lb)   Height: 180.3 cm (71\")     Vitals:    06/10/25 0917   PainSc: 0-No pain          ECOG score: 0           Vitals reviewed.    ECOG: (0) Fully Active - Able to Carry On All Pre-disease Performance Without Restriction    Lab Results   Component Value Date    HGB 13.3 05/13/2025    HCT 41.0 05/13/2025    MCV 87.8 05/13/2025     05/13/2025    WBC 8.08 05/13/2025    NEUTROABS 5.22 05/13/2025    LYMPHSABS 1.95 05/13/2025    MONOSABS 0.62 05/13/2025    EOSABS 0.19 05/13/2025    BASOSABS 0.06 05/13/2025       Lab Results   Component Value Date    GLUCOSE 140 (H) 05/13/2025    BUN 31 (H) 05/13/2025    CREATININE 1.52 (H) 05/13/2025     " 05/13/2025    K 4.3 05/13/2025     05/13/2025    CO2 23.2 05/13/2025    CALCIUM 9.8 05/13/2025    PROTEINTOT 7.3 05/13/2025    ALBUMIN 4.4 05/13/2025    BILITOT 0.4 05/13/2025    ALKPHOS 80 05/13/2025    AST 16 05/13/2025    ALT 10 05/13/2025             ASSESSMENT & PLAN:  1.  Prostate cancer clinical T1c and 2M1B stage IVb treated with 4 cycles of Taxotere, stopped due to syncope with negative cardiac work-up, with marked drop in PSA of 18.6 from over 900 at baseline, continued on Zytiga and prednisone.  2.  Urinary retention with Goodwin in place 1/24/2022.  On finasteride 1/24/2022.  3.  Covid 19 December 2021  4.  Hypertension  5.  Stage 3a chronic kidney disease    Oncology history timeline:  -11/29/2021  with symptoms of urinary retention.  -2/15/2022 prostate biopsy adenocarcinoma grade group 5 and 3 out of 12 cores, grade group 4 in 1 out of 12 cores, grade group 3 in 8 out of 12 cores.  -2/24/2022 CT chest abdomen pelvis and total body bone scan shows left third rib, right acetabulum, periaortic and retroperitoneal kizzy metastases complaining of pain in the left chest and right hip.  Also possible sclerotic left 10th rib on CT.  Spleen mildly enlarged 13.8 cm.  Hepatic steatosis.  Small liver cyst.  Fat stranding in the periaortic and mesenteric region consistent with reactive/inflammatory stranding.  Multiple enlarged periaortic and retroperitoneal nodes and lymphadenopathy largest 4.9 cm.  CT chest describes tiny sclerotic foci in left eighth rib and right lateral seventh rib and T1.  Multiple small mediastinal and bilateral axillary nodes seen with small hypodense left thyroid nodule.  Creatinine 1.7.  -3/4/2022 office note Dr. Rolando Matute: Patient was seen after his elevated PSA by Dr. Vera and prostate biopsy scheduled.  However, he developed COVID-19 and this was canceled and the patient did not reschedule due to lack of insurance.  Saw Dr. Matute back on 1/24/2022 with plans to  get staging CTs and bone scan with results as outlined above.  Started Casodex and to return on 3/11/2022 for Lupron.  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  TURP planned for urinary retention symptoms.    -3/15/2022 [facility] initial consultation: I reviewed the above data with the patient.  With his fairly bulky retroperitoneal adenopathy and bone metastases including extra axial metastases, he would fit the data from the CHAARTED trial for which Taxotere x6 followed by Zytiga prednisone along with the planned Lupron already being planned by Dr. Matute would be reasonable.  Equally reasonable in terms of comparisons with bicalutamide and Lupron would be Zytiga prednisone plus Lupron or Xtandi plus Lupron or apalutamide plus Lupron.  None of these have been compared head-to-head but all have beaten combined androgen deprivation therapy with bicalutamide and Lupron.  At the age of 67 and in order to have as many bullets as possible down the road and hence saving some of the hormone blockade options for later and using chemotherapy when he is younger and healthier for a more time-limited.,  He has opted for the Taxotere x6 followed by Zytiga and prednisone.  We will also give him Xgeva to improve bone health and given metastatic disease, as with all metastatic prostate cancer patients, he needs genetic counseling both for his sake as well as his family's.  If he were to have BRCA1 or other such  mutations, then that also opens up the options for PARP inhibitors etc. down the road.  He has his TURP scheduled for 2/24/2022 and we will start treatment a couple of weeks after that and he will get chemo preparation visit in the meantime and I will get capital surgeons to place a port.  We will check his PSA after his TURP when he sees my nurse practitioner back early April for the start of chemotherapy and repeat the PSA and CT chest abdomen  pelvis and bone scan after the Taxotere is complete.    -3/31/2022 chemotherapy education and needs assessment completed    -4/7/2022 began docetaxel and Xgeva.  .0.  We will do his Xgeva every 6 weeks to coordinate with his docetaxel infusions.    -4/19/2022 patient stopped Casodex    -5/17/2022 patient reported seeing his PCP for an abscess in his suprapubic area.  Placed on clindamycin, will delay treatment 1 week.    -5/25/2022 PSA 34.3  -5/26/2022 [facility] [place] Oncology clinic follow-up: Chris has an abscess in the suprapubic area, it has improved on antibiotic therapy but I am concerned if we treat him it will just flare back up unless it is drained.  I will get him to Dr. Miller who placed his port for I&D and culture.  He is on clindamycin and states he completes course of treatment tomorrow.  I will delay his treatment with Taxotere 1 more week.  We will give his Xgeva today.  I will see him back in 1 week for follow-up to assess whether or not we can resume his Taxotere.  His PSA has dropped nicely with treatment thus far, PSA yesterday was 34.3 which is down from a PSA of 259.0 when we started treatment, it has been as high as 911 in November.    -6/2/2022 [facility] [place] Oncology clinic follow-up: Chris went to see Dr. Miller to have his abscess lanced however apparently there was a long wait and he left without being seen.  He did call his PCP and was given 5 more days of clindamycin and the abscess now seems to have resolved.  We discussed today that he is at risk for recurrence of infection due to immunocompromise state with chemotherapy.  He will notify us if he has any return of his abscess.  He does have intertrigo under his panniculus, I have advised him to keep this area dry, to apply topical drying/antifungal powders.  Also recommended looser clothing.  His counts are adequate to continue therapy, we will resume Taxotere today with cycle #3.  We will repeat restaging scans after 6 courses.  We are  doing Xgeva every 6 weeks to coordinate with his Taxotere.  Once he finishes Taxotere we can then go back to every 4 weeks.  His next Xgeva is not due until 7/14/2022.  His calcium is running a little low, he is going to  calcium supplement.    -6/23/2022 [facility] Oncology clinic follow-up: Chris huerta is doing well on treatment with Taxotere.  Labs reviewed from yesterday and are unremarkable.  We will continue treatment today unchanged.  His suprapubic abscess resolved on clindamycin.  He will continue to use drying powder/antifungal powder under his panniculus for intertrigo.  Today will be cycle #4 of a planned 6 courses of Taxotere.  He is also on Xgeva, next dose due 7/14/2022, we are doing this every 6 weeks for now to line up with his chemotherapy, can go back to every 4 weeks after he finishes Taxotere.  Calcium from CMP yesterday was normal.    -7/13/2022 [facility] Oncology clinic follow-up: Mr. Mofrin has had issues around day 3 after each treatment ranging from chest pain after his first treatment for which he did not seek medical attention, fatigue after the next few treatments, but 3 days after his most recent treatment on 6/23/2022 he had a syncopal episode at home and once again did not seek medical attention.  I discussed with him that this was not acceptable, if he has occurrences like this someone needs to call 911, it is difficult to figure out what is going on after the fact, the best time to work this up would be during the occurrence.  For now we will get labs today with CBC, CMP, PSA.  I will refer him to cardiology for further evaluation.  We will hold Taxotere most likely, I will see him tomorrow to go over his lab results.  I will also repeat his restaging scans with CT chest, abdomen and pelvis and will include CT of the head in light of his syncopal episode.      -7/13/2022 PSA 18.6    -7/14/2022 [facility] Oncology clinic follow-up: Mr. Morfin returns today to review his labs from  yesterday.  Labs are unremarkable.  PSA down to 18.6 from a high of 259.0 in April prior to starting treatment.  CBC and CMP unremarkable, magnesium is normal at 2.3, phosphorus slightly low at 2.4.  We will hold therapy for now in light of his syncopal episode on day 3 after his last treatment and prior episodes with chest pain and severe fatigue that all occurred on day 3 after his Taxotere.  We will restage him with CT head, chest, abdomen and pelvis (I am including the head in light of his syncopal episode).  I have also referred him to cardiology, he states that he received a call from them about making an appointment however he wanted to talk to us today first.  I emphasized the importance to him of making and keeping that appointment and he states understanding.  I also once again emphasized the requirement to seek emergency medical attention if he has any episodes in the future such as chest pain or syncopal events.  Whether or not we try and complete Taxotere with 2 more courses or move to the next line of therapy will be decided when he returns to discuss with Dr. Goodson and review his restaging scans.    -7/15/2022 saw Dr. Diaz cardiologist.  For syncope he ordered echocardiogram and 48-hour Holter monitor.    -7/15/2022 Holter monitor relatively benign.    -7/22/2022 CT brain with and without contrast negative.  CT chest abdomen and pelvis shows some nonspecific cecal wall thickening.  No progression in the chest.  T1 and ribs stable.  Decrease in multiple retroperitoneal and pelvic nodes.  Slight increase in right acetabular sclerotic lesion.  Persistent but improved circumferential bladder wall thickening.  Total body bone scan shows stable left third rib and no evidence of new bone metastases.    -7/19/2022 ejection fraction 65% with grade 1 diastolic dysfunction.    -7/26/2022 [facility] oncology clinic follow-up: Given presyncopal symptoms occurred 3 days after Taxotere and has not otherwise occurred  any other time and his cardiology work-up is fairly unremarkable and his disease is under good control as witnessed by the report above of the CTs of head chest abdomen pelvis and bone scan, I will hold cycle 5 and 6 of Taxotere and continue 1 now with Zytiga and prednisone.  With reference to the coincidental cecal wall thickening found on CT, we will get him to [facility] surgeons for colonoscopy.  He will see my nurse practitioner back in August for next cycle of Abiraterone prednisone.  He will continue his Lupron with Dr. Matute.  We will continue his Xgeva.  We will repeat his CT chest abdomen pelvis and bone scan again in 3 months.  He will see my nurse practitioner in the interim.    -8/23/2022 [facility] clinic follow-up: No further presyncopal symptoms.  Feeling great other than for hot flashes.  Did commend for now we will continue on with his Zytiga prednisone and he will get his Xgeva today based on labs from 8/10/2022.  He opted out of getting the colonoscopy that I recommended and he will not change his mind.  He will continue getting the Lupron with Dr. Matute and I asked him to give the date of this appointment to my nurse when he sees her back in a month.  We will give his Xgeva today.  We will get CT chest abdomen pelvis and total body bone scan towards the middle to end of October.  Presently other than for the hot flashes feels great.    -9/20/2022 PSA 8.320    -9/22/2022 [facility] clinic follow-up: Chris is doing well on Zytiga, prednisone along with Xgeva.  Labs reviewed from 9/20/2022 are unremarkable, CBC was normal, CMP with creatinine of 1.34 otherwise normal, this is stable from his baseline.  PSA stable at 8.320.  He received Lupron recently with Dr. Matute, he thinks last week or so, states his next appointment is in February.  He will continue treatment unchanged.  We will repeat restaging CT chest, abdomen and pelvis and total body bone scan in October prior to return  and I have ordered those today.  We will also repeat his PSA.    -9/22/2022 CT chest abdomen pelvisCompared to July 2022 [facility] showed no evidence of disease progression in the chest with no substantial sclerotic bony lesions and decrease in size of retroperitoneal and pelvic nodes with persistent cecal wall thickening and suboptimal bladder distention.  Total body bone scan showed decrease in previously seen uptake in the left third rib with diffuse sclerosis representing treatment response with no new foci.    -10/18/2022 [facility] oncology follow-up: I reviewed bone scan and CT reports as above with no evidence of progression.  Tolerating Zytiga prednisone and Xgeva.  Getting Lupron regularly with Dr. Matute next appointment coming in February.  We will repeat his CT chest abdomen pelvis and total body bone scan in April.  He will follow with my nurse practitioner in the interim.    -1/10/2023 PSA 3.450  -1/11/2023 [facility] Oncology clinic follow-up: Mr. Morfin continues to do well on current therapy with Zytiga, prednisone and Xgeva with no unusual side effects.  He has no new worrisome symptoms.  He is due for his next Lupron injection in February with Dr. Matute.  His PSA continues to trend downward, current PSA from yesterday was 3.450.  We will continue therapy unchanged, he will receive Xgeva today.  Has had no hypocalcemia, his CMP, phosphorus and magnesium are all normal.  We will repeat restaging scans in April.  -3/7/2023 PSA 2.460  -3/8/2023 [facility] Oncology clinic follow-up:  Mr. Morfin is doing well.  He is tolerating therapy with Zytiga, prednisone and Xgeva with no significant side effects.  He has hot flashes but these are tolerable.  He had Lupron injection 2/24/2023 with Dr. Matute.  His PSA continues to decline, current PSA 2.460.  He will continue therapy unchanged.  We will repeat restaging scans late April prior to return in 2 months and I have ordered those today.  He  "is working with his PCP Rodrigo Ram on his hypertension.  His b/p today was 160/88.  He was to have increased his losartan but I do not think he has done that yet as he said \"I still have some of my old prescription left\".  -4/4/2023 PSA 2.55  - 4/26/2023 CT chest abdomen pelvis [facility] comparison 10/10/2022 showed stable left third rib, T1, left fourth and eighth rib, right seventh rib.  Probable liver cyst.  Few diverticuli.  Mild further decrease in a few of the previously enlarged nodes.  Left external iliac 12 mm compared to 16 mm.  Right common iliac 7 mm stable.  Lower left periaortic 8 mm previously 13 mm.  No new lytic or blastic osseous lesions.  Total body bone scan comparison 10/10/2022, 7/22/2022, and CT 4/26/2023.  No new sites of increased uptake.  1 focal left third rib hotspot consistent with bone metastasis.    -5/2/2023 [facility] follow-[facility]: I reviewed interval notes since I last saw him from my nurse practitioner as outlined above in interval images and reports thereof from [facility] that shows no new sites of bone metastasis and no kizzy or visceral progression.  PSA stabilized around 2.5.  In the absence of symptomatic or radiographic progression, I would be unlikely to change therapy just on the basis of a PSA rise and for now the PSA is stable.  We will continue Zytiga prednisone and Xgeva and he will follow-up with my nurse practitioner 5/30/2023 with repeat CTs and bone scan in 6 months.  I asked him to check with his primary care today regarding his systolic in the 170s.    -5/31/2023 [facility] follow-up: Chris continues to do well on current therapy with Zytiga, prednisone and Xgeva along with Lupron that he receives with Dr. Matute.  His PSA remains stable, it was lower this month compared to last month, current PSA 1.940.  Continues to deal with hypertension, on arrival today his blood pressure was 210/92 however this was using a wrist cuff, when " I rechecked it manually his blood pressure was 160/90.  Still has room for improvement despite recent increase in his losartan to 100 mg daily.  He will follow-up with Dr. Ram for further management.  We will continue therapy unchanged.  We will repeat restaging scans in October.    -7/26/2023 [facility] follow-up: Chris overall continues to do well on current therapy with Zytiga, prednisone and Xgeva.  He receives Lupron every 6 months with Dr. Matute and states that is scheduled for August.  PSA from yesterday is pending, PSA on 6/27/2023 was 1.590 which was continuing to decline, in February it was 3.250.  His creatinine this past month has bumped up.  He feels he is staying hydrated but I encouraged him to increase his fluid intake.  I discussed with him that this could be from his hypertension, some of his medications.  His blood pressure today is 165/92.  He has an appointment with his primary care provider Dr. Teri Ram next week and can further discuss with her.  No adjustment needed for Xgeva.  I refilled his prednisone today.  I will see him back in 1 month for follow-up with continued monitoring of his labs.  He may end up needing to see nephrology.  We will repeat restaging scans in October.    -9/29/2023 PSA 1.810  -8/23/2023 [facility] clinic follow-up: Chris continues on therapy with Zytiga, prednisone and Xgeva, he also receives Lupron every 6 months with Dr. Matute with most recent given on 8/7/2023.  He is scheduled for a cystoscopy in a few weeks as his last few UAs per the patient's report had microscopic hematuria, he has had no steve hematuria.  His creatinine continues to remain elevated at 1.82, BUN is 40, GFR is 40.  I will get him to nephrology for further evaluation.  Blood pressure today was fairly good at 161/86, he continues to follow with Dr. Ram for management.  PSA on 7/25/2023 was up slightly from prior month, PSA in July was 2.140, in June it  was 1.590, PSA from yesterday is pending.  I plan on repeating restaging CT scans and bone scans in October however if his PSA is up significantly then I will do sooner.  I will see him back for follow-up in 1 month.  -8/23/2024 PSA 1.860    -9/20/2023 [facility] follow[facility]: Chris is tolerating treatment with Zytiga, prednisone and Xgeva.  He is up-to-date with his Lupron injection, last received in August with Dr. Matute.  He had cystoscopy on 9/8/2023 that was normal. I referred him to nephrology when I saw him last in August as his creatinine has been increasing, creatinine yesterday was a little better 1.51, GFR is 50, creatinine clearance 63.8.  He is still awaiting an appointment with nephrology, per our  it will be early November.  We will repeat his restaging scans prior to return and I will do his CT scans without contrast in light of his new renal insufficiency.  We will also get total body bone scan and I have ordered those today.  His PSA yesterday was 1.810.    -10/9/2023 CT chest abdomen Pelvis without contrast compared to April 2023 shows slightly prominent pelvic lymph nodes left external iliac 8 mm compared to prior study.  Right internal iliac heterogenous 19 mm compared to 11 mm prior.  Stable sclerotic bone lesions and no new lesions.  Stable bone scan with no new sites of disease    -10/17/2023 [facility] follow-up: With elevated creatinine, CTs done without contrast showed very slight increased prominence by few millimeters of some internal iliac and external iliac nodes for which PSMA PET could be done but for now I will check his PSA and have him see my nurse practitioner back in a week and unless the PSA is significantly rising I would continue the Zytiga, prednisone, Xgeva and February dose of Lupron with Dr. Matute and make sure he follows with nephrology.  If his PSA is significantly rising then my nurse practitioner will order PSMA  PET.    -10/17/2023 PSA 1.5  -11/14/2023 PSA 1.510  -11/15/2023 [facility] clinic follow-up: Mr. Ferrara continues to do well on current therapy with Zytiga, prednisone, Xgeva and Lupron that he receives with Dr. Matute.  His PSA is stable at 1.5.  Would not change therapy for now, we will plan on repeating restaging scans in February unless PSA starts to rise or he has new concerns.  Overall he feels good.  He is now established with nephrology and will continue to follow with them regarding his renal insufficiency, I reviewed note from consultation with Dr. Armstrong 11/6/2023.    -1/9/2024 PSA 1.050    -1/10/2024 [facility] clinic follow-up: Chris overall continues to tolerate current therapy with Zytiga, prednisone, Xgeva and Lupron.  He states his next Lupron injection with Dr. Matute is due late February.  His PSA is stable at 1.050. Creatinine stable at 1.64, he is following with nephrology, he has a an appointment with Dr. Armstrong in February for follow-up.  Hypertension being managed by his primary care provider, target systolic ideally 120s-140s, today it is running a little high at 172/90, yesterday when he was here for labs it was 138/80.  We will continue therapy unchanged with plans to repeat restaging scans in April.    -2/6/2024 PSA 0.753    -2/7/2024 [facility] onc[facility] clinic follow-up: Chris continues to do well on current therapy with Zytiga, prednisone, Xgeva and Lupron.  He is up-to-date on Lupron injection next due later this month with Dr. Matute.  His PSA continues to slowly decrease, current PSA is 0.753.  He has no new worrisome symptoms.  We will continue therapy unchanged, I have ordered restaging scans for late March prior to his return in April. He is following with nephrology for his renal insufficiency, his creatinine yesterday was good at 1.21.  I will do his CT scans with contrast unless something changes in the interim. He follows closely with Dr. Ram for management  of his hypertension. I reviewed notes from his last office visit with her earlier this month on 2/1/2024, also reviewed labs in detail with the patient today.  All questions were answered to his satisfaction.    -3/5/2024 PSA slowly declining 0.73.  Creatinine fluctuating.  1.69 with GFR 43 otherwise unremarkable CMP.  Phosphorus low 2.1    -4/8/2024 CT chest, abdomen and pelvis shows stable sclerotic lesions in the thoracic spine and bilateral ribs and right acetabulum.  Stable appearance of pelvic lymph nodes.  No new sites concerning for progression of disease.  Total body bone scan shows stable diffuse increased uptake of the left third rib correlates with diffuse sclerosis and enlargement on CT, no new sites of active osseous lesions.  -4/30/2024 PSA 0.713  -5/1/2024 [facility] follow[facility]: Chris continues to do well on current therapy with Zytiga, prednisone, Xgeva and Lupron.  He receives his Lupron every 6 months with Dr. Matute, last administered 2/5/2024.  Current CT scans and bone scan showed no progression of disease, PSA is stable at 0.713.  His CBC is unremarkable, CMP shows creatinine stable at 1.50 otherwise normal.  He does follow with nephrology.  We will continue treatment unchanged.  I will see him back in 2 months for follow-up.  We will repeat restaging scans in 6 months unless he has any new symptoms or significant change in his PSA.    -6/25/2024 PSA 0.623  -6/26/2024 [facility] follow[facility]: Chris overall continues to tolerate current therapy with Zytiga, prednisone, Xgeva and Lupron.  We did call to confirm his next Lupron injection and that is scheduled for late July with Dr. Matute.  His PSA for the most part is stable at 0.623, we will continue to monitor.  He has no new worrisome symptoms.  I did discuss with him today his creatinine which has increased to 1.86, BUN was 32.  Reminded him to be sure and stay hydrated.  He does follow with nephrology and his next  appointment is in August.  He is on Xgeva however there is no indication for any adjustment, his creatinine clearance is greater than 30.  Blood pressure today was under good control at 130/78.  We will continue treatment unchanged.  I will see him back in 1 month for follow-up.  Plan to repeat restaging scans in October unless his PSA rises or he has worrisome symptoms.    -7/23/2024 PSA 0.606    -9/17/2024 PSA 0.455  -9/18/2024 [facility] follow-up: Chris is doing well overall.  Tolerates therapy with Zytiga, prednisone, Xgeva and Lupron.  He last received Lupron in August with Dr. Matute.  His PSA had been slowly creeping up however it has now went back down and is currently 0.455.  He will continue treatment unchanged.  We will repeat CT chest, abdomen and pelvis prior to return and I will do that without contrast in light of his renal insufficiency.  Current creatinine is stable at 1.5, he does follow with nephrology.  We will also repeat total body bone scan.  We will see him back in 1 months for follow-up and sooner if any concerns.  I did discuss with him that he can take Tylenol if needed or use topical over-the-counter pain relief ointment such as Biofreeze etc. for his back pain but thus far it has not required any intervention, he does not like to take pain medications.    -10/14/2024 CT chest abdomen pelvis without contrast [facility] compared to August 2024 shows unchanged sclerotic T1 vertebral body with expansile sclerotic left third rib without fracture and other small sclerotic nodules all unchanged.  Right external iliac node 20 mm increased from 7 mm on prior exam with internal iliac node 15 mm unchanged.  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    -10/22/2024 [facility] follow-up: Feeling great.  While he has 1 slightly larger node, given that his PSA has not been rapidly rising and all  other disease is stable I will have him continue Zytiga prednisone Xgeva and he gets his next Lupron in February with Dr. Matute.  He will see my nurse practitioner and she will order repeat CT chest abdomen pelvis and bone scan for January and will follow-up with her in January to go over those results.  If either his PSA or the scans show progression beyond just the single slightly larger iliac node, she will order a PSMA PET and then get him back to me.  If the PSA is stable and the subsequent scans are stable then we will just continue his current regimen with quarterly imaging or sooner as symptoms or PSA dictate.    -11/19/2024 PSA 0.453  -1/14/2025 PSA 0.351  -1/13/2025 received Lupron with Dr. Matute  -2/3/2025 total body bone scan shows overall stable, 1 new site involving the left costovertebral junction of the left sixth rib with no definite CT correlate and favored to represent degenerative/traumatic changes.  CT chest, abdomen and pelvis with no evidence of disease progression in the chest, unchanged expansile sclerotic left third rib mass and sclerotic T1 vertebral body focus.  Abdomen and pelvis with mild interval increase in bilateral external iliac lymph nodes, unchanged right internal iliac lymph node, for reference right external iliac lymph node now measuring up to 28 mm compared to 20 mm on prior, left external iliac lymph node now measuring up to 20 mm compared to 10 mm on prior.  Right internal iliac 15 mm stable.  -2/11/2025 PSA 0.364  -2/12/2025 [facility] follow-up: Chris overall is tolerating current therapy with Zytiga, prednisone and Xgeva monthly, he receives Lupron with Dr. Matute.  He last received Lupron 1/13/2025, next Lupron due in June.  His PSA is stable at 0.364.  Current restaging scans on 2/3/2025 show 1 new site in the left costovertebral junction of the left sixth rib, this is favored to represent degenerative/traumatic change, he does not recall any  trauma to this area.  He is having no pain in this area.  He still has intermittent pain in his right mid back that is worse typically when he is lying down.  He does have a sclerotic lesion at T1, I will go ahead and get an MRI of his thoracic spine just to make sure this is stable.  There has been a mild increase in the bilateral external iliac nodes and the right internal iliac node is stable.  Overall due to the stability of his PSA, his tolerability of current treatment and his desire not to pursue chemotherapy we will continue treatment unchanged.  We will repeat restaging scans in 3 months for follow-up.  If he has progressive new disease or worsening symptoms we would then want to get PSMA PET scan for evaluation, I think he would consider possibly [place] down the road.  We will see him back in 1 month for follow-up to go over his MRI and continue current therapy.     -3/11/2025 [facility] follow-up: Tolerating Zytiga prednisone and Xgeva.  Next Lupron in June with Dr. Matute.  PSA staying low.  Patient could not tolerate MRI relative to his back pain due to claustrophobia.  Minimal back pain.  No leg weakness.  If his PSA rises we will get PSMA PET otherwise we will repeat his bone scan and CAT scans mid-May.    - 4/15/2025 PSA 0.302  -4/16/2025 [facility] follow-up: Chris currently is feeling well however he did have a concerning acute illness several weeks ago when he woke up and reports that he was incontinent of urine and stool and felt dizzy, he was then fatigued for about 3 days.  He did not seek emergency attention or any follow-up with PCP, he did not notify any healthcare provider.  I am not sure if he had a viral illness, we discussed that he could have had a seizure.  I stressed the importance of seeking medical attention if this happened again but he honestly admits that he most likely would not. His labs from yesterday look good, CBC is unremarkable, CMP with  stable renal insufficiency, creatinine 1.57.  His PSA looks good at 0.302.  Blood pressure is running a little high at 157/94, he will follow-up with Dr. Ram, also has seen nephrology Dr. Armstrong.  He last received Lupron with Dr. Matute in January.  We will continue treatment unchanged.  We will repeat restaging CT chest, abdomen and pelvis without contrast and total body bone scan late May and I have ordered those today.  We will see him back for follow-up after his scans to go over those results and further plan of care.    - 5/13/2025 PSA 0.306 CBC and CMP unremarkable save for creatinine stable 1.52 glucose 140.    - 5/27/2025 total body bone scan compared to February 2025 shows single left third rib osseous metastasis exhibiting treatment response.  CT chest abdomen pelvis without contrast compared to February 2025 shows no change in the bony lesions within T1 or left third rib and improvement in the adenopathy along the left pelvic sidewall with stable lymph nodes right pelvic sidewall.  Abdomen pelvis otherwise unchanged.    - 6/10/2025 [facility] follow-up: PSA staying down.  Tolerating Zytiga prednisone and Xgeva.  Next Lupron July with Dr. Matute.  Next Xgeva June.  Practitioner June 9.  Check PSA quarterly check total body bone scan and noncontrast CT chest abdomen pelvis in December 25.  Follow-up with Dr. Armstrong on his renal insufficiency.    Total time of care today inclusive of time spent today prior to patient's arrival reviewing interval data and during visit translating to patient putting forth as outlined above and after visit instituting this plan took 45 minutes patient care time throughout the day today.  Fritz Goodson MD    06/10/2025

## 2025-06-11 ENCOUNTER — INFUSION (OUTPATIENT)
Dept: ONCOLOGY | Facility: HOSPITAL | Age: 70
End: 2025-06-11
Payer: MEDICARE

## 2025-06-11 VITALS
SYSTOLIC BLOOD PRESSURE: 142 MMHG | RESPIRATION RATE: 18 BRPM | DIASTOLIC BLOOD PRESSURE: 87 MMHG | HEART RATE: 73 BPM | TEMPERATURE: 97.1 F

## 2025-06-11 DIAGNOSIS — C61 PROSTATE CANCER METASTATIC TO MULTIPLE SITES: Primary | ICD-10-CM

## 2025-06-11 LAB
ALBUMIN SERPL-MCNC: 4.3 G/DL (ref 3.5–5.2)
ALBUMIN/GLOB SERPL: 1.9 G/DL
ALP SERPL-CCNC: 75 U/L (ref 39–117)
ALT SERPL-CCNC: 9 U/L (ref 1–41)
AST SERPL-CCNC: 14 U/L (ref 1–40)
BASOPHILS # BLD AUTO: 0.08 10*3/MM3 (ref 0–0.2)
BASOPHILS NFR BLD AUTO: 1.1 % (ref 0–1.5)
BILIRUB SERPL-MCNC: 0.2 MG/DL (ref 0–1.2)
BUN SERPL-MCNC: 26 MG/DL (ref 8–23)
BUN/CREAT SERPL: 14.7 (ref 7–25)
CALCIUM SERPL-MCNC: 8.7 MG/DL (ref 8.6–10.5)
CHLORIDE SERPL-SCNC: 108 MMOL/L (ref 98–107)
CO2 SERPL-SCNC: 21.5 MMOL/L (ref 22–29)
CREAT SERPL-MCNC: 1.77 MG/DL (ref 0.76–1.27)
EGFRCR SERPLBLD CKD-EPI 2021: 40.8 ML/MIN/1.73
EOSINOPHIL # BLD AUTO: 0.15 10*3/MM3 (ref 0–0.4)
EOSINOPHIL NFR BLD AUTO: 2 % (ref 0.3–6.2)
ERYTHROCYTE [DISTWIDTH] IN BLOOD BY AUTOMATED COUNT: 13.6 % (ref 12.3–15.4)
GLOBULIN SER CALC-MCNC: 2.3 GM/DL
GLUCOSE SERPL-MCNC: 98 MG/DL (ref 65–99)
HCT VFR BLD AUTO: 39.2 % (ref 37.5–51)
HGB BLD-MCNC: 12.8 G/DL (ref 13–17.7)
IMM GRANULOCYTES # BLD AUTO: 0.03 10*3/MM3 (ref 0–0.05)
IMM GRANULOCYTES NFR BLD AUTO: 0.4 % (ref 0–0.5)
LYMPHOCYTES # BLD AUTO: 1.63 10*3/MM3 (ref 0.7–3.1)
LYMPHOCYTES NFR BLD AUTO: 21.8 % (ref 19.6–45.3)
MAGNESIUM SERPL-MCNC: 2.4 MG/DL (ref 1.6–2.4)
MCH RBC QN AUTO: 29.1 PG (ref 26.6–33)
MCHC RBC AUTO-ENTMCNC: 32.7 G/DL (ref 31.5–35.7)
MCV RBC AUTO: 89.1 FL (ref 79–97)
MONOCYTES # BLD AUTO: 0.62 10*3/MM3 (ref 0.1–0.9)
MONOCYTES NFR BLD AUTO: 8.3 % (ref 5–12)
NEUTROPHILS # BLD AUTO: 4.98 10*3/MM3 (ref 1.7–7)
NEUTROPHILS NFR BLD AUTO: 66.4 % (ref 42.7–76)
NRBC BLD AUTO-RTO: 0 /100 WBC (ref 0–0.2)
PHOSPHATE SERPL-MCNC: 3 MG/DL (ref 2.5–4.5)
PLATELET # BLD AUTO: 230 10*3/MM3 (ref 140–450)
POTASSIUM SERPL-SCNC: 4.1 MMOL/L (ref 3.5–5.2)
PROT SERPL-MCNC: 6.6 G/DL (ref 6–8.5)
PSA SERPL-MCNC: 0.3 NG/ML (ref 0–4)
RBC # BLD AUTO: 4.4 10*6/MM3 (ref 4.14–5.8)
SODIUM SERPL-SCNC: 143 MMOL/L (ref 136–145)
WBC # BLD AUTO: 7.49 10*3/MM3 (ref 3.4–10.8)

## 2025-06-11 PROCEDURE — 25010000002 DENOSUMAB 120 MG/1.7ML SOLUTION: Performed by: NURSE PRACTITIONER

## 2025-06-11 PROCEDURE — 96372 THER/PROPH/DIAG INJ SC/IM: CPT

## 2025-06-11 RX ADMIN — DENOSUMAB 120 MG: 120 INJECTION SUBCUTANEOUS at 10:40

## 2025-06-23 ENCOUNTER — SPECIALTY PHARMACY (OUTPATIENT)
Dept: ONCOLOGY | Facility: HOSPITAL | Age: 70
End: 2025-06-23
Payer: MEDICARE

## 2025-06-23 RX ORDER — AMLODIPINE BESYLATE 5 MG/1
7.5 TABLET ORAL DAILY
Qty: 135 TABLET | Refills: 0 | Status: SHIPPED | OUTPATIENT
Start: 2025-06-23 | End: 2025-12-20

## 2025-06-23 RX ORDER — LOSARTAN POTASSIUM 100 MG/1
100 TABLET ORAL DAILY
Qty: 90 TABLET | Refills: 0 | Status: SHIPPED | OUTPATIENT
Start: 2025-06-23

## 2025-06-23 NOTE — TELEPHONE ENCOUNTER
Caller: Chris Morfin    Relationship: Self    Best call back number: [phone]     Requested Prescriptions:   Requested Prescriptions     Pending Prescriptions Disp Refills    losartan (COZAAR) 100 MG tablet 90 tablet 1     Sig: Take 1 tablet by mouth Daily.    amLODIPine (NORVASC) 5 MG tablet 135 tablet 1     Sig: Take 1.5 tablets by mouth Daily for 180 days.        Pharmacy where request should be sent: [address] [phone] [facility] [phone] [facility]     Last office visit with prescribing clinician: 2/5/2025   Last telemedicine visit with prescribing clinician: Visit date not found   Next office visit with prescribing clinician: 8/5/2025     Additional details provided by patient: 4 PILLS ON HAND     Does the patient have less than a 3 day supply:  [] Yes  [x] No    Would you like a call back once the refill request has been completed: [] Yes [] No    If the office needs to give you a call back, can they leave a voicemail: [] Yes [] No    Reza Hanna Rep   06/23/25 10:13 EDT

## 2025-06-23 NOTE — PROGRESS NOTES
Specialty Pharmacy Patient Management Program  Refill Outreach     Chris was contacted today regarding refills of their medication(s).    Refill Questions      Flowsheet Row Most Recent Value   Changes to allergies? No   Changes to medications? No   New conditions or infections since last clinic visit No   If yes, please describe  n/a   Unplanned office visit, urgent care, ED, or hospital admission in the last 4 weeks  No   How does patient/caregiver feel medication is working? Good   Financial problems or insurance changes  No   Since the previous refill, were any specialty medication doses or scheduled injections missed or delayed?  Yes   If yes, please provide the amount 2 doses   Why were doses missed? Wasn't feeling good   Does this patient require a clinical escalation to a pharmacist? No            Delivery Questions      Flowsheet Row Most Recent Value   Delivery method UPS   Delivery address verified with patient/caregiver? Yes   Delivery address Home   Other address preferred n/a   Number of medications in delivery 1   Medication(s) being filled and delivered Abiraterone Acetate (ZYTIGA)   Doses left of specialty medications 3 days left   Copay verified? Yes   Copay amount $0   Copay form of payment No copayment ($0)   Delivery Date Selection 06/25/25   Signature Required No                 Follow-up: 30 day(s)     Dianna Zepeda, Pharmacy Technician  6/23/2025  13:57 EDT

## 2025-07-08 ENCOUNTER — LAB (OUTPATIENT)
Dept: ONCOLOGY | Facility: HOSPITAL | Age: 70
End: 2025-07-08
Payer: MEDICARE

## 2025-07-08 VITALS
SYSTOLIC BLOOD PRESSURE: 128 MMHG | HEART RATE: 53 BPM | DIASTOLIC BLOOD PRESSURE: 84 MMHG | BODY MASS INDEX: 36.21 KG/M2 | TEMPERATURE: 97.9 F | WEIGHT: 259.6 LBS | RESPIRATION RATE: 18 BRPM

## 2025-07-08 DIAGNOSIS — C61 PROSTATE CANCER METASTATIC TO MULTIPLE SITES: ICD-10-CM

## 2025-07-08 PROCEDURE — 36415 COLL VENOUS BLD VENIPUNCTURE: CPT

## 2025-07-09 ENCOUNTER — SPECIALTY PHARMACY (OUTPATIENT)
Dept: ONCOLOGY | Facility: HOSPITAL | Age: 70
End: 2025-07-09
Payer: MEDICARE

## 2025-07-09 ENCOUNTER — OFFICE VISIT (OUTPATIENT)
Dept: ONCOLOGY | Facility: CLINIC | Age: 70
End: 2025-07-09
Payer: MEDICARE

## 2025-07-09 ENCOUNTER — INFUSION (OUTPATIENT)
Dept: ONCOLOGY | Facility: HOSPITAL | Age: 70
End: 2025-07-09
Payer: MEDICARE

## 2025-07-09 VITALS
SYSTOLIC BLOOD PRESSURE: 144 MMHG | DIASTOLIC BLOOD PRESSURE: 70 MMHG | RESPIRATION RATE: 18 BRPM | TEMPERATURE: 98 F | HEIGHT: 71 IN | OXYGEN SATURATION: 94 % | WEIGHT: 259 LBS | HEART RATE: 74 BPM | BODY MASS INDEX: 36.26 KG/M2

## 2025-07-09 DIAGNOSIS — C61 PROSTATE CANCER METASTATIC TO MULTIPLE SITES: Primary | ICD-10-CM

## 2025-07-09 LAB
ALBUMIN SERPL-MCNC: 4.4 G/DL (ref 3.5–5.2)
ALBUMIN/GLOB SERPL: 1.7 G/DL
ALP SERPL-CCNC: 85 U/L (ref 39–117)
ALT SERPL-CCNC: 9 U/L (ref 1–41)
AST SERPL-CCNC: 13 U/L (ref 1–40)
BASOPHILS # BLD AUTO: 0.08 10*3/MM3 (ref 0–0.2)
BASOPHILS NFR BLD AUTO: 0.8 % (ref 0–1.5)
BILIRUB SERPL-MCNC: 0.4 MG/DL (ref 0–1.2)
BUN SERPL-MCNC: 34 MG/DL (ref 8–23)
BUN/CREAT SERPL: 18.9 (ref 7–25)
CALCIUM SERPL-MCNC: 10.1 MG/DL (ref 8.6–10.5)
CHLORIDE SERPL-SCNC: 107 MMOL/L (ref 98–107)
CO2 SERPL-SCNC: 20.8 MMOL/L (ref 22–29)
CREAT SERPL-MCNC: 1.8 MG/DL (ref 0.76–1.27)
EGFRCR SERPLBLD CKD-EPI 2021: 40 ML/MIN/1.73
EOSINOPHIL # BLD AUTO: 0.21 10*3/MM3 (ref 0–0.4)
EOSINOPHIL NFR BLD AUTO: 2.1 % (ref 0.3–6.2)
ERYTHROCYTE [DISTWIDTH] IN BLOOD BY AUTOMATED COUNT: 13 % (ref 12.3–15.4)
GLOBULIN SER CALC-MCNC: 2.6 GM/DL
GLUCOSE SERPL-MCNC: 125 MG/DL (ref 65–99)
HCT VFR BLD AUTO: 42.1 % (ref 37.5–51)
HGB BLD-MCNC: 13.8 G/DL (ref 13–17.7)
IMM GRANULOCYTES # BLD AUTO: 0.05 10*3/MM3 (ref 0–0.05)
IMM GRANULOCYTES NFR BLD AUTO: 0.5 % (ref 0–0.5)
LYMPHOCYTES # BLD AUTO: 3.12 10*3/MM3 (ref 0.7–3.1)
LYMPHOCYTES NFR BLD AUTO: 31.9 % (ref 19.6–45.3)
MAGNESIUM SERPL-MCNC: 2.1 MG/DL (ref 1.6–2.4)
MCH RBC QN AUTO: 29.1 PG (ref 26.6–33)
MCHC RBC AUTO-ENTMCNC: 32.8 G/DL (ref 31.5–35.7)
MCV RBC AUTO: 88.8 FL (ref 79–97)
MONOCYTES # BLD AUTO: 0.67 10*3/MM3 (ref 0.1–0.9)
MONOCYTES NFR BLD AUTO: 6.9 % (ref 5–12)
NEUTROPHILS # BLD AUTO: 5.64 10*3/MM3 (ref 1.7–7)
NEUTROPHILS NFR BLD AUTO: 57.8 % (ref 42.7–76)
NRBC BLD AUTO-RTO: 0 /100 WBC (ref 0–0.2)
PHOSPHATE SERPL-MCNC: 3.2 MG/DL (ref 2.5–4.5)
PLATELET # BLD AUTO: 264 10*3/MM3 (ref 140–450)
POTASSIUM SERPL-SCNC: 4.3 MMOL/L (ref 3.5–5.2)
PROT SERPL-MCNC: 7 G/DL (ref 6–8.5)
PSA SERPL-MCNC: 0.39 NG/ML (ref 0–4)
RBC # BLD AUTO: 4.74 10*6/MM3 (ref 4.14–5.8)
SODIUM SERPL-SCNC: 141 MMOL/L (ref 136–145)
WBC # BLD AUTO: 9.77 10*3/MM3 (ref 3.4–10.8)

## 2025-07-09 PROCEDURE — 99214 OFFICE O/P EST MOD 30 MIN: CPT | Performed by: NURSE PRACTITIONER

## 2025-07-09 PROCEDURE — 1159F MED LIST DOCD IN RCRD: CPT | Performed by: NURSE PRACTITIONER

## 2025-07-09 PROCEDURE — 25010000002 DENOSUMAB 120 MG/1.7ML SOLUTION: Performed by: NURSE PRACTITIONER

## 2025-07-09 PROCEDURE — 96372 THER/PROPH/DIAG INJ SC/IM: CPT

## 2025-07-09 PROCEDURE — 1126F AMNT PAIN NOTED NONE PRSNT: CPT | Performed by: NURSE PRACTITIONER

## 2025-07-09 PROCEDURE — 1160F RVW MEDS BY RX/DR IN RCRD: CPT | Performed by: NURSE PRACTITIONER

## 2025-07-09 RX ADMIN — DENOSUMAB 120 MG: 120 INJECTION SUBCUTANEOUS at 09:25

## 2025-07-09 NOTE — PROGRESS NOTES
CHIEF COMPLAINT: Metastatic prostate cancer    Problem List:  Oncology/Hematology History Overview Note   1.  Prostate cancer clinical T1c and 2M1B stage IVb treated with 4 cycles of Taxotere, stopped due to syncope with negative cardiac work-up, with marked drop in PSA of 18.6 from over 900 at baseline, continued on Zytiga and prednisone.  2.  Urinary retention with Goodwin in place 1/24/2022.  On finasteride 1/24/2022.  3.  Covid 19 December 2021  4.  Hypertension  5.  Stage 3a chronic kidney disease    Oncology history timeline:  -11/29/2021  with symptoms of urinary retention.  -2/15/2022 prostate biopsy adenocarcinoma grade group 5 and 3 out of 12 cores, grade group 4 in 1 out of 12 cores, grade group 3 in 8 out of 12 cores.  -2/24/2022 CT chest abdomen pelvis and total body bone scan shows left third rib, right acetabulum, periaortic and retroperitoneal kizzy metastases complaining of pain in the left chest and right hip.  Also possible sclerotic left 10th rib on CT.  Spleen mildly enlarged 13.8 cm.  Hepatic steatosis.  Small liver cyst.  Fat stranding in the periaortic and mesenteric region consistent with reactive/inflammatory stranding.  Multiple enlarged periaortic and retroperitoneal nodes and lymphadenopathy largest 4.9 cm.  CT chest describes tiny sclerotic foci in left eighth rib and right lateral seventh rib and T1.  Multiple small mediastinal and bilateral axillary nodes seen with small hypodense left thyroid nodule.  Creatinine 1.7.  -3/4/2022 office note Dr. Rolando Matute: Patient was seen after his elevated PSA by Dr. Vera and prostate biopsy scheduled.  However, he developed COVID-19 and this was canceled and the patient did not reschedule due to lack of insurance.  Saw Dr. Matute back on 1/24/2022 with plans to get staging CTs and bone scan with results as outlined above.  Started Casodex and to return on 3/11/2022 for Lupron.  Referral made to medical oncology for other  additional castrate naïve prostate cancer therapies.  TURP planned for urinary retention symptoms.    -3/15/2022 [facility] [place] operated by [facility] medical oncology initial consultation: I reviewed the above data with the patient.  With his fairly bulky retroperitoneal adenopathy and bone metastases including extra axial metastases, he would fit the data from the CHAARTED trial for which Taxotere x6 followed by Zytiga prednisone along with the planned Lupron already being planned by Dr. Matute would be reasonable.  Equally reasonable in terms of comparisons with bicalutamide and Lupron would be Zytiga prednisone plus Lupron or Xtandi plus Lupron or apalutamide plus Lupron.  None of these have been compared head-to-head but all have beaten combined androgen deprivation therapy with bicalutamide and Lupron.  At the age of 67 and in order to have as many bullets as possible down the road and hence saving some of the hormone blockade options for later and using chemotherapy when he is younger and healthier for a more time-limited.,  He has opted for the Taxotere x6 followed by Zytiga and prednisone.  We will also give him Xgeva to improve bone health and given metastatic disease, as with all metastatic prostate cancer patients, he needs genetic counseling both for his sake as well as his family's.  If he were to have BRCA1 or other such  mutations, then that also opens up the options for PARP inhibitors etc. down the road.  He has his TURP scheduled for 2/24/2022 and we will start treatment a couple of weeks after that and he will get chemo preparation visit in the meantime and I will get capital surgeons to place a port.  We will check his PSA after his TURP when he sees my nurse practitioner back early April for the start of chemotherapy and repeat the PSA and CT chest abdomen pelvis and bone scan after the Taxotere is complete.    -3/31/2022 chemotherapy education and needs assessment completed    -4/7/2022 began docetaxel and Xgeva.   .0.  We will do his Xgeva every 6 weeks to coordinate with his docetaxel infusions.    -4/19/2022 patient stopped Casodex    -5/17/2022 patient reported seeing his PCP for an abscess in his suprapubic area.  Placed on clindamycin, will delay treatment 1 week.    -5/25/2022 PSA 34.3  -5/26/2022 [facility] Oncology clinic follow-up: Chris has an abscess in the suprapubic area, it has improved on antibiotic therapy but I am concerned if we treat him it will just flare back up unless it is drained.  I will get him to Dr. Miller who placed his port for I&D and culture.  He is on clindamycin and states he completes course of treatment tomorrow.  I will delay his treatment with Taxotere 1 more week.  We will give his Xgeva today.  I will see him back in 1 week for follow-up to assess whether or not we can resume his Taxotere.  His PSA has dropped nicely with treatment thus far, PSA yesterday was 34.3 which is down from a PSA of 259.0 when we started treatment, it has been as high as 911 in November.    -6/2/2022 [facility] Oncology clinic follow-up: Chris went to see Dr. Miller to have his abscess lanced however apparently there was a long wait and he left without being seen.  He did call his PCP and was given 5 more days of clindamycin and the abscess now seems to have resolved.  We discussed today that he is at risk for recurrence of infection due to immunocompromise state with chemotherapy.  He will notify us if he has any return of his abscess.  He does have intertrigo under his panniculus, I have advised him to keep this area dry, to apply topical drying/antifungal powders.  Also recommended looser clothing.  His counts are adequate to continue therapy, we will resume Taxotere today with cycle #3.  We will repeat restaging scans after 6 courses.  We are doing Xgeva every 6 weeks to coordinate with his Taxotere.  Once he finishes Taxotere we can then go back to every 4 weeks.  His next Xgeva is not due until  7/14/2022.  His calcium is running a little low, he is going to  calcium supplement.    -6/23/2022 [facility] follow[facility]: Chris overall is doing well on treatment with Taxotere.  Labs reviewed from yesterday and are unremarkable.  We will continue treatment today unchanged.  His suprapubic abscess resolved on clindamycin.  He will continue to use drying powder/antifungal powder under his panniculus for intertrigo.  Today will be cycle #4 of a planned 6 courses of Taxotere.  He is also on Xgeva, next dose due 7/14/2022, we are doing this every 6 weeks for now to line up with his chemotherapy, can go back to every 4 weeks after he finishes Taxotere.  Calcium from CMP yesterday was normal.    -7/13/2022 [facility] follow-up: Mr. Morfin has had issues around day 3 after each treatment ranging from chest pain after his first treatment for which he did not seek medical attention, fatigue after the next few treatments, but 3 days after his most recent treatment on 6/23/2022 he had a syncopal episode at home and once again did not seek medical attention.  I discussed with him that this was not acceptable, if he has occurrences like this someone needs to call 911, it is difficult to figure out what is going on after the fact, the best time to work this up would be during the occurrence.  For now we will get labs today with CBC, CMP, PSA.  I will refer him to cardiology for further evaluation.  We will hold Taxotere most likely, I will see him tomorrow to go over his lab results.  I will also repeat his restaging scans with CT chest, abdomen and pelvis and will include CT of the head in light of his syncopal episode.      -7/13/2022 PSA 18.6    -7/14/2022 [facility] follow-up: Mr. Morfin returns today to review his labs from yesterday.  Labs are unremarkable.  PSA down to 18.6 from a high of 259.0 in April prior to starting treatment.  CBC and CMP unremarkable, magnesium is normal  at 2.3, phosphorus slightly low at 2.4.  We will hold therapy for now in light of his syncopal episode on day 3 after his last treatment and prior episodes with chest pain and severe fatigue that all occurred on day 3 after his Taxotere.  We will restage him with CT head, chest, abdomen and pelvis (I am including the head in light of his syncopal episode).  I have also referred him to cardiology, he states that he received a call from them about making an appointment however he wanted to talk to us today first.  I emphasized the importance to him of making and keeping that appointment and he states understanding.  I also once again emphasized the requirement to seek emergency medical attention if he has any episodes in the future such as chest pain or syncopal events.  Whether or not we try and complete Taxotere with 2 more courses or move to the next line of therapy will be decided when he returns to discuss with Dr. Goodson and review his restaging scans.    -7/15/2022 saw Dr. Diaz cardiologist.  For syncope he ordered echocardiogram and 48-hour Holter monitor.    -7/15/2022 Holter monitor relatively benign.    -7/22/2022 CT brain with and without contrast negative.  CT chest abdomen and pelvis shows some nonspecific cecal wall thickening.  No progression in the chest.  T1 and ribs stable.  Decrease in multiple retroperitoneal and pelvic nodes.  Slight increase in right acetabular sclerotic lesion.  Persistent but improved circumferential bladder wall thickening.  Total body bone scan shows stable left third rib and no evidence of new bone metastases.    -7/19/2022 ejection fraction 65% with grade 1 diastolic dysfunction.    -7/26/2022 [facility] clinic follow-up: Given presyncopal symptoms occurred 3 days after Taxotere and has not otherwise occurred any other time and his cardiology work-up is fairly unremarkable and his disease is under good control as witnessed by the report above of the CTs of head  chest abdomen pelvis and bone scan, I will hold cycle 5 and 6 of Taxotere and continue 1 now with Zytiga and prednisone.  With reference to the coincidental cecal wall thickening found on CT, we will get him to [facility] surgeons for colonoscopy.  He will see my nurse practitioner back in August for next cycle of Abiraterone prednisone.  He will continue his Lupron with Dr. Matute.  We will continue his Xgeva.  We will repeat his CT chest abdomen pelvis and bone scan again in 3 months.  He will see my nurse practitioner in the interim.    -8/23/2022 [facility] oncology clinic follow-up: No further presyncopal symptoms.  Feeling great other than for hot flashes.  Did commend for now we will continue on with his Zytiga prednisone and he will get his Xgeva today based on labs from 8/10/2022.  He opted out of getting the colonoscopy that I recommended and he will not change his mind.  He will continue getting the Lupron with Dr. Matute and I asked him to give the date of this appointment to my nurse when he sees her back in a month.  We will give his Xgeva today.  We will get CT chest abdomen pelvis and total body bone scan towards the middle to end of October.  Presently other than for the hot flashes feels great.    -9/20/2022 PSA 8.320    -9/22/2022 [facility] Oncology clinic follow-up: Chris is doing well on Zytiga, prednisone along with Xgeva.  Labs reviewed from 9/20/2022 are unremarkable, CBC was normal, CMP with creatinine of 1.34 otherwise normal, this is stable from his baseline.  PSA stable at 8.320.  He received Lupron recently with Dr. Matute, he thinks last week or so, states his next appointment is in February.  He will continue treatment unchanged.  We will repeat restaging CT chest, abdomen and pelvis and total body bone scan in October prior to return and I have ordered those today.  We will also repeat his PSA.    -9/22/2022 CT chest abdomen pelvisCompared to July 2022 [facility] showed no  evidence of disease progression in the chest with no substantial sclerotic bony lesions and decrease in size of retroperitoneal and pelvic nodes with persistent cecal wall thickening and suboptimal bladder distention.  Total body bone scan showed decrease in previously seen uptake in the left third rib with diffuse sclerosis representing treatment response with no new foci.    -10/18/2022 [facility] medical oncology follow-up: I reviewed bone scan and CT reports as above with no evidence of progression.  Tolerating Zytiga prednisone and Xgeva.  Getting Lupron regularly with Dr. Matute next appointment coming in February.  We will repeat his CT chest abdomen pelvis and total body bone scan in April.  He will follow with my nurse practitioner in the interim.    -1/10/2023 PSA 3.450  -1/11/2023 [facility] Oncology clinic follow-up: Mr. Morfin continues to do well on current therapy with Zytiga, prednisone and Xgeva with no unusual side effects.  He has no new worrisome symptoms.  He is due for his next Lupron injection in February with Dr. Matute.  His PSA continues to trend downward, current PSA from yesterday was 3.450.  We will continue therapy unchanged, he will receive Xgeva today.  Has had no hypocalcemia, his CMP, phosphorus and magnesium are all normal.  We will repeat restaging scans in April.  -3/7/2023 PSA 2.460  -3/8/2023 [facility] Oncology clinic follow-up:  Mr. Morfin is doing well.  He is tolerating therapy with Zytiga, prednisone and Xgeva with no significant side effects.  He has hot flashes but these are tolerable.  He had Lupron injection 2/24/2023 with Dr. Matute.  His PSA continues to decline, current PSA 2.460.  He will continue therapy unchanged.  We will repeat restaging scans late April prior to return in 2 months and I have ordered those today.  He is working with his PCP Rodrigo Ram on his hypertension.  His b/p today was 160/88.  He was to have increased his losartan but I do not think he has  "done that yet as he said \"I still have some of my old prescription left\".  -4/4/2023 PSA 2.55  - 4/26/2023 CT chest abdomen pelvis [place] comparison 10/10/2022 showed stable left third rib, T1, left fourth and eighth rib, right seventh rib.  Probable liver cyst.  Few diverticuli.  Mild further decrease in a few of the previously enlarged nodes.  Left external iliac 12 mm compared to 16 mm.  Right common iliac 7 mm stable.  Lower left periaortic 8 mm previously 13 mm.  No new lytic or blastic osseous lesions.  Total body bone scan comparison 10/10/2022, 7/22/2022, and CT 4/26/2023.  No new sites of increased uptake.  1 focal left third rib hotspot consistent with bone metastasis.    -5/2/2023 [facility] follow-up: I reviewed interval notes since I last saw him from my nurse practitioner as outlined above in interval images and reports thereof from [facility] that shows no new sites of bone metastasis and no kizzy or visceral progression.  PSA stabilized around 2.5.  In the absence of symptomatic or radiographic progression, I would be unlikely to change therapy just on the basis of a PSA rise and for now the PSA is stable.  We will continue Zytiga prednisone and Xgeva and he will follow-up with my nurse practitioner 5/30/2023 with repeat CTs and bone scan in 6 months.  I asked him to check with his primary care today regarding his systolic in the 170s.    -5/31/2023 [facility] clinic follow-up: Chris continues to do well on current therapy with Zytiga, prednisone and Xgeva along with Lupron that he receives with Dr. Matute.  His PSA remains stable, it was lower this month compared to last month, current PSA 1.940.  Continues to deal with hypertension, on arrival today his blood pressure was 210/92 however this was using a wrist cuff, when I rechecked it manually his blood pressure was 160/90.  Still has room for improvement despite recent increase in his losartan to 100 mg daily.  He " will follow-up with Dr. Ram for further management.  We will continue therapy unchanged.  We will repeat restaging scans in October.    -7/26/2023 [facility] follow-up: Chris overall continues to do well on current therapy with Zytiga, prednisone and Xgeva.  He receives Lupron every 6 months with Dr. Matute and states that is scheduled for August.  PSA from yesterday is pending, PSA on 6/27/2023 was 1.590 which was continuing to decline, in February it was 3.250.  His creatinine this past month has bumped up.  He feels he is staying hydrated but I encouraged him to increase his fluid intake.  I discussed with him that this could be from his hypertension, some of his medications.  His blood pressure today is 165/92.  He has an appointment with his primary care provider Dr. Teri Ram next week and can further discuss with her.  No adjustment needed for Xgeva.  I refilled his prednisone today.  I will see him back in 1 month for follow-up with continued monitoring of his labs.  He may end up needing to see nephrology.  We will repeat restaging scans in October.    -9/29/2023 PSA 1.810  -8/23/2023 [facility] follow-up: Chris continues on therapy with Zytiga, prednisone and Xgeva, he also receives Lupron every 6 months with Dr. Matute with most recent given on 8/7/2023.  He is scheduled for a cystoscopy in a few weeks as his last few UAs per the patient's report had microscopic hematuria, he has had no steve hematuria.  His creatinine continues to remain elevated at 1.82, BUN is 40, GFR is 40.  I will get him to nephrology for further evaluation.  Blood pressure today was fairly good at 161/86, he continues to follow with Dr. Ram for management.  PSA on 7/25/2023 was up slightly from prior month, PSA in July was 2.140, in June it was 1.590, PSA from yesterday is pending.  I plan on repeating restaging CT scans and bone scans in October however if his PSA is up significantly  then I will do sooner.  I will see him back for follow-up in 1 month.  -8/23/2024 PSA 1.860    -9/20/2023 [facility] follow-up: Chris is tolerating treatment with Zytiga, prednisone and Xgeva.  He is up-to-date with his Lupron injection, last received in August with Dr. Matute.  He had cystoscopy on 9/8/2023 that was normal. I referred him to nephrology when I saw him last in August as his creatinine has been increasing, creatinine yesterday was a little better 1.51, GFR is 50, creatinine clearance 63.8.  He is still awaiting an appointment with nephrology, per our  it will be early November.  We will repeat his restaging scans prior to return and I will do his CT scans without contrast in light of his new renal insufficiency.  We will also get total body bone scan and I have ordered those today.  His PSA yesterday was 1.810.    -10/9/2023 CT chest abdomen Pelvis without contrast compared to April 2023 shows slightly prominent pelvic lymph nodes left external iliac 8 mm compared to prior study.  Right internal iliac heterogenous 19 mm compared to 11 mm prior.  Stable sclerotic bone lesions and no new lesions.  Stable bone scan with no new sites of disease    -10/17/2023 [facility] clinic follow-up: With elevated creatinine, CTs done without contrast showed very slight increased prominence by few millimeters of some internal iliac and external iliac nodes for which PSMA PET could be done but for now I will check his PSA and have him see my nurse practitioner back in a week and unless the PSA is significantly rising I would continue the Zytiga, prednisone, Xgeva and February dose of Lupron with Dr. Matute and make sure he follows with nephrology.  If his PSA is significantly rising then my nurse practitioner will order PSMA PET.    -10/17/2023 PSA 1.5  -11/14/2023 PSA 1.510  -11/15/2023 [facility] follow-up: Mr. Ferrara continues to do well on current therapy with Zytiga,  prednisone, Xgeva and Lupron that he receives with Dr. Matute.  His PSA is stable at 1.5.  Would not change therapy for now, we will plan on repeating restaging scans in February unless PSA starts to rise or he has new concerns.  Overall he feels good.  He is now established with nephrology and will continue to follow with them regarding his renal insufficiency, I reviewed note from consultation with Dr. Armstrong 11/6/2023.    -1/9/2024 PSA 1.050    -1/10/2024 [facility] [place] Oncology clinic follow-up: Chris overall continues to tolerate current therapy with Zytiga, prednisone, Xgeva and Lupron.  He states his next Lupron injection with Dr. Matute is due late February.  His PSA is stable at 1.050. Creatinine stable at 1.64, he is following with nephrology, he has a an appointment with Dr. Armstrong in February for follow-up.  Hypertension being managed by his primary care provider, target systolic ideally 120s-140s, today it is running a little high at 172/90, yesterday when he was here for labs it was 138/80.  We will continue therapy unchanged with plans to repeat restaging scans in April.    -2/6/2024 PSA 0.753    -2/7/2024 [facility] [place] oncology clinic follow-up: Chris continues to do well on current therapy with Zytiga, prednisone, Xgeva and Lupron.  He is up-to-date on Lupron injection next due later this month with Dr. Matute.  His PSA continues to slowly decrease, current PSA is 0.753.  He has no new worrisome symptoms.  We will continue therapy unchanged, I have ordered restaging scans for late March prior to his return in April. He is following with nephrology for his renal insufficiency, his creatinine yesterday was good at 1.21.  I will do his CT scans with contrast unless something changes in the interim. He follows closely with Dr. Ram for management of his hypertension. I reviewed notes from his last office visit with her earlier this month on 2/1/2024, also reviewed labs in detail with the patient today.   All questions were answered to his satisfaction.    -3/5/2024 PSA slowly declining 0.73.  Creatinine fluctuating.  1.69 with GFR 43 otherwise unremarkable CMP.  Phosphorus low 2.1    -4/8/2024 CT chest, abdomen and pelvis shows stable sclerotic lesions in the thoracic spine and bilateral ribs and right acetabulum.  Stable appearance of pelvic lymph nodes.  No new sites concerning for progression of disease.  Total body bone scan shows stable diffuse increased uptake of the left third rib correlates with diffuse sclerosis and enlargement on CT, no new sites of active osseous lesions.  -4/30/2024 PSA 0.713  -5/1/2024 [facility] follow-up: Chris continues to do well on current therapy with Zytiga, prednisone, Xgeva and Lupron.  He receives his Lupron every 6 months with Dr. Matute, last administered 2/5/2024.  Current CT scans and bone scan showed no progression of disease, PSA is stable at 0.713.  His CBC is unremarkable, CMP shows creatinine stable at 1.50 otherwise normal.  He does follow with nephrology.  We will continue treatment unchanged.  I will see him back in 2 months for follow-up.  We will repeat restaging scans in 6 months unless he has any new symptoms or significant change in his PSA.    -6/25/2024 PSA 0.623  -6/26/2024 [facility] clinic follow-up: Chris overall continues to tolerate current therapy with Zytiga, prednisone, Xgeva and Lupron.  We did call to confirm his next Lupron injection and that is scheduled for late July with Dr. Matute.  His PSA for the most part is stable at 0.623, we will continue to monitor.  He has no new worrisome symptoms.  I did discuss with him today his creatinine which has increased to 1.86, BUN was 32.  Reminded him to be sure and stay hydrated.  He does follow with nephrology and his next appointment is in August.  He is on Xgeva however there is no indication for any adjustment, his creatinine clearance is greater than 30.  Blood pressure today  was under good control at 130/78.  We will continue treatment unchanged.  I will see him back in 1 month for follow-up.  Plan to repeat restaging scans in October unless his PSA rises or he has worrisome symptoms.    -7/23/2024 PSA 0.606    -9/17/2024 PSA 0.455  -9/18/2024 [facility] follow-up: Chris is doing well overall.  Tolerates therapy with Zytiga, prednisone, Xgeva and Lupron.  He last received Lupron in August with Dr. Matute.  His PSA had been slowly creeping up however it has now went back down and is currently 0.455.  He will continue treatment unchanged.  We will repeat CT chest, abdomen and pelvis prior to return and I will do that without contrast in light of his renal insufficiency.  Current creatinine is stable at 1.5, he does follow with nephrology.  We will also repeat total body bone scan.  We will see him back in 1 months for follow-up and sooner if any concerns.  I did discuss with him that he can take Tylenol if needed or use topical over-the-counter pain relief ointment such as Biofreeze etc. for his back pain but thus far it has not required any intervention, he does not like to take pain medications.    -10/14/2024 CT chest abdomen pelvis without contrast [facility] compared to August 2024 shows unchanged sclerotic T1 vertebral body with expansile sclerotic left third rib without fracture and other small sclerotic nodules all unchanged.  Right external iliac node 20 mm increased from 7 mm on prior exam with internal iliac node 15 mm unchanged.  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    -10/22/2024 [facility] follow-up: Feeling great.  While he has 1 slightly larger node, given that his PSA has not been rapidly rising and all other disease is stable I will have him continue Zytiga prednisone Xgeva and he gets his next Lupron in February with Dr. Matute.  He will see my nurse  practitioner and she will order repeat CT chest abdomen pelvis and bone scan for January and will follow-up with her in January to go over those results.  If either his PSA or the scans show progression beyond just the single slightly larger iliac node, she will order a PSMA PET and then get him back to me.  If the PSA is stable and the subsequent scans are stable then we will just continue his current regimen with quarterly imaging or sooner as symptoms or PSA dictate.    -11/19/2024 PSA 0.453  -1/14/2025 PSA 0.351  -1/13/2025 received Lupron with Dr. Matute  -2/3/2025 total body bone scan shows overall stable, 1 new site involving the left costovertebral junction of the left sixth rib with no definite CT correlate and favored to represent degenerative/traumatic changes.  CT chest, abdomen and pelvis with no evidence of disease progression in the chest, unchanged expansile sclerotic left third rib mass and sclerotic T1 vertebral body focus.  Abdomen and pelvis with mild interval increase in bilateral external iliac lymph nodes, unchanged right internal iliac lymph node, for reference right external iliac lymph node now measuring up to 28 mm compared to 20 mm on prior, left external iliac lymph node now measuring up to 20 mm compared to 10 mm on prior.  Right internal iliac 15 mm stable.  -2/11/2025 PSA 0.364  -2/12/2025 [facility] follow-up: Chris overall is tolerating current therapy with Zytiga, prednisone and Xgeva monthly, he receives Lupron with Dr. Matute.  He last received Lupron 1/13/2025, next Lupron due in June.  His PSA is stable at 0.364.  Current restaging scans on 2/3/2025 show 1 new site in the left costovertebral junction of the left sixth rib, this is favored to represent degenerative/traumatic change, he does not recall any trauma to this area.  He is having no pain in this area.  He still has intermittent pain in his right mid back that is worse typically when he is lying down.   He does have a sclerotic lesion at T1, I will go ahead and get an MRI of his thoracic spine just to make sure this is stable.  There has been a mild increase in the bilateral external iliac nodes and the right internal iliac node is stable.  Overall due to the stability of his PSA, his tolerability of current treatment and his desire not to pursue chemotherapy we will continue treatment unchanged.  We will repeat restaging scans in 3 months for follow-up.  If he has progressive new disease or worsening symptoms we would then want to get PSMA PET scan for evaluation, I think he would consider possibly [place] down the road.  We will see him back in 1 month for follow-up to go over his MRI and continue current therapy.     -3/11/2025 [facility] follow-up: Tolerating Zytiga prednisone and Xgeva.  Next Lupron in June with Dr. Matute.  PSA staying low.  Patient could not tolerate MRI relative to his back pain due to claustrophobia.  Minimal back pain.  No leg weakness.  If his PSA rises we will get PSMA PET otherwise we will repeat his bone scan and CAT scans mid-May.    - 4/15/2025 PSA 0.302  -4/16/2025 [facility] follow-up: Chris currently is feeling well however he did have a concerning acute illness several weeks ago when he woke up and reports that he was incontinent of urine and stool and felt dizzy, he was then fatigued for about 3 days.  He did not seek emergency attention or any follow-up with PCP, he did not notify any healthcare provider.  I am not sure if he had a viral illness, we discussed that he could have had a seizure.  I stressed the importance of seeking medical attention if this happened again but he honestly admits that he most likely would not. His labs from yesterday look good, CBC is unremarkable, CMP with stable renal insufficiency, creatinine 1.57.  His PSA looks good at 0.302.  Blood pressure is running a little high at 157/94, he will follow-up with Dr. Ram,  also has seen nephrology Dr. Armstrong.  He last received Lupron with Dr. Matute in January.  We will continue treatment unchanged.  We will repeat restaging CT chest, abdomen and pelvis without contrast and total body bone scan late May and I have ordered those today.  We will see him back for follow-up after his scans to go over those results and further plan of care.    - 5/13/2025 PSA 0.306 CBC and CMP unremarkable save for creatinine stable 1.52 glucose 140.    - 5/27/2025 total body bone scan compared to February 2025 shows single left third rib osseous metastasis exhibiting treatment response.  CT chest abdomen pelvis without contrast compared to February 2025 shows no change in the bony lesions within T1 or left third rib and improvement in the adenopathy along the left pelvic sidewall with stable lymph nodes right pelvic sidewall.  Abdomen pelvis otherwise unchanged.    - 6/10/2025 [facility] follow-up: PSA staying down.  Tolerating Zytiga prednisone and Xgeva.  Next Lupron July with Dr. Matute.  Next Xgeva June.  Practitioner June 9.  Check PSA quarterly check total body bone scan and noncontrast CT chest abdomen pelvis in December 25.  Follow-up with Dr. Armstrong on his renal insufficiency.    -7/8/2025 PSA 0.391     Prostate cancer metastatic to multiple sites   3/15/2022 Initial Diagnosis    Prostate cancer metastatic to multiple sites (HCC)     3/15/2022 Cancer Staged    Staging form: Prostate, AJCC 8th Edition  - Clinical: Stage IVB (cT1c, cN1, cM1b, Grade Group: 5) - Signed by Fritz Goodson MD on 3/15/2022     4/7/2022 - 6/23/2022 Chemotherapy    Stopped after cycle 4 6/23/2022 due to syncope 3 days post infusions with negative cardiac work-up  OP PROSTATE DOCEtaxel     4/7/2022 -  Chemotherapy    OP SUPPORTIVE Denosumab (Xgeva) Q28D     7/26/2022 -  Chemotherapy    OP PROSTATE Abiraterone / PredniSONE           HISTORY OF PRESENT ILLNESS:  The patient is a 70 y.o. male, here for follow  "up on management of metastatic prostate cancer currently on therapy with Zytiga, prednisone and Xgeva, also receives Lupron every 6 months with Dr. Sue.  Chris states he received his Lupron last week.  He also had follow-up recently with nephrology and states they did not recommend any changes.  Overall he feels well.  His appetite is normal, no change in his bowel or bladder habits.  He has no new pain.    Past Medical History:   Diagnosis Date    Cancer     Prostate cancer      Past Surgical History:   Procedure Laterality Date    PROSTATE BIOPSY  02/2022    dr. sue       No Known Allergies    Family History and Social History reviewed and changed as necessary    REVIEW OF SYSTEM:   No new somatic concerns    PHYSICAL EXAM:  Well-developed, well-nourished appearing male in no distress  Respirations regular and unlabored, lungs clear to auscultation bilaterally  Heart regular rate and rhythm    Vitals:    07/09/25 0854   BP: 144/70   Pulse: 74   Resp: 18   Temp: 98 °F (36.7 °C)   SpO2: 94%   Weight: 117 kg (259 lb)   Height: 180.3 cm (71\")     Vitals:    07/09/25 0854   PainSc: 0-No pain          ECOG score: 0           Vitals reviewed.  Labs reviewed    ECOG: (0) Fully Active - Able to Carry On All Pre-disease Performance Without Restriction    Lab Results   Component Value Date    HGB 13.8 07/08/2025    HCT 42.1 07/08/2025    MCV 88.8 07/08/2025     07/08/2025    WBC 9.77 07/08/2025    NEUTROABS 5.64 07/08/2025    LYMPHSABS 3.12 (H) 07/08/2025    MONOSABS 0.67 07/08/2025    EOSABS 0.21 07/08/2025    BASOSABS 0.08 07/08/2025       Lab Results   Component Value Date    GLUCOSE 125 (H) 07/08/2025    BUN 34.0 (H) 07/08/2025    CREATININE 1.80 (H) 07/08/2025     07/08/2025    K 4.3 07/08/2025     07/08/2025    CO2 20.8 (L) 07/08/2025    CALCIUM 10.1 07/08/2025    PROTEINTOT 7.0 07/08/2025    ALBUMIN 4.4 07/08/2025    BILITOT 0.4 07/08/2025    ALKPHOS 85 07/08/2025    AST 13 07/08/2025    " ALT 9 07/08/2025     Lab Results   Component Value Date    PSA 0.391 07/08/2025    PSA 0.301 06/10/2025    PSA 0.306 05/13/2025    PSA 0.302 04/15/2025    PSA 0.349 03/11/2025    PSA 0.364 02/11/2025             ASSESSMENT & PLAN:    1.  Metastatic prostate cancer  2.  Hypertension  3.  Stage IIIa chronic kidney disease    Discussion: Chris overall continues to do well on current therapy with Zytiga, prednisone, Xgeva and Lupron.  He received Lupron last week with Dr. Bennett.  Labs reviewed from yesterday, PSA stable at 0.391.  CBC is unremarkable, creatinine 1.80 with otherwise normal CMP.  He follows with nephrology every 6 months and states he last had follow-up in June with no changes recommended at that time.  We will continue treatment unchanged.  Blood pressure today was good at 144/70 managed by Dr. Ram with next follow-up in August.  We plan to repeat restaging scans in December unless symptoms or rising PSA dictate the need to do so sooner.  I will see him back in 2 months for follow-up.    This was a level 4, moderate MDM visit with management of side effects of therapy, review of labs, management of drug therapy requiring intensive monitoring for toxicity  Susana Torres, KARYN    07/09/2025

## 2025-07-23 ENCOUNTER — SPECIALTY PHARMACY (OUTPATIENT)
Dept: ONCOLOGY | Facility: HOSPITAL | Age: 70
End: 2025-07-23
Payer: MEDICARE

## 2025-07-23 NOTE — PROGRESS NOTES
Specialty Pharmacy Patient Management Program  Refill Outreach     Chris was contacted today regarding refills of their medication(s).    Refill Questions      Flowsheet Row Most Recent Value   Changes to allergies? No   Changes to medications? No   New conditions or infections since last clinic visit No   If yes, please describe  n/a   Unplanned office visit, urgent care, ED, or hospital admission in the last 4 weeks  No   How does patient/caregiver feel medication is working? Good   Financial problems or insurance changes  No   Since the previous refill, were any specialty medication doses or scheduled injections missed or delayed?  No   If yes, please provide the amount n/a   Why were doses missed? n/a   Does this patient require a clinical escalation to a pharmacist? No            Delivery Questions      Flowsheet Row Most Recent Value   Delivery method [employer]   Delivery address verified with patient/caregiver? Yes   Delivery address Home   Other address preferred n/a   Number of medications in delivery 1   Medication(s) being filled and delivered Abiraterone Acetate (ZYTIGA)   Doses left of specialty medications 6 left   Copay verified? Yes   Copay amount $0   Copay form of payment No copayment ($0)   Delivery Date Selection 07/25/25   Signature Required No                 Follow-up: 30 day(s)     Jazmine Dozier, Pharmacy Technician  7/23/2025  14:42 EDT

## 2025-08-05 ENCOUNTER — HOSPITAL ENCOUNTER (OUTPATIENT)
Age: 70
Discharge: HOME | End: 2025-08-05
Admitting: NURSE PRACTITIONER
Payer: MEDICARE

## 2025-08-05 ENCOUNTER — OFFICE VISIT (OUTPATIENT)
Dept: FAMILY MEDICINE CLINIC | Facility: CLINIC | Age: 70
End: 2025-08-05
Payer: MEDICARE

## 2025-08-05 VITALS
WEIGHT: 258.4 LBS | SYSTOLIC BLOOD PRESSURE: 147 MMHG | DIASTOLIC BLOOD PRESSURE: 84 MMHG | BODY MASS INDEX: 36.04 KG/M2 | TEMPERATURE: 97.8 F | HEART RATE: 47 BPM | RESPIRATION RATE: 18 BRPM

## 2025-08-05 VITALS
BODY MASS INDEX: 36.26 KG/M2 | DIASTOLIC BLOOD PRESSURE: 84 MMHG | WEIGHT: 259 LBS | HEART RATE: 84 BPM | HEIGHT: 71 IN | SYSTOLIC BLOOD PRESSURE: 138 MMHG | OXYGEN SATURATION: 99 %

## 2025-08-05 DIAGNOSIS — I10 ESSENTIAL HYPERTENSION: Primary | ICD-10-CM

## 2025-08-05 DIAGNOSIS — C61 PROSTATE CANCER METASTATIC TO MULTIPLE SITES: Primary | Chronic | ICD-10-CM

## 2025-08-05 DIAGNOSIS — E55.9 VITAMIN D DEFICIENCY: ICD-10-CM

## 2025-08-05 DIAGNOSIS — C61 PROSTATE CANCER METASTATIC TO MULTIPLE SITES: ICD-10-CM

## 2025-08-05 DIAGNOSIS — E78.2 MIXED HYPERLIPIDEMIA: ICD-10-CM

## 2025-08-05 PROCEDURE — 99214 OFFICE O/P EST MOD 30 MIN: CPT | Performed by: STUDENT IN AN ORGANIZED HEALTH CARE EDUCATION/TRAINING PROGRAM

## 2025-08-05 PROCEDURE — 1126F AMNT PAIN NOTED NONE PRSNT: CPT | Performed by: STUDENT IN AN ORGANIZED HEALTH CARE EDUCATION/TRAINING PROGRAM

## 2025-08-05 PROCEDURE — 36415 COLL VENOUS BLD VENIPUNCTURE: CPT

## 2025-08-05 RX ORDER — AMLODIPINE BESYLATE 5 MG/1
7.5 TABLET ORAL DAILY
Qty: 135 TABLET | Refills: 1 | Status: SHIPPED | OUTPATIENT
Start: 2025-08-05 | End: 2026-02-01

## 2025-08-05 RX ORDER — LOSARTAN POTASSIUM 100 MG/1
100 TABLET ORAL DAILY
Qty: 90 TABLET | Refills: 1 | Status: SHIPPED | OUTPATIENT
Start: 2025-08-05

## 2025-08-06 ENCOUNTER — HOSPITAL ENCOUNTER (OUTPATIENT)
Age: 70
Discharge: HOME | End: 2025-08-06
Payer: MEDICARE

## 2025-08-06 ENCOUNTER — TELEPHONE (OUTPATIENT)
Age: 70
End: 2025-08-06
Payer: MEDICARE

## 2025-08-06 DIAGNOSIS — C61 PROSTATE CANCER METASTATIC TO MULTIPLE SITES: Primary | ICD-10-CM

## 2025-08-06 LAB
25(OH)D3+25(OH)D2 SERPL-MCNC: 19.2 NG/ML (ref 30–100)
ALBUMIN SERPL-MCNC: 4.6 G/DL (ref 3.9–4.9)
ALP SERPL-CCNC: 80 IU/L (ref 44–121)
ALT SERPL-CCNC: 12 IU/L (ref 0–44)
AMBIG ABBREV LP DEFAULT: NORMAL
AST SERPL-CCNC: 14 IU/L (ref 0–40)
BASOPHILS # BLD AUTO: 0.1 X10E3/UL (ref 0–0.2)
BASOPHILS NFR BLD AUTO: 1 %
BILIRUB SERPL-MCNC: 0.4 MG/DL (ref 0–1.2)
BUN SERPL-MCNC: 21 MG/DL (ref 8–27)
BUN/CREAT SERPL: 12 (ref 10–24)
CALCIUM SERPL-MCNC: 9.2 MG/DL (ref 8.6–10.2)
CHLORIDE SERPL-SCNC: 106 MMOL/L (ref 96–106)
CHOLEST SERPL-MCNC: 192 MG/DL (ref 100–199)
CO2 SERPL-SCNC: 17 MMOL/L (ref 20–29)
CREAT SERPL-MCNC: 1.74 MG/DL (ref 0.76–1.27)
EGFRCR SERPLBLD CKD-EPI 2021: 42 ML/MIN/1.73
EOSINOPHIL # BLD AUTO: 0.1 X10E3/UL (ref 0–0.4)
EOSINOPHIL NFR BLD AUTO: 1 %
ERYTHROCYTE [DISTWIDTH] IN BLOOD BY AUTOMATED COUNT: 13.2 % (ref 11.6–15.4)
GLOBULIN SER CALC-MCNC: 2.6 G/DL (ref 1.5–4.5)
GLUCOSE SERPL-MCNC: 105 MG/DL (ref 70–99)
HCT VFR BLD AUTO: 42.9 % (ref 37.5–51)
HDLC SERPL-MCNC: 60 MG/DL
HGB BLD-MCNC: 13.4 G/DL (ref 13–17.7)
IMM GRANULOCYTES # BLD AUTO: 0.1 X10E3/UL (ref 0–0.1)
IMM GRANULOCYTES NFR BLD AUTO: 1 %
LDLC SERPL CALC-MCNC: 112 MG/DL (ref 0–99)
LYMPHOCYTES # BLD AUTO: 1.4 X10E3/UL (ref 0.7–3.1)
LYMPHOCYTES NFR BLD AUTO: 15 %
MAGNESIUM SERPL-MCNC: 2.2 MG/DL (ref 1.6–2.3)
MCH RBC QN AUTO: 28.8 PG (ref 26.6–33)
MCHC RBC AUTO-ENTMCNC: 31.2 G/DL (ref 31.5–35.7)
MCV RBC AUTO: 92 FL (ref 79–97)
MONOCYTES # BLD AUTO: 0.7 X10E3/UL (ref 0.1–0.9)
MONOCYTES NFR BLD AUTO: 7 %
NEUTROPHILS # BLD AUTO: 7.4 X10E3/UL (ref 1.4–7)
NEUTROPHILS NFR BLD AUTO: 75 %
PHOSPHATE SERPL-MCNC: 1.9 MG/DL (ref 2.8–4.1)
PLATELET # BLD AUTO: 273 X10E3/UL (ref 150–450)
POTASSIUM SERPL-SCNC: 4.2 MMOL/L (ref 3.5–5.2)
PROT SERPL-MCNC: 7.2 G/DL (ref 6–8.5)
PSA SERPL-MCNC: 0.3 NG/ML (ref 0–4)
RBC # BLD AUTO: 4.65 X10E6/UL (ref 4.14–5.8)
SODIUM SERPL-SCNC: 143 MMOL/L (ref 134–144)
T4 FREE SERPL-MCNC: 1.71 NG/DL (ref 0.82–1.77)
TRIGL SERPL-MCNC: 114 MG/DL (ref 0–149)
TSH SERPL DL<=0.005 MIU/L-ACNC: 1.81 UIU/ML (ref 0.45–4.5)
VLDLC SERPL CALC-MCNC: 20 MG/DL (ref 5–40)
WBC # BLD AUTO: 9.7 X10E3/UL (ref 3.4–10.8)

## 2025-08-18 DIAGNOSIS — C61 PROSTATE CANCER METASTATIC TO MULTIPLE SITES: Chronic | ICD-10-CM

## 2025-08-18 RX ORDER — PREDNISONE 5 MG/1
5 TABLET ORAL EVERY 12 HOURS SCHEDULED
Qty: 60 TABLET | Refills: 11 | Status: SHIPPED | OUTPATIENT
Start: 2025-08-18

## 2025-08-19 ENCOUNTER — SPECIALTY PHARMACY (OUTPATIENT)
Dept: ONCOLOGY | Facility: HOSPITAL | Age: 70
End: 2025-08-19
Payer: MEDICARE